# Patient Record
Sex: FEMALE | Race: OTHER | Employment: UNEMPLOYED | URBAN - METROPOLITAN AREA
[De-identification: names, ages, dates, MRNs, and addresses within clinical notes are randomized per-mention and may not be internally consistent; named-entity substitution may affect disease eponyms.]

---

## 2019-09-26 ENCOUNTER — HOSPITAL ENCOUNTER (OUTPATIENT)
Dept: GENERAL RADIOLOGY | Age: 59
Discharge: HOME | End: 2019-09-28
Payer: MEDICARE

## 2019-09-26 DIAGNOSIS — Z12.31 VISIT FOR SCREENING MAMMOGRAM: ICD-10-CM

## 2019-09-26 PROCEDURE — 77063 BREAST TOMOSYNTHESIS BI: CPT

## 2020-02-15 ENCOUNTER — APPOINTMENT (OUTPATIENT)
Dept: GENERAL RADIOLOGY | Age: 60
End: 2020-02-15
Payer: MEDICARE

## 2020-02-15 ENCOUNTER — HOSPITAL ENCOUNTER (EMERGENCY)
Age: 60
Discharge: HOME | End: 2020-02-15
Payer: MEDICARE

## 2020-02-15 VITALS
HEIGHT: 61 IN | BODY MASS INDEX: 33.99 KG/M2 | RESPIRATION RATE: 17 BRPM | HEART RATE: 82 BPM | WEIGHT: 180 LBS | OXYGEN SATURATION: 98 % | SYSTOLIC BLOOD PRESSURE: 136 MMHG | DIASTOLIC BLOOD PRESSURE: 83 MMHG | TEMPERATURE: 97.8 F

## 2020-02-15 LAB
INFLUENZA A BY PCR: DETECTED
INFLUENZA B BY PCR: NOT DETECTED

## 2020-02-15 PROCEDURE — 99283 EMERGENCY DEPT VISIT LOW MDM: CPT

## 2020-02-15 PROCEDURE — 71046 X-RAY EXAM CHEST 2 VIEWS: CPT

## 2020-02-15 PROCEDURE — 87502 INFLUENZA DNA AMP PROBE: CPT

## 2020-02-15 PROCEDURE — 6370000000 HC RX 637 (ALT 250 FOR IP): Performed by: NURSE PRACTITIONER

## 2020-02-15 RX ORDER — GUAIFENESIN AND CODEINE PHOSPHATE 100; 10 MG/5ML; MG/5ML
5 SOLUTION ORAL 3 TIMES DAILY PRN
Qty: 45 ML | Refills: 0 | Status: SHIPPED | OUTPATIENT
Start: 2020-02-15 | End: 2020-02-18

## 2020-02-15 RX ORDER — OSELTAMIVIR PHOSPHATE 75 MG/1
75 CAPSULE ORAL ONCE
Status: COMPLETED | OUTPATIENT
Start: 2020-02-15 | End: 2020-02-15

## 2020-02-15 RX ORDER — OSELTAMIVIR PHOSPHATE 75 MG/1
75 CAPSULE ORAL 2 TIMES DAILY
Qty: 10 CAPSULE | Refills: 0 | Status: SHIPPED | OUTPATIENT
Start: 2020-02-15 | End: 2020-02-20

## 2020-02-15 RX ADMIN — OSELTAMIVIR PHOSPHATE 75 MG: 75 CAPSULE ORAL at 02:29

## 2020-02-15 NOTE — ED PROVIDER NOTES
[facility]     Department of Emergency Medicine   ED  Provider Note  Admit Date/RoomTime: 2/15/2020 12:24 AM  ED Room: 36/36  Chief Complaint:   Cough (and running nose since yesterday and then began to bleed today. no bleeding in triage. pt states that she wants to gete something for her cold symptoms and also make she nose bleed is not from her abd. band surgery )    History of Present Illness   Source of history provided by:  patient. History/Exam Limitations: none. New Laura is a 61 y.o. old female with a past medical history of:   Past Medical History:   Diagnosis Date    Cir[facility]osis ([facility] [address]     Diabetes mellitus ([facility] [address]     presents to the emergency department by private vehicle, for rhinorrhea and cough, which began 1 day(s) prior to arrival.  Since onset the symptoms have been persistent and mild-moderate in severity. The symptoms are associated with none of significance. There has been NO abdominal pain, appetite decrease, chest pain, conjunctivitis, diarrhea, dizziness, dysuria, earache, headache, hoarseness, nausea, neck stiffness, rash, vomiting or wheezing. ROS   Pertinent positives and negatives are stated within HPI, all other systems reviewed and are negative. Past Surgical History:  has no past surgical history on file. Social History:  reports that she has never smoked. She has never used smokeless tobacco. She reports previous alcohol use. She reports previous drug use. Family History: family history is not on file. Allergies: Patient has no known allergies. Physical Exam           ED Triage Vitals   BP Temp Temp Source Pulse Resp SpO2 Height Weight   02/15/20 0020 02/15/20 0020 02/15/20 0020 02/14/20 2352 02/14/20 2352 02/14/20 2352 02/15/20 0020 02/15/20 0020   136/83 98.3 °F (36.8 °C) Oral 83 16 98 % 5' 1\" (1.549 m) 180 lb (81.6 kg)      Oxygen Saturation Interpretation: Normal.    · Constitutional:  Alert, development consistent with age.   · Ears:  External Ears: appropriate for outpatient management. Patient did test positive for influenza A. She will be discharged at this time was given a prescription for influenza as well as Robitussin cough syrup. She is to follow-up with her primary care provider. Plan of Care: Normal progression of disease discussed. All questions answered. Explained the rationale for symptomatic treatment rather than use of an antibiotic. Instruction provided in the use of fluids, vaporizer, acetaminophen, and other OTC medication for symptom control. Extra fluids  Analgesics as needed, dose reviewed. Follow up as needed should symptoms fail to improve. Counseling: The emergency provider has spoken with the patient and discussed todays results, in addition to providing specific details for the plan of care and counseling regarding the diagnosis and prognosis. Questions are answered at this time and they are agreeable with the plan to be discharged. Assessment     1. Influenza A      Plan   Discharge to home  Patient condition is good    New Medications     Discharge Medication List as of 2/15/2020  1:57 AM      START taking these medications    Details   oseltamivir (TAMIFLU) 75 MG capsule Take 1 capsule by mouth 2 times daily for 5 days, Disp-10 capsule, R-0Print      guaiFENesin-codeine (TUSSI-ORGANIDIN NR) 100-10 MG/5ML syrup Take 5 mLs by mouth 3 times daily as needed for Cough for up to 3 days. , Disp-45 mL, R-0Print           Electronically signed by OK Zamudio NP   DD: 2/15/20  **This report was transcribed using voice recognition software. Every effort was made to ensure accuracy; however, inadvertent computerized transcription errors may be present.   END OF ED PROVIDER NOTE        OK Silverman NP  02/15/20 [id number]

## 2021-01-20 LAB

## 2021-04-16 LAB
ALBUMIN SERPL-MCNC: NORMAL G/DL
ALP BLD-CCNC: NORMAL U/L
ALT SERPL-CCNC: NORMAL U/L
ANION GAP SERPL CALCULATED.3IONS-SCNC: NORMAL MMOL/L
AST SERPL-CCNC: NORMAL U/L
BILIRUB SERPL-MCNC: NORMAL MG/DL
BUN BLDV-MCNC: NORMAL MG/DL
CALCIUM SERPL-MCNC: NORMAL MG/DL
CHLORIDE BLD-SCNC: NORMAL MMOL/L
CO2: NORMAL
CREAT SERPL-MCNC: NORMAL MG/DL
GFR CALCULATED: NORMAL
GLUCOSE BLD-MCNC: NORMAL MG/DL
INR BLD: NORMAL
POTASSIUM SERPL-SCNC: NORMAL MMOL/L
PROTIME: NORMAL
SODIUM BLD-SCNC: NORMAL MMOL/L
TOTAL PROTEIN: NORMAL

## 2021-11-23 LAB
ALBUMIN SERPL-MCNC: NORMAL G/DL
ALP BLD-CCNC: NORMAL U/L
ALT SERPL-CCNC: NORMAL U/L
ANION GAP SERPL CALCULATED.3IONS-SCNC: NORMAL MMOL/L
AST SERPL-CCNC: NORMAL U/L
AVERAGE GLUCOSE: 180
BILIRUB SERPL-MCNC: NORMAL MG/DL
BUN BLDV-MCNC: NORMAL MG/DL
CALCIUM SERPL-MCNC: NORMAL MG/DL
CHLORIDE BLD-SCNC: NORMAL MMOL/L
CHOLESTEROL, TOTAL: NORMAL
CHOLESTEROL/HDL RATIO: NORMAL
CO2: NORMAL
CREAT SERPL-MCNC: NORMAL MG/DL
CREATININE, URINE: 109.6
GFR CALCULATED: NORMAL
GLUCOSE BLD-MCNC: NORMAL MG/DL
HBA1C MFR BLD: 7.9 %
HDLC SERPL-MCNC: NORMAL MG/DL
LDL CHOLESTEROL CALCULATED: NORMAL
MICROALBUMIN/CREAT 24H UR: 19 MG/G{CREAT}
MICROALBUMIN/CREAT UR-RTO: 17
NONHDLC SERPL-MCNC: NORMAL MG/DL
POTASSIUM SERPL-SCNC: NORMAL MMOL/L
SODIUM BLD-SCNC: NORMAL MMOL/L
TOTAL PROTEIN: NORMAL
TRIGL SERPL-MCNC: NORMAL MG/DL
VLDLC SERPL CALC-MCNC: NORMAL MG/DL

## 2022-03-21 ENCOUNTER — HOSPITAL ENCOUNTER (OUTPATIENT)
Dept: GENERAL RADIOLOGY | Age: 62
Discharge: HOME | End: 2022-03-23
Payer: MEDICARE

## 2022-03-21 DIAGNOSIS — R13.10 DYSPHAGIA, UNSPECIFIED TYPE: ICD-10-CM

## 2022-03-21 PROCEDURE — 74230 X-RAY XM SWLNG FUNCJ C+: CPT

## 2022-03-21 PROCEDURE — 92526 ORAL FUNCTION THERAPY: CPT

## 2022-03-21 PROCEDURE — 92611 MOTION FLUOROSCOPY/SWALLOW: CPT

## 2022-03-21 NOTE — PROGRESS NOTES
SPEECH/LANGUAGE PATHOLOGY  VIDEOFLUOROSCOPIC STUDY OF SWALLOWING (MBS)   and PLAN OF CARE    PATIENT NAME:  Francis Carroll  (female)     MRN:  [id number]    :  1960  (58 y.o.)  STATUS:  Outpatient    TODAY'S DATE:  3/21/2022  REFERRING PROVIDER:   Rosario Peres   SPECIFIC PROVIDER ORDER: FL modified barium swallow with video  Date of order:  3-10-22   REASON FOR REFERRAL: dysphagia   EVALUATING THERAPIST: JAVI Germain      RESULTS:      DYSPHAGIA DIAGNOSIS:  normal swallow function     DIET RECOMMENDATIONS:  Regular consistency solids (IDDSI level 7) with  thin liquids (IDDSI level 0)    FEEDING RECOMMENDATIONS:    Assistance level:  No assistance needed     Compensatory strategies recommended: No strategies are recommended at this time     Discussed recommendations with nursing and/or faxed report to referring provider: Yes    SPEECH THERAPY  PLAN OF CARE   The dysphagia POC is established based on physician order and dysphagia diagnosis    Dysphagia therapy is not recommended       Conditions Requiring Skilled Therapeutic Intervention for dysphagia:    not applicable    SPECIFIC DYSPHAGIA INTERVENTIONS TO INCLUDE:     Not applicable    Specific instructions for next treatment:  not applicable   Treatment Goals:    Short Term Goals:  Not applicable no therapy warranted     Long Term Goals:   Not applicable no therapy warranted      Patient/family Goal:    not applicable                    ADMITTING DIAGNOSIS: Dysphagia, unspecified type [R13.10]     VISIT DIAGNOSIS:   Visit Diagnoses       Codes    Dysphagia, unspecified type     R13.10              PATIENT REPORT/COMPLAINT: food sticking in the throat with emesis    PRIOR LEVEL OF SWALLOW FUNCTION:    Past History of Dysphagia?:  none reported    Home diet: Pureed consistency solids (IDDSI level 4) with  thin liquids (IDDSI level 0) due to fear    PROCEDURE:  Consistencies Administered During the Evaluation   Liquids: thin liquid and nectar thick liquid   Solids:  pureed foods and solid foods      Method of Intake:   cup, straw, spoon  Self fed, Fed by clinician      Position:   Standing, Lateral plane    INSTRUMENTAL ASSESSMENT:    ORAL PREPARATION PHASE:    Within functional limits    ORAL PHASE:   Within functional limits    PHARYNGEAL PHASE:     ONSET TIME       Onset time of the pharyngeal swallow was adequate       PHARYNGEAL RESIDUALS        Vallecula/Pharyngeal Wall           No significant residuals were noted in the vallecula      Pyriform Sinuses      No significant residuals were noted in the pyriform sinuses     LARYNGEAL PENETRATION   Laryngeal penetration was not present during this evaluation    ASPIRATION  Aspiration was not present during this evaluation    PENETRATION-ASPIRATION SCALE (PAS):  THIN 1 = Material does not enter the airway  MILDLY THICK 1 = Material does not enter the airway  MODERATELY THICK item not administered  PUREE 1 = Material does not enter the airway  HARD SOLID 1 = Material does not enter the airway       COMPENSATORY STRATEGIES    Compensatory strategies were not attempted      STRUCTURAL/FUNCTIONAL ANOMALIES   No structural/functional anomalies were noted    CERVICAL ESOPHAGEAL STAGE :     The cervical esophagus appeared adequate          ___________    Cognition:   Within functional limits for this exam    Oral Peripheral Examination   Adequate lingual/labial strength     Current Respiratory Status   room air     Parameters of Speech Production  Respiration:  Adequate for speech production  Quality:   Within functional limits  Intensity: Within functional limits    Pain: No pain reported. EDUCATION:   The Speech Language Pathologist (SLP) completed education regarding results of evaluation and that intervention is not warranted at this time.   Learner: Patient and Family  Education: Reviewed results and recommendations of this evaluation  Evaluation of Education:  Davin Stauffer understanding    This plan may be re-evaluated and revised as warranted. Evaluation Time includes thorough review of current medical information, gathering information on past medical history/social history and prior level of function, completion of standardized testing/informal observation of tasks, assessment of data and education on plan of care and goals. [x]The admitting diagnosis and active problem list, have been reviewed prior to initiation of this evaluation. CPT Code: 71219  dysphagia study    ACTIVE PROBLEM LIST: There is no problem list on file for this patient.

## 2022-07-06 LAB
AVERAGE GLUCOSE: NORMAL
HBA1C MFR BLD: 9 %

## 2022-09-28 RX ORDER — GABAPENTIN 600 MG/1
600 TABLET ORAL 3 TIMES DAILY
COMMUNITY

## 2022-09-28 RX ORDER — NADOLOL 20 MG/1
20 TABLET ORAL DAILY
COMMUNITY

## 2022-09-28 RX ORDER — LISINOPRIL 40 MG/1
40 TABLET ORAL DAILY
COMMUNITY

## 2022-09-28 RX ORDER — INSULIN LISPRO 100 [IU]/ML
45 INJECTION, SUSPENSION SUBCUTANEOUS 2 TIMES DAILY WITH MEALS
COMMUNITY

## 2022-10-17 DIAGNOSIS — E11.9 DIABETES MELLITUS WITHOUT COMPLICATION (HCC): ICD-10-CM

## 2022-10-17 DIAGNOSIS — E11.9 DIABETES MELLITUS WITHOUT COMPLICATION (HCC): Primary | ICD-10-CM

## 2022-10-17 LAB
ALBUMIN SERPL-MCNC: 3.5 G/DL (ref 3.5–5.2)
ALP BLD-CCNC: 127 U/L (ref 35–104)
ALT SERPL-CCNC: 19 U/L (ref 0–32)
ANION GAP SERPL CALCULATED.3IONS-SCNC: 10 MMOL/L (ref 7–16)
AST SERPL-CCNC: 29 U/L (ref 0–31)
BASOPHILS ABSOLUTE: 0 E9/L (ref 0–0.2)
BASOPHILS RELATIVE PERCENT: 0.5 % (ref 0–2)
BILIRUB SERPL-MCNC: 1.2 MG/DL (ref 0–1.2)
BUN BLDV-MCNC: 23 MG/DL (ref 6–23)
CALCIUM SERPL-MCNC: 8.9 MG/DL (ref 8.6–10.2)
CHLORIDE BLD-SCNC: 105 MMOL/L (ref 98–107)
CHOLESTEROL, TOTAL: 149 MG/DL (ref 0–199)
CO2: 25 MMOL/L (ref 22–29)
CREAT SERPL-MCNC: 1.1 MG/DL (ref 0.5–1)
EOSINOPHILS ABSOLUTE: 0.03 E9/L (ref 0.05–0.5)
EOSINOPHILS RELATIVE PERCENT: 1.8 % (ref 0–6)
GFR SERPL CREATININE-BSD FRML MDRD: 57 ML/MIN/1.73
GLUCOSE BLD-MCNC: 269 MG/DL (ref 74–99)
HBA1C MFR BLD: 9.7 % (ref 4–5.6)
HCT VFR BLD CALC: 33.9 % (ref 34–48)
HDLC SERPL-MCNC: 69 MG/DL
HEMOGLOBIN: 10.9 G/DL (ref 11.5–15.5)
LDL CHOLESTEROL CALCULATED: 57 MG/DL (ref 0–99)
LYMPHOCYTES ABSOLUTE: 0.46 E9/L (ref 1.5–4)
LYMPHOCYTES RELATIVE PERCENT: 24.1 % (ref 20–42)
MCH RBC QN AUTO: 29.4 PG (ref 26–35)
MCHC RBC AUTO-ENTMCNC: 32.2 % (ref 32–34.5)
MCV RBC AUTO: 91.4 FL (ref 80–99.9)
MONOCYTES ABSOLUTE: 0.15 E9/L (ref 0.1–0.95)
MONOCYTES RELATIVE PERCENT: 8 % (ref 2–12)
NEUTROPHILS ABSOLUTE: 1.25 E9/L (ref 1.8–7.3)
NEUTROPHILS RELATIVE PERCENT: 66.1 % (ref 43–80)
NUCLEATED RED BLOOD CELLS: 0.9 /100 WBC
OVALOCYTES: ABNORMAL
PDW BLD-RTO: 13.5 FL (ref 11.5–15)
PLATELET # BLD: 34 E9/L (ref 130–450)
PLATELET CONFIRMATION: NORMAL
PMV BLD AUTO: 12.6 FL (ref 7–12)
POIKILOCYTES: ABNORMAL
POTASSIUM SERPL-SCNC: 4.9 MMOL/L (ref 3.5–5)
RBC # BLD: 3.71 E12/L (ref 3.5–5.5)
SODIUM BLD-SCNC: 140 MMOL/L (ref 132–146)
TOTAL PROTEIN: 7.9 G/DL (ref 6.4–8.3)
TRIGL SERPL-MCNC: 113 MG/DL (ref 0–149)
VACUOLATED NEUTROPHILS: ABNORMAL
VLDLC SERPL CALC-MCNC: 23 MG/DL
WBC # BLD: 1.9 E9/L (ref 4.5–11.5)

## 2022-11-08 ENCOUNTER — OFFICE VISIT (OUTPATIENT)
Dept: PRIMARY CARE CLINIC | Age: 62
End: 2022-11-08
Payer: MEDICARE

## 2022-11-08 VITALS
HEIGHT: 63 IN | WEIGHT: 182 LBS | SYSTOLIC BLOOD PRESSURE: 122 MMHG | OXYGEN SATURATION: 99 % | BODY MASS INDEX: 32.25 KG/M2 | DIASTOLIC BLOOD PRESSURE: 62 MMHG | HEART RATE: 80 BPM | TEMPERATURE: 98 F

## 2022-11-08 DIAGNOSIS — Z79.4 TYPE 2 DIABETES MELLITUS WITHOUT COMPLICATION, WITH LONG-TERM CURRENT USE OF INSULIN (HCC): ICD-10-CM

## 2022-11-08 DIAGNOSIS — K74.69 OTHER CIRRHOSIS OF LIVER (HCC): ICD-10-CM

## 2022-11-08 DIAGNOSIS — E11.9 TYPE 2 DIABETES MELLITUS WITHOUT COMPLICATION, WITH LONG-TERM CURRENT USE OF INSULIN (HCC): ICD-10-CM

## 2022-11-08 DIAGNOSIS — I85.10 SECONDARY ESOPHAGEAL VARICES WITHOUT BLEEDING (HCC): ICD-10-CM

## 2022-11-08 DIAGNOSIS — K21.9 GERD WITHOUT ESOPHAGITIS: ICD-10-CM

## 2022-11-08 PROCEDURE — 99214 OFFICE O/P EST MOD 30 MIN: CPT | Performed by: INTERNAL MEDICINE

## 2022-11-08 PROCEDURE — 3046F HEMOGLOBIN A1C LEVEL >9.0%: CPT | Performed by: INTERNAL MEDICINE

## 2022-11-08 RX ORDER — OMEPRAZOLE 20 MG/1
20 CAPSULE, DELAYED RELEASE ORAL DAILY
Qty: 90 CAPSULE | Refills: 0 | Status: SHIPPED | OUTPATIENT
Start: 2022-11-08

## 2022-11-08 RX ORDER — INSULIN LISPRO 100 [IU]/ML
45 INJECTION, SUSPENSION SUBCUTANEOUS 2 TIMES DAILY WITH MEALS
Qty: 5 ADJUSTABLE DOSE PRE-FILLED PEN SYRINGE | Refills: 2 | Status: SHIPPED | OUTPATIENT
Start: 2022-11-08

## 2022-11-08 RX ORDER — NADOLOL 20 MG/1
20 TABLET ORAL DAILY
Qty: 90 TABLET | Refills: 0 | Status: SHIPPED | OUTPATIENT
Start: 2022-11-08

## 2022-11-08 RX ORDER — GABAPENTIN 600 MG/1
600 TABLET ORAL 3 TIMES DAILY
Qty: 90 TABLET | Refills: 2 | Status: SHIPPED | OUTPATIENT
Start: 2022-11-08 | End: 2023-02-06

## 2022-11-08 RX ORDER — LISINOPRIL 40 MG/1
40 TABLET ORAL DAILY
Qty: 90 TABLET | Refills: 0 | Status: SHIPPED | OUTPATIENT
Start: 2022-11-08

## 2022-11-08 SDOH — ECONOMIC STABILITY: FOOD INSECURITY: WITHIN THE PAST 12 MONTHS, THE FOOD YOU BOUGHT JUST DIDN'T LAST AND YOU DIDN'T HAVE MONEY TO GET MORE.: NEVER TRUE

## 2022-11-08 SDOH — ECONOMIC STABILITY: FOOD INSECURITY: WITHIN THE PAST 12 MONTHS, YOU WORRIED THAT YOUR FOOD WOULD RUN OUT BEFORE YOU GOT MONEY TO BUY MORE.: NEVER TRUE

## 2022-11-08 ASSESSMENT — PATIENT HEALTH QUESTIONNAIRE - PHQ9
SUM OF ALL RESPONSES TO PHQ QUESTIONS 1-9: 1
2. FEELING DOWN, DEPRESSED OR HOPELESS: 0
SUM OF ALL RESPONSES TO PHQ QUESTIONS 1-9: 1
1. LITTLE INTEREST OR PLEASURE IN DOING THINGS: 1
SUM OF ALL RESPONSES TO PHQ QUESTIONS 1-9: 1
SUM OF ALL RESPONSES TO PHQ QUESTIONS 1-9: 1
SUM OF ALL RESPONSES TO PHQ9 QUESTIONS 1 & 2: 1

## 2022-11-08 ASSESSMENT — ENCOUNTER SYMPTOMS
COLOR CHANGE: 0
ABDOMINAL PAIN: 0
SINUS PRESSURE: 0
SORE THROAT: 0
DIARRHEA: 0
NAUSEA: 0
RHINORRHEA: 0
TROUBLE SWALLOWING: 0
ABDOMINAL DISTENTION: 0
VOMITING: 0
BLOOD IN STOOL: 0
BACK PAIN: 0
ALLERGIC/IMMUNOLOGIC NEGATIVE: 1
CONSTIPATION: 0

## 2022-11-08 ASSESSMENT — SOCIAL DETERMINANTS OF HEALTH (SDOH): HOW HARD IS IT FOR YOU TO PAY FOR THE VERY BASICS LIKE FOOD, HOUSING, MEDICAL CARE, AND HEATING?: VERY HARD

## 2022-11-08 NOTE — PROGRESS NOTES
Lazaro Leader presents today for follow up of Diabetes, Cirhosis of the liver, Esophageal Varices, HTN and GERDLiver cirrhosis    Current Outpatient Medications   Medication Sig Dispense Refill    gabapentin (NEURONTIN) 600 MG tablet Take 1 tablet by mouth 3 times daily for 90 days. 90 tablet 2    insulin lispro protamine & lispro (HUMALOG MIX 75/25 KWIKPEN) (75-25) 100 UNIT per ML SUPN injection pen Inject 0.45 mLs into the skin 2 times daily (with meals) 5 Adjustable Dose Pre-filled Pen Syringe 2    lisinopril (PRINIVIL;ZESTRIL) 40 MG tablet Take 1 tablet by mouth daily 90 tablet 0    nadolol (CORGARD) 20 MG tablet Take 1 tablet by mouth daily 90 tablet 0    omeprazole (PRILOSEC) 20 MG delayed release capsule Take 1 capsule by mouth daily 90 capsule 0    insulin regular (HUMULIN R;NOVOLIN R) 100 UNIT/ML injection Inject 45 Units into the skin 2 times daily (before meals)       No current facility-administered medications for this visit. Past Medical History:   Diagnosis Date    Cirrhosis ([facility] [address]     Diabetes mellitus ([facility] [address]     Diabetic neuropathy ([facility] [address]     Esophageal varices without bleeding ([facility] [address]     GERD (gastroesophageal reflux disease)     Hypertension     Liver cirrhosis ([facility] [address]     with secondary esophageal varices, no signs/sx's of hepatic encephalopathy    Low vitamin D level     Thrombocytopenia (HCC)           Subjective:  Issues with Chronic Tendonitis rt upper chest and rt shoulder, has had it for years. At times has a hard time getting dressed. BS at times goes up to 200, depends on what she eats. No further choking episodes. Had Upper GI, says was fine, no abd pain, black stools or bloody stools. Review of Systems   Constitutional:  Negative for activity change, appetite change and chills. HENT:  Negative for congestion, ear pain, mouth sores, postnasal drip, rhinorrhea, sinus pressure, sneezing, sore throat and trouble swallowing. Eyes:  Negative for visual disturbance. Cardiovascular:  Negative for chest pain, palpitations and leg swelling. Gastrointestinal:  Negative for abdominal distention, abdominal pain, blood in stool, constipation, diarrhea, nausea and vomiting. Endocrine: Negative for cold intolerance, heat intolerance, polydipsia and polyuria. Genitourinary:  Negative for difficulty urinating, dysuria, flank pain, frequency and urgency. Musculoskeletal:  Negative for arthralgias, back pain, gait problem, neck pain and neck stiffness. Rt shoulder and rt upper chest pain   Skin: Negative. Negative for color change. Allergic/Immunologic: Negative. Neurological:  Negative for dizziness, tremors, speech difficulty, weakness, light-headedness and headaches. Hematological: Negative. Psychiatric/Behavioral: Negative. Objective:  /62 (Site: Left Upper Arm, Position: Sitting, Cuff Size: Medium Adult)   Pulse 80   Temp 98 °F (36.7 °C)   Ht 5' 3\" (1.6 m)   Wt 182 lb (82.6 kg)   SpO2 99%   BMI 32.24 kg/m²      Physical Exam  HENT:      Head: Normocephalic. Right Ear: Tympanic membrane and external ear normal. There is no impacted cerumen. Left Ear: Tympanic membrane and external ear normal. There is no impacted cerumen. Nose: Nose normal.      Mouth/Throat:      Pharynx: Oropharynx is clear. No oropharyngeal exudate. Eyes:      Extraocular Movements: Extraocular movements intact. Conjunctiva/sclera: Conjunctivae normal.      Pupils: Pupils are equal, round, and reactive to light. Cardiovascular:      Rate and Rhythm: Normal rate and regular rhythm. Heart sounds: No murmur heard. No friction rub. No gallop. Pulmonary:      Effort: Pulmonary effort is normal.      Breath sounds: Normal breath sounds. Abdominal:      General: Bowel sounds are normal. There is no distension. Palpations: Abdomen is soft. There is no mass. Tenderness: There is no abdominal tenderness.  There is no guarding or rebound. Musculoskeletal:         General: No swelling, tenderness or deformity. Normal range of motion. Cervical back: Normal range of motion and neck supple. No tenderness. Comments: Limited rom rt shoulder, tender rt upper chest, anteriorly   Lymphadenopathy:      Cervical: No cervical adenopathy. Skin:     General: Skin is warm. Coloration: Skin is not pale. Findings: No rash. Neurological:      General: No focal deficit present. Mental Status: She is alert and oriented to person, place, and time. Assessment:  Jessica Fields was seen today for diabetes. Diagnoses and all orders for this visit:    Other cirrhosis of liver ([facility] [address]  Comments:  latelets low 30's. Advised to watch for any signs of bleed, specially black and blue lesions on skin or bleeding from gums. Repeat in 3 months    Type 2 diabetes mellitus without complication, with long-term current use of insulin ([facility])  Comments:  Discussed dietary restrictions, monitor bs at home, repeat A1C next visit    Secondary esophageal varices without bleeding ([facility]  Comments:  Has repeat Gastroscopy in Feb 23    GERD without esophagitis  Comments:  Asymptomatic at this time    Other orders  -     gabapentin (NEURONTIN) 600 MG tablet; Take 1 tablet by mouth 3 times daily for 90 days. -     insulin lispro protamine & lispro (HUMALOG MIX 75/25 KWIKPEN) (75-25) 100 UNIT per ML SUPN injection pen; Inject 0.45 mLs into the skin 2 times daily (with meals)  -     lisinopril (PRINIVIL;ZESTRIL) 40 MG tablet; Take 1 tablet by mouth daily  -     nadolol (CORGARD) 20 MG tablet; Take 1 tablet by mouth daily  -     omeprazole (PRILOSEC) 20 MG delayed release capsule;  Take 1 capsule by mouth daily

## 2022-11-13 RX ORDER — GABAPENTIN 600 MG/1
600 TABLET ORAL 3 TIMES DAILY
Qty: 270 TABLET | Refills: 0 | OUTPATIENT
Start: 2022-11-13 | End: 2023-02-11

## 2023-03-29 ENCOUNTER — OFFICE VISIT (OUTPATIENT)
Dept: FAMILY MEDICINE CLINIC | Age: 63
End: 2023-03-29
Payer: MEDICARE

## 2023-03-29 VITALS
TEMPERATURE: 97.5 F | HEART RATE: 88 BPM | HEIGHT: 63 IN | DIASTOLIC BLOOD PRESSURE: 70 MMHG | OXYGEN SATURATION: 97 % | BODY MASS INDEX: 34.55 KG/M2 | SYSTOLIC BLOOD PRESSURE: 130 MMHG | WEIGHT: 195 LBS | RESPIRATION RATE: 16 BRPM

## 2023-03-29 DIAGNOSIS — E11.9 TYPE 2 DIABETES MELLITUS WITHOUT COMPLICATION, WITH LONG-TERM CURRENT USE OF INSULIN (HCC): ICD-10-CM

## 2023-03-29 DIAGNOSIS — K59.00 CONSTIPATION, UNSPECIFIED CONSTIPATION TYPE: ICD-10-CM

## 2023-03-29 DIAGNOSIS — Z12.11 COLON CANCER SCREENING: ICD-10-CM

## 2023-03-29 DIAGNOSIS — Z79.4 TYPE 2 DIABETES MELLITUS WITHOUT COMPLICATION, WITH LONG-TERM CURRENT USE OF INSULIN (HCC): ICD-10-CM

## 2023-03-29 DIAGNOSIS — Z28.21 VACCINATION NOT CARRIED OUT BECAUSE OF PATIENT REFUSAL: ICD-10-CM

## 2023-03-29 DIAGNOSIS — Z00.00 ENCOUNTER FOR MEDICAL EXAMINATION TO ESTABLISH CARE: Primary | ICD-10-CM

## 2023-03-29 DIAGNOSIS — I10 ESSENTIAL HYPERTENSION: ICD-10-CM

## 2023-03-29 DIAGNOSIS — Z12.31 BREAST CANCER SCREENING BY MAMMOGRAM: ICD-10-CM

## 2023-03-29 DIAGNOSIS — K74.69 OTHER CIRRHOSIS OF LIVER (HCC): ICD-10-CM

## 2023-03-29 LAB
ALBUMIN SERPL-MCNC: 3.1 G/DL (ref 3.5–5.2)
ALP SERPL-CCNC: 110 U/L (ref 35–104)
ALT SERPL-CCNC: 12 U/L (ref 0–32)
ANION GAP SERPL CALCULATED.3IONS-SCNC: 8 MMOL/L (ref 7–16)
AST SERPL-CCNC: 33 U/L (ref 0–31)
BILIRUB SERPL-MCNC: 1.7 MG/DL (ref 0–1.2)
BUN SERPL-MCNC: 21 MG/DL (ref 6–23)
CALCIUM SERPL-MCNC: 8.9 MG/DL (ref 8.6–10.2)
CHLORIDE SERPL-SCNC: 106 MMOL/L (ref 98–107)
CHOLESTEROL, FASTING: 132 MG/DL (ref 0–199)
CHP ED QC CHECK: ABNORMAL
CO2 SERPL-SCNC: 24 MMOL/L (ref 22–29)
CREAT SERPL-MCNC: 1 MG/DL (ref 0.5–1)
ERYTHROCYTE [DISTWIDTH] IN BLOOD BY AUTOMATED COUNT: 14.4 FL (ref 11.5–15)
GLUCOSE BLD-MCNC: 222 MG/DL
GLUCOSE FASTING: 227 MG/DL (ref 74–99)
HBA1C MFR BLD: 7.4 %
HCT VFR BLD AUTO: 30.3 % (ref 34–48)
HDLC SERPL-MCNC: 51 MG/DL
HGB BLD-MCNC: 9.7 G/DL (ref 11.5–15.5)
LDL CHOLESTEROL CALCULATED: 59 MG/DL (ref 0–99)
MCH RBC QN AUTO: 29.9 PG (ref 26–35)
MCHC RBC AUTO-ENTMCNC: 32 % (ref 32–34.5)
MCV RBC AUTO: 93.5 FL (ref 80–99.9)
PLATELET # BLD AUTO: 48 E9/L (ref 130–450)
PLATELET CONFIRMATION: NORMAL
PMV BLD AUTO: 11.7 FL (ref 7–12)
POTASSIUM SERPL-SCNC: 5 MMOL/L (ref 3.5–5)
PROT SERPL-MCNC: 8.3 G/DL (ref 6.4–8.3)
RBC # BLD AUTO: 3.24 E12/L (ref 3.5–5.5)
SODIUM SERPL-SCNC: 138 MMOL/L (ref 132–146)
TRIGLYCERIDE, FASTING: 111 MG/DL (ref 0–149)
TSH SERPL-MCNC: 3.74 UIU/ML (ref 0.27–4.2)
VLDLC SERPL CALC-MCNC: 22 MG/DL
WBC # BLD: 2.4 E9/L (ref 4.5–11.5)

## 2023-03-29 PROCEDURE — 99214 OFFICE O/P EST MOD 30 MIN: CPT | Performed by: STUDENT IN AN ORGANIZED HEALTH CARE EDUCATION/TRAINING PROGRAM

## 2023-03-29 PROCEDURE — 3075F SYST BP GE 130 - 139MM HG: CPT | Performed by: STUDENT IN AN ORGANIZED HEALTH CARE EDUCATION/TRAINING PROGRAM

## 2023-03-29 PROCEDURE — 3051F HG A1C>EQUAL 7.0%<8.0%: CPT | Performed by: STUDENT IN AN ORGANIZED HEALTH CARE EDUCATION/TRAINING PROGRAM

## 2023-03-29 PROCEDURE — 83036 HEMOGLOBIN GLYCOSYLATED A1C: CPT | Performed by: STUDENT IN AN ORGANIZED HEALTH CARE EDUCATION/TRAINING PROGRAM

## 2023-03-29 PROCEDURE — 82962 GLUCOSE BLOOD TEST: CPT | Performed by: STUDENT IN AN ORGANIZED HEALTH CARE EDUCATION/TRAINING PROGRAM

## 2023-03-29 PROCEDURE — 3078F DIAST BP <80 MM HG: CPT | Performed by: STUDENT IN AN ORGANIZED HEALTH CARE EDUCATION/TRAINING PROGRAM

## 2023-03-29 RX ORDER — POLYETHYLENE GLYCOL 3350 17 G/17G
17 POWDER, FOR SOLUTION ORAL DAILY
Qty: 1530 G | Refills: 1 | Status: SHIPPED | OUTPATIENT
Start: 2023-03-29 | End: 2023-04-28

## 2023-03-29 SDOH — ECONOMIC STABILITY: HOUSING INSECURITY
IN THE LAST 12 MONTHS, WAS THERE A TIME WHEN YOU DID NOT HAVE A STEADY PLACE TO SLEEP OR SLEPT IN A SHELTER (INCLUDING NOW)?: NO

## 2023-03-29 SDOH — ECONOMIC STABILITY: FOOD INSECURITY: WITHIN THE PAST 12 MONTHS, YOU WORRIED THAT YOUR FOOD WOULD RUN OUT BEFORE YOU GOT MONEY TO BUY MORE.: NEVER TRUE

## 2023-03-29 SDOH — ECONOMIC STABILITY: INCOME INSECURITY: HOW HARD IS IT FOR YOU TO PAY FOR THE VERY BASICS LIKE FOOD, HOUSING, MEDICAL CARE, AND HEATING?: NOT HARD AT ALL

## 2023-03-29 SDOH — ECONOMIC STABILITY: FOOD INSECURITY: WITHIN THE PAST 12 MONTHS, THE FOOD YOU BOUGHT JUST DIDN'T LAST AND YOU DIDN'T HAVE MONEY TO GET MORE.: NEVER TRUE

## 2023-03-29 ASSESSMENT — ENCOUNTER SYMPTOMS
SHORTNESS OF BREATH: 0
NAUSEA: 0
SORE THROAT: 0
CONSTIPATION: 1
DIARRHEA: 0
BACK PAIN: 0
RHINORRHEA: 0
SINUS PRESSURE: 0
ABDOMINAL PAIN: 0
VOMITING: 0
EYE PAIN: 0
COUGH: 0
ABDOMINAL DISTENTION: 1
SINUS PAIN: 0
EYE REDNESS: 0

## 2023-03-29 ASSESSMENT — PATIENT HEALTH QUESTIONNAIRE - PHQ9
SUM OF ALL RESPONSES TO PHQ9 QUESTIONS 1 & 2: 0
SUM OF ALL RESPONSES TO PHQ QUESTIONS 1-9: 0
1. LITTLE INTEREST OR PLEASURE IN DOING THINGS: 0
2. FEELING DOWN, DEPRESSED OR HOPELESS: 0
SUM OF ALL RESPONSES TO PHQ QUESTIONS 1-9: 0

## 2023-03-29 NOTE — PROGRESS NOTES
3/29/2023    HPI  Chief Complaint   Patient presents with    New Patient    Hypertension    Diabetes    Constipation     X 2 days     Bloated       Agn Welsh is a 61 y.o. female who  has a past medical history of Cirrhosis ([address] Diabetes mellitus ([address] Diabetic neuropathy ([address] Esophageal varices without bleeding ([address] GERD (gastroesophageal reflux disease), Hypertension, Liver cirrhosis ([address] Low vitamin D level, Thrombocytopenia ([address] and Type 2 diabetes mellitus without complication ([address] Patient is here today to establish care myself. Patient has no known allergies to medications but is allergic to fish. Surgical history includes appendectomy cholecystectomy  upper and GI endoscopy. DM2:   Patient is here to fu regarding DM2. Patient is not controlled. Taking all medications and tolerating well. Fasting sugars are running high. Patient is  taking ASA and Ace Inhibitor/ARB. Patient is not on appropriately-dosed statin. LDL is  at goal.  BP is  controlled. does not hypoglycemic episodes. Patient does not see Podiatry regularly. Patient is aware that it is necessary to see an Eye Dr yearly. Patient does not smoke. Most recent labs reviewed with patient. Patient does not have complaints or concerns today. Lab Results   Component Value Date    LABA1C 7.4 2023       Lab Results   Component Value Date    [address] 57 10/17/2022         Hypertension:  Patient is here for follow up chronic hypertension. This is  generally controlled on current medication regimen. Takes meds as directed and tolerates them well. Most recent labs reviewed with patient and are remarkable. No symptoms from htn standpoint per ROS. Patient is not compliant with lifestyle modifications. Patient does not smoke. Constipation  Patient complains of constipation. Onset was 2 days ago. Patient has been having rare formed stools per day. Defecation has been painful.  Symptoms have

## 2023-04-06 RX ORDER — OMEPRAZOLE 20 MG/1
20 CAPSULE, DELAYED RELEASE ORAL DAILY
Qty: 90 CAPSULE | Refills: 0 | Status: SHIPPED | OUTPATIENT
Start: 2023-04-06

## 2023-04-06 RX ORDER — INSULIN LISPRO 100 [IU]/ML
45 INJECTION, SUSPENSION SUBCUTANEOUS 2 TIMES DAILY WITH MEALS
Qty: 5 ADJUSTABLE DOSE PRE-FILLED PEN SYRINGE | Refills: 2 | Status: SHIPPED | OUTPATIENT
Start: 2023-04-06

## 2023-04-06 RX ORDER — GABAPENTIN 600 MG/1
600 TABLET ORAL 3 TIMES DAILY
Qty: 90 TABLET | Refills: 2 | Status: SHIPPED | OUTPATIENT
Start: 2023-04-06 | End: 2023-07-05

## 2023-04-06 RX ORDER — LISINOPRIL 40 MG/1
40 TABLET ORAL DAILY
Qty: 90 TABLET | Refills: 0 | Status: SHIPPED | OUTPATIENT
Start: 2023-04-06

## 2023-04-06 RX ORDER — NADOLOL 20 MG/1
20 TABLET ORAL DAILY
Qty: 90 TABLET | Refills: 0 | Status: SHIPPED | OUTPATIENT
Start: 2023-04-06

## 2023-04-28 DIAGNOSIS — R18.8 PANCREATIC ASCITES: ICD-10-CM

## 2023-04-28 DIAGNOSIS — K74.60 CIRRHOSIS OF LIVER WITHOUT ASCITES, UNSPECIFIED HEPATIC CIRRHOSIS TYPE (HCC): ICD-10-CM

## 2023-05-01 ENCOUNTER — APPOINTMENT (OUTPATIENT)
Dept: GENERAL RADIOLOGY | Age: 63
DRG: 086 | End: 2023-05-01
Payer: MEDICARE

## 2023-05-01 ENCOUNTER — HOSPITAL ENCOUNTER (INPATIENT)
Age: 63
LOS: 3 days | Discharge: NURSING, REHAB OR LONG-TERM CARE | DRG: 086 | End: 2023-05-04
Attending: EMERGENCY MEDICINE | Admitting: SURGERY
Payer: MEDICARE

## 2023-05-01 ENCOUNTER — APPOINTMENT (OUTPATIENT)
Dept: CT IMAGING | Age: 63
DRG: 086 | End: 2023-05-01
Payer: MEDICARE
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
## 2023-05-01 DIAGNOSIS — S06.5XAA SDH (SUBDURAL HEMATOMA) (HCC): ICD-10-CM

## 2023-05-01 DIAGNOSIS — D69.6 THROMBOCYTOPENIA (HCC): ICD-10-CM

## 2023-05-01 DIAGNOSIS — I62.9 INTRACRANIAL HEMORRHAGE (HCC): ICD-10-CM

## 2023-05-01 DIAGNOSIS — S09.90XA CLOSED HEAD INJURY, INITIAL ENCOUNTER: ICD-10-CM

## 2023-05-01 LAB
ABO + RH BLD: NORMAL
ABO + RH BLD: NORMAL
ALBUMIN SERPL-MCNC: 3 G/DL (ref 3.5–5.2)
ALP SERPL-CCNC: 104 U/L (ref 35–104)
ALT SERPL-CCNC: 11 U/L (ref 0–32)
ANGLE (CLOT STRENGTH): 59.2 DEGREE (ref 59–74)
ANION GAP SERPL CALCULATED.3IONS-SCNC: 7 MMOL/L (ref 7–16)
ANISOCYTOSIS: ABNORMAL
APTT BLD: 31.6 SEC (ref 24.5–35.1)
AST SERPL-CCNC: 28 U/L (ref 0–31)
BASOPHILS # BLD: 0 E9/L (ref 0–0.2)
BASOPHILS NFR BLD: 0.7 % (ref 0–2)
BILIRUB SERPL-MCNC: 2 MG/DL (ref 0–1.2)
BLD GP AB SCN SERPL QL: NORMAL
BLD GP AB SCN SERPL QL: NORMAL
BLOOD BANK DISPENSE STATUS: NORMAL
BLOOD BANK PRODUCT CODE: NORMAL
BPU ID: NORMAL
BUN SERPL-MCNC: 17 MG/DL (ref 6–23)
CALCIUM SERPL-MCNC: 8.9 MG/DL (ref 8.6–10.2)
CHLORIDE SERPL-SCNC: 106 MMOL/L (ref 98–107)
CO2 SERPL-SCNC: 24 MMOL/L (ref 22–29)
CREAT SERPL-MCNC: 0.8 MG/DL (ref 0.5–1)
DESCRIPTION BLOOD BANK: NORMAL
EOSINOPHIL # BLD: 0.03 E9/L (ref 0.05–0.5)
EOSINOPHIL NFR BLD: 1.8 % (ref 0–6)
EPL-TEG: 0.3 % (ref 0–15)
ERYTHROCYTE [DISTWIDTH] IN BLOOD BY AUTOMATED COUNT: 13.5 FL (ref 11.5–15)
ETHANOLAMINE SERPL-MCNC: <10 MG/DL (ref 0–0.08)
G-TEG: 5.2 K D/SC (ref 4.5–11)
GLUCOSE SERPL-MCNC: 220 MG/DL (ref 74–99)
HCT VFR BLD AUTO: 30.1 % (ref 34–48)
HGB BLD-MCNC: 9.7 G/DL (ref 11.5–15.5)
INR BLD: 1.6
K (CLOTTING TIME): 2.3 MIN (ref 1–3)
LACTATE BLDV-SCNC: 1.6 MMOL/L (ref 0.5–2.2)
LIPASE: 26 U/L (ref 13–60)
LY30 (FIBRINOLYSIS): 0.3 % (ref 0–8)
LYMPHOCYTES # BLD: 0.24 E9/L (ref 1.5–4)
LYMPHOCYTES NFR BLD: 15.9 % (ref 20–42)
MA (MAX AMPLITUDE): 51 MM (ref 50–70)
MCH RBC QN AUTO: 29.8 PG (ref 26–35)
MCHC RBC AUTO-ENTMCNC: 32.2 % (ref 32–34.5)
MCV RBC AUTO: 92.3 FL (ref 80–99.9)
METAMYELOCYTES NFR BLD MANUAL: 0.9 % (ref 0–1)
MONOCYTES # BLD: 0.06 E9/L (ref 0.1–0.95)
MONOCYTES NFR BLD: 3.5 % (ref 2–12)
MYELOCYTES NFR BLD MANUAL: 0.9 % (ref 0–0)
NEUTROPHILS # BLD: 1.19 E9/L (ref 1.8–7.3)
NEUTS SEG NFR BLD: 77 % (ref 43–80)
OVALOCYTES: ABNORMAL
PLATELET # BLD AUTO: 46 E9/L (ref 130–450)
PLATELET CONFIRMATION: NORMAL
PMV BLD AUTO: 11.5 FL (ref 7–12)
POIKILOCYTES: ABNORMAL
POTASSIUM SERPL-SCNC: 4.7 MMOL/L (ref 3.5–5)
PROT SERPL-MCNC: 8 G/DL (ref 6.4–8.3)
PROTHROMBIN TIME: 16.8 SEC (ref 9.3–12.4)
R (REACTION TIME): 5.9 MIN (ref 5–10)
RBC # BLD AUTO: 3.26 E12/L (ref 3.5–5.5)
SODIUM SERPL-SCNC: 137 MMOL/L (ref 132–146)
TEAR DROP CELLS: ABNORMAL
WBC # BLD: 1.5 E9/L (ref 4.5–11.5)

## 2023-05-01 PROCEDURE — 96375 TX/PRO/DX INJ NEW DRUG ADDON: CPT

## 2023-05-01 PROCEDURE — 2060000000 HC ICU INTERMEDIATE R&B

## 2023-05-01 PROCEDURE — 6360000004 HC RX CONTRAST MEDICATION: Performed by: RADIOLOGY

## 2023-05-01 PROCEDURE — 80053 COMPREHEN METABOLIC PANEL: CPT

## 2023-05-01 PROCEDURE — 96374 THER/PROPH/DIAG INJ IV PUSH: CPT

## 2023-05-01 PROCEDURE — P9035 PLATELET PHERES LEUKOREDUCED: HCPCS

## 2023-05-01 PROCEDURE — 85730 THROMBOPLASTIN TIME PARTIAL: CPT

## 2023-05-01 PROCEDURE — 72125 CT NECK SPINE W/O DYE: CPT

## 2023-05-01 PROCEDURE — 70486 CT MAXILLOFACIAL W/O DYE: CPT

## 2023-05-01 PROCEDURE — 85384 FIBRINOGEN ACTIVITY: CPT

## 2023-05-01 PROCEDURE — 6370000000 HC RX 637 (ALT 250 FOR IP)

## 2023-05-01 PROCEDURE — 85576 BLOOD PLATELET AGGREGATION: CPT

## 2023-05-01 PROCEDURE — 71045 X-RAY EXAM CHEST 1 VIEW: CPT

## 2023-05-01 PROCEDURE — 73080 X-RAY EXAM OF ELBOW: CPT

## 2023-05-01 PROCEDURE — 99285 EMERGENCY DEPT VISIT HI MDM: CPT

## 2023-05-01 PROCEDURE — 73030 X-RAY EXAM OF SHOULDER: CPT

## 2023-05-01 PROCEDURE — 2580000003 HC RX 258

## 2023-05-01 PROCEDURE — 85347 COAGULATION TIME ACTIVATED: CPT

## 2023-05-01 PROCEDURE — 6360000002 HC RX W HCPCS: Performed by: EMERGENCY MEDICINE

## 2023-05-01 PROCEDURE — 82077 ASSAY SPEC XCP UR&BREATH IA: CPT

## 2023-05-01 PROCEDURE — 85025 COMPLETE CBC W/AUTO DIFF WBC: CPT

## 2023-05-01 PROCEDURE — 70450 CT HEAD/BRAIN W/O DYE: CPT

## 2023-05-01 PROCEDURE — 83690 ASSAY OF LIPASE: CPT

## 2023-05-01 PROCEDURE — 86900 BLOOD TYPING SEROLOGIC ABO: CPT

## 2023-05-01 PROCEDURE — 86850 RBC ANTIBODY SCREEN: CPT

## 2023-05-01 PROCEDURE — 83605 ASSAY OF LACTIC ACID: CPT

## 2023-05-01 PROCEDURE — 85610 PROTHROMBIN TIME: CPT

## 2023-05-01 PROCEDURE — 36415 COLL VENOUS BLD VENIPUNCTURE: CPT

## 2023-05-01 PROCEDURE — 74177 CT ABD & PELVIS W/CONTRAST: CPT

## 2023-05-01 PROCEDURE — 86901 BLOOD TYPING SEROLOGIC RH(D): CPT

## 2023-05-01 RX ORDER — LEVETIRACETAM 500 MG/5ML
500 INJECTION, SOLUTION, CONCENTRATE INTRAVENOUS EVERY 12 HOURS
Status: DISCONTINUED | OUTPATIENT
Start: 2023-05-02 | End: 2023-05-04 | Stop reason: HOSPADM

## 2023-05-01 RX ORDER — ONDANSETRON 4 MG/1
4 TABLET, ORALLY DISINTEGRATING ORAL EVERY 8 HOURS PRN
Status: DISCONTINUED | OUTPATIENT
Start: 2023-05-01 | End: 2023-05-04 | Stop reason: HOSPADM

## 2023-05-01 RX ORDER — OXYCODONE HYDROCHLORIDE 5 MG/1
2.5 TABLET ORAL EVERY 4 HOURS PRN
Status: DISCONTINUED | OUTPATIENT
Start: 2023-05-01 | End: 2023-05-04 | Stop reason: HOSPADM

## 2023-05-01 RX ORDER — SODIUM CHLORIDE 0.9 % (FLUSH) 0.9 %
5-40 SYRINGE (ML) INJECTION EVERY 12 HOURS SCHEDULED
Status: DISCONTINUED | OUTPATIENT
Start: 2023-05-01 | End: 2023-05-04 | Stop reason: HOSPADM

## 2023-05-01 RX ORDER — SODIUM CHLORIDE 9 MG/ML
INJECTION, SOLUTION INTRAVENOUS CONTINUOUS
Status: DISCONTINUED | OUTPATIENT
Start: 2023-05-01 | End: 2023-05-03

## 2023-05-01 RX ORDER — OXYCODONE HYDROCHLORIDE 5 MG/1
5 TABLET ORAL EVERY 4 HOURS PRN
Status: DISCONTINUED | OUTPATIENT
Start: 2023-05-01 | End: 2023-05-04 | Stop reason: HOSPADM

## 2023-05-01 RX ORDER — HYDRALAZINE HYDROCHLORIDE 20 MG/ML
10 INJECTION INTRAMUSCULAR; INTRAVENOUS
Status: DISCONTINUED | OUTPATIENT
Start: 2023-05-01 | End: 2023-05-04 | Stop reason: HOSPADM

## 2023-05-01 RX ORDER — LABETALOL HYDROCHLORIDE 5 MG/ML
10 INJECTION, SOLUTION INTRAVENOUS EVERY 30 MIN PRN
Status: DISCONTINUED | OUTPATIENT
Start: 2023-05-01 | End: 2023-05-04 | Stop reason: HOSPADM

## 2023-05-01 RX ORDER — GABAPENTIN 600 MG/1
600 TABLET ORAL NIGHTLY
COMMUNITY

## 2023-05-01 RX ORDER — SENNA AND DOCUSATE SODIUM 50; 8.6 MG/1; MG/1
1 TABLET, FILM COATED ORAL 2 TIMES DAILY
Status: DISCONTINUED | OUTPATIENT
Start: 2023-05-01 | End: 2023-05-04 | Stop reason: HOSPADM

## 2023-05-01 RX ORDER — SODIUM CHLORIDE 9 MG/ML
INJECTION, SOLUTION INTRAVENOUS PRN
Status: DISCONTINUED | OUTPATIENT
Start: 2023-05-01 | End: 2023-05-04 | Stop reason: HOSPADM

## 2023-05-01 RX ORDER — BACITRACIN ZINC 500 [USP'U]/G
OINTMENT TOPICAL 3 TIMES DAILY
Status: DISCONTINUED | OUTPATIENT
Start: 2023-05-01 | End: 2023-05-04 | Stop reason: HOSPADM

## 2023-05-01 RX ORDER — CARVEDILOL 6.25 MG/1
6.25 TABLET ORAL DAILY
COMMUNITY

## 2023-05-01 RX ORDER — LEVETIRACETAM 500 MG/5ML
1000 INJECTION, SOLUTION, CONCENTRATE INTRAVENOUS ONCE
Status: COMPLETED | OUTPATIENT
Start: 2023-05-01 | End: 2023-05-01

## 2023-05-01 RX ORDER — ONDANSETRON 2 MG/ML
4 INJECTION INTRAMUSCULAR; INTRAVENOUS EVERY 6 HOURS PRN
Status: DISCONTINUED | OUTPATIENT
Start: 2023-05-01 | End: 2023-05-04 | Stop reason: HOSPADM

## 2023-05-01 RX ORDER — POLYETHYLENE GLYCOL 3350 17 G/17G
17 POWDER, FOR SOLUTION ORAL DAILY PRN
Status: DISCONTINUED | OUTPATIENT
Start: 2023-05-01 | End: 2023-05-04 | Stop reason: HOSPADM

## 2023-05-01 RX ORDER — SODIUM CHLORIDE 0.9 % (FLUSH) 0.9 %
5-40 SYRINGE (ML) INJECTION PRN
Status: DISCONTINUED | OUTPATIENT
Start: 2023-05-01 | End: 2023-05-04 | Stop reason: HOSPADM

## 2023-05-01 RX ORDER — HYDRALAZINE HYDROCHLORIDE 20 MG/ML
10 INJECTION INTRAMUSCULAR; INTRAVENOUS ONCE
Status: COMPLETED | OUTPATIENT
Start: 2023-05-01 | End: 2023-05-01

## 2023-05-01 RX ORDER — ACETAMINOPHEN 500 MG
1000 TABLET ORAL EVERY 8 HOURS PRN
Status: DISCONTINUED | OUTPATIENT
Start: 2023-05-01 | End: 2023-05-04 | Stop reason: HOSPADM

## 2023-05-01 RX ADMIN — HYDRALAZINE HYDROCHLORIDE 10 MG: 20 INJECTION INTRAMUSCULAR; INTRAVENOUS at 18:13

## 2023-05-01 RX ADMIN — SODIUM CHLORIDE: 9 INJECTION, SOLUTION INTRAVENOUS at 21:19

## 2023-05-01 RX ADMIN — SENNOSIDES AND DOCUSATE SODIUM 1 TABLET: 50; 8.6 TABLET ORAL at 21:12

## 2023-05-01 RX ADMIN — Medication 10 ML: at 23:04

## 2023-05-01 RX ADMIN — OXYCODONE 5 MG: 5 TABLET ORAL at 21:11

## 2023-05-01 RX ADMIN — IOPAMIDOL 75 ML: 755 INJECTION, SOLUTION INTRAVENOUS at 15:52

## 2023-05-01 RX ADMIN — LEVETIRACETAM 1000 MG: 100 INJECTION INTRAVENOUS at 17:29

## 2023-05-01 ASSESSMENT — PAIN - FUNCTIONAL ASSESSMENT: PAIN_FUNCTIONAL_ASSESSMENT: NONE - DENIES PAIN

## 2023-05-01 ASSESSMENT — PAIN SCALES - GENERAL: PAINLEVEL_OUTOF10: 6

## 2023-05-01 ASSESSMENT — PAIN DESCRIPTION - LOCATION: LOCATION: HEAD

## 2023-05-01 NOTE — H&P
TRAUMA HISTORY & PHYSICAL  Surgical Resident/Advance Practice Nurse  5/1/2023  6:24 PM    PRIMARY SURVEY    CHIEF COMPLAINT:  Trauma consult. Injury occurred at approximately 12 PM, patient was outside and had mechanical fall, she tripped onto concrete landing on her face. Patient has abrasion to her nose and ecchymosis on her nasal bridge. Patient is Ukrainian-speaking and  was used. Patient and daughter verify she has a history of Alejandro/cirrhosis. Patient's abdomen is distended with ascites. Her chief complaint is facial pain and headache. She has mild left-sided elbow pain and weakness which she attributes to tenosynovitis that has been chronic, her weakness in her left hand is not new. Patient is GCS 15 and focally intact. AIRWAY:   Airway Normal  EMS ETT Absent  Noisy respirations Absent  Retractions: Absent  Vomiting/bleeding: Absent    BREATHING:    Midaxillary breath sound left:  Normal  Midaxillary breath sound right:  Normal    Cough sound intensity:  good   FiO2: Room air  SMI 2000 mL. CIRCULATION:   Femerol pulse intensity: Strong  Palpebral conjunctiva: Pink     Vitals:    05/01/23 1813   BP: (!) 169/80   Pulse:    Resp:    Temp:    SpO2:           FAST EXAM:  Not performed    Central Nervous System    GCS Initial 15 minutes   Eye  Motor  Verbal 4 - Opens eyes on own  6 - Follows simple motor commands  5 - Alert and oriented 4 - Opens eyes on own  6 - Follows simple motor commands  5 - Alert and oriented     Neuromuscular blockade: No  Pupil size:  Left 4 mm    Right 4 mm  Pupil reaction: Yes    Wiggles fingers: Left Yes Right Yes  Wiggles toes: Left Yes   Right Yes    Hand grasp:   Left  Present      Right  Present  Plantar flexion: Left  Present      Right   Present    Loss of consciousness:  No  History Obtained From:  Patient   Private Medical Doctor:  Kianna Lee DO     Pre-exisiting Medical History:  yes  As below    Conditions:   Past Medical History:   Diagnosis Date

## 2023-05-01 NOTE — ED PROVIDER NOTES
Department of Emergency Medicine   ED  Provider Note  Admit Date/RoomTime: 2023  1:47 PM  ED Room: 10/10          History of Present Illness:  23, Time: 2:33 PM EDT  Chief Complaint   Patient presents with    Fall     Patient was walking out of GI doctors office and tripped and fell hitting nose denies LOC                 Kojo Frances is a 61 y.o. female presenting to the ED for fall. Patient was just at her GI doctors when she tripped over the curb and hit her head. No loss conscious, she is on no anticoagulation. She does have a history of Alejandro. She states he was initially at the appointment as she was very distended over the past several days. She had a scope on Friday, she is not sure the results of that. She has never had a paracentesis before. She has been distended for about a week and a half. She is on no anticoagulation. No neck pain or stiffness. No back pain, does have right-sided arm pain. No other symptoms or complaints. Review of Systems:   Pertinent positives and negatives are stated within HPI, all other systems reviewed and are negative.        --------------------------------------------- PAST HISTORY ---------------------------------------------  Past Medical History:  has a past medical history of Cirrhosis ([facility] [address] Diabetes mellitus ([facility] [address] Diabetic neuropathy ([facility] [address] Esophageal varices without bleeding ([facility] [address] GERD (gastroesophageal reflux disease), Hypertension, Liver cirrhosis ([facility] [address] Low vitamin D level, Thrombocytopenia ([facility] [address] and Type 2 diabetes mellitus without complication ([facility] [address] Past Surgical History:  has a past surgical history that includes Upper gastrointestinal endoscopy (2022); Upper gastrointestinal endoscopy (2021); Upper gastrointestinal endoscopy (2019); Upper gastrointestinal endoscopy (2021);  section; Cholecystectomy; and Appendectomy. Social History:  reports that she has never smoked.  She has never used

## 2023-05-01 NOTE — ED NOTES
Per EMS patient was at the dr and tripped and fell. - LOC -thinners.       Seema Cm RN  05/01/23 1401

## 2023-05-01 NOTE — ED NOTES
Nurse to nurse report given to [facility].  Will take pt up to room when available     Albert Solorzano RN  05/01/23 1952

## 2023-05-02 PROBLEM — K75.81 LIVER CIRRHOSIS SECONDARY TO NASH (HCC): Status: ACTIVE | Noted: 2023-05-02

## 2023-05-02 PROBLEM — S06.5XAA SDH (SUBDURAL HEMATOMA) (HCC): Status: ACTIVE | Noted: 2023-05-02

## 2023-05-02 PROBLEM — K74.60 LIVER CIRRHOSIS SECONDARY TO NASH (HCC): Status: ACTIVE | Noted: 2023-05-02

## 2023-05-02 LAB
ALBUMIN SERPL-MCNC: 2.9 G/DL (ref 3.5–5.2)
ALP SERPL-CCNC: 100 U/L (ref 35–104)
ALT SERPL-CCNC: 11 U/L (ref 0–32)
AMPHET UR QL SCN: NOT DETECTED
ANION GAP SERPL CALCULATED.3IONS-SCNC: 10 MMOL/L (ref 7–16)
AST SERPL-CCNC: 28 U/L (ref 0–31)
BARBITURATES UR QL SCN: NOT DETECTED
BASOPHILS # BLD: 0.02 E9/L (ref 0–0.2)
BASOPHILS NFR BLD: 0.9 % (ref 0–2)
BENZODIAZ UR QL SCN: NOT DETECTED
BILIRUB SERPL-MCNC: 2.1 MG/DL (ref 0–1.2)
BUN SERPL-MCNC: 16 MG/DL (ref 6–23)
CALCIUM SERPL-MCNC: 8.3 MG/DL (ref 8.6–10.2)
CANNABINOIDS UR QL SCN: NOT DETECTED
CHLORIDE SERPL-SCNC: 106 MMOL/L (ref 98–107)
CO2 SERPL-SCNC: 21 MMOL/L (ref 22–29)
COCAINE UR QL SCN: NOT DETECTED
CREAT SERPL-MCNC: 0.8 MG/DL (ref 0.5–1)
DRUG SCREEN COMMENT UR-IMP: ABNORMAL
EOSINOPHIL # BLD: 0.1 E9/L (ref 0.05–0.5)
EOSINOPHIL NFR BLD: 4.3 % (ref 0–6)
ERYTHROCYTE [DISTWIDTH] IN BLOOD BY AUTOMATED COUNT: 13.7 FL (ref 11.5–15)
FENTANYL SCREEN, URINE: NOT DETECTED
GLUCOSE SERPL-MCNC: 190 MG/DL (ref 74–99)
HBA1C MFR BLD: 7.4 % (ref 4–5.6)
HCT VFR BLD AUTO: 31.4 % (ref 34–48)
HGB BLD-MCNC: 10 G/DL (ref 11.5–15.5)
IMM GRANULOCYTES # BLD: 0.01 E9/L
IMM GRANULOCYTES NFR BLD: 0.4 % (ref 0–5)
LYMPHOCYTES # BLD: 0.57 E9/L (ref 1.5–4)
LYMPHOCYTES NFR BLD: 24.7 % (ref 20–42)
MCH RBC QN AUTO: 29.9 PG (ref 26–35)
MCHC RBC AUTO-ENTMCNC: 31.8 % (ref 32–34.5)
MCV RBC AUTO: 93.7 FL (ref 80–99.9)
METER GLUCOSE: 195 MG/DL (ref 74–99)
METER GLUCOSE: 223 MG/DL (ref 74–99)
METER GLUCOSE: 266 MG/DL (ref 74–99)
METER GLUCOSE: 355 MG/DL (ref 74–99)
METHADONE UR QL SCN: NOT DETECTED
MONOCYTES # BLD: 0.19 E9/L (ref 0.1–0.95)
MONOCYTES NFR BLD: 8.2 % (ref 2–12)
NEUTROPHILS # BLD: 1.42 E9/L (ref 1.8–7.3)
NEUTS SEG NFR BLD: 61.5 % (ref 43–80)
OPIATES UR QL SCN: NOT DETECTED
OVALOCYTES: ABNORMAL
OXYCODONE URINE: POSITIVE
PCP UR QL SCN: NOT DETECTED
PLATELET # BLD AUTO: 45 E9/L (ref 130–450)
PLATELET CONFIRMATION: NORMAL
PMV BLD AUTO: 12.1 FL (ref 7–12)
POIKILOCYTES: ABNORMAL
POTASSIUM SERPL-SCNC: 4.5 MMOL/L (ref 3.5–5)
PROT SERPL-MCNC: 7.6 G/DL (ref 6.4–8.3)
RBC # BLD AUTO: 3.35 E12/L (ref 3.5–5.5)
REASON FOR REJECTION: NORMAL
REJECTED TEST: NORMAL
SODIUM SERPL-SCNC: 137 MMOL/L (ref 132–146)
TEAR DROP CELLS: ABNORMAL
WBC # BLD: 2.3 E9/L (ref 4.5–11.5)

## 2023-05-02 PROCEDURE — 6370000000 HC RX 637 (ALT 250 FOR IP)

## 2023-05-02 PROCEDURE — 2060000000 HC ICU INTERMEDIATE R&B

## 2023-05-02 PROCEDURE — 80307 DRUG TEST PRSMV CHEM ANLYZR: CPT

## 2023-05-02 PROCEDURE — 97165 OT EVAL LOW COMPLEX 30 MIN: CPT

## 2023-05-02 PROCEDURE — 97530 THERAPEUTIC ACTIVITIES: CPT

## 2023-05-02 PROCEDURE — 83036 HEMOGLOBIN GLYCOSYLATED A1C: CPT

## 2023-05-02 PROCEDURE — 85025 COMPLETE CBC W/AUTO DIFF WBC: CPT

## 2023-05-02 PROCEDURE — 6370000000 HC RX 637 (ALT 250 FOR IP): Performed by: STUDENT IN AN ORGANIZED HEALTH CARE EDUCATION/TRAINING PROGRAM

## 2023-05-02 PROCEDURE — 2580000003 HC RX 258

## 2023-05-02 PROCEDURE — 82962 GLUCOSE BLOOD TEST: CPT

## 2023-05-02 PROCEDURE — 99223 1ST HOSP IP/OBS HIGH 75: CPT | Performed by: SURGERY

## 2023-05-02 PROCEDURE — 97161 PT EVAL LOW COMPLEX 20 MIN: CPT

## 2023-05-02 PROCEDURE — 6360000002 HC RX W HCPCS: Performed by: STUDENT IN AN ORGANIZED HEALTH CARE EDUCATION/TRAINING PROGRAM

## 2023-05-02 PROCEDURE — 36415 COLL VENOUS BLD VENIPUNCTURE: CPT

## 2023-05-02 PROCEDURE — 80053 COMPREHEN METABOLIC PANEL: CPT

## 2023-05-02 PROCEDURE — 2500000003 HC RX 250 WO HCPCS

## 2023-05-02 RX ORDER — INSULIN LISPRO 100 [IU]/ML
0-4 INJECTION, SOLUTION INTRAVENOUS; SUBCUTANEOUS NIGHTLY
Status: DISCONTINUED | OUTPATIENT
Start: 2023-05-02 | End: 2023-05-04

## 2023-05-02 RX ORDER — CARVEDILOL 6.25 MG/1
6.25 TABLET ORAL DAILY
Status: DISCONTINUED | OUTPATIENT
Start: 2023-05-02 | End: 2023-05-04 | Stop reason: HOSPADM

## 2023-05-02 RX ORDER — INSULIN LISPRO 100 [IU]/ML
0-4 INJECTION, SOLUTION INTRAVENOUS; SUBCUTANEOUS
Status: DISCONTINUED | OUTPATIENT
Start: 2023-05-02 | End: 2023-05-02

## 2023-05-02 RX ORDER — PANTOPRAZOLE SODIUM 40 MG/1
40 TABLET, DELAYED RELEASE ORAL
Status: DISCONTINUED | OUTPATIENT
Start: 2023-05-03 | End: 2023-05-04 | Stop reason: HOSPADM

## 2023-05-02 RX ORDER — GABAPENTIN 300 MG/1
600 CAPSULE ORAL NIGHTLY
Status: DISCONTINUED | OUTPATIENT
Start: 2023-05-02 | End: 2023-05-04 | Stop reason: HOSPADM

## 2023-05-02 RX ORDER — LISINOPRIL 20 MG/1
40 TABLET ORAL DAILY
Status: DISCONTINUED | OUTPATIENT
Start: 2023-05-02 | End: 2023-05-04 | Stop reason: HOSPADM

## 2023-05-02 RX ORDER — LACTULOSE 10 G/15ML
20 SOLUTION ORAL ONCE
Status: COMPLETED | OUTPATIENT
Start: 2023-05-02 | End: 2023-05-02

## 2023-05-02 RX ORDER — DEXTROSE MONOHYDRATE 100 MG/ML
INJECTION, SOLUTION INTRAVENOUS CONTINUOUS PRN
Status: DISCONTINUED | OUTPATIENT
Start: 2023-05-02 | End: 2023-05-02 | Stop reason: SDUPTHER

## 2023-05-02 RX ORDER — DEXTROSE MONOHYDRATE 100 MG/ML
INJECTION, SOLUTION INTRAVENOUS CONTINUOUS PRN
Status: DISCONTINUED | OUTPATIENT
Start: 2023-05-02 | End: 2023-05-04 | Stop reason: HOSPADM

## 2023-05-02 RX ORDER — INSULIN LISPRO 100 [IU]/ML
0-4 INJECTION, SOLUTION INTRAVENOUS; SUBCUTANEOUS NIGHTLY
Status: DISCONTINUED | OUTPATIENT
Start: 2023-05-02 | End: 2023-05-02

## 2023-05-02 RX ORDER — INSULIN LISPRO 100 [IU]/ML
0-8 INJECTION, SOLUTION INTRAVENOUS; SUBCUTANEOUS
Status: DISCONTINUED | OUTPATIENT
Start: 2023-05-02 | End: 2023-05-04

## 2023-05-02 RX ADMIN — LABETALOL HYDROCHLORIDE 10 MG: 5 INJECTION INTRAVENOUS at 12:31

## 2023-05-02 RX ADMIN — SODIUM CHLORIDE: 9 INJECTION, SOLUTION INTRAVENOUS at 18:32

## 2023-05-02 RX ADMIN — LACTULOSE 20 G: 20 SOLUTION ORAL at 20:30

## 2023-05-02 RX ADMIN — SENNOSIDES AND DOCUSATE SODIUM 1 TABLET: 50; 8.6 TABLET ORAL at 08:08

## 2023-05-02 RX ADMIN — LEVETIRACETAM 500 MG: 100 INJECTION INTRAVENOUS at 06:12

## 2023-05-02 RX ADMIN — SODIUM CHLORIDE: 9 INJECTION, SOLUTION INTRAVENOUS at 06:13

## 2023-05-02 RX ADMIN — Medication 10 ML: at 08:08

## 2023-05-02 RX ADMIN — INSULIN LISPRO 1 UNITS: 100 INJECTION, SOLUTION INTRAVENOUS; SUBCUTANEOUS at 11:25

## 2023-05-02 RX ADMIN — LABETALOL HYDROCHLORIDE 10 MG: 5 INJECTION INTRAVENOUS at 13:36

## 2023-05-02 RX ADMIN — GABAPENTIN 600 MG: 300 CAPSULE ORAL at 20:30

## 2023-05-02 RX ADMIN — LISINOPRIL 40 MG: 20 TABLET ORAL at 16:37

## 2023-05-02 RX ADMIN — SENNOSIDES AND DOCUSATE SODIUM 1 TABLET: 50; 8.6 TABLET ORAL at 20:30

## 2023-05-02 RX ADMIN — INSULIN LISPRO 8 UNITS: 100 INJECTION, SOLUTION INTRAVENOUS; SUBCUTANEOUS at 16:37

## 2023-05-02 RX ADMIN — CARVEDILOL 6.25 MG: 6.25 TABLET, FILM COATED ORAL at 16:49

## 2023-05-02 RX ADMIN — LEVETIRACETAM 500 MG: 100 INJECTION INTRAVENOUS at 18:29

## 2023-05-02 ASSESSMENT — PAIN SCALES - GENERAL: PAINLEVEL_OUTOF10: 0

## 2023-05-02 ASSESSMENT — ENCOUNTER SYMPTOMS
PHOTOPHOBIA: 0
ABDOMINAL PAIN: 1
SHORTNESS OF BREATH: 0
TROUBLE SWALLOWING: 0

## 2023-05-02 NOTE — PLAN OF CARE
Problem: Discharge Planning  Goal: Discharge to home or other facility with appropriate resources  Outcome: Progressing  Flowsheets (Taken 5/2/2023 0810)  Discharge to home or other facility with appropriate resources: Identify barriers to discharge with patient and caregiver     Problem: ABCDS Injury Assessment  Goal: Absence of physical injury  Outcome: Progressing  Flowsheets (Taken 5/2/2023 1005)  Absence of Physical Injury: Implement safety measures based on patient assessment     Problem: Safety - Adult  Goal: Free from fall injury  Outcome: Progressing  Flowsheets (Taken 5/2/2023 1005)  Free From Fall Injury: Instruct family/caregiver on patient safety     Problem: Pain  Goal: Verbalizes/displays adequate comfort level or baseline comfort level  Outcome: Progressing     Problem: Chronic Conditions and Co-morbidities  Goal: Patient's chronic conditions and co-morbidity symptoms are monitored and maintained or improved  Outcome: Progressing  Flowsheets (Taken 5/2/2023 0810)  Care Plan - Patient's Chronic Conditions and Co-Morbidity Symptoms are Monitored and Maintained or Improved: Monitor and assess patient's chronic conditions and comorbid symptoms for stability, deterioration, or improvement

## 2023-05-02 NOTE — CARE COORDINATION
5/2/23. Patien admitted 5/1/23 Dx Intracranial bleed Head CT showed SDH. Neuro consulted, plan for BP control, serial Ct 4 weeks, Keppra x 7 days. OT15/PT10. Pt lives home alone in apartment. Per pt and daughter she has been declining and having difficulty with ADLs. She does not have any DME. PCP Dr Alisa El, preferred pharmacy is [facility] on [address] and daughter requesting rehab prior to return to home. SNF list provided. Pt will review with family and provide choices. Víctor ESPINOZA RN-BC  [phone]    Case Management Assessment  Initial Evaluation    Date/Time of Evaluation: 5/2/2023 3:07 PM  Assessment Completed by: Víctor Enamorado RN    If patient is discharged prior to next notation, then this note serves as note for discharge by case management. Patient Name: Ángel Cobian                   YOB: 1960  Diagnosis: Intracranial hemorrhage ([facility] [I62.9]  Thrombocytopenia ([facility] [D69.6]  Intracranial bleed ([facility] [I62.9]  Closed head injury, initial encounter [C77.58QC]  Fall, initial encounter [W19. XXXA]                   Date / Time: 5/1/2023  1:47 PM    Patient Admission Status: Inpatient   Readmission Risk (Low < 19, Mod (19-27), High > 27): Readmission Risk Score: 10.4    Current PCP: Liliana Gutierrez, DO  PCP verified by ? Chart Reviewed: Yes      History Provided by: Patient, Medical Record, Child/Family  Patient Orientation: Alert and Oriented    Patient Cognition: Alert    Hospitalization in the last 30 days (Readmission):  No    If yes, Readmission Assessment in  Navigator will be completed.     Advance Directives:      Code Status: Full Code   Patient's Primary Decision Maker is:      Primary Decision MakerAroman Isadora - Child - [phone]    Secondary Decision Maker: Nathan Jumana - Child - [phone]    Discharge Planning:    Patient lives with: Spouse/Significant Other Type of Home: House  Primary Care Giver: Self  Patient Support Systems

## 2023-05-02 NOTE — H&P
TRAUMA SURGERY HISTORY & PHYSICAL  TRAUMA ATTENDING      Attending Physician Statement:  I have examined the patient in  12l , reviewed the record, and discussed the case with the resident/APN. I agree with the  assessment and plan with the following corrections/additions. CC: fall    HISTORY   The patient is a 62 yo  female who sustained a fall around noon. Patient tripped and hit her face on concrete. The patient reported  acute, constant  sharp  pain localized to the head  that started afterwards. The intensity of the pain is mild. Pain does not radiate. There are no alleviating or worsening factors regarding the pain. Patient is Uz[place] speaker and was using the [employer]  . Patient has a history of Alejandro cirrhosis. Currently patient        A trauma consult was requested to assist, guide,  and expedite further evaluation and treatment for the patient.            Past medical/surgical/social history:  as noted in resident/APN note    Family History:   no anesthetic complications      Review of Systems:  General ROS: Positive for fall  Psychological ROS: negative  ENT ROS: negative  Hematological and Lymphatic ROS: negative  Respiratory ROS: negative  Cardiovascular ROS: negative  Gastrointestinal ROS: Positive for liver cirrhosis  Genito-Urinary ROS: negative  Musculoskeletal ROS: negative  Neurological ROS: negative     PHYSICAL EXAM:  Vitals:    05/02/23 1441   BP: (!) 142/59   Pulse:    Resp:    Temp:    SpO2:        Neuro:   GCS 15     Moving all extremities    Reactive, equal pupils  Psychiatric:  Affect , Judgement  appropriate   Alert and oriented to self, place, time  Head/Face:   Facial ecchymosis   no bony deformities  Eyes:     Vision/EOM grossly intact  Ears:     no otorrhagia  Nose:      no epistaxis  Mouth:     no intraoral lacerations/ecchymoses  Neck:        no tracheal deviation    no soft tissue injury  Chest:     no deformities   non tender  Lungs:      equal and clear

## 2023-05-02 NOTE — CONSULTS
KWIKPEN) (75-25) 100 UNIT per ML SUPN injection pen Inject 0.45 mLs into the skin 2 times daily (with meals) 5 Adjustable Dose Pre-filled Pen Syringe 2        Allergies:    Fish allergy     /61   Pulse 84   Temp 97.7 °F (36.5 °C) (Temporal)   Resp 18   Ht 5' 3\" (1.6 m)   Wt 185 lb (83.9 kg)   SpO2 97%   BMI 32.77 kg/m²     Review of Systems   Constitutional:  Negative for chills and fever. HENT:  Negative for trouble swallowing. Eyes:  Negative for photophobia. Respiratory:  Negative for shortness of breath. Cardiovascular:  Negative for chest pain. Gastrointestinal:  Positive for abdominal pain. Endocrine: Negative for heat intolerance. Genitourinary:  Negative for flank pain. Musculoskeletal:  Negative for myalgias. Skin:  Negative for wound. Neurological:  Negative for weakness, numbness and headaches. Psychiatric/Behavioral:  Negative for confusion. Physical Exam  HENT:      Head: Normocephalic. Eyes:      Pupils: Pupils are equal, round, and reactive to light. Cardiovascular:      Rate and Rhythm: Normal rate. Pulmonary:      Effort: Pulmonary effort is normal.   Abdominal:      General: There is no distension. Musculoskeletal:         General: Normal range of motion. Cervical back: Normal range of motion. Skin:     General: Skin is warm and dry. Neurological:      Mental Status: She is alert. Comments: A&Ox3  CN3-12 intact  Motor Strength full   Sensation intact to light touch   (-)drift   Psychiatric:         Thought Content: Thought content normal.      Imagin2023 CT Head   IMPRESSION:  Layering in the right-side of the tentorium extending along the right  posterior and mid falx    Assessment:   -Krishna Morrison is a 62 y/o female who CT head shows SDH.      Plan:  -Serial Neuro Checks  -Blood pressure control; keep SBP <140  -No AP/AC or NSAIDS  -Follow up in 4 weeks with repeat Head CT  -Please call with any questions or concerns

## 2023-05-03 DIAGNOSIS — I62.9 INTRACRANIAL BLEED (HCC): Primary | ICD-10-CM

## 2023-05-03 DIAGNOSIS — S06.5XAA SDH (SUBDURAL HEMATOMA) (HCC): ICD-10-CM

## 2023-05-03 LAB
ALBUMIN SERPL-MCNC: 2.7 G/DL (ref 3.5–5.2)
ALP SERPL-CCNC: 95 U/L (ref 35–104)
ALT SERPL-CCNC: 10 U/L (ref 0–32)
ANION GAP SERPL CALCULATED.3IONS-SCNC: 9 MMOL/L (ref 7–16)
ANISOCYTOSIS: ABNORMAL
AST SERPL-CCNC: 22 U/L (ref 0–31)
BASOPHILS # BLD: 0.02 E9/L (ref 0–0.2)
BASOPHILS NFR BLD: 1 % (ref 0–2)
BILIRUB SERPL-MCNC: 1.8 MG/DL (ref 0–1.2)
BUN SERPL-MCNC: 20 MG/DL (ref 6–23)
CALCIUM SERPL-MCNC: 8.1 MG/DL (ref 8.6–10.2)
CHLORIDE SERPL-SCNC: 107 MMOL/L (ref 98–107)
CO2 SERPL-SCNC: 20 MMOL/L (ref 22–29)
CREAT SERPL-MCNC: 0.9 MG/DL (ref 0.5–1)
EOSINOPHIL # BLD: 0.06 E9/L (ref 0.05–0.5)
EOSINOPHIL NFR BLD: 2.9 % (ref 0–6)
ERYTHROCYTE [DISTWIDTH] IN BLOOD BY AUTOMATED COUNT: 13.5 FL (ref 11.5–15)
GLUCOSE SERPL-MCNC: 234 MG/DL (ref 74–99)
HCT VFR BLD AUTO: 25.5 % (ref 34–48)
HGB BLD-MCNC: 8.2 G/DL (ref 11.5–15.5)
IMM GRANULOCYTES # BLD: 0 E9/L
IMM GRANULOCYTES NFR BLD: 0 % (ref 0–5)
LYMPHOCYTES # BLD: 0.57 E9/L (ref 1.5–4)
LYMPHOCYTES NFR BLD: 27.5 % (ref 20–42)
MCH RBC QN AUTO: 29.9 PG (ref 26–35)
MCHC RBC AUTO-ENTMCNC: 32.2 % (ref 32–34.5)
MCV RBC AUTO: 93.1 FL (ref 80–99.9)
METER GLUCOSE: 255 MG/DL (ref 74–99)
METER GLUCOSE: 261 MG/DL (ref 74–99)
METER GLUCOSE: 272 MG/DL (ref 74–99)
METER GLUCOSE: 320 MG/DL (ref 74–99)
MONOCYTES # BLD: 0.22 E9/L (ref 0.1–0.95)
MONOCYTES NFR BLD: 10.6 % (ref 2–12)
NEUTROPHILS # BLD: 1.2 E9/L (ref 1.8–7.3)
NEUTS SEG NFR BLD: 58 % (ref 43–80)
OVALOCYTES: ABNORMAL
PLATELET # BLD AUTO: 52 E9/L (ref 130–450)
PLATELET CONFIRMATION: NORMAL
PMV BLD AUTO: 11.2 FL (ref 7–12)
POIKILOCYTES: ABNORMAL
POLYCHROMASIA: ABNORMAL
POTASSIUM SERPL-SCNC: 3.8 MMOL/L (ref 3.5–5)
PROT SERPL-MCNC: 7.1 G/DL (ref 6.4–8.3)
RBC # BLD AUTO: 2.74 E12/L (ref 3.5–5.5)
SODIUM SERPL-SCNC: 136 MMOL/L (ref 132–146)
WBC # BLD: 2.1 E9/L (ref 4.5–11.5)

## 2023-05-03 PROCEDURE — 6370000000 HC RX 637 (ALT 250 FOR IP)

## 2023-05-03 PROCEDURE — 6360000002 HC RX W HCPCS: Performed by: STUDENT IN AN ORGANIZED HEALTH CARE EDUCATION/TRAINING PROGRAM

## 2023-05-03 PROCEDURE — 80053 COMPREHEN METABOLIC PANEL: CPT

## 2023-05-03 PROCEDURE — 2060000000 HC ICU INTERMEDIATE R&B

## 2023-05-03 PROCEDURE — 82962 GLUCOSE BLOOD TEST: CPT

## 2023-05-03 PROCEDURE — 36415 COLL VENOUS BLD VENIPUNCTURE: CPT

## 2023-05-03 PROCEDURE — 99232 SBSQ HOSP IP/OBS MODERATE 35: CPT | Performed by: SURGERY

## 2023-05-03 PROCEDURE — 2580000003 HC RX 258

## 2023-05-03 PROCEDURE — 85025 COMPLETE CBC W/AUTO DIFF WBC: CPT

## 2023-05-03 PROCEDURE — 6370000000 HC RX 637 (ALT 250 FOR IP): Performed by: STUDENT IN AN ORGANIZED HEALTH CARE EDUCATION/TRAINING PROGRAM

## 2023-05-03 RX ADMIN — SENNOSIDES AND DOCUSATE SODIUM 1 TABLET: 50; 8.6 TABLET ORAL at 09:17

## 2023-05-03 RX ADMIN — GABAPENTIN 600 MG: 300 CAPSULE ORAL at 21:10

## 2023-05-03 RX ADMIN — Medication 10 ML: at 21:13

## 2023-05-03 RX ADMIN — LEVETIRACETAM 500 MG: 100 INJECTION INTRAVENOUS at 05:26

## 2023-05-03 RX ADMIN — INSULIN LISPRO 4 UNITS: 100 INJECTION, SOLUTION INTRAVENOUS; SUBCUTANEOUS at 12:05

## 2023-05-03 RX ADMIN — SENNOSIDES AND DOCUSATE SODIUM 1 TABLET: 50; 8.6 TABLET ORAL at 21:11

## 2023-05-03 RX ADMIN — INSULIN LISPRO 4 UNITS: 100 INJECTION, SOLUTION INTRAVENOUS; SUBCUTANEOUS at 06:32

## 2023-05-03 RX ADMIN — OXYCODONE 5 MG: 5 TABLET ORAL at 03:48

## 2023-05-03 RX ADMIN — CARVEDILOL 6.25 MG: 6.25 TABLET, FILM COATED ORAL at 09:17

## 2023-05-03 RX ADMIN — INSULIN LISPRO 4 UNITS: 100 INJECTION, SOLUTION INTRAVENOUS; SUBCUTANEOUS at 16:15

## 2023-05-03 RX ADMIN — Medication 10 ML: at 09:17

## 2023-05-03 RX ADMIN — INSULIN LISPRO 4 UNITS: 100 INJECTION, SOLUTION INTRAVENOUS; SUBCUTANEOUS at 21:11

## 2023-05-03 RX ADMIN — LISINOPRIL 40 MG: 20 TABLET ORAL at 09:17

## 2023-05-03 RX ADMIN — PANTOPRAZOLE SODIUM 40 MG: 40 TABLET, DELAYED RELEASE ORAL at 05:26

## 2023-05-03 RX ADMIN — LEVETIRACETAM 500 MG: 100 INJECTION INTRAVENOUS at 18:42

## 2023-05-03 NOTE — CARE COORDINATION
5/3/23 Update CM Note. Patient admitted 5/1/23 Dx Intracranial bleed Head CT showed SDH. Neuro consulted, plan for BP control, serial Ct 4 weeks, Keppra x 7 days. OT15/PT10. Reviewed choice list with pt and daughter Berny Denney. Referrals to [facility] and [address] also sent to [facility] for additional support once pt returns to home. Magali ESTRADAN RN-BC  [phone]     Addendum:Patient accepted at [facility]. Daughter and pt in agreement with DC plan.

## 2023-05-03 NOTE — PLAN OF CARE
Problem: Discharge Planning  Goal: Discharge to home or other facility with appropriate resources  5/3/2023 1034 by Saroj Irizarry RN  Outcome: Progressing  Flowsheets (Taken 5/3/2023 2572)  Discharge to home or other facility with appropriate resources: Identify barriers to discharge with patient and caregiver  5/2/2023 2142 by Michelet Roman RN  Outcome: Progressing     Problem: ABCDS Injury Assessment  Goal: Absence of physical injury  5/3/2023 1034 by Saroj Irizarry RN  Outcome: Progressing  Flowsheets (Taken 5/3/2023 1033)  Absence of Physical Injury: Implement safety measures based on patient assessment  5/2/2023 2142 by Michelet Roman RN  Outcome: Progressing     Problem: Safety - Adult  Goal: Free from fall injury  Outcome: Progressing  Flowsheets (Taken 5/3/2023 1033)  Free From Fall Injury: Instruct family/caregiver on patient safety     Problem: Pain  Goal: Verbalizes/displays adequate comfort level or baseline comfort level  Outcome: Progressing     Problem: Chronic Conditions and Co-morbidities  Goal: Patient's chronic conditions and co-morbidity symptoms are monitored and maintained or improved  Outcome: Progressing  Flowsheets (Taken 5/3/2023 0917)  Care Plan - Patient's Chronic Conditions and Co-Morbidity Symptoms are Monitored and Maintained or Improved: Monitor and assess patient's chronic conditions and comorbid symptoms for stability, deterioration, or improvement

## 2023-05-03 NOTE — PLAN OF CARE
Problem: Discharge Planning  Goal: Discharge to home or other facility with appropriate resources  5/2/2023 2142 by Jaison Zepeda RN  Outcome: Progressing  5/2/2023 1007 by Daija Pittman RN  Outcome: Progressing  Flowsheets (Taken 5/2/2023 2174)  Discharge to home or other facility with appropriate resources: Identify barriers to discharge with patient and caregiver     Problem: ABCDS Injury Assessment  Goal: Absence of physical injury  5/2/2023 2142 by Jaison Zepeda RN  Outcome: Progressing  5/2/2023 1007 by Daija Pittman RN  Outcome: Progressing  Flowsheets (Taken 5/2/2023 1005)  Absence of Physical Injury: Implement safety measures based on patient assessment     Problem: Safety - Adult  Goal: Free from fall injury  5/2/2023 1007 by Daija Pittman RN  Outcome: Progressing  Flowsheets (Taken 5/2/2023 1005)  Free From Fall Injury: Instruct family/caregiver on patient safety     Problem: Pain  Goal: Verbalizes/displays adequate comfort level or baseline comfort level  5/2/2023 1007 by Daija Pittman RN  Outcome: Progressing     Problem: Chronic Conditions and Co-morbidities  Goal: Patient's chronic conditions and co-morbidity symptoms are monitored and maintained or improved  5/2/2023 1007 by Daija Pittman RN  Outcome: Progressing  Flowsheets (Taken 5/2/2023 0810)  Care Plan - Patient's Chronic Conditions and Co-Morbidity Symptoms are Monitored and Maintained or Improved: Monitor and assess patient's chronic conditions and comorbid symptoms for stability, deterioration, or improvement

## 2023-05-04 ENCOUNTER — TELEPHONE (OUTPATIENT)
Dept: FAMILY MEDICINE CLINIC | Age: 63
End: 2023-05-04

## 2023-05-04 VITALS
WEIGHT: 185 LBS | HEART RATE: 84 BPM | TEMPERATURE: 97.7 F | HEIGHT: 63 IN | BODY MASS INDEX: 32.78 KG/M2 | OXYGEN SATURATION: 97 % | SYSTOLIC BLOOD PRESSURE: 118 MMHG | RESPIRATION RATE: 18 BRPM | DIASTOLIC BLOOD PRESSURE: 61 MMHG

## 2023-05-04 LAB
ALBUMIN SERPL-MCNC: 2.7 G/DL (ref 3.5–5.2)
ALP SERPL-CCNC: 91 U/L (ref 35–104)
ALT SERPL-CCNC: 9 U/L (ref 0–32)
ANION GAP SERPL CALCULATED.3IONS-SCNC: 9 MMOL/L (ref 7–16)
ANISOCYTOSIS: ABNORMAL
AST SERPL-CCNC: 20 U/L (ref 0–31)
BASOPHILS # BLD: 0.04 E9/L (ref 0–0.2)
BASOPHILS NFR BLD: 1.7 % (ref 0–2)
BILIRUB SERPL-MCNC: 1.3 MG/DL (ref 0–1.2)
BUN SERPL-MCNC: 21 MG/DL (ref 6–23)
CALCIUM SERPL-MCNC: 8 MG/DL (ref 8.6–10.2)
CHLORIDE SERPL-SCNC: 109 MMOL/L (ref 98–107)
CO2 SERPL-SCNC: 20 MMOL/L (ref 22–29)
CREAT SERPL-MCNC: 0.9 MG/DL (ref 0.5–1)
EOSINOPHIL # BLD: 0.05 E9/L (ref 0.05–0.5)
EOSINOPHIL NFR BLD: 2.6 % (ref 0–6)
ERYTHROCYTE [DISTWIDTH] IN BLOOD BY AUTOMATED COUNT: 13.5 FL (ref 11.5–15)
GLUCOSE SERPL-MCNC: 252 MG/DL (ref 74–99)
HCT VFR BLD AUTO: 28 % (ref 34–48)
HGB BLD-MCNC: 9 G/DL (ref 11.5–15.5)
LYMPHOCYTES # BLD: 0.25 E9/L (ref 1.5–4)
LYMPHOCYTES NFR BLD: 12.2 % (ref 20–42)
MCH RBC QN AUTO: 29.7 PG (ref 26–35)
MCHC RBC AUTO-ENTMCNC: 32.1 % (ref 32–34.5)
MCV RBC AUTO: 92.4 FL (ref 80–99.9)
METER GLUCOSE: 270 MG/DL (ref 74–99)
METER GLUCOSE: 305 MG/DL (ref 74–99)
MONOCYTES # BLD: 0.08 E9/L (ref 0.1–0.95)
MONOCYTES NFR BLD: 4.4 % (ref 2–12)
NEUTROPHILS # BLD: 1.66 E9/L (ref 1.8–7.3)
NEUTS SEG NFR BLD: 79.1 % (ref 43–80)
OVALOCYTES: ABNORMAL
PLATELET # BLD AUTO: 49 E9/L (ref 130–450)
PLATELET CONFIRMATION: NORMAL
PMV BLD AUTO: 11.1 FL (ref 7–12)
POIKILOCYTES: ABNORMAL
POTASSIUM SERPL-SCNC: 4.2 MMOL/L (ref 3.5–5)
PROT SERPL-MCNC: 7.2 G/DL (ref 6.4–8.3)
RBC # BLD AUTO: 3.03 E12/L (ref 3.5–5.5)
SARS-COV-2 RDRP RESP QL NAA+PROBE: NOT DETECTED
SMUDGE CELLS: ABNORMAL
SODIUM SERPL-SCNC: 138 MMOL/L (ref 132–146)
WBC # BLD: 2.1 E9/L (ref 4.5–11.5)

## 2023-05-04 PROCEDURE — 80053 COMPREHEN METABOLIC PANEL: CPT

## 2023-05-04 PROCEDURE — 85025 COMPLETE CBC W/AUTO DIFF WBC: CPT

## 2023-05-04 PROCEDURE — 2580000003 HC RX 258

## 2023-05-04 PROCEDURE — 97530 THERAPEUTIC ACTIVITIES: CPT

## 2023-05-04 PROCEDURE — 82962 GLUCOSE BLOOD TEST: CPT

## 2023-05-04 PROCEDURE — 36415 COLL VENOUS BLD VENIPUNCTURE: CPT

## 2023-05-04 PROCEDURE — 99238 HOSP IP/OBS DSCHRG MGMT 30/<: CPT | Performed by: SURGERY

## 2023-05-04 PROCEDURE — 6370000000 HC RX 637 (ALT 250 FOR IP)

## 2023-05-04 PROCEDURE — 6360000002 HC RX W HCPCS: Performed by: STUDENT IN AN ORGANIZED HEALTH CARE EDUCATION/TRAINING PROGRAM

## 2023-05-04 PROCEDURE — 6370000000 HC RX 637 (ALT 250 FOR IP): Performed by: STUDENT IN AN ORGANIZED HEALTH CARE EDUCATION/TRAINING PROGRAM

## 2023-05-04 PROCEDURE — 6370000000 HC RX 637 (ALT 250 FOR IP): Performed by: NURSE PRACTITIONER

## 2023-05-04 PROCEDURE — 87635 SARS-COV-2 COVID-19 AMP PRB: CPT

## 2023-05-04 RX ORDER — INSULIN LISPRO 100 [IU]/ML
0-16 INJECTION, SOLUTION INTRAVENOUS; SUBCUTANEOUS
Status: DISCONTINUED | OUTPATIENT
Start: 2023-05-04 | End: 2023-05-04 | Stop reason: HOSPADM

## 2023-05-04 RX ORDER — INSULIN LISPRO 100 [IU]/ML
0-4 INJECTION, SOLUTION INTRAVENOUS; SUBCUTANEOUS NIGHTLY
Status: DISCONTINUED | OUTPATIENT
Start: 2023-05-04 | End: 2023-05-04 | Stop reason: HOSPADM

## 2023-05-04 RX ORDER — INSULIN GLARGINE-YFGN 100 [IU]/ML
15 INJECTION, SOLUTION SUBCUTANEOUS NIGHTLY
Status: DISCONTINUED | OUTPATIENT
Start: 2023-05-04 | End: 2023-05-04 | Stop reason: HOSPADM

## 2023-05-04 RX ADMIN — BACITRACIN ZINC: 500 OINTMENT TOPICAL at 08:37

## 2023-05-04 RX ADMIN — LEVETIRACETAM 500 MG: 100 INJECTION INTRAVENOUS at 06:06

## 2023-05-04 RX ADMIN — PANTOPRAZOLE SODIUM 40 MG: 40 TABLET, DELAYED RELEASE ORAL at 06:05

## 2023-05-04 RX ADMIN — INSULIN LISPRO 4 UNITS: 100 INJECTION, SOLUTION INTRAVENOUS; SUBCUTANEOUS at 08:38

## 2023-05-04 RX ADMIN — INSULIN LISPRO 12 UNITS: 100 INJECTION, SOLUTION INTRAVENOUS; SUBCUTANEOUS at 11:23

## 2023-05-04 RX ADMIN — CARVEDILOL 6.25 MG: 6.25 TABLET, FILM COATED ORAL at 08:36

## 2023-05-04 RX ADMIN — Medication 10 ML: at 08:37

## 2023-05-04 RX ADMIN — SENNOSIDES AND DOCUSATE SODIUM 1 TABLET: 50; 8.6 TABLET ORAL at 08:36

## 2023-05-04 RX ADMIN — LISINOPRIL 40 MG: 20 TABLET ORAL at 08:36

## 2023-05-04 ASSESSMENT — PAIN SCALES - GENERAL: PAINLEVEL_OUTOF10: 0

## 2023-05-04 NOTE — CARE COORDINATION
3Precmichelle initiated this AM for [facility]. Spoke to [facility], they will accept pending precert if needed. Will need a COVID day of discharge. PASRR, ambulance form and envelope on soft chart. Per NS Keppra x 7days, BP control <140, f/u 4 weeks with head CT. Will ask Trauma if stable for discharge. CM will follow (TF)    Matthew Flores  Case Management  [phone] 1220--Discharge order noted. Transport arranged with [facility] @ 3:30pm. Daughter at bedside and in room updated.  Facility updated (TF)

## 2023-05-04 NOTE — DISCHARGE INSTRUCTIONS
Syndrome)    Notify physician if any of the following persists longer than 2-4 weeks. Difficulty with concentration or attention (easily distracted)  Frequent headaches  Memory problems   Sensitivity to light   Sleeping difficulties      There is a higher risk of having a more serious head injury if:  Previous history of head injury or concussion  Taking medicine that thins your blood, or have a bleeding disorder  Have other neurologic (brain) problems  Have difficulty walking or frequent falls  Active in high impact contact sports, like soccer or football. Activities  Stay away from activities that could cause another head injury (like sports), until the doctor says it's okay. A second blow to the head can cause more damage to the brain  Limit reading, television, video games, etc. the first 48 hours. Your brain needs to rest so that it can recover. You may find that it helps to take time off school or work. Limit exposure to bright lights, loud noises, and crowds for the first 48 hours, as these can make your symptoms worse  Limit use of screens, such as an IPad, computer, cellphone, TV, etc, as these can make symptoms, especially headaches, worse. Work/School  It is recommended that you wait to return to work/school until after your follow-up appointment with trauma or your family doctor. If you are having no symptoms, please call for an earlier appointment  Some people find it hard to concentrate well so return to your normal activities slowly. Go back to work or school for half days at first, and increase as tolerated. Trauma services can help you with a graduated schedule. If you feel comfortable doing so, tell work or school about your concussion. You may have to adjust your activities, depending on your job or school demands. Rest and Sleep  Rest for the first 24 hours; it's one of the best things to help your brain recover.   It's okay to sleep if you want  You don't have to be woken up

## 2023-05-04 NOTE — TELEPHONE ENCOUNTER
Pts daughter called to let you know that they will be sending pt to a rehab facility and when she gets home her daughter will take care of her. She needs a letter stating that she is taking care of her mother.  Thank you

## 2023-05-04 NOTE — ACP (ADVANCE CARE PLANNING)
Advance Care Planning   Healthcare Decision Maker:    Primary Decision Maker: Sandeep Hamm Child - [phone]    Secondary Decision Maker: Angel Pena - Child - [phone]    Click here to complete Healthcare Decision Makers including selection of the Healthcare Decision Maker Relationship (ie \"Primary\").

## 2023-05-04 NOTE — PROGRESS NOTES
[address]        Date:2023                                                  Patient Name: Pavan Zhu    MRN: [id number]    : 1960    Room: [address] [place] [address]          Evaluating OT: Santy Persaud OTR/L; [id number]       Referring Provider: Luciano Dose, DO    Specific Provider Orders/Date: OT Eval and Treat 23       Diagnosis: Intracranial bleed      Surgery: None this admission     Pertinent Medical History:  has a past medical history of Cirrhosis ([facility] [address] Diabetes mellitus ([facility] [address] Diabetic neuropathy ([facility] Esophageal varices without bleeding ([facility] GERD (gastroesophageal reflux disease), Hypertension, Liver cirrhosis ([facility] Low vitamin D level, Thrombocytopenia ([facility] and Type 2 diabetes mellitus without complication ([facility]      Recommended Adaptive Equipment: TBD     Precautions:  Fall Risk, South Korean-speaking, SBP <140     ID: [id number]     Assessment of current deficits    [x] Functional mobility  [x]ADLs  [x] Strength               []Cognition    [x] Functional transfers   [x] IADLs         [x] Safety Awareness   [x]Endurance    [x] Fine Coordination              [x] Balance      [] Vision/perception   []Sensation     []Gross Motor Coordination  [] ROM  [] Delirium                   [] Motor Control     OT PLAN OF CARE   OT POC based on physician orders, patient diagnosis and results of clinical assessment    Frequency/Duration 2-4 days/wk for 2 weeks PRN   Specific OT Treatment Interventions to include:   * Instruction/training on adapted ADL techniques and AE recommendations to increase functional independence within precautions       * Training on energy conservation strategies, correct breathing pattern and techniques to improve independence/tolerance for self-care routine  * Functional transfer/mobility training/DME recommendations for increased
Norma Salinas notified of patients blood sugar of 355 via perfect serve at this time.
Nurse to nurse given to David Dsouza.
[facility]  ATTENDING PHYSICIAN PROGRESS NOTE      I personally saw, examined and provided care for the patient. Radiographs, labs and medications were reviewed by me independently. The case was discussed in detail and plans for care were established. Review of Residents documentation was conducted and revisions were made as appropriate. I agree with the above documented exam, problem list and plan of care. The following summarizes my clinical findings and independent assessment. CC: Subdural hematoma    S. Patient is awaiting subacute rehab    O. 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 *       PLAN:  LABS:  -I have personally reviewed the patient's labs   Complete blood count--    Basic metabolic panel--   CBC:   Lab Results   Component Value Date/Time    WBC 2.1 05/04/2023 06:34 AM    RBC 3.03 05/04/2023 06:34 AM    HGB 9.0 05/04/2023 06:34 AM    HCT 28.0 05/04/2023 06:34 AM    MCV 92.4 05/04/2023 06:34 AM    MCH 29.7 05/04/2023 06:34 AM    MCHC 32.1 05/04/2023 06:34 AM    RDW 13.5 05/04/2023 06:34 AM    PLT 49 05/04/2023 06:34 AM    MPV 11.1 05/04/2023 06:34 AM     BMP:    Lab Results   Component Value Date/Time     05/04/2023 06:35 AM    K 4.2 05/04/2023 06:35 AM     05/04/2023 06:35 AM    CO2 20 05/04/2023 06:35 AM    BUN 21 05/04/2023 06:35 AM    LABALBU 2.7 05/04/2023 06:35 AM    CREATININE 0.9 05/04/2023 06:35 AM    CALCIUM 8.0 05/04/2023 06:35 AM    GFRAA 55 07/06/2022 10:18 AM    LABGLOM >60 05/04/2023 06:35 AM    GLUCOSE 252 05/04/2023 06:35 AM       -I have reviewed the progress note from case management 5/4/2023      -Acute subdural hematoma Patient's GCS remained 15.   Continue Keppra x7 days for early TBI
Physical Therapy  Physical Therapy Initial Assessment     Name: Cielo Wilkins  : 1960  MRN: [id number]      Date of Service: 2023    Evaluating PT:  Maged Carey PT, DPT, [id number]    Room #:  9228/7332-J  Diagnosis:  Intracranial hemorrhage ([address] [I62.9]  Thrombocytopenia ([address] [D69.6]  Intracranial bleed ([address] [I62.9]  Closed head injury, initial encounter [G08.33AJ]  Fall, initial encounter [W19. XXXA]  PMHx/PSHx:    Past Medical History:   Diagnosis Date    Cirrhosis ([address]     Diabetes mellitus ([address]     Diabetic neuropathy ([address]     Esophageal varices without bleeding ([address]     GERD (gastroesophageal reflux disease)     Hypertension     Liver cirrhosis ([address]     with secondary esophageal varices, no signs/sx's of hepatic encephalopathy    Low vitamin D level     Thrombocytopenia (HCC)     Type 2 diabetes mellitus without complication [facility])       Past Surgical History:   Procedure Laterality Date    APPENDECTOMY       SECTION      CHOLECYSTECTOMY      UPPER GASTROINTESTINAL ENDOSCOPY  2022    [facility]    UPPER GASTROINTESTINAL ENDOSCOPY  2021    [facility]    UPPER [facility]    UPPER GASTROINTESTINAL ENDOSCOPY  2021    [facility]      Procedure/Surgery:  none this admission  Precautions:  Fall Risk, Wol[place] Speaking, SBP <140  Equipment Needs:  TBD    SUBJECTIVE:    Pt lives alone in a 3rd floor apartment with elevator access. Bed is on 1 floor and bath is on 1 floor. Pt ambulated with cane PTA. OBJECTIVE:   Initial Evaluation  Date: 23 Treatment Short Term/ Long Term   Goals   AM-PAC 6 Clicks      Was pt agreeable to Eval/treatment? yes     Does pt have pain? No c/o pain     Bed Mobility  Rolling: NT  Supine to sit: SBA  Sit to supine: SBA  Scooting: SBA  Rolling: Independent   Supine to sit:  Independent   Sit to supine: Independent   Scooting: Independent    Transfers Sit to
Physical Therapy  Physical Therapy Treatment     Name: Orquidea Armstrong  : 1960  MRN: [id number]      Date of Service: 2023    Evaluating PT:  Munir Perdomo PT, DPT, [id number]    Room #:  9051/5910-T  Diagnosis:  Intracranial hemorrhage ([address] [I62.9]  Thrombocytopenia ([address] [D69.6]  Intracranial bleed ([address] [I62.9]  Closed head injury, initial encounter [S30.03VU]  Fall, initial encounter [W19. XXXA]  PMHx/PSHx:    Past Medical History:   Diagnosis Date    Cirrhosis ([facility]     Diabetes mellitus ([facility]     Diabetic neuropathy ([facility]     Esophageal varices without bleeding ([address]     GERD (gastroesophageal reflux disease)     Hypertension     Liver cirrhosis ([facility] [facility]     with secondary esophageal varices, no signs/sx's of hepatic encephalopathy    Low vitamin D level     Thrombocytopenia (HCC)     Type 2 diabetes mellitus without complication [facility])       Past Surgical History:   Procedure Laterality Date    APPENDECTOMY       SECTION      CHOLECYSTECTOMY      UPPER GASTROINTESTINAL ENDOSCOPY  2022    [facility]    UPPER GASTROINTESTINAL ENDOSCOPY  2021    [facility]    UPPER GASTROINTESTINAL [facility]COPY  [facility]    UPPER GASTROINTESTINAL ENDOSCOPY  2021    [facility]      Procedure/Surgery:  none this admission  Precautions:  Fall Risk, Kittitian Speaking, SBP <140  Equipment Needs:  TBD    SUBJECTIVE:    Pt lives alone in a 3rd floor apartment with elevator access. Bed is on 1 floor and bath is on 1 floor. Pt ambulated with cane PTA. OBJECTIVE:   Initial Evaluation  Date: 23 Treatment Date:   23 Short Term/ Long Term   Goals   AM-PAC 6 Clicks 78/76 75/92    Was pt agreeable to Eval/treatment? yes yes    Does pt have pain?  No c/o pain \"A little\" pain across her nose    Bed Mobility  Rolling: NT  Supine to sit: SBA  Sit to supine: SBA  Scooting: SBA Rolling: NT  Supine to sit: NT  Sit to supine: NT  Scooting: NT Rolling:
Trauma Tertiary Survey    Admit Date: 5/1/2023  Hospital day 1    CC: Intracranial hemorrhage  * via iPad video used*  Alcohol pre-screening:  Women: How many times in the past year have you had 4 or more drinks in a day? none  How much do you drink on a daily basis? 0    Drug Pre-screening:    How many times in the past year have you used a recreational drug or used a prescription medication for non medical reasons? No    Mood Prescreening:    During the past two weeks, have you been bothered by little interest or pleasure doing things? No  During the past two weeks, have you been bothered by feeling down, depressed or hopeless? No      Scheduled Meds:   insulin lispro  0-4 Units SubCUTAneous TID WC    insulin lispro  0-4 Units SubCUTAneous Nightly    levETIRAcetam  500 mg IntraVENous Q12H    sodium chloride flush  5-40 mL IntraVENous 2 times per day    bacitracin zinc   Topical TID    sennosides-docusate sodium  1 tablet Oral BID     Continuous Infusions:   dextrose      sodium chloride      sodium chloride      sodium chloride 100 mL/hr at 05/02/23 0613     PRN Meds:glucose, dextrose bolus **OR** dextrose bolus, glucagon (rDNA), dextrose, sodium chloride, sodium chloride flush, sodium chloride, acetaminophen, ondansetron **OR** ondansetron, polyethylene glycol, oxyCODONE **OR** oxyCODONE, hydrALAZINE, labetalol    Subjective:     Overall patient is feeling okay, no new complaints today except for constipation. Does not have any significant pain, no nausea or emesis. Patient tells me she has never had paracentesis in the past.  She follows with GI at [facility] for her cirrhosis. She does not take daily Lasix or spironolactone. Objective:   Patient Vitals for the past 8 hrs:   BP Temp Temp src Pulse Resp SpO2   05/02/23 0800 (!) 149/67 97.4 °F (36.3 °C) Temporal 90 17 99 %   05/02/23 0430 (!) 144/68 97 °F (36.1 °C) Oral 85 18 --       I/O last 3 completed shifts:   In: 1000 [P.O.:100;
personally reviewed the repeat head CT scan and shows stable head bleed. Patient's GCS remained 15. Continue Keppra x7 days for early TBI seizure prophylaxis  -ACOSTA cirrhosis-continue monitor  - Hypertension-continue Coreg lisinopril  PT OT-AM-PAC score 10  DVT Proph: ELA/              Yessi Barth MD, FACS  5/3/2023  1:09 PM    NOTE: This report was transcribed using voice recognition software. Every effort was made to ensure accuracy; however, inadvertent computerized transcription errors may be present.

## 2023-05-05 ENCOUNTER — TELEPHONE (OUTPATIENT)
Dept: FAMILY MEDICINE CLINIC | Age: 63
End: 2023-05-05
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
## 2023-05-10 ENCOUNTER — HOSPITAL ENCOUNTER (OUTPATIENT)
Dept: INTERVENTIONAL RADIOLOGY/VASCULAR | Age: 63
Discharge: HOME | End: 2023-05-12
Payer: MEDICARE

## 2023-05-10 VITALS
DIASTOLIC BLOOD PRESSURE: 55 MMHG | RESPIRATION RATE: 18 BRPM | OXYGEN SATURATION: 96 % | HEART RATE: 88 BPM | TEMPERATURE: 97.3 F | SYSTOLIC BLOOD PRESSURE: 163 MMHG

## 2023-05-10 DIAGNOSIS — R18.8 OTHER ASCITES: ICD-10-CM

## 2023-05-10 LAB
ALBUMIN FLD-MCNC: 1.1 G/DL
AMYLASE FLD-CCNC: 13 U/L
APPEARANCE FLUID: NORMAL
CELL COUNT FLUID TYPE: NORMAL
COLOR FLUID: YELLOW
FLUID TYPE: NORMAL
INR BLD: 1.4
MONOCYTE, FLUID: 91 %
NEUTROPHIL, FLUID: 9 %
NUCLEATED CELLS FLUID: 196 /UL
PROT FLD-MCNC: 2.9 G/DL
PROTHROMBIN TIME: 15.1 SEC (ref 9.3–12.4)
RBC FLUID: <2000 /UL

## 2023-05-10 PROCEDURE — 36415 COLL VENOUS BLD VENIPUNCTURE: CPT

## 2023-05-10 PROCEDURE — 87205 SMEAR GRAM STAIN: CPT

## 2023-05-10 PROCEDURE — 82150 ASSAY OF AMYLASE: CPT

## 2023-05-10 PROCEDURE — 85610 PROTHROMBIN TIME: CPT

## 2023-05-10 PROCEDURE — 49083 ABD PARACENTESIS W/IMAGING: CPT

## 2023-05-10 PROCEDURE — 87070 CULTURE OTHR SPECIMN AEROBIC: CPT

## 2023-05-10 PROCEDURE — C1729 CATH, DRAINAGE: HCPCS

## 2023-05-10 PROCEDURE — 88112 CYTOPATH CELL ENHANCE TECH: CPT

## 2023-05-10 PROCEDURE — 84157 ASSAY OF PROTEIN OTHER: CPT

## 2023-05-10 PROCEDURE — 82042 OTHER SOURCE ALBUMIN QUAN EA: CPT

## 2023-05-10 PROCEDURE — 89051 BODY FLUID CELL COUNT: CPT

## 2023-05-10 PROCEDURE — 88305 TISSUE EXAM BY PATHOLOGIST: CPT

## 2023-05-10 NOTE — INTERVAL H&P NOTE
IR H&P UPDATE NOTE  5/10/23    Molly #[id number] from [address] [place] [address] Video Remote Interpreting Service was utilized for patient: Stacey Le for purposes of obtaining reason for visit, History of Present Illness and  all relevant PMH as it relates to today's visit including pertinent physical exam requiring the aid of an  and tentative treatment plan pending any/all initial testing that is considered appropriate at this time. All initial questions and concerns were addressed and answered at this time. Patient's History and Physical Examination were reviewed from current hospital admission records. Stacey Le appears likely to able to tolerate the requested Paracentesis procedure by the consultant. Risk and benefits were discussed with patient including ultimate complications, possibly death and consent was obtained.     RANJITH Betancourt MD.

## 2023-05-10 NOTE — CONSULTS
Session ID: [id number]  Request JOHN:[id number]  Language:Tunisian  Status:Fulfilled  Agent ID:#[id number]  Agent Name:Molly

## 2023-05-10 NOTE — PROCEDURES
Patient arrived to radiology department for paracentesis. Allergies, home medications, H&P and fasting instructions reviewed with patient. Vital signs taken. IV placed, blood obtained, IV flushed and prn adapter attached. Blood sample sent to lab for ordered tests. Procedural instructions given, questions answered, understanding expressed and consent signed.  No questions at this time

## 2023-05-10 NOTE — BRIEF OP NOTE
Brief Postoperative Note  ______________________________________________________________      IR PARACENTESIS    Patient Name: Arjun Cosby   YOB: 1960  Medical Record Number: [id number]  Date of Procedure: 5/10/23  Room/Bed: Room/bed info not found    Pre-operative Diagnosis: Ascites    Post-operative Diagnosis: Ascites    Consent: INFORMED CONSENT WAS OBTAINED BY patient, RISK AND BENEFITS WERE DISCUSSED via video  Molly #[id number]. Procedure: image guided paracentesis. H and P reviewed prior to the procedure without change. Sonographic images were saved and stored in PACS. See radiology dictation in PACs for image review and additional procedural information. Performed by: RANJITH Li under on-site supervision by Gil York MD.    Procedure: Routine scanning of all four abdominal quadrants was performed using real-time ultrasound and revealed sufficient amount of ascites fluid present. Decision was made to proceed with procedure. After obtaining consent, the patient was placed in the supine position with the head of the bed slightly elevated and the appropriate landmarks were identified. The skin over the puncture site in the left lower quadrant region was prepped with betadine and draped in a sterile fashion. Local anesthesia was obtained by infiltration using 2% Lidocaine without epinephrine. A paracentesis catheter was then advanced into the abdominal cavity over a needle and the needle was withdrawn. Fluid return was clear yellow colored. A total volume of 5350 cc was withdrawn and was processed in accordance with the ordering provider(s) requests (see Epic Orders--> Labs for laboratory order details). The catheter was then withdrawn and a sterile dressing was placed over the site. The patient tolerated the procedure well. Complications: None. Estimated Blood Loss: < 10 cc. .        Electronically signed by RANJITH Li   DD: 5/10/23  11:48 AM

## 2023-05-11 RX ORDER — NADOLOL 20 MG/1
20 TABLET ORAL DAILY
Qty: 90 TABLET | Refills: 0 | OUTPATIENT
Start: 2023-05-11

## 2023-05-13 LAB
BACTERIA FLD AEROBE CULT: NORMAL
GRAM STN SPEC: NORMAL

## 2023-05-16 ENCOUNTER — TELEPHONE (OUTPATIENT)
Dept: FAMILY MEDICINE CLINIC | Age: 63
End: 2023-05-16

## 2023-05-16 DIAGNOSIS — I62.00 SUBDURAL HEMORRHAGE (HCC): Primary | ICD-10-CM

## 2023-05-16 RX ORDER — CALCIUM CARBONATE 160(400)MG
1 TABLET,CHEWABLE ORAL DAILY
Qty: 1 EACH | Refills: 0 | Status: SHIPPED | OUTPATIENT
Start: 2023-05-16

## 2023-05-16 NOTE — TELEPHONE ENCOUNTER
Junious Post from [facility] called. She is asking for orders for pt to get a rollator walker with seat. DX. Subdural hemorrhage with gait abnormalities. Please advise.  Can fax order  to [phone]  Last seen 3/29/2023  Next appt 5/24/2023

## 2023-05-24 ENCOUNTER — OFFICE VISIT (OUTPATIENT)
Dept: FAMILY MEDICINE CLINIC | Age: 63
End: 2023-05-24
Payer: MEDICARE

## 2023-05-24 VITALS
SYSTOLIC BLOOD PRESSURE: 98 MMHG | HEIGHT: 63 IN | OXYGEN SATURATION: 99 % | RESPIRATION RATE: 16 BRPM | DIASTOLIC BLOOD PRESSURE: 64 MMHG | WEIGHT: 180 LBS | HEART RATE: 75 BPM | BODY MASS INDEX: 31.89 KG/M2 | TEMPERATURE: 97.8 F

## 2023-05-24 DIAGNOSIS — R42 DIZZINESS: ICD-10-CM

## 2023-05-24 DIAGNOSIS — I10 ESSENTIAL HYPERTENSION: Primary | ICD-10-CM

## 2023-05-24 DIAGNOSIS — I62.9 INTRACRANIAL BLEED (HCC): ICD-10-CM

## 2023-05-24 DIAGNOSIS — S06.5XAA SDH (SUBDURAL HEMATOMA) (HCC): ICD-10-CM

## 2023-05-24 DIAGNOSIS — R05.1 ACUTE COUGH: ICD-10-CM

## 2023-05-24 PROCEDURE — 3078F DIAST BP <80 MM HG: CPT | Performed by: STUDENT IN AN ORGANIZED HEALTH CARE EDUCATION/TRAINING PROGRAM

## 2023-05-24 PROCEDURE — 3074F SYST BP LT 130 MM HG: CPT | Performed by: STUDENT IN AN ORGANIZED HEALTH CARE EDUCATION/TRAINING PROGRAM

## 2023-05-24 PROCEDURE — 99214 OFFICE O/P EST MOD 30 MIN: CPT | Performed by: STUDENT IN AN ORGANIZED HEALTH CARE EDUCATION/TRAINING PROGRAM

## 2023-05-24 RX ORDER — BLOOD-GLUCOSE METER
EACH MISCELLANEOUS
COMMUNITY
Start: 2023-04-11

## 2023-05-24 RX ORDER — CARVEDILOL 6.25 MG/1
6.25 TABLET ORAL DAILY
Qty: 90 TABLET | Refills: 1 | Status: SHIPPED | OUTPATIENT
Start: 2023-05-24

## 2023-05-24 RX ORDER — BROMPHENIRAMINE MALEATE, PSEUDOEPHEDRINE HYDROCHLORIDE, AND DEXTROMETHORPHAN HYDROBROMIDE 2; 30; 10 MG/5ML; MG/5ML; MG/5ML
SYRUP ORAL
Qty: 180 ML | Refills: 0 | Status: SHIPPED | OUTPATIENT
Start: 2023-05-24

## 2023-05-24 RX ORDER — BLOOD PRESSURE TEST KIT
1 KIT MISCELLANEOUS DAILY
Qty: 1 KIT | Refills: 0 | Status: SHIPPED | OUTPATIENT
Start: 2023-05-24

## 2023-05-24 RX ORDER — LANCETS
EACH MISCELLANEOUS
COMMUNITY
Start: 2023-04-11

## 2023-05-24 RX ORDER — BLOOD SUGAR DIAGNOSTIC
STRIP MISCELLANEOUS
COMMUNITY
Start: 2023-04-11

## 2023-05-24 ASSESSMENT — ENCOUNTER SYMPTOMS
ABDOMINAL PAIN: 0
SHORTNESS OF BREATH: 0
CONSTIPATION: 0
BACK PAIN: 0
DIARRHEA: 0
VOMITING: 0
NAUSEA: 0
SINUS PRESSURE: 0
EYE PAIN: 0
SORE THROAT: 0
EYE REDNESS: 0
SINUS PAIN: 0
COUGH: 1
RHINORRHEA: 0

## 2023-05-24 NOTE — PROGRESS NOTES
5/24/2023    [facility]  Chief Complaint   Patient presents with    Follow-up     Discharged from Nursing home 5/14/23, patient noted still feeling dizzy  Has [facility] coming to house w/ PT/OT       Concepcion Daneils is a 61 y.o. female who  has a past medical history of Cirrhosis ([address] Diabetes mellitus ([address] Diabetic neuropathy ([address] Esophageal varices without bleeding ([address] GERD (gastroesophageal reflux disease), Hypertension, Liver cirrhosis ([address] Low vitamin D level, Thrombocytopenia ([address] and Type 2 diabetes mellitus without complication ([facility] [address] Patient is here today for a hospital follow-up and nursing home follow-up. She was discharged to [address] [place] [address] and rehabilitation. She was discharged from the nursing home on May 14. Patient is currently still feeling dizzy. Blood pressure is on the lower end here today. We are going to stop her amlodipine and we may have to cut the lisinopril if we have to. Hospital Course/Procedures/Operation/treatments:   5/1: Trauma consult. Injury occurred at approximately 12 PM, patient was outside and had mechanical fall, she tripped onto concrete landing on her face. Patient has abrasion to her nose and ecchymosis on her nasal bridge. Patient is Belgian-speaking and  was used. Patient and daughter verify she has a history of Alejandro/cirrhosis. Patient's abdomen is distended with ascites. Her chief complaint is facial pain and headache. She has mild left-sided elbow pain and weakness which she attributes to tenosynovitis that has been chronic, her weakness in her left hand is not new. Patient is GCS 15 and focally intact. Imaging revealed SDH. Patient was admitted for further management. Started on [address]. 5/2: Repeat head CT stable. NSGY with no surgical intervention planned. Worked with PT/OT, 10/24. 5/3: Feels well, tolerating diet. Continue home medications. Holding DVT PPX given low platelet count.   5/4: Worked

## 2023-05-31 ENCOUNTER — HOSPITAL ENCOUNTER (OUTPATIENT)
Dept: MAMMOGRAPHY | Age: 63
Discharge: HOME | End: 2023-06-02

## 2023-05-31 DIAGNOSIS — Z12.31 BREAST CANCER SCREENING BY MAMMOGRAM: ICD-10-CM

## 2023-06-01 ENCOUNTER — OFFICE VISIT (OUTPATIENT)
Dept: NEUROSURGERY | Age: 63
End: 2023-06-01
Payer: MEDICARE

## 2023-06-01 ENCOUNTER — HOSPITAL ENCOUNTER (OUTPATIENT)
Dept: CT IMAGING | Age: 63
Discharge: HOME | End: 2023-06-03
Payer: MEDICARE

## 2023-06-01 VITALS
HEART RATE: 94 BPM | WEIGHT: 180 LBS | HEIGHT: 63 IN | BODY MASS INDEX: 31.89 KG/M2 | OXYGEN SATURATION: 99 % | DIASTOLIC BLOOD PRESSURE: 85 MMHG | SYSTOLIC BLOOD PRESSURE: 140 MMHG

## 2023-06-01 DIAGNOSIS — I62.9 INTRACRANIAL BLEED (HCC): ICD-10-CM

## 2023-06-01 DIAGNOSIS — S06.5XAA SDH (SUBDURAL HEMATOMA) (HCC): Primary | ICD-10-CM

## 2023-06-01 DIAGNOSIS — S06.5XAA SDH (SUBDURAL HEMATOMA) (HCC): ICD-10-CM

## 2023-06-01 PROCEDURE — 3077F SYST BP >= 140 MM HG: CPT | Performed by: STUDENT IN AN ORGANIZED HEALTH CARE EDUCATION/TRAINING PROGRAM

## 2023-06-01 PROCEDURE — 99212 OFFICE O/P EST SF 10 MIN: CPT

## 2023-06-01 PROCEDURE — 3079F DIAST BP 80-89 MM HG: CPT | Performed by: STUDENT IN AN ORGANIZED HEALTH CARE EDUCATION/TRAINING PROGRAM

## 2023-06-01 PROCEDURE — 99213 OFFICE O/P EST LOW 20 MIN: CPT | Performed by: STUDENT IN AN ORGANIZED HEALTH CARE EDUCATION/TRAINING PROGRAM

## 2023-06-01 PROCEDURE — 70450 CT HEAD/BRAIN W/O DYE: CPT

## 2023-06-01 NOTE — PROGRESS NOTES
Hospital Follow-up     This is a 61year old female who presents to the office for a 1 month follow-up s/p SDH     Subjective: Patient is Icelandic speaking, so IPAD interpretor was used. Patient states she is doing well. She denies any significant headache or dizziness. No numbness or weakness. Head CT reviewed with patient. Physical Exam:              WDWN, no apparent distress              Non-labored breathing               Vitals Stable              Alert and oriented x3              PERRL              EOMI              CALIXTO well              Motor strength symmetric              Sensation to LT intact bilaterally                Imagin2023 CT Head   1. Resolved previous midline falcine subdural hematoma which had extended  into the right-sided tentorium the cerebellum. 2.  No acute intracranial abnormality. Assessment: This is a 61 y.o.  female presenting for a 1 month follow-up s/p SDH. Plan:  -Pain control and expectations discussed  -No restrictions  -OARRS report reviewed   -Follow-up in neurosurgery clinic prn  -Call or return to neurosurgery office sooner if symptoms worsen or if new issues arise in the interim.     Electronically signed by Channing Monae PA-C on 2023 at 6:16 PM

## 2023-06-01 NOTE — CONSULTS
Session ID: [id number]  Request ID: [id number]  Language: Bulgarian  Status: Fulfilled   ID: #[id number]   Name: Verito Macdonald

## 2023-06-01 NOTE — CONSULTS
Session ID: [id number]  Request ID: [id number]  Language: Azeri  Status: Fulfilled   ID: #[id number]   Name: Melissa Siddiqi
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
## 2023-06-19 ENCOUNTER — TELEPHONE (OUTPATIENT)
Dept: FAMILY MEDICINE CLINIC | Age: 63
End: 2023-06-19

## 2023-06-19 DIAGNOSIS — E11.9 TYPE 2 DIABETES MELLITUS WITHOUT COMPLICATION, WITH LONG-TERM CURRENT USE OF INSULIN (HCC): Primary | ICD-10-CM

## 2023-06-19 DIAGNOSIS — Z79.4 TYPE 2 DIABETES MELLITUS WITHOUT COMPLICATION, WITH LONG-TERM CURRENT USE OF INSULIN (HCC): Primary | ICD-10-CM

## 2023-06-19 RX ORDER — INSULIN LISPRO 100 [IU]/ML
INJECTION, SUSPENSION SUBCUTANEOUS
Qty: 84 ML | Refills: 1 | Status: SHIPPED | OUTPATIENT
Start: 2023-06-19

## 2023-06-19 NOTE — TELEPHONE ENCOUNTER
Steve called and they need an updated script for the insulin the script is currently in mLs and they need it to be in units and please make the qty for 84 mL on new script for a 90 day supply.

## 2023-07-05 ENCOUNTER — OFFICE VISIT (OUTPATIENT)
Dept: FAMILY MEDICINE CLINIC | Age: 63
End: 2023-07-05
Payer: MEDICARE

## 2023-07-05 VITALS
HEART RATE: 86 BPM | WEIGHT: 180 LBS | SYSTOLIC BLOOD PRESSURE: 132 MMHG | HEIGHT: 63 IN | RESPIRATION RATE: 16 BRPM | OXYGEN SATURATION: 96 % | TEMPERATURE: 97 F | DIASTOLIC BLOOD PRESSURE: 70 MMHG | BODY MASS INDEX: 31.89 KG/M2

## 2023-07-05 DIAGNOSIS — R42 DIZZINESS: ICD-10-CM

## 2023-07-05 DIAGNOSIS — M25.511 CHRONIC PAIN OF BOTH SHOULDERS: ICD-10-CM

## 2023-07-05 DIAGNOSIS — E11.9 TYPE 2 DIABETES MELLITUS WITHOUT COMPLICATION, WITH LONG-TERM CURRENT USE OF INSULIN (HCC): ICD-10-CM

## 2023-07-05 DIAGNOSIS — Z79.4 TYPE 2 DIABETES MELLITUS WITHOUT COMPLICATION, WITH LONG-TERM CURRENT USE OF INSULIN (HCC): ICD-10-CM

## 2023-07-05 DIAGNOSIS — M25.512 CHRONIC PAIN OF BOTH SHOULDERS: ICD-10-CM

## 2023-07-05 DIAGNOSIS — Z00.00 INITIAL MEDICARE ANNUAL WELLNESS VISIT: Primary | ICD-10-CM

## 2023-07-05 DIAGNOSIS — S06.5XAA SDH (SUBDURAL HEMATOMA) (HCC): ICD-10-CM

## 2023-07-05 DIAGNOSIS — M79.89 SWELLING OF LOWER EXTREMITY: ICD-10-CM

## 2023-07-05 DIAGNOSIS — R29.6 FREQUENT FALLS: ICD-10-CM

## 2023-07-05 DIAGNOSIS — G89.29 CHRONIC PAIN OF BOTH SHOULDERS: ICD-10-CM

## 2023-07-05 LAB
CREATININE URINE POCT: NORMAL
MICROALBUMIN/CREAT 24H UR: NORMAL MG/G{CREAT}
MICROALBUMIN/CREAT UR-RTO: NORMAL

## 2023-07-05 PROCEDURE — 3051F HG A1C>EQUAL 7.0%<8.0%: CPT | Performed by: STUDENT IN AN ORGANIZED HEALTH CARE EDUCATION/TRAINING PROGRAM

## 2023-07-05 PROCEDURE — 3075F SYST BP GE 130 - 139MM HG: CPT | Performed by: STUDENT IN AN ORGANIZED HEALTH CARE EDUCATION/TRAINING PROGRAM

## 2023-07-05 PROCEDURE — G0438 PPPS, INITIAL VISIT: HCPCS | Performed by: STUDENT IN AN ORGANIZED HEALTH CARE EDUCATION/TRAINING PROGRAM

## 2023-07-05 PROCEDURE — 82044 UR ALBUMIN SEMIQUANTITATIVE: CPT | Performed by: STUDENT IN AN ORGANIZED HEALTH CARE EDUCATION/TRAINING PROGRAM

## 2023-07-05 PROCEDURE — 3078F DIAST BP <80 MM HG: CPT | Performed by: STUDENT IN AN ORGANIZED HEALTH CARE EDUCATION/TRAINING PROGRAM

## 2023-07-05 RX ORDER — IBUPROFEN 600 MG/1
600 TABLET ORAL 2 TIMES DAILY
Qty: 60 TABLET | Refills: 3 | Status: SHIPPED | OUTPATIENT
Start: 2023-07-05

## 2023-07-05 RX ORDER — MECLIZINE HCL 12.5 MG/1
12.5 TABLET ORAL 3 TIMES DAILY PRN
Qty: 30 TABLET | Refills: 1 | Status: SHIPPED | OUTPATIENT
Start: 2023-07-05 | End: 2023-07-25

## 2023-07-05 RX ORDER — FUROSEMIDE 20 MG/1
20 TABLET ORAL DAILY
Qty: 90 TABLET | Refills: 1 | Status: SHIPPED | OUTPATIENT
Start: 2023-07-05

## 2023-07-05 SDOH — HEALTH STABILITY: PHYSICAL HEALTH: ON AVERAGE, HOW MANY MINUTES DO YOU ENGAGE IN EXERCISE AT THIS LEVEL?: 20 MIN

## 2023-07-05 SDOH — HEALTH STABILITY: PHYSICAL HEALTH: ON AVERAGE, HOW MANY DAYS PER WEEK DO YOU ENGAGE IN MODERATE TO STRENUOUS EXERCISE (LIKE A BRISK WALK)?: 1 DAY

## 2023-07-05 ASSESSMENT — PATIENT HEALTH QUESTIONNAIRE - PHQ9
2. FEELING DOWN, DEPRESSED OR HOPELESS: 0
SUM OF ALL RESPONSES TO PHQ9 QUESTIONS 1 & 2: 0
SUM OF ALL RESPONSES TO PHQ QUESTIONS 1-9: 0
1. LITTLE INTEREST OR PLEASURE IN DOING THINGS: 0
SUM OF ALL RESPONSES TO PHQ QUESTIONS 1-9: 0

## 2023-07-05 ASSESSMENT — LIFESTYLE VARIABLES
HOW MANY STANDARD DRINKS CONTAINING ALCOHOL DO YOU HAVE ON A TYPICAL DAY: PATIENT DOES NOT DRINK
HOW OFTEN DO YOU HAVE A DRINK CONTAINING ALCOHOL: NEVER
HOW OFTEN DO YOU HAVE SIX OR MORE DRINKS ON ONE OCCASION: 1
HOW OFTEN DO YOU HAVE A DRINK CONTAINING ALCOHOL: 1
HOW MANY STANDARD DRINKS CONTAINING ALCOHOL DO YOU HAVE ON A TYPICAL DAY: 0

## 2023-07-17 ENCOUNTER — HOSPITAL ENCOUNTER (INPATIENT)
Age: 63
LOS: 1 days | Discharge: HOME | DRG: 433 | End: 2023-07-19
Attending: EMERGENCY MEDICINE | Admitting: FAMILY MEDICINE
Payer: MEDICARE

## 2023-07-17 ENCOUNTER — APPOINTMENT (OUTPATIENT)
Dept: CT IMAGING | Age: 63
DRG: 433 | End: 2023-07-17
Payer: MEDICARE

## 2023-07-17 ENCOUNTER — APPOINTMENT (OUTPATIENT)
Dept: GENERAL RADIOLOGY | Age: 63
DRG: 433 | End: 2023-07-17
Payer: MEDICARE

## 2023-07-17 DIAGNOSIS — M79.89 SWELLING OF LOWER EXTREMITY: ICD-10-CM

## 2023-07-17 DIAGNOSIS — R18.8 OTHER ASCITES: ICD-10-CM

## 2023-07-17 DIAGNOSIS — G93.40 ENCEPHALOPATHY: Primary | ICD-10-CM

## 2023-07-17 DIAGNOSIS — D72.819 LEUKOPENIA, UNSPECIFIED TYPE: ICD-10-CM

## 2023-07-17 DIAGNOSIS — N17.9 AKI (ACUTE KIDNEY INJURY) (HCC): ICD-10-CM

## 2023-07-17 LAB
ALBUMIN SERPL-MCNC: 3.6 G/DL (ref 3.5–5.2)
ALP SERPL-CCNC: 135 U/L (ref 35–104)
ALT SERPL-CCNC: 15 U/L (ref 0–32)
ANION GAP SERPL CALCULATED.3IONS-SCNC: 8 MMOL/L (ref 7–16)
AST SERPL-CCNC: 35 U/L (ref 0–31)
BILIRUB DIRECT SERPL-MCNC: 0.3 MG/DL (ref 0–0.3)
BILIRUB INDIRECT SERPL-MCNC: 1 MG/DL (ref 0–1)
BILIRUB SERPL-MCNC: 1.3 MG/DL (ref 0–1.2)
BNP SERPL-MCNC: 190 PG/ML (ref 0–125)
BUN SERPL-MCNC: 30 MG/DL (ref 6–23)
CALCIUM SERPL-MCNC: 9.1 MG/DL (ref 8.6–10.2)
CHLORIDE SERPL-SCNC: 104 MMOL/L (ref 98–107)
CO2 SERPL-SCNC: 23 MMOL/L (ref 22–29)
CREAT SERPL-MCNC: 1.5 MG/DL (ref 0.5–1)
ERYTHROCYTE [DISTWIDTH] IN BLOOD BY AUTOMATED COUNT: 13.7 % (ref 11.5–15)
GFR SERPL CREATININE-BSD FRML MDRD: 41 ML/MIN/1.73M2
GLUCOSE SERPL-MCNC: 321 MG/DL (ref 74–99)
HCT VFR BLD AUTO: 31.4 % (ref 34–48)
HGB BLD-MCNC: 10 G/DL (ref 11.5–15.5)
LACTATE BLDV-SCNC: 1.8 MMOL/L (ref 0.5–2.2)
LIPASE SERPL-CCNC: 40 U/L (ref 13–60)
MCH RBC QN AUTO: 29.2 PG (ref 26–35)
MCHC RBC AUTO-ENTMCNC: 31.8 G/DL (ref 32–34.5)
MCV RBC AUTO: 91.8 FL (ref 80–99.9)
METER GLUCOSE: 318 MG/DL (ref 74–99)
PLATELET # BLD AUTO: 59 K/UL (ref 130–450)
PLATELET CONFIRMATION: NORMAL
PMV BLD AUTO: 12.6 FL (ref 7–12)
POTASSIUM SERPL-SCNC: 6.3 MMOL/L (ref 3.5–5)
PROT SERPL-MCNC: 9.4 G/DL (ref 6.4–8.3)
RBC # BLD AUTO: 3.42 M/UL (ref 3.5–5.5)
SODIUM SERPL-SCNC: 135 MMOL/L (ref 132–146)
TROPONIN I SERPL HS-MCNC: NORMAL NG/L (ref 0–14)
TROPONIN INTERP: NORMAL
TROPONIN T SERPL-MCNC: NORMAL NG/ML
TSH SERPL DL<=0.05 MIU/L-ACNC: 3.08 UIU/ML (ref 0.27–4.2)
WBC OTHER # BLD: 2.2 K/UL (ref 4.5–11.5)

## 2023-07-17 PROCEDURE — 71045 X-RAY EXAM CHEST 1 VIEW: CPT

## 2023-07-17 PROCEDURE — 80048 BASIC METABOLIC PNL TOTAL CA: CPT

## 2023-07-17 PROCEDURE — 70450 CT HEAD/BRAIN W/O DYE: CPT

## 2023-07-17 PROCEDURE — 84484 ASSAY OF TROPONIN QUANT: CPT

## 2023-07-17 PROCEDURE — 6360000004 HC RX CONTRAST MEDICATION: Performed by: RADIOLOGY

## 2023-07-17 PROCEDURE — 93005 ELECTROCARDIOGRAM TRACING: CPT | Performed by: EMERGENCY MEDICINE

## 2023-07-17 PROCEDURE — 83605 ASSAY OF LACTIC ACID: CPT

## 2023-07-17 PROCEDURE — 82947 ASSAY GLUCOSE BLOOD QUANT: CPT

## 2023-07-17 PROCEDURE — 99285 EMERGENCY DEPT VISIT HI MDM: CPT

## 2023-07-17 PROCEDURE — 85027 COMPLETE CBC AUTOMATED: CPT

## 2023-07-17 PROCEDURE — 36415 COLL VENOUS BLD VENIPUNCTURE: CPT

## 2023-07-17 PROCEDURE — 83880 ASSAY OF NATRIURETIC PEPTIDE: CPT

## 2023-07-17 PROCEDURE — 80076 HEPATIC FUNCTION PANEL: CPT

## 2023-07-17 PROCEDURE — 84443 ASSAY THYROID STIM HORMONE: CPT

## 2023-07-17 PROCEDURE — 96375 TX/PRO/DX INJ NEW DRUG ADDON: CPT

## 2023-07-17 PROCEDURE — 2580000003 HC RX 258: Performed by: EMERGENCY MEDICINE

## 2023-07-17 PROCEDURE — 84132 ASSAY OF SERUM POTASSIUM: CPT

## 2023-07-17 PROCEDURE — 83690 ASSAY OF LIPASE: CPT

## 2023-07-17 PROCEDURE — 96374 THER/PROPH/DIAG INJ IV PUSH: CPT

## 2023-07-17 PROCEDURE — 74177 CT ABD & PELVIS W/CONTRAST: CPT

## 2023-07-17 RX ORDER — CALCIUM GLUCONATE 10 MG/ML
1000 INJECTION, SOLUTION INTRAVENOUS ONCE
Status: COMPLETED | OUTPATIENT
Start: 2023-07-18 | End: 2023-07-18

## 2023-07-17 RX ORDER — 0.9 % SODIUM CHLORIDE 0.9 %
1000 INTRAVENOUS SOLUTION INTRAVENOUS ONCE
Status: COMPLETED | OUTPATIENT
Start: 2023-07-17 | End: 2023-07-18

## 2023-07-17 RX ORDER — ONDANSETRON 2 MG/ML
4 INJECTION INTRAMUSCULAR; INTRAVENOUS ONCE
Status: COMPLETED | OUTPATIENT
Start: 2023-07-17 | End: 2023-07-18

## 2023-07-17 RX ADMIN — IOPAMIDOL 75 ML: 755 INJECTION, SOLUTION INTRAVENOUS at 22:48

## 2023-07-17 RX ADMIN — SODIUM CHLORIDE 1000 ML: 9 INJECTION, SOLUTION INTRAVENOUS at 20:34

## 2023-07-17 ASSESSMENT — PAIN - FUNCTIONAL ASSESSMENT: PAIN_FUNCTIONAL_ASSESSMENT: NONE - DENIES PAIN

## 2023-07-18 ENCOUNTER — APPOINTMENT (OUTPATIENT)
Dept: INTERVENTIONAL RADIOLOGY/VASCULAR | Age: 63
DRG: 433 | End: 2023-07-18
Payer: MEDICARE

## 2023-07-18 PROBLEM — E55.9 VITAMIN D DEFICIENCY: Status: ACTIVE | Noted: 2023-07-18

## 2023-07-18 PROBLEM — E87.5 HYPERKALEMIA: Status: ACTIVE | Noted: 2023-07-18

## 2023-07-18 PROBLEM — N17.9 AKI (ACUTE KIDNEY INJURY) (HCC): Status: ACTIVE | Noted: 2023-07-18

## 2023-07-18 PROBLEM — G93.41 METABOLIC ENCEPHALOPATHY: Status: ACTIVE | Noted: 2023-07-18

## 2023-07-18 PROBLEM — K76.82 ACUTE HEPATIC ENCEPHALOPATHY (HCC): Status: ACTIVE | Noted: 2023-07-18

## 2023-07-18 LAB
25(OH)D3 SERPL-MCNC: 15.1 NG/ML (ref 30–100)
ALBUMIN SERPL-MCNC: 4 G/DL (ref 3.5–5.2)
ALP SERPL-CCNC: 143 U/L (ref 35–104)
ALT SERPL-CCNC: 19 U/L (ref 0–32)
AMMONIA PLAS-SCNC: 52 UMOL/L (ref 11–51)
AMMONIA PLAS-SCNC: 78 UMOL/L (ref 11–51)
ANION GAP SERPL CALCULATED.3IONS-SCNC: 11 MMOL/L (ref 7–16)
APPEARANCE FLD: CLEAR
AST SERPL-CCNC: 46 U/L (ref 0–31)
BASOPHILS # BLD: 0.03 K/UL (ref 0–0.2)
BASOPHILS NFR BLD: 2 % (ref 0–2)
BILIRUB SERPL-MCNC: 2.3 MG/DL (ref 0–1.2)
BODY FLD TYPE: NORMAL
BUN SERPL-MCNC: 24 MG/DL (ref 6–23)
CALCIUM SERPL-MCNC: 9.7 MG/DL (ref 8.6–10.2)
CHLORIDE SERPL-SCNC: 102 MMOL/L (ref 98–107)
CHP ED QC CHECK: NORMAL
CLOT CHECK: NORMAL
CO2 SERPL-SCNC: 21 MMOL/L (ref 22–29)
COLOR FLD: YELLOW
CREAT SERPL-MCNC: 1.1 MG/DL (ref 0.5–1)
EKG ATRIAL RATE: 83 BPM
EKG P AXIS: 37 DEGREES
EKG P-R INTERVAL: 174 MS
EKG Q-T INTERVAL: 422 MS
EKG QRS DURATION: 132 MS
EKG QTC CALCULATION (BAZETT): 495 MS
EKG R AXIS: -44 DEGREES
EKG T AXIS: -3 DEGREES
EKG VENTRICULAR RATE: 83 BPM
EOSINOPHIL # BLD: 0.02 K/UL (ref 0.05–0.5)
EOSINOPHILS RELATIVE PERCENT: 1 % (ref 0–6)
ERYTHROCYTE [DISTWIDTH] IN BLOOD BY AUTOMATED COUNT: 13.8 % (ref 11.5–15)
FOLATE SERPL-MCNC: 15.5 NG/ML (ref 4.8–24.2)
FOLATE SERPL-MCNC: NORMAL NG/ML
GFR SERPL CREATININE-BSD FRML MDRD: 54 ML/MIN/1.73M2
GLUCOSE BLD-MCNC: 385 MG/DL
GLUCOSE SERPL-MCNC: 367 MG/DL (ref 74–99)
HBA1C MFR BLD: 8.5 % (ref 4–5.6)
HCT VFR BLD AUTO: 34.5 % (ref 34–48)
HGB BLD-MCNC: 10.9 G/DL (ref 11.5–15.5)
INR PPP: 1.6
IRON SATN MFR SERPL: 43 % (ref 15–50)
IRON SERPL-MCNC: 159 UG/DL (ref 37–145)
LACTATE BLDV-SCNC: 1.8 MMOL/L (ref 0.5–1.9)
LYMPHOCYTES # BLD: 24 % (ref 20–42)
LYMPHOCYTES NFR BLD: 0.43 K/UL (ref 1.5–4)
MCH RBC QN AUTO: 29.3 PG (ref 26–35)
MCHC RBC AUTO-ENTMCNC: 31.6 G/DL (ref 32–34.5)
MCV RBC AUTO: 92.7 FL (ref 80–99.9)
METER GLUCOSE: 385 MG/DL (ref 74–99)
METER GLUCOSE: 406 MG/DL (ref 74–99)
MONOCYTES NFR BLD: 0.06 K/UL (ref 0.1–0.95)
MONOCYTES NFR BLD: 4 % (ref 2–12)
MONOCYTES NFR FLD: 96 %
NEUTROPHILS NFR BLD: 70 % (ref 43–80)
NEUTROPHILS NFR FLD: 4 %
NEUTS SEG NFR BLD: 1.25 K/UL (ref 1.8–7.3)
PLATELET # BLD AUTO: 67 K/UL (ref 130–450)
PLATELET CONFIRMATION: NORMAL
PMV BLD AUTO: 12.3 FL (ref 7–12)
POTASSIUM SERPL-SCNC: 4 MMOL/L (ref 3.5–5)
POTASSIUM SERPL-SCNC: 5.1 MMOL/L (ref 3.5–5)
POTASSIUM SERPL-SCNC: 5.9 MMOL/L (ref 3.5–5)
POTASSIUM SERPL-SCNC: 6.2 MMOL/L (ref 3.5–5)
PROT SERPL-MCNC: 10.2 G/DL (ref 6.4–8.3)
PROTHROMBIN TIME: 17 SEC (ref 9.3–12.4)
RBC # BLD AUTO: 3.72 M/UL (ref 3.5–5.5)
RBC # BLD: NORMAL 10*6/UL
RBC # FLD: 3000 CELLS/UL
SODIUM SERPL-SCNC: 134 MMOL/L (ref 132–146)
TIBC SERPL-MCNC: 370 UG/DL (ref 250–450)
TROPONIN I SERPL HS-MCNC: 16 NG/L (ref 0–9)
TROPONIN I SERPL HS-MCNC: 16 NG/L (ref 0–9)
VIT B12 SERPL-MCNC: 601 PG/ML (ref 211–946)
VIT B12 SERPL-MCNC: NORMAL PG/ML (ref 232–1245)
WBC # FLD: 284 CELLS/UL
WBC OTHER # BLD: 1.8 K/UL (ref 4.5–11.5)

## 2023-07-18 PROCEDURE — 85027 COMPLETE CBC AUTOMATED: CPT

## 2023-07-18 PROCEDURE — 84484 ASSAY OF TROPONIN QUANT: CPT

## 2023-07-18 PROCEDURE — 6360000002 HC RX W HCPCS: Performed by: EMERGENCY MEDICINE

## 2023-07-18 PROCEDURE — 2580000003 HC RX 258: Performed by: FAMILY MEDICINE

## 2023-07-18 PROCEDURE — 82150 ASSAY OF AMYLASE: CPT

## 2023-07-18 PROCEDURE — 6370000000 HC RX 637 (ALT 250 FOR IP): Performed by: INTERNAL MEDICINE

## 2023-07-18 PROCEDURE — 87205 SMEAR GRAM STAIN: CPT

## 2023-07-18 PROCEDURE — 2060000000 HC ICU INTERMEDIATE R&B

## 2023-07-18 PROCEDURE — 82947 ASSAY GLUCOSE BLOOD QUANT: CPT

## 2023-07-18 PROCEDURE — 2500000003 HC RX 250 WO HCPCS: Performed by: PHYSICIAN ASSISTANT

## 2023-07-18 PROCEDURE — 83540 ASSAY OF IRON: CPT

## 2023-07-18 PROCEDURE — 82607 VITAMIN B-12: CPT

## 2023-07-18 PROCEDURE — 85610 PROTHROMBIN TIME: CPT

## 2023-07-18 PROCEDURE — 88305 TISSUE EXAM BY PATHOLOGIST: CPT

## 2023-07-18 PROCEDURE — 51798 US URINE CAPACITY MEASURE: CPT

## 2023-07-18 PROCEDURE — 80053 COMPREHEN METABOLIC PANEL: CPT

## 2023-07-18 PROCEDURE — 83550 IRON BINDING TEST: CPT

## 2023-07-18 PROCEDURE — 93010 ELECTROCARDIOGRAM REPORT: CPT | Performed by: INTERNAL MEDICINE

## 2023-07-18 PROCEDURE — C1729 CATH, DRAINAGE: HCPCS

## 2023-07-18 PROCEDURE — 0W9G3ZZ DRAINAGE OF PERITONEAL CAVITY, PERCUTANEOUS APPROACH: ICD-10-PCS | Performed by: INTERNAL MEDICINE

## 2023-07-18 PROCEDURE — 88112 CYTOPATH CELL ENHANCE TECH: CPT

## 2023-07-18 PROCEDURE — 49083 ABD PARACENTESIS W/IMAGING: CPT

## 2023-07-18 PROCEDURE — 82140 ASSAY OF AMMONIA: CPT

## 2023-07-18 PROCEDURE — 82306 VITAMIN D 25 HYDROXY: CPT

## 2023-07-18 PROCEDURE — 87070 CULTURE OTHR SPECIMN AEROBIC: CPT

## 2023-07-18 PROCEDURE — 6370000000 HC RX 637 (ALT 250 FOR IP): Performed by: FAMILY MEDICINE

## 2023-07-18 PROCEDURE — 2580000003 HC RX 258: Performed by: INTERNAL MEDICINE

## 2023-07-18 PROCEDURE — 82746 ASSAY OF FOLIC ACID SERUM: CPT

## 2023-07-18 PROCEDURE — 83036 HEMOGLOBIN GLYCOSYLATED A1C: CPT

## 2023-07-18 PROCEDURE — 6360000002 HC RX W HCPCS: Performed by: FAMILY MEDICINE

## 2023-07-18 PROCEDURE — 82042 OTHER SOURCE ALBUMIN QUAN EA: CPT

## 2023-07-18 PROCEDURE — 89051 BODY FLUID CELL COUNT: CPT

## 2023-07-18 RX ORDER — LIDOCAINE HYDROCHLORIDE 20 MG/ML
INJECTION, SOLUTION INFILTRATION; PERINEURAL PRN
Status: COMPLETED | OUTPATIENT
Start: 2023-07-18 | End: 2023-07-18

## 2023-07-18 RX ORDER — CHOLECALCIFEROL (VITAMIN D3) 50 MCG
2000 TABLET ORAL DAILY
Status: DISCONTINUED | OUTPATIENT
Start: 2023-07-18 | End: 2023-07-19 | Stop reason: HOSPADM

## 2023-07-18 RX ORDER — SODIUM CHLORIDE 9 MG/ML
INJECTION, SOLUTION INTRAVENOUS PRN
Status: DISCONTINUED | OUTPATIENT
Start: 2023-07-18 | End: 2023-07-19 | Stop reason: HOSPADM

## 2023-07-18 RX ORDER — AMLODIPINE BESYLATE 5 MG/1
5 TABLET ORAL DAILY
Status: DISCONTINUED | OUTPATIENT
Start: 2023-07-18 | End: 2023-07-19 | Stop reason: HOSPADM

## 2023-07-18 RX ORDER — LACTULOSE 10 G/15ML
20 SOLUTION ORAL 3 TIMES DAILY
Status: DISCONTINUED | OUTPATIENT
Start: 2023-07-18 | End: 2023-07-19 | Stop reason: HOSPADM

## 2023-07-18 RX ORDER — INSULIN LISPRO 100 [IU]/ML
0-8 INJECTION, SOLUTION INTRAVENOUS; SUBCUTANEOUS
Status: DISCONTINUED | OUTPATIENT
Start: 2023-07-18 | End: 2023-07-19 | Stop reason: HOSPADM

## 2023-07-18 RX ORDER — SODIUM CHLORIDE 0.9 % (FLUSH) 0.9 %
10 SYRINGE (ML) INJECTION PRN
Status: DISCONTINUED | OUTPATIENT
Start: 2023-07-18 | End: 2023-07-19 | Stop reason: HOSPADM

## 2023-07-18 RX ORDER — SODIUM CHLORIDE 0.9 % (FLUSH) 0.9 %
10 SYRINGE (ML) INJECTION EVERY 12 HOURS SCHEDULED
Status: DISCONTINUED | OUTPATIENT
Start: 2023-07-18 | End: 2023-07-19 | Stop reason: HOSPADM

## 2023-07-18 RX ORDER — ACETAMINOPHEN 650 MG/1
650 SUPPOSITORY RECTAL EVERY 6 HOURS PRN
Status: DISCONTINUED | OUTPATIENT
Start: 2023-07-18 | End: 2023-07-19 | Stop reason: HOSPADM

## 2023-07-18 RX ORDER — PANTOPRAZOLE SODIUM 40 MG/1
40 TABLET, DELAYED RELEASE ORAL
Status: DISCONTINUED | OUTPATIENT
Start: 2023-07-18 | End: 2023-07-19 | Stop reason: HOSPADM

## 2023-07-18 RX ORDER — SODIUM CHLORIDE, SODIUM LACTATE, POTASSIUM CHLORIDE, CALCIUM CHLORIDE 600; 310; 30; 20 MG/100ML; MG/100ML; MG/100ML; MG/100ML
INJECTION, SOLUTION INTRAVENOUS CONTINUOUS
Status: DISCONTINUED | OUTPATIENT
Start: 2023-07-18 | End: 2023-07-18

## 2023-07-18 RX ORDER — DEXTROSE MONOHYDRATE 100 MG/ML
INJECTION, SOLUTION INTRAVENOUS CONTINUOUS PRN
Status: DISCONTINUED | OUTPATIENT
Start: 2023-07-18 | End: 2023-07-19 | Stop reason: HOSPADM

## 2023-07-18 RX ORDER — INSULIN GLARGINE-YFGN 100 [IU]/ML
0.25 INJECTION, SOLUTION SUBCUTANEOUS DAILY
Status: DISCONTINUED | OUTPATIENT
Start: 2023-07-18 | End: 2023-07-19

## 2023-07-18 RX ORDER — POLYETHYLENE GLYCOL 3350 17 G/17G
17 POWDER, FOR SOLUTION ORAL DAILY PRN
Status: DISCONTINUED | OUTPATIENT
Start: 2023-07-18 | End: 2023-07-19 | Stop reason: HOSPADM

## 2023-07-18 RX ORDER — PROMETHAZINE HYDROCHLORIDE 12.5 MG/1
12.5 TABLET ORAL EVERY 6 HOURS PRN
Status: DISCONTINUED | OUTPATIENT
Start: 2023-07-18 | End: 2023-07-19 | Stop reason: HOSPADM

## 2023-07-18 RX ORDER — ACETAMINOPHEN 325 MG/1
650 TABLET ORAL EVERY 6 HOURS PRN
Status: DISCONTINUED | OUTPATIENT
Start: 2023-07-18 | End: 2023-07-19 | Stop reason: HOSPADM

## 2023-07-18 RX ORDER — OMEPRAZOLE 20 MG/1
20 CAPSULE, DELAYED RELEASE ORAL DAILY
Status: DISCONTINUED | OUTPATIENT
Start: 2023-07-18 | End: 2023-07-18 | Stop reason: CLARIF

## 2023-07-18 RX ORDER — GABAPENTIN 600 MG/1
600 TABLET ORAL NIGHTLY
Status: DISCONTINUED | OUTPATIENT
Start: 2023-07-18 | End: 2023-07-18

## 2023-07-18 RX ORDER — ENOXAPARIN SODIUM 100 MG/ML
40 INJECTION SUBCUTANEOUS DAILY
Status: DISCONTINUED | OUTPATIENT
Start: 2023-07-18 | End: 2023-07-19 | Stop reason: HOSPADM

## 2023-07-18 RX ORDER — INSULIN GLARGINE-YFGN 100 [IU]/ML
0.25 INJECTION, SOLUTION SUBCUTANEOUS NIGHTLY
Status: DISCONTINUED | OUTPATIENT
Start: 2023-07-18 | End: 2023-07-18

## 2023-07-18 RX ORDER — SODIUM CHLORIDE 9 MG/ML
INJECTION, SOLUTION INTRAVENOUS CONTINUOUS
Status: DISCONTINUED | OUTPATIENT
Start: 2023-07-18 | End: 2023-07-18

## 2023-07-18 RX ORDER — CLONIDINE HYDROCHLORIDE 0.1 MG/1
0.1 TABLET ORAL EVERY 4 HOURS PRN
Status: DISCONTINUED | OUTPATIENT
Start: 2023-07-18 | End: 2023-07-19 | Stop reason: HOSPADM

## 2023-07-18 RX ORDER — CARVEDILOL 6.25 MG/1
6.25 TABLET ORAL DAILY
Status: DISCONTINUED | OUTPATIENT
Start: 2023-07-18 | End: 2023-07-19 | Stop reason: HOSPADM

## 2023-07-18 RX ORDER — INSULIN LISPRO 100 [IU]/ML
0-4 INJECTION, SOLUTION INTRAVENOUS; SUBCUTANEOUS NIGHTLY
Status: DISCONTINUED | OUTPATIENT
Start: 2023-07-18 | End: 2023-07-19 | Stop reason: HOSPADM

## 2023-07-18 RX ORDER — ERGOCALCIFEROL 1.25 MG/1
50000 CAPSULE ORAL WEEKLY
Status: DISCONTINUED | OUTPATIENT
Start: 2023-07-19 | End: 2023-07-19 | Stop reason: HOSPADM

## 2023-07-18 RX ORDER — INSULIN LISPRO 100 [IU]/ML
8 INJECTION, SOLUTION INTRAVENOUS; SUBCUTANEOUS
Status: DISCONTINUED | OUTPATIENT
Start: 2023-07-18 | End: 2023-07-19 | Stop reason: HOSPADM

## 2023-07-18 RX ORDER — ONDANSETRON 2 MG/ML
4 INJECTION INTRAMUSCULAR; INTRAVENOUS EVERY 6 HOURS PRN
Status: DISCONTINUED | OUTPATIENT
Start: 2023-07-18 | End: 2023-07-19 | Stop reason: HOSPADM

## 2023-07-18 RX ADMIN — SODIUM CHLORIDE, PRESERVATIVE FREE 10 ML: 5 INJECTION INTRAVENOUS at 12:07

## 2023-07-18 RX ADMIN — AMLODIPINE BESYLATE 5 MG: 5 TABLET ORAL at 12:43

## 2023-07-18 RX ADMIN — LACTULOSE 20 G: 20 SOLUTION ORAL at 12:00

## 2023-07-18 RX ADMIN — LIDOCAINE HYDROCHLORIDE 7 ML: 20 INJECTION, SOLUTION INFILTRATION; PERINEURAL at 15:48

## 2023-07-18 RX ADMIN — SODIUM CHLORIDE, PRESERVATIVE FREE 10 ML: 5 INJECTION INTRAVENOUS at 21:18

## 2023-07-18 RX ADMIN — SODIUM CHLORIDE, POTASSIUM CHLORIDE, SODIUM LACTATE AND CALCIUM CHLORIDE: 600; 310; 30; 20 INJECTION, SOLUTION INTRAVENOUS at 11:54

## 2023-07-18 RX ADMIN — LACTULOSE 20 G: 20 SOLUTION ORAL at 14:13

## 2023-07-18 RX ADMIN — PANTOPRAZOLE SODIUM 40 MG: 40 TABLET, DELAYED RELEASE ORAL at 11:57

## 2023-07-18 RX ADMIN — INSULIN LISPRO 4 UNITS: 100 INJECTION, SOLUTION INTRAVENOUS; SUBCUTANEOUS at 21:17

## 2023-07-18 RX ADMIN — CALCIUM GLUCONATE 1000 MG: 10 INJECTION, SOLUTION INTRAVENOUS at 01:47

## 2023-07-18 RX ADMIN — ONDANSETRON 4 MG: 2 INJECTION INTRAMUSCULAR; INTRAVENOUS at 01:42

## 2023-07-18 RX ADMIN — CLONIDINE HYDROCHLORIDE 0.1 MG: 0.1 TABLET ORAL at 12:43

## 2023-07-18 RX ADMIN — INSULIN LISPRO 8 UNITS: 100 INJECTION, SOLUTION INTRAVENOUS; SUBCUTANEOUS at 12:46

## 2023-07-18 RX ADMIN — CARVEDILOL 6.25 MG: 6.25 TABLET, FILM COATED ORAL at 11:57

## 2023-07-18 RX ADMIN — LACTULOSE 20 G: 20 SOLUTION ORAL at 21:17

## 2023-07-18 NOTE — INTERVAL H&P NOTE
IR H&P UPDATE NOTE    Patient's History and Physical Examination were reviewed from current hospital admission records. Brigid Calderon appears likely to able to tolerate the requested Paracentesis procedure by the consultant. Risk and benefits were discussed with patient via [employer] language service  Mary Piper due to language barrier including ultimate complications, possibly death and consent was obtained.     RANJITH Wyatt MD.

## 2023-07-18 NOTE — PROGRESS NOTES
4 Eyes Skin Assessment     NAME:  Ubaldo Carranza  YOB: 1960  MEDICAL RECORD NUMBER:  [id number]    The patient is being assessed for  Admission    I agree that at least one RN has performed a thorough Head to Toe Skin Assessment on the patient. ALL assessment sites listed below have been assessed. Areas assessed by both nurses:    Head, Face, Ears, Shoulders, Back, Chest, Arms, Elbows, Hands, Sacrum. Buttock, Coccyx, Ischium, Legs. Feet and Heels, and Under Medical Devices         Does the Patient have a Wound? Yes wound(s) were present on assessment.  LDA wound assessment was Initiated and completed by RN       Kane Prevention initiated by RN: No  Wound Care Orders initiated by RN: No    Pressure Injury (Stage 3,4, Unstageable, DTI, NWPT, and Complex wounds) if present, place Wound referral order by RN under : No    New Ostomies, if present place, Ostomy referral order under : No     Nurse 1 eSignature: Electronically signed by Kya Montero RN on 7/18/23 at 6:57 PM EDT    **SHARE this note so that the co-signing nurse can place an eSignature**    Nurse 2 eSignature: {Esignature:[id number]}

## 2023-07-18 NOTE — H&P
Hospitalist History & Physical      PCP: Sarwat Dee DO    Date of Service: Pt seen/examined on 2023     Chief Complaint:  had concerns including Altered Mental Status (Per daughter has noticed increasing confusion today, BGL >400, dizziness. ). History Of Present Illness:    Ms. Vidal Broussard, a 61y.o. year old female  who  has a past medical history of Cirrhosis ([address]), Diabetes mellitus ([address]), Diabetic neuropathy ([address]), Esophageal varices without bleeding ([address]), GERD (gastroesophageal reflux disease), Hypertension, Liver cirrhosis ([address]), Low vitamin D level, Thrombocytopenia ([address]), and Type 2 diabetes mellitus without complication ([address]). Scented to the emergency department with altered mental status. Patient been demonstrating increasing confusion throughout the day. Her blood glucose has been running higher than normal.  She has been complaining about lightheadedness. Denies fever, chills, chest pain, shortness of breath. Vital signs within normal limits and stable. Patient is afebrile. Laboratory studies demonstrate potassium 6.3, BUN 30, creatinine 1.5, glucose 321, proBNP 190, troponin 16 with repeat of 16, alk phos 135, AST 35, bilirubin 1.3 and hemoglobin of 10.0. CT of the head was unremarkable. CT abdomen pelvis did not show any acute changes but did show some ascites. Patient has a history of hepatic cirrhosis. Ammonia levels pending.       Past Medical History:   Diagnosis Date    Cirrhosis ([address])     Diabetes mellitus ([address])     Diabetic neuropathy ([address])     Esophageal varices without bleeding ([address])     GERD (gastroesophageal reflux disease)     Hypertension     Liver cirrhosis ([address])     with secondary esophageal varices, no signs/sx's of hepatic encephalopathy    Low vitamin D level     Thrombocytopenia (HCC)     Type 2 diabetes mellitus without complication ([address])        Past Surgical History:   Procedure Laterality Date    APPENDECTOMY       SECTION

## 2023-07-18 NOTE — PROCEDURES
Patient arrived via cart with self to Radiology department for paracentesis. Allergies, home medications, H&P and fasting instructions reviewed with patient. Vital signs taken. 20 ga IV in prn adapter attached. Language services contacted Procedural instructions given, questions answered, understanding expressed and consent signed. Patient given fluoroscopy education, no questions at this time.    Language session [id number] interprter [id number] procedure ended 2050cc peace ascetic fluid removed, tolerated well dressing applied to LUQ sample walked to lab pt escorted to er 13

## 2023-07-18 NOTE — PROGRESS NOTES
Hospitalist Progress Note            Patient: Susannah Medley Age: 61 y.o.   DOA: 7/17/2023 Admit Dx / CC: Metabolic encephalopathy [Z50.98]   LOS:  LOS: 0 days      Assessment/ Plan:     Acute hepatic encephalopathy (resolved) in pt with liver cirrhosis with esophageal varices and thrombocytopenia 2/2 ACOSTA  Lactulose PO and goal 3-4 Bms /day as d/w pt and her daughter  Rpt ammonia level now  CT abd noted with some ascites  US paracentesis ordered  Follows with Hepatology as OP    Acute kidney injury with hyperkalemia  Hold lisinopril and lasix  D/cibuprofen  IVF  Rpt BMP now    Chronic anemia  Check iron level    Hypertension  Cont home meds    Diabetes mellitus with neuropathy  C/w lantus  Hold gabapentin for now  ISS    GERD  On PPI    Hx/o SDH, resolved  CTH on admission with no SDH    DVT ppx:  Lovenox  Code status: Full code    Plan discharge home tomorrow    Plan of care discussed with: patient, family, and bedside RN    Patient Active Problem List   Diagnosis    Other cirrhosis of liver ([address])    Type 2 diabetes mellitus without complication, with long-term current use of insulin ([address])    Secondary esophageal varices without bleeding (HCC)    GERD without esophagitis    Intracranial bleed ([address])    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 ([address])    SDH (subdural hematoma) ([address])    Essential hypertension    Metabolic encephalopathy    DONNA (acute kidney injury) ([address])    Hyperkalemia    Acute hepatic encephalopathy (HCC)        Medications:  Reviewed    Infusion Medications    sodium chloride      dextrose      lactated ringers IV soln       Scheduled Medications    carvedilol  6.25 mg Oral Daily    sodium chloride flush  10 mL IntraVENous 2 times per day    enoxaparin  40 mg SubCUTAneous Daily    insulin glargine  0.25 Units/kg SubCUTAneous Nightly    insulin lispro  0-8 Units SubCUTAneous TID WC    insulin lispro  0-4 Units SubCUTAneous Nightly    lactulose  20 g Oral TID    pantoprazole  40 mg Oral

## 2023-07-18 NOTE — BRIEF OP NOTE
Brief-Op Note  ______________________________________________________________      IR PARACENTESIS    Patient Name: Buddy Lan   YOB: 1960  Medical Record Number: [id number]  Date of Procedure: 7/18/23  Room/Bed: 13/13      Pre-operative Diagnosis: Ascites    Post-operative Diagnosis: Ascites    Consent: Informed consent was obtained from the patient prior to the procedure. The details of the procedure, as well is its risks, benefits, and alternatives, were explained. Anesthesia: Local anesthesia.     Performed by: RANJITH Pereyra under on-site supervision by Swetha Panchal MD.    Estimated blood loss: Minimal    Complications: None    Implants: None    (See radiology dictation in [facility] for image review and additional procedural information)    Electronically signed by RANJITH Pereyra   DD: 7/18/23  3:54 PM

## 2023-07-18 NOTE — ED PROVIDER NOTES
[address] ENCOUNTER        Pt Name: Kiel Gaspar  MRN: [id number]  [address] [facility] 1960  Date of evaluation: 2023  Provider: Symone Hurst DO  PCP: Santos Bishop DO  Note Started: 8:17 PM EDT 23    CHIEF COMPLAINT       Chief Complaint   Patient presents with    Altered Mental Status     Per daughter has noticed increasing confusion today, BGL >400, dizziness. HISTORY OF PRESENT ILLNESS: 1 or more Elements   History From: Patient and family    Limitations to history : None    Kiel Gaspar is a 61 y.o. female who presents with concern for worsening mental status. She has been demonstrating increasing confusion throughout the day today, her blood glucose has been running higher than normal and her daughter gave her more of her insulin. She is can been complaining about lightheadedness. She was admitted to the hospital last for intracranial bleeding. She is not any headache, has not fallen recently, no chest pain or difficulty breathing, she has been vomiting throughout the day. No fevers, no other associated complaints. Nursing Notes were all reviewed and agreed with or any disagreements were addressed in the HPI. REVIEW OF SYSTEMS :      Positives and Pertinent negatives as per HPI.      SURGICAL HISTORY     Past Surgical History:   Procedure Laterality Date    APPENDECTOMY       SECTION      CHOLECYSTECTOMY      UPPER GASTROINTESTINAL ENDOSCOPY  2022    [facility]    UPPER GASTROINTESTINAL ENDOSCOPY  2021    [facility]    UPPER GASTROINTESTINAL ENDOSCOPY  2019    [facility]    UPPER GASTROINTESTINAL ENDOSCOPY  2021    [facility]       CURRENTMEDICATIONS       Previous Medications    ACCU-CHEK GUIDE STRIP    TEST FOUR TIMES DAILY    ACCU-CHEK SOFTCLIX LANCETS MISC    TEST FOUR TIMES DAILY    BLOOD GLUCOSE MONITORING SUPPL

## 2023-07-19 VITALS
WEIGHT: 180 LBS | HEIGHT: 63 IN | DIASTOLIC BLOOD PRESSURE: 50 MMHG | HEART RATE: 79 BPM | SYSTOLIC BLOOD PRESSURE: 100 MMHG | TEMPERATURE: 97.2 F | BODY MASS INDEX: 31.89 KG/M2 | RESPIRATION RATE: 19 BRPM | OXYGEN SATURATION: 98 %

## 2023-07-19 LAB
ALBUMIN FLD-MCNC: 1.5 G/DL
ALBUMIN SERPL-MCNC: 2.8 G/DL (ref 3.5–5.2)
ALP SERPL-CCNC: 104 U/L (ref 35–104)
ALT SERPL-CCNC: 12 U/L (ref 0–32)
AMMONIA PLAS-SCNC: 53 UMOL/L (ref 11–51)
AMYLASE FLD-CCNC: 13 U/L
ANION GAP SERPL CALCULATED.3IONS-SCNC: 4 MMOL/L (ref 7–16)
AST SERPL-CCNC: 22 U/L (ref 0–31)
BASOPHILS # BLD: 0.02 K/UL (ref 0–0.2)
BASOPHILS NFR BLD: 1 % (ref 0–2)
BILIRUB SERPL-MCNC: 1.1 MG/DL (ref 0–1.2)
BUN SERPL-MCNC: 24 MG/DL (ref 6–23)
CALCIUM SERPL-MCNC: 8.5 MG/DL (ref 8.6–10.2)
CHLORIDE SERPL-SCNC: 108 MMOL/L (ref 98–107)
CO2 SERPL-SCNC: 23 MMOL/L (ref 22–29)
CREAT SERPL-MCNC: 1.3 MG/DL (ref 0.5–1)
EOSINOPHIL # BLD: 0.1 K/UL (ref 0.05–0.5)
EOSINOPHILS RELATIVE PERCENT: 4 % (ref 0–6)
ERYTHROCYTE [DISTWIDTH] IN BLOOD BY AUTOMATED COUNT: 13.6 % (ref 11.5–15)
GFR SERPL CREATININE-BSD FRML MDRD: 45 ML/MIN/1.73M2
GLUCOSE SERPL-MCNC: 373 MG/DL (ref 74–99)
HCT VFR BLD AUTO: 27.5 % (ref 34–48)
HGB BLD-MCNC: 8.8 G/DL (ref 11.5–15.5)
IMM GRANULOCYTES # BLD AUTO: <0.03 K/UL (ref 0–0.58)
IMM GRANULOCYTES NFR BLD: 0 % (ref 0–5)
LYMPHOCYTES NFR BLD: 0.61 K/UL (ref 1.5–4)
LYMPHOCYTES RELATIVE PERCENT: 26 % (ref 20–42)
MCH RBC QN AUTO: 29.1 PG (ref 26–35)
MCHC RBC AUTO-ENTMCNC: 32 G/DL (ref 32–34.5)
MCV RBC AUTO: 91.1 FL (ref 80–99.9)
METER GLUCOSE: 373 MG/DL (ref 74–99)
MONOCYTES NFR BLD: 0.22 K/UL (ref 0.1–0.95)
MONOCYTES NFR BLD: 9 % (ref 2–12)
NEUTROPHILS NFR BLD: 60 % (ref 43–80)
NEUTS SEG NFR BLD: 1.43 K/UL (ref 1.8–7.3)
PLATELET # BLD AUTO: 40 K/UL (ref 130–450)
PLATELET CONFIRMATION: NORMAL
PMV BLD AUTO: 12.2 FL (ref 7–12)
POTASSIUM SERPL-SCNC: 4.8 MMOL/L (ref 3.5–5)
PROT SERPL-MCNC: 7.1 G/DL (ref 6.4–8.3)
RBC # BLD AUTO: 3.02 M/UL (ref 3.5–5.5)
SODIUM SERPL-SCNC: 135 MMOL/L (ref 132–146)
SPECIMEN TYPE: NORMAL
SPECIMEN TYPE: NORMAL
WBC OTHER # BLD: 2.4 K/UL (ref 4.5–11.5)

## 2023-07-19 PROCEDURE — 36415 COLL VENOUS BLD VENIPUNCTURE: CPT

## 2023-07-19 PROCEDURE — 80053 COMPREHEN METABOLIC PANEL: CPT

## 2023-07-19 PROCEDURE — 6370000000 HC RX 637 (ALT 250 FOR IP): Performed by: INTERNAL MEDICINE

## 2023-07-19 PROCEDURE — 82140 ASSAY OF AMMONIA: CPT

## 2023-07-19 PROCEDURE — 51798 US URINE CAPACITY MEASURE: CPT

## 2023-07-19 PROCEDURE — S5553 INSULIN LONG ACTING 5 U: HCPCS | Performed by: INTERNAL MEDICINE

## 2023-07-19 PROCEDURE — 6370000000 HC RX 637 (ALT 250 FOR IP): Performed by: FAMILY MEDICINE

## 2023-07-19 PROCEDURE — 85027 COMPLETE CBC AUTOMATED: CPT

## 2023-07-19 PROCEDURE — 82947 ASSAY GLUCOSE BLOOD QUANT: CPT

## 2023-07-19 RX ORDER — AMLODIPINE BESYLATE 5 MG/1
5 TABLET ORAL DAILY
Qty: 30 TABLET | Refills: 0 | Status: SHIPPED | OUTPATIENT
Start: 2023-07-19

## 2023-07-19 RX ORDER — ERGOCALCIFEROL 1.25 MG/1
50000 CAPSULE ORAL WEEKLY
Qty: 12 CAPSULE | Refills: 0 | Status: SHIPPED | OUTPATIENT
Start: 2023-07-19

## 2023-07-19 RX ORDER — FUROSEMIDE 20 MG/1
20 TABLET ORAL DAILY
Qty: 90 TABLET | Refills: 1
Start: 2023-07-21

## 2023-07-19 RX ORDER — INSULIN GLARGINE-YFGN 100 [IU]/ML
30 INJECTION, SOLUTION SUBCUTANEOUS DAILY
Status: DISCONTINUED | OUTPATIENT
Start: 2023-07-19 | End: 2023-07-19 | Stop reason: HOSPADM

## 2023-07-19 RX ORDER — LACTULOSE 10 G/15ML
20 SOLUTION ORAL 3 TIMES DAILY
Qty: 1 EACH | Refills: 0 | Status: SHIPPED | OUTPATIENT
Start: 2023-07-19 | End: 2023-08-18

## 2023-07-19 RX ORDER — CHOLECALCIFEROL (VITAMIN D3) 50 MCG
2000 TABLET ORAL DAILY
Qty: 30 TABLET | Refills: 0 | Status: SHIPPED | OUTPATIENT
Start: 2023-07-19

## 2023-07-19 RX ADMIN — INSULIN LISPRO 8 UNITS: 100 INJECTION, SOLUTION INTRAVENOUS; SUBCUTANEOUS at 09:19

## 2023-07-19 RX ADMIN — CARVEDILOL 6.25 MG: 6.25 TABLET, FILM COATED ORAL at 09:15

## 2023-07-19 RX ADMIN — ERGOCALCIFEROL 50000 UNITS: 1.25 CAPSULE ORAL at 09:15

## 2023-07-19 RX ADMIN — AMLODIPINE BESYLATE 5 MG: 5 TABLET ORAL at 09:15

## 2023-07-19 RX ADMIN — INSULIN GLARGINE-YFGN 30 UNITS: 100 INJECTION, SOLUTION SUBCUTANEOUS at 09:19

## 2023-07-19 RX ADMIN — INSULIN LISPRO 8 UNITS: 100 INJECTION, SOLUTION INTRAVENOUS; SUBCUTANEOUS at 09:30

## 2023-07-19 RX ADMIN — Medication 2000 UNITS: at 09:15

## 2023-07-19 RX ADMIN — LACTULOSE 20 G: 20 SOLUTION ORAL at 09:15

## 2023-07-19 RX ADMIN — PANTOPRAZOLE SODIUM 40 MG: 40 TABLET, DELAYED RELEASE ORAL at 05:47

## 2023-07-19 NOTE — PROGRESS NOTES
was used to go over in detail patient discharge paperwork. All questions were answered. IV was pulled. Heart monitor was taken off patient and placed in appropriate location. Patient left with paperwork in folder, medications were sent into patients pharmacy and belongings in bag. Daughter ambulated with patient off the floor.

## 2023-07-19 NOTE — DISCHARGE SUMMARY
Patient: Jerry Gardner Sex: female DOA: 7/17/2023   YOB: 1960 Age: 61 y.o. LOS:  LOS: 1 day    Admit Date: 7/17/2023   Discharge Date: 07/19/23   Primary Care Physician: Artem Harper,    Discharge to: home with support  Readmission risk: Moderate Risk    Hospital admission:  Per HPI:\" Ms. Jerry Gardner, a 61y.o. year old female  who  has a past medical history of Cirrhosis ([address]), Diabetes mellitus ([address]), Diabetic neuropathy ([address]), Esophageal varices without bleeding ([address]), GERD (gastroesophageal reflux disease), Hypertension, Liver cirrhosis ([address]), Low vitamin D level, Thrombocytopenia ([address]), and Type 2 diabetes mellitus without complication ([address]). Scented to the emergency department with altered mental status. Patient been demonstrating increasing confusion throughout the day. Her blood glucose has been running higher than normal.  She has been complaining about lightheadedness. Denies fever, chills, chest pain, shortness of breath. Vital signs within normal limits and stable. Patient is afebrile. Laboratory studies demonstrate potassium 6.3, BUN 30, creatinine 1.5, glucose 321, proBNP 190, troponin 16 with repeat of 16, alk phos 135, AST 35, bilirubin 1.3 and hemoglobin of 10.0. CT of the head was unremarkable. CT abdomen pelvis did not show any acute changes but did show some ascites. Patient has a history of hepatic cirrhosis. Ammonia levels pending.  [facility] Course and discharge assessment with plan:     Acute hepatic encephalopathy (resolved) in pt with liver cirrhosis with esophageal varices and thrombocytopenia 2/2 ACOSTA  Lactulose PO and goal 3-4 Bms /day as d/w pt and her daughter  US paracentesis 7/18 and 2050cc peace ascetic fluid was removed  Follows with Hepatology as OP     Acute kidney injury with hyperkalemia, on CKD, improved  stopped lisinopril due to hyperK  resume lasix tomorrow  D/c ibuprofen     Chronic anemia     Hypertension  Cont home

## 2023-07-19 NOTE — PROGRESS NOTES
Pt requesting that prescriptions be sent to her pharmacy, [address]. Called outpatient pharm to relay the message. Pharm tech stated that they would send them over.

## 2023-07-20 LAB — NON-GYN CYTOLOGY REPORT: NORMAL

## 2023-07-22 LAB
MICROORGANISM SPEC CULT: NO GROWTH
MICROORGANISM/AGENT SPEC: NORMAL
SPECIMEN DESCRIPTION: NORMAL

## 2023-08-01 ENCOUNTER — APPOINTMENT (OUTPATIENT)
Dept: CT IMAGING | Age: 63
DRG: 441 | End: 2023-08-01
Attending: EMERGENCY MEDICINE
Payer: MEDICARE

## 2023-08-01 ENCOUNTER — APPOINTMENT (OUTPATIENT)
Dept: GENERAL RADIOLOGY | Age: 63
DRG: 441 | End: 2023-08-01
Payer: MEDICARE

## 2023-08-01 PROBLEM — K76.82 HEPATIC ENCEPHALOPATHY (HCC): Status: ACTIVE | Noted: 2023-08-01

## 2023-08-01 LAB
ALBUMIN SERPL-MCNC: 3.7 G/DL (ref 3.5–5.2)
ALP SERPL-CCNC: 156 U/L (ref 35–104)
ALT SERPL-CCNC: 21 U/L (ref 0–32)
AMMONIA PLAS-SCNC: 93 UMOL/L (ref 11–51)
ANION GAP SERPL CALCULATED.3IONS-SCNC: 10 MMOL/L (ref 7–16)
AST SERPL-CCNC: 31 U/L (ref 0–31)
BACTERIA URNS QL MICRO: ABNORMAL
BASOPHILS # BLD: 0.01 K/UL (ref 0–0.2)
BASOPHILS NFR BLD: 0 % (ref 0–2)
BILIRUB SERPL-MCNC: 1.5 MG/DL (ref 0–1.2)
BILIRUB UR QL STRIP: NEGATIVE
BUN SERPL-MCNC: 29 MG/DL (ref 6–23)
CALCIUM SERPL-MCNC: 9.7 MG/DL (ref 8.6–10.2)
CHLORIDE SERPL-SCNC: 100 MMOL/L (ref 98–107)
CLARITY UR: CLEAR
CO2 SERPL-SCNC: 22 MMOL/L (ref 22–29)
COLOR UR: YELLOW
CREAT SERPL-MCNC: 1.3 MG/DL (ref 0.5–1)
EOSINOPHIL # BLD: 0.08 K/UL (ref 0.05–0.5)
EOSINOPHILS RELATIVE PERCENT: 3 % (ref 0–6)
ERYTHROCYTE [DISTWIDTH] IN BLOOD BY AUTOMATED COUNT: 13.4 % (ref 11.5–15)
GFR SERPL CREATININE-BSD FRML MDRD: 48 ML/MIN/1.73M2
GLUCOSE SERPL-MCNC: 349 MG/DL (ref 74–99)
GLUCOSE UR STRIP-MCNC: >=1000 MG/DL
HCT VFR BLD AUTO: 33.3 % (ref 34–48)
HGB BLD-MCNC: 10.7 G/DL (ref 11.5–15.5)
HGB UR QL STRIP.AUTO: ABNORMAL
IMM GRANULOCYTES # BLD AUTO: <0.03 K/UL (ref 0–0.58)
IMM GRANULOCYTES NFR BLD: 0 % (ref 0–5)
INR PPP: 1.4
KETONES UR STRIP-MCNC: NEGATIVE MG/DL
LEUKOCYTE ESTERASE UR QL STRIP: NEGATIVE
LYMPHOCYTES NFR BLD: 0.7 K/UL (ref 1.5–4)
LYMPHOCYTES RELATIVE PERCENT: 23 % (ref 20–42)
MAGNESIUM SERPL-MCNC: 1.7 MG/DL (ref 1.6–2.6)
MCH RBC QN AUTO: 29.7 PG (ref 26–35)
MCHC RBC AUTO-ENTMCNC: 32.1 G/DL (ref 32–34.5)
MCV RBC AUTO: 92.5 FL (ref 80–99.9)
MONOCYTES NFR BLD: 0.23 K/UL (ref 0.1–0.95)
MONOCYTES NFR BLD: 7 % (ref 2–12)
NEUTROPHILS NFR BLD: 67 % (ref 43–80)
NEUTS SEG NFR BLD: 2.07 K/UL (ref 1.8–7.3)
NITRITE UR QL STRIP: NEGATIVE
PH UR STRIP: 5.5 [PH] (ref 5–9)
PLATELET # BLD AUTO: 37 K/UL (ref 130–450)
PMV BLD AUTO: 12.6 FL (ref 7–12)
POTASSIUM SERPL-SCNC: 5.5 MMOL/L (ref 3.5–5)
PROT SERPL-MCNC: 9.5 G/DL (ref 6.4–8.3)
PROT UR STRIP-MCNC: NEGATIVE MG/DL
PROTHROMBIN TIME: 15.2 SEC (ref 9.3–12.4)
RBC # BLD AUTO: 3.6 M/UL (ref 3.5–5.5)
RBC #/AREA URNS HPF: ABNORMAL /HPF
SODIUM SERPL-SCNC: 132 MMOL/L (ref 132–146)
SP GR UR STRIP: 1.02 (ref 1–1.03)
UROBILINOGEN UR STRIP-ACNC: 0.2 EU/DL (ref 0–1)
WBC #/AREA URNS HPF: ABNORMAL /HPF
WBC OTHER # BLD: 3.1 K/UL (ref 4.5–11.5)

## 2023-08-01 PROCEDURE — 81001 URINALYSIS AUTO W/SCOPE: CPT

## 2023-08-01 PROCEDURE — 82140 ASSAY OF AMMONIA: CPT

## 2023-08-01 PROCEDURE — 71045 X-RAY EXAM CHEST 1 VIEW: CPT

## 2023-08-01 PROCEDURE — 83735 ASSAY OF MAGNESIUM: CPT

## 2023-08-01 PROCEDURE — 93005 ELECTROCARDIOGRAM TRACING: CPT | Performed by: EMERGENCY MEDICINE

## 2023-08-01 PROCEDURE — 99285 EMERGENCY DEPT VISIT HI MDM: CPT

## 2023-08-01 PROCEDURE — 80053 COMPREHEN METABOLIC PANEL: CPT

## 2023-08-01 PROCEDURE — 36415 COLL VENOUS BLD VENIPUNCTURE: CPT

## 2023-08-01 PROCEDURE — 85025 COMPLETE CBC W/AUTO DIFF WBC: CPT

## 2023-08-01 PROCEDURE — 85610 PROTHROMBIN TIME: CPT

## 2023-08-01 PROCEDURE — 70450 CT HEAD/BRAIN W/O DYE: CPT

## 2023-08-01 RX ORDER — LACTULOSE 10 G/15ML
20 SOLUTION ORAL ONCE
Status: DISCONTINUED | OUTPATIENT
Start: 2023-08-01 | End: 2023-08-02

## 2023-08-02 ENCOUNTER — HOSPITAL ENCOUNTER (INPATIENT)
Age: 63
LOS: 1 days | Discharge: HOME | DRG: 441 | End: 2023-08-02
Attending: FAMILY MEDICINE | Admitting: FAMILY MEDICINE
Payer: MEDICARE

## 2023-08-02 VITALS
RESPIRATION RATE: 16 BRPM | SYSTOLIC BLOOD PRESSURE: 113 MMHG | HEART RATE: 74 BPM | OXYGEN SATURATION: 99 % | TEMPERATURE: 97.2 F | DIASTOLIC BLOOD PRESSURE: 54 MMHG

## 2023-08-02 DIAGNOSIS — K76.82 HEPATIC ENCEPHALOPATHY (HCC): Primary | ICD-10-CM

## 2023-08-02 LAB
ALBUMIN SERPL-MCNC: 3.5 G/DL (ref 3.5–5.2)
ALP SERPL-CCNC: 131 U/L (ref 35–104)
ALT SERPL-CCNC: 17 U/L (ref 0–32)
AMMONIA PLAS-SCNC: 39 UMOL/L (ref 11–51)
ANION GAP SERPL CALCULATED.3IONS-SCNC: 13 MMOL/L (ref 7–16)
AST SERPL-CCNC: 24 U/L (ref 0–31)
BASOPHILS # BLD: 0.02 K/UL (ref 0–0.2)
BASOPHILS NFR BLD: 1 % (ref 0–2)
BILIRUB SERPL-MCNC: 1.9 MG/DL (ref 0–1.2)
BNP SERPL-MCNC: 95 PG/ML (ref 0–125)
BUN SERPL-MCNC: 30 MG/DL (ref 6–23)
CALCIUM SERPL-MCNC: 9.2 MG/DL (ref 8.6–10.2)
CHLORIDE SERPL-SCNC: 108 MMOL/L (ref 98–107)
CO2 SERPL-SCNC: 19 MMOL/L (ref 22–29)
CREAT SERPL-MCNC: 1.3 MG/DL (ref 0.5–1)
EKG ATRIAL RATE: 87 BPM
EKG P AXIS: 54 DEGREES
EKG P-R INTERVAL: 162 MS
EKG Q-T INTERVAL: 412 MS
EKG QRS DURATION: 130 MS
EKG QTC CALCULATION (BAZETT): 495 MS
EKG R AXIS: -62 DEGREES
EKG T AXIS: 27 DEGREES
EKG VENTRICULAR RATE: 87 BPM
EOSINOPHIL # BLD: 0.08 K/UL (ref 0.05–0.5)
EOSINOPHILS RELATIVE PERCENT: 4 % (ref 0–6)
ERYTHROCYTE [DISTWIDTH] IN BLOOD BY AUTOMATED COUNT: 13.6 % (ref 11.5–15)
GFR SERPL CREATININE-BSD FRML MDRD: 47 ML/MIN/1.73M2
GLUCOSE SERPL-MCNC: 260 MG/DL (ref 74–99)
HCT VFR BLD AUTO: 32.1 % (ref 34–48)
HGB BLD-MCNC: 10.1 G/DL (ref 11.5–15.5)
IMM GRANULOCYTES # BLD AUTO: <0.03 K/UL (ref 0–0.58)
IMM GRANULOCYTES NFR BLD: 0 % (ref 0–5)
LYMPHOCYTES NFR BLD: 0.67 K/UL (ref 1.5–4)
LYMPHOCYTES RELATIVE PERCENT: 31 % (ref 20–42)
MCH RBC QN AUTO: 29.4 PG (ref 26–35)
MCHC RBC AUTO-ENTMCNC: 31.5 G/DL (ref 32–34.5)
MCV RBC AUTO: 93.3 FL (ref 80–99.9)
METER GLUCOSE: 229 MG/DL (ref 74–99)
METER GLUCOSE: 263 MG/DL (ref 74–99)
MONOCYTES NFR BLD: 0.17 K/UL (ref 0.1–0.95)
MONOCYTES NFR BLD: 8 % (ref 2–12)
NEUTROPHILS NFR BLD: 57 % (ref 43–80)
NEUTS SEG NFR BLD: 1.24 K/UL (ref 1.8–7.3)
PLATELET # BLD AUTO: 28 K/UL (ref 130–450)
PMV BLD AUTO: 11.5 FL (ref 7–12)
POTASSIUM SERPL-SCNC: 4.9 MMOL/L (ref 3.5–5)
PROT SERPL-MCNC: 8.6 G/DL (ref 6.4–8.3)
RBC # BLD AUTO: 3.44 M/UL (ref 3.5–5.5)
SODIUM SERPL-SCNC: 140 MMOL/L (ref 132–146)
WBC OTHER # BLD: 2.2 K/UL (ref 4.5–11.5)

## 2023-08-02 PROCEDURE — 85025 COMPLETE CBC W/AUTO DIFF WBC: CPT

## 2023-08-02 PROCEDURE — 2580000003 HC RX 258: Performed by: NURSE PRACTITIONER

## 2023-08-02 PROCEDURE — 93010 ELECTROCARDIOGRAM REPORT: CPT | Performed by: INTERNAL MEDICINE

## 2023-08-02 PROCEDURE — 2580000003 HC RX 258: Performed by: FAMILY MEDICINE

## 2023-08-02 PROCEDURE — 6370000000 HC RX 637 (ALT 250 FOR IP): Performed by: NURSE PRACTITIONER

## 2023-08-02 PROCEDURE — 80053 COMPREHEN METABOLIC PANEL: CPT

## 2023-08-02 PROCEDURE — 6370000000 HC RX 637 (ALT 250 FOR IP): Performed by: FAMILY MEDICINE

## 2023-08-02 PROCEDURE — S5553 INSULIN LONG ACTING 5 U: HCPCS | Performed by: FAMILY MEDICINE

## 2023-08-02 PROCEDURE — 82947 ASSAY GLUCOSE BLOOD QUANT: CPT

## 2023-08-02 PROCEDURE — 83880 ASSAY OF NATRIURETIC PEPTIDE: CPT

## 2023-08-02 PROCEDURE — 82140 ASSAY OF AMMONIA: CPT

## 2023-08-02 PROCEDURE — 1200000000 HC SEMI PRIVATE

## 2023-08-02 RX ORDER — INSULIN LISPRO 100 [IU]/ML
0-4 INJECTION, SOLUTION INTRAVENOUS; SUBCUTANEOUS NIGHTLY
Status: DISCONTINUED | OUTPATIENT
Start: 2023-08-02 | End: 2023-08-02 | Stop reason: HOSPADM

## 2023-08-02 RX ORDER — PROMETHAZINE HYDROCHLORIDE 12.5 MG/1
12.5 TABLET ORAL EVERY 6 HOURS PRN
Status: DISCONTINUED | OUTPATIENT
Start: 2023-08-02 | End: 2023-08-02 | Stop reason: HOSPADM

## 2023-08-02 RX ORDER — ACETAMINOPHEN 325 MG/1
650 TABLET ORAL EVERY 6 HOURS PRN
Status: DISCONTINUED | OUTPATIENT
Start: 2023-08-02 | End: 2023-08-02 | Stop reason: HOSPADM

## 2023-08-02 RX ORDER — CHOLECALCIFEROL (VITAMIN D3) 50 MCG
2000 TABLET ORAL DAILY
Status: DISCONTINUED | OUTPATIENT
Start: 2023-08-02 | End: 2023-08-02 | Stop reason: HOSPADM

## 2023-08-02 RX ORDER — INSULIN GLARGINE-YFGN 100 [IU]/ML
54 INJECTION, SOLUTION SUBCUTANEOUS EVERY MORNING
Status: DISCONTINUED | OUTPATIENT
Start: 2023-08-02 | End: 2023-08-02 | Stop reason: HOSPADM

## 2023-08-02 RX ORDER — BROMPHENIRAMINE MALEATE, PSEUDOEPHEDRINE HYDROCHLORIDE, AND DEXTROMETHORPHAN HYDROBROMIDE 2; 30; 10 MG/5ML; MG/5ML; MG/5ML
5 SYRUP ORAL EVERY 6 HOURS PRN
COMMUNITY

## 2023-08-02 RX ORDER — DEXTROSE MONOHYDRATE 100 MG/ML
INJECTION, SOLUTION INTRAVENOUS CONTINUOUS PRN
Status: DISCONTINUED | OUTPATIENT
Start: 2023-08-02 | End: 2023-08-02 | Stop reason: HOSPADM

## 2023-08-02 RX ORDER — ONDANSETRON 2 MG/ML
4 INJECTION INTRAMUSCULAR; INTRAVENOUS EVERY 6 HOURS PRN
Status: DISCONTINUED | OUTPATIENT
Start: 2023-08-02 | End: 2023-08-02 | Stop reason: HOSPADM

## 2023-08-02 RX ORDER — 0.9 % SODIUM CHLORIDE 0.9 %
500 INTRAVENOUS SOLUTION INTRAVENOUS ONCE
Status: COMPLETED | OUTPATIENT
Start: 2023-08-02 | End: 2023-08-02

## 2023-08-02 RX ORDER — IBUPROFEN 600 MG/1
600 TABLET ORAL 2 TIMES DAILY PRN
COMMUNITY

## 2023-08-02 RX ORDER — POLYETHYLENE GLYCOL 3350 17 G/17G
17 POWDER, FOR SOLUTION ORAL DAILY PRN
Status: DISCONTINUED | OUTPATIENT
Start: 2023-08-02 | End: 2023-08-02 | Stop reason: HOSPADM

## 2023-08-02 RX ORDER — FUROSEMIDE 20 MG/1
20 TABLET ORAL EVERY OTHER DAY
Qty: 60 TABLET | Refills: 0
Start: 2023-08-02

## 2023-08-02 RX ORDER — FUROSEMIDE 20 MG/1
20 TABLET ORAL DAILY
Status: DISCONTINUED | OUTPATIENT
Start: 2023-08-02 | End: 2023-08-02 | Stop reason: HOSPADM

## 2023-08-02 RX ORDER — INSULIN LISPRO 100 [IU]/ML
45 INJECTION, SUSPENSION SUBCUTANEOUS 2 TIMES DAILY WITH MEALS
COMMUNITY

## 2023-08-02 RX ORDER — ENOXAPARIN SODIUM 100 MG/ML
40 INJECTION SUBCUTANEOUS DAILY
Status: DISCONTINUED | OUTPATIENT
Start: 2023-08-02 | End: 2023-08-02

## 2023-08-02 RX ORDER — AMLODIPINE BESYLATE 5 MG/1
5 TABLET ORAL DAILY
Status: DISCONTINUED | OUTPATIENT
Start: 2023-08-02 | End: 2023-08-02 | Stop reason: HOSPADM

## 2023-08-02 RX ORDER — SODIUM CHLORIDE 0.9 % (FLUSH) 0.9 %
10 SYRINGE (ML) INJECTION EVERY 12 HOURS SCHEDULED
Status: DISCONTINUED | OUTPATIENT
Start: 2023-08-02 | End: 2023-08-02 | Stop reason: HOSPADM

## 2023-08-02 RX ORDER — SODIUM CHLORIDE 0.9 % (FLUSH) 0.9 %
10 SYRINGE (ML) INJECTION PRN
Status: DISCONTINUED | OUTPATIENT
Start: 2023-08-02 | End: 2023-08-02 | Stop reason: HOSPADM

## 2023-08-02 RX ORDER — CARVEDILOL 6.25 MG/1
6.25 TABLET ORAL DAILY
Status: DISCONTINUED | OUTPATIENT
Start: 2023-08-02 | End: 2023-08-02 | Stop reason: HOSPADM

## 2023-08-02 RX ORDER — INSULIN LISPRO 100 [IU]/ML
7 INJECTION, SOLUTION INTRAVENOUS; SUBCUTANEOUS
Status: DISCONTINUED | OUTPATIENT
Start: 2023-08-02 | End: 2023-08-02 | Stop reason: HOSPADM

## 2023-08-02 RX ORDER — PANTOPRAZOLE SODIUM 40 MG/1
40 TABLET, DELAYED RELEASE ORAL
Status: DISCONTINUED | OUTPATIENT
Start: 2023-08-02 | End: 2023-08-02 | Stop reason: HOSPADM

## 2023-08-02 RX ORDER — LACTULOSE 10 G/15ML
20 SOLUTION ORAL 3 TIMES DAILY
Status: DISCONTINUED | OUTPATIENT
Start: 2023-08-02 | End: 2023-08-02 | Stop reason: HOSPADM

## 2023-08-02 RX ORDER — INSULIN LISPRO 100 [IU]/ML
0-4 INJECTION, SOLUTION INTRAVENOUS; SUBCUTANEOUS
Status: DISCONTINUED | OUTPATIENT
Start: 2023-08-02 | End: 2023-08-02 | Stop reason: HOSPADM

## 2023-08-02 RX ORDER — FUROSEMIDE 20 MG/1
20 TABLET ORAL DAILY
COMMUNITY
End: 2023-08-02 | Stop reason: SDUPTHER

## 2023-08-02 RX ORDER — OMEPRAZOLE 20 MG/1
20 CAPSULE, DELAYED RELEASE ORAL DAILY
Status: DISCONTINUED | OUTPATIENT
Start: 2023-08-02 | End: 2023-08-02 | Stop reason: CLARIF

## 2023-08-02 RX ORDER — LACTULOSE 10 G/15ML
20 SOLUTION ORAL 3 TIMES DAILY
COMMUNITY

## 2023-08-02 RX ORDER — SODIUM CHLORIDE 9 MG/ML
INJECTION, SOLUTION INTRAVENOUS PRN
Status: DISCONTINUED | OUTPATIENT
Start: 2023-08-02 | End: 2023-08-02 | Stop reason: HOSPADM

## 2023-08-02 RX ORDER — LISINOPRIL 40 MG/1
40 TABLET ORAL DAILY
COMMUNITY
End: 2023-08-02 | Stop reason: HOSPADM

## 2023-08-02 RX ORDER — ACETAMINOPHEN 650 MG/1
650 SUPPOSITORY RECTAL EVERY 6 HOURS PRN
Status: DISCONTINUED | OUTPATIENT
Start: 2023-08-02 | End: 2023-08-02 | Stop reason: HOSPADM

## 2023-08-02 RX ADMIN — CARVEDILOL 6.25 MG: 6.25 TABLET, FILM COATED ORAL at 07:54

## 2023-08-02 RX ADMIN — INSULIN LISPRO 7 UNITS: 100 INJECTION, SOLUTION INTRAVENOUS; SUBCUTANEOUS at 12:04

## 2023-08-02 RX ADMIN — FUROSEMIDE 20 MG: 20 TABLET ORAL at 07:54

## 2023-08-02 RX ADMIN — Medication 2000 UNITS: at 07:54

## 2023-08-02 RX ADMIN — INSULIN LISPRO 7 UNITS: 100 INJECTION, SOLUTION INTRAVENOUS; SUBCUTANEOUS at 07:52

## 2023-08-02 RX ADMIN — AMLODIPINE BESYLATE 5 MG: 5 TABLET ORAL at 07:55

## 2023-08-02 RX ADMIN — INSULIN GLARGINE-YFGN 54 UNITS: 100 INJECTION, SOLUTION SUBCUTANEOUS at 07:52

## 2023-08-02 RX ADMIN — Medication 10 ML: at 07:55

## 2023-08-02 RX ADMIN — SODIUM CHLORIDE 500 ML: 9 INJECTION, SOLUTION INTRAVENOUS at 13:24

## 2023-08-02 RX ADMIN — LACTULOSE 20 G: 20 SOLUTION ORAL at 07:54

## 2023-08-02 RX ADMIN — INSULIN LISPRO 1 UNITS: 100 INJECTION, SOLUTION INTRAVENOUS; SUBCUTANEOUS at 12:03

## 2023-08-02 RX ADMIN — SODIUM CHLORIDE 500 ML: 9 INJECTION, SOLUTION INTRAVENOUS at 10:54

## 2023-08-02 RX ADMIN — PANTOPRAZOLE SODIUM 40 MG: 40 TABLET, DELAYED RELEASE ORAL at 07:55

## 2023-08-02 ASSESSMENT — PAIN - FUNCTIONAL ASSESSMENT: PAIN_FUNCTIONAL_ASSESSMENT: NONE - DENIES PAIN

## 2023-08-02 NOTE — DISCHARGE SUMMARY
Hospitalist Discharge Summary    Patient ID: Martell Rouse   Patient : 1960  Patient's PCP: Angel Rose DO    Admit Date: 2023   Admitting Physician: Alberto Boggs DO    Discharge Date:  2023   Discharge Physician: OK Hernandez NP   Discharge Condition: Stable  Discharge Disposition: [facility] course in brief:  (Please refer to daily progress notes for a comprehensive review of the hospitalization by requesting medical records)    Patient admitted for Altered mental status that began several days ago. Her daughter assists with HPI. Per notes the patient has had increased confusion, not speaking or walking. The patient does have a history of cirrhosis/hepatic encephalopathy. She admits to noncompliance with her lactulose at home. She follows with Dr Frank VÁZQUEZ outpatient and underwent an Ultrasound-guided paracentesis on   with a fluid removal of 2050 cc. In ED labs significant for hyperammonemia, hyperglycemia, anemia, and renal insufficiency. EKG significant for a right bundle branch. Chest x ray shows suboptimal inspiration that limits the study. CT of the head is unremarkable. Her mentation and ammonia improved with administration of home dose of lactulose. Her Bps were soft but improved with 1L IVF. She is eager for discharge. She lives home with her daughter. She is stable for discharge home with OP follow up with PCP and established GI. Consults:   IP CONSULT TO INTERNAL MEDICINE    Discharge Diagnoses:  Hepatic encephalopathy- cont Lactulose 20 grams oral TID. Follow up with Doctor Frank VÁZQUEZ outpatient. Hyperammonemia- resolved 39. Patient and daughter educated on the importance of taking Lactulose as ordered. Hyperkalemia-resolved      Normocytic anemia     Hyperglycemia-A1C 8.5. Resume home insulin dosing     Paroxysmal Hypotension w/ hx of HTN - improved with IVF. Stop lisinopril + amlodipine at discharge. Cont coreg.

## 2023-08-02 NOTE — H&P
Hospitalist History & Physical      PCP: Sylwia Rios DO    Date of Service: Pt seen/examined on 2023     Chief Complaint:  had concerns including Altered Mental Status (Hasnt been speaking, walking, confusion  x3 hours, hx of cirrhosis and elevated ammonia levels, c/o high glucose 325 at home, denies pain, SOB   ). History Of Present Illness:    Ms. Barbara Arriola, a 61y.o. year old female  who  has a past medical history of Cirrhosis ([address]), Diabetes mellitus ([address]), Diabetic neuropathy ([address]), Esophageal varices without bleeding ([address]), GERD (gastroesophageal reflux disease), Hypertension, Liver cirrhosis ([address]), Low vitamin D level, Thrombocytopenia ([address]), and Type 2 diabetes mellitus without complication ([address]). Patient presented to the emergency department with altered mental status. This began several days ago. Has a known history of hepatic encephalopathy/hepatic cirrhosis. Vital signs within normal limits and stable. The patient is afebrile. Laboratory studies demonstrate potassium 5.5, BUN 29, creatinine 1.3, glucose 349, ammonia 93, hemoglobin 10.7. CT head unremarkable. Was given a dose of lactulose in the emergency department and medicine was consulted for admission.       Past Medical History:   Diagnosis Date    Cirrhosis ([address])     Diabetes mellitus ([address])     Diabetic neuropathy ([address])     Esophageal varices without bleeding ([address])     GERD (gastroesophageal reflux disease)     Hypertension     Liver cirrhosis ([address])     with secondary esophageal varices, no signs/sx's of hepatic encephalopathy    Low vitamin D level     Thrombocytopenia (HCC)     Type 2 diabetes mellitus without complication [facility])        Past Surgical History:   Procedure Laterality Date    APPENDECTOMY       SECTION      CHOLECYSTECTOMY      UPPER GASTROINTESTINAL ENDOSCOPY  2022    [facility]    UPPER GASTROINTESTINAL ENDOSCOPY  2021    [facility]    UPPER GASTROINTESTINAL

## 2023-08-03 DIAGNOSIS — K74.69 OTHER CIRRHOSIS OF LIVER (HCC): Primary | ICD-10-CM

## 2023-08-03 LAB — PLATELET CONFIRMATION: NORMAL

## 2023-08-03 NOTE — TELEPHONE ENCOUNTER
----- Message from [facility] sent at 8/3/2023 12:11 PM EDT -----  Subject: Message to Provider    QUESTIONS  Information for Provider? Pt daughter needs a call back about her   medication to be filled for her Ammonia levels. Please call back to   schedule appt if needed asap. She is going into the hospital on Tuesday   and needs this medication asap.   ---------------------------------------------------------------------------  --------------  [address]  [phone]; OK to leave message on voicemail  ---------------------------------------------------------------------------  --------------  SCRIPT ANSWERS  Relationship to Patient? Covered Entity  Covered Entity Type? Other  Other Covered Entity Type? Daughter  Representative Name?  Rossi

## 2023-08-04 RX ORDER — LACTULOSE 10 G/15ML
SOLUTION ORAL
Qty: 1 EACH | Refills: 5 | Status: SHIPPED | OUTPATIENT
Start: 2023-08-04

## 2023-08-04 NOTE — TELEPHONE ENCOUNTER
Last Appointment:  7/5/2023  Future Appointments   Date Time Provider [address]   10/5/2023  1:15 PM DO Padmini Anderson [facility]

## 2023-08-15 LAB — PLATELET CONFIRMATION: NORMAL

## 2023-08-22 RX ORDER — BLOOD SUGAR DIAGNOSTIC
STRIP MISCELLANEOUS
Qty: 400 STRIP | OUTPATIENT
Start: 2023-08-22

## 2023-09-01 DIAGNOSIS — K74.69 OTHER CIRRHOSIS OF LIVER (HCC): ICD-10-CM

## 2023-09-01 RX ORDER — LACTULOSE 10 G/15ML
SOLUTION ORAL
Qty: 1 EACH | Refills: 5 | Status: SHIPPED | OUTPATIENT
Start: 2023-09-01

## 2023-09-11 RX ORDER — LISINOPRIL 40 MG/1
40 TABLET ORAL DAILY
Qty: 90 TABLET | Refills: 0 | OUTPATIENT
Start: 2023-09-11

## 2023-09-12 RX ORDER — OMEPRAZOLE 20 MG/1
20 CAPSULE, DELAYED RELEASE ORAL DAILY
Qty: 90 CAPSULE | Refills: 0 | Status: SHIPPED | OUTPATIENT
Start: 2023-09-12

## 2023-09-14 DIAGNOSIS — K74.69 OTHER CIRRHOSIS OF LIVER (HCC): ICD-10-CM

## 2023-09-14 RX ORDER — LACTULOSE 10 G/15ML
SOLUTION ORAL
Qty: 946 ML | Refills: 5 | Status: SHIPPED | OUTPATIENT
Start: 2023-09-14

## 2023-10-12 ENCOUNTER — HOSPITAL ENCOUNTER (OUTPATIENT)
Dept: INTERVENTIONAL RADIOLOGY/VASCULAR | Age: 63
Discharge: HOME | End: 2023-10-14
Payer: MEDICARE

## 2023-10-12 VITALS
SYSTOLIC BLOOD PRESSURE: 157 MMHG | TEMPERATURE: 97.8 F | DIASTOLIC BLOOD PRESSURE: 66 MMHG | RESPIRATION RATE: 18 BRPM | HEART RATE: 82 BPM | OXYGEN SATURATION: 100 %

## 2023-10-12 DIAGNOSIS — K70.31 ASCITES DUE TO ALCOHOLIC CIRRHOSIS (HCC): ICD-10-CM

## 2023-10-12 LAB
ALBUMIN FLD-MCNC: 1.1 G/DL
AMYLASE FLD-CCNC: 14 U/L
BASOPHILS # BLD: 0.01 K/UL (ref 0–0.2)
BASOPHILS NFR BLD: 1 % (ref 0–2)
EOSINOPHIL # BLD: 0.1 K/UL (ref 0.05–0.5)
EOSINOPHILS RELATIVE PERCENT: 6 % (ref 0–6)
ERYTHROCYTE [DISTWIDTH] IN BLOOD BY AUTOMATED COUNT: 13 % (ref 11.5–15)
HCT VFR BLD AUTO: 29.5 % (ref 34–48)
HGB BLD-MCNC: 9.5 G/DL (ref 11.5–15.5)
INR PPP: 1.5
LYMPHOCYTES NFR BLD: 0.24 K/UL (ref 1.5–4)
LYMPHOCYTES RELATIVE PERCENT: 15 % (ref 20–42)
MCH RBC QN AUTO: 29.3 PG (ref 26–35)
MCHC RBC AUTO-ENTMCNC: 32.2 G/DL (ref 32–34.5)
MCV RBC AUTO: 91 FL (ref 80–99.9)
MONOCYTES NFR BLD: 0.1 K/UL (ref 0.1–0.95)
MONOCYTES NFR BLD: 6 % (ref 2–12)
NEUTROPHILS NFR BLD: 72 % (ref 43–80)
NEUTS SEG NFR BLD: 1.15 K/UL (ref 1.8–7.3)
PLATELET # BLD AUTO: 44 K/UL (ref 130–450)
PLATELET CONFIRMATION: NORMAL
PMV BLD AUTO: 11.2 FL (ref 7–12)
PROT FLD-MCNC: 2.9 G/DL
PROTHROMBIN TIME: 16.1 SEC (ref 9.3–12.4)
RBC # BLD AUTO: 3.24 M/UL (ref 3.5–5.5)
RBC # BLD: ABNORMAL 10*6/UL
RBC # BLD: ABNORMAL 10*6/UL
SPECIMEN TYPE: NORMAL
WBC OTHER # BLD: 1.6 K/UL (ref 4.5–11.5)

## 2023-10-12 PROCEDURE — 87070 CULTURE OTHR SPECIMN AEROBIC: CPT

## 2023-10-12 PROCEDURE — 85610 PROTHROMBIN TIME: CPT

## 2023-10-12 PROCEDURE — C1729 CATH, DRAINAGE: HCPCS

## 2023-10-12 PROCEDURE — 82150 ASSAY OF AMYLASE: CPT

## 2023-10-12 PROCEDURE — 88112 CYTOPATH CELL ENHANCE TECH: CPT

## 2023-10-12 PROCEDURE — 87205 SMEAR GRAM STAIN: CPT

## 2023-10-12 PROCEDURE — 89051 BODY FLUID CELL COUNT: CPT

## 2023-10-12 PROCEDURE — 82042 OTHER SOURCE ALBUMIN QUAN EA: CPT

## 2023-10-12 PROCEDURE — 2500000003 HC RX 250 WO HCPCS: Performed by: PHYSICIAN ASSISTANT

## 2023-10-12 PROCEDURE — 84157 ASSAY OF PROTEIN OTHER: CPT

## 2023-10-12 PROCEDURE — 49083 ABD PARACENTESIS W/IMAGING: CPT

## 2023-10-12 PROCEDURE — 36415 COLL VENOUS BLD VENIPUNCTURE: CPT

## 2023-10-12 PROCEDURE — 88305 TISSUE EXAM BY PATHOLOGIST: CPT

## 2023-10-12 PROCEDURE — 85025 COMPLETE CBC W/AUTO DIFF WBC: CPT

## 2023-10-12 RX ORDER — LIDOCAINE HYDROCHLORIDE 20 MG/ML
INJECTION, SOLUTION INFILTRATION; PERINEURAL PRN
Status: COMPLETED | OUTPATIENT
Start: 2023-10-12 | End: 2023-10-12

## 2023-10-12 RX ADMIN — LIDOCAINE HYDROCHLORIDE 10 ML: 20 INJECTION, SOLUTION INFILTRATION; PERINEURAL at 10:09

## 2023-10-12 NOTE — PROGRESS NOTES
Patient arrived via ambulation with family member to Radiology department for paracentesis. Allergies, home medications, H&P and fasting instructions reviewed with patient. Vital signs taken. 22g IV placed, blood obtained, IV flushed and prn adapter attached. Blood sample sent to lab for ordered tests. Procedural instructions given, questions answered, understanding expressed and consent signed.

## 2023-10-12 NOTE — H&P
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)    SDH (subdural hematoma) ([address])    Essential hypertension    Metabolic encephalopathy    DONNA (acute kidney injury) ([address])    Hyperkalemia    Acute hepatic encephalopathy (HCC)    Vitamin D deficiency    Hepatic encephalopathy (HCC)       CHIEF COMPLAINT: Abdominal Ascites      Current Outpatient Medications:     lactulose (CHRONULAC) 10 GM/15ML solution, Take 30ml by mouth three times daily (titrate for every 3 to 4 bowel movements per day), Disp: 946 mL, Rfl: 5    omeprazole (PRILOSEC) 20 MG delayed release capsule, TAKE 1 CAPSULE BY MOUTH DAILY, Disp: 90 capsule, Rfl: 0    brompheniramine-pseudoephedrine-DM 2-30-10 MG/5ML syrup, Take 5 mLs by mouth every 6 hours as needed for Cough or Congestion, Disp: , Rfl:     insulin lispro protamine & lispro (HUMALOG MIX) (75-25) 100 UNIT per ML SUPN injection pen, Inject 0.45 mLs into the skin with breakfast and with evening meal, Disp: , Rfl:     lactulose (CHRONULAC) 10 GM/15ML solution, Take 30 mLs by mouth 3 times daily, Disp: , Rfl:     ibuprofen (ADVIL;MOTRIN) 600 MG tablet, Take 1 tablet by mouth 2 times daily as needed for Pain, Disp: , Rfl:     furosemide (LASIX) 20 MG tablet, Take 1 tablet by mouth every other day, Disp: 60 tablet, Rfl: 0    vitamin D (CHOLECALCIFEROL) 50 MCG (2000 UT) TABS tablet, Take 1 tablet by mouth daily, Disp: 30 tablet, Rfl: 0    Ergocalciferol (VITAMIN D) 96009 units CAPS, Take 50,000 Units by mouth once a week, Disp: 12 capsule, Rfl: 0    carvedilol (COREG) 6.25 MG tablet, Take 1 tablet by mouth daily, Disp: 90 tablet, Rfl: 1  Allergy:

## 2023-10-12 NOTE — BRIEF OP NOTE
Brief-Op Note  ______________________________________________________________      IR PARACENTESIS    Patient Name: Elder Men   YOB: 1960  Medical Record Number: [id number]  Date of Procedure: 10/12/23  Room/Bed: Room/bed info not found      Pre-operative Diagnosis: Ascites    Post-operative Diagnosis: Ascites    Consent: Informed consent was obtained from the patient daughter prior to the procedure. The details of the procedure, as well is its risks, benefits, and alternatives, were explained. Anesthesia: Local anesthesia.     Performed by: RANJITH Velasquez under on-site supervision by Mitch Zepeda III MD.    Estimated blood loss: Minimal    Complications: None    Implants: None    (See radiology dictation in [facility] for image review and additional procedural information)    Electronically signed by RANJITH Velasquez   DD: 10/12/23  1:21 PM

## 2023-10-12 NOTE — OP NOTE
Operative Note  ______________________________________________________________      IR PARACENTESIS    Patient Name: Corky Zamora   YOB: 1960  Medical Record Number: [id number]  Date of Procedure: 10/12/23  Room/Bed: Room/bed info not found    Pre-operative Diagnosis: Ascites    Post-operative Diagnosis: Ascites    Consent: Informed consent was obtained from the patient daughter via representative Jocelyn Mann #[id number] from [facility] due to language barrier. prior to the procedure. The details of the procedure, as well is its risks, benefits, and alternatives, were explained. Anesthesia: Local anesthesia    Performed by: RANJITH Harris under on-site supervision by Iris Quigley III MD.    Estimated blood loss: Minimal    Complications: None    Implants: None    Procedure: Routine scanning of all four abdominal quadrants was performed using real-time ultrasound and revealed sufficient amount of ascites fluid present. Decision was made to proceed with procedure. After obtaining consent, the patient was placed in the supine position with the head of the bed slightly elevated and the appropriate landmarks were identified. The skin over the puncture site in the left lower quadrant region was prepped with betadine and draped in a sterile fashion. Local anesthesia was obtained by infiltration using 2% Lidocaine without epinephrine. A 6 Divehi safe-t-centesis catheter was then advanced into the abdominal cavity. Fluid return was clear yellow colored. A total volume of  4400mL was withdrawn. The catheter was then withdrawn and a sterile dressing was placed over the site. The patient tolerated the procedure well. Complications: None. Estimated Blood Loss: < 10 cc. Albumin not given.        Electronically signed by RANJITH Harris   DD: 10/12/23  1:21 PM

## 2023-10-13 LAB
APPEARANCE FLD: NORMAL
BODY FLD TYPE: NORMAL
CLOT CHECK: NORMAL
COLOR FLD: YELLOW
MANUAL DIF COMMENT FLD-IMP: NORMAL
MONOCYTES NFR FLD: 93 %
NEUTROPHILS NFR FLD: 7 %
NON-GYN CYTOLOGY REPORT: NORMAL
RBC # FLD: 2000 CELLS/UL
WBC # FLD: 268 CELLS/UL

## 2023-10-14 LAB
MICROORGANISM SPEC CULT: NO GROWTH
MICROORGANISM/AGENT SPEC: NORMAL
SPECIMEN DESCRIPTION: NORMAL

## 2023-11-29 ENCOUNTER — OFFICE VISIT (OUTPATIENT)
Dept: FAMILY MEDICINE CLINIC | Age: 63
End: 2023-11-29
Payer: MEDICARE

## 2023-11-29 VITALS
TEMPERATURE: 97.6 F | SYSTOLIC BLOOD PRESSURE: 138 MMHG | OXYGEN SATURATION: 99 % | RESPIRATION RATE: 18 BRPM | DIASTOLIC BLOOD PRESSURE: 60 MMHG | HEART RATE: 80 BPM | WEIGHT: 163 LBS | BODY MASS INDEX: 28.87 KG/M2

## 2023-11-29 DIAGNOSIS — K74.69 OTHER CIRRHOSIS OF LIVER (HCC): ICD-10-CM

## 2023-11-29 DIAGNOSIS — G89.29 CHRONIC PAIN OF BOTH SHOULDERS: ICD-10-CM

## 2023-11-29 DIAGNOSIS — E55.9 VITAMIN D DEFICIENCY: ICD-10-CM

## 2023-11-29 DIAGNOSIS — K21.9 GERD WITHOUT ESOPHAGITIS: ICD-10-CM

## 2023-11-29 DIAGNOSIS — R11.0 NAUSEA: ICD-10-CM

## 2023-11-29 DIAGNOSIS — M25.511 CHRONIC PAIN OF BOTH SHOULDERS: ICD-10-CM

## 2023-11-29 DIAGNOSIS — Z79.4 TYPE 2 DIABETES MELLITUS WITHOUT COMPLICATION, WITH LONG-TERM CURRENT USE OF INSULIN (HCC): Primary | ICD-10-CM

## 2023-11-29 DIAGNOSIS — R42 DIZZINESS: ICD-10-CM

## 2023-11-29 DIAGNOSIS — E11.9 TYPE 2 DIABETES MELLITUS WITHOUT COMPLICATION, WITH LONG-TERM CURRENT USE OF INSULIN (HCC): Primary | ICD-10-CM

## 2023-11-29 DIAGNOSIS — M25.512 CHRONIC PAIN OF BOTH SHOULDERS: ICD-10-CM

## 2023-11-29 DIAGNOSIS — M79.89 SWELLING OF LOWER EXTREMITY: ICD-10-CM

## 2023-11-29 DIAGNOSIS — I10 ESSENTIAL HYPERTENSION: ICD-10-CM

## 2023-11-29 LAB
ABSOLUTE IMMATURE GRANULOCYTE: <0.03 K/UL (ref 0–0.58)
ALBUMIN SERPL-MCNC: 3.6 G/DL (ref 3.5–5.2)
ALP BLD-CCNC: 127 U/L (ref 35–104)
ALT SERPL-CCNC: 23 U/L (ref 0–32)
ANION GAP SERPL CALCULATED.3IONS-SCNC: 14 MMOL/L (ref 7–16)
AST SERPL-CCNC: 37 U/L (ref 0–31)
BASOPHILS ABSOLUTE: 0.01 K/UL (ref 0–0.2)
BASOPHILS RELATIVE PERCENT: 1 % (ref 0–2)
BILIRUB SERPL-MCNC: 1.4 MG/DL (ref 0–1.2)
BUN BLDV-MCNC: 33 MG/DL (ref 6–23)
CALCIUM SERPL-MCNC: 9.4 MG/DL (ref 8.6–10.2)
CHLORIDE BLD-SCNC: 103 MMOL/L (ref 98–107)
CHOLESTEROL: 139 MG/DL
CO2: 20 MMOL/L (ref 22–29)
CREAT SERPL-MCNC: 1.2 MG/DL (ref 0.5–1)
EOSINOPHILS ABSOLUTE: 0.04 K/UL (ref 0.05–0.5)
EOSINOPHILS RELATIVE PERCENT: 3 % (ref 0–6)
GFR SERPL CREATININE-BSD FRML MDRD: 52 ML/MIN/1.73M2
GLUCOSE BLD-MCNC: 245 MG/DL (ref 74–99)
HBA1C MFR BLD: 9.2 %
HCT VFR BLD CALC: 30.4 % (ref 34–48)
HDLC SERPL-MCNC: 67 MG/DL
HEMOGLOBIN: 9.6 G/DL (ref 11.5–15.5)
IMMATURE GRANULOCYTES: 0 % (ref 0–5)
LDL CHOLESTEROL: 54 MG/DL
LYMPHOCYTES ABSOLUTE: 0.35 K/UL (ref 1.5–4)
LYMPHOCYTES RELATIVE PERCENT: 24 % (ref 20–42)
MAGNESIUM: 1.8 MG/DL (ref 1.6–2.6)
MCH RBC QN AUTO: 28.6 PG (ref 26–35)
MCHC RBC AUTO-ENTMCNC: 31.6 G/DL (ref 32–34.5)
MCV RBC AUTO: 90.5 FL (ref 80–99.9)
MONOCYTES ABSOLUTE: 0.08 K/UL (ref 0.1–0.95)
MONOCYTES RELATIVE PERCENT: 5 % (ref 2–12)
NEUTROPHILS ABSOLUTE: 1 K/UL (ref 1.8–7.3)
NEUTROPHILS RELATIVE PERCENT: 68 % (ref 43–80)
PDW BLD-RTO: 14.4 % (ref 11.5–15)
PLATELET # BLD: 31 K/UL (ref 130–450)
PLATELET CONFIRMATION: NORMAL
PMV BLD AUTO: 11.3 FL (ref 7–12)
POTASSIUM SERPL-SCNC: 5 MMOL/L (ref 3.5–5)
RBC # BLD: 3.36 M/UL (ref 3.5–5.5)
RBC # BLD: ABNORMAL 10*6/UL
SODIUM BLD-SCNC: 137 MMOL/L (ref 132–146)
TOTAL PROTEIN: 8.8 G/DL (ref 6.4–8.3)
TRIGL SERPL-MCNC: 88 MG/DL
TSH SERPL DL<=0.05 MIU/L-ACNC: 5.77 UIU/ML (ref 0.27–4.2)
VITAMIN B-12: 731 PG/ML (ref 211–946)
VITAMIN D 25-HYDROXY: 39.1 NG/ML (ref 30–100)
VLDLC SERPL CALC-MCNC: 18 MG/DL
WBC # BLD: 1.5 K/UL (ref 4.5–11.5)

## 2023-11-29 PROCEDURE — 99214 OFFICE O/P EST MOD 30 MIN: CPT | Performed by: STUDENT IN AN ORGANIZED HEALTH CARE EDUCATION/TRAINING PROGRAM

## 2023-11-29 PROCEDURE — 3046F HEMOGLOBIN A1C LEVEL >9.0%: CPT | Performed by: STUDENT IN AN ORGANIZED HEALTH CARE EDUCATION/TRAINING PROGRAM

## 2023-11-29 PROCEDURE — 3075F SYST BP GE 130 - 139MM HG: CPT | Performed by: STUDENT IN AN ORGANIZED HEALTH CARE EDUCATION/TRAINING PROGRAM

## 2023-11-29 PROCEDURE — 83036 HEMOGLOBIN GLYCOSYLATED A1C: CPT | Performed by: STUDENT IN AN ORGANIZED HEALTH CARE EDUCATION/TRAINING PROGRAM

## 2023-11-29 PROCEDURE — 3078F DIAST BP <80 MM HG: CPT | Performed by: STUDENT IN AN ORGANIZED HEALTH CARE EDUCATION/TRAINING PROGRAM

## 2023-11-29 PROCEDURE — 93000 ELECTROCARDIOGRAM COMPLETE: CPT | Performed by: STUDENT IN AN ORGANIZED HEALTH CARE EDUCATION/TRAINING PROGRAM

## 2023-11-29 PROCEDURE — 36415 COLL VENOUS BLD VENIPUNCTURE: CPT | Performed by: STUDENT IN AN ORGANIZED HEALTH CARE EDUCATION/TRAINING PROGRAM

## 2023-11-29 RX ORDER — IBUPROFEN 600 MG/1
600 TABLET ORAL 2 TIMES DAILY PRN
Qty: 120 TABLET | Refills: 3 | Status: SHIPPED | OUTPATIENT
Start: 2023-11-29

## 2023-11-29 RX ORDER — ONDANSETRON 4 MG/1
4 TABLET, FILM COATED ORAL DAILY PRN
Qty: 30 TABLET | Refills: 3 | Status: SHIPPED | OUTPATIENT
Start: 2023-11-29

## 2023-11-29 RX ORDER — INSULIN LISPRO 100 [IU]/ML
50 INJECTION, SUSPENSION SUBCUTANEOUS 2 TIMES DAILY WITH MEALS
Qty: 5 ADJUSTABLE DOSE PRE-FILLED PEN SYRINGE | Refills: 3 | Status: SHIPPED | OUTPATIENT
Start: 2023-11-29

## 2023-11-29 RX ORDER — FUROSEMIDE 20 MG/1
20 TABLET ORAL EVERY OTHER DAY
Qty: 60 TABLET | Refills: 3 | Status: SHIPPED | OUTPATIENT
Start: 2023-11-29

## 2023-11-29 RX ORDER — CHOLECALCIFEROL (VITAMIN D3) 125 MCG
1 CAPSULE ORAL DAILY
Qty: 90 TABLET | OUTPATIENT
Start: 2023-11-29

## 2023-11-29 RX ORDER — INSULIN LISPRO 100 [IU]/ML
45 INJECTION, SUSPENSION SUBCUTANEOUS 2 TIMES DAILY WITH MEALS
Qty: 5 ADJUSTABLE DOSE PRE-FILLED PEN SYRINGE | Refills: 3 | Status: SHIPPED
Start: 2023-11-29 | End: 2023-11-29 | Stop reason: SDUPTHER

## 2023-11-29 RX ORDER — LACTULOSE 10 G/15ML
20 SOLUTION ORAL 3 TIMES DAILY
Status: CANCELLED | OUTPATIENT
Start: 2023-11-29

## 2023-11-29 RX ORDER — LACTULOSE 10 G/15ML
SOLUTION ORAL
Qty: 946 ML | Refills: 5 | Status: SHIPPED | OUTPATIENT
Start: 2023-11-29

## 2023-11-29 RX ORDER — MECLIZINE HCL 12.5 MG/1
12.5 TABLET ORAL 3 TIMES DAILY PRN
Qty: 30 TABLET | Refills: 1 | Status: SHIPPED | OUTPATIENT
Start: 2023-11-29 | End: 2023-12-19

## 2023-11-29 RX ORDER — OMEPRAZOLE 20 MG/1
20 CAPSULE, DELAYED RELEASE ORAL DAILY
Qty: 90 CAPSULE | Refills: 1 | Status: SHIPPED | OUTPATIENT
Start: 2023-11-29

## 2023-11-29 RX ORDER — CHOLECALCIFEROL (VITAMIN D3) 50 MCG
2000 TABLET ORAL DAILY
Qty: 30 TABLET | Refills: 3 | Status: SHIPPED | OUTPATIENT
Start: 2023-11-29

## 2023-11-29 RX ORDER — ERGOCALCIFEROL 1.25 MG/1
50000 CAPSULE ORAL WEEKLY
Qty: 12 CAPSULE | Refills: 0 | Status: SHIPPED | OUTPATIENT
Start: 2023-11-29

## 2023-11-29 RX ORDER — CARVEDILOL 6.25 MG/1
6.25 TABLET ORAL DAILY
Qty: 90 TABLET | Refills: 1 | Status: SHIPPED | OUTPATIENT
Start: 2023-11-29

## 2023-11-29 ASSESSMENT — ENCOUNTER SYMPTOMS
NAUSEA: 0
VOMITING: 0
COUGH: 0
SINUS PRESSURE: 0
DIARRHEA: 0
BACK PAIN: 0
SHORTNESS OF BREATH: 0
SINUS PAIN: 0
RHINORRHEA: 0
EYE PAIN: 0
EYE REDNESS: 0
CONSTIPATION: 0
ABDOMINAL PAIN: 0
SORE THROAT: 0

## 2023-11-29 NOTE — PROGRESS NOTES
General: Skin is warm and dry. Capillary Refill: Capillary refill takes less than 2 seconds. Neurological:      General: No focal deficit present. Mental Status: She is alert and oriented to person, place, and time. Psychiatric:         Mood and Affect: Mood normal.         Behavior: Behavior normal.         Thought Content: Thought content normal.         ASSESSMENT/PLAN:  Niecy Ledesma was seen today for diabetes. Diagnoses and all orders for this visit:    Type 2 diabetes mellitus without complication, with long-term current use of insulin ([facility])  -     Discontinue: insulin lispro protamine & lispro (HUMALOG MIX) (75-25) 100 UNIT per ML SUPN injection pen; Inject 0.45 mLs into the skin with breakfast and with evening meal  -     POCT glycosylated hemoglobin (Hb A1C)  -     insulin lispro protamine & lispro (HUMALOG MIX) (75-25) 100 UNIT per ML SUPN injection pen; Inject 0.5 mLs into the skin with breakfast and with evening meal  -     Lipid Panel  Not controlled  Will increase insulin to 50 units BID   The patient is asked to make an attempt to improve diet and exercise patterns to aid in medical management of this problem. Essential hypertension  -     carvedilol (COREG) 6.25 MG tablet; Take 1 tablet by mouth daily  -     furosemide (LASIX) 20 MG tablet; Take 1 tablet by mouth every other day  -     Comprehensive Metabolic Panel  -     CBC with Auto Differential  Blood pressure well controlled and at goal  All medications reviewed by myself personally, continue current meds  Diet and exercise were discussed and recommended to the patient. Labs ordered     Other cirrhosis of liver (HCC)  -     furosemide (LASIX) 20 MG tablet; Take 1 tablet by mouth every other day  -     lactulose (CHRONULAC) 10 GM/15ML solution; Take 30ml by mouth three times daily (titrate for every 3 to 4 bowel movements per day)    Vitamin D deficiency  -     Vitamin D, Ergocalciferol, 28712 units CAPS;  Take 50,000 Units by

## 2023-12-04 DIAGNOSIS — N18.31 STAGE 3A CHRONIC KIDNEY DISEASE (HCC): Primary | ICD-10-CM

## 2023-12-09 ENCOUNTER — APPOINTMENT (OUTPATIENT)
Dept: CT IMAGING | Age: 63
DRG: 441 | End: 2023-12-09
Payer: MEDICARE

## 2023-12-09 ENCOUNTER — APPOINTMENT (OUTPATIENT)
Dept: GENERAL RADIOLOGY | Age: 63
DRG: 441 | End: 2023-12-09
Payer: MEDICARE

## 2023-12-09 ENCOUNTER — HOSPITAL ENCOUNTER (INPATIENT)
Age: 63
LOS: 3 days | Discharge: HOME | DRG: 441 | End: 2023-12-12
Attending: EMERGENCY MEDICINE | Admitting: INTERNAL MEDICINE
Payer: MEDICARE

## 2023-12-09 DIAGNOSIS — K76.82 HEPATIC ENCEPHALOPATHY (HCC): ICD-10-CM

## 2023-12-09 DIAGNOSIS — E72.20 HYPERAMMONEMIA (HCC): ICD-10-CM

## 2023-12-09 DIAGNOSIS — U07.1 COVID-19 VIRUS INFECTION: ICD-10-CM

## 2023-12-09 DIAGNOSIS — R41.82 ALTERED MENTAL STATUS, UNSPECIFIED ALTERED MENTAL STATUS TYPE: Primary | ICD-10-CM

## 2023-12-09 LAB
ALBUMIN SERPL-MCNC: 3.6 G/DL (ref 3.5–5.2)
ALP SERPL-CCNC: 141 U/L (ref 35–104)
ALT SERPL-CCNC: 26 U/L (ref 0–32)
AMMONIA PLAS-SCNC: 85 UMOL/L (ref 11–51)
ANION GAP SERPL CALCULATED.3IONS-SCNC: 13 MMOL/L (ref 7–16)
AST SERPL-CCNC: 32 U/L (ref 0–31)
B PARAP IS1001 DNA NPH QL NAA+NON-PROBE: NOT DETECTED
B PERT DNA SPEC QL NAA+PROBE: NOT DETECTED
B-OH-BUTYR SERPL-MCNC: 0.31 MMOL/L (ref 0.02–0.27)
BACTERIA URNS QL MICRO: ABNORMAL
BASOPHILS # BLD: 0 K/UL (ref 0–0.2)
BASOPHILS NFR BLD: 0 % (ref 0–2)
BILIRUB SERPL-MCNC: 2 MG/DL (ref 0–1.2)
BILIRUB UR QL STRIP: NEGATIVE
BUN SERPL-MCNC: 26 MG/DL (ref 6–23)
C PNEUM DNA NPH QL NAA+NON-PROBE: NOT DETECTED
CALCIUM SERPL-MCNC: 8.9 MG/DL (ref 8.6–10.2)
CHLORIDE SERPL-SCNC: 102 MMOL/L (ref 98–107)
CLARITY UR: CLEAR
CO2 SERPL-SCNC: 20 MMOL/L (ref 22–29)
COLOR UR: YELLOW
CREAT SERPL-MCNC: 1.2 MG/DL (ref 0.5–1)
EOSINOPHIL # BLD: 0.05 K/UL (ref 0.05–0.5)
EOSINOPHILS RELATIVE PERCENT: 3 % (ref 0–6)
ERYTHROCYTE [DISTWIDTH] IN BLOOD BY AUTOMATED COUNT: 14.3 % (ref 11.5–15)
FLUAV RNA NPH QL NAA+NON-PROBE: NOT DETECTED
FLUBV RNA NPH QL NAA+NON-PROBE: NOT DETECTED
GFR SERPL CREATININE-BSD FRML MDRD: 53 ML/MIN/1.73M2
GLUCOSE BLD-MCNC: 249 MG/DL (ref 74–99)
GLUCOSE BLD-MCNC: 316 MG/DL (ref 74–99)
GLUCOSE BLD-MCNC: 342 MG/DL (ref 74–99)
GLUCOSE SERPL-MCNC: 257 MG/DL (ref 74–99)
GLUCOSE UR STRIP-MCNC: 250 MG/DL
HADV DNA NPH QL NAA+NON-PROBE: NOT DETECTED
HCOV 229E RNA NPH QL NAA+NON-PROBE: DETECTED
HCOV HKU1 RNA NPH QL NAA+NON-PROBE: NOT DETECTED
HCOV NL63 RNA NPH QL NAA+NON-PROBE: NOT DETECTED
HCOV OC43 RNA NPH QL NAA+NON-PROBE: NOT DETECTED
HCT VFR BLD AUTO: 31.3 % (ref 34–48)
HGB BLD-MCNC: 10.1 G/DL (ref 11.5–15.5)
HGB UR QL STRIP.AUTO: ABNORMAL
HMPV RNA NPH QL NAA+NON-PROBE: NOT DETECTED
HPIV1 RNA NPH QL NAA+NON-PROBE: NOT DETECTED
HPIV2 RNA NPH QL NAA+NON-PROBE: NOT DETECTED
HPIV3 RNA NPH QL NAA+NON-PROBE: NOT DETECTED
HPIV4 RNA NPH QL NAA+NON-PROBE: NOT DETECTED
KETONES UR STRIP-MCNC: NEGATIVE MG/DL
LACTATE BLDV-SCNC: 1.7 MMOL/L (ref 0.5–1.9)
LACTATE BLDV-SCNC: 1.9 MMOL/L (ref 0.5–2.2)
LEUKOCYTE ESTERASE UR QL STRIP: NEGATIVE
LIPASE SERPL-CCNC: 24 U/L (ref 13–60)
LYMPHOCYTES NFR BLD: 0.27 K/UL (ref 1.5–4)
LYMPHOCYTES RELATIVE PERCENT: 13 % (ref 20–42)
M PNEUMO DNA NPH QL NAA+NON-PROBE: NOT DETECTED
MCH RBC QN AUTO: 28.5 PG (ref 26–35)
MCHC RBC AUTO-ENTMCNC: 32.3 G/DL (ref 32–34.5)
MCV RBC AUTO: 88.2 FL (ref 80–99.9)
METAMYELOCYTES ABSOLUTE COUNT: 0.02 K/UL (ref 0–0.12)
METAMYELOCYTES: 1 % (ref 0–1)
MONOCYTES NFR BLD: 0.07 K/UL (ref 0.1–0.95)
MONOCYTES NFR BLD: 4 % (ref 2–12)
NEUTROPHILS NFR BLD: 80 % (ref 43–80)
NEUTS SEG NFR BLD: 1.68 K/UL (ref 1.8–7.3)
NITRITE UR QL STRIP: NEGATIVE
PH UR STRIP: 6.5 [PH] (ref 5–9)
PH VENOUS: 7.39 (ref 7.35–7.45)
PLATELET CONFIRMATION: NORMAL
PLATELET, FLUORESCENCE: 35 K/UL (ref 130–450)
PMV BLD AUTO: 12.3 FL (ref 7–12)
POTASSIUM SERPL-SCNC: 4.9 MMOL/L (ref 3.5–5)
PROT SERPL-MCNC: 8.6 G/DL (ref 6.4–8.3)
PROT UR STRIP-MCNC: NEGATIVE MG/DL
RBC # BLD AUTO: 3.55 M/UL (ref 3.5–5.5)
RBC # BLD: ABNORMAL 10*6/UL
RBC # BLD: ABNORMAL 10*6/UL
RBC #/AREA URNS HPF: ABNORMAL /HPF
RSV RNA NPH QL NAA+NON-PROBE: NOT DETECTED
RV+EV RNA NPH QL NAA+NON-PROBE: NOT DETECTED
SARS-COV-2 RNA NPH QL NAA+NON-PROBE: NOT DETECTED
SODIUM SERPL-SCNC: 135 MMOL/L (ref 132–146)
SP GR UR STRIP: 1.01 (ref 1–1.03)
SPECIMEN DESCRIPTION: ABNORMAL
T4 FREE SERPL-MCNC: 1.4 NG/DL (ref 0.9–1.7)
TROPONIN I SERPL HS-MCNC: 19 NG/L (ref 0–9)
TROPONIN I SERPL HS-MCNC: 20 NG/L (ref 0–9)
TSH SERPL DL<=0.05 MIU/L-ACNC: 4.67 UIU/ML (ref 0.27–4.2)
UROBILINOGEN UR STRIP-ACNC: 0.2 EU/DL (ref 0–1)
WBC #/AREA URNS HPF: ABNORMAL /HPF
WBC OTHER # BLD: 2.1 K/UL (ref 4.5–11.5)

## 2023-12-09 PROCEDURE — 99285 EMERGENCY DEPT VISIT HI MDM: CPT

## 2023-12-09 PROCEDURE — 84443 ASSAY THYROID STIM HORMONE: CPT

## 2023-12-09 PROCEDURE — 2580000003 HC RX 258: Performed by: NURSE PRACTITIONER

## 2023-12-09 PROCEDURE — A4216 STERILE WATER/SALINE, 10 ML: HCPCS | Performed by: NURSE PRACTITIONER

## 2023-12-09 PROCEDURE — 84439 ASSAY OF FREE THYROXINE: CPT

## 2023-12-09 PROCEDURE — 0202U NFCT DS 22 TRGT SARS-COV-2: CPT

## 2023-12-09 PROCEDURE — 83605 ASSAY OF LACTIC ACID: CPT

## 2023-12-09 PROCEDURE — 74176 CT ABD & PELVIS W/O CONTRAST: CPT

## 2023-12-09 PROCEDURE — 87040 BLOOD CULTURE FOR BACTERIA: CPT

## 2023-12-09 PROCEDURE — 82800 BLOOD PH: CPT

## 2023-12-09 PROCEDURE — C9113 INJ PANTOPRAZOLE SODIUM, VIA: HCPCS | Performed by: NURSE PRACTITIONER

## 2023-12-09 PROCEDURE — 6370000000 HC RX 637 (ALT 250 FOR IP)

## 2023-12-09 PROCEDURE — 93005 ELECTROCARDIOGRAM TRACING: CPT

## 2023-12-09 PROCEDURE — 2060000000 HC ICU INTERMEDIATE R&B

## 2023-12-09 PROCEDURE — 82962 GLUCOSE BLOOD TEST: CPT

## 2023-12-09 PROCEDURE — 82010 KETONE BODYS QUAN: CPT

## 2023-12-09 PROCEDURE — 82140 ASSAY OF AMMONIA: CPT

## 2023-12-09 PROCEDURE — 70450 CT HEAD/BRAIN W/O DYE: CPT

## 2023-12-09 PROCEDURE — 85025 COMPLETE CBC W/AUTO DIFF WBC: CPT

## 2023-12-09 PROCEDURE — 6370000000 HC RX 637 (ALT 250 FOR IP): Performed by: NURSE PRACTITIONER

## 2023-12-09 PROCEDURE — 84484 ASSAY OF TROPONIN QUANT: CPT

## 2023-12-09 PROCEDURE — 83690 ASSAY OF LIPASE: CPT

## 2023-12-09 PROCEDURE — 80053 COMPREHEN METABOLIC PANEL: CPT

## 2023-12-09 PROCEDURE — 2580000003 HC RX 258

## 2023-12-09 PROCEDURE — 81001 URINALYSIS AUTO W/SCOPE: CPT

## 2023-12-09 PROCEDURE — 71045 X-RAY EXAM CHEST 1 VIEW: CPT

## 2023-12-09 PROCEDURE — 6360000002 HC RX W HCPCS: Performed by: NURSE PRACTITIONER

## 2023-12-09 RX ORDER — LACTULOSE 10 G/15ML
30 SOLUTION ORAL 3 TIMES DAILY
Status: DISCONTINUED | OUTPATIENT
Start: 2023-12-09 | End: 2023-12-12 | Stop reason: HOSPADM

## 2023-12-09 RX ORDER — LACTULOSE 10 G/15ML
20 SOLUTION ORAL ONCE
Status: COMPLETED | OUTPATIENT
Start: 2023-12-09 | End: 2023-12-09

## 2023-12-09 RX ORDER — POTASSIUM CHLORIDE 7.45 MG/ML
10 INJECTION INTRAVENOUS PRN
Status: DISCONTINUED | OUTPATIENT
Start: 2023-12-09 | End: 2023-12-12 | Stop reason: HOSPADM

## 2023-12-09 RX ORDER — SODIUM CHLORIDE 0.9 % (FLUSH) 0.9 %
5-40 SYRINGE (ML) INJECTION PRN
Status: DISCONTINUED | OUTPATIENT
Start: 2023-12-09 | End: 2023-12-12 | Stop reason: HOSPADM

## 2023-12-09 RX ORDER — SODIUM CHLORIDE 0.9 % (FLUSH) 0.9 %
5-40 SYRINGE (ML) INJECTION EVERY 12 HOURS SCHEDULED
Status: DISCONTINUED | OUTPATIENT
Start: 2023-12-09 | End: 2023-12-12 | Stop reason: HOSPADM

## 2023-12-09 RX ORDER — ONDANSETRON 2 MG/ML
4 INJECTION INTRAMUSCULAR; INTRAVENOUS EVERY 6 HOURS PRN
Status: DISCONTINUED | OUTPATIENT
Start: 2023-12-09 | End: 2023-12-09 | Stop reason: SDUPTHER

## 2023-12-09 RX ORDER — PROCHLORPERAZINE MALEATE 10 MG
10 TABLET ORAL EVERY 6 HOURS PRN
Status: DISCONTINUED | OUTPATIENT
Start: 2023-12-09 | End: 2023-12-12 | Stop reason: HOSPADM

## 2023-12-09 RX ORDER — POLYETHYLENE GLYCOL 3350 17 G/17G
17 POWDER, FOR SOLUTION ORAL DAILY PRN
Status: DISCONTINUED | OUTPATIENT
Start: 2023-12-09 | End: 2023-12-12 | Stop reason: HOSPADM

## 2023-12-09 RX ORDER — SODIUM CHLORIDE 9 MG/ML
INJECTION, SOLUTION INTRAVENOUS CONTINUOUS
Status: DISCONTINUED | OUTPATIENT
Start: 2023-12-09 | End: 2023-12-10

## 2023-12-09 RX ORDER — INSULIN LISPRO 100 [IU]/ML
0-8 INJECTION, SOLUTION INTRAVENOUS; SUBCUTANEOUS
Status: DISCONTINUED | OUTPATIENT
Start: 2023-12-09 | End: 2023-12-11

## 2023-12-09 RX ORDER — PANTOPRAZOLE SODIUM 40 MG/1
40 TABLET, DELAYED RELEASE ORAL DAILY
Status: DISCONTINUED | OUTPATIENT
Start: 2023-12-09 | End: 2023-12-09

## 2023-12-09 RX ORDER — PROCHLORPERAZINE EDISYLATE 5 MG/ML
10 INJECTION INTRAMUSCULAR; INTRAVENOUS EVERY 6 HOURS PRN
Status: DISCONTINUED | OUTPATIENT
Start: 2023-12-09 | End: 2023-12-12 | Stop reason: HOSPADM

## 2023-12-09 RX ORDER — SODIUM CHLORIDE 9 MG/ML
INJECTION, SOLUTION INTRAVENOUS PRN
Status: DISCONTINUED | OUTPATIENT
Start: 2023-12-09 | End: 2023-12-12 | Stop reason: HOSPADM

## 2023-12-09 RX ORDER — ACETAMINOPHEN 325 MG/1
650 TABLET ORAL EVERY 6 HOURS PRN
Status: DISCONTINUED | OUTPATIENT
Start: 2023-12-09 | End: 2023-12-12 | Stop reason: HOSPADM

## 2023-12-09 RX ORDER — GABAPENTIN 600 MG/1
600 TABLET ORAL 3 TIMES DAILY
COMMUNITY

## 2023-12-09 RX ORDER — MAGNESIUM SULFATE IN WATER 40 MG/ML
2000 INJECTION, SOLUTION INTRAVENOUS PRN
Status: DISCONTINUED | OUTPATIENT
Start: 2023-12-09 | End: 2023-12-12 | Stop reason: HOSPADM

## 2023-12-09 RX ORDER — ONDANSETRON 4 MG/1
4 TABLET, ORALLY DISINTEGRATING ORAL EVERY 8 HOURS PRN
Status: DISCONTINUED | OUTPATIENT
Start: 2023-12-09 | End: 2023-12-09 | Stop reason: SDUPTHER

## 2023-12-09 RX ORDER — INSULIN LISPRO 100 [IU]/ML
0-4 INJECTION, SOLUTION INTRAVENOUS; SUBCUTANEOUS NIGHTLY
Status: DISCONTINUED | OUTPATIENT
Start: 2023-12-09 | End: 2023-12-11

## 2023-12-09 RX ORDER — LACTULOSE 10 G/15ML
20 SOLUTION ORAL 3 TIMES DAILY
COMMUNITY

## 2023-12-09 RX ORDER — CARVEDILOL 6.25 MG/1
6.25 TABLET ORAL DAILY
Status: DISCONTINUED | OUTPATIENT
Start: 2023-12-09 | End: 2023-12-12 | Stop reason: HOSPADM

## 2023-12-09 RX ORDER — 0.9 % SODIUM CHLORIDE 0.9 %
1000 INTRAVENOUS SOLUTION INTRAVENOUS ONCE
Status: COMPLETED | OUTPATIENT
Start: 2023-12-09 | End: 2023-12-09

## 2023-12-09 RX ORDER — ACETAMINOPHEN 650 MG/1
650 SUPPOSITORY RECTAL EVERY 6 HOURS PRN
Status: DISCONTINUED | OUTPATIENT
Start: 2023-12-09 | End: 2023-12-12 | Stop reason: HOSPADM

## 2023-12-09 RX ORDER — MECLIZINE HCL 12.5 MG/1
12.5 TABLET ORAL 3 TIMES DAILY PRN
Status: DISCONTINUED | OUTPATIENT
Start: 2023-12-09 | End: 2023-12-12 | Stop reason: HOSPADM

## 2023-12-09 RX ORDER — POTASSIUM CHLORIDE 20 MEQ/1
40 TABLET, EXTENDED RELEASE ORAL PRN
Status: DISCONTINUED | OUTPATIENT
Start: 2023-12-09 | End: 2023-12-12 | Stop reason: HOSPADM

## 2023-12-09 RX ADMIN — INSULIN LISPRO 6 UNITS: 100 INJECTION, SOLUTION INTRAVENOUS; SUBCUTANEOUS at 16:46

## 2023-12-09 RX ADMIN — INSULIN LISPRO 6 UNITS: 100 INJECTION, SOLUTION INTRAVENOUS; SUBCUTANEOUS at 19:04

## 2023-12-09 RX ADMIN — PANTOPRAZOLE SODIUM 40 MG: 40 INJECTION, POWDER, FOR SOLUTION INTRAVENOUS at 16:47

## 2023-12-09 RX ADMIN — LACTULOSE 20 G: 20 SOLUTION ORAL at 12:58

## 2023-12-09 RX ADMIN — SODIUM CHLORIDE 1000 ML: 9 INJECTION, SOLUTION INTRAVENOUS at 09:42

## 2023-12-09 RX ADMIN — CARVEDILOL 6.25 MG: 6.25 TABLET, FILM COATED ORAL at 16:46

## 2023-12-09 RX ADMIN — LACTULOSE 20 G: 20 SOLUTION ORAL at 16:45

## 2023-12-09 NOTE — H&P
31       Time spent reviewing chart, clinical exam, discussing case and answering questions with staff/consultants/patient/family = 45 minutes         Code Status: FULL  DVT prophylaxis: pcds      NOTE: This report was transcribed using voice recognition software. Every effort was made to ensure accuracy; however, inadvertent computerized transcription errors may be present.   Electronically signed by ANNA Ventura on 12/9/2023 at 1:02 PM

## 2023-12-09 NOTE — ED NOTES
Nay called and notified of bladder scan/ urine retention. Nay requested bladder scan in 4 hours and notify him if urine retention is still present. Nay also wants to encourage patient to void.       Shruthi Crocker RN  12/09/23 2071

## 2023-12-09 NOTE — ED NOTES
Called to [facility] made to update on bladder scan No answer and call back will be made in a few minutes.       Lee Ann Coats RN  12/09/23 8291

## 2023-12-09 NOTE — ED NOTES
Second call made to [facility] with no answer.      Ruperto Capps RN  12/09/23 0948

## 2023-12-09 NOTE — ED NOTES
Bladder scan revealed 450 ml @ 1756. Patient had one large urination on CT table and one large urination 1 hour after CT. Patient straight cath and had 650 mls of urine out. Patient placed on pure wick and currently has 600 ml of urine now.       Mervat Lua RN  12/09/23 7651       Mervat Lau RN  12/09/23 1412

## 2023-12-10 LAB
AMMONIA PLAS-SCNC: 66 UMOL/L (ref 11–51)
ANION GAP SERPL CALCULATED.3IONS-SCNC: 10 MMOL/L (ref 7–16)
BASOPHILS # BLD: 0.01 K/UL (ref 0–0.2)
BASOPHILS NFR BLD: 1 % (ref 0–2)
BUN SERPL-MCNC: 26 MG/DL (ref 6–23)
CALCIUM SERPL-MCNC: 8.5 MG/DL (ref 8.6–10.2)
CHLORIDE SERPL-SCNC: 105 MMOL/L (ref 98–107)
CO2 SERPL-SCNC: 20 MMOL/L (ref 22–29)
CREAT SERPL-MCNC: 1.1 MG/DL (ref 0.5–1)
EOSINOPHIL # BLD: 0.03 K/UL (ref 0.05–0.5)
EOSINOPHILS RELATIVE PERCENT: 2 % (ref 0–6)
ERYTHROCYTE [DISTWIDTH] IN BLOOD BY AUTOMATED COUNT: 14.2 % (ref 11.5–15)
GFR SERPL CREATININE-BSD FRML MDRD: 58 ML/MIN/1.73M2
GLUCOSE BLD-MCNC: 280 MG/DL (ref 74–99)
GLUCOSE BLD-MCNC: 357 MG/DL (ref 74–99)
GLUCOSE BLD-MCNC: 362 MG/DL (ref 74–99)
GLUCOSE BLD-MCNC: 462 MG/DL (ref 74–99)
GLUCOSE SERPL-MCNC: 252 MG/DL (ref 74–99)
HCT VFR BLD AUTO: 28.3 % (ref 34–48)
HGB BLD-MCNC: 9.3 G/DL (ref 11.5–15.5)
LYMPHOCYTES NFR BLD: 0.33 K/UL (ref 1.5–4)
LYMPHOCYTES RELATIVE PERCENT: 22 % (ref 20–42)
MCH RBC QN AUTO: 29.3 PG (ref 26–35)
MCHC RBC AUTO-ENTMCNC: 32.9 G/DL (ref 32–34.5)
MCV RBC AUTO: 89.3 FL (ref 80–99.9)
MONOCYTES NFR BLD: 0.1 K/UL (ref 0.1–0.95)
MONOCYTES NFR BLD: 7 % (ref 2–12)
NEUTROPHILS NFR BLD: 69 % (ref 43–80)
NEUTS SEG NFR BLD: 1.03 K/UL (ref 1.8–7.3)
PLATELET # BLD AUTO: 41 K/UL (ref 130–450)
PLATELET CONFIRMATION: NORMAL
PMV BLD AUTO: 12.5 FL (ref 7–12)
POTASSIUM SERPL-SCNC: 4.6 MMOL/L (ref 3.5–5)
RBC # BLD AUTO: 3.17 M/UL (ref 3.5–5.5)
RBC # BLD: NORMAL 10*6/UL
SODIUM SERPL-SCNC: 135 MMOL/L (ref 132–146)
WBC OTHER # BLD: 1.5 K/UL (ref 4.5–11.5)

## 2023-12-10 PROCEDURE — C9113 INJ PANTOPRAZOLE SODIUM, VIA: HCPCS | Performed by: NURSE PRACTITIONER

## 2023-12-10 PROCEDURE — 2580000003 HC RX 258: Performed by: NURSE PRACTITIONER

## 2023-12-10 PROCEDURE — A4216 STERILE WATER/SALINE, 10 ML: HCPCS | Performed by: NURSE PRACTITIONER

## 2023-12-10 PROCEDURE — 85025 COMPLETE CBC W/AUTO DIFF WBC: CPT

## 2023-12-10 PROCEDURE — 82140 ASSAY OF AMMONIA: CPT

## 2023-12-10 PROCEDURE — 99233 SBSQ HOSP IP/OBS HIGH 50: CPT | Performed by: INTERNAL MEDICINE

## 2023-12-10 PROCEDURE — 6370000000 HC RX 637 (ALT 250 FOR IP): Performed by: NURSE PRACTITIONER

## 2023-12-10 PROCEDURE — 80048 BASIC METABOLIC PNL TOTAL CA: CPT

## 2023-12-10 PROCEDURE — 2060000000 HC ICU INTERMEDIATE R&B

## 2023-12-10 PROCEDURE — 82962 GLUCOSE BLOOD TEST: CPT

## 2023-12-10 PROCEDURE — 6360000002 HC RX W HCPCS: Performed by: NURSE PRACTITIONER

## 2023-12-10 RX ORDER — DEXTROSE MONOHYDRATE 100 MG/ML
INJECTION, SOLUTION INTRAVENOUS CONTINUOUS PRN
Status: DISCONTINUED | OUTPATIENT
Start: 2023-12-10 | End: 2023-12-12 | Stop reason: HOSPADM

## 2023-12-10 RX ORDER — INSULIN GLARGINE 100 [IU]/ML
0.25 INJECTION, SOLUTION SUBCUTANEOUS NIGHTLY
Status: DISCONTINUED | OUTPATIENT
Start: 2023-12-10 | End: 2023-12-12 | Stop reason: HOSPADM

## 2023-12-10 RX ADMIN — INSULIN LISPRO 8 UNITS: 100 INJECTION, SOLUTION INTRAVENOUS; SUBCUTANEOUS at 18:58

## 2023-12-10 RX ADMIN — SODIUM CHLORIDE, PRESERVATIVE FREE 10 ML: 5 INJECTION INTRAVENOUS at 13:31

## 2023-12-10 RX ADMIN — INSULIN HUMAN 8 UNITS: 100 INJECTION, SOLUTION PARENTERAL at 22:13

## 2023-12-10 RX ADMIN — INSULIN LISPRO 4 UNITS: 100 INJECTION, SOLUTION INTRAVENOUS; SUBCUTANEOUS at 22:14

## 2023-12-10 RX ADMIN — INSULIN LISPRO 4 UNITS: 100 INJECTION, SOLUTION INTRAVENOUS; SUBCUTANEOUS at 09:48

## 2023-12-10 RX ADMIN — INSULIN LISPRO 8 UNITS: 100 INJECTION, SOLUTION INTRAVENOUS; SUBCUTANEOUS at 13:26

## 2023-12-10 RX ADMIN — LACTULOSE 20 G: 20 SOLUTION ORAL at 21:31

## 2023-12-10 RX ADMIN — CARVEDILOL 6.25 MG: 6.25 TABLET, FILM COATED ORAL at 09:48

## 2023-12-10 RX ADMIN — PANTOPRAZOLE SODIUM 40 MG: 40 INJECTION, POWDER, FOR SOLUTION INTRAVENOUS at 13:30

## 2023-12-10 RX ADMIN — SODIUM CHLORIDE, PRESERVATIVE FREE 10 ML: 5 INJECTION INTRAVENOUS at 21:31

## 2023-12-10 RX ADMIN — INSULIN GLARGINE 19 UNITS: 100 INJECTION, SOLUTION SUBCUTANEOUS at 22:14

## 2023-12-11 LAB
ALBUMIN SERPL-MCNC: 3.1 G/DL (ref 3.5–5.2)
ALP SERPL-CCNC: 117 U/L (ref 35–104)
ALT SERPL-CCNC: 19 U/L (ref 0–32)
AMMONIA PLAS-SCNC: 93 UMOL/L (ref 11–51)
ANION GAP SERPL CALCULATED.3IONS-SCNC: 13 MMOL/L (ref 7–16)
AST SERPL-CCNC: 27 U/L (ref 0–31)
BASOPHILS # BLD: 0.03 K/UL (ref 0–0.2)
BASOPHILS NFR BLD: 2 % (ref 0–2)
BILIRUB SERPL-MCNC: 1.1 MG/DL (ref 0–1.2)
BUN SERPL-MCNC: 26 MG/DL (ref 6–23)
CALCIUM SERPL-MCNC: 8.4 MG/DL (ref 8.6–10.2)
CHLORIDE SERPL-SCNC: 103 MMOL/L (ref 98–107)
CO2 SERPL-SCNC: 19 MMOL/L (ref 22–29)
CREAT SERPL-MCNC: 1 MG/DL (ref 0.5–1)
EKG ATRIAL RATE: 78 BPM
EKG P AXIS: 42 DEGREES
EKG P-R INTERVAL: 172 MS
EKG Q-T INTERVAL: 450 MS
EKG QRS DURATION: 140 MS
EKG QTC CALCULATION (BAZETT): 513 MS
EKG R AXIS: -39 DEGREES
EKG T AXIS: 8 DEGREES
EKG VENTRICULAR RATE: 78 BPM
EOSINOPHIL # BLD: 0.04 K/UL (ref 0.05–0.5)
EOSINOPHILS RELATIVE PERCENT: 3 % (ref 0–6)
ERYTHROCYTE [DISTWIDTH] IN BLOOD BY AUTOMATED COUNT: 14.1 % (ref 11.5–15)
GFR SERPL CREATININE-BSD FRML MDRD: >60 ML/MIN/1.73M2
GLUCOSE BLD-MCNC: 311 MG/DL (ref 74–99)
GLUCOSE BLD-MCNC: 342 MG/DL (ref 74–99)
GLUCOSE BLD-MCNC: 355 MG/DL (ref 74–99)
GLUCOSE BLD-MCNC: 357 MG/DL (ref 74–99)
GLUCOSE SERPL-MCNC: 300 MG/DL (ref 74–99)
HCT VFR BLD AUTO: 27.5 % (ref 34–48)
HGB BLD-MCNC: 8.9 G/DL (ref 11.5–15.5)
LYMPHOCYTES NFR BLD: 0.32 K/UL (ref 1.5–4)
LYMPHOCYTES RELATIVE PERCENT: 20 % (ref 20–42)
MCH RBC QN AUTO: 29 PG (ref 26–35)
MCHC RBC AUTO-ENTMCNC: 32.4 G/DL (ref 32–34.5)
MCV RBC AUTO: 89.6 FL (ref 80–99.9)
MONOCYTES NFR BLD: 0.18 K/UL (ref 0.1–0.95)
MONOCYTES NFR BLD: 11 % (ref 2–12)
NEUTROPHILS NFR BLD: 64 % (ref 43–80)
NEUTS SEG NFR BLD: 1.02 K/UL (ref 1.8–7.3)
PLATELET # BLD AUTO: 32 K/UL (ref 130–450)
PLATELET CONFIRMATION: NORMAL
PMV BLD AUTO: 11.9 FL (ref 7–12)
POTASSIUM SERPL-SCNC: 4.2 MMOL/L (ref 3.5–5)
PROT SERPL-MCNC: 7.5 G/DL (ref 6.4–8.3)
RBC # BLD AUTO: 3.07 M/UL (ref 3.5–5.5)
RBC # BLD: NORMAL 10*6/UL
SODIUM SERPL-SCNC: 135 MMOL/L (ref 132–146)
WBC OTHER # BLD: 1.6 K/UL (ref 4.5–11.5)

## 2023-12-11 PROCEDURE — 36415 COLL VENOUS BLD VENIPUNCTURE: CPT

## 2023-12-11 PROCEDURE — 93010 ELECTROCARDIOGRAM REPORT: CPT | Performed by: INTERNAL MEDICINE

## 2023-12-11 PROCEDURE — 99232 SBSQ HOSP IP/OBS MODERATE 35: CPT | Performed by: INTERNAL MEDICINE

## 2023-12-11 PROCEDURE — 2580000003 HC RX 258: Performed by: NURSE PRACTITIONER

## 2023-12-11 PROCEDURE — 6370000000 HC RX 637 (ALT 250 FOR IP): Performed by: NURSE PRACTITIONER

## 2023-12-11 PROCEDURE — 80053 COMPREHEN METABOLIC PANEL: CPT

## 2023-12-11 PROCEDURE — 82962 GLUCOSE BLOOD TEST: CPT

## 2023-12-11 PROCEDURE — 2060000000 HC ICU INTERMEDIATE R&B

## 2023-12-11 PROCEDURE — 6370000000 HC RX 637 (ALT 250 FOR IP): Performed by: INTERNAL MEDICINE

## 2023-12-11 PROCEDURE — 82140 ASSAY OF AMMONIA: CPT

## 2023-12-11 PROCEDURE — 85025 COMPLETE CBC W/AUTO DIFF WBC: CPT

## 2023-12-11 RX ORDER — INSULIN LISPRO 100 [IU]/ML
0-4 INJECTION, SOLUTION INTRAVENOUS; SUBCUTANEOUS NIGHTLY
Status: DISCONTINUED | OUTPATIENT
Start: 2023-12-11 | End: 2023-12-12 | Stop reason: HOSPADM

## 2023-12-11 RX ORDER — INSULIN LISPRO 100 [IU]/ML
0-16 INJECTION, SOLUTION INTRAVENOUS; SUBCUTANEOUS
Status: DISCONTINUED | OUTPATIENT
Start: 2023-12-11 | End: 2023-12-12 | Stop reason: HOSPADM

## 2023-12-11 RX ORDER — PANTOPRAZOLE SODIUM 40 MG/1
40 TABLET, DELAYED RELEASE ORAL
Status: DISCONTINUED | OUTPATIENT
Start: 2023-12-11 | End: 2023-12-12 | Stop reason: HOSPADM

## 2023-12-11 RX ADMIN — LACTULOSE 20 G: 20 SOLUTION ORAL at 15:44

## 2023-12-11 RX ADMIN — SODIUM CHLORIDE, PRESERVATIVE FREE 10 ML: 5 INJECTION INTRAVENOUS at 11:32

## 2023-12-11 RX ADMIN — INSULIN LISPRO 6 UNITS: 100 INJECTION, SOLUTION INTRAVENOUS; SUBCUTANEOUS at 12:35

## 2023-12-11 RX ADMIN — LACTULOSE 20 G: 20 SOLUTION ORAL at 20:48

## 2023-12-11 RX ADMIN — INSULIN LISPRO 16 UNITS: 100 INJECTION, SOLUTION INTRAVENOUS; SUBCUTANEOUS at 16:49

## 2023-12-11 RX ADMIN — CARVEDILOL 6.25 MG: 6.25 TABLET, FILM COATED ORAL at 09:52

## 2023-12-11 RX ADMIN — INSULIN LISPRO 4 UNITS: 100 INJECTION, SOLUTION INTRAVENOUS; SUBCUTANEOUS at 20:48

## 2023-12-11 RX ADMIN — INSULIN GLARGINE 19 UNITS: 100 INJECTION, SOLUTION SUBCUTANEOUS at 20:48

## 2023-12-11 RX ADMIN — INSULIN LISPRO 8 UNITS: 100 INJECTION, SOLUTION INTRAVENOUS; SUBCUTANEOUS at 09:52

## 2023-12-11 RX ADMIN — PANTOPRAZOLE SODIUM 40 MG: 40 TABLET, DELAYED RELEASE ORAL at 15:44

## 2023-12-11 RX ADMIN — INSULIN LISPRO 6 UNITS: 100 INJECTION, SOLUTION INTRAVENOUS; SUBCUTANEOUS at 11:38

## 2023-12-11 RX ADMIN — LACTULOSE 20 G: 20 SOLUTION ORAL at 09:52

## 2023-12-11 RX ADMIN — INSULIN HUMAN 8 UNITS: 100 INJECTION, SOLUTION PARENTERAL at 06:00

## 2023-12-11 RX ADMIN — SODIUM CHLORIDE, PRESERVATIVE FREE 10 ML: 5 INJECTION INTRAVENOUS at 20:49

## 2023-12-12 VITALS
RESPIRATION RATE: 16 BRPM | BODY MASS INDEX: 29.77 KG/M2 | WEIGHT: 168 LBS | TEMPERATURE: 98 F | DIASTOLIC BLOOD PRESSURE: 51 MMHG | HEIGHT: 63 IN | SYSTOLIC BLOOD PRESSURE: 105 MMHG | HEART RATE: 76 BPM | OXYGEN SATURATION: 100 %

## 2023-12-12 LAB
ALBUMIN SERPL-MCNC: 3.1 G/DL (ref 3.5–5.2)
ALP SERPL-CCNC: 112 U/L (ref 35–104)
ALT SERPL-CCNC: 18 U/L (ref 0–32)
AMMONIA PLAS-SCNC: 49 UMOL/L (ref 11–51)
ANION GAP SERPL CALCULATED.3IONS-SCNC: 12 MMOL/L (ref 7–16)
AST SERPL-CCNC: 26 U/L (ref 0–31)
BASOPHILS # BLD: 0.01 K/UL (ref 0–0.2)
BASOPHILS NFR BLD: 1 % (ref 0–2)
BILIRUB SERPL-MCNC: 1.3 MG/DL (ref 0–1.2)
BUN SERPL-MCNC: 25 MG/DL (ref 6–23)
CALCIUM SERPL-MCNC: 8.7 MG/DL (ref 8.6–10.2)
CHLORIDE SERPL-SCNC: 103 MMOL/L (ref 98–107)
CO2 SERPL-SCNC: 20 MMOL/L (ref 22–29)
CREAT SERPL-MCNC: 1.1 MG/DL (ref 0.5–1)
EOSINOPHIL # BLD: 0.06 K/UL (ref 0.05–0.5)
EOSINOPHILS RELATIVE PERCENT: 4 % (ref 0–6)
ERYTHROCYTE [DISTWIDTH] IN BLOOD BY AUTOMATED COUNT: 14 % (ref 11.5–15)
GFR SERPL CREATININE-BSD FRML MDRD: 59 ML/MIN/1.73M2
GLUCOSE BLD-MCNC: 246 MG/DL (ref 74–99)
GLUCOSE BLD-MCNC: 383 MG/DL (ref 74–99)
GLUCOSE SERPL-MCNC: 234 MG/DL (ref 74–99)
HCT VFR BLD AUTO: 27 % (ref 34–48)
HGB BLD-MCNC: 8.9 G/DL (ref 11.5–15.5)
IMM GRANULOCYTES # BLD AUTO: <0.03 K/UL (ref 0–0.58)
IMM GRANULOCYTES NFR BLD: 1 % (ref 0–5)
LYMPHOCYTES NFR BLD: 0.51 K/UL (ref 1.5–4)
LYMPHOCYTES RELATIVE PERCENT: 32 % (ref 20–42)
MCH RBC QN AUTO: 29.2 PG (ref 26–35)
MCHC RBC AUTO-ENTMCNC: 33 G/DL (ref 32–34.5)
MCV RBC AUTO: 88.5 FL (ref 80–99.9)
MONOCYTES NFR BLD: 0.16 K/UL (ref 0.1–0.95)
MONOCYTES NFR BLD: 10 % (ref 2–12)
NEUTROPHILS NFR BLD: 54 % (ref 43–80)
NEUTS SEG NFR BLD: 0.86 K/UL (ref 1.8–7.3)
PLATELET # BLD AUTO: 27 K/UL (ref 130–450)
PLATELET CONFIRMATION: NORMAL
PMV BLD AUTO: 11.6 FL (ref 7–12)
POTASSIUM SERPL-SCNC: 4.3 MMOL/L (ref 3.5–5)
PROT SERPL-MCNC: 7.4 G/DL (ref 6.4–8.3)
RBC # BLD AUTO: 3.05 M/UL (ref 3.5–5.5)
SODIUM SERPL-SCNC: 135 MMOL/L (ref 132–146)
WBC OTHER # BLD: 1.6 K/UL (ref 4.5–11.5)

## 2023-12-12 PROCEDURE — 99239 HOSP IP/OBS DSCHRG MGMT >30: CPT | Performed by: INTERNAL MEDICINE

## 2023-12-12 PROCEDURE — 82140 ASSAY OF AMMONIA: CPT

## 2023-12-12 PROCEDURE — 97530 THERAPEUTIC ACTIVITIES: CPT

## 2023-12-12 PROCEDURE — 82962 GLUCOSE BLOOD TEST: CPT

## 2023-12-12 PROCEDURE — 36415 COLL VENOUS BLD VENIPUNCTURE: CPT

## 2023-12-12 PROCEDURE — 6370000000 HC RX 637 (ALT 250 FOR IP): Performed by: INTERNAL MEDICINE

## 2023-12-12 PROCEDURE — 80053 COMPREHEN METABOLIC PANEL: CPT

## 2023-12-12 PROCEDURE — 6370000000 HC RX 637 (ALT 250 FOR IP): Performed by: NURSE PRACTITIONER

## 2023-12-12 PROCEDURE — 85025 COMPLETE CBC W/AUTO DIFF WBC: CPT

## 2023-12-12 PROCEDURE — 97535 SELF CARE MNGMENT TRAINING: CPT

## 2023-12-12 PROCEDURE — 97165 OT EVAL LOW COMPLEX 30 MIN: CPT

## 2023-12-12 PROCEDURE — 97161 PT EVAL LOW COMPLEX 20 MIN: CPT

## 2023-12-12 RX ADMIN — INSULIN LISPRO 4 UNITS: 100 INJECTION, SOLUTION INTRAVENOUS; SUBCUTANEOUS at 06:50

## 2023-12-12 RX ADMIN — PANTOPRAZOLE SODIUM 40 MG: 40 TABLET, DELAYED RELEASE ORAL at 05:58

## 2023-12-12 RX ADMIN — LACTULOSE 20 G: 20 SOLUTION ORAL at 14:50

## 2023-12-12 RX ADMIN — CARVEDILOL 6.25 MG: 6.25 TABLET, FILM COATED ORAL at 09:25

## 2023-12-12 RX ADMIN — LACTULOSE 20 G: 20 SOLUTION ORAL at 09:25

## 2023-12-12 RX ADMIN — INSULIN LISPRO 16 UNITS: 100 INJECTION, SOLUTION INTRAVENOUS; SUBCUTANEOUS at 11:45

## 2023-12-12 NOTE — DISCHARGE SUMMARY
Take 1 tablet by mouth daily as needed for Nausea or Vomiting  Qty: 30 tablet, Refills: 3    Associated Diagnoses: Nausea      insulin lispro protamine & lispro (HUMALOG MIX) (75-25) 100 UNIT per ML SUPN injection pen Inject 0.5 mLs into the skin with breakfast and with evening meal  Qty: 5 Adjustable Dose Pre-filled Pen Syringe, Refills: 3    Associated Diagnoses: Type 2 diabetes mellitus without complication, with long-term current use of insulin (HCC)      meclizine (ANTIVERT) 12.5 MG tablet Take 1 tablet by mouth 3 times daily as needed for Dizziness or Nausea  Qty: 30 tablet, Refills: 1    Associated Diagnoses: Dizziness             Time Spent on discharge is 45 minutes in the examination, evaluation, counseling and review of medications and discharge plan.       Signed:    Nikki Prince MD   12/12/2023

## 2023-12-12 NOTE — PROGRESS NOTES
[address]        EUMA:02/15/7710                                                  Patient Name: Leodan Eli    MRN: [id number]    : 1960    Room: [address] [place] [address]          Evaluating OT: Shani Yusuf OTR/L; [id number]       Referring Provider: ANNA Moran     Specific Provider Orders/Date: OT Eval and Treat 23       Diagnosis: Hepatic encephalopathy      Surgery: None this admission     Pertinent Medical History:  has a past medical history of Cirrhosis ([address]), Diabetes mellitus ([address]), Diabetic neuropathy ([address]), Esophageal varices without bleeding ([address]), GERD (gastroesophageal reflux disease), Hypertension, Liver cirrhosis ([address]), Low vitamin D level, Thrombocytopenia ([address]), and Type 2 diabetes mellitus without complication ([address]).      Recommended Adaptive Equipment: TBD     Precautions:  Fall Risk, Zimbabwean speaking    Session Code: 73370   ID: [id number]     Assessment of current deficits    [x] Functional mobility  [x]ADLs  [x] Strength               [x]Cognition    [x] Functional transfers   [x] IADLs         [x] Safety Awareness   [x]Endurance    [] Fine Coordination              [x] Balance      [] Vision/perception   []Sensation     []Gross Motor Coordination  [] ROM  [] Delirium                   [] Motor Control     OT PLAN OF CARE   OT POC based on physician orders, patient diagnosis and results of clinical assessment    Frequency/Duration 1-3 days/wk for 2 weeks PRN   Specific OT Treatment Interventions to include:   * Instruction/training on adapted ADL techniques and AE recommendations to increase functional independence within precautions       * Training on energy conservation strategies, correct breathing pattern and techniques to improve independence/tolerance for self-care routine  * Functional transfer/mobility training/DME

## 2023-12-12 NOTE — CARE COORDINATION
Transition of Care: Discharge order noted. Pt lives with her daughter in a 1 story home. She has a cane. Independent with ADL's. She ambulated with therapy 100 feet no assistive device SBA. Pt returning home with no anticipated needs.  Family to provide transport(TF)      Cassi Jeffers [facility]  Case Management  [phone]

## 2023-12-12 NOTE — PROGRESS NOTES
Physical Therapy    Initial Assessment     Name: Patricia Acosta  : 1960  MRN: [id number]      Date of Service: 2023    Evaluating PT: Linda De PT, LUC [id number]      Room #:  3679/7487-H  Diagnosis:  Hepatic encephalopathy ([address]) [F55.04]  Metabolic encephalopathy [V07.63]  Hyperammonemia ([address]) [E72.20]  Altered mental status, unspecified altered mental status type [R41.82]  COVID-19 virus infection [U07.1]  PMHx/PSHx:   has a past medical history of Cirrhosis ([address]), Diabetes mellitus ([address]), Diabetic neuropathy ([address]), Esophageal varices without bleeding ([address]), GERD (gastroesophageal reflux disease), Hypertension, Liver cirrhosis ([address]), Low vitamin D level, Thrombocytopenia ([address]), and Type 2 diabetes mellitus without complication ([address]). Precautions:  Fall risk, Malay speaking, iPad     SUBJECTIVE:    Pt lives with daughter in a single story house with 3 stair(s) and 1 rail(s) to enter. Pt ambulated with cane prior to admission. OBJECTIVE:   Initial Evaluation  Date: 23 Treatment Date: Short Term/ Long Term   Goals   AM-PAC 6 Clicks /     Was pt agreeable to Eval/treatment? Yes     Does pt have pain? No complaints of pain     Bed Mobility  Rolling: NT  Supine to sit: NT  Sit to supine: NT  Scooting: NT  Rolling: Independent   Supine to sit: Independent   Sit to supine: Independent   Scooting: Independent    Transfers Sit to stand: SBA  Stand to sit: SBA  Stand pivot: CGA progressing to SBA without AD  Sit to stand: Independent   Stand to sit:  Independent   Stand pivot: Independent    Ambulation   25 feet without AD with CGA  100 feet without AD with SBA  >400 feet with SPC Mod Independent    Stair negotiation: ascended and descended NT  3 step(s) with 1 rail(s) Mod Independent    ROM BUE: Refer to OT note  BLE: WFL     Strength BUE: Refer to OT note  BLE: WFL     Balance Sitting EOB: Independent   Dynamic Standing: SBA without AD  Dynamic Standing: Independent      Pt is

## 2023-12-12 NOTE — PLAN OF CARE
Problem: Discharge Planning  Goal: Discharge to home or other facility with appropriate resources  12/11/2023 2305 by Claudia Hernandez RN  Outcome: Progressing  12/11/2023 1926 by Brittney Martin RN  Outcome: Progressing     Problem: Safety - Adult  Goal: Free from fall injury  12/11/2023 2305 by Claudia Hernandez RN  Outcome: Progressing  12/11/2023 1926 by Brittney Martin RN  Outcome: Progressing     Problem: Chronic Conditions and Co-morbidities  Goal: Patient's chronic conditions and co-morbidity symptoms are monitored and maintained or improved  12/11/2023 2305 by Claudia Hernandez RN  Outcome: Progressing  12/11/2023 1926 by Brittney Martin RN  Outcome: Progressing     Problem: Neurosensory - Adult  Goal: Achieves stable or improved neurological status  Outcome: Progressing

## 2023-12-12 NOTE — CARE COORDINATION
Reviewed chart, attending waiting for GI, reviewing orders, GI consult cancelled 12/10. Messaged attending for new GI consult. Kala Pierce, MSW, LSW

## 2023-12-13 ENCOUNTER — CARE COORDINATION (OUTPATIENT)
Dept: CARE COORDINATION | Age: 63
End: 2023-12-13

## 2023-12-13 NOTE — CARE COORDINATION
Care Transitions Initial Follow Up Call    Call within 2 business days of discharge: Yes    Patient: Barak Marcus Patient : 1960   MRN: [id number]  Reason for Admission: AMS  Discharge Date: 23 RARS: Readmission Risk Score: 16.2      Last Discharge Facility       Date Complaint Diagnosis Description Type Department Provider    23 Altered Mental Status Altered mental status, unspecified altered mental status type . .. ED to Hosp-Admission (Discharged) (ADMITTED) [facility] 7WE Remi Corea MD; Maurisio-Spirto. .. First attempt to reach the patient for initial Care Transition call post hospital discharge. HIPAA compliant message left with CTN's contact information requesting return phone call.     Follow Up  Future Appointments   Date Time Provider [address]   2024  9:30 AM DO Padmini Sethi [facility]     Vishal Leal RN

## 2023-12-14 ENCOUNTER — CARE COORDINATION (OUTPATIENT)
Dept: CARE COORDINATION | Age: 63
End: 2023-12-14

## 2023-12-14 LAB
MICROORGANISM SPEC CULT: NORMAL
MICROORGANISM SPEC CULT: NORMAL
SERVICE CMNT-IMP: NORMAL
SERVICE CMNT-IMP: NORMAL
SPECIMEN DESCRIPTION: NORMAL
SPECIMEN DESCRIPTION: NORMAL

## 2023-12-14 NOTE — CARE COORDINATION
Care Transitions Initial Follow Up Call    Call within 2 business days of discharge: Yes    Patient: Mau Zavala Patient : 1960   MRN: [id number]  Reason for Admission: AMS  Discharge Date: 23 RARS: Readmission Risk Score: 16.2    Last Discharge Facility       Date Complaint Diagnosis Description Type Department Provider    23 Altered Mental Status Altered mental status, unspecified altered mental status type . .. ED to Hosp-Admission (Discharged) (ADMITTED) [facility] 7WE Jareth Calderon MD; Maurisio-Spirto. .. Spoke briefly with Trupti Vila, she stated she cannot speak English, CTN will call back with . [facility] used for reattempt: Care Transition/Care Management [phone]    Session ID: [id number]  Language: Yoruba   ID: [id number]   Name: Natali Meredith and final attempt to reach the patient for initial Care Transition call post hospital discharge. HIPAA compliant message left via  with CTN's contact information requesting return phone call. Will route a message to Dr. Flora Rodriguez office requesting that an attempt be made to contact the patient to schedule TCM visit within 7 days of discharge, discharge date 23.   If no return call by the end of today, CTN will sign off and resolve CT program.       Follow Up  Future Appointments   Date Time Provider [address]   2024  9:30 AM DO Padmini Smith [facility]   Lili Donnelly RN

## 2023-12-22 PROBLEM — N18.30 CHRONIC RENAL DISEASE, STAGE III (HCC): Status: ACTIVE | Noted: 2023-12-22

## 2023-12-26 ENCOUNTER — TELEPHONE (OUTPATIENT)
Dept: FAMILY MEDICINE CLINIC | Age: 63
End: 2023-12-26

## 2023-12-26 DIAGNOSIS — E11.9 TYPE 2 DIABETES MELLITUS WITHOUT COMPLICATION, WITH LONG-TERM CURRENT USE OF INSULIN (HCC): ICD-10-CM

## 2023-12-26 DIAGNOSIS — Z79.4 TYPE 2 DIABETES MELLITUS WITHOUT COMPLICATION, WITH LONG-TERM CURRENT USE OF INSULIN (HCC): ICD-10-CM

## 2023-12-26 RX ORDER — INSULIN LISPRO 100 [IU]/ML
INJECTION, SUSPENSION SUBCUTANEOUS
Qty: 5 ADJUSTABLE DOSE PRE-FILLED PEN SYRINGE | Refills: 3 | Status: SHIPPED | OUTPATIENT
Start: 2023-12-26

## 2023-12-26 NOTE — TELEPHONE ENCOUNTER
Walrafaelrussell needs clarification on pt insulin humalog with how many units she is supposed in inject. Directions stated inject 0.5 mLs with breakfast and evening meals. They need units.  Please advise

## 2023-12-27 ENCOUNTER — TELEPHONE (OUTPATIENT)
Dept: HEMATOLOGY | Age: 63
End: 2023-12-27

## 2023-12-27 NOTE — TELEPHONE ENCOUNTER
I called and spoke to patient and her daughter was on phone also. Appt made for 1/10/24 at [facility] clinic at [facility] at 8:30am.  I gave her directions to our office and she verbalized understanding and confirmed this appt. She will need ipad for  for this appt.     Electronically signed by Paolo Kong RN on 12/27/2023 at 11:28 AM

## 2024-01-09 ENCOUNTER — TELEPHONE (OUTPATIENT)
Dept: HEMATOLOGY | Age: 64
End: 2024-01-09

## 2024-01-09 ENCOUNTER — APPOINTMENT (OUTPATIENT)
Dept: GENERAL RADIOLOGY | Age: 64
DRG: 442 | End: 2024-01-09
Payer: MEDICARE

## 2024-01-09 ENCOUNTER — HOSPITAL ENCOUNTER (INPATIENT)
Age: 64
LOS: 5 days | Discharge: HOME | DRG: 442 | End: 2024-01-14
Attending: EMERGENCY MEDICINE | Admitting: INTERNAL MEDICINE
Payer: MEDICARE

## 2024-01-09 ENCOUNTER — APPOINTMENT (OUTPATIENT)
Dept: CT IMAGING | Age: 64
DRG: 442 | End: 2024-01-09
Attending: EMERGENCY MEDICINE
Payer: MEDICARE

## 2024-01-09 DIAGNOSIS — K74.69 OTHER CIRRHOSIS OF LIVER (HCC): ICD-10-CM

## 2024-01-09 DIAGNOSIS — I10 ESSENTIAL HYPERTENSION: ICD-10-CM

## 2024-01-09 DIAGNOSIS — K76.82 HEPATIC ENCEPHALOPATHY (HCC): ICD-10-CM

## 2024-01-09 DIAGNOSIS — D64.9 CHRONIC ANEMIA: ICD-10-CM

## 2024-01-09 DIAGNOSIS — N18.31 STAGE 3A CHRONIC KIDNEY DISEASE (HCC): Primary | ICD-10-CM

## 2024-01-09 DIAGNOSIS — D64.9 ANEMIA REQUIRING TRANSFUSIONS: ICD-10-CM

## 2024-01-09 DIAGNOSIS — M79.89 SWELLING OF LOWER EXTREMITY: ICD-10-CM

## 2024-01-09 LAB
ALBUMIN SERPL-MCNC: 3.3 G/DL (ref 3.5–5.2)
ALP SERPL-CCNC: 129 U/L (ref 35–104)
ALT SERPL-CCNC: 17 U/L (ref 0–32)
AMMONIA PLAS-SCNC: 58 UMOL/L (ref 11–51)
AMPHET UR QL SCN: NEGATIVE
ANION GAP SERPL CALCULATED.3IONS-SCNC: 7 MMOL/L (ref 7–16)
APAP SERPL-MCNC: <5 UG/ML (ref 10–30)
AST SERPL-CCNC: 26 U/L (ref 0–31)
B PARAP IS1001 DNA NPH QL NAA+NON-PROBE: NOT DETECTED
B PERT DNA SPEC QL NAA+PROBE: NOT DETECTED
BARBITURATES UR QL SCN: NEGATIVE
BASOPHILS # BLD: 0.02 K/UL (ref 0–0.2)
BASOPHILS NFR BLD: 1 % (ref 0–2)
BENZODIAZ UR QL: NEGATIVE
BILIRUB SERPL-MCNC: 1.3 MG/DL (ref 0–1.2)
BILIRUB UR QL STRIP: NEGATIVE
BUN SERPL-MCNC: 28 MG/DL (ref 6–23)
BUPRENORPHINE UR QL: NEGATIVE
C PNEUM DNA NPH QL NAA+NON-PROBE: NOT DETECTED
CALCIUM SERPL-MCNC: 8.9 MG/DL (ref 8.6–10.2)
CANNABINOIDS UR QL SCN: NEGATIVE
CHLORIDE SERPL-SCNC: 104 MMOL/L (ref 98–107)
CLARITY UR: CLEAR
CO2 SERPL-SCNC: 28 MMOL/L (ref 22–29)
COCAINE UR QL SCN: NEGATIVE
COLOR UR: YELLOW
COMMENT: ABNORMAL
CREAT SERPL-MCNC: 1.4 MG/DL (ref 0.5–1)
EKG ATRIAL RATE: 83 BPM
EKG P AXIS: 23 DEGREES
EKG P-R INTERVAL: 168 MS
EKG Q-T INTERVAL: 418 MS
EKG QRS DURATION: 136 MS
EKG QTC CALCULATION (BAZETT): 491 MS
EKG R AXIS: -28 DEGREES
EKG T AXIS: 11 DEGREES
EKG VENTRICULAR RATE: 83 BPM
EOSINOPHIL # BLD: 0.08 K/UL (ref 0.05–0.5)
EOSINOPHILS RELATIVE PERCENT: 4 % (ref 0–6)
ERYTHROCYTE [DISTWIDTH] IN BLOOD BY AUTOMATED COUNT: 14.2 % (ref 11.5–15)
ETHANOLAMINE SERPL-MCNC: <10 MG/DL
FENTANYL UR QL: NEGATIVE
FLUAV RNA NPH QL NAA+NON-PROBE: NOT DETECTED
FLUBV RNA NPH QL NAA+NON-PROBE: NOT DETECTED
GFR SERPL CREATININE-BSD FRML MDRD: 43 ML/MIN/1.73M2
GLUCOSE BLD-MCNC: 211 MG/DL (ref 74–99)
GLUCOSE SERPL-MCNC: 214 MG/DL (ref 74–99)
GLUCOSE UR STRIP-MCNC: >=1000 MG/DL
HADV DNA NPH QL NAA+NON-PROBE: NOT DETECTED
HCOV 229E RNA NPH QL NAA+NON-PROBE: NOT DETECTED
HCOV HKU1 RNA NPH QL NAA+NON-PROBE: NOT DETECTED
HCOV NL63 RNA NPH QL NAA+NON-PROBE: NOT DETECTED
HCOV OC43 RNA NPH QL NAA+NON-PROBE: NOT DETECTED
HCT VFR BLD AUTO: 26.1 % (ref 34–48)
HGB BLD-MCNC: 8.5 G/DL (ref 11.5–15.5)
HGB UR QL STRIP.AUTO: NEGATIVE
HMPV RNA NPH QL NAA+NON-PROBE: NOT DETECTED
HPIV1 RNA NPH QL NAA+NON-PROBE: NOT DETECTED
HPIV2 RNA NPH QL NAA+NON-PROBE: NOT DETECTED
HPIV3 RNA NPH QL NAA+NON-PROBE: NOT DETECTED
HPIV4 RNA NPH QL NAA+NON-PROBE: NOT DETECTED
IMM GRANULOCYTES # BLD AUTO: <0.03 K/UL (ref 0–0.58)
IMM GRANULOCYTES NFR BLD: 0 % (ref 0–5)
INR PPP: 1.4
KETONES UR STRIP-MCNC: NEGATIVE MG/DL
LACTATE BLDV-SCNC: 1.8 MMOL/L (ref 0.5–1.9)
LEUKOCYTE ESTERASE UR QL STRIP: NEGATIVE
LYMPHOCYTES NFR BLD: 0.47 K/UL (ref 1.5–4)
LYMPHOCYTES RELATIVE PERCENT: 21 % (ref 20–42)
M PNEUMO DNA NPH QL NAA+NON-PROBE: NOT DETECTED
MCH RBC QN AUTO: 29 PG (ref 26–35)
MCHC RBC AUTO-ENTMCNC: 32.6 G/DL (ref 32–34.5)
MCV RBC AUTO: 89.1 FL (ref 80–99.9)
METHADONE UR QL: NEGATIVE
MONOCYTES NFR BLD: 0.24 K/UL (ref 0.1–0.95)
MONOCYTES NFR BLD: 11 % (ref 2–12)
NEUTROPHILS NFR BLD: 64 % (ref 43–80)
NEUTS SEG NFR BLD: 1.41 K/UL (ref 1.8–7.3)
NITRITE UR QL STRIP: NEGATIVE
OPIATES UR QL SCN: NEGATIVE
OXYCODONE UR QL SCN: NEGATIVE
PCP UR QL SCN: NEGATIVE
PH UR STRIP: 6 [PH] (ref 5–9)
PLATELET # BLD AUTO: 37 K/UL (ref 130–450)
PLATELET CONFIRMATION: NORMAL
PMV BLD AUTO: 12.2 FL (ref 7–12)
POTASSIUM SERPL-SCNC: 4.3 MMOL/L (ref 3.5–5)
PROT SERPL-MCNC: 7.7 G/DL (ref 6.4–8.3)
PROT UR STRIP-MCNC: NEGATIVE MG/DL
PROTHROMBIN TIME: 15.5 SEC (ref 9.3–12.4)
RBC # BLD AUTO: 2.93 M/UL (ref 3.5–5.5)
RSV RNA NPH QL NAA+NON-PROBE: NOT DETECTED
RV+EV RNA NPH QL NAA+NON-PROBE: NOT DETECTED
SALICYLATES SERPL-MCNC: <0.3 MG/DL (ref 0–30)
SARS-COV-2 RNA NPH QL NAA+NON-PROBE: NOT DETECTED
SODIUM SERPL-SCNC: 139 MMOL/L (ref 132–146)
SP GR UR STRIP: 1.02 (ref 1–1.03)
SPECIMEN DESCRIPTION: NORMAL
TEST INFORMATION: NORMAL
TOXIC TRICYCLIC SC,BLOOD: NEGATIVE
TROPONIN I SERPL HS-MCNC: 22 NG/L (ref 0–9)
TSH SERPL DL<=0.05 MIU/L-ACNC: 6.32 UIU/ML (ref 0.27–4.2)
UROBILINOGEN UR STRIP-ACNC: 0.2 EU/DL (ref 0–1)
WBC OTHER # BLD: 2.2 K/UL (ref 4.5–11.5)

## 2024-01-09 PROCEDURE — 71045 X-RAY EXAM CHEST 1 VIEW: CPT

## 2024-01-09 PROCEDURE — 80179 DRUG ASSAY SALICYLATE: CPT

## 2024-01-09 PROCEDURE — 84443 ASSAY THYROID STIM HORMONE: CPT

## 2024-01-09 PROCEDURE — 2060000000 HC ICU INTERMEDIATE R&B

## 2024-01-09 PROCEDURE — 70450 CT HEAD/BRAIN W/O DYE: CPT

## 2024-01-09 PROCEDURE — 6370000000 HC RX 637 (ALT 250 FOR IP): Performed by: EMERGENCY MEDICINE

## 2024-01-09 PROCEDURE — 80307 DRUG TEST PRSMV CHEM ANLYZR: CPT

## 2024-01-09 PROCEDURE — 85025 COMPLETE CBC W/AUTO DIFF WBC: CPT

## 2024-01-09 PROCEDURE — 0202U NFCT DS 22 TRGT SARS-COV-2: CPT

## 2024-01-09 PROCEDURE — 93010 ELECTROCARDIOGRAM REPORT: CPT | Performed by: INTERNAL MEDICINE

## 2024-01-09 PROCEDURE — 84484 ASSAY OF TROPONIN QUANT: CPT

## 2024-01-09 PROCEDURE — 81003 URINALYSIS AUTO W/O SCOPE: CPT

## 2024-01-09 PROCEDURE — 80053 COMPREHEN METABOLIC PANEL: CPT

## 2024-01-09 PROCEDURE — 83605 ASSAY OF LACTIC ACID: CPT

## 2024-01-09 PROCEDURE — G0480 DRUG TEST DEF 1-7 CLASSES: HCPCS

## 2024-01-09 PROCEDURE — 99285 EMERGENCY DEPT VISIT HI MDM: CPT

## 2024-01-09 PROCEDURE — 82140 ASSAY OF AMMONIA: CPT

## 2024-01-09 PROCEDURE — 99223 1ST HOSP IP/OBS HIGH 75: CPT | Performed by: INTERNAL MEDICINE

## 2024-01-09 PROCEDURE — 82962 GLUCOSE BLOOD TEST: CPT

## 2024-01-09 PROCEDURE — 85610 PROTHROMBIN TIME: CPT

## 2024-01-09 PROCEDURE — 80143 DRUG ASSAY ACETAMINOPHEN: CPT

## 2024-01-09 PROCEDURE — 2580000003 HC RX 258: Performed by: NURSE PRACTITIONER

## 2024-01-09 PROCEDURE — 93005 ELECTROCARDIOGRAM TRACING: CPT | Performed by: EMERGENCY MEDICINE

## 2024-01-09 PROCEDURE — 6370000000 HC RX 637 (ALT 250 FOR IP): Performed by: NURSE PRACTITIONER

## 2024-01-09 RX ORDER — SODIUM CHLORIDE 9 MG/ML
INJECTION, SOLUTION INTRAVENOUS PRN
Status: DISCONTINUED | OUTPATIENT
Start: 2024-01-09 | End: 2024-01-14 | Stop reason: HOSPADM

## 2024-01-09 RX ORDER — INSULIN LISPRO 100 [IU]/ML
0-4 INJECTION, SOLUTION INTRAVENOUS; SUBCUTANEOUS NIGHTLY
Status: DISCONTINUED | OUTPATIENT
Start: 2024-01-09 | End: 2024-01-10

## 2024-01-09 RX ORDER — SODIUM CHLORIDE 0.9 % (FLUSH) 0.9 %
5-40 SYRINGE (ML) INJECTION PRN
Status: DISCONTINUED | OUTPATIENT
Start: 2024-01-09 | End: 2024-01-14 | Stop reason: HOSPADM

## 2024-01-09 RX ORDER — LACTULOSE 10 G/15ML
20 SOLUTION ORAL EVERY 6 HOURS SCHEDULED
Status: DISCONTINUED | OUTPATIENT
Start: 2024-01-09 | End: 2024-01-14 | Stop reason: HOSPADM

## 2024-01-09 RX ORDER — ACETAMINOPHEN 650 MG/1
650 SUPPOSITORY RECTAL EVERY 6 HOURS PRN
Status: DISCONTINUED | OUTPATIENT
Start: 2024-01-09 | End: 2024-01-14 | Stop reason: HOSPADM

## 2024-01-09 RX ORDER — POLYETHYLENE GLYCOL 3350 17 G/17G
17 POWDER, FOR SOLUTION ORAL DAILY PRN
Status: DISCONTINUED | OUTPATIENT
Start: 2024-01-09 | End: 2024-01-14 | Stop reason: HOSPADM

## 2024-01-09 RX ORDER — ONDANSETRON 2 MG/ML
4 INJECTION INTRAMUSCULAR; INTRAVENOUS EVERY 6 HOURS PRN
Status: DISCONTINUED | OUTPATIENT
Start: 2024-01-09 | End: 2024-01-14 | Stop reason: HOSPADM

## 2024-01-09 RX ORDER — PANTOPRAZOLE SODIUM 40 MG/1
40 TABLET, DELAYED RELEASE ORAL DAILY
Status: DISCONTINUED | OUTPATIENT
Start: 2024-01-09 | End: 2024-01-14 | Stop reason: HOSPADM

## 2024-01-09 RX ORDER — SODIUM CHLORIDE 0.9 % (FLUSH) 0.9 %
5-40 SYRINGE (ML) INJECTION EVERY 12 HOURS SCHEDULED
Status: DISCONTINUED | OUTPATIENT
Start: 2024-01-09 | End: 2024-01-14 | Stop reason: HOSPADM

## 2024-01-09 RX ORDER — DEXTROSE MONOHYDRATE 100 MG/ML
INJECTION, SOLUTION INTRAVENOUS CONTINUOUS PRN
Status: DISCONTINUED | OUTPATIENT
Start: 2024-01-09 | End: 2024-01-10 | Stop reason: SDUPTHER

## 2024-01-09 RX ORDER — CARVEDILOL 6.25 MG/1
6.25 TABLET ORAL DAILY
Status: DISCONTINUED | OUTPATIENT
Start: 2024-01-09 | End: 2024-01-14 | Stop reason: HOSPADM

## 2024-01-09 RX ORDER — ACETAMINOPHEN 325 MG/1
650 TABLET ORAL EVERY 6 HOURS PRN
Status: DISCONTINUED | OUTPATIENT
Start: 2024-01-09 | End: 2024-01-14 | Stop reason: HOSPADM

## 2024-01-09 RX ORDER — LACTULOSE 10 G/15ML
20 SOLUTION ORAL ONCE
Status: COMPLETED | OUTPATIENT
Start: 2024-01-09 | End: 2024-01-09

## 2024-01-09 RX ORDER — INSULIN LISPRO 100 [IU]/ML
0-4 INJECTION, SOLUTION INTRAVENOUS; SUBCUTANEOUS
Status: DISCONTINUED | OUTPATIENT
Start: 2024-01-09 | End: 2024-01-10

## 2024-01-09 RX ORDER — ONDANSETRON 4 MG/1
4 TABLET, ORALLY DISINTEGRATING ORAL EVERY 8 HOURS PRN
Status: DISCONTINUED | OUTPATIENT
Start: 2024-01-09 | End: 2024-01-14 | Stop reason: HOSPADM

## 2024-01-09 RX ORDER — SODIUM CHLORIDE 9 MG/ML
INJECTION, SOLUTION INTRAVENOUS CONTINUOUS
Status: DISCONTINUED | OUTPATIENT
Start: 2024-01-09 | End: 2024-01-10

## 2024-01-09 RX ADMIN — CARVEDILOL 6.25 MG: 6.25 TABLET, FILM COATED ORAL at 16:29

## 2024-01-09 RX ADMIN — INSULIN LISPRO 1 UNITS: 100 INJECTION, SOLUTION INTRAVENOUS; SUBCUTANEOUS at 16:25

## 2024-01-09 RX ADMIN — LACTULOSE 20 G: 20 SOLUTION ORAL at 14:01

## 2024-01-09 RX ADMIN — PANTOPRAZOLE SODIUM 40 MG: 40 TABLET, DELAYED RELEASE ORAL at 16:29

## 2024-01-09 RX ADMIN — LACTULOSE 20 G: 10 SOLUTION ORAL at 16:45

## 2024-01-09 RX ADMIN — SODIUM CHLORIDE: 9 INJECTION, SOLUTION INTRAVENOUS at 16:26

## 2024-01-09 ASSESSMENT — LIFESTYLE VARIABLES
HOW OFTEN DO YOU HAVE A DRINK CONTAINING ALCOHOL: PATIENT DECLINED
HOW MANY STANDARD DRINKS CONTAINING ALCOHOL DO YOU HAVE ON A TYPICAL DAY: PATIENT DECLINED

## 2024-01-09 NOTE — H&P
[facility] History and Physical      CHIEF COMPLAINT:  Decreased responsiveness     History of Present Illness:      Patient is a 62 yo female with a PMH of cirrhosis of liver, DM, DM neuropathy, h/o esophageal varices, GERD, HTN, thrombocytopenia.              Please note limited HPI as pt is somnolent- an no family at bedside. Also to note- pt is Malay speaking. Assistance from chart review/ ER attending. Pt sent to ER from home with decreased responsiveness. Unclear duration of symptoms.    Patient currently seen resting in ER in no acute distress. Somnolent. Unable to do ROS. Work up in ER revealed elevation of ammonia level.        Recent admission - 23- treated for hepatic encephalopathy, coronavirus variant. DC home       Informant(s) for H&P:pt/ chart review     REVIEW OF SYSTEMS:  A comprehensive review of systems was negative except for: what is in the HPI      PMH:  Past Medical History:   Diagnosis Date    Cirrhosis (HCC)     Diabetes mellitus (HCC)     Diabetic neuropathy (HCC)     Esophageal varices without bleeding (HCC)     GERD (gastroesophageal reflux disease)     Hypertension     Liver cirrhosis (HCC)     with secondary esophageal varices, no signs/sx's of hepatic encephalopathy    Low vitamin D level     Thrombocytopenia (HCC)     Type 2 diabetes mellitus without complication (HCC)        Surgical History:  Past Surgical History:   Procedure Laterality Date    APPENDECTOMY       SECTION      CHOLECYSTECTOMY      PARACENTESIS Left 10/12/2023    4400ml hazy yellow    UPPER GASTROINTESTINAL ENDOSCOPY  2022    [facility]    UPPER GASTROINTESTINAL ENDOSCOPY  2021    [facility]    UPPER GASTROINTESTINAL ENDOSCOPY  2019    [facility]    UPPER GASTROINTESTINAL ENDOSCOPY  2021    [facility]       Medications Prior to Admission:    Prior to Admission medications    Medication Sig Start Date End Date

## 2024-01-09 NOTE — TELEPHONE ENCOUNTER
Daughter called to cx appt with Dr. Sosa. Stated they had called the ambulance for her mother and she would be going to [facility]. Encouraged them to return call to clinic once ready to reschedule.

## 2024-01-09 NOTE — ED PROVIDER NOTES
HPI:  24,   Time: 12:10 PM CLIFFORD Hunt is a 63 y.o. female presenting to the ED for an altered mental status with decreased responsiveness, beginning days ago.  The complaint has been persistent, severe in severity, and worsened by nothing.  Patient is unable to give any history.  History is obtained from EMS and from the electronic medical record    ROS:   Pertinent positives and negatives are stated within HPI, all other systems reviewed and are negative.  --------------------------------------------- PAST HISTORY ---------------------------------------------  Past Medical History:  has a past medical history of Cirrhosis (HCC), Diabetes mellitus (HCC), Diabetic neuropathy (HCC), Esophageal varices without bleeding (HCC), GERD (gastroesophageal reflux disease), Hypertension, Liver cirrhosis (HCC), Low vitamin D level, Thrombocytopenia (HCC), and Type 2 diabetes mellitus without complication (HCC).    Past Surgical History:  has a past surgical history that includes Upper gastrointestinal endoscopy (2022); Upper gastrointestinal endoscopy (2021); Upper gastrointestinal endoscopy (2019); Upper gastrointestinal endoscopy (2021);  section; Cholecystectomy; Appendectomy; and Paracentesis (Left, 10/12/2023).    Social History:  reports that she has never smoked. She has never used smokeless tobacco. She reports that she does not currently use alcohol. She reports that she does not currently use drugs.    Family History: family history is not on file.     The patient’s home medications have been reviewed.    Allergies: Fish allergy    -------------------------------------------------- RESULTS -------------------------------------------------  All laboratory and radiology results have been personally reviewed by myself   LABS:  Results for orders placed or performed during the hospital encounter of 24   CBC with Auto Differential   Result Value Ref Range    WBC

## 2024-01-10 ENCOUNTER — APPOINTMENT (OUTPATIENT)
Dept: ULTRASOUND IMAGING | Age: 64
DRG: 442 | End: 2024-01-10
Payer: MEDICARE

## 2024-01-10 LAB
AMMONIA PLAS-SCNC: 59 UMOL/L (ref 11–51)
ANION GAP SERPL CALCULATED.3IONS-SCNC: 9 MMOL/L (ref 7–16)
BASOPHILS # BLD: 0.03 K/UL (ref 0–0.2)
BASOPHILS NFR BLD: 2 % (ref 0–2)
BUN SERPL-MCNC: 27 MG/DL (ref 6–23)
CALCIUM SERPL-MCNC: 8.5 MG/DL (ref 8.6–10.2)
CHLORIDE SERPL-SCNC: 104 MMOL/L (ref 98–107)
CO2 SERPL-SCNC: 24 MMOL/L (ref 22–29)
CREAT SERPL-MCNC: 1.2 MG/DL (ref 0.5–1)
EOSINOPHIL # BLD: 0.09 K/UL (ref 0.05–0.5)
EOSINOPHILS RELATIVE PERCENT: 5 % (ref 0–6)
ERYTHROCYTE [DISTWIDTH] IN BLOOD BY AUTOMATED COUNT: 14.2 % (ref 11.5–15)
GFR SERPL CREATININE-BSD FRML MDRD: 52 ML/MIN/1.73M2
GLUCOSE BLD-MCNC: 257 MG/DL (ref 74–99)
GLUCOSE BLD-MCNC: 370 MG/DL (ref 74–99)
GLUCOSE BLD-MCNC: 395 MG/DL (ref 74–99)
GLUCOSE SERPL-MCNC: 239 MG/DL (ref 74–99)
HCT VFR BLD AUTO: 25.9 % (ref 34–48)
HGB BLD-MCNC: 8.3 G/DL (ref 11.5–15.5)
LYMPHOCYTES NFR BLD: 0.19 K/UL (ref 1.5–4)
LYMPHOCYTES RELATIVE PERCENT: 11 % (ref 20–42)
MCH RBC QN AUTO: 28.9 PG (ref 26–35)
MCHC RBC AUTO-ENTMCNC: 32 G/DL (ref 32–34.5)
MCV RBC AUTO: 90.2 FL (ref 80–99.9)
MONOCYTES NFR BLD: 0.05 K/UL (ref 0.1–0.95)
MONOCYTES NFR BLD: 3 % (ref 2–12)
NEUTROPHILS NFR BLD: 80 % (ref 43–80)
NEUTS SEG NFR BLD: 1.44 K/UL (ref 1.8–7.3)
PLATELET # BLD AUTO: 32 K/UL (ref 130–450)
PLATELET CONFIRMATION: NORMAL
PMV BLD AUTO: 11.4 FL (ref 7–12)
POTASSIUM SERPL-SCNC: 4.2 MMOL/L (ref 3.5–5)
RBC # BLD AUTO: 2.87 M/UL (ref 3.5–5.5)
RBC # BLD: ABNORMAL 10*6/UL
RBC # BLD: ABNORMAL 10*6/UL
SODIUM SERPL-SCNC: 137 MMOL/L (ref 132–146)
WBC # BLD: ABNORMAL 10*3/UL
WBC OTHER # BLD: 1.8 K/UL (ref 4.5–11.5)

## 2024-01-10 PROCEDURE — 97535 SELF CARE MNGMENT TRAINING: CPT

## 2024-01-10 PROCEDURE — P9047 ALBUMIN (HUMAN), 25%, 50ML: HCPCS | Performed by: NURSE PRACTITIONER

## 2024-01-10 PROCEDURE — 6360000002 HC RX W HCPCS: Performed by: NURSE PRACTITIONER

## 2024-01-10 PROCEDURE — 36415 COLL VENOUS BLD VENIPUNCTURE: CPT

## 2024-01-10 PROCEDURE — 85025 COMPLETE CBC W/AUTO DIFF WBC: CPT

## 2024-01-10 PROCEDURE — 99222 1ST HOSP IP/OBS MODERATE 55: CPT | Performed by: NURSE PRACTITIONER

## 2024-01-10 PROCEDURE — 80048 BASIC METABOLIC PNL TOTAL CA: CPT

## 2024-01-10 PROCEDURE — 51798 US URINE CAPACITY MEASURE: CPT

## 2024-01-10 PROCEDURE — 97530 THERAPEUTIC ACTIVITIES: CPT

## 2024-01-10 PROCEDURE — 2580000003 HC RX 258: Performed by: NURSE PRACTITIONER

## 2024-01-10 PROCEDURE — 6370000000 HC RX 637 (ALT 250 FOR IP): Performed by: INTERNAL MEDICINE

## 2024-01-10 PROCEDURE — 76700 US EXAM ABDOM COMPLETE: CPT

## 2024-01-10 PROCEDURE — 82962 GLUCOSE BLOOD TEST: CPT

## 2024-01-10 PROCEDURE — 2060000000 HC ICU INTERMEDIATE R&B

## 2024-01-10 PROCEDURE — 97165 OT EVAL LOW COMPLEX 30 MIN: CPT

## 2024-01-10 PROCEDURE — 6370000000 HC RX 637 (ALT 250 FOR IP): Performed by: NURSE PRACTITIONER

## 2024-01-10 PROCEDURE — 82140 ASSAY OF AMMONIA: CPT

## 2024-01-10 PROCEDURE — 99233 SBSQ HOSP IP/OBS HIGH 50: CPT | Performed by: INTERNAL MEDICINE

## 2024-01-10 PROCEDURE — 51701 INSERT BLADDER CATHETER: CPT

## 2024-01-10 PROCEDURE — 97161 PT EVAL LOW COMPLEX 20 MIN: CPT

## 2024-01-10 RX ORDER — DEXTROSE MONOHYDRATE 100 MG/ML
INJECTION, SOLUTION INTRAVENOUS CONTINUOUS PRN
Status: DISCONTINUED | OUTPATIENT
Start: 2024-01-10 | End: 2024-01-11 | Stop reason: SDUPTHER

## 2024-01-10 RX ORDER — INSULIN LISPRO 100 [IU]/ML
0-8 INJECTION, SOLUTION INTRAVENOUS; SUBCUTANEOUS
Status: DISCONTINUED | OUTPATIENT
Start: 2024-01-10 | End: 2024-01-11

## 2024-01-10 RX ORDER — FUROSEMIDE 20 MG/1
20 TABLET ORAL DAILY
Status: DISCONTINUED | OUTPATIENT
Start: 2024-01-10 | End: 2024-01-14 | Stop reason: HOSPADM

## 2024-01-10 RX ORDER — ALBUMIN (HUMAN) 12.5 G/50ML
75 SOLUTION INTRAVENOUS ONCE
Status: COMPLETED | OUTPATIENT
Start: 2024-01-10 | End: 2024-01-10

## 2024-01-10 RX ORDER — SPIRONOLACTONE 25 MG/1
50 TABLET ORAL DAILY
Status: DISCONTINUED | OUTPATIENT
Start: 2024-01-10 | End: 2024-01-14 | Stop reason: HOSPADM

## 2024-01-10 RX ORDER — INSULIN LISPRO 100 [IU]/ML
0-4 INJECTION, SOLUTION INTRAVENOUS; SUBCUTANEOUS NIGHTLY
Status: DISCONTINUED | OUTPATIENT
Start: 2024-01-10 | End: 2024-01-11

## 2024-01-10 RX ADMIN — CARVEDILOL 6.25 MG: 6.25 TABLET, FILM COATED ORAL at 09:16

## 2024-01-10 RX ADMIN — RIFAXIMIN 400 MG: 200 TABLET ORAL at 09:16

## 2024-01-10 RX ADMIN — RIFAXIMIN 400 MG: 200 TABLET ORAL at 14:26

## 2024-01-10 RX ADMIN — INSULIN LISPRO 1 UNITS: 100 INJECTION, SOLUTION INTRAVENOUS; SUBCUTANEOUS at 09:16

## 2024-01-10 RX ADMIN — INSULIN LISPRO 4 UNITS: 100 INJECTION, SOLUTION INTRAVENOUS; SUBCUTANEOUS at 21:37

## 2024-01-10 RX ADMIN — SODIUM CHLORIDE, PRESERVATIVE FREE 10 ML: 5 INJECTION INTRAVENOUS at 09:20

## 2024-01-10 RX ADMIN — INSULIN LISPRO 8 UNITS: 100 INJECTION, SOLUTION INTRAVENOUS; SUBCUTANEOUS at 17:44

## 2024-01-10 RX ADMIN — RIFAXIMIN 400 MG: 200 TABLET ORAL at 21:37

## 2024-01-10 RX ADMIN — FUROSEMIDE 20 MG: 20 TABLET ORAL at 17:42

## 2024-01-10 RX ADMIN — SPIRONOLACTONE 50 MG: 25 TABLET ORAL at 17:42

## 2024-01-10 RX ADMIN — PANTOPRAZOLE SODIUM 40 MG: 40 TABLET, DELAYED RELEASE ORAL at 09:16

## 2024-01-10 RX ADMIN — LACTULOSE 20 G: 10 SOLUTION ORAL at 11:33

## 2024-01-10 RX ADMIN — LACTULOSE 20 G: 10 SOLUTION ORAL at 17:42

## 2024-01-10 RX ADMIN — LACTULOSE 20 G: 10 SOLUTION ORAL at 06:18

## 2024-01-10 RX ADMIN — ALBUMIN (HUMAN) 75 G: 0.25 INJECTION, SOLUTION INTRAVENOUS at 14:31

## 2024-01-10 NOTE — ED NOTES
Nurse to nurse report given to Natty LITTLEJOHN. All questions answered.   
Straight cath to get urine. Output 700ml   
caps/bridge/implants

## 2024-01-10 NOTE — ACP (ADVANCE CARE PLANNING)
Advance Care Planning   Healthcare Decision Maker:    Primary Decision Maker: yolette fitch - Child - [phone]    Secondary Decision Maker: Lisa Fitch - Child - [phone]    Click here to complete Healthcare Decision Makers including selection of the Healthcare Decision Maker Relationship (ie \"Primary\").

## 2024-01-10 NOTE — CARE COORDINATION
Transition of Care: Met with Patient at bedside. Patient AOX2. Called Clive (friend) and Kelsey (daughter) and explained case management role. Patient lives with Kelsey (daughter) in a Multi level with 2 steps to enter. Uses a front wheeled walker at home. Patient primary care physician is Tonie Hernandez DO. Patient uses [facility]. Primary decision maker is Kelsey (daughter). Discharge plan is Home with no home health care needs when medically stable. Hx of cirrhosis/hepatic encephalopathy/esoph varices with multiple admissions. follows with Dr Jovan Arteaga GI outpatient. Hx Paracentesis. Patient came to hospital with altered mental status with decreased responsiveness. Found to have Acute hepatic encephalopathy. Needs US abdomen to determine need for repeat paracentesis. lactulose q 6 hours for amonia level 58 on admit and now 59. CM/SW to follow. MT    Case Management Assessment  Initial Evaluation    Date/Time of Evaluation: 1/10/2024 1:12 PM  Assessment Completed by: Brent Yanez RN    If patient is discharged prior to next notation, then this note serves as note for discharge by case management.    Patient Name: Lisa Hunt                   YOB: 1960  Diagnosis: Hepatic encephalopathy (HCC) [K76.82]  Acute hepatic encephalopathy (HCC) [K76.82]                   Date / Time: 1/9/2024 12:02 PM    Patient Admission Status: Inpatient   Readmission Risk (Low < 19, Mod (19-27), High > 27): Readmission Risk Score: 22.8    Current PCP: Tonie Hernandez DO  PCP verified by CM?      Chart Reviewed: Yes      History Provided by:    Patient Orientation:      Patient Cognition:      Hospitalization in the last 30 days (Readmission):  Yes    If yes, Readmission Assessment in CM Navigator will be completed.    Advance Directives:      Code Status: Full Code   Patient's Primary Decision Maker is:      Primary Decision Maker: Lisa Fitch - Child - [phone]

## 2024-01-10 NOTE — PROGRESS NOTES
OCCUPATIONAL THERAPY TREATMENT SESSION  [facility]  [address] [place], [place]      Date:1/10/2024                                                  Patient Name: Lisa Hunt  MRN: [id number]  : 1960  Room: [address] [place] [address]    Evaluating OT: Davina Isbell, MOT, OTR/L  # [id number]    Referring Provider:  Tasha Smith APN    Specific Provider Orders:  \"OT Eval and Treat\"  24    Diagnosis: Hepatic encephalopathy (HCC) [K76.82]  Acute hepatic encephalopathy (HCC) [K76.82]    Pt was admitted w/ Altered mental status, decreased level of responsiveness    Pertinent Medical History:  Pt has a past medical history of Cirrhosis (HCC), Diabetes mellitus (HCC), Diabetic neuropathy (HCC), Esophageal varices without bleeding (HCC), GERD (gastroesophageal reflux disease), Hypertension, Liver cirrhosis (HCC), Low vitamin D level, Thrombocytopenia (HCC), and Type 2 diabetes mellitus without complication (HCC).,  has a past surgical history that includes Upper gastrointestinal endoscopy (2022); Upper gastrointestinal endoscopy (2021); Upper gastrointestinal endoscopy (2019); Upper gastrointestinal endoscopy (2021);  section; Cholecystectomy; Appendectomy; and Paracentesis (Left, 10/12/2023).    Surgeries this admission: None     Precautions:  Fall Risk  Cognition/Impulsive - Alarms  Surinamese Speaking -     Assessment of current deficits   [x] Functional mobility  [x]ADLs  [x] Strength               [x]Cognition   [x] Functional transfers   [x] IADLs         [x] Safety Awareness   [x]Endurance   [] Fine Coordination              [x] Balance     [] Vision/perception   []Sensation    []Gross Motor Coordination  [] ROM  [] Delirium                  [] Motor Control       OT PLAN OF CARE   OT POC based on physician orders, patient diagnosis and results of clinical assessment    Frequency/Duration 1-3 days/wk for 2  bathroom to move her bowels.    She was agreeable to participate in therapeutic ax w/ this OT.  No Family present during session.  Received permission from RN prior to engaging pt in OT services.  Educated pt on role of OT services.      At the end of the session, patient was properly positioned in Semi-Supine.  Call light and phone within reach, all lines and tubes intact.  Oriented pt to call bell.  Made all appropriate Environmental Modifications to facilitate pt's level of IND and safety.  All needs met.  Bed Alarm activated.  Nrsg Staff at b/s.  Notified RN of pt's position and performance and of her attempts to transfer OOB.          Overall patient demonstrated decreased independence and safety during completion of ADL/functional transfer/mobility tasks.  Pt would benefit from continued skilled OT to increase safety and independence with completion of ADL/IADL tasks for functional independence and quality of life.    Treatment: OT treatment provided this date includes:   Instruction/training on safety and adapted techniques for completion of ADLs, use of DME/AD/Adaptive equip;  within precautions   Instruction/training on safe functional mobility/transfer techniques, use of DME/AD;  within precautions      Instruction/training on energy conservation techs (EC)/Work Simplification/PLB for completion of ADLs:     Neuromuscular Reeducation to facilitate balance/righting reactions for increased function with ADLs:    Skilled positioning/alignment for Pain Mgmt, Skin Integrity, Edema Control, to maximize Pt's safety and ability to safely and INDly interact w/ his/her environment, maximize respiratory status  Activity tolerance - Sitting/Standing to improve endurance w/ functional ax   Cognitive retraining -  Oriented pt to current Date, Place and Situation; Cues for safety/safety awareness, sequencing, problem solving    Skilled monitoring of pt's response to tx ax      Pt/family ed re: benefits of participate in

## 2024-01-10 NOTE — CONSULTS
1/10/2024 5:15 PM  Lisa Hunt  [id number]     Chief Complaint:    Urinary retention      History of Present Illness:      The patient is a 63 y.o. female patient who has a history of cirrhosis and presented to the hospital with complaints of altered mental status. She was admitted for hepatic encephalopathy. She is Nepali speaking. Urology is asked to  evaluate for urinary retention. She was bladder scanned today for 483ml. She was then straight cathed for 250ml. PVR was still reading in the 400's, likely due to her ascites. A lane was placed.       Past Medical History:   Diagnosis Date    Cirrhosis (HCC)     Diabetes mellitus (HCC)     Diabetic neuropathy (HCC)     Esophageal varices without bleeding (HCC)     GERD (gastroesophageal reflux disease)     Hypertension     Liver cirrhosis (HCC)     with secondary esophageal varices, no signs/sx's of hepatic encephalopathy    Low vitamin D level     Thrombocytopenia (HCC)     Type 2 diabetes mellitus without complication (HCC)          Past Surgical History:   Procedure Laterality Date    APPENDECTOMY       SECTION      CHOLECYSTECTOMY      PARACENTESIS Left 10/12/2023    4400ml hazy yellow    UPPER GASTROINTESTINAL ENDOSCOPY  2022    [facility]    UPPER GASTROINTESTINAL ENDOSCOPY  2021    [facility]    UPPER [facility]TEST[facility]AL [facility]    UPPER GASTROINTESTINAL ENDOSCOPY  2021    [facility]       Medications Prior to Admission:    Medications Prior to Admission: insulin lispro protamine & lispro (HUMALOG MIX) (75-25) 100 UNIT per ML SUPN injection pen, Inject 50 Units into the skin 2 times daily (with meals  carvedilol (COREG) 6.25 MG tablet, Take 1 tablet by mouth daily  furosemide (LASIX) 20 MG tablet, Take 1 tablet by mouth every other day  ondansetron (ZOFRAN) 4 MG tablet, Take 1 tablet by mouth daily as needed for Nausea or Vomiting  vitamin D (CHOLECALCIFEROL) 50  32.6 32.0   RDW 14.2 14.2   PLT 37* 32*   MPV 12.2* 11.4         Recent Labs     01/09/24  1218 01/10/24  0536   CREATININE 1.4* 1.2*       No results found for: \"PSA\"    Imaging:   EXAMINATION:  Ultrasound abdomen complete.     COMPARISON:  None.     HISTORY:  ORDERING SYSTEM PROVIDED HISTORY: Ascites  TECHNOLOGIST PROVIDED HISTORY:     Reason for exam:->Ascites  What reading provider will be dictating this exam?->CRC     FINDINGS:  Moderate volume 4 quadrant ascites.     IMPRESSION:  Moderate volume 4 quadrant ascites.    Assessment/plan:  Urinary retention     UA unremarkable   Continue to watch the creatinine   Lane was placed for about 250ml and bladder scans were still reading high in the 400's likely due to her ascites. Bladder scans will be inaccurate in this patient   Her lane can be removed at any time as she was not retaining (only 250ml)  We will be unable to rely on bladder scans. As long as she is voiding comfortably the lane will not need replaced.           Electronically signed by OK Cedeño CNP on 1/10/2024 at 5:15 PM  [facility]   Agree with above assessment and plan

## 2024-01-10 NOTE — PROGRESS NOTES
OCCUPATIONAL THERAPY INITIAL EVALUATION    [facility]  [address] [place], [place]      Date:1/10/2024                                                  Patient Name: Lisa Hunt  MRN: [id number]  : 1960  Room: [address] [place] [address]    Evaluating OT: Davina Isbell, MOT, OTR/L  # [id number]    Referring Provider:  Tasha Smith APN    Specific Provider Orders:  \"OT Eval and Treat\"  24    Diagnosis: Hepatic encephalopathy (HCC) [K76.82]  Acute hepatic encephalopathy (HCC) [K76.82]    Pt was admitted w/ Altered mental status, decreased level of responsiveness    Pertinent Medical History:  Pt has a past medical history of Cirrhosis (HCC), Diabetes mellitus (HCC), Diabetic neuropathy (HCC), Esophageal varices without bleeding (HCC), GERD (gastroesophageal reflux disease), Hypertension, Liver cirrhosis (HCC), Low vitamin D level, Thrombocytopenia (HCC), and Type 2 diabetes mellitus without complication (HCC).,  has a past surgical history that includes Upper gastrointestinal endoscopy (2022); Upper gastrointestinal endoscopy (2021); Upper gastrointestinal endoscopy (2019); Upper gastrointestinal endoscopy (2021);  section; Cholecystectomy; Appendectomy; and Paracentesis (Left, 10/12/2023).    Surgeries this admission: None     Precautions:  Fall Risk  Cognition/Impulsive - Alarms  American Speaking -     Assessment of current deficits   [x] Functional mobility  [x]ADLs  [x] Strength               [x]Cognition   [x] Functional transfers   [x] IADLs         [x] Safety Awareness   [x]Endurance   [] Fine Coordination              [x] Balance     [] Vision/perception   []Sensation    []Gross Motor Coordination  [] ROM  [] Delirium                  [] Motor Control       OT PLAN OF CARE   OT POC based on physician orders, patient diagnosis and results of clinical assessment    Frequency/Duration 1-3 days/wk for

## 2024-01-10 NOTE — PROGRESS NOTES
Physical Therapy    Initial Assessment     Name: Lisa Hunt  : 1960  MRN: [id number]      Date of Service: 1/10/2024    Evaluating PT: Jacky Brody PT, DPT [id number]      Room #:  4503/4503-B  Diagnosis:  Hepatic encephalopathy (HCC) [K76.82]  Acute hepatic encephalopathy (HCC) [K76.82]  PMHx/PSHx:   has a past medical history of Cirrhosis (HCC), Diabetes mellitus (HCC), Diabetic neuropathy (HCC), Esophageal varices without bleeding (HCC), GERD (gastroesophageal reflux disease), Hypertension, Liver cirrhosis (HCC), Low vitamin D level, Thrombocytopenia (HCC), and Type 2 diabetes mellitus without complication (HCC).  Precautions:  Fall risk, Jordanian speaking, iPad     SUBJECTIVE:    Pt lives with daughter in a single story house with 1 stair(s) and 2 rail(s) to enter. Pt ambulated with rollator prior to admission.    OBJECTIVE:   Initial Evaluation  Date: 1/10/24 Treatment Date: Short Term/ Long Term   Goals   AM-PAC 6 Clicks      Was pt agreeable to Eval/treatment? Yes     Does pt have pain? No complaints of pain     Bed Mobility  Rolling: NT  Supine to sit: SBA  Sit to supine: SBA  Scooting: SBA to EOB  Rolling: Independent   Supine to sit: Independent   Sit to supine: Independent   Scooting: Independent    Transfers Sit to stand: Livier  Stand to sit: Livier  Stand pivot: Livier with WW  Sit to stand: Independent   Stand to sit: Independent   Stand pivot: Independent    Ambulation   100, 10, 10 feet with WW with Livier  >400 feet with WW Mod Independent    Stair negotiation: ascended and descended NT  >4 step(s) with 1 rail(s) Mod Independent    ROM BUE: Refer to OT note  BLE: WFL     Strength BUE: Refer to OT note  BLE: WFL     Balance Sitting EOB: SBA  Dynamic Standing: Livier with WW  Sitting EOB: Independent   Dynamic Standing: Supervision with WW     Pt is A & O x: 2 grossly to person and place.  Sensation: Pt denies numbness and tingling of extremities.   Edema: Unremarkable    Patient

## 2024-01-10 NOTE — PROGRESS NOTES
[facility] Hospitalist Progress Note     Admitting Date and Time: 1/9/2024 12:02 PM  had concerns including Altered Mental Status (Patient not answering any questions.  Will open eyes on occasion.  Syriac Speaking).  Admit Dx: Hepatic encephalopathy (HCC) [K76.82]  Acute hepatic encephalopathy (HCC) [K76.82]      Subjective:  Patient is being followed for Hepatic encephalopathy (HCC) [K76.82]  Acute hepatic encephalopathy (HCC) [K76.82]     Patient was seen and examined this morning at bedside  Tele  was used during the encounter  Family was also present on FaceTime  She is alert and oriented x 3  In no acute distress  Distended abdomen    ROS: denies fever, chills, cp, sob, n/v, HA unless stated above.      albumin human 25%  75 g IntraVENous Once    sodium chloride flush  5-40 mL IntraVENous 2 times per day    pantoprazole  40 mg Oral Daily    carvedilol  6.25 mg Oral Daily    lactulose  20 g Oral 4 times per day    insulin lispro  0-4 Units SubCUTAneous TID WC    insulin lispro  0-4 Units SubCUTAneous Nightly    rifAXIMin  400 mg Oral TID     sodium chloride flush, 5-40 mL, PRN  sodium chloride, , PRN  ondansetron, 4 mg, Q8H PRN   Or  ondansetron, 4 mg, Q6H PRN  polyethylene glycol, 17 g, Daily PRN  acetaminophen, 650 mg, Q6H PRN   Or  acetaminophen, 650 mg, Q6H PRN  glucose, 4 tablet, PRN  dextrose bolus, 125 mL, PRN   Or  dextrose bolus, 250 mL, PRN  glucagon (rDNA), 1 mg, PRN  dextrose, , Continuous PRN         Objective:    BP (!) 112/50   Pulse 80   Temp 97.5 °F (36.4 °C) (Infrared)   Resp 18   Ht 1.575 m (5' 2\")   Wt 77.1 kg (170 lb)   SpO2 100%   BMI 31.09 kg/m²     General Appearance: alert and oriented to person, place and time and in no acute distress  Skin: warm and dry  Head: normocephalic and atraumatic  Eyes: pupils equal, round, and reactive to light,

## 2024-01-10 NOTE — PROGRESS NOTES
Patient has not voided since admission 2200. Bladder scanned patient 483 mL. Patient straight cath got 250 mL out. PVR  still reading 412 mL, with complaints of pain on right side during bladder scan. NP notified. Advised to placed lane and if PVR is still high urology needs consulted.     Lane placed.  mL. Plan to consult urology.

## 2024-01-10 NOTE — CARE COORDINATION
Transition of Care: Met with Patient at bedside. Patient AOX2. Called Clive (friend) and Yolette (daughter) and explained case management role. Patient lives with Yolette (daughter) in a Multi level with 2 steps to enter. Uses a front wheeled walker at home. Patient primary care physician is Tonie Hernandez DO. Patient uses [facility]. Primary decision maker is Yolette (daughter). Discharge plan is Home with no home health care needs when medically stable. Hx of cirrhosis/hepatic encephalopathy/esoph varices with multiple admissions. follows with Dr Jovan Arteaga GI outpatient. Hx Paracentesis. Patient came to hospital with altered mental status with decreased responsiveness. Found to have Acute hepatic encephalopathy. Needs US abdomen to determine need for repeat paracentesis. lactulose q 6 hours for amonia level 58 on admit and now 59. CM/SW to follow. MT    Case Management Assessment  Initial Evaluation    Date/Time of Evaluation: 1/10/2024 2:59 PM  Assessment Completed by: Brent Yanez RN    If patient is discharged prior to next notation, then this note serves as note for discharge by case management.    Patient Name: Lisa Hunt                   YOB: 1960  Diagnosis: Hepatic encephalopathy (HCC) [K76.82]  Acute hepatic encephalopathy (HCC) [K76.82]                   Date / Time: 1/9/2024 12:02 PM    Patient Admission Status: Inpatient   Readmission Risk (Low < 19, Mod (19-27), High > 27): Readmission Risk Score: 22.8    Current PCP: Tonie Hernandez DO  PCP verified by CM?      Chart Reviewed: Yes      History Provided by:    Patient Orientation:      Patient Cognition:      Hospitalization in the last 30 days (Readmission):  Yes    If yes, Readmission Assessment in CM Navigator will be completed.    Advance Directives:      Code Status: Full Code   Patient's Primary Decision Maker is:      Primary Decision Maker: liatasayolette - Child - [phone]    Secondary

## 2024-01-11 PROBLEM — D64.9 ANEMIA REQUIRING TRANSFUSIONS: Status: ACTIVE | Noted: 2024-01-09

## 2024-01-11 LAB
ALBUMIN SERPL-MCNC: 3.5 G/DL (ref 3.5–5.2)
ALP SERPL-CCNC: 106 U/L (ref 35–104)
ALT SERPL-CCNC: 13 U/L (ref 0–32)
ANION GAP SERPL CALCULATED.3IONS-SCNC: 9 MMOL/L (ref 7–16)
AST SERPL-CCNC: 23 U/L (ref 0–31)
BASOPHILS # BLD: 0.03 K/UL (ref 0–0.2)
BASOPHILS NFR BLD: 3 % (ref 0–2)
BILIRUB SERPL-MCNC: 1.3 MG/DL (ref 0–1.2)
BUN SERPL-MCNC: 26 MG/DL (ref 6–23)
CALCIUM SERPL-MCNC: 8.5 MG/DL (ref 8.6–10.2)
CHLORIDE SERPL-SCNC: 104 MMOL/L (ref 98–107)
CO2 SERPL-SCNC: 24 MMOL/L (ref 22–29)
CREAT SERPL-MCNC: 1.3 MG/DL (ref 0.5–1)
EOSINOPHIL # BLD: 0.06 K/UL (ref 0.05–0.5)
EOSINOPHILS RELATIVE PERCENT: 4 % (ref 0–6)
ERYTHROCYTE [DISTWIDTH] IN BLOOD BY AUTOMATED COUNT: 13.9 % (ref 11.5–15)
GFR SERPL CREATININE-BSD FRML MDRD: 46 ML/MIN/1.73M2
GLUCOSE BLD-MCNC: 304 MG/DL (ref 74–99)
GLUCOSE BLD-MCNC: 358 MG/DL (ref 74–99)
GLUCOSE BLD-MCNC: 395 MG/DL (ref 74–99)
GLUCOSE BLD-MCNC: 415 MG/DL (ref 74–99)
GLUCOSE BLD-MCNC: 424 MG/DL (ref 74–99)
GLUCOSE SERPL-MCNC: 335 MG/DL (ref 74–99)
HCT VFR BLD AUTO: 20.9 % (ref 34–48)
HCT VFR BLD AUTO: 20.9 % (ref 34–48)
HCT VFR BLD AUTO: 21.9 % (ref 34–48)
HGB BLD-MCNC: 6.6 G/DL (ref 11.5–15.5)
HGB BLD-MCNC: 6.7 G/DL (ref 11.5–15.5)
HGB BLD-MCNC: 7.1 G/DL (ref 11.5–15.5)
LYMPHOCYTES NFR BLD: 0.34 K/UL (ref 1.5–4)
LYMPHOCYTES RELATIVE PERCENT: 26 % (ref 20–42)
MCH RBC QN AUTO: 28.9 PG (ref 26–35)
MCHC RBC AUTO-ENTMCNC: 32.1 G/DL (ref 32–34.5)
MCV RBC AUTO: 90.1 FL (ref 80–99.9)
MONOCYTES NFR BLD: 0.08 K/UL (ref 0.1–0.95)
MONOCYTES NFR BLD: 6 % (ref 2–12)
NEUTROPHILS NFR BLD: 61 % (ref 43–80)
NEUTS SEG NFR BLD: 0.79 K/UL (ref 1.8–7.3)
PLATELET # BLD AUTO: 32 K/UL (ref 130–450)
PLATELET CONFIRMATION: NORMAL
PMV BLD AUTO: 12.2 FL (ref 7–12)
POTASSIUM SERPL-SCNC: 4.5 MMOL/L (ref 3.5–5)
PROT SERPL-MCNC: 6.9 G/DL (ref 6.4–8.3)
RBC # BLD AUTO: 2.32 M/UL (ref 3.5–5.5)
RBC # BLD: ABNORMAL 10*6/UL
SODIUM SERPL-SCNC: 137 MMOL/L (ref 132–146)
T4 FREE SERPL-MCNC: 1.1 NG/DL (ref 0.9–1.7)
WBC OTHER # BLD: 1.3 K/UL (ref 4.5–11.5)

## 2024-01-11 PROCEDURE — P9047 ALBUMIN (HUMAN), 25%, 50ML: HCPCS | Performed by: NURSE PRACTITIONER

## 2024-01-11 PROCEDURE — 99233 SBSQ HOSP IP/OBS HIGH 50: CPT | Performed by: INTERNAL MEDICINE

## 2024-01-11 PROCEDURE — 80053 COMPREHEN METABOLIC PANEL: CPT

## 2024-01-11 PROCEDURE — 2580000003 HC RX 258: Performed by: NURSE PRACTITIONER

## 2024-01-11 PROCEDURE — 6360000002 HC RX W HCPCS: Performed by: NURSE PRACTITIONER

## 2024-01-11 PROCEDURE — 84439 ASSAY OF FREE THYROXINE: CPT

## 2024-01-11 PROCEDURE — 85018 HEMOGLOBIN: CPT

## 2024-01-11 PROCEDURE — 6370000000 HC RX 637 (ALT 250 FOR IP): Performed by: INTERNAL MEDICINE

## 2024-01-11 PROCEDURE — 2060000000 HC ICU INTERMEDIATE R&B

## 2024-01-11 PROCEDURE — 6370000000 HC RX 637 (ALT 250 FOR IP): Performed by: NURSE PRACTITIONER

## 2024-01-11 PROCEDURE — 85014 HEMATOCRIT: CPT

## 2024-01-11 PROCEDURE — 85025 COMPLETE CBC W/AUTO DIFF WBC: CPT

## 2024-01-11 PROCEDURE — 82962 GLUCOSE BLOOD TEST: CPT

## 2024-01-11 PROCEDURE — 36415 COLL VENOUS BLD VENIPUNCTURE: CPT

## 2024-01-11 RX ORDER — ALBUMIN (HUMAN) 12.5 G/50ML
25 SOLUTION INTRAVENOUS
Status: COMPLETED | OUTPATIENT
Start: 2024-01-11 | End: 2024-01-11

## 2024-01-11 RX ORDER — INSULIN LISPRO 100 [IU]/ML
0-8 INJECTION, SOLUTION INTRAVENOUS; SUBCUTANEOUS EVERY 4 HOURS
Status: DISCONTINUED | OUTPATIENT
Start: 2024-01-11 | End: 2024-01-13

## 2024-01-11 RX ORDER — INSULIN LISPRO 100 [IU]/ML
0-4 INJECTION, SOLUTION INTRAVENOUS; SUBCUTANEOUS NIGHTLY
Status: DISCONTINUED | OUTPATIENT
Start: 2024-01-11 | End: 2024-01-13

## 2024-01-11 RX ORDER — INSULIN GLARGINE 100 [IU]/ML
20 INJECTION, SOLUTION SUBCUTANEOUS NIGHTLY
Status: DISCONTINUED | OUTPATIENT
Start: 2024-01-11 | End: 2024-01-13

## 2024-01-11 RX ORDER — GABAPENTIN 100 MG/1
100 CAPSULE ORAL 3 TIMES DAILY
Status: DISCONTINUED | OUTPATIENT
Start: 2024-01-11 | End: 2024-01-14 | Stop reason: HOSPADM

## 2024-01-11 RX ORDER — SODIUM CHLORIDE 9 MG/ML
INJECTION, SOLUTION INTRAVENOUS PRN
Status: DISCONTINUED | OUTPATIENT
Start: 2024-01-11 | End: 2024-01-14 | Stop reason: HOSPADM

## 2024-01-11 RX ORDER — DEXTROSE MONOHYDRATE 100 MG/ML
INJECTION, SOLUTION INTRAVENOUS CONTINUOUS PRN
Status: DISCONTINUED | OUTPATIENT
Start: 2024-01-11 | End: 2024-01-14 | Stop reason: HOSPADM

## 2024-01-11 RX ORDER — INSULIN LISPRO 100 [IU]/ML
7 INJECTION, SOLUTION INTRAVENOUS; SUBCUTANEOUS
Status: DISCONTINUED | OUTPATIENT
Start: 2024-01-11 | End: 2024-01-13

## 2024-01-11 RX ADMIN — ALBUMIN (HUMAN) 25 G: 0.25 INJECTION, SOLUTION INTRAVENOUS at 13:09

## 2024-01-11 RX ADMIN — RIFAXIMIN 400 MG: 200 TABLET ORAL at 14:09

## 2024-01-11 RX ADMIN — LACTULOSE 20 G: 10 SOLUTION ORAL at 00:00

## 2024-01-11 RX ADMIN — GABAPENTIN 100 MG: 100 CAPSULE ORAL at 12:56

## 2024-01-11 RX ADMIN — INSULIN LISPRO 6 UNITS: 100 INJECTION, SOLUTION INTRAVENOUS; SUBCUTANEOUS at 21:02

## 2024-01-11 RX ADMIN — LACTULOSE 20 G: 10 SOLUTION ORAL at 17:22

## 2024-01-11 RX ADMIN — ACETAMINOPHEN 650 MG: 325 TABLET ORAL at 17:25

## 2024-01-11 RX ADMIN — INSULIN GLARGINE 20 UNITS: 100 INJECTION, SOLUTION SUBCUTANEOUS at 21:03

## 2024-01-11 RX ADMIN — WATER 1000 MG: 1 INJECTION INTRAMUSCULAR; INTRAVENOUS; SUBCUTANEOUS at 12:56

## 2024-01-11 RX ADMIN — GABAPENTIN 100 MG: 100 CAPSULE ORAL at 21:02

## 2024-01-11 RX ADMIN — INSULIN LISPRO 8 UNITS: 100 INJECTION, SOLUTION INTRAVENOUS; SUBCUTANEOUS at 12:57

## 2024-01-11 RX ADMIN — ALBUMIN (HUMAN) 25 G: 0.25 INJECTION, SOLUTION INTRAVENOUS at 14:02

## 2024-01-11 RX ADMIN — INSULIN LISPRO 8 UNITS: 100 INJECTION, SOLUTION INTRAVENOUS; SUBCUTANEOUS at 17:22

## 2024-01-11 RX ADMIN — RIFAXIMIN 400 MG: 200 TABLET ORAL at 09:36

## 2024-01-11 RX ADMIN — LACTULOSE 20 G: 10 SOLUTION ORAL at 12:56

## 2024-01-11 RX ADMIN — CARVEDILOL 6.25 MG: 6.25 TABLET, FILM COATED ORAL at 09:36

## 2024-01-11 RX ADMIN — SODIUM CHLORIDE, PRESERVATIVE FREE 10 ML: 5 INJECTION INTRAVENOUS at 09:36

## 2024-01-11 RX ADMIN — LACTULOSE 20 G: 10 SOLUTION ORAL at 06:09

## 2024-01-11 RX ADMIN — LACTULOSE 20 G: 10 SOLUTION ORAL at 23:54

## 2024-01-11 RX ADMIN — ALBUMIN (HUMAN) 25 G: 0.25 INJECTION, SOLUTION INTRAVENOUS at 15:12

## 2024-01-11 RX ADMIN — INSULIN LISPRO 7 UNITS: 100 INJECTION, SOLUTION INTRAVENOUS; SUBCUTANEOUS at 14:06

## 2024-01-11 RX ADMIN — RIFAXIMIN 400 MG: 200 TABLET ORAL at 21:02

## 2024-01-11 RX ADMIN — INSULIN LISPRO 7 UNITS: 100 INJECTION, SOLUTION INTRAVENOUS; SUBCUTANEOUS at 17:22

## 2024-01-11 RX ADMIN — SPIRONOLACTONE 50 MG: 25 TABLET ORAL at 09:36

## 2024-01-11 RX ADMIN — INSULIN LISPRO 4 UNITS: 100 INJECTION, SOLUTION INTRAVENOUS; SUBCUTANEOUS at 22:08

## 2024-01-11 RX ADMIN — SODIUM CHLORIDE, PRESERVATIVE FREE 10 ML: 5 INJECTION INTRAVENOUS at 21:03

## 2024-01-11 RX ADMIN — ACETAMINOPHEN 650 MG: 325 TABLET ORAL at 09:56

## 2024-01-11 RX ADMIN — FUROSEMIDE 20 MG: 20 TABLET ORAL at 09:36

## 2024-01-11 RX ADMIN — INSULIN LISPRO 8 UNITS: 100 INJECTION, SOLUTION INTRAVENOUS; SUBCUTANEOUS at 09:53

## 2024-01-11 ASSESSMENT — PAIN SCALES - GENERAL
PAINLEVEL_OUTOF10: 6
PAINLEVEL_OUTOF10: 7
PAINLEVEL_OUTOF10: 3

## 2024-01-11 ASSESSMENT — PAIN DESCRIPTION - LOCATION: LOCATION: ARM

## 2024-01-11 ASSESSMENT — PAIN DESCRIPTION - ORIENTATION: ORIENTATION: LEFT;UPPER

## 2024-01-11 NOTE — PROGRESS NOTES
Spoke with RN regarding patient's ordered paracentesis. Please hold all thinners for procedure. Pt ate lunch. Patient is Yi speaking but competent to sign consent with translation services.

## 2024-01-11 NOTE — PLAN OF CARE
Problem: Discharge Planning  Goal: Discharge to home or other facility with appropriate resources  1/11/2024 0517 by Amy Tovar RN  Outcome: Progressing  1/11/2024 0517 by Amy Tovar RN  Outcome: Progressing     Problem: Safety - Adult  Goal: Free from fall injury  1/11/2024 0517 by Amy Tovar RN  Outcome: Progressing  1/11/2024 0517 by Amy Tovar RN  Outcome: Progressing     Problem: Cardiovascular - Adult  Goal: Maintains optimal cardiac output and hemodynamic stability  Outcome: Progressing     Problem: Genitourinary - Adult  Goal: Absence of urinary retention  Outcome: Progressing

## 2024-01-11 NOTE — PROGRESS NOTES
[facility] Hospitalist Progress Note     Admitting Date and Time: 1/9/2024 12:02 PM  had concerns including Altered Mental Status (Patient not answering any questions.  Will open eyes on occasion.  Arabic Speaking).  Admit Dx: Hepatic encephalopathy (HCC) [K76.82]  Acute hepatic encephalopathy (HCC) [K76.82]      Subjective:  Patient is being followed for Hepatic encephalopathy (HCC) [K76.82]  Acute hepatic encephalopathy (HCC) [K76.82]     Patient was seen and examined this morning at bedside  Tele  was used during the encounter  She is alert and oriented x 3  In no acute distress  Distended abdomen  Discussed with GI    ROS: denies fever, chills, cp, sob, n/v, HA unless stated above.      insulin lispro  0-8 Units SubCUTAneous Q4H    insulin lispro  0-4 Units SubCUTAneous Nightly    cefTRIAXone (ROCEPHIN) IV  1,000 mg IntraVENous Q24H    albumin human 25%  25 g IntraVENous Q1H    gabapentin  100 mg Oral TID    furosemide  20 mg Oral Daily    spironolactone  50 mg Oral Daily    sodium chloride flush  5-40 mL IntraVENous 2 times per day    pantoprazole  40 mg Oral Daily    carvedilol  6.25 mg Oral Daily    lactulose  20 g Oral 4 times per day    rifAXIMin  400 mg Oral TID     glucose, 4 tablet, PRN  dextrose bolus, 125 mL, PRN   Or  dextrose bolus, 250 mL, PRN  glucagon (rDNA), 1 mg, PRN  dextrose, , Continuous PRN  sodium chloride flush, 5-40 mL, PRN  sodium chloride, , PRN  ondansetron, 4 mg, Q8H PRN   Or  ondansetron, 4 mg, Q6H PRN  polyethylene glycol, 17 g, Daily PRN  acetaminophen, 650 mg, Q6H PRN   Or  acetaminophen, 650 mg, Q6H PRN         Objective:    BP (!) 114/54   Pulse 76   Temp 97.9 °F (36.6 °C) (Oral)   Resp 16   Ht 1.575 m (5' 2\")   Wt 77.1 kg (170 lb)   SpO2 97%   BMI 31.09 kg/m²     General Appearance: alert and oriented to person, place and time and in

## 2024-01-11 NOTE — CARE COORDINATION
CM Update: Plan at discharge is Home. Unsure if hhc is needed. Pt known from previous admissions. She was independent, living with her daughter. She walked 120 feet with PT. GI consulted, Paracentisis ordered. IV Rocephin started. Ammonia 59, lactulose continues. Hgb 6.7, redraw 7.1. CM will continue to follow for discharge needs(TF)      Marek West, NATALIEN,RN  Case Management  [phone]

## 2024-01-12 ENCOUNTER — APPOINTMENT (OUTPATIENT)
Dept: INTERVENTIONAL RADIOLOGY/VASCULAR | Age: 64
DRG: 442 | End: 2024-01-12
Payer: MEDICARE

## 2024-01-12 ENCOUNTER — ANESTHESIA EVENT (OUTPATIENT)
Dept: ENDOSCOPY | Age: 64
End: 2024-01-12
Payer: MEDICARE

## 2024-01-12 ENCOUNTER — ANESTHESIA (OUTPATIENT)
Dept: ENDOSCOPY | Age: 64
End: 2024-01-12
Payer: MEDICARE

## 2024-01-12 LAB
ALBUMIN FLD-MCNC: 2 G/DL
ALBUMIN SERPL-MCNC: 4.3 G/DL (ref 3.5–5.2)
ALP SERPL-CCNC: 108 U/L (ref 35–104)
ALT SERPL-CCNC: 16 U/L (ref 0–32)
AMYLASE FLD-CCNC: 12 U/L
ANION GAP SERPL CALCULATED.3IONS-SCNC: 15 MMOL/L (ref 7–16)
APPEARANCE FLD: CLEAR
AST SERPL-CCNC: 34 U/L (ref 0–31)
BASOPHILS # BLD: 0.04 K/UL (ref 0–0.2)
BASOPHILS NFR BLD: 3 % (ref 0–2)
BILIRUB SERPL-MCNC: 3.2 MG/DL (ref 0–1.2)
BODY FLD TYPE: NORMAL
BUN SERPL-MCNC: 28 MG/DL (ref 6–23)
CALCIUM SERPL-MCNC: 8.9 MG/DL (ref 8.6–10.2)
CHLORIDE SERPL-SCNC: 106 MMOL/L (ref 98–107)
CLOT CHECK: NORMAL
CO2 SERPL-SCNC: 23 MMOL/L (ref 22–29)
COLOR FLD: YELLOW
CREAT SERPL-MCNC: 1.4 MG/DL (ref 0.5–1)
EOSINOPHIL # BLD: 0.05 K/UL (ref 0.05–0.5)
EOSINOPHILS RELATIVE PERCENT: 4 % (ref 0–6)
ERYTHROCYTE [DISTWIDTH] IN BLOOD BY AUTOMATED COUNT: 14 % (ref 11.5–15)
GFR SERPL CREATININE-BSD FRML MDRD: 43 ML/MIN/1.73M2
GLUCOSE BLD-MCNC: 200 MG/DL (ref 74–99)
GLUCOSE BLD-MCNC: 222 MG/DL (ref 74–99)
GLUCOSE BLD-MCNC: 261 MG/DL (ref 74–99)
GLUCOSE SERPL-MCNC: 221 MG/DL (ref 74–99)
HCT VFR BLD AUTO: 25.6 % (ref 34–48)
HCT VFR BLD AUTO: 29.5 % (ref 34–48)
HGB BLD-MCNC: 8.5 G/DL (ref 11.5–15.5)
HGB BLD-MCNC: 9.4 G/DL (ref 11.5–15.5)
LYMPHOCYTES NFR BLD: 0.27 K/UL (ref 1.5–4)
LYMPHOCYTES RELATIVE PERCENT: 19 % (ref 20–42)
MCH RBC QN AUTO: 30 PG (ref 26–35)
MCHC RBC AUTO-ENTMCNC: 33.2 G/DL (ref 32–34.5)
MCV RBC AUTO: 90.5 FL (ref 80–99.9)
MONOCYTES NFR BLD: 0.05 K/UL (ref 0.1–0.95)
MONOCYTES NFR BLD: 4 % (ref 2–12)
MONOCYTES NFR FLD: 94 %
NEUTROPHILS NFR BLD: 71 % (ref 43–80)
NEUTROPHILS NFR FLD: 6 %
NEUTS SEG NFR BLD: 0.99 K/UL (ref 1.8–7.3)
PLATELET # BLD AUTO: 34 K/UL (ref 130–450)
PLATELET CONFIRMATION: NORMAL
PMV BLD AUTO: 11.9 FL (ref 7–12)
POTASSIUM SERPL-SCNC: 4.2 MMOL/L (ref 3.5–5)
PROT FLD-MCNC: 3.4 G/DL
PROT SERPL-MCNC: 7.7 G/DL (ref 6.4–8.3)
RBC # BLD AUTO: 2.83 M/UL (ref 3.5–5.5)
RBC # BLD: ABNORMAL 10*6/UL
RBC # FLD: 2000 CELLS/UL
SODIUM SERPL-SCNC: 144 MMOL/L (ref 132–146)
SPECIMEN TYPE: NORMAL
WBC # FLD: 331 CELLS/UL
WBC OTHER # BLD: 1.4 K/UL (ref 4.5–11.5)

## 2024-01-12 PROCEDURE — 85018 HEMOGLOBIN: CPT

## 2024-01-12 PROCEDURE — 3700000001 HC ADD 15 MINUTES (ANESTHESIA): Performed by: STUDENT IN AN ORGANIZED HEALTH CARE EDUCATION/TRAINING PROGRAM

## 2024-01-12 PROCEDURE — 43244 EGD VARICES LIGATION: CPT | Performed by: STUDENT IN AN ORGANIZED HEALTH CARE EDUCATION/TRAINING PROGRAM

## 2024-01-12 PROCEDURE — 86900 BLOOD TYPING SEROLOGIC ABO: CPT

## 2024-01-12 PROCEDURE — 36430 TRANSFUSION BLD/BLD COMPNT: CPT

## 2024-01-12 PROCEDURE — 86923 COMPATIBILITY TEST ELECTRIC: CPT

## 2024-01-12 PROCEDURE — 86850 RBC ANTIBODY SCREEN: CPT

## 2024-01-12 PROCEDURE — 36415 COLL VENOUS BLD VENIPUNCTURE: CPT

## 2024-01-12 PROCEDURE — P9016 RBC LEUKOCYTES REDUCED: HCPCS

## 2024-01-12 PROCEDURE — 0W9G3ZZ DRAINAGE OF PERITONEAL CAVITY, PERCUTANEOUS APPROACH: ICD-10-PCS | Performed by: RADIOLOGY

## 2024-01-12 PROCEDURE — 89051 BODY FLUID CELL COUNT: CPT

## 2024-01-12 PROCEDURE — 2580000003 HC RX 258: Performed by: NURSE ANESTHETIST, CERTIFIED REGISTERED

## 2024-01-12 PROCEDURE — 2580000003 HC RX 258: Performed by: NURSE PRACTITIONER

## 2024-01-12 PROCEDURE — 30233N1 TRANSFUSION OF NONAUTOLOGOUS RED BLOOD CELLS INTO PERIPHERAL VEIN, PERCUTANEOUS APPROACH: ICD-10-PCS | Performed by: INTERNAL MEDICINE

## 2024-01-12 PROCEDURE — 43239 EGD BIOPSY SINGLE/MULTIPLE: CPT | Performed by: STUDENT IN AN ORGANIZED HEALTH CARE EDUCATION/TRAINING PROGRAM

## 2024-01-12 PROCEDURE — 3609012400 HC EGD TRANSORAL BIOPSY SINGLE/MULTIPLE: Performed by: STUDENT IN AN ORGANIZED HEALTH CARE EDUCATION/TRAINING PROGRAM

## 2024-01-12 PROCEDURE — 6370000000 HC RX 637 (ALT 250 FOR IP): Performed by: INTERNAL MEDICINE

## 2024-01-12 PROCEDURE — P9047 ALBUMIN (HUMAN), 25%, 50ML: HCPCS | Performed by: RADIOLOGY

## 2024-01-12 PROCEDURE — C1729 CATH, DRAINAGE: HCPCS

## 2024-01-12 PROCEDURE — 6370000000 HC RX 637 (ALT 250 FOR IP): Performed by: NURSE PRACTITIONER

## 2024-01-12 PROCEDURE — 86901 BLOOD TYPING SEROLOGIC RH(D): CPT

## 2024-01-12 PROCEDURE — 6360000002 HC RX W HCPCS: Performed by: NURSE ANESTHETIST, CERTIFIED REGISTERED

## 2024-01-12 PROCEDURE — 3609012300 HC EGD BAND LIGATION ESOPHGEAL/GASTRIC VARICES: Performed by: STUDENT IN AN ORGANIZED HEALTH CARE EDUCATION/TRAINING PROGRAM

## 2024-01-12 PROCEDURE — 49083 ABD PARACENTESIS W/IMAGING: CPT

## 2024-01-12 PROCEDURE — 2500000003 HC RX 250 WO HCPCS: Performed by: NURSE ANESTHETIST, CERTIFIED REGISTERED

## 2024-01-12 PROCEDURE — 88342 IMHCHEM/IMCYTCHM 1ST ANTB: CPT

## 2024-01-12 PROCEDURE — 2709999900 HC NON-CHARGEABLE SUPPLY: Performed by: STUDENT IN AN ORGANIZED HEALTH CARE EDUCATION/TRAINING PROGRAM

## 2024-01-12 PROCEDURE — 85014 HEMATOCRIT: CPT

## 2024-01-12 PROCEDURE — 0DB98ZX EXCISION OF DUODENUM, VIA NATURAL OR ARTIFICIAL OPENING ENDOSCOPIC, DIAGNOSTIC: ICD-10-PCS | Performed by: STUDENT IN AN ORGANIZED HEALTH CARE EDUCATION/TRAINING PROGRAM

## 2024-01-12 PROCEDURE — 7100000000 HC PACU RECOVERY - FIRST 15 MIN: Performed by: STUDENT IN AN ORGANIZED HEALTH CARE EDUCATION/TRAINING PROGRAM

## 2024-01-12 PROCEDURE — 3700000000 HC ANESTHESIA ATTENDED CARE: Performed by: STUDENT IN AN ORGANIZED HEALTH CARE EDUCATION/TRAINING PROGRAM

## 2024-01-12 PROCEDURE — 80053 COMPREHEN METABOLIC PANEL: CPT

## 2024-01-12 PROCEDURE — 82042 OTHER SOURCE ALBUMIN QUAN EA: CPT

## 2024-01-12 PROCEDURE — 88305 TISSUE EXAM BY PATHOLOGIST: CPT

## 2024-01-12 PROCEDURE — 2720000010 HC SURG SUPPLY STERILE: Performed by: STUDENT IN AN ORGANIZED HEALTH CARE EDUCATION/TRAINING PROGRAM

## 2024-01-12 PROCEDURE — 2060000000 HC ICU INTERMEDIATE R&B

## 2024-01-12 PROCEDURE — 84157 ASSAY OF PROTEIN OTHER: CPT

## 2024-01-12 PROCEDURE — 82962 GLUCOSE BLOOD TEST: CPT

## 2024-01-12 PROCEDURE — 7100000001 HC PACU RECOVERY - ADDTL 15 MIN: Performed by: STUDENT IN AN ORGANIZED HEALTH CARE EDUCATION/TRAINING PROGRAM

## 2024-01-12 PROCEDURE — 85025 COMPLETE CBC W/AUTO DIFF WBC: CPT

## 2024-01-12 PROCEDURE — 99233 SBSQ HOSP IP/OBS HIGH 50: CPT | Performed by: INTERNAL MEDICINE

## 2024-01-12 PROCEDURE — 82150 ASSAY OF AMYLASE: CPT

## 2024-01-12 PROCEDURE — 6360000002 HC RX W HCPCS: Performed by: RADIOLOGY

## 2024-01-12 PROCEDURE — 06L38CZ OCCLUSION OF ESOPHAGEAL VEIN WITH EXTRALUMINAL DEVICE, VIA NATURAL OR ARTIFICIAL OPENING ENDOSCOPIC: ICD-10-PCS | Performed by: STUDENT IN AN ORGANIZED HEALTH CARE EDUCATION/TRAINING PROGRAM

## 2024-01-12 PROCEDURE — 2500000003 HC RX 250 WO HCPCS: Performed by: RADIOLOGY

## 2024-01-12 RX ORDER — SODIUM CHLORIDE 9 MG/ML
INJECTION, SOLUTION INTRAVENOUS CONTINUOUS PRN
Status: DISCONTINUED | OUTPATIENT
Start: 2024-01-12 | End: 2024-01-12 | Stop reason: SDUPTHER

## 2024-01-12 RX ORDER — PROPOFOL 10 MG/ML
INJECTION, EMULSION INTRAVENOUS PRN
Status: DISCONTINUED | OUTPATIENT
Start: 2024-01-12 | End: 2024-01-12 | Stop reason: SDUPTHER

## 2024-01-12 RX ORDER — LIDOCAINE HYDROCHLORIDE AND EPINEPHRINE BITARTRATE 20; .01 MG/ML; MG/ML
INJECTION, SOLUTION SUBCUTANEOUS PRN
Status: COMPLETED | OUTPATIENT
Start: 2024-01-12 | End: 2024-01-12

## 2024-01-12 RX ORDER — HYDRALAZINE HYDROCHLORIDE 20 MG/ML
10 INJECTION INTRAMUSCULAR; INTRAVENOUS EVERY 6 HOURS PRN
Status: DISCONTINUED | OUTPATIENT
Start: 2024-01-12 | End: 2024-01-14 | Stop reason: HOSPADM

## 2024-01-12 RX ORDER — LIDOCAINE HYDROCHLORIDE 20 MG/ML
INJECTION, SOLUTION INFILTRATION; PERINEURAL PRN
Status: COMPLETED | OUTPATIENT
Start: 2024-01-12 | End: 2024-01-12

## 2024-01-12 RX ORDER — DEXMEDETOMIDINE HYDROCHLORIDE 100 UG/ML
INJECTION, SOLUTION INTRAVENOUS PRN
Status: DISCONTINUED | OUTPATIENT
Start: 2024-01-12 | End: 2024-01-12 | Stop reason: SDUPTHER

## 2024-01-12 RX ORDER — ALBUMIN (HUMAN) 12.5 G/50ML
SOLUTION INTRAVENOUS CONTINUOUS PRN
Status: COMPLETED | OUTPATIENT
Start: 2024-01-12 | End: 2024-01-12

## 2024-01-12 RX ADMIN — LACTULOSE 20 G: 10 SOLUTION ORAL at 05:18

## 2024-01-12 RX ADMIN — DEXMEDETOMIDINE HCL 20 MCG: 100 INJECTION INTRAVENOUS at 10:09

## 2024-01-12 RX ADMIN — INSULIN GLARGINE 20 UNITS: 100 INJECTION, SOLUTION SUBCUTANEOUS at 21:14

## 2024-01-12 RX ADMIN — INSULIN LISPRO 4 UNITS: 100 INJECTION, SOLUTION INTRAVENOUS; SUBCUTANEOUS at 21:14

## 2024-01-12 RX ADMIN — LIDOCAINE HYDROCHLORIDE 10 ML: 20 INJECTION, SOLUTION INFILTRATION; PERINEURAL at 16:37

## 2024-01-12 RX ADMIN — RIFAXIMIN 400 MG: 200 TABLET ORAL at 21:14

## 2024-01-12 RX ADMIN — LIDOCAINE HYDROCHLORIDE,EPINEPHRINE BITARTRATE 10 ML: 20; .01 INJECTION, SOLUTION INFILTRATION; PERINEURAL at 16:37

## 2024-01-12 RX ADMIN — PROPOFOL 125 MCG/KG/MIN: 10 INJECTION, EMULSION INTRAVENOUS at 10:13

## 2024-01-12 RX ADMIN — ALBUMIN (HUMAN) 25 G: 25 SOLUTION INTRAVENOUS at 16:58

## 2024-01-12 RX ADMIN — PROPOFOL 30 MG: 10 INJECTION, EMULSION INTRAVENOUS at 10:14

## 2024-01-12 RX ADMIN — SODIUM CHLORIDE: 9 INJECTION, SOLUTION INTRAVENOUS at 10:06

## 2024-01-12 RX ADMIN — INSULIN LISPRO 2 UNITS: 100 INJECTION, SOLUTION INTRAVENOUS; SUBCUTANEOUS at 02:00

## 2024-01-12 RX ADMIN — GABAPENTIN 100 MG: 100 CAPSULE ORAL at 21:14

## 2024-01-12 RX ADMIN — SODIUM CHLORIDE, PRESERVATIVE FREE 10 ML: 5 INJECTION INTRAVENOUS at 21:15

## 2024-01-12 RX ADMIN — PROPOFOL 50 MG: 10 INJECTION, EMULSION INTRAVENOUS at 10:12

## 2024-01-12 RX ADMIN — INSULIN LISPRO 2 UNITS: 100 INJECTION, SOLUTION INTRAVENOUS; SUBCUTANEOUS at 05:17

## 2024-01-12 ASSESSMENT — PAIN SCALES - GENERAL
PAINLEVEL_OUTOF10: 0

## 2024-01-12 NOTE — ANESTHESIA PRE PROCEDURE
1/10/2024  EXAMINATION: Ultrasound abdomen complete. COMPARISON: None. HISTORY: ORDERING SYSTEM PROVIDED HISTORY: Ascites TECHNOLOGIST PROVIDED HISTORY: Reason for exam:->Ascites What reading provider will be dictating this exam?->CRC FINDINGS: Moderate volume 4 quadrant ascites.     Moderate volume 4 quadrant ascites.     XR CHEST PORTABLE    Result Date: 1/9/2024  EXAMINATION: ONE XRAY VIEW OF THE CHEST 1/9/2024 2:26 pm COMPARISON: 12/09/2023 HISTORY: ORDERING SYSTEM PROVIDED HISTORY: altered mental status TECHNOLOGIST PROVIDED HISTORY: Reason for exam:->altered mental status What reading provider will be dictating this exam?->CRC FINDINGS: See impression.     1. Low lung volumes with central and bibasilar subsegmental atelectasis. 2.  Cardiac silhouette borderline enlarged, accentuated by portable projection.     CT HEAD WO CONTRAST    Result Date: 1/9/2024  EXAMINATION: CT OF THE HEAD WITHOUT CONTRAST  1/9/2024 1:15 pm TECHNIQUE: CT of the head was performed without the administration of intravenous contrast. Automated exposure control, iterative reconstruction, and/or weight based adjustment of the mA/kV was utilized to reduce the radiation dose to as low as reasonably achievable. COMPARISON: None. HISTORY: ORDERING SYSTEM PROVIDED HISTORY: ams TECHNOLOGIST PROVIDED HISTORY: Has a \"code stroke\" or \"stroke alert\" been called?->No Reason for exam:->ams Decision Support Exception - unselect if not a suspected or confirmed emergency medical condition->Emergency Medical Condition (MA) What reading provider will be dictating this exam?->CRC FINDINGS: BRAIN/VENTRICLES: There is no acute intracranial hemorrhage, mass effect or midline shift.  No abnormal extra-axial fluid collection.  The gray-white differentiation is maintained without evidence of an acute infarct.  There is no evidence of hydrocephalus. The ventricles, cisterns and sulci are prominent consistent with atrophy.  There is decreased attenuation within

## 2024-01-12 NOTE — PROGRESS NOTES
Floor Called, nurse to nurse given. Spoke with Ruby LITTLEJOHN. Patients test results review, VS reported to receiving nurse. Any and all important information regarding patient disclosed. To 4502A on telemetry monitor in stable condition with ancillary staff.

## 2024-01-12 NOTE — PLAN OF CARE
Problem: Discharge Planning  Goal: Discharge to home or other facility with appropriate resources  Outcome: Progressing     Problem: Safety - Adult  Goal: Free from fall injury  Outcome: Progressing     Problem: Cardiovascular - Adult  Goal: Maintains optimal cardiac output and hemodynamic stability  Outcome: Progressing  Flowsheets (Taken 1/11/2024 2017)  Maintains optimal cardiac output and hemodynamic stability: Monitor urine output and notify Licensed Independent Practitioner for values outside of normal range     Problem: Genitourinary - Adult  Goal: Absence of urinary retention  Outcome: Progressing     Problem: Pain  Goal: Verbalizes/displays adequate comfort level or baseline comfort level  Outcome: Progressing     Problem: Chronic Conditions and Co-morbidities  Goal: Patient's chronic conditions and co-morbidity symptoms are monitored and maintained or improved  Outcome: Progressing

## 2024-01-12 NOTE — PROGRESS NOTES
Spoke with ANNETTA Piña regarding patient's ordered paracentesis. Keep patient NPO and hold all thinners for procedure. Patient is able to sign consent.

## 2024-01-12 NOTE — PROCEDURES
Patient arrived to Radiology department for paracentesis. Allergies, home medications, H&P and fasting instructions reviewed with patient. Vital signs taken. Procedural instructions given, questions answered, understanding expressed and consent signed. Patient given fluoroscopy education, no questions at this time.

## 2024-01-12 NOTE — BRIEF OP NOTE
Brief Postoperative Note      Patient: Lisa Hunt  YOB: 1960  MRN: [id number]    Date of Procedure: 1/12/2024    Pre-Op Diagnosis Codes:     * Anemia requiring transfusions [D64.9]    Post-Op Diagnosis: Esophageal varices, portal hypertensive gastropathy       Procedure(s):  EGD BIOPSY  EGD BAND LIGATION    Surgeon(s):  Anil Conrad MD    Assistant:  * No surgical staff found *    Anesthesia: Monitor Anesthesia Care    Estimated Blood Loss (mL): less than 50     Complications: None    Specimens:   ID Type Source Tests Collected by Time Destination   A : duodenum bx r/o Celiac Dz Gastric Gastric SURGICAL PATHOLOGY Anil Conrad MD 1/12/2024 1016    B : Antrum biopsy r/o H. Pylori Gastric Gastric SURGICAL PATHOLOGY Anil Conrad MD 1/12/2024 1020        Implants:  * No implants in log *      Drains:   [REMOVED] Urinary Catheter 01/10/24 Stauffer (Removed)   Catheter Indications Urinary retention (acute or chronic), continuous bladder irrigation or bladder outlet obstruction 01/11/24 0800   Site Assessment No urethral drainage 01/11/24 0800   Urine Color Yellow 01/11/24 0800   Urine Appearance Cloudy 01/11/24 0800   Collection Container Standard 01/11/24 0800   Securement Method Leg strap 01/11/24 0800   Catheter Care  Perineal wipes 01/10/24 0800   Catheter Best Practices  Drainage tube clipped to bed;Catheter secured to thigh;Tamper seal intact;Bag below bladder;Bag not on floor;Lack of dependent loop in tubing;Drainage bag less than half full 01/11/24 0800   Status Draining 01/11/24 0800   Output (mL) 450 mL 01/11/24 0400       Findings:     Grade I/II varices with mucosal scarring from likely previous banding. One column with focal red spot. Banding x 1 performed.   Moderate portal hypertensive gastropathy.  Mild antral gastritis. Biopsied.   Overall normal duodenum with possible scalloping. Biopsied.     PLAN:  - Clear liquid diet.  - Paracentesis today.  - Continue trending Hgb daily.  Billing Type: Third-Party Bill Expected Date Of Service: 01/11/2022 Bill For Surgical Tray: no Clinical Notes (To The Lab): Standing order for six months Performing Laboratory: 0

## 2024-01-12 NOTE — PROGRESS NOTES
Patient admitted to PACU and placed on appropriate monitors. Patient on 40% face mask. Airway patent at this time. Report obtained from CRNA. Warm blankets applied.

## 2024-01-12 NOTE — ANESTHESIA POSTPROCEDURE EVALUATION
Department of Anesthesiology  Postprocedure Note    Patient: Lisa Hunt  MRN: [id number]  YOB: 1960  Date of evaluation: 1/12/2024    Procedure Summary     Date: 01/12/24 Room / Location: [place] [address] / [facility]    Anesthesia Start: 1006 Anesthesia Stop: 1039    Procedures:       EGD BIOPSY      EGD BAND LIGATION Diagnosis:       Anemia requiring transfusions      (Anemia requiring transfusions [D64.9])    Surgeons: Anil Conrad MD Responsible Provider: Nain Kaufman MD    Anesthesia Type: MAC ASA Status: 3          Anesthesia Type: MAC    Suzette Phase I: Suzette Score: 9    Suzette Phase II:      Anesthesia Post Evaluation    Patient location during evaluation: PACU  Patient participation: complete - patient participated  Level of consciousness: awake and alert  Airway patency: patent  Nausea & Vomiting: no nausea and no vomiting  Cardiovascular status: hemodynamically stable  Respiratory status: acceptable  Hydration status: euvolemic  There was medical reason for not using a multimodal analgesia pain management approach.Pain management: adequate        No notable events documented.

## 2024-01-12 NOTE — PROGRESS NOTES
[facility] Hospitalist Progress Note     Admitting Date and Time: 1/9/2024 12:02 PM  had concerns including Altered Mental Status (Patient not answering any questions.  Will open eyes on occasion.  Georgian Speaking).  Admit Dx: Hepatic encephalopathy (HCC) [K76.82]  Acute hepatic encephalopathy (HCC) [K76.82]      Subjective:  Patient is being followed for Hepatic encephalopathy (HCC) [K76.82]  Acute hepatic encephalopathy (HCC) [K76.82]     Patient was seen and examined this morning at bedside  Tele  was used during the encounter   For EGD and paracentesis today   In no acute distress  Distended abdomen  Discussed with GI    ROS: denies fever, chills, cp, sob, n/v, HA unless stated above.      insulin lispro  0-8 Units SubCUTAneous Q4H    insulin lispro  0-4 Units SubCUTAneous Nightly    cefTRIAXone (ROCEPHIN) IV  1,000 mg IntraVENous Q24H    gabapentin  100 mg Oral TID    insulin glargine  20 Units SubCUTAneous Nightly    insulin lispro  7 Units SubCUTAneous TID WC    furosemide  20 mg Oral Daily    spironolactone  50 mg Oral Daily    sodium chloride flush  5-40 mL IntraVENous 2 times per day    pantoprazole  40 mg Oral Daily    carvedilol  6.25 mg Oral Daily    lactulose  20 g Oral 4 times per day    rifAXIMin  400 mg Oral TID     glucose, 4 tablet, PRN  dextrose bolus, 125 mL, PRN   Or  dextrose bolus, 250 mL, PRN  glucagon (rDNA), 1 mg, PRN  dextrose, , Continuous PRN  sodium chloride, , PRN  sodium chloride flush, 5-40 mL, PRN  sodium chloride, , PRN  ondansetron, 4 mg, Q8H PRN   Or  ondansetron, 4 mg, Q6H PRN  polyethylene glycol, 17 g, Daily PRN  acetaminophen, 650 mg, Q6H PRN   Or  acetaminophen, 650 mg, Q6H PRN         Objective:    BP (!) 134/58   Pulse 77   Temp 98.1 °F (36.7 °C)   Resp 23   Ht 1.575 m (5' 2\")   Wt 77.1 kg (170 lb)   SpO2 96%   BMI 31.09 kg/m²

## 2024-01-12 NOTE — CARE COORDINATION
CM Update: Plan at discharge is Home with her daughter, no needs. Scheduled for IR paracentisis and EGD today. IV Rocephin continues. Repeat hgb 8.5 after 6.6 and 1 unit blood yesterday. CM to follow (TF)      NATALIE PowersN,RN  Case Management  [phone]

## 2024-01-12 NOTE — PROGRESS NOTES
Procedural signed by pt and placed in soft chart. All questions answered at this time using .    Electronically signed by Lena Jacob RN on 1/12/2024 at 9:19 AM

## 2024-01-13 LAB
ABO/RH: NORMAL
ALBUMIN SERPL-MCNC: 4 G/DL (ref 3.5–5.2)
ALP SERPL-CCNC: 115 U/L (ref 35–104)
ALT SERPL-CCNC: 16 U/L (ref 0–32)
ANION GAP SERPL CALCULATED.3IONS-SCNC: 11 MMOL/L (ref 7–16)
ANTIBODY SCREEN: NEGATIVE
ARM BAND NUMBER: NORMAL
AST SERPL-CCNC: 31 U/L (ref 0–31)
BASOPHILS # BLD: 0.01 K/UL (ref 0–0.2)
BASOPHILS NFR BLD: 1 % (ref 0–2)
BILIRUB SERPL-MCNC: 2.2 MG/DL (ref 0–1.2)
BLOOD BANK BLOOD PRODUCT EXPIRATION DATE: NORMAL
BLOOD BANK DISPENSE STATUS: NORMAL
BLOOD BANK ISBT PRODUCT BLOOD TYPE: 600
BLOOD BANK PRODUCT CODE: NORMAL
BLOOD BANK SAMPLE EXPIRATION: NORMAL
BLOOD BANK UNIT TYPE AND RH: NORMAL
BPU ID: NORMAL
BUN SERPL-MCNC: 23 MG/DL (ref 6–23)
CALCIUM SERPL-MCNC: 8.9 MG/DL (ref 8.6–10.2)
CHLORIDE SERPL-SCNC: 104 MMOL/L (ref 98–107)
CO2 SERPL-SCNC: 22 MMOL/L (ref 22–29)
COMPONENT: NORMAL
CREAT SERPL-MCNC: 1 MG/DL (ref 0.5–1)
CROSSMATCH RESULT: NORMAL
EOSINOPHIL # BLD: 0.01 K/UL (ref 0.05–0.5)
EOSINOPHILS RELATIVE PERCENT: 1 % (ref 0–6)
ERYTHROCYTE [DISTWIDTH] IN BLOOD BY AUTOMATED COUNT: 14 % (ref 11.5–15)
GFR SERPL CREATININE-BSD FRML MDRD: >60 ML/MIN/1.73M2
GLUCOSE BLD-MCNC: 285 MG/DL (ref 74–99)
GLUCOSE BLD-MCNC: 370 MG/DL (ref 74–99)
GLUCOSE BLD-MCNC: 425 MG/DL (ref 74–99)
GLUCOSE BLD-MCNC: 473 MG/DL (ref 74–99)
GLUCOSE SERPL-MCNC: 366 MG/DL (ref 74–99)
HCT VFR BLD AUTO: 27.3 % (ref 34–48)
HGB BLD-MCNC: 9.1 G/DL (ref 11.5–15.5)
LYMPHOCYTES NFR BLD: 0.38 K/UL (ref 1.5–4)
LYMPHOCYTES RELATIVE PERCENT: 23 % (ref 20–42)
MCH RBC QN AUTO: 29.8 PG (ref 26–35)
MCHC RBC AUTO-ENTMCNC: 33.3 G/DL (ref 32–34.5)
MCV RBC AUTO: 89.5 FL (ref 80–99.9)
MONOCYTES NFR BLD: 0.09 K/UL (ref 0.1–0.95)
MONOCYTES NFR BLD: 5 % (ref 2–12)
NEUTROPHILS NFR BLD: 70 % (ref 43–80)
NEUTS SEG NFR BLD: 1.2 K/UL (ref 1.8–7.3)
PLATELET # BLD AUTO: 38 K/UL (ref 130–450)
PLATELET CONFIRMATION: NORMAL
PMV BLD AUTO: 11.7 FL (ref 7–12)
POTASSIUM SERPL-SCNC: 4.6 MMOL/L (ref 3.5–5)
PROT SERPL-MCNC: 7.3 G/DL (ref 6.4–8.3)
RBC # BLD AUTO: 3.05 M/UL (ref 3.5–5.5)
RBC # BLD: ABNORMAL 10*6/UL
SODIUM SERPL-SCNC: 137 MMOL/L (ref 132–146)
TRANSFUSION STATUS: NORMAL
UNIT DIVISION: 0
UNIT ISSUE DATE/TIME: NORMAL
WBC OTHER # BLD: 1.7 K/UL (ref 4.5–11.5)

## 2024-01-13 PROCEDURE — 99232 SBSQ HOSP IP/OBS MODERATE 35: CPT | Performed by: INTERNAL MEDICINE

## 2024-01-13 PROCEDURE — 82962 GLUCOSE BLOOD TEST: CPT

## 2024-01-13 PROCEDURE — 36415 COLL VENOUS BLD VENIPUNCTURE: CPT

## 2024-01-13 PROCEDURE — 80053 COMPREHEN METABOLIC PANEL: CPT

## 2024-01-13 PROCEDURE — 2060000000 HC ICU INTERMEDIATE R&B

## 2024-01-13 PROCEDURE — 2580000003 HC RX 258: Performed by: NURSE PRACTITIONER

## 2024-01-13 PROCEDURE — 6370000000 HC RX 637 (ALT 250 FOR IP): Performed by: INTERNAL MEDICINE

## 2024-01-13 PROCEDURE — 6360000002 HC RX W HCPCS: Performed by: NURSE PRACTITIONER

## 2024-01-13 PROCEDURE — 85025 COMPLETE CBC W/AUTO DIFF WBC: CPT

## 2024-01-13 PROCEDURE — 6370000000 HC RX 637 (ALT 250 FOR IP): Performed by: NURSE PRACTITIONER

## 2024-01-13 RX ORDER — INSULIN GLARGINE 100 [IU]/ML
25 INJECTION, SOLUTION SUBCUTANEOUS NIGHTLY
Status: DISCONTINUED | OUTPATIENT
Start: 2024-01-13 | End: 2024-01-14 | Stop reason: HOSPADM

## 2024-01-13 RX ORDER — INSULIN LISPRO 100 [IU]/ML
0-16 INJECTION, SOLUTION INTRAVENOUS; SUBCUTANEOUS EVERY 4 HOURS
Status: DISCONTINUED | OUTPATIENT
Start: 2024-01-13 | End: 2024-01-14 | Stop reason: HOSPADM

## 2024-01-13 RX ORDER — INSULIN LISPRO 100 [IU]/ML
8 INJECTION, SOLUTION INTRAVENOUS; SUBCUTANEOUS
Status: DISCONTINUED | OUTPATIENT
Start: 2024-01-13 | End: 2024-01-13

## 2024-01-13 RX ORDER — INSULIN LISPRO 100 [IU]/ML
0-4 INJECTION, SOLUTION INTRAVENOUS; SUBCUTANEOUS NIGHTLY
Status: DISCONTINUED | OUTPATIENT
Start: 2024-01-13 | End: 2024-01-14 | Stop reason: HOSPADM

## 2024-01-13 RX ORDER — INSULIN LISPRO 100 [IU]/ML
8 INJECTION, SOLUTION INTRAVENOUS; SUBCUTANEOUS
Status: DISCONTINUED | OUTPATIENT
Start: 2024-01-14 | End: 2024-01-14 | Stop reason: HOSPADM

## 2024-01-13 RX ADMIN — LACTULOSE 20 G: 10 SOLUTION ORAL at 17:27

## 2024-01-13 RX ADMIN — WATER 1000 MG: 1 INJECTION INTRAMUSCULAR; INTRAVENOUS; SUBCUTANEOUS at 13:40

## 2024-01-13 RX ADMIN — RIFAXIMIN 400 MG: 200 TABLET ORAL at 21:19

## 2024-01-13 RX ADMIN — CARVEDILOL 6.25 MG: 6.25 TABLET, FILM COATED ORAL at 09:20

## 2024-01-13 RX ADMIN — PANTOPRAZOLE SODIUM 40 MG: 40 TABLET, DELAYED RELEASE ORAL at 09:20

## 2024-01-13 RX ADMIN — LACTULOSE 20 G: 10 SOLUTION ORAL at 00:54

## 2024-01-13 RX ADMIN — INSULIN LISPRO 16 UNITS: 100 INJECTION, SOLUTION INTRAVENOUS; SUBCUTANEOUS at 17:27

## 2024-01-13 RX ADMIN — INSULIN LISPRO 16 UNITS: 100 INJECTION, SOLUTION INTRAVENOUS; SUBCUTANEOUS at 21:10

## 2024-01-13 RX ADMIN — INSULIN LISPRO 7 UNITS: 100 INJECTION, SOLUTION INTRAVENOUS; SUBCUTANEOUS at 09:20

## 2024-01-13 RX ADMIN — INSULIN HUMAN 8 UNITS: 100 INJECTION, SOLUTION PARENTERAL at 17:26

## 2024-01-13 RX ADMIN — INSULIN GLARGINE 25 UNITS: 100 INJECTION, SOLUTION SUBCUTANEOUS at 21:10

## 2024-01-13 RX ADMIN — INSULIN LISPRO 8 UNITS: 100 INJECTION, SOLUTION INTRAVENOUS; SUBCUTANEOUS at 13:41

## 2024-01-13 RX ADMIN — SODIUM CHLORIDE, PRESERVATIVE FREE 10 ML: 5 INJECTION INTRAVENOUS at 21:19

## 2024-01-13 RX ADMIN — GABAPENTIN 100 MG: 100 CAPSULE ORAL at 13:40

## 2024-01-13 RX ADMIN — RIFAXIMIN 400 MG: 200 TABLET ORAL at 13:40

## 2024-01-13 RX ADMIN — RIFAXIMIN 400 MG: 200 TABLET ORAL at 09:20

## 2024-01-13 RX ADMIN — INSULIN LISPRO 8 UNITS: 100 INJECTION, SOLUTION INTRAVENOUS; SUBCUTANEOUS at 06:10

## 2024-01-13 RX ADMIN — SODIUM CHLORIDE, PRESERVATIVE FREE 10 ML: 5 INJECTION INTRAVENOUS at 09:21

## 2024-01-13 RX ADMIN — GABAPENTIN 100 MG: 100 CAPSULE ORAL at 21:10

## 2024-01-13 RX ADMIN — LACTULOSE 20 G: 10 SOLUTION ORAL at 13:40

## 2024-01-13 RX ADMIN — GABAPENTIN 100 MG: 100 CAPSULE ORAL at 09:20

## 2024-01-13 RX ADMIN — INSULIN LISPRO 4 UNITS: 100 INJECTION, SOLUTION INTRAVENOUS; SUBCUTANEOUS at 21:10

## 2024-01-13 ASSESSMENT — PAIN SCALES - GENERAL
PAINLEVEL_OUTOF10: 0

## 2024-01-13 NOTE — PLAN OF CARE
Problem: Discharge Planning  Goal: Discharge to home or other facility with appropriate resources  Outcome: Progressing     Problem: Safety - Adult  Goal: Free from fall injury  Outcome: Progressing     Problem: Cardiovascular - Adult  Goal: Maintains optimal cardiac output and hemodynamic stability  Outcome: Progressing     Problem: Genitourinary - Adult  Goal: Absence of urinary retention  Outcome: Progressing     Problem: Pain  Goal: Verbalizes/displays adequate comfort level or baseline comfort level  Outcome: Progressing     Problem: Chronic Conditions and Co-morbidities  Goal: Patient's chronic conditions and co-morbidity symptoms are monitored and maintained or improved  Outcome: Progressing

## 2024-01-13 NOTE — PROGRESS NOTES
Patient blood sugar 425 and /45 with temp of 99.6 Dr. Major notified via [employer].  Electronically signed by Kostas Adair RN on 1/13/2024 at 5:01 PM

## 2024-01-13 NOTE — PROGRESS NOTES
Patient /50 perfectserve sent to Dr March regarding BP med administration. Ordered to give coreg but hold Lasix and Aldactone.  Electronically signed by Kostas Adair RN on 1/13/2024 at 8:02 AM

## 2024-01-13 NOTE — PROGRESS NOTES
[facility] Hospitalist Progress Note     Admitting Date and Time: 1/9/2024 12:02 PM  had concerns including Altered Mental Status (Patient not answering any questions.  Will open eyes on occasion.  Portuguese Speaking).  Admit Dx: Hepatic encephalopathy (HCC) [K76.82]  Acute hepatic encephalopathy (HCC) [K76.82]      Subjective:  Patient is being followed for Hepatic encephalopathy (HCC) [K76.82]  Acute hepatic encephalopathy (HCC) [K76.82]     Patient was seen and examined this morning at bedside  Tele  was used during the encounter   S/p EGD and paracentesis  In no acute distress  Distended abdomen  Discussed with family at bedside     ROS: denies fever, chills, cp, sob, n/v, HA unless stated above.      insulin glargine  25 Units SubCUTAneous Nightly    insulin lispro  8 Units SubCUTAneous TID WC    insulin lispro  0-8 Units SubCUTAneous Q4H    insulin lispro  0-4 Units SubCUTAneous Nightly    cefTRIAXone (ROCEPHIN) IV  1,000 mg IntraVENous Q24H    gabapentin  100 mg Oral TID    furosemide  20 mg Oral Daily    spironolactone  50 mg Oral Daily    sodium chloride flush  5-40 mL IntraVENous 2 times per day    pantoprazole  40 mg Oral Daily    carvedilol  6.25 mg Oral Daily    lactulose  20 g Oral 4 times per day    rifAXIMin  400 mg Oral TID     hydrALAZINE, 10 mg, Q6H PRN  glucose, 4 tablet, PRN  dextrose bolus, 125 mL, PRN   Or  dextrose bolus, 250 mL, PRN  glucagon (rDNA), 1 mg, PRN  dextrose, , Continuous PRN  sodium chloride, , PRN  sodium chloride flush, 5-40 mL, PRN  sodium chloride, , PRN  ondansetron, 4 mg, Q8H PRN   Or  ondansetron, 4 mg, Q6H PRN  polyethylene glycol, 17 g, Daily PRN  acetaminophen, 650 mg, Q6H PRN   Or  acetaminophen, 650 mg, Q6H PRN         Objective:    BP (!) 110/50   Pulse 70   Temp 98.1 °F (36.7 °C) (Oral)   Resp 13   Ht 1.575 m (5' 2\")   Wt  decompensated liver cirrhosis  Known history of liver cirrhosis  S/p EGD and banding 01/12/23  Follow biopsy   Continue Coreg, lactulose, rifaximin  Discussed with GI team and bedside nurse   SBP prophylaxis with ceftriaxone  S/p  IR paracentesis 2L  Patient has outpatient follow-up with Dr. Arteaga  lasix 20 and aldactone 50, titrate   Monitor kidney function     Chronic anemia and thrombocytopenia  From liver cirrhosis  S/p banding 01/12  Monitor H and H  Transfuse if hemoglobin is less than 7  Repeat CBC    Uncontrolled diabetes mellitus  Start 25 units of Lantus nightly, 8 Premeal with medium dose sliding scale insulin  Hypoglycemia protocol  adjusted insulin based on the 24-hour requirement  Carb controlled diet  Resume home gabapentin    Subclinical hypothyroidism   Repeat tsh on 4 to 6 weeks     DONNA on CKD  Improving   Repeat BMP, hold diuretics if worsening cret   Encourage oral intake    CODE STATUS:   full      DVT prophylaxis: pcd  GI prophylaxis: protonix   Disposition: home, once tolerate diet     NOTE: This report was transcribed using voice recognition software. Every effort was made to ensure accuracy; however, inadvertent computerized transcription errors may be present.    Electronically signed by Casper March MD on 1/13/2024 at 1:59 PM

## 2024-01-14 VITALS
TEMPERATURE: 98.4 F | HEIGHT: 62 IN | OXYGEN SATURATION: 96 % | RESPIRATION RATE: 16 BRPM | WEIGHT: 170 LBS | HEART RATE: 82 BPM | SYSTOLIC BLOOD PRESSURE: 118 MMHG | BODY MASS INDEX: 31.28 KG/M2 | DIASTOLIC BLOOD PRESSURE: 48 MMHG

## 2024-01-14 LAB
ALBUMIN SERPL-MCNC: 3.8 G/DL (ref 3.5–5.2)
ALP SERPL-CCNC: 115 U/L (ref 35–104)
ALT SERPL-CCNC: 16 U/L (ref 0–32)
ANION GAP SERPL CALCULATED.3IONS-SCNC: 12 MMOL/L (ref 7–16)
AST SERPL-CCNC: 32 U/L (ref 0–31)
BASOPHILS # BLD: 0.01 K/UL (ref 0–0.2)
BASOPHILS NFR BLD: 0 % (ref 0–2)
BILIRUB SERPL-MCNC: 2.7 MG/DL (ref 0–1.2)
BUN SERPL-MCNC: 25 MG/DL (ref 6–23)
CALCIUM SERPL-MCNC: 8.5 MG/DL (ref 8.6–10.2)
CHLORIDE SERPL-SCNC: 102 MMOL/L (ref 98–107)
CO2 SERPL-SCNC: 23 MMOL/L (ref 22–29)
CREAT SERPL-MCNC: 1.3 MG/DL (ref 0.5–1)
EOSINOPHIL # BLD: 0.09 K/UL (ref 0.05–0.5)
EOSINOPHILS RELATIVE PERCENT: 4 % (ref 0–6)
ERYTHROCYTE [DISTWIDTH] IN BLOOD BY AUTOMATED COUNT: 14.1 % (ref 11.5–15)
GFR SERPL CREATININE-BSD FRML MDRD: 47 ML/MIN/1.73M2
GLUCOSE BLD-MCNC: 133 MG/DL (ref 74–99)
GLUCOSE BLD-MCNC: 177 MG/DL (ref 74–99)
GLUCOSE BLD-MCNC: 321 MG/DL (ref 74–99)
GLUCOSE SERPL-MCNC: 146 MG/DL (ref 74–99)
HCT VFR BLD AUTO: 28.8 % (ref 34–48)
HGB BLD-MCNC: 9.1 G/DL (ref 11.5–15.5)
IMM GRANULOCYTES # BLD AUTO: <0.03 K/UL (ref 0–0.58)
IMM GRANULOCYTES NFR BLD: 0 % (ref 0–5)
LYMPHOCYTES NFR BLD: 0.48 K/UL (ref 1.5–4)
LYMPHOCYTES RELATIVE PERCENT: 20 % (ref 20–42)
MCH RBC QN AUTO: 29 PG (ref 26–35)
MCHC RBC AUTO-ENTMCNC: 31.6 G/DL (ref 32–34.5)
MCV RBC AUTO: 91.7 FL (ref 80–99.9)
MONOCYTES NFR BLD: 0.21 K/UL (ref 0.1–0.95)
MONOCYTES NFR BLD: 9 % (ref 2–12)
NEUTROPHILS NFR BLD: 67 % (ref 43–80)
NEUTS SEG NFR BLD: 1.6 K/UL (ref 1.8–7.3)
PLATELET CONFIRMATION: NORMAL
PLATELET, FLUORESCENCE: 37 K/UL (ref 130–450)
PMV BLD AUTO: 12 FL (ref 7–12)
POTASSIUM SERPL-SCNC: 4 MMOL/L (ref 3.5–5)
PROT SERPL-MCNC: 7 G/DL (ref 6.4–8.3)
RBC # BLD AUTO: 3.14 M/UL (ref 3.5–5.5)
RBC # BLD: ABNORMAL 10*6/UL
SODIUM SERPL-SCNC: 137 MMOL/L (ref 132–146)
WBC OTHER # BLD: 2.4 K/UL (ref 4.5–11.5)

## 2024-01-14 PROCEDURE — 36415 COLL VENOUS BLD VENIPUNCTURE: CPT

## 2024-01-14 PROCEDURE — 82962 GLUCOSE BLOOD TEST: CPT

## 2024-01-14 PROCEDURE — 6360000002 HC RX W HCPCS: Performed by: NURSE PRACTITIONER

## 2024-01-14 PROCEDURE — 2580000003 HC RX 258: Performed by: NURSE PRACTITIONER

## 2024-01-14 PROCEDURE — 99239 HOSP IP/OBS DSCHRG MGMT >30: CPT | Performed by: INTERNAL MEDICINE

## 2024-01-14 PROCEDURE — 80053 COMPREHEN METABOLIC PANEL: CPT

## 2024-01-14 PROCEDURE — 6370000000 HC RX 637 (ALT 250 FOR IP): Performed by: NURSE PRACTITIONER

## 2024-01-14 PROCEDURE — 85025 COMPLETE CBC W/AUTO DIFF WBC: CPT

## 2024-01-14 PROCEDURE — 6370000000 HC RX 637 (ALT 250 FOR IP): Performed by: INTERNAL MEDICINE

## 2024-01-14 RX ORDER — SPIRONOLACTONE 50 MG/1
50 TABLET, FILM COATED ORAL DAILY
Qty: 30 TABLET | Refills: 2 | Status: SHIPPED | OUTPATIENT
Start: 2024-01-14 | End: 2024-04-13

## 2024-01-14 RX ORDER — SPIRONOLACTONE 50 MG/1
50 TABLET, FILM COATED ORAL DAILY
Qty: 30 TABLET | Refills: 3 | Status: SHIPPED | OUTPATIENT
Start: 2024-01-15 | End: 2024-01-14 | Stop reason: SDUPTHER

## 2024-01-14 RX ORDER — FUROSEMIDE 20 MG/1
20 TABLET ORAL DAILY
Qty: 60 TABLET | Refills: 3 | Status: SHIPPED | OUTPATIENT
Start: 2024-01-14

## 2024-01-14 RX ADMIN — INSULIN LISPRO 8 UNITS: 100 INJECTION, SOLUTION INTRAVENOUS; SUBCUTANEOUS at 12:46

## 2024-01-14 RX ADMIN — SPIRONOLACTONE 50 MG: 25 TABLET ORAL at 08:35

## 2024-01-14 RX ADMIN — SODIUM CHLORIDE, PRESERVATIVE FREE 10 ML: 5 INJECTION INTRAVENOUS at 08:35

## 2024-01-14 RX ADMIN — PANTOPRAZOLE SODIUM 40 MG: 40 TABLET, DELAYED RELEASE ORAL at 08:34

## 2024-01-14 RX ADMIN — WATER 1000 MG: 1 INJECTION INTRAMUSCULAR; INTRAVENOUS; SUBCUTANEOUS at 12:45

## 2024-01-14 RX ADMIN — FUROSEMIDE 20 MG: 20 TABLET ORAL at 08:35

## 2024-01-14 RX ADMIN — RIFAXIMIN 400 MG: 200 TABLET ORAL at 08:33

## 2024-01-14 RX ADMIN — GABAPENTIN 100 MG: 100 CAPSULE ORAL at 08:33

## 2024-01-14 RX ADMIN — GABAPENTIN 100 MG: 100 CAPSULE ORAL at 14:16

## 2024-01-14 RX ADMIN — INSULIN LISPRO 8 UNITS: 100 INJECTION, SOLUTION INTRAVENOUS; SUBCUTANEOUS at 08:33

## 2024-01-14 RX ADMIN — CARVEDILOL 6.25 MG: 6.25 TABLET, FILM COATED ORAL at 08:34

## 2024-01-14 RX ADMIN — RIFAXIMIN 400 MG: 200 TABLET ORAL at 14:16

## 2024-01-14 RX ADMIN — INSULIN LISPRO 12 UNITS: 100 INJECTION, SOLUTION INTRAVENOUS; SUBCUTANEOUS at 12:45

## 2024-01-14 NOTE — PLAN OF CARE
Problem: Discharge Planning  Goal: Discharge to home or other facility with appropriate resources  Outcome: Progressing  Flowsheets (Taken 1/14/2024 1194)  Discharge to home or other facility with appropriate resources: Arrange for needed discharge resources and transportation as appropriate     Problem: Safety - Adult  Goal: Free from fall injury  Outcome: Progressing

## 2024-01-14 NOTE — DISCHARGE INSTRUCTIONS
Activity as tolerated    Be compliant with your medications and take them as prescribed.    Diet: soft low carb diet     Special Instructions:     Hospital Follow up: With Dr @PCP@ in 7 days    Other Follow-Ups:    Other than any new prescriptions given to you today, the list of home going meds on this After Visit Summary are based on information provided to us from you. This information, including the list, dose, and frequency of medications is only as accurate as the information you provided. If you have any questions or concerns about your home  medications, please contact your Primary Care Physician for further clarification.    Information obtained by:   By signing below, I understand that if any problems occur once I leave the hospital I am to contact [email or web address]. I understand and acknowledge receipt of the instruction indicated above.

## 2024-01-14 NOTE — DISCHARGE SUMMARY
[facility] Hospitalist Physician Discharge Summary       Tonie Hernandez DO  [address] [address]  [phone]    Follow up      Tonie Hernandez DO  [address] [address]  [phone]          Chandler Jovan, DO  [address] [facility] [address]  [phone]    Schedule an appointment as soon as possible for a visit in 1 week(s)  Hospital followup      Activity level: As tolerated     Dispo: Home      Condition on discharge: Stable     Patient ID:  Lisa Hunt  [id number]  63 y.o.  1960    Admit date: 1/9/2024    Discharge date and time:  1/14/2024  12:42 PM    Admission Diagnoses: Principal Problem:    Acute hepatic encephalopathy (HCC)  Active Problems:    Type 2 diabetes mellitus without complication, without long-term current use of insulin (HCC)    Chronic anemia    Anemia requiring transfusions  Resolved Problems:    * No resolved hospital problems. *      Discharge Diagnoses: Principal Problem:    Acute hepatic encephalopathy (HCC)  Active Problems:    Type 2 diabetes mellitus without complication, without long-term current use of insulin (HCC)    Chronic anemia    Anemia requiring transfusions  Resolved Problems:    * No resolved hospital problems. *      Consults:  IP CONSULT TO INTERNAL MEDICINE  IP CONSULT TO GI  IP CONSULT TO UROLOGY    Hospital Course:   Patient Lisa Hunt is a 63 y.o. presented with Hepatic encephalopathy (HCC) [K76.82]  Acute hepatic encephalopathy (HCC) [K76.82]    Patient is a 64 yo female with a PMH of cirrhosis of liver, DM, DM neuropathy, h/o esophageal varices, GERD, HTN, thrombocytopenia. Hb dropped she she underwent EGD and has esophogeal banding              Acute hepatic encephalopathy  Resolved   Decompensated liver cirrhosis  Elevated ammonia 58 on presentation  Titrate lactulose to 3 bowel movement per day     Acute decompensated liver cirrhosis  Known history of liver cirrhosis  S/p EGD and banding

## 2024-01-14 NOTE — CARE COORDINATION
Discharge order noted. Plan is home with her daughter. No needs at this time. Daughter will provide transport(TF)      NATALIE PowersN,RN  Case Management  [phone]

## 2024-01-15 RX ORDER — BLOOD SUGAR DIAGNOSTIC
STRIP MISCELLANEOUS
Qty: 400 STRIP | Refills: 1 | Status: SHIPPED | OUTPATIENT
Start: 2024-01-15

## 2024-01-15 RX ORDER — LANCETS
EACH MISCELLANEOUS
Qty: 400 EACH | Refills: 1 | Status: SHIPPED | OUTPATIENT
Start: 2024-01-15

## 2024-01-16 ENCOUNTER — TELEPHONE (OUTPATIENT)
Dept: FAMILY MEDICINE CLINIC | Age: 64
End: 2024-01-16

## 2024-01-16 ENCOUNTER — CARE COORDINATION (OUTPATIENT)
Dept: CARE COORDINATION | Age: 64
End: 2024-01-16

## 2024-01-16 NOTE — TELEPHONE ENCOUNTER
Care Transitions Initial Follow Up Call    Outreach made within 2 business days of discharge: Yes    Patient: Lisa Hunt Patient : 1960   MRN: [id number]  Reason for Admission: There are no discharge diagnoses documented for the most recent discharge.  Discharge Date: 24       Spoke with: Daughter    Discharge department/facility: [facility] Interactive Patient Contact:  Was patient able to fill all prescriptions: Yes  Was patient instructed to bring all medications to the follow-up visit: Yes  Is patient taking all medications as directed in the discharge summary? Yes  Does patient understand their discharge instructions: Yes  Does patient have questions or concerns that need addressed prior to 7-14 day follow up office visit: no    Scheduled appointment with PCP within 7-14 days    Follow Up  Future Appointments   Date Time Provider Department Center   2024  9:45 AM Tonie Hernandez DO [facility]   2024  9:30 AM Tonie Hernandez DO [facility]       Daniela Mcneil MA

## 2024-01-16 NOTE — CARE COORDINATION
Care Transitions Initial Follow Up Call    Call within 2 business days of discharge: Yes    Care Transition Nurse attempted initial CT outreach using [employer] P: [phone]  #[phone] to leave [employer] VM, purpose of call, my contact, and instruction to schedule appts.       Patient: Lisa Hunt Patient : 1960   MRN: <[id number]>  Reason for Admission: 2024 - 2024 [facility] [place] Obs. Acute hepatic encephalopathy, s/p EGD w/ banding & Paracentesis.  Discharge Date: 24 RARS: Readmission Risk Score: 23.2  NR  CT    [facility]/  staff routed to schedule 7 day hosp fu PCP.  Schedule an appointment with Jovan Arteaga DO (Gastroenterology) in 1 week     START taking:  rifAXIMin (XIFAXAN)  spironolactone (ALDACTONE)  Start taking on: January 15, 2024  CHANGE how you take:  furosemide (LASIX)    PDM: Kelsey Fitch/Dtr [phone]   PMH: Cirrhosis/ACOSTA, Ascites, DM, Esophageal varices, SDH, HTN.    Last Discharge Facility       Date Complaint Diagnosis Description Type Department Provider    24 Altered Mental Status Stage 3a chronic kidney disease (HCC) ... ED to Hosp-Admission (Discharged) (ADMITTED) [facility] Casper March MD; Tristian Campbell RN

## 2024-01-17 ENCOUNTER — CARE COORDINATION (OUTPATIENT)
Dept: CARE COORDINATION | Age: 64
End: 2024-01-17

## 2024-01-17 ENCOUNTER — TELEPHONE (OUTPATIENT)
Dept: HEMATOLOGY | Age: 64
End: 2024-01-17

## 2024-01-17 ENCOUNTER — OFFICE VISIT (OUTPATIENT)
Dept: FAMILY MEDICINE CLINIC | Age: 64
End: 2024-01-17

## 2024-01-17 VITALS
WEIGHT: 174.6 LBS | HEART RATE: 96 BPM | BODY MASS INDEX: 31.93 KG/M2 | DIASTOLIC BLOOD PRESSURE: 58 MMHG | SYSTOLIC BLOOD PRESSURE: 136 MMHG | OXYGEN SATURATION: 99 % | RESPIRATION RATE: 17 BRPM | TEMPERATURE: 97.6 F

## 2024-01-17 DIAGNOSIS — Z79.4 TYPE 2 DIABETES MELLITUS WITHOUT COMPLICATION, WITH LONG-TERM CURRENT USE OF INSULIN (HCC): ICD-10-CM

## 2024-01-17 DIAGNOSIS — E11.22 TYPE 2 DIABETES MELLITUS WITH STAGE 3B CHRONIC KIDNEY DISEASE, WITH LONG-TERM CURRENT USE OF INSULIN (HCC): ICD-10-CM

## 2024-01-17 DIAGNOSIS — K76.82 ACUTE HEPATIC ENCEPHALOPATHY (HCC): ICD-10-CM

## 2024-01-17 DIAGNOSIS — Z79.4 TYPE 2 DIABETES MELLITUS WITH HYPERGLYCEMIA, WITH LONG-TERM CURRENT USE OF INSULIN (HCC): ICD-10-CM

## 2024-01-17 DIAGNOSIS — E11.65 TYPE 2 DIABETES MELLITUS WITH HYPERGLYCEMIA, WITH LONG-TERM CURRENT USE OF INSULIN (HCC): ICD-10-CM

## 2024-01-17 DIAGNOSIS — K74.60 LIVER CIRRHOSIS SECONDARY TO NASH (HCC): ICD-10-CM

## 2024-01-17 DIAGNOSIS — N18.31 STAGE 3A CHRONIC KIDNEY DISEASE (HCC): ICD-10-CM

## 2024-01-17 DIAGNOSIS — K74.69 OTHER CIRRHOSIS OF LIVER (HCC): ICD-10-CM

## 2024-01-17 DIAGNOSIS — K76.82 HEPATIC ENCEPHALOPATHY (HCC): ICD-10-CM

## 2024-01-17 DIAGNOSIS — Z09 HOSPITAL DISCHARGE FOLLOW-UP: Primary | ICD-10-CM

## 2024-01-17 DIAGNOSIS — E11.9 TYPE 2 DIABETES MELLITUS WITHOUT COMPLICATION, WITHOUT LONG-TERM CURRENT USE OF INSULIN (HCC): ICD-10-CM

## 2024-01-17 DIAGNOSIS — N18.32 TYPE 2 DIABETES MELLITUS WITH STAGE 3B CHRONIC KIDNEY DISEASE, WITH LONG-TERM CURRENT USE OF INSULIN (HCC): ICD-10-CM

## 2024-01-17 DIAGNOSIS — I85.10 SECONDARY ESOPHAGEAL VARICES WITHOUT BLEEDING (HCC): ICD-10-CM

## 2024-01-17 DIAGNOSIS — K75.81 LIVER CIRRHOSIS SECONDARY TO NASH (HCC): ICD-10-CM

## 2024-01-17 DIAGNOSIS — E72.20 HYPERAMMONEMIA (HCC): ICD-10-CM

## 2024-01-17 DIAGNOSIS — E11.9 TYPE 2 DIABETES MELLITUS WITHOUT COMPLICATION, WITH LONG-TERM CURRENT USE OF INSULIN (HCC): ICD-10-CM

## 2024-01-17 DIAGNOSIS — Z79.4 TYPE 2 DIABETES MELLITUS WITH STAGE 3B CHRONIC KIDNEY DISEASE, WITH LONG-TERM CURRENT USE OF INSULIN (HCC): ICD-10-CM

## 2024-01-17 DIAGNOSIS — D69.6 THROMBOCYTOPENIA (HCC): ICD-10-CM

## 2024-01-17 LAB — SURGICAL PATHOLOGY REPORT: NORMAL

## 2024-01-17 RX ORDER — LACTULOSE 10 G/15ML
20 SOLUTION ORAL 3 TIMES DAILY
Qty: 946 ML | Refills: 5 | Status: SHIPPED | OUTPATIENT
Start: 2024-01-17

## 2024-01-17 RX ORDER — INSULIN LISPRO 100 [IU]/ML
INJECTION, SUSPENSION SUBCUTANEOUS
Qty: 5 ADJUSTABLE DOSE PRE-FILLED PEN SYRINGE | Refills: 3 | Status: SHIPPED | OUTPATIENT
Start: 2024-01-17

## 2024-01-17 ASSESSMENT — PATIENT HEALTH QUESTIONNAIRE - PHQ9
SUM OF ALL RESPONSES TO PHQ9 QUESTIONS 1 & 2: 0
SUM OF ALL RESPONSES TO PHQ QUESTIONS 1-9: 0
1. LITTLE INTEREST OR PLEASURE IN DOING THINGS: 0
SUM OF ALL RESPONSES TO PHQ QUESTIONS 1-9: 0
2. FEELING DOWN, DEPRESSED OR HOPELESS: 0

## 2024-01-17 NOTE — PROGRESS NOTES
tablet  Commonly known as: XIFAXAN  Take 2 tablets by mouth 3 times daily for 10 days     spironolactone 50 MG tablet  Commonly known as: ALDACTONE  TAKE 1 TABLET BY MOUTH DAILY     Vitamin D (Ergocalciferol) 25894 units Caps  Take 50,000 Units by mouth once a week     vitamin D 50 MCG (2000 UT) Tabs tablet  Commonly known as: CHOLECALCIFEROL  Take 1 tablet by mouth daily               Where to Get Your Medications        These medications were sent to [id number] - [place], [place] - [address] - P [phone] - F [phone] [address], [facility] [address]      Phone: [phone]   insulin lispro protamine & lispro (75-25) 100 UNIT per ML Supn injection pen  lactulose 10 GM/15ML solution           Medications marked \"taking\" at this time  Outpatient Medications Marked as Taking for the 1/17/24 encounter (Office Visit) with Tonie Hernandez DO   Medication Sig Dispense Refill    lactulose (CHRONULAC) 10 GM/15ML solution Take 30 mLs by mouth 3 times daily 946 mL 5    insulin lispro protamine & lispro (HUMALOG MIX) (75-25) 100 UNIT per ML SUPN injection pen Inject 50 Units into the skin 2 times daily (with meals 5 Adjustable Dose Pre-filled Pen Syringe 3    blood glucose test strips (ACCU-CHEK GUIDE) strip TEST FOUR TIMES DAILY 400 strip 1    Accu-Chek Softclix Lancets MISC TEST FOUR TIMES DAILY 400 each 1    furosemide (LASIX) 20 MG tablet Take 1 tablet by mouth daily 60 tablet 3    rifAXIMin (XIFAXAN) 200 MG tablet Take 2 tablets by mouth 3 times daily for 10 days 60 tablet 0    spironolactone (ALDACTONE) 50 MG tablet TAKE 1 TABLET BY MOUTH DAILY 30 tablet 2    carvedilol (COREG) 6.25 MG tablet Take 1 tablet by mouth daily 90 tablet 1    ondansetron (ZOFRAN) 4 MG tablet Take 1 tablet by mouth daily as needed for Nausea or Vomiting 30 tablet 3    vitamin D (CHOLECALCIFEROL) 50 MCG (2000 UT) TABS tablet Take 1 tablet by mouth daily 30 tablet 3    Vitamin D, Ergocalciferol, 28030

## 2024-01-17 NOTE — TELEPHONE ENCOUNTER
The patients daughter Kelsey called and stated that the patient seen Dr Hernandez this morning and was told to call and make a appt asap. I scheduled the patient for [facility] on 01/24/2024 at 10:00 am. Directions to the office were given at the time of our call. The patients daughter confirmed all of the above and all questions were answered at this time  Electronically signed by Julissa Castanon MA on 1/17/2024 at 10:08 AM

## 2024-01-17 NOTE — CARE COORDINATION
Care Transitions Initial Follow Up Call    Call within 2 business days of discharge: Yes    Care Transition Nurse attempted second initial CT outreach using [employer] #[phone], leaving [employer] VM, purpose of call, and my contact. CTN s/o.     Patient: Lisa Hunt Patient : 1960   MRN: [id number]  Reason for Admission: 2024 - 2024 [facility] Obs. Acute hepatic encephalopathy, s/p EGD w/ banding & Paracentesis.  Discharge Date: 24 RARS: Readmission Risk Score: 23.2  NR  CT     PCP 24 9:45 (Office /58, HR 96, Sat 99%) Attended.  Surg/Ian  10:00     START taking:  rifAXIMin (XIFAXAN)  spironolactone (ALDACTONE)  Start taking on: January 15, 2024  CHANGE how you take:  furosemide (LASIX)     PDM: Kelsey Fitch/Dtr [phone]   PMH: Cirrhosis/ACOSTA, Ascites, DM, Esophageal varices, SDH, HTN.    Last Discharge Facility       Date Complaint Diagnosis Description Type Department Provider    24 Altered Mental Status Stage 3a chronic kidney disease (HCC) ... ED to Hosp-Admission (Discharged) (ADMITTED) [facility] Casper March MD; Tristian Campbell.          Ammy Murdock RN

## 2024-01-18 PROBLEM — S06.5XAA SDH (SUBDURAL HEMATOMA) (HCC): Status: RESOLVED | Noted: 2023-05-02 | Resolved: 2024-01-18

## 2024-01-18 PROBLEM — I62.9 INTRACRANIAL BLEED (HCC): Status: RESOLVED | Noted: 2023-05-01 | Resolved: 2024-01-18

## 2024-01-18 PROBLEM — N17.9 AKI (ACUTE KIDNEY INJURY) (HCC): Status: RESOLVED | Noted: 2023-07-18 | Resolved: 2024-01-18

## 2024-01-24 ENCOUNTER — TELEPHONE (OUTPATIENT)
Dept: HEMATOLOGY | Age: 64
End: 2024-01-24

## 2024-01-24 ENCOUNTER — OFFICE VISIT (OUTPATIENT)
Dept: HEMATOLOGY | Age: 64
End: 2024-01-24
Payer: MEDICARE

## 2024-01-24 VITALS
HEIGHT: 62 IN | BODY MASS INDEX: 32.2 KG/M2 | SYSTOLIC BLOOD PRESSURE: 124 MMHG | TEMPERATURE: 98.3 F | HEART RATE: 76 BPM | WEIGHT: 175 LBS | DIASTOLIC BLOOD PRESSURE: 58 MMHG | OXYGEN SATURATION: 100 %

## 2024-01-24 DIAGNOSIS — E11.69 TYPE 2 DIABETES MELLITUS WITH OTHER SPECIFIED COMPLICATION, WITHOUT LONG-TERM CURRENT USE OF INSULIN (HCC): ICD-10-CM

## 2024-01-24 DIAGNOSIS — I85.10 SECONDARY ESOPHAGEAL VARICES WITHOUT BLEEDING (HCC): ICD-10-CM

## 2024-01-24 DIAGNOSIS — K75.81 NASH (NONALCOHOLIC STEATOHEPATITIS): ICD-10-CM

## 2024-01-24 DIAGNOSIS — K74.60 CIRRHOSIS OF LIVER WITH ASCITES, UNSPECIFIED HEPATIC CIRRHOSIS TYPE (HCC): Primary | ICD-10-CM

## 2024-01-24 DIAGNOSIS — R18.8 CIRRHOSIS OF LIVER WITH ASCITES, UNSPECIFIED HEPATIC CIRRHOSIS TYPE (HCC): Primary | ICD-10-CM

## 2024-01-24 DIAGNOSIS — K76.6 PORTAL HYPERTENSION (HCC): ICD-10-CM

## 2024-01-24 DIAGNOSIS — E72.20 HYPERAMMONEMIA (HCC): ICD-10-CM

## 2024-01-24 LAB
ALBUMIN SERPL-MCNC: 3.4 G/DL (ref 3.5–5.2)
ALP BLD-CCNC: 153 U/L (ref 35–104)
ALT SERPL-CCNC: 17 U/L (ref 0–32)
ANION GAP SERPL CALCULATED.3IONS-SCNC: 12 MMOL/L (ref 7–16)
AST SERPL-CCNC: 36 U/L (ref 0–31)
BASOPHILS ABSOLUTE: 0 K/UL (ref 0–0.2)
BASOPHILS RELATIVE PERCENT: 0 % (ref 0–2)
BILIRUB SERPL-MCNC: 0.9 MG/DL (ref 0–1.2)
BILIRUBIN DIRECT: 0.3 MG/DL (ref 0–0.3)
BILIRUBIN, INDIRECT: 0.6 MG/DL (ref 0–1)
BUN BLDV-MCNC: 27 MG/DL (ref 6–23)
CALCIUM SERPL-MCNC: 7.6 MG/DL (ref 8.6–10.2)
CHLORIDE BLD-SCNC: 106 MMOL/L (ref 98–107)
CO2: 21 MMOL/L (ref 22–29)
CREAT SERPL-MCNC: 1.2 MG/DL (ref 0.5–1)
EOSINOPHILS ABSOLUTE: 0.06 K/UL (ref 0.05–0.5)
EOSINOPHILS RELATIVE PERCENT: 4 % (ref 0–6)
GFR SERPL CREATININE-BSD FRML MDRD: 52 ML/MIN/1.73M2
GLUCOSE BLD-MCNC: 179 MG/DL (ref 74–99)
HCT VFR BLD CALC: 25.4 % (ref 34–48)
HEMOGLOBIN: 8 G/DL (ref 11.5–15.5)
INR BLD: 1.4
LYMPHOCYTES ABSOLUTE: 0.31 K/UL (ref 1.5–4)
LYMPHOCYTES RELATIVE PERCENT: 19 % (ref 20–42)
MCH RBC QN AUTO: 29.5 PG (ref 26–35)
MCHC RBC AUTO-ENTMCNC: 31.5 G/DL (ref 32–34.5)
MCV RBC AUTO: 93.7 FL (ref 80–99.9)
MONOCYTES ABSOLUTE: 0.06 K/UL (ref 0.1–0.95)
MONOCYTES RELATIVE PERCENT: 4 % (ref 2–12)
NEUTROPHILS ABSOLUTE: 1.18 K/UL (ref 1.8–7.3)
NEUTROPHILS RELATIVE PERCENT: 74 % (ref 43–80)
PDW BLD-RTO: 14.6 % (ref 11.5–15)
PLATELET # BLD: 37 K/UL (ref 130–450)
PLATELET CONFIRMATION: NORMAL
PMV BLD AUTO: 12.4 FL (ref 7–12)
POTASSIUM SERPL-SCNC: 5.3 MMOL/L (ref 3.5–5)
PROTHROMBIN TIME: 15.5 SEC (ref 9.3–12.4)
RBC # BLD: 2.71 M/UL (ref 3.5–5.5)
RBC # BLD: NORMAL 10*6/UL
SODIUM BLD-SCNC: 139 MMOL/L (ref 132–146)
TOTAL PROTEIN: 6.6 G/DL (ref 6.4–8.3)
WBC # BLD: 1.6 K/UL (ref 4.5–11.5)

## 2024-01-24 PROCEDURE — 99205 OFFICE O/P NEW HI 60 MIN: CPT | Performed by: STUDENT IN AN ORGANIZED HEALTH CARE EDUCATION/TRAINING PROGRAM

## 2024-01-24 PROCEDURE — 36415 COLL VENOUS BLD VENIPUNCTURE: CPT | Performed by: STUDENT IN AN ORGANIZED HEALTH CARE EDUCATION/TRAINING PROGRAM

## 2024-01-24 PROCEDURE — 3074F SYST BP LT 130 MM HG: CPT | Performed by: STUDENT IN AN ORGANIZED HEALTH CARE EDUCATION/TRAINING PROGRAM

## 2024-01-24 PROCEDURE — 3078F DIAST BP <80 MM HG: CPT | Performed by: STUDENT IN AN ORGANIZED HEALTH CARE EDUCATION/TRAINING PROGRAM

## 2024-01-24 RX ORDER — IBUPROFEN 600 MG/1
600 TABLET ORAL EVERY 6 HOURS PRN
COMMUNITY

## 2024-01-24 RX ORDER — MECLIZINE HYDROCHLORIDE 25 MG/1
12.5 TABLET ORAL 3 TIMES DAILY PRN
COMMUNITY

## 2024-01-24 ASSESSMENT — ENCOUNTER SYMPTOMS
EYES NEGATIVE: 1
ABDOMINAL DISTENTION: 1
ALLERGIC/IMMUNOLOGIC NEGATIVE: 1
DIARRHEA: 1
COLOR CHANGE: 0
RESPIRATORY NEGATIVE: 1

## 2024-01-24 NOTE — TELEPHONE ENCOUNTER
The patients prior auth was done online, approved and scanned into the patients chart. The patient is scheduled for her MRI on 02/08/2024 [facility]  Arrive 8:00 am  Scan 8:30 am  NPO starting at midnight    While the patient and her daughter were at their appt this morning the above information was shared to them via [employer], id# [id number]. The patient was also informed via [employer]  to not wear any medal, jewelry, buttons or zippers to this appt. I also gave them directions to radiology registration. While in the office a follow up appt was given to the patient for [facility] 02/14/2024 at 10:00 am. All the above information was given via , the patient and her daughter had the opportunity to ask questions and all questions were answered at this time. The patient and her daughter confirmed all of the above via [employer]io the  with [employer]  Electronically signed by Julissa Castanon MA on 1/24/2024 at 11:13 AM

## 2024-01-24 NOTE — PROGRESS NOTES
Hepatobiliary and Pancreatic Surgery Attending History and Physical    Patient's Name/Date of Birth: Lisa Hunt /1960 (63 y.o.)    Date: 2024     CC:Cirrhosis    HPI:  Ms. Hunt is a 64 yo Italian speaking F with long hx of ACOSTA cirrhosis for the last 12 yrs, DONNA, DM, GERD, HTN, who presents as new patient referral. She has seen GI in the past and had prior endoscopies with Dr. Arteaga. She recently was hospitalized early January due to decompensation requiring EGD wit Dr. Conrad and banding of esophageal varices and IR paracentesis. She has had a total of 4 paracenteses and three were within the last year. She denies hx of bloody stools or dark tarry stools. She does take rifaximin and lactulose and notices she gets confused when she does not have a bowel movement for a while. Her ammonia levels have also been persistently elevated. She has been told by someone in the past that she should be put on a transplant list. She denies any alcohol use or smoking. Her liver disease is from ACOSTA. She takes her spironolactone and lasix as prescribed. She has not had a heart cath. Last EKG showed RBB which is new.     Past Medical History:   Diagnosis Date    DONNA (acute kidney injury) (HCC) 2023    Cirrhosis (HCC)     Diabetes mellitus (HCC)     Diabetic neuropathy (HCC)     Esophageal varices without bleeding (HCC)     GERD (gastroesophageal reflux disease)     Hypertension     Intracranial bleed (HCC) 2023    Liver cirrhosis (HCC)     with secondary esophageal varices, no signs/sx's of hepatic encephalopathy    Low vitamin D level     SDH (subdural hematoma) (HCC) 2023    Thrombocytopenia (HCC)     Type 2 diabetes mellitus without complication (HCC)        Past Surgical History:   Procedure Laterality Date    APPENDECTOMY       SECTION      CHOLECYSTECTOMY      PARACENTESIS Left 10/12/2023    4400ml hazy yellow    PARACENTESIS Left 2024    2440cc yellow clear fluid

## 2024-01-26 LAB — AFP: <1.8 UG/L

## 2024-02-08 ENCOUNTER — HOSPITAL ENCOUNTER (OUTPATIENT)
Dept: MRI IMAGING | Age: 64
Discharge: HOME | End: 2024-02-10
Attending: STUDENT IN AN ORGANIZED HEALTH CARE EDUCATION/TRAINING PROGRAM
Payer: MEDICARE

## 2024-02-08 DIAGNOSIS — R18.8 CIRRHOSIS OF LIVER WITH ASCITES, UNSPECIFIED HEPATIC CIRRHOSIS TYPE (HCC): ICD-10-CM

## 2024-02-08 DIAGNOSIS — K74.60 CIRRHOSIS OF LIVER WITH ASCITES, UNSPECIFIED HEPATIC CIRRHOSIS TYPE (HCC): ICD-10-CM

## 2024-02-08 PROCEDURE — 74183 MRI ABD W/O CNTR FLWD CNTR: CPT

## 2024-02-08 PROCEDURE — A9581 GADOXETATE DISODIUM INJ: HCPCS | Performed by: RADIOLOGY

## 2024-02-08 PROCEDURE — 6360000004 HC RX CONTRAST MEDICATION: Performed by: RADIOLOGY

## 2024-02-08 RX ADMIN — GADOXETATE DISODIUM 9 ML: 181.43 INJECTION, SOLUTION INTRAVENOUS at 10:28

## 2024-02-21 ENCOUNTER — OFFICE VISIT (OUTPATIENT)
Dept: FAMILY MEDICINE CLINIC | Age: 64
End: 2024-02-21
Payer: MEDICARE

## 2024-02-21 VITALS
HEART RATE: 88 BPM | SYSTOLIC BLOOD PRESSURE: 110 MMHG | OXYGEN SATURATION: 99 % | RESPIRATION RATE: 17 BRPM | HEIGHT: 62 IN | WEIGHT: 174.8 LBS | DIASTOLIC BLOOD PRESSURE: 60 MMHG | BODY MASS INDEX: 32.17 KG/M2 | TEMPERATURE: 96.9 F

## 2024-02-21 DIAGNOSIS — Z12.11 COLON CANCER SCREENING: ICD-10-CM

## 2024-02-21 DIAGNOSIS — Z79.4 TYPE 2 DIABETES MELLITUS WITHOUT COMPLICATION, WITH LONG-TERM CURRENT USE OF INSULIN (HCC): ICD-10-CM

## 2024-02-21 DIAGNOSIS — E72.20 HYPERAMMONEMIA (HCC): ICD-10-CM

## 2024-02-21 DIAGNOSIS — Z12.31 BREAST CANCER SCREENING BY MAMMOGRAM: ICD-10-CM

## 2024-02-21 DIAGNOSIS — E11.9 TYPE 2 DIABETES MELLITUS WITHOUT COMPLICATION, WITH LONG-TERM CURRENT USE OF INSULIN (HCC): ICD-10-CM

## 2024-02-21 DIAGNOSIS — Z00.00 MEDICARE ANNUAL WELLNESS VISIT, SUBSEQUENT: Primary | ICD-10-CM

## 2024-02-21 DIAGNOSIS — I10 ESSENTIAL HYPERTENSION: ICD-10-CM

## 2024-02-21 DIAGNOSIS — R11.0 NAUSEA: ICD-10-CM

## 2024-02-21 DIAGNOSIS — K21.9 GERD WITHOUT ESOPHAGITIS: ICD-10-CM

## 2024-02-21 DIAGNOSIS — E55.9 VITAMIN D DEFICIENCY: ICD-10-CM

## 2024-02-21 PROCEDURE — G0439 PPPS, SUBSEQ VISIT: HCPCS | Performed by: STUDENT IN AN ORGANIZED HEALTH CARE EDUCATION/TRAINING PROGRAM

## 2024-02-21 PROCEDURE — 3074F SYST BP LT 130 MM HG: CPT | Performed by: STUDENT IN AN ORGANIZED HEALTH CARE EDUCATION/TRAINING PROGRAM

## 2024-02-21 PROCEDURE — 3078F DIAST BP <80 MM HG: CPT | Performed by: STUDENT IN AN ORGANIZED HEALTH CARE EDUCATION/TRAINING PROGRAM

## 2024-02-21 RX ORDER — INSULIN LISPRO 100 [IU]/ML
INJECTION, SUSPENSION SUBCUTANEOUS
Qty: 5 ADJUSTABLE DOSE PRE-FILLED PEN SYRINGE | Refills: 3 | Status: SHIPPED | OUTPATIENT
Start: 2024-02-21

## 2024-02-21 RX ORDER — OMEPRAZOLE 20 MG/1
20 CAPSULE, DELAYED RELEASE ORAL DAILY
Qty: 90 CAPSULE | Refills: 1 | Status: SHIPPED | OUTPATIENT
Start: 2024-02-21

## 2024-02-21 RX ORDER — LACTULOSE 10 G/15ML
20 SOLUTION ORAL 3 TIMES DAILY
Qty: 946 ML | Refills: 5 | Status: SHIPPED | OUTPATIENT
Start: 2024-02-21

## 2024-02-21 RX ORDER — GABAPENTIN 600 MG/1
600 TABLET ORAL 3 TIMES DAILY
Qty: 90 TABLET | Refills: 2 | Status: SHIPPED | OUTPATIENT
Start: 2024-02-21 | End: 2024-05-21

## 2024-02-21 RX ORDER — CHOLECALCIFEROL (VITAMIN D3) 50 MCG
2000 TABLET ORAL DAILY
Qty: 90 TABLET | Refills: 1 | Status: SHIPPED | OUTPATIENT
Start: 2024-02-21

## 2024-02-21 RX ORDER — ERGOCALCIFEROL 1.25 MG/1
50000 CAPSULE ORAL WEEKLY
Qty: 12 CAPSULE | Refills: 1 | Status: SHIPPED | OUTPATIENT
Start: 2024-02-21

## 2024-02-21 RX ORDER — ONDANSETRON 4 MG/1
4 TABLET, FILM COATED ORAL DAILY PRN
Qty: 90 TABLET | Refills: 1 | Status: SHIPPED | OUTPATIENT
Start: 2024-02-21

## 2024-02-21 RX ORDER — CARVEDILOL 6.25 MG/1
6.25 TABLET ORAL DAILY
Qty: 90 TABLET | Refills: 1 | Status: SHIPPED | OUTPATIENT
Start: 2024-02-21

## 2024-02-21 ASSESSMENT — PATIENT HEALTH QUESTIONNAIRE - PHQ9
2. FEELING DOWN, DEPRESSED OR HOPELESS: 0
SUM OF ALL RESPONSES TO PHQ9 QUESTIONS 1 & 2: 0
1. LITTLE INTEREST OR PLEASURE IN DOING THINGS: 0
SUM OF ALL RESPONSES TO PHQ QUESTIONS 1-9: 0

## 2024-02-21 ASSESSMENT — LIFESTYLE VARIABLES
HOW OFTEN DO YOU HAVE A DRINK CONTAINING ALCOHOL: NEVER
HOW MANY STANDARD DRINKS CONTAINING ALCOHOL DO YOU HAVE ON A TYPICAL DAY: PATIENT DOES NOT DRINK

## 2024-02-21 NOTE — PROGRESS NOTES
daily Yes Tonie Hernandez DO   gabapentin (NEURONTIN) 600 MG tablet Take 1 tablet by mouth 3 times daily for 90 days. Yes Tonie Hernandez DO   insulin lispro protamine & lispro (HUMALOG MIX) (75-25) 100 UNIT per ML SUPN injection pen Inject 50 Units into the skin 2 times daily (with meals Yes Tonie Hernandez DO   lactulose (CHRONULAC) 10 GM/15ML solution Take 30 mLs by mouth 3 times daily Yes Tonie Hernandez DO   omeprazole (PRILOSEC) 20 MG delayed release capsule Take 1 capsule by mouth daily Yes Tonie Hernandez DO   ondansetron (ZOFRAN) 4 MG tablet Take 1 tablet by mouth daily as needed for Nausea or Vomiting Yes Tonie Hernandez DO   vitamin D (CHOLECALCIFEROL) 50 MCG (2000 UT) TABS tablet Take 1 tablet by mouth daily Yes Tonie Hernandez DO   Vitamin D, Ergocalciferol, 17396 units CAPS Take 50,000 Units by mouth once a week Yes Tonie Hernandez DO   meclizine (ANTIVERT) 25 MG tablet Take 0.5 tablets by mouth 3 times daily as needed Yes ProviderJamie MD   ibuprofen (ADVIL;MOTRIN) 600 MG tablet Take 1 tablet by mouth every 6 hours as needed Yes ProviderJamie MD   blood glucose test strips (ACCU-CHEK GUIDE) strip TEST FOUR TIMES DAILY Yes Tonie Hernandez DO   Accu-Chek Softclix Lancets MISC TEST FOUR TIMES DAILY Yes Tonie Hernandez DO   furosemide (LASIX) 20 MG tablet Take 1 tablet by mouth daily Yes Casper March MD   spironolactone (ALDACTONE) 50 MG tablet TAKE 1 TABLET BY MOUTH DAILY Yes Casper March MD       CareTe (Including outside providers/suppliers regularly involved in providing care):   Patient Care Team:  Tonie Hernandez DO as PCP - General (Family Medicine)  Tonie Hernandez DO as PCP - Empaneled Provider     Reviewed and updated this visit:  Tobacco  Allergies  Meds  Problems  Med Hx  Surg Hx  Soc Hx  Fam Hx

## 2024-03-08 ENCOUNTER — TELEPHONE (OUTPATIENT)
Dept: FAMILY MEDICINE CLINIC | Age: 64
End: 2024-03-08

## 2024-03-08 NOTE — TELEPHONE ENCOUNTER
Pt daughter called in stating pt is having a lot of fluid build up in her stomach causing her to be bloated and she would like the fluid drained. Pt had fluid drained in hospital on 01/09/2024 and the family is wondering if there is a specialist that she needs referred to or what pt needs to do in order to have fluid removed. Please advise with what you would like to do.

## 2024-03-13 ENCOUNTER — HOSPITAL ENCOUNTER (INPATIENT)
Age: 64
LOS: 4 days | Discharge: HOME | DRG: 432 | End: 2024-03-17
Attending: STUDENT IN AN ORGANIZED HEALTH CARE EDUCATION/TRAINING PROGRAM | Admitting: INTERNAL MEDICINE
Payer: MEDICARE

## 2024-03-13 DIAGNOSIS — D64.9 ANEMIA, UNSPECIFIED TYPE: ICD-10-CM

## 2024-03-13 DIAGNOSIS — D64.9 ANEMIA REQUIRING TRANSFUSIONS: ICD-10-CM

## 2024-03-13 DIAGNOSIS — K74.60 DECOMPENSATED HEPATIC CIRRHOSIS (HCC): ICD-10-CM

## 2024-03-13 DIAGNOSIS — R18.8 OTHER ASCITES: Primary | ICD-10-CM

## 2024-03-13 DIAGNOSIS — K72.90 DECOMPENSATED HEPATIC CIRRHOSIS (HCC): ICD-10-CM

## 2024-03-13 PROBLEM — K72.10 CHRONIC LIVER FAILURE (HCC): Status: ACTIVE | Noted: 2024-03-13

## 2024-03-13 PROBLEM — E11.65 DM HYPEROSMOLARITY TYPE II, UNCONTROLLED (HCC): Status: ACTIVE | Noted: 2024-01-17

## 2024-03-13 PROBLEM — K92.2 GI BLEED: Status: ACTIVE | Noted: 2024-03-13

## 2024-03-13 PROBLEM — E11.00 DM HYPEROSMOLARITY TYPE II, UNCONTROLLED (HCC): Status: ACTIVE | Noted: 2024-01-17

## 2024-03-13 LAB
ALBUMIN SERPL-MCNC: 3 G/DL (ref 3.5–5.2)
ALP SERPL-CCNC: 124 U/L (ref 35–104)
ALT SERPL-CCNC: 11 U/L (ref 0–32)
AMMONIA PLAS-SCNC: 37 UMOL/L (ref 11–51)
ANION GAP SERPL CALCULATED.3IONS-SCNC: 12 MMOL/L (ref 7–16)
AST SERPL-CCNC: 25 U/L (ref 0–31)
BASOPHILS # BLD: 0.01 K/UL (ref 0–0.2)
BASOPHILS NFR BLD: 1 % (ref 0–2)
BILIRUB DIRECT SERPL-MCNC: 0.3 MG/DL (ref 0–0.3)
BILIRUB INDIRECT SERPL-MCNC: 0.6 MG/DL (ref 0–1)
BILIRUB SERPL-MCNC: 0.9 MG/DL (ref 0–1.2)
BUN SERPL-MCNC: 26 MG/DL (ref 6–23)
CALCIUM SERPL-MCNC: 8.9 MG/DL (ref 8.6–10.2)
CHLORIDE SERPL-SCNC: 99 MMOL/L (ref 98–107)
CO2 SERPL-SCNC: 22 MMOL/L (ref 22–29)
CREAT SERPL-MCNC: 1.3 MG/DL (ref 0.5–1)
EOSINOPHIL # BLD: 0.05 K/UL (ref 0.05–0.5)
EOSINOPHILS RELATIVE PERCENT: 3 % (ref 0–6)
ERYTHROCYTE [DISTWIDTH] IN BLOOD BY AUTOMATED COUNT: 14.4 % (ref 11.5–15)
GFR SERPL CREATININE-BSD FRML MDRD: 46 ML/MIN/1.73M2
GLUCOSE BLD-MCNC: 365 MG/DL (ref 74–99)
GLUCOSE BLD-MCNC: 412 MG/DL (ref 74–99)
GLUCOSE BLD-MCNC: 418 MG/DL (ref 74–99)
GLUCOSE BLD-MCNC: >500 MG/DL (ref 74–99)
GLUCOSE BLD-MCNC: >500 MG/DL (ref 74–99)
GLUCOSE SERPL-MCNC: 562 MG/DL (ref 74–99)
HCT VFR BLD AUTO: 16.1 % (ref 34–48)
HCT VFR BLD AUTO: 20.9 % (ref 34–48)
HCT VFR BLD AUTO: 21.4 % (ref 34–48)
HGB BLD-MCNC: 4.8 G/DL (ref 11.5–15.5)
HGB BLD-MCNC: 6.3 G/DL (ref 11.5–15.5)
HGB BLD-MCNC: 6.6 G/DL (ref 11.5–15.5)
IMM GRANULOCYTES # BLD AUTO: <0.03 K/UL (ref 0–0.58)
IMM GRANULOCYTES NFR BLD: 0 % (ref 0–5)
INR PPP: 1.5
LACTATE BLDV-SCNC: 2.3 MMOL/L (ref 0.5–2.2)
LIPASE SERPL-CCNC: 35 U/L (ref 13–60)
LYMPHOCYTES NFR BLD: 0.4 K/UL (ref 1.5–4)
LYMPHOCYTES RELATIVE PERCENT: 23 % (ref 20–42)
MCH RBC QN AUTO: 25.3 PG (ref 26–35)
MCHC RBC AUTO-ENTMCNC: 29.8 G/DL (ref 32–34.5)
MCV RBC AUTO: 84.7 FL (ref 80–99.9)
MONOCYTES NFR BLD: 0.14 K/UL (ref 0.1–0.95)
MONOCYTES NFR BLD: 8 % (ref 2–12)
NEUTROPHILS NFR BLD: 66 % (ref 43–80)
NEUTS SEG NFR BLD: 1.18 K/UL (ref 1.8–7.3)
PARTIAL THROMBOPLASTIN TIME: 23.2 SEC (ref 24.5–35.1)
PLATELET CONFIRMATION: NORMAL
PLATELET, FLUORESCENCE: 34 K/UL (ref 130–450)
PMV BLD AUTO: 12.5 FL (ref 7–12)
POTASSIUM SERPL-SCNC: 5.5 MMOL/L (ref 3.5–5)
PROT SERPL-MCNC: 7.5 G/DL (ref 6.4–8.3)
PROT SERPL-MCNC: 8.2 G/DL (ref 6.4–8.3)
PROTHROMBIN TIME: 16.5 SEC (ref 9.3–12.4)
RBC # BLD AUTO: 1.9 M/UL (ref 3.5–5.5)
SODIUM SERPL-SCNC: 133 MMOL/L (ref 132–146)
WBC OTHER # BLD: 1.8 K/UL (ref 4.5–11.5)

## 2024-03-13 PROCEDURE — 99285 EMERGENCY DEPT VISIT HI MDM: CPT

## 2024-03-13 PROCEDURE — 80053 COMPREHEN METABOLIC PANEL: CPT

## 2024-03-13 PROCEDURE — 84155 ASSAY OF PROTEIN SERUM: CPT

## 2024-03-13 PROCEDURE — 89051 BODY FLUID CELL COUNT: CPT

## 2024-03-13 PROCEDURE — C9113 INJ PANTOPRAZOLE SODIUM, VIA: HCPCS | Performed by: NURSE PRACTITIONER

## 2024-03-13 PROCEDURE — 2580000003 HC RX 258: Performed by: STUDENT IN AN ORGANIZED HEALTH CARE EDUCATION/TRAINING PROGRAM

## 2024-03-13 PROCEDURE — 86850 RBC ANTIBODY SCREEN: CPT

## 2024-03-13 PROCEDURE — 83690 ASSAY OF LIPASE: CPT

## 2024-03-13 PROCEDURE — 82042 OTHER SOURCE ALBUMIN QUAN EA: CPT

## 2024-03-13 PROCEDURE — 06L38CZ OCCLUSION OF ESOPHAGEAL VEIN WITH EXTRALUMINAL DEVICE, VIA NATURAL OR ARTIFICIAL OPENING ENDOSCOPIC: ICD-10-PCS | Performed by: STUDENT IN AN ORGANIZED HEALTH CARE EDUCATION/TRAINING PROGRAM

## 2024-03-13 PROCEDURE — 82140 ASSAY OF AMMONIA: CPT

## 2024-03-13 PROCEDURE — 6370000000 HC RX 637 (ALT 250 FOR IP): Performed by: NURSE PRACTITIONER

## 2024-03-13 PROCEDURE — 6360000002 HC RX W HCPCS: Performed by: NURSE PRACTITIONER

## 2024-03-13 PROCEDURE — 36430 TRANSFUSION BLD/BLD COMPNT: CPT

## 2024-03-13 PROCEDURE — 99223 1ST HOSP IP/OBS HIGH 75: CPT | Performed by: INTERNAL MEDICINE

## 2024-03-13 PROCEDURE — 86901 BLOOD TYPING SEROLOGIC RH(D): CPT

## 2024-03-13 PROCEDURE — 36415 COLL VENOUS BLD VENIPUNCTURE: CPT

## 2024-03-13 PROCEDURE — 83605 ASSAY OF LACTIC ACID: CPT

## 2024-03-13 PROCEDURE — 99222 1ST HOSP IP/OBS MODERATE 55: CPT | Performed by: STUDENT IN AN ORGANIZED HEALTH CARE EDUCATION/TRAINING PROGRAM

## 2024-03-13 PROCEDURE — 30233N1 TRANSFUSION OF NONAUTOLOGOUS RED BLOOD CELLS INTO PERIPHERAL VEIN, PERCUTANEOUS APPROACH: ICD-10-PCS | Performed by: INTERNAL MEDICINE

## 2024-03-13 PROCEDURE — 6370000000 HC RX 637 (ALT 250 FOR IP): Performed by: STUDENT IN AN ORGANIZED HEALTH CARE EDUCATION/TRAINING PROGRAM

## 2024-03-13 PROCEDURE — 86923 COMPATIBILITY TEST ELECTRIC: CPT

## 2024-03-13 PROCEDURE — 96375 TX/PRO/DX INJ NEW DRUG ADDON: CPT

## 2024-03-13 PROCEDURE — 2580000003 HC RX 258: Performed by: NURSE PRACTITIONER

## 2024-03-13 PROCEDURE — 86900 BLOOD TYPING SEROLOGIC ABO: CPT

## 2024-03-13 PROCEDURE — 82248 BILIRUBIN DIRECT: CPT

## 2024-03-13 PROCEDURE — 85014 HEMATOCRIT: CPT

## 2024-03-13 PROCEDURE — 96374 THER/PROPH/DIAG INJ IV PUSH: CPT

## 2024-03-13 PROCEDURE — P9016 RBC LEUKOCYTES REDUCED: HCPCS

## 2024-03-13 PROCEDURE — 82150 ASSAY OF AMYLASE: CPT

## 2024-03-13 PROCEDURE — 82962 GLUCOSE BLOOD TEST: CPT

## 2024-03-13 PROCEDURE — 85018 HEMOGLOBIN: CPT

## 2024-03-13 PROCEDURE — 0DB68ZX EXCISION OF STOMACH, VIA NATURAL OR ARTIFICIAL OPENING ENDOSCOPIC, DIAGNOSTIC: ICD-10-PCS | Performed by: STUDENT IN AN ORGANIZED HEALTH CARE EDUCATION/TRAINING PROGRAM

## 2024-03-13 PROCEDURE — 85730 THROMBOPLASTIN TIME PARTIAL: CPT

## 2024-03-13 PROCEDURE — 87070 CULTURE OTHR SPECIMN AEROBIC: CPT

## 2024-03-13 PROCEDURE — 99222 1ST HOSP IP/OBS MODERATE 55: CPT | Performed by: NURSE PRACTITIONER

## 2024-03-13 PROCEDURE — 0DB98ZX EXCISION OF DUODENUM, VIA NATURAL OR ARTIFICIAL OPENING ENDOSCOPIC, DIAGNOSTIC: ICD-10-PCS | Performed by: STUDENT IN AN ORGANIZED HEALTH CARE EDUCATION/TRAINING PROGRAM

## 2024-03-13 PROCEDURE — 30233N1 TRANSFUSION OF NONAUTOLOGOUS RED BLOOD CELLS INTO PERIPHERAL VEIN, PERCUTANEOUS APPROACH: ICD-10-PCS

## 2024-03-13 PROCEDURE — 85025 COMPLETE CBC W/AUTO DIFF WBC: CPT

## 2024-03-13 PROCEDURE — 87205 SMEAR GRAM STAIN: CPT

## 2024-03-13 PROCEDURE — 85610 PROTHROMBIN TIME: CPT

## 2024-03-13 PROCEDURE — 2060000000 HC ICU INTERMEDIATE R&B

## 2024-03-13 PROCEDURE — A4216 STERILE WATER/SALINE, 10 ML: HCPCS | Performed by: NURSE PRACTITIONER

## 2024-03-13 PROCEDURE — 6360000002 HC RX W HCPCS: Performed by: STUDENT IN AN ORGANIZED HEALTH CARE EDUCATION/TRAINING PROGRAM

## 2024-03-13 RX ORDER — ACETAMINOPHEN 325 MG/1
650 TABLET ORAL EVERY 6 HOURS PRN
Status: DISCONTINUED | OUTPATIENT
Start: 2024-03-13 | End: 2024-03-17 | Stop reason: HOSPADM

## 2024-03-13 RX ORDER — DEXTROSE MONOHYDRATE 100 MG/ML
INJECTION, SOLUTION INTRAVENOUS CONTINUOUS PRN
Status: DISCONTINUED | OUTPATIENT
Start: 2024-03-13 | End: 2024-03-17 | Stop reason: HOSPADM

## 2024-03-13 RX ORDER — INSULIN LISPRO 100 [IU]/ML
0-16 INJECTION, SOLUTION INTRAVENOUS; SUBCUTANEOUS
Status: DISCONTINUED | OUTPATIENT
Start: 2024-03-13 | End: 2024-03-16

## 2024-03-13 RX ORDER — INSULIN LISPRO 100 [IU]/ML
0-4 INJECTION, SOLUTION INTRAVENOUS; SUBCUTANEOUS NIGHTLY
Status: DISCONTINUED | OUTPATIENT
Start: 2024-03-13 | End: 2024-03-16

## 2024-03-13 RX ORDER — OCTREOTIDE ACETATE 50 UG/ML
50 INJECTION, SOLUTION INTRAVENOUS; SUBCUTANEOUS ONCE
Status: COMPLETED | OUTPATIENT
Start: 2024-03-13 | End: 2024-03-13

## 2024-03-13 RX ORDER — SODIUM CHLORIDE 9 MG/ML
INJECTION, SOLUTION INTRAVENOUS PRN
Status: DISCONTINUED | OUTPATIENT
Start: 2024-03-13 | End: 2024-03-17 | Stop reason: HOSPADM

## 2024-03-13 RX ORDER — SPIRONOLACTONE 25 MG/1
50 TABLET ORAL DAILY
Status: DISCONTINUED | OUTPATIENT
Start: 2024-03-13 | End: 2024-03-17 | Stop reason: HOSPADM

## 2024-03-13 RX ORDER — FUROSEMIDE 10 MG/ML
20 INJECTION INTRAMUSCULAR; INTRAVENOUS ONCE
Status: COMPLETED | OUTPATIENT
Start: 2024-03-13 | End: 2024-03-13

## 2024-03-13 RX ORDER — SODIUM CHLORIDE 0.9 % (FLUSH) 0.9 %
5-40 SYRINGE (ML) INJECTION PRN
Status: DISCONTINUED | OUTPATIENT
Start: 2024-03-13 | End: 2024-03-17 | Stop reason: HOSPADM

## 2024-03-13 RX ORDER — CARVEDILOL 6.25 MG/1
6.25 TABLET ORAL DAILY
Status: DISCONTINUED | OUTPATIENT
Start: 2024-03-13 | End: 2024-03-17 | Stop reason: HOSPADM

## 2024-03-13 RX ORDER — ONDANSETRON 4 MG/1
4 TABLET, ORALLY DISINTEGRATING ORAL EVERY 8 HOURS PRN
Status: DISCONTINUED | OUTPATIENT
Start: 2024-03-13 | End: 2024-03-17 | Stop reason: HOSPADM

## 2024-03-13 RX ORDER — CHOLECALCIFEROL (VITAMIN D3) 50 MCG
2000 TABLET ORAL DAILY
Status: DISCONTINUED | OUTPATIENT
Start: 2024-03-13 | End: 2024-03-17 | Stop reason: HOSPADM

## 2024-03-13 RX ORDER — LACTULOSE 10 G/15ML
20 SOLUTION ORAL 3 TIMES DAILY
Status: DISCONTINUED | OUTPATIENT
Start: 2024-03-13 | End: 2024-03-13 | Stop reason: SDUPTHER

## 2024-03-13 RX ORDER — ERGOCALCIFEROL 1.25 MG/1
50000 CAPSULE ORAL WEEKLY
COMMUNITY

## 2024-03-13 RX ORDER — GABAPENTIN 300 MG/1
600 CAPSULE ORAL 3 TIMES DAILY
Status: DISCONTINUED | OUTPATIENT
Start: 2024-03-13 | End: 2024-03-17 | Stop reason: HOSPADM

## 2024-03-13 RX ORDER — ACETAMINOPHEN 650 MG/1
650 SUPPOSITORY RECTAL EVERY 6 HOURS PRN
Status: DISCONTINUED | OUTPATIENT
Start: 2024-03-13 | End: 2024-03-17 | Stop reason: HOSPADM

## 2024-03-13 RX ORDER — LACTULOSE 10 G/15ML
20 SOLUTION ORAL 3 TIMES DAILY
Status: DISCONTINUED | OUTPATIENT
Start: 2024-03-13 | End: 2024-03-17 | Stop reason: HOSPADM

## 2024-03-13 RX ORDER — FUROSEMIDE 40 MG/1
20 TABLET ORAL DAILY
Status: DISCONTINUED | OUTPATIENT
Start: 2024-03-14 | End: 2024-03-17 | Stop reason: HOSPADM

## 2024-03-13 RX ORDER — OCTREOTIDE ACETATE 50 UG/ML
50 INJECTION, SOLUTION INTRAVENOUS; SUBCUTANEOUS ONCE
Status: DISCONTINUED | OUTPATIENT
Start: 2024-03-13 | End: 2024-03-13

## 2024-03-13 RX ORDER — INSULIN LISPRO 100 [IU]/ML
50 INJECTION, SUSPENSION SUBCUTANEOUS 2 TIMES DAILY WITH MEALS
COMMUNITY
End: 2024-03-29 | Stop reason: SDUPTHER

## 2024-03-13 RX ORDER — ONDANSETRON 2 MG/ML
4 INJECTION INTRAMUSCULAR; INTRAVENOUS EVERY 6 HOURS PRN
Status: DISCONTINUED | OUTPATIENT
Start: 2024-03-13 | End: 2024-03-17 | Stop reason: HOSPADM

## 2024-03-13 RX ORDER — POLYETHYLENE GLYCOL 3350 17 G/17G
17 POWDER, FOR SOLUTION ORAL DAILY PRN
Status: DISCONTINUED | OUTPATIENT
Start: 2024-03-13 | End: 2024-03-17 | Stop reason: HOSPADM

## 2024-03-13 RX ORDER — GLUCAGON 1 MG/ML
1 KIT INJECTION PRN
Status: DISCONTINUED | OUTPATIENT
Start: 2024-03-13 | End: 2024-03-17 | Stop reason: HOSPADM

## 2024-03-13 RX ORDER — SODIUM CHLORIDE 0.9 % (FLUSH) 0.9 %
5-40 SYRINGE (ML) INJECTION EVERY 12 HOURS SCHEDULED
Status: DISCONTINUED | OUTPATIENT
Start: 2024-03-13 | End: 2024-03-17 | Stop reason: HOSPADM

## 2024-03-13 RX ADMIN — INSULIN LISPRO 16 UNITS: 100 INJECTION, SOLUTION INTRAVENOUS; SUBCUTANEOUS at 18:17

## 2024-03-13 RX ADMIN — FUROSEMIDE 20 MG: 10 INJECTION, SOLUTION INTRAMUSCULAR; INTRAVENOUS at 12:09

## 2024-03-13 RX ADMIN — INSULIN LISPRO 4 UNITS: 100 INJECTION, SOLUTION INTRAVENOUS; SUBCUTANEOUS at 20:11

## 2024-03-13 RX ADMIN — INSULIN HUMAN 10 UNITS: 100 INJECTION, SOLUTION PARENTERAL at 14:03

## 2024-03-13 RX ADMIN — OCTREOTIDE ACETATE 25 MCG/HR: 500 INJECTION, SOLUTION INTRAVENOUS; SUBCUTANEOUS at 11:29

## 2024-03-13 RX ADMIN — INSULIN HUMAN 10 UNITS: 100 INJECTION, SOLUTION PARENTERAL at 11:36

## 2024-03-13 RX ADMIN — SODIUM CHLORIDE, PRESERVATIVE FREE 10 ML: 5 INJECTION INTRAVENOUS at 20:12

## 2024-03-13 RX ADMIN — GABAPENTIN 600 MG: 300 CAPSULE ORAL at 20:11

## 2024-03-13 RX ADMIN — CEFTRIAXONE SODIUM 1000 MG: 1 INJECTION, POWDER, FOR SOLUTION INTRAMUSCULAR; INTRAVENOUS at 23:10

## 2024-03-13 RX ADMIN — CEFTRIAXONE SODIUM 2000 MG: 2 INJECTION, POWDER, FOR SOLUTION INTRAMUSCULAR; INTRAVENOUS at 11:39

## 2024-03-13 RX ADMIN — LACTULOSE 20 G: 20 SOLUTION ORAL at 20:11

## 2024-03-13 RX ADMIN — PANTOPRAZOLE SODIUM 40 MG: 40 INJECTION, POWDER, FOR SOLUTION INTRAVENOUS at 20:12

## 2024-03-13 RX ADMIN — OCTREOTIDE ACETATE 50 MCG: 50 INJECTION, SOLUTION INTRAVENOUS; SUBCUTANEOUS at 11:25

## 2024-03-13 NOTE — PROGRESS NOTES
Sent perfect serve making Dr Martinez making aware pt current Blood glucose is 412 awaiting response/orders

## 2024-03-13 NOTE — PROCEDURES
Spoke with bedside RN, per our MD hemoglobin needs corrected prior to paracentesis. The procedure is planned for 3-. Please keep patient NPO, hold all thinners and obtain STAT PT/INR in AM.

## 2024-03-13 NOTE — PROGRESS NOTES
4 Eyes Skin Assessment     NAME:  Lisa Hunt  YOB: 1960  MEDICAL RECORD NUMBER:  [id number]    The patient is being assessed for  Admission    I agree that at least one RN has performed a thorough Head to Toe Skin Assessment on the patient. ALL assessment sites listed below have been assessed.      Areas assessed by both nurses:    Head, Face, Ears, Shoulders, Back, Chest, Arms, Elbows, Hands, Sacrum. Buttock, Coccyx, Ischium, and Legs. Feet and Heels        Does the Patient have a Wound? No noted wound(s)       Kane Prevention initiated by RN: Yes  Wound Care Orders initiated by RN: No    Pressure Injury (Stage 3,4, Unstageable, DTI, NWPT, and Complex wounds) if present, place Wound referral order by RN under : No    New Ostomies, if present place, Ostomy referral order under : No     Nurse 1 eSignature: Electronically signed by Lelo Rosado RN on 3/13/24 at 5:55 PM EDT    **SHARE this note so that the co-signing nurse can place an eSignature**    Nurse 2 eSignature: {Esignature:[id number]}

## 2024-03-13 NOTE — PLAN OF CARE
Problem: Chronic Conditions and Co-morbidities  Goal: Patient's chronic conditions and co-morbidity symptoms are monitored and maintained or improved  Outcome: Progressing  Flowsheets  Taken 3/13/2024 1852  Care Plan - Patient's Chronic Conditions and Co-Morbidity Symptoms are Monitored and Maintained or Improved: Monitor and assess patient's chronic conditions and comorbid symptoms for stability, deterioration, or improvement  Taken 3/13/2024 1700  Care Plan - Patient's Chronic Conditions and Co-Morbidity Symptoms are Monitored and Maintained or Improved: Monitor and assess patient's chronic conditions and comorbid symptoms for stability, deterioration, or improvement     Problem: Discharge Planning  Goal: Discharge to home or other facility with appropriate resources  Outcome: Progressing  Flowsheets (Taken 3/13/2024 1700)  Discharge to home or other facility with appropriate resources: Identify barriers to discharge with patient and caregiver     Problem: Safety - Adult  Goal: Free from fall injury  Outcome: Progressing   Pt arrived and oriented to unit  used @ bedside as pt is Japanese speaking blood transfusion started pt tolerating well plan for paracentesis 03/14 care plan ongoing

## 2024-03-13 NOTE — CONSENT
Informed Consent for Blood Component Transfusion Note    I have discussed with the patient the rationale for blood component transfusion; its benefits in treating or preventing fatigue, organ damage, or death; and its risk which includes mild transfusion reactions, rare risk of blood borne infection, or more serious but rare reactions. I have discussed the alternatives to transfusion, including the risk and consequences of not receiving transfusion. The patient had an opportunity to ask questions and had agreed to proceed with transfusion of blood components.    Electronically signed by Alessio Leach DO on 3/13/24 at 10:43 AM EDT

## 2024-03-13 NOTE — CONSULTS
[facility]   Gastroenterology, Hepatology, &  Advanced Endoscopy    Consult     Reason for consult: Decompensated Cirrhosis, GI bleed concern for Esophageal Varices  ASSESSMENT AND PLAN:  Kyrgyz speaking patient with use of video interpretor used during my consultation.  . She has had a total of 4 paracenteses and three were within the last year.     MELD 3.0  17    Cirrhosis of the liver, ACOSTA, esophageal varices, HE  - EGD Friday will continue to monitor Hgb or gross bleed  - continue Xifaxan 400 mg TID  - Protonix 40 mg qd  - lactulose q 6 hours  - coreg 6.25 hx of varices  -Hgb 4.8 on presentation in the ED.  -INR 1.5  -Rifaximin ordered  -Lactulose ordered  - US abdomen  - For paracentesis today  - Fluids for analysis  -Monitor Hgb transfuse to keep >7  - Transplant consideration referral to  as discussed during office visit with Dr. Sosa 1/24/24    EGD Findings: 1/12/24   Grade I/II varices with mucosal scarring from likely previous banding. One column with focal red spot. Banding x 1 performed.   Moderate portal hypertensive gastropathy.  Mild antral gastritis. Biopsied.   Overall normal duodenum with possible scalloping. Biopsied.     Pt has had multiple admissions  7/17/23, 8/2/23, 12/9/23 for AMS with hx of  cirrhosis/hepatic encephalopathy/esoph varices.  She admits to noncompliance with her lactulose at home in the past. She follows with Dr Jovan VÁZQUEZ as a outpatient and underwent an Ultrasound-guided paracentesis on 7/19/23 with a fluid removal of 2050 cc and 10/12/23 Paracentesis Fluid return was clear yellow colored.  A total volume of  4400mL was withdrawn.     HISTORY OF PRESENT ILLNESS:      This is a 64-year-old female with a past medical history of CKD,ACOSTA, cirrhosis, type II DM, diabetic neuropathy, esophageal varices s/p banding, GERD, HTN, SDH, thrombocytopenia, anemia, and type II DM who presents to the ED today with complaints of increasing abdominal girth, bilateral lower  CREATININE 1.3 (H) 03/13/2024    BUN 26 (H) 03/13/2024    CO2 22 03/13/2024    FOLATE 15.5 07/18/2023    [id number] 731 11/29/2023    AMMONIA 37 03/13/2024    GLUCOSE 562 (HH) 03/13/2024    INR 1.5 03/13/2024    APTT 23.2 (L) 03/13/2024    TSH 6.32 (H) 01/09/2024    LABA1C 9.2 11/29/2023         PHYSICAL EXAM:  /66   Pulse 76   Temp 97.5 °F (36.4 °C) (Temporal)   Resp 28   Wt 78.9 kg (174 lb)   SpO2 100%   BMI 31.83 kg/m²   Physical Exam:  General: Overall well-appearing, NAD  HEENT: PERRLA, EOMI, Anicteric sclera, MMM, no rhinorrhea  Cards: RRR, no LE edema  Resp: Breathing comfortably on room air, good air movement, no use of accessory muscles, no audible wheezing  Abdomen: Appreciable distention and ascites   Extremities: Moves all extremities, no effusions or bruising.  Skin: No rashes or jaundice  Neuro: A&O x 3, CN grossly intact, non-focal exam

## 2024-03-14 ENCOUNTER — ANESTHESIA EVENT (OUTPATIENT)
Dept: ENDOSCOPY | Age: 64
End: 2024-03-14
Payer: MEDICARE

## 2024-03-14 ENCOUNTER — APPOINTMENT (OUTPATIENT)
Dept: INTERVENTIONAL RADIOLOGY/VASCULAR | Age: 64
DRG: 432 | End: 2024-03-14
Payer: MEDICARE

## 2024-03-14 LAB
ALBUMIN FLD-MCNC: 1 G/DL
ALBUMIN SERPL-MCNC: 3 G/DL (ref 3.5–5.2)
ALP SERPL-CCNC: 119 U/L (ref 35–104)
ALT SERPL-CCNC: 12 U/L (ref 0–32)
AMYLASE FLD-CCNC: 10 U/L
ANION GAP SERPL CALCULATED.3IONS-SCNC: 10 MMOL/L (ref 7–16)
ANION GAP SERPL CALCULATED.3IONS-SCNC: 9 MMOL/L (ref 7–16)
APPEARANCE FLD: NORMAL
AST SERPL-CCNC: 28 U/L (ref 0–31)
BASOPHILS # BLD: 0.01 K/UL (ref 0–0.2)
BASOPHILS NFR BLD: 1 % (ref 0–2)
BILIRUB SERPL-MCNC: 2.5 MG/DL (ref 0–1.2)
BODY FLD TYPE: NORMAL
BUN SERPL-MCNC: 27 MG/DL (ref 6–23)
BUN SERPL-MCNC: 27 MG/DL (ref 6–23)
CALCIUM SERPL-MCNC: 8.5 MG/DL (ref 8.6–10.2)
CALCIUM SERPL-MCNC: 8.7 MG/DL (ref 8.6–10.2)
CHLORIDE SERPL-SCNC: 100 MMOL/L (ref 98–107)
CHLORIDE SERPL-SCNC: 100 MMOL/L (ref 98–107)
CLOT CHECK: NORMAL
CO2 SERPL-SCNC: 22 MMOL/L (ref 22–29)
CO2 SERPL-SCNC: 24 MMOL/L (ref 22–29)
COLOR FLD: YELLOW
CREAT SERPL-MCNC: 1.3 MG/DL (ref 0.5–1)
CREAT SERPL-MCNC: 1.3 MG/DL (ref 0.5–1)
EOSINOPHIL # BLD: 0.09 K/UL (ref 0.05–0.5)
EOSINOPHILS RELATIVE PERCENT: 4 % (ref 0–6)
ERYTHROCYTE [DISTWIDTH] IN BLOOD BY AUTOMATED COUNT: 14.7 % (ref 11.5–15)
GFR SERPL CREATININE-BSD FRML MDRD: 45 ML/MIN/1.73M2
GFR SERPL CREATININE-BSD FRML MDRD: 46 ML/MIN/1.73M2
GLUCOSE BLD-MCNC: 168 MG/DL (ref 74–99)
GLUCOSE BLD-MCNC: 221 MG/DL (ref 74–99)
GLUCOSE BLD-MCNC: 276 MG/DL (ref 74–99)
GLUCOSE BLD-MCNC: 323 MG/DL (ref 74–99)
GLUCOSE SERPL-MCNC: 150 MG/DL (ref 74–99)
GLUCOSE SERPL-MCNC: 326 MG/DL (ref 74–99)
HCT VFR BLD AUTO: 26.1 % (ref 34–48)
HCT VFR BLD AUTO: 26.3 % (ref 34–48)
HCT VFR BLD AUTO: 27.4 % (ref 34–48)
HCT VFR BLD AUTO: 27.7 % (ref 34–48)
HGB BLD-MCNC: 8.2 G/DL (ref 11.5–15.5)
HGB BLD-MCNC: 8.3 G/DL (ref 11.5–15.5)
HGB BLD-MCNC: 8.3 G/DL (ref 11.5–15.5)
HGB BLD-MCNC: 8.5 G/DL (ref 11.5–15.5)
IMM GRANULOCYTES # BLD AUTO: <0.03 K/UL (ref 0–0.58)
IMM GRANULOCYTES NFR BLD: 0 % (ref 0–5)
LYMPHOCYTES NFR BLD: 0.61 K/UL (ref 1.5–4)
LYMPHOCYTES RELATIVE PERCENT: 30 % (ref 20–42)
MCH RBC QN AUTO: 27.7 PG (ref 26–35)
MCHC RBC AUTO-ENTMCNC: 31.6 G/DL (ref 32–34.5)
MCV RBC AUTO: 87.7 FL (ref 80–99.9)
MONOCYTES NFR BLD: 0.16 K/UL (ref 0.1–0.95)
MONOCYTES NFR BLD: 8 % (ref 2–12)
MONOCYTES NFR FLD: 95 %
NEUTROPHILS NFR BLD: 58 % (ref 43–80)
NEUTROPHILS NFR FLD: 5 %
NEUTS SEG NFR BLD: 1.19 K/UL (ref 1.8–7.3)
PLATELET # BLD AUTO: 29 K/UL (ref 130–450)
PLATELET CONFIRMATION: NORMAL
PMV BLD AUTO: 12 FL (ref 7–12)
POTASSIUM SERPL-SCNC: 4.9 MMOL/L (ref 3.5–5)
POTASSIUM SERPL-SCNC: 5.2 MMOL/L (ref 3.5–5)
PROT SERPL-MCNC: 7.5 G/DL (ref 6.4–8.3)
RBC # BLD AUTO: 3 M/UL (ref 3.5–5.5)
RBC # FLD: <2000 CELLS/UL
SODIUM SERPL-SCNC: 131 MMOL/L (ref 132–146)
SODIUM SERPL-SCNC: 134 MMOL/L (ref 132–146)
SPECIMEN TYPE: NORMAL
SPECIMEN TYPE: NORMAL
WBC # FLD: 114 CELLS/UL
WBC OTHER # BLD: 2.1 K/UL (ref 4.5–11.5)

## 2024-03-14 PROCEDURE — C9113 INJ PANTOPRAZOLE SODIUM, VIA: HCPCS | Performed by: NURSE PRACTITIONER

## 2024-03-14 PROCEDURE — 6360000002 HC RX W HCPCS: Performed by: INTERNAL MEDICINE

## 2024-03-14 PROCEDURE — 0W9G3ZZ DRAINAGE OF PERITONEAL CAVITY, PERCUTANEOUS APPROACH: ICD-10-PCS

## 2024-03-14 PROCEDURE — 6360000002 HC RX W HCPCS: Performed by: NURSE PRACTITIONER

## 2024-03-14 PROCEDURE — 2580000003 HC RX 258: Performed by: NURSE PRACTITIONER

## 2024-03-14 PROCEDURE — C1729 CATH, DRAINAGE: HCPCS

## 2024-03-14 PROCEDURE — 49083 ABD PARACENTESIS W/IMAGING: CPT

## 2024-03-14 PROCEDURE — 82962 GLUCOSE BLOOD TEST: CPT

## 2024-03-14 PROCEDURE — A4216 STERILE WATER/SALINE, 10 ML: HCPCS | Performed by: NURSE PRACTITIONER

## 2024-03-14 PROCEDURE — 85014 HEMATOCRIT: CPT

## 2024-03-14 PROCEDURE — 2500000003 HC RX 250 WO HCPCS: Performed by: PHYSICIAN ASSISTANT

## 2024-03-14 PROCEDURE — P9047 ALBUMIN (HUMAN), 25%, 50ML: HCPCS | Performed by: INTERNAL MEDICINE

## 2024-03-14 PROCEDURE — 80053 COMPREHEN METABOLIC PANEL: CPT

## 2024-03-14 PROCEDURE — 0W9G3ZZ DRAINAGE OF PERITONEAL CAVITY, PERCUTANEOUS APPROACH: ICD-10-PCS | Performed by: RADIOLOGY

## 2024-03-14 PROCEDURE — 99232 SBSQ HOSP IP/OBS MODERATE 35: CPT | Performed by: NURSE PRACTITIONER

## 2024-03-14 PROCEDURE — 99233 SBSQ HOSP IP/OBS HIGH 50: CPT | Performed by: INTERNAL MEDICINE

## 2024-03-14 PROCEDURE — 80048 BASIC METABOLIC PNL TOTAL CA: CPT

## 2024-03-14 PROCEDURE — P9016 RBC LEUKOCYTES REDUCED: HCPCS

## 2024-03-14 PROCEDURE — 2060000000 HC ICU INTERMEDIATE R&B

## 2024-03-14 PROCEDURE — 6370000000 HC RX 637 (ALT 250 FOR IP): Performed by: NURSE PRACTITIONER

## 2024-03-14 PROCEDURE — 88112 CYTOPATH CELL ENHANCE TECH: CPT

## 2024-03-14 PROCEDURE — 85018 HEMOGLOBIN: CPT

## 2024-03-14 PROCEDURE — 36430 TRANSFUSION BLD/BLD COMPNT: CPT

## 2024-03-14 PROCEDURE — 85025 COMPLETE CBC W/AUTO DIFF WBC: CPT

## 2024-03-14 PROCEDURE — 88305 TISSUE EXAM BY PATHOLOGIST: CPT

## 2024-03-14 PROCEDURE — 36415 COLL VENOUS BLD VENIPUNCTURE: CPT

## 2024-03-14 PROCEDURE — 6360000002 HC RX W HCPCS: Performed by: STUDENT IN AN ORGANIZED HEALTH CARE EDUCATION/TRAINING PROGRAM

## 2024-03-14 PROCEDURE — 2580000003 HC RX 258: Performed by: STUDENT IN AN ORGANIZED HEALTH CARE EDUCATION/TRAINING PROGRAM

## 2024-03-14 RX ORDER — LIDOCAINE HYDROCHLORIDE 20 MG/ML
INJECTION, SOLUTION INFILTRATION; PERINEURAL PRN
Status: COMPLETED | OUTPATIENT
Start: 2024-03-14 | End: 2024-03-14

## 2024-03-14 RX ORDER — ALBUMIN (HUMAN) 12.5 G/50ML
25 SOLUTION INTRAVENOUS EVERY 8 HOURS
Status: COMPLETED | OUTPATIENT
Start: 2024-03-14 | End: 2024-03-15

## 2024-03-14 RX ORDER — CALCIUM GLUCONATE 10 MG/ML
1000 INJECTION, SOLUTION INTRAVENOUS ONCE
Status: COMPLETED | OUTPATIENT
Start: 2024-03-14 | End: 2024-03-14

## 2024-03-14 RX ADMIN — Medication 2000 UNITS: at 10:13

## 2024-03-14 RX ADMIN — SODIUM CHLORIDE, PRESERVATIVE FREE 10 ML: 5 INJECTION INTRAVENOUS at 09:48

## 2024-03-14 RX ADMIN — GABAPENTIN 600 MG: 300 CAPSULE ORAL at 10:12

## 2024-03-14 RX ADMIN — RIFAXIMIN 400 MG: 200 TABLET ORAL at 20:57

## 2024-03-14 RX ADMIN — CARVEDILOL 6.25 MG: 6.25 TABLET, FILM COATED ORAL at 10:12

## 2024-03-14 RX ADMIN — LIDOCAINE HYDROCHLORIDE 10 ML: 20 INJECTION, SOLUTION INFILTRATION; PERINEURAL at 12:25

## 2024-03-14 RX ADMIN — RIFAXIMIN 400 MG: 200 TABLET ORAL at 10:13

## 2024-03-14 RX ADMIN — LACTULOSE 20 G: 20 SOLUTION ORAL at 20:42

## 2024-03-14 RX ADMIN — INSULIN LISPRO 12 UNITS: 100 INJECTION, SOLUTION INTRAVENOUS; SUBCUTANEOUS at 16:40

## 2024-03-14 RX ADMIN — LACTULOSE 20 G: 20 SOLUTION ORAL at 13:56

## 2024-03-14 RX ADMIN — FUROSEMIDE 20 MG: 40 TABLET ORAL at 10:12

## 2024-03-14 RX ADMIN — GABAPENTIN 600 MG: 300 CAPSULE ORAL at 20:42

## 2024-03-14 RX ADMIN — ALBUMIN (HUMAN) 25 G: 0.25 INJECTION, SOLUTION INTRAVENOUS at 10:04

## 2024-03-14 RX ADMIN — LACTULOSE 20 G: 20 SOLUTION ORAL at 10:12

## 2024-03-14 RX ADMIN — INSULIN LISPRO 4 UNITS: 100 INJECTION, SOLUTION INTRAVENOUS; SUBCUTANEOUS at 13:56

## 2024-03-14 RX ADMIN — OCTREOTIDE ACETATE 25 MCG/HR: 500 INJECTION, SOLUTION INTRAVENOUS; SUBCUTANEOUS at 05:33

## 2024-03-14 RX ADMIN — SPIRONOLACTONE 50 MG: 25 TABLET ORAL at 10:12

## 2024-03-14 RX ADMIN — RIFAXIMIN 400 MG: 200 TABLET ORAL at 13:56

## 2024-03-14 RX ADMIN — GABAPENTIN 600 MG: 300 CAPSULE ORAL at 13:56

## 2024-03-14 RX ADMIN — PANTOPRAZOLE SODIUM 40 MG: 40 INJECTION, POWDER, FOR SOLUTION INTRAVENOUS at 20:42

## 2024-03-14 RX ADMIN — PANTOPRAZOLE SODIUM 40 MG: 40 INJECTION, POWDER, FOR SOLUTION INTRAVENOUS at 09:55

## 2024-03-14 RX ADMIN — ALBUMIN (HUMAN) 25 G: 0.25 INJECTION, SOLUTION INTRAVENOUS at 17:45

## 2024-03-14 RX ADMIN — CALCIUM GLUCONATE 1000 MG: 10 INJECTION, SOLUTION INTRAVENOUS at 09:55

## 2024-03-14 ASSESSMENT — PAIN - FUNCTIONAL ASSESSMENT: PAIN_FUNCTIONAL_ASSESSMENT: ACTIVITIES ARE NOT PREVENTED

## 2024-03-14 ASSESSMENT — PAIN SCALES - GENERAL
PAINLEVEL_OUTOF10: 3
PAINLEVEL_OUTOF10: 1

## 2024-03-14 ASSESSMENT — PAIN DESCRIPTION - DESCRIPTORS: DESCRIPTORS: HEAVINESS

## 2024-03-14 ASSESSMENT — PAIN DESCRIPTION - PAIN TYPE: TYPE: ACUTE PAIN

## 2024-03-14 ASSESSMENT — PAIN DESCRIPTION - ORIENTATION: ORIENTATION: LOWER;UPPER

## 2024-03-14 ASSESSMENT — PAIN DESCRIPTION - LOCATION: LOCATION: ABDOMEN

## 2024-03-14 NOTE — PROGRESS NOTES
Blood glucose 365. Yulisa Balderas NP notified. States that she will forward to attending. No new orders at this time.

## 2024-03-14 NOTE — PROGRESS NOTES
Comprehensive Nutrition Assessment    Type and Reason for Visit:  Initial, Positive Nutrition Screen    Nutrition Recommendations/Plan:   Continue NPO ; will provide nutrition recs as appropriate with diet advancement  Will monitor     Malnutrition Assessment:  Malnutrition Status:  At risk for malnutrition (Comment) (03/14/24 1105)    Context:  Chronic Illness     Findings of the 6 clinical characteristics of malnutrition:  Energy Intake:  75% or less estimated energy requirements for 1 month or longer  Weight Loss:  Unable to assess (d/t lack of wt hx per EMR)     Body Fat Loss:  Unable to assess     Muscle Mass Loss:  Unable to assess    Fluid Accumulation:  No significant fluid accumulation     Strength:  Not Performed    Nutrition Assessment:    pt adm d/t abd pain/ascites w/ dark stools/GIB; pt currently NPO anticipating EGD/thoracentesis today 3/14; PMhx of liver cirrhosis 2/2 ACOSTA w/ esophageal varices s/p banding; PMhx of SDH,GERD; will provide nutrition recs as appropriate with diet advancement; will monitor.    Nutrition Related Findings:    Kazakh speaking ; +I/O; alert; active BS; trace edema; hyponatremia; hyperkalemia; elevated bili Wound Type: None       Current Nutrition Intake & Therapies:    Average Meal Intake: NPO  Average Supplements Intake: NPO  Diet NPO    Anthropometric Measures:  Height: 160 cm (5' 2.99\")  Ideal Body Weight (IBW): 115 lbs (52 kg)    Admission Body Weight: 74.8 kg (164 lb 14.5 oz) (3/13-BS)  Current Body Weight: 74.8 kg (164 lb 14.5 oz) (3/13-BS), 143.4 % IBW. Weight Source: Bed Scale  Current BMI (kg/m2): 29.2  Usual Body Weight:  (UTO d/t lack of wt hx per EMR)     Weight Adjustment For: No Adjustment                 BMI Categories: Overweight (BMI 25.0-29.9)    Estimated Daily Nutrient Needs:  Energy Requirements Based On: Formula  Weight Used for Energy Requirements: Current  Energy (kcal/day): 4276-3137  Weight Used for Protein Requirements: Ideal  Protein (g/day):

## 2024-03-14 NOTE — PROGRESS NOTES
[facility]   Gastroenterology, Hepatology, &  Advanced Endoscopy      Reason for consult: Decompensated Cirrhosis, GI bleed concern for Esophageal Varices  ASSESSMENT AND PLAN:  Georgian speaking patient with use of video interpretor used during my consultation.  . She has had a total of 4 paracenteses and three were within the last year.     MELD 3.0  17    Cirrhosis of the liver, ACOSTA, esophageal varices, HE  - EGD Friday will continue to monitor Hgb or gross bleed  - continue Xifaxan 400 mg TID  - Protonix 40 mg qd  - lactulose q 6 hours  - coreg 6.25 hx of varices  -Hgb 4.8 on presentation in the ED.  -INR 1.5  -Rifaximin ordered  -Lactulose ordered  - US abdomen  - For paracentesis today, not done yesterday 2/2 low hgb 6.6 , 8.3 currently  - Fluids for analysis  -Monitor Hgb transfuse to keep >7  repeat transfusion this am  - Transplant consideration referral to  as discussed during office visit with Dr. Sosa 1/24/24, pt has a appointment setup at  but not seen yet.    EGD Findings: 1/12/24   Grade I/II varices with mucosal scarring from likely previous banding. One column with focal red spot. Banding x 1 performed.   Moderate portal hypertensive gastropathy.  Mild antral gastritis. Biopsied.   Overall normal duodenum with possible scalloping. Biopsied.     Pt has had multiple admissions  7/17/23, 8/2/23, 12/9/23 for AMS with hx of  cirrhosis/hepatic encephalopathy/esoph varices.  She admits to noncompliance with her lactulose at home in the past. She follows with Dr Jovan VÁZQUEZ as a outpatient and underwent an Ultrasound-guided paracentesis on 7/19/23 with a fluid removal of 2050 cc and 10/12/23 Paracentesis Fluid return was clear yellow colored.  A total volume of  4400mL was withdrawn.     HISTORY OF PRESENT ILLNESS:      This is a 64-year-old female with a past medical history of CKD,ACOSTA, cirrhosis, type II DM, diabetic neuropathy, esophageal varices s/p banding, GERD, HTN, SDH,  IntraVENous, Continuous PRN, Lisa Campa APRN - CNP    insulin lispro (HUMALOG) injection vial 0-16 Units, 0-16 Units, SubCUTAneous, TID WC, Carrier, MONROE GonzalezN - CNP, 16 Units at 03/13/24 1817    insulin lispro (HUMALOG) injection vial 0-4 Units, 0-4 Units, SubCUTAneous, Nightly, Katheryn, [place] Gonzalez - CNP, 4 Units at 03/13/24 2011    cefTRIAXone (ROCEPHIN) 1,000 mg in sterile water 10 mL IV syringe, 1,000 mg, IntraVENous, Q24H, Katheryn, [place] Gonzalez - CNP, 1,000 mg at 03/13/24 2310    carvedilol (COREG) tablet 6.25 mg, 6.25 mg, Oral, Daily, Lisa Campa APRN - CNP    gabapentin (NEURONTIN) capsule 600 mg, 600 mg, Oral, TID, Lisa Campa APRN - CNP, 600 mg at 03/13/24 2011    lactulose (CHRONULAC) 10 GM/15ML solution 20 g, 20 g, Oral, TID, Lisa Campa APRN - CNP, 20 g at 03/13/24 2011    spironolactone (ALDACTONE) tablet 50 mg, 50 mg, Oral, Daily, Lisa Campa APRN - CNP    vitamin D (CHOLECALCIFEROL) tablet 2,000 Units, 2,000 Units, Oral, Daily, Lisa Campa APRN - CNP    furosemide (LASIX) tablet 20 mg, 20 mg, Oral, Daily, Lisa Campa APRN - CNP    rifAXIMin (XIFAXAN) tablet 400 mg, 400 mg, Oral, TID, Igor Hussein APRN - CNP    0.9 % sodium chloride infusion, , IntraVENous, PRN, Yulisa Balderas APRN - NP     Allergies:  Fish allergy    ROS:  Aside from what was mentioned in the past medical history and history of present illness, essentially unremarkable, all others are negative.      Recent Labs     03/13/24  0943 03/14/24  0634   INR 1.5  --    ALT 11 12   AST 25 28   ALKPHOS 124* 119*   BILITOT 0.9 2.5*   LABALBU 3.0* 3.0*   BILIDIR 0.3  --        Lab Results   Component Value Date    WBC 2.1 (L) 03/14/2024    HGB 8.3 (L) 03/14/2024    HCT 27.4 (L) 03/14/2024    PLT 29 (L) 03/14/2024     (L) 03/14/2024    K 5.2 (H) 03/14/2024     03/14/2024    CREATININE 1.3 (H) 03/14/2024    BUN 27 (H) 03/14/2024    CO2 22 03/14/2024

## 2024-03-14 NOTE — OP NOTE
Operative Note  ______________________________________________________________      IR U/S GUIDED PARACENTESIS  [facility] 8SE MED SURG/PEDS    Patient Name: Lisa Hunt   YOB: 1960  Medical Record Number: [id number]  Date of Procedure: 3/14/24  Room/Bed: [facility]8508-    Pre-operative Diagnosis: Ascites    Post-operative Diagnosis: Ascites    Consent: Informed consent was obtained from the patient via representative from [facility] Video Remote Interpreting Service due to language barrier. prior to the procedure. The details of the procedure, as well is its risks, benefits, and alternatives, were explained.      Anesthesia: Local anesthesia    Performed by: RANJITH Alvarez under on-site supervision by Tian Gonzalez MD.    Estimated blood loss: Minimal    Complications: None    Specimens Obtained: Ascites Fluid    Procedure: Routine scanning of all four abdominal quadrants was performed using real-time ultrasound and revealed sufficient amount of ascites fluid present.  Decision was made to proceed with procedure.  After obtaining consent, a \"Time-Out\" was called to verify the correct patient, procedure/location, allergies, relevant medications held for procedure and that all equipment is functioning and available. The patient was then placed in the supine position with the head of the bed slightly elevated and the appropriate landmarks were identified. The skin over the puncture site in the left lower quadrant region was prepped with betadine and draped in a sterile fashion. Local anesthesia was obtained by infiltration using 2% Lidocaine without epinephrine. A 5 Mongolian needle sheath catheter was then advanced into the abdominal cavity. Fluid return was clear straw colored.      A total volume of  4250mL was withdrawn. The catheter was then withdrawn and a sterile dressing was placed over the site. The patient tolerated the procedure well.     Complications: None.     Estimated Blood Loss: <

## 2024-03-14 NOTE — ACP (ADVANCE CARE PLANNING)
Advance Care Planning   Healthcare Decision Maker:    Primary Decision Maker: yolette fitch - Child - [phone]    Secondary Decision Maker: Lisa Fitch - Child - [phone]    Click here to complete Healthcare Decision Makers including selection of the Healthcare Decision Maker Relationship (ie \"Primary\").

## 2024-03-14 NOTE — PLAN OF CARE
Problem: Chronic Conditions and Co-morbidities  Goal: Patient's chronic conditions and co-morbidity symptoms are monitored and maintained or improved  3/13/2024 2115 by Renea Ladd RN  Outcome: Progressing  3/13/2024 1852 by Lelo Rosado RN  Outcome: Progressing  Flowsheets  Taken 3/13/2024 1852  Care Plan - Patient's Chronic Conditions and Co-Morbidity Symptoms are Monitored and Maintained or Improved: Monitor and assess patient's chronic conditions and comorbid symptoms for stability, deterioration, or improvement  Taken 3/13/2024 1700  Care Plan - Patient's Chronic Conditions and Co-Morbidity Symptoms are Monitored and Maintained or Improved: Monitor and assess patient's chronic conditions and comorbid symptoms for stability, deterioration, or improvement     Problem: Discharge Planning  Goal: Discharge to home or other facility with appropriate resources  3/13/2024 2115 by Renea Ladd RN  Outcome: Progressing  3/13/2024 1852 by Lelo Rosado RN  Outcome: Progressing  Flowsheets (Taken 3/13/2024 1700)  Discharge to home or other facility with appropriate resources: Identify barriers to discharge with patient and caregiver     Problem: Safety - Adult  Goal: Free from fall injury  3/13/2024 2115 by Renea Ladd RN  Outcome: Progressing  3/13/2024 1852 by Lelo Rosado RN  Outcome: Progressing

## 2024-03-14 NOTE — ED PROVIDER NOTES
[facility] 8[facility] MED SURG/PEDS  EMERGENCY DEPARTMENT ENCOUNTER      Pt Name: Lisa Hunt  MRN: [id number]  Birthdate 1960  Date of evaluation: 3/13/2024  Provider: Alessio Leach DO  PCP: Tonie Hernandez DO      CHIEF COMPLAINT       Chief Complaint   Patient presents with    Abdominal Pain     Started a couple of days ago. Pt feels bloated and feels like she needs to be drained. Hx of cirrhosis.        HISTORY OF PRESENT ILLNESS: 1 or more Elements   History From: Patient  Limitations to history : Language  used    Lisa Hunt is a 64 y.o. female past medical history of type 2 diabetes, hypertension, CKD as well as cirrhosis secondary to ACOSTA with known esophageal varices.  Patient presents with chief complaint of worsening abdominal distention as well as pain.  Patient states that for the last few days she has had gradual worsening abdominal distention and pain.  She states that symptoms have been moderate in severity constant onset.  She denies any exacerbating leaving factors.  Patient notes that she has had multiple similar episodes in the past and has required paracentesis in the past.  Most recently was last month.  Patient denies any outward signs of bleeding.  Patient denies any fevers, chills, chest pain, cough, headache, lightheadedness, numbness tingling or weakness  Nursing Notes were all reviewed and agreed with or any disagreements were addressed in the HPI.    REVIEW OF SYSTEMS :    Positives and Pertinent negatives as per HPI.     PAST MEDICAL HISTORY/Chronic Conditions Affecting Care    has a past medical history of DONNA (acute kidney injury) (HCC) (07/18/2023), Cirrhosis (HCC), Diabetes mellitus (HCC), Diabetic neuropathy (HCC), Esophageal varices without bleeding (HCC), GERD (gastroesophageal reflux disease), Hypertension, Intracranial bleed (HCC) (05/01/2023), Liver cirrhosis (HCC), Low vitamin D level, SDH (subdural hematoma) (HCC) (05/02/2023), Thrombocytopenia

## 2024-03-14 NOTE — PROGRESS NOTES
VSS. bBood transfusion initiated after explaining procedure to patient and verifying blood transfusion consent

## 2024-03-14 NOTE — CARE COORDINATION
Here for GI bleed - hgb 4.8  Has had 3 units prbcs. liver cirrhosis She developed huge ascites causing shortness of breath, need paracentesis.:Rocephin IV  and sandostatin  Met with patient to discuss role/transition of care via Swedish interpretors session 74626. She lives with  2 story home  with her daughter  Her PCP is Dr Hernandez  and uses [facility] pharmacy on [address].  She owns a walker. No HHC or reanna history.aWAIT pt/ot. Cm/sw to follow.Electronically signed by Amy Young RN on 3/14/2024 at 11:12 AM    Case Management Assessment  Initial Evaluation    Date/Time of Evaluation: 3/14/2024 11:14 AM  Assessment Completed by: Amy Young RN    If patient is discharged prior to next notation, then this note serves as note for discharge by case management.    Patient Name: Lisa Hunt                   YOB: 1960  Diagnosis: GI bleed [K92.2]  Other ascites [R18.8]                   Date / Time: 3/13/2024 11:08 AM    Patient Admission Status: Inpatient   Readmission Risk (Low < 19, Mod (19-27), High > 27): Readmission Risk Score: 24.9    Current PCP: Tonie Hernandez, DO  PCP verified by CM? Yes    Chart Reviewed: Yes      History Provided by: Patient  Patient Orientation: Alert and Oriented    Patient Cognition: Alert    Hospitalization in the last 30 days (Readmission):  No    If yes, Readmission Assessment in  Navigator will be completed.    Advance Directives:      Code Status: Full Code   Patient's Primary Decision Maker is: Legal Next of Kin    Primary Decision Maker: yolette armstrong - Child - [phone]    Secondary Decision Maker: CamilleLisa bray - Child - [phone]    Discharge Planning:    Patient lives with: Children Type of Home: House  Primary Care Giver: Family  Patient Support Systems include: Children   Current Financial resources:    Current community resources:    Current services prior to admission: None            Current DME:              Type of Home Care

## 2024-03-14 NOTE — PLAN OF CARE
Problem: Chronic Conditions and Co-morbidities  Goal: Patient's chronic conditions and co-morbidity symptoms are monitored and maintained or improved  Outcome: Progressing  Flowsheets (Taken 3/14/2024 0815)  Care Plan - Patient's Chronic Conditions and Co-Morbidity Symptoms are Monitored and Maintained or Improved: Monitor and assess patient's chronic conditions and comorbid symptoms for stability, deterioration, or improvement     Problem: Discharge Planning  Goal: Discharge to home or other facility with appropriate resources  Outcome: Progressing  Flowsheets (Taken 3/14/2024 0815)  Discharge to home or other facility with appropriate resources: Identify barriers to discharge with patient and caregiver     Problem: Safety - Adult  Goal: Free from fall injury  Outcome: Progressing  Flowsheets (Taken 3/14/2024 0815)  Free From Fall Injury: Instruct family/caregiver on patient safety     Problem: Pain  Goal: Verbalizes/displays adequate comfort level or baseline comfort level  Outcome: Progressing    Pt remained HDS throughout shift pt went ti IR for paracentesis 4250 ml removed pt had c/o mld abd discomfort pre procedure but discomfort relieved post procedure plan is possible endoscopy tomorrow care plan ongoing

## 2024-03-14 NOTE — PROGRESS NOTES
Patient arrival to procedure area via stretcher with hospital transportation staff for an anticipated and scheduled paracentesis.    A thorough review of the patient's allergies, current home medications, and history & physical was conducted to ensure accurate medical history. Vital signs were assessed with results indicating a stable condition. See flowsheets for detailed documentation of vital signs.    Detailed procedural instructions were provided to the patient, ensuring they understood each step of the upcoming procedure. Ample opportunity to ask questions, to which satisfactory answers were provided. The patient demonstrated a clear understanding of the information.    Throughout the interaction, the patient appeared calm and cooperative. The patient was reassured and made comfortable in preparation for the procedure. The nursing team remains ready to provide further assistance and answer any additional questions that may arise.

## 2024-03-14 NOTE — PROGRESS NOTES
[facility] Hospitalist Progress Note    Admitting Date and Time: 3/13/2024 11:08 AM  Admit Dx: GI bleed [K92.2]  Other ascites [R18.8]  Patient is a 64 year old female with PMH CKD, ACOSTA, cirrhosis, DM, esophageal varies s/p banding, GERD, HTN, thrombocytopenia, anemia who presented with increased abdominal girth, BL lower extremity swelling and dark stools. She follows with GI outpatient, last paracentesis 2 months ago, severe anemia on admission requiring transfusion. Plan for paracentesis. GI consulted.     Subjective:  Patient is being followed for GI bleed [K92.2]  Other ascites [R18.8]   Patient seen and examined.    used: #[id number]  Patient states she is doing well, abdominal distension but denies abdominal pain, denies signs of bleeding or SOB.     ROS: denies fever, chills, cp, n/v, HA unless stated above.      albumin human 25%  25 g IntraVENous Q8H    sodium chloride flush  5-40 mL IntraVENous 2 times per day    pantoprazole (PROTONIX) 40 mg in sodium chloride (PF) 0.9 % 10 mL injection  40 mg IntraVENous Q12H    insulin lispro  0-16 Units SubCUTAneous TID WC    insulin lispro  0-4 Units SubCUTAneous Nightly    cefTRIAXone (ROCEPHIN) IV  1,000 mg IntraVENous Q24H    carvedilol  6.25 mg Oral Daily    gabapentin  600 mg Oral TID    lactulose  20 g Oral TID    spironolactone  50 mg Oral Daily    vitamin D  2,000 Units Oral Daily    furosemide  20 mg Oral Daily    rifAXIMin  400 mg Oral TID     sodium chloride, , PRN  sodium chloride flush, 5-40 mL, PRN  sodium chloride, , PRN  ondansetron, 4 mg, Q8H PRN   Or  ondansetron, 4 mg, Q6H PRN  polyethylene glycol, 17 g, Daily PRN  acetaminophen, 650 mg, Q6H PRN   Or  acetaminophen, 650 mg, Q6H PRN  glucose, 4 tablet, PRN  dextrose bolus, 125 mL, PRN   Or  dextrose bolus, 250 mL, PRN  glucagon (rDNA), 1 mg, PRN  dextrose, , Continuous PRN  sodium chloride, , PRN         Objective:    BP (!) 162/76   Pulse 88   Temp 97.9 °F (36.6 °C)  (Temporal)   Resp 14   Ht 1.6 m (5' 2.99\")   Wt 74.8 kg (164 lb 14.5 oz)   SpO2 98%   BMI 29.22 kg/m²     General Appearance: alert and oriented to person, place and time and in no acute distress  Skin: warm and dry  Head: normocephalic and atraumatic  Eyes: pupils equal, round, and reactive to light, extraocular eye movements intact, conjunctivae normal  Neck: neck supple and non tender without mass   Pulmonary/Chest: clear to auscultation bilaterally- no wheezes, rales or rhonchi, normal air movement, no respiratory distress  Cardiovascular: normal rate, normal S1 and S2 and no carotid bruits  Abdomen: soft, non-tender, distended  Extremities: no cyanosis, no clubbing and no edema  Neurologic: no cranial nerve deficit and speech normal        Recent Labs     03/13/24  0943 03/14/24  0634    131*   K 5.5* 5.2*   CL 99 100   CO2 22 22   BUN 26* 27*   CREATININE 1.3* 1.3*   GLUCOSE 562* 150*   CALCIUM 8.9 8.5*       Recent Labs     03/13/24  0943 03/13/24  1721 03/14/24  0634 03/14/24  0635 03/14/24  1121   WBC 1.8*  --  2.1*  --   --    RBC 1.90*  --  3.00*  --   --    HGB 4.8*   < > 8.3* 8.3* 8.2*   HCT 16.1*   < > 26.3* 27.4* 26.1*   MCV 84.7  --  87.7  --   --    MCH 25.3*  --  27.7  --   --    MCHC 29.8*  --  31.6*  --   --    RDW 14.4  --  14.7  --   --    PLT  --   --  29*  --   --    MPV 12.5*  --  12.0  --   --     < > = values in this interval not displayed.       Labs and imaging reviewed.     Assessment and plan:    Acute decompensated liver cirrhosis with ascites, varices, thrombocytopenia, encephalopathy, follows with GI outpatient. GI consulted. Patient has appt set up with  for transplant consideration. Continue Rifaximin, lactulose, lasix, aldactone. On octreotide gtt.   Esophageal varices s/p banding 1/2023, EGD in Jan 2024 with Grade I/II varices with mucosal scariring from likely previous banding, one column with focal red spot, moderate portal hypertensive gastropathy, mild antral  gastritis s/p biopsy. continue coreg.   Ascites s/p IR paracentesis last admission. Plan for paracentesis today s/p 4250 cc removed clear yellow fluid, follow fluid analysis, give albumin, albumin 3.0 on labs. Continue Rocephin for SBP ppx.   Hyperkalemia s/p lasix. K 5.2 this morning, giving Ca, recheck BMP this afternoon. On aldactone.   Volume overload   Acute on chronic anemia, thrombocytopenia. Concern for GIB. Hg 4.8 on admission. Hg 6.6 this AM, Repeat transfusion today. Monitor Hg and transfuse if less than 7. Repeat Hg 8.2 after transfusion. S/p total of 3 units transfused.   Type II DM, uncontrolled, A1c 9.2% 11/2023, BS stable, continue to monitor, ISS, hypoglycemia protocol. Obtain repeat A1c.  CKD stage III, stable at baseline   GERD, stable, continue PPI  Vitamin D def, stable, continue supplementation.   HTN, continue coreg.   Noncompliance   Nutrition, patient was NPO for concern of GIB      Discharge planning, PT/OT, discussed with CM.      Total time 50 minutes spent reviewing chart notes, reviewing treatment plans and prognosis with the patient/caregiver, and documenting in the chart.    NOTE: This report was transcribed using voice recognition software. Every effort was made to ensure accuracy; however, inadvertent computerized transcription errors may be present.  Electronically signed by Peg Salcedo MD on 3/14/2024 at 12:02 PM

## 2024-03-14 NOTE — BRIEF OP NOTE
Brief-Op Note  ______________________________________________________________      IR U/S GUIDED PARACENTESIS  [facility] 8SE MED SURG/PEDS    Patient Name: Lisa Hunt   YOB: 1960  Medical Record Number: [id number]  Date of Procedure: 3/14/24  Room/Bed: [address] [place] [address]      Pre-operative Diagnosis: Ascites    Post-operative Diagnosis: Ascites    Consent: Informed consent was obtained from the via representative from [employer] due to language barrier. prior to the procedure. The details of the procedure, as well is its risks, benefits, and alternatives, were explained.      Anesthesia: Local anesthesia.    Performed by: RANJITH Alvarez under on-site supervision by Tian Gonzalez MD.    Estimated blood loss: Minimal    Complications: None    Specimen Obtained: 4250mL of clear straw colored ascites fluid was withdrawn.    (See radiology dictation in PACs for image review and additional procedural information)    Electronically signed by RANJITH Alvarez   DD: 3/14/24  2:32 PM

## 2024-03-14 NOTE — INTERVAL H&P NOTE
IR H&P UPDATE NOTE  [facility] 8SE MED SURG/PEDS    Patient's History and Physical Examination were reviewed from current hospital admission records.    Lisa Hunt appears likely to able to tolerate the requested Paracentesis procedure by the consultant.    Due to language barrier,  Name: Blane, ID #: [id number] from [employer] [facility] was utilized for Lisa Hunt for purposes of obtaining history of present illness and  all relevant PMH as it relates to today's procedure.  Risk and benefits were discussed with patient including ultimate complications, possibly death and consent was obtained.  The purpose of the procedure as well as procedural details was discussed in detail including post procedural instructions.    All questions and concerns were addressed and answered at this time and consent was obtained.   Electronically signed by RANJITH Alvarez   DD: 3/14/24  2:31 PM

## 2024-03-15 ENCOUNTER — ANESTHESIA (OUTPATIENT)
Dept: ENDOSCOPY | Age: 64
End: 2024-03-15
Payer: MEDICARE

## 2024-03-15 LAB
ABO/RH: NORMAL
ALBUMIN SERPL-MCNC: 4 G/DL (ref 3.5–5.2)
ALP SERPL-CCNC: 102 U/L (ref 35–104)
ALT SERPL-CCNC: 10 U/L (ref 0–32)
ANION GAP SERPL CALCULATED.3IONS-SCNC: 13 MMOL/L (ref 7–16)
ANTIBODY SCREEN: NEGATIVE
ARM BAND NUMBER: NORMAL
AST SERPL-CCNC: 27 U/L (ref 0–31)
BASOPHILS # BLD: 0.03 K/UL (ref 0–0.2)
BASOPHILS NFR BLD: 2 % (ref 0–2)
BILIRUB SERPL-MCNC: 3 MG/DL (ref 0–1.2)
BLOOD BANK BLOOD PRODUCT EXPIRATION DATE: NORMAL
BLOOD BANK DISPENSE STATUS: NORMAL
BLOOD BANK ISBT PRODUCT BLOOD TYPE: 6200
BLOOD BANK PRODUCT CODE: NORMAL
BLOOD BANK SAMPLE EXPIRATION: NORMAL
BLOOD BANK UNIT TYPE AND RH: NORMAL
BPU ID: NORMAL
BUN SERPL-MCNC: 26 MG/DL (ref 6–23)
CALCIUM SERPL-MCNC: 8.7 MG/DL (ref 8.6–10.2)
CHLORIDE SERPL-SCNC: 98 MMOL/L (ref 98–107)
CO2 SERPL-SCNC: 22 MMOL/L (ref 22–29)
COMPONENT: NORMAL
CREAT SERPL-MCNC: 1.2 MG/DL (ref 0.5–1)
CROSSMATCH RESULT: NORMAL
EOSINOPHIL # BLD: 0.04 K/UL (ref 0.05–0.5)
EOSINOPHILS RELATIVE PERCENT: 3 % (ref 0–6)
ERYTHROCYTE [DISTWIDTH] IN BLOOD BY AUTOMATED COUNT: 14.7 % (ref 11.5–15)
GFR SERPL CREATININE-BSD FRML MDRD: 51 ML/MIN/1.73M2
GLUCOSE BLD-MCNC: 282 MG/DL (ref 74–99)
GLUCOSE BLD-MCNC: 306 MG/DL (ref 74–99)
GLUCOSE BLD-MCNC: 317 MG/DL (ref 74–99)
GLUCOSE BLD-MCNC: 345 MG/DL (ref 74–99)
GLUCOSE SERPL-MCNC: 291 MG/DL (ref 74–99)
HBA1C MFR BLD: 7.9 % (ref 4–5.6)
HCT VFR BLD AUTO: 22.7 % (ref 34–48)
HCT VFR BLD AUTO: 25.1 % (ref 34–48)
HCT VFR BLD AUTO: 26.2 % (ref 34–48)
HCT VFR BLD AUTO: 26.2 % (ref 34–48)
HCT VFR BLD AUTO: NORMAL % (ref 36.3–47.1)
HGB BLD-MCNC: 7.1 G/DL (ref 11.5–15.5)
HGB BLD-MCNC: 7.9 G/DL (ref 11.5–15.5)
HGB BLD-MCNC: 8.1 G/DL (ref 11.5–15.5)
HGB BLD-MCNC: 8.3 G/DL (ref 11.5–15.5)
HGB BLD-MCNC: NORMAL G/DL (ref 11.9–15.1)
LYMPHOCYTES NFR BLD: 0.17 K/UL (ref 1.5–4)
LYMPHOCYTES RELATIVE PERCENT: 10 % (ref 20–42)
MCH RBC QN AUTO: 27.1 PG (ref 26–35)
MCHC RBC AUTO-ENTMCNC: 30.9 G/DL (ref 32–34.5)
MCV RBC AUTO: 87.6 FL (ref 80–99.9)
MICROORGANISM SPEC CULT: NO GROWTH
MICROORGANISM/AGENT SPEC: NORMAL
MONOCYTES NFR BLD: 0.06 K/UL (ref 0.1–0.95)
MONOCYTES NFR BLD: 4 % (ref 2–12)
NEUTROPHILS NFR BLD: 82 % (ref 43–80)
NEUTS SEG NFR BLD: 1.31 K/UL (ref 1.8–7.3)
PLATELET # BLD AUTO: 31 K/UL (ref 130–450)
PLATELET CONFIRMATION: NORMAL
PMV BLD AUTO: 12.3 FL (ref 7–12)
POTASSIUM SERPL-SCNC: 5 MMOL/L (ref 3.5–5)
PROT SERPL-MCNC: 7.9 G/DL (ref 6.4–8.3)
RBC # BLD AUTO: 2.99 M/UL (ref 3.5–5.5)
RBC # BLD: ABNORMAL 10*6/UL
RBC # BLD: ABNORMAL 10*6/UL
SODIUM SERPL-SCNC: 133 MMOL/L (ref 132–146)
SPECIMEN DESCRIPTION: NORMAL
TRANSFUSION STATUS: NORMAL
UNIT DIVISION: 0
UNIT ISSUE DATE/TIME: NORMAL
WBC OTHER # BLD: 1.6 K/UL (ref 4.5–11.5)

## 2024-03-15 PROCEDURE — 6360000002 HC RX W HCPCS: Performed by: INTERNAL MEDICINE

## 2024-03-15 PROCEDURE — 83036 HEMOGLOBIN GLYCOSYLATED A1C: CPT

## 2024-03-15 PROCEDURE — 97166 OT EVAL MOD COMPLEX 45 MIN: CPT

## 2024-03-15 PROCEDURE — 43255 EGD CONTROL BLEEDING ANY: CPT | Performed by: STUDENT IN AN ORGANIZED HEALTH CARE EDUCATION/TRAINING PROGRAM

## 2024-03-15 PROCEDURE — 6360000002 HC RX W HCPCS: Performed by: NURSE ANESTHETIST, CERTIFIED REGISTERED

## 2024-03-15 PROCEDURE — 0W3P8ZZ CONTROL BLEEDING IN GASTROINTESTINAL TRACT, VIA NATURAL OR ARTIFICIAL OPENING ENDOSCOPIC: ICD-10-PCS | Performed by: STUDENT IN AN ORGANIZED HEALTH CARE EDUCATION/TRAINING PROGRAM

## 2024-03-15 PROCEDURE — 2709999900 HC NON-CHARGEABLE SUPPLY: Performed by: STUDENT IN AN ORGANIZED HEALTH CARE EDUCATION/TRAINING PROGRAM

## 2024-03-15 PROCEDURE — 97530 THERAPEUTIC ACTIVITIES: CPT

## 2024-03-15 PROCEDURE — 3700000001 HC ADD 15 MINUTES (ANESTHESIA): Performed by: STUDENT IN AN ORGANIZED HEALTH CARE EDUCATION/TRAINING PROGRAM

## 2024-03-15 PROCEDURE — 2580000003 HC RX 258: Performed by: NURSE PRACTITIONER

## 2024-03-15 PROCEDURE — 2580000003 HC RX 258: Performed by: STUDENT IN AN ORGANIZED HEALTH CARE EDUCATION/TRAINING PROGRAM

## 2024-03-15 PROCEDURE — 80053 COMPREHEN METABOLIC PANEL: CPT

## 2024-03-15 PROCEDURE — P9047 ALBUMIN (HUMAN), 25%, 50ML: HCPCS | Performed by: INTERNAL MEDICINE

## 2024-03-15 PROCEDURE — 6360000002 HC RX W HCPCS: Performed by: STUDENT IN AN ORGANIZED HEALTH CARE EDUCATION/TRAINING PROGRAM

## 2024-03-15 PROCEDURE — 6370000000 HC RX 637 (ALT 250 FOR IP): Performed by: INTERNAL MEDICINE

## 2024-03-15 PROCEDURE — 7100000000 HC PACU RECOVERY - FIRST 15 MIN: Performed by: STUDENT IN AN ORGANIZED HEALTH CARE EDUCATION/TRAINING PROGRAM

## 2024-03-15 PROCEDURE — 85018 HEMOGLOBIN: CPT

## 2024-03-15 PROCEDURE — 99233 SBSQ HOSP IP/OBS HIGH 50: CPT | Performed by: INTERNAL MEDICINE

## 2024-03-15 PROCEDURE — 7100000001 HC PACU RECOVERY - ADDTL 15 MIN: Performed by: STUDENT IN AN ORGANIZED HEALTH CARE EDUCATION/TRAINING PROGRAM

## 2024-03-15 PROCEDURE — A4216 STERILE WATER/SALINE, 10 ML: HCPCS | Performed by: NURSE PRACTITIONER

## 2024-03-15 PROCEDURE — 6370000000 HC RX 637 (ALT 250 FOR IP): Performed by: NURSE PRACTITIONER

## 2024-03-15 PROCEDURE — 6360000002 HC RX W HCPCS: Performed by: NURSE PRACTITIONER

## 2024-03-15 PROCEDURE — 36415 COLL VENOUS BLD VENIPUNCTURE: CPT

## 2024-03-15 PROCEDURE — 3609012300 HC EGD BAND LIGATION ESOPHGEAL/GASTRIC VARICES: Performed by: STUDENT IN AN ORGANIZED HEALTH CARE EDUCATION/TRAINING PROGRAM

## 2024-03-15 PROCEDURE — 85014 HEMATOCRIT: CPT

## 2024-03-15 PROCEDURE — C9113 INJ PANTOPRAZOLE SODIUM, VIA: HCPCS | Performed by: NURSE PRACTITIONER

## 2024-03-15 PROCEDURE — 2580000003 HC RX 258: Performed by: NURSE ANESTHETIST, CERTIFIED REGISTERED

## 2024-03-15 PROCEDURE — 85025 COMPLETE CBC W/AUTO DIFF WBC: CPT

## 2024-03-15 PROCEDURE — 2060000000 HC ICU INTERMEDIATE R&B

## 2024-03-15 PROCEDURE — 82962 GLUCOSE BLOOD TEST: CPT

## 2024-03-15 PROCEDURE — 3700000000 HC ANESTHESIA ATTENDED CARE: Performed by: STUDENT IN AN ORGANIZED HEALTH CARE EDUCATION/TRAINING PROGRAM

## 2024-03-15 PROCEDURE — 97161 PT EVAL LOW COMPLEX 20 MIN: CPT

## 2024-03-15 RX ORDER — SODIUM CHLORIDE 0.9 % (FLUSH) 0.9 %
5-40 SYRINGE (ML) INJECTION PRN
Status: CANCELLED | OUTPATIENT
Start: 2024-03-15

## 2024-03-15 RX ORDER — INSULIN GLARGINE 100 [IU]/ML
25 INJECTION, SOLUTION SUBCUTANEOUS NIGHTLY
Status: DISCONTINUED | OUTPATIENT
Start: 2024-03-15 | End: 2024-03-15

## 2024-03-15 RX ORDER — SODIUM CHLORIDE 9 MG/ML
INJECTION, SOLUTION INTRAVENOUS CONTINUOUS PRN
Status: DISCONTINUED | OUTPATIENT
Start: 2024-03-15 | End: 2024-03-15 | Stop reason: SDUPTHER

## 2024-03-15 RX ORDER — SODIUM CHLORIDE 0.9 % (FLUSH) 0.9 %
5-40 SYRINGE (ML) INJECTION EVERY 12 HOURS SCHEDULED
Status: CANCELLED | OUTPATIENT
Start: 2024-03-15

## 2024-03-15 RX ORDER — INSULIN GLARGINE 100 [IU]/ML
20 INJECTION, SOLUTION SUBCUTANEOUS NIGHTLY
Status: DISCONTINUED | OUTPATIENT
Start: 2024-03-15 | End: 2024-03-16

## 2024-03-15 RX ORDER — INSULIN LISPRO 100 [IU]/ML
8 INJECTION, SOLUTION INTRAVENOUS; SUBCUTANEOUS
Status: DISCONTINUED | OUTPATIENT
Start: 2024-03-15 | End: 2024-03-17 | Stop reason: HOSPADM

## 2024-03-15 RX ORDER — INSULIN GLARGINE 100 [IU]/ML
20 INJECTION, SOLUTION SUBCUTANEOUS NIGHTLY
Status: DISCONTINUED | OUTPATIENT
Start: 2024-03-15 | End: 2024-03-15

## 2024-03-15 RX ORDER — PROPOFOL 10 MG/ML
INJECTION, EMULSION INTRAVENOUS PRN
Status: DISCONTINUED | OUTPATIENT
Start: 2024-03-15 | End: 2024-03-15 | Stop reason: SDUPTHER

## 2024-03-15 RX ORDER — SODIUM CHLORIDE 9 MG/ML
25 INJECTION, SOLUTION INTRAVENOUS PRN
Status: CANCELLED | OUTPATIENT
Start: 2024-03-15

## 2024-03-15 RX ADMIN — SODIUM CHLORIDE, PRESERVATIVE FREE 10 ML: 5 INJECTION INTRAVENOUS at 09:28

## 2024-03-15 RX ADMIN — RIFAXIMIN 400 MG: 200 TABLET ORAL at 09:27

## 2024-03-15 RX ADMIN — RIFAXIMIN 400 MG: 200 TABLET ORAL at 21:06

## 2024-03-15 RX ADMIN — GABAPENTIN 600 MG: 300 CAPSULE ORAL at 09:24

## 2024-03-15 RX ADMIN — CEFTRIAXONE SODIUM 1000 MG: 1 INJECTION, POWDER, FOR SOLUTION INTRAMUSCULAR; INTRAVENOUS at 00:22

## 2024-03-15 RX ADMIN — SODIUM CHLORIDE: 9 INJECTION, SOLUTION INTRAVENOUS at 18:39

## 2024-03-15 RX ADMIN — FUROSEMIDE 20 MG: 40 TABLET ORAL at 09:25

## 2024-03-15 RX ADMIN — RIFAXIMIN 400 MG: 200 TABLET ORAL at 13:55

## 2024-03-15 RX ADMIN — ALBUMIN (HUMAN) 25 G: 0.25 INJECTION, SOLUTION INTRAVENOUS at 00:33

## 2024-03-15 RX ADMIN — PANTOPRAZOLE SODIUM 40 MG: 40 INJECTION, POWDER, FOR SOLUTION INTRAVENOUS at 20:52

## 2024-03-15 RX ADMIN — SPIRONOLACTONE 50 MG: 25 TABLET ORAL at 10:17

## 2024-03-15 RX ADMIN — GABAPENTIN 600 MG: 300 CAPSULE ORAL at 13:55

## 2024-03-15 RX ADMIN — INSULIN GLARGINE 20 UNITS: 100 INJECTION, SOLUTION SUBCUTANEOUS at 20:53

## 2024-03-15 RX ADMIN — PROPOFOL 300 MG: 10 INJECTION, EMULSION INTRAVENOUS at 18:47

## 2024-03-15 RX ADMIN — INSULIN LISPRO 4 UNITS: 100 INJECTION, SOLUTION INTRAVENOUS; SUBCUTANEOUS at 20:52

## 2024-03-15 RX ADMIN — Medication 2000 UNITS: at 09:26

## 2024-03-15 RX ADMIN — LACTULOSE 20 G: 20 SOLUTION ORAL at 20:52

## 2024-03-15 RX ADMIN — ALBUMIN (HUMAN) 25 G: 0.25 INJECTION, SOLUTION INTRAVENOUS at 09:24

## 2024-03-15 RX ADMIN — GABAPENTIN 600 MG: 300 CAPSULE ORAL at 20:52

## 2024-03-15 RX ADMIN — CARVEDILOL 6.25 MG: 6.25 TABLET, FILM COATED ORAL at 09:24

## 2024-03-15 RX ADMIN — SODIUM CHLORIDE, PRESERVATIVE FREE 10 ML: 5 INJECTION INTRAVENOUS at 20:54

## 2024-03-15 RX ADMIN — OCTREOTIDE ACETATE 25 MCG/HR: 500 INJECTION, SOLUTION INTRAVENOUS; SUBCUTANEOUS at 06:12

## 2024-03-15 RX ADMIN — PANTOPRAZOLE SODIUM 40 MG: 40 INJECTION, POWDER, FOR SOLUTION INTRAVENOUS at 09:24

## 2024-03-15 ASSESSMENT — PAIN SCALES - GENERAL
PAINLEVEL_OUTOF10: 5
PAINLEVEL_OUTOF10: 0

## 2024-03-15 ASSESSMENT — PAIN - FUNCTIONAL ASSESSMENT: PAIN_FUNCTIONAL_ASSESSMENT: ADULT NONVERBAL PAIN SCALE (NPVS)

## 2024-03-15 NOTE — BRIEF OP NOTE
Brief Postoperative Note      Patient: Lisa Hunt  YOB: 1960  MRN: [id number]    Date of Procedure: 3/15/2024    Pre-Op Diagnosis Codes:     * Anemia, unspecified type [D64.9]     * Bleeding esophageal varices, unspecified esophageal varices type (HCC) [I85.01]    Post-Op Diagnosis: GAVE, portal hypertensive gastropathy        Procedure(s):  ESOPHAGOGASTRODUODENOSCOPY POSSIBLE BAND LIGATION    Surgeon(s):  Anil Conrad MD    Assistant:  * No surgical staff found *    Anesthesia: Monitor Anesthesia Care    Estimated Blood Loss (mL): less than 50     Complications: None    Specimens:   * No specimens in log *    Implants:  * No implants in log *      Drains: * No LDAs found *    Findings:     Esophagus with scarring from previous banding. Grade I/II varices with no high risk features.  Moderate portal hypertensive gastropathy.  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  Normal duodenum.       Electronically signed by Anil Conrad MD on 3/15/2024 at 7:05 PM

## 2024-03-15 NOTE — ANESTHESIA PRE PROCEDURE
Vascular: negative vascular ROS.         Other Findings:             Anesthesia Plan      MAC     ASA 3       Induction: intravenous.      Anesthetic plan and risks discussed with patient.    Use of blood products discussed with patient whom consented to blood products.    Plan discussed with CRNA.                    Luigi Olson RN   3/14/2024

## 2024-03-15 NOTE — PROGRESS NOTES
Physician Progress Note      PATIENT:               CLINTON ADAMS  CSN #:                  [id number]  :                       1960  ADMIT DATE:       3/13/2024 11:08 AM  DISCH DATE:  RESPONDING  PROVIDER #:        Peg Salcedo MD          QUERY TEXT:    Patient admitted with  acute decompensated liver cirrhosis. Noted   documentation of encephalopathy in Dr. Salcedo progress note on  24.    Also noted patient \"Neuro: A&O x 3, CN grossly intact, non-focal exam\".  In   order to support the diagnosis of encephalopathy, please include additional   clinical indicators in your documentation.  Or please document if the   diagnosis of encephalopathy has been ruled out after further study.    The medical record reflects the following:  Risk Factors: 64 yof, Cirrhosis of the liver, ACOSTA, esophageal varices, HE,   GIB, hx of hepatic encephalopathy  Clinical Indicators: ED GCS 15, no focal deficits, symmetric strength 5/5 in   the upper and lower extremities bilaterally A&O x 3,  CN grossly intact, non-focal exam.  Treatment:  consult GI, Hepatology, paracentesis, planned EGD, Lactulose,   Rifaximin, labs    Thank you  Cristian Schwartz BSN RN CDS   M-F 6am-2pm  Options provided:  -- encephalopathy present as evidenced by, Please document evidence and type.  -- encephalopathy was ruled out  -- Other - I will add my own diagnosis  -- Disagree - Not applicable / Not valid  -- Disagree - Clinically unable to determine / Unknown  -- Refer to Clinical Documentation Reviewer    PROVIDER RESPONSE TEXT:    Provider disagreed with this query.  hx of hepatic encephalopathy    Query created by: Cristian Schwartz on 3/15/2024 11:07 AM      Electronically signed by:  Peg Salcedo MD 3/15/2024 11:10 AM

## 2024-03-15 NOTE — CARE COORDINATION
Here for GI bleed - hgb 4.8  Has had 3 units prbcs. liver cirrhosis. Had paracentesis yesterday 4250 ml removed. HGB yesterday 7.9 HGB today 7.1 IV albumin, rocephin and sandostatin For EGD with possible banding or control of hemorrhage today. She owns a walker. No HHC or reanna history.Await PT/OT Cm/sw to follow. Electronically signed by Amy Young RN on 3/15/2024 at 10:36 AM

## 2024-03-15 NOTE — PROGRESS NOTES
Physical Therapy  Physical Therapy Initial Assessment     Name: Lisa Hunt  : 1960  MRN: [id number]      Date of Service: 3/15/2024    Evaluating PT:  Isabel Lopez PT, DPT [id number]    Room #:  8508/8508-B  Diagnosis:  GI bleed [K92.2]  Other ascites [R18.8]  PMHx/PSHx:    Past Medical History:   Diagnosis Date    DONNA (acute kidney injury) (HCC) 2023    Cirrhosis (HCC)     Diabetes mellitus (HCC)     Diabetic neuropathy (HCC)     Esophageal varices without bleeding (HCC)     GERD (gastroesophageal reflux disease)     Hypertension     Intracranial bleed (HCC) 2023    Liver cirrhosis (HCC)     with secondary esophageal varices, no signs/sx's of hepatic encephalopathy    Low vitamin D level     SDH (subdural hematoma) (HCC) 2023    Thrombocytopenia (HCC)     Type 2 diabetes mellitus without complication (HCC)       Procedure/Surgery:  paracentesis 3/14  Precautions:  Falls, Armenian speaking  Equipment Needs:  none, pt has rollator    SUBJECTIVE:    Pt lives with daughter in a 2 story home with 1 stairs to enter and 1 rail.  Bed is on 1st floor and bath is on 1st floor.  Pt ambulated with rollator as needed PTA.    OBJECTIVE:   Initial Evaluation  Date: 3/15/24 Treatment Short Term/ Long Term   Goals   AM-PAC 6 Clicks      Was pt agreeable to Eval/treatment? yes     Does pt have pain? No c/o pain     Bed Mobility  Rolling: SBA  Supine to sit: SBA  Sit to supine: SBA  Scooting: SBA  Rolling: Independent   Supine to sit: Independent   Sit to supine: Independent   Scooting: Independent    Transfers Sit to stand: SBA  Stand to sit: SBA  Stand pivot: SBA with WW  Sit to stand: Independent   Stand to sit: Independent   Stand pivot: Mod I with AAD   Ambulation    150 feet with WW SBA  10 feet x2 with no AD SBA  >300 feet with AAD Mod I    Stair negotiation: ascended and descended  NT  1 steps with 1 rail Mod I    ROM BUE:  Defer to OT note  BLE:  WFL     Strength BUE:  Defer to OT  - minutes  [] Manual therapy 42078 - minutes  [x] Therapeutic activities 97886 15 minutes  [] Therapeutic exercises 21336 - minutes  [] Neuromuscular reeducation 82359 - minutes     Isabel Lopez PT, DPT  [id number]

## 2024-03-15 NOTE — PROGRESS NOTES
[facility] Hospitalist Progress Note    Admitting Date and Time: 3/13/2024 11:08 AM  Admit Dx: GI bleed [K92.2]  Other ascites [R18.8]  Patient is a 64 year old female with PMH CKD, ACOSTA, cirrhosis, DM, esophageal varies s/p banding, GERD, HTN, thrombocytopenia, anemia who presented with increased abdominal girth, BL lower extremity swelling and dark stools. She follows with GI outpatient, last paracentesis 2 months ago, severe anemia on admission requiring transfusion. Plan for paracentesis. GI consulted.     Subjective:  Patient is being followed for GI bleed [K92.2]  Other ascites [R18.8]   Patient seen and examined.    used: #[id number]  States she is doing better, abdomen feels better after paracentesis yesterday, denies fever, chills, abdominal pain, bleeding, nausea, vomiting.   NPO for EGD today.     ROS: denies fever, chills, cp, n/v, HA unless stated above.      sodium chloride flush  5-40 mL IntraVENous 2 times per day    pantoprazole (PROTONIX) 40 mg in sodium chloride (PF) 0.9 % 10 mL injection  40 mg IntraVENous Q12H    insulin lispro  0-16 Units SubCUTAneous TID WC    insulin lispro  0-4 Units SubCUTAneous Nightly    cefTRIAXone (ROCEPHIN) IV  1,000 mg IntraVENous Q24H    carvedilol  6.25 mg Oral Daily    gabapentin  600 mg Oral TID    lactulose  20 g Oral TID    spironolactone  50 mg Oral Daily    vitamin D  2,000 Units Oral Daily    furosemide  20 mg Oral Daily    rifAXIMin  400 mg Oral TID     sodium chloride, , PRN  sodium chloride flush, 5-40 mL, PRN  sodium chloride, , PRN  ondansetron, 4 mg, Q8H PRN   Or  ondansetron, 4 mg, Q6H PRN  polyethylene glycol, 17 g, Daily PRN  acetaminophen, 650 mg, Q6H PRN   Or  acetaminophen, 650 mg, Q6H PRN  glucose, 4 tablet, PRN  dextrose bolus, 125 mL, PRN   Or  dextrose bolus, 250 mL, PRN  glucagon (rDNA), 1 mg, PRN  dextrose, , Continuous PRN  sodium chloride, , PRN         Objective:    BP (!) 133/55   Pulse 75   Temp 98 °F (36.7 °C)  mucosal scariring from likely previous banding, one column with focal red spot, moderate portal hypertensive gastropathy, mild antral gastritis s/p biopsy. continue coreg.   Ascites s/p IR paracentesis last admission. Plan for paracentesis today s/p 4250 cc removed clear yellow fluid, follow fluid analysis, giving albumin, Continue Rocephin for SBP ppx.   Hyperkalemia s/p lasix. K 5.2 > 4.9. monitor.   Acute on chronic anemia, thrombocytopenia. Concern for GIB. Hg 4.8 on admission. Monitor Hg and transfuse if less than 7. S/p total of 3 units transfused. Trending down, down to 7.1, follow repeat CBC  Type II DM, uncontrolled, A1c 7.9% 11/2023, BS stable, continue to monitor, ISS, hypoglycemia protocol. Add lantus 20 units nightly. Continue HDSS. NPO, can add premeals when eating. Can resume diet after EGD.  CKD stage III, stable at baseline, continue to monitor.   GERD, stable, continue PPI  Vitamin D def, stable, continue supplementation.   HTN, continue coreg.   Noncompliance       Discharge planning, PT/OT, discussed with CM. Plan for EGD today with GI.      Total time 50 minutes spent reviewing chart notes, reviewing treatment plans and prognosis with the patient/caregiver, and documenting in the chart.    NOTE: This report was transcribed using voice recognition software. Every effort was made to ensure accuracy; however, inadvertent computerized transcription errors may be present.  Electronically signed by Peg Salcedo MD on 3/15/2024 at 11:11 AM

## 2024-03-15 NOTE — PROGRESS NOTES
OCCUPATIONAL THERAPY INITIAL EVALUATION    [facility] [address] [place], [place]       Date:3/15/2024                                                  Patient Name: Lisa Hunt  MRN: [id number]  : 1960  Room: 850850[facility]    Evaluating OT: Miguelangel Ohara OTR/L [id number]    Referring Provider: Peg Salcedo MD      Specific Provider Orders/Date: OT evaluation and treatment 3/14/24 1115    Diagnosis:  GI bleed [K92.2]  Other ascites [R18.8]      Pertinent Medical History:  has a past medical history of DONNA (acute kidney injury) (HCC), Cirrhosis (HCC), Diabetes mellitus (HCC), Diabetic neuropathy (HCC), Esophageal varices without bleeding (HCC), GERD (gastroesophageal reflux disease), Hypertension, Intracranial bleed (HCC), Liver cirrhosis (HCC), Low vitamin D level, SDH (subdural hematoma) (HCC), Thrombocytopenia (HCC), and Type 2 diabetes mellitus without complication (HCC).   Past Surgical History:   Procedure Laterality Date    APPENDECTOMY       SECTION      CHOLECYSTECTOMY      PARACENTESIS Left 10/12/2023    4400ml hazy yellow    PARACENTESIS Left 2024    2440cc yellow clear fluid removed.    PARACENTESIS Left 2024    4250cc clear yellow fluid removed.    UPPER GASTROINTESTINAL ENDOSCOPY  2022    [facility]    UPPER GASTROINTESTINAL ENDOSCOPY  2021    [facility]    UPPER GASTROINTESTINAL ENDOSCOPY  2019    [facility]    UPPER GASTROINTESTINAL ENDOSCOPY  2021    [facility]    UPPER GASTROINTESTINAL ENDOSCOPY N/A 2024    EGD [place] [facility]    UPPER GASTROINTESTINAL ENDOSCOPY N/A 2024    EGD BAND LIGATION [place]li [place] [facility]       Pt admitted to the hospital 3/13 with abdominal pain   Pt underwent paracentesis on 3/14   EGD scheduled on 3/15    Orders received, chart  session, patient long sitting in bed ; with call light and phone within reach; all lines and tubes intact.   Patient presents with decreased safety awareness, activity tolerance , balance , and strength . Pt demonstrated decreased independence during ADLs, bed mobility , functional transfers, and functional mobility. Pt would benefit from continued skilled OT to increase safety and independence with completion of ADL tasks and functional mobility for improved quality of life and return to PLOF.       Treatment: OT treatment provided this date includes:  OT edu pt/family on role of OT in the acute care setting. Pt verbalized understanding   Therapist facilitated and instructed pt on adapted techniques & compensatory strategies to improve safety and independence with ADLs, bed mobility , functional transfers, and functional mobility as noted above to allow pt to achieve highest level of independence and safely. Pt provided verbal instructions, cuing, extended time, and encouragement in order to promote maximum level of independence.   Pt edu on use of call light and waiting until staff present to attempt any functional mobility. Pt verbalized understanding and demonstrated ability to locate and press call button.     Rehab Potential: Good for established goals     Patient / Family Goal: to go home      Patient and/or family were instructed on functional diagnosis, prognosis/goals and OT plan of care. Pt/family demonstrated understanding.       Eval Complexity:      Description  Performance deficits  Clinical decision making  Co-morbidities affecting occupational performance  Modification or assistance to complete eval    Low Complexity   1 to 3 []  Low []  None []  None []   Moderate Complexity   3 to 5 [x]  Mod [x]  Maybe []  Min to Mod [x]   High Complexity   5 or more []  High []  Yes [x]  Max []     The above evaluation is classified as moderate complexity based off the noted performance deficits, personal

## 2024-03-16 LAB
ALBUMIN SERPL-MCNC: 3.2 G/DL (ref 3.5–5.2)
ALP SERPL-CCNC: 92 U/L (ref 35–104)
ALT SERPL-CCNC: 8 U/L (ref 0–32)
ANION GAP SERPL CALCULATED.3IONS-SCNC: 12 MMOL/L (ref 7–16)
AST SERPL-CCNC: 22 U/L (ref 0–31)
BASOPHILS # BLD: 0.01 K/UL (ref 0–0.2)
BASOPHILS NFR BLD: 1 % (ref 0–2)
BILIRUB SERPL-MCNC: 2.2 MG/DL (ref 0–1.2)
BUN SERPL-MCNC: 27 MG/DL (ref 6–23)
CALCIUM SERPL-MCNC: 8.3 MG/DL (ref 8.6–10.2)
CHLORIDE SERPL-SCNC: 99 MMOL/L (ref 98–107)
CO2 SERPL-SCNC: 22 MMOL/L (ref 22–29)
CREAT SERPL-MCNC: 1.2 MG/DL (ref 0.5–1)
EOSINOPHIL # BLD: 0.06 K/UL (ref 0.05–0.5)
EOSINOPHILS RELATIVE PERCENT: 3 % (ref 0–6)
ERYTHROCYTE [DISTWIDTH] IN BLOOD BY AUTOMATED COUNT: 14.7 % (ref 11.5–15)
GFR SERPL CREATININE-BSD FRML MDRD: 49 ML/MIN/1.73M2
GLUCOSE BLD-MCNC: 166 MG/DL (ref 74–99)
GLUCOSE BLD-MCNC: 261 MG/DL (ref 74–99)
GLUCOSE BLD-MCNC: 273 MG/DL (ref 74–99)
GLUCOSE BLD-MCNC: 323 MG/DL (ref 74–99)
GLUCOSE SERPL-MCNC: 262 MG/DL (ref 74–99)
HCT VFR BLD AUTO: 22.9 % (ref 34–48)
HCT VFR BLD AUTO: 23.4 % (ref 34–48)
HCT VFR BLD AUTO: 24.3 % (ref 34–48)
HCT VFR BLD AUTO: 25 % (ref 34–48)
HGB BLD-MCNC: 7.2 G/DL (ref 11.5–15.5)
HGB BLD-MCNC: 7.3 G/DL (ref 11.5–15.5)
HGB BLD-MCNC: 7.6 G/DL (ref 11.5–15.5)
HGB BLD-MCNC: 7.8 G/DL (ref 11.5–15.5)
IMM GRANULOCYTES # BLD AUTO: <0.03 K/UL (ref 0–0.58)
IMM GRANULOCYTES NFR BLD: 0 % (ref 0–5)
LYMPHOCYTES NFR BLD: 0.46 K/UL (ref 1.5–4)
LYMPHOCYTES RELATIVE PERCENT: 26 % (ref 20–42)
MCH RBC QN AUTO: 27.1 PG (ref 26–35)
MCHC RBC AUTO-ENTMCNC: 31.3 G/DL (ref 32–34.5)
MCV RBC AUTO: 86.8 FL (ref 80–99.9)
MONOCYTES NFR BLD: 0.14 K/UL (ref 0.1–0.95)
MONOCYTES NFR BLD: 8 % (ref 2–12)
NEUTROPHILS NFR BLD: 63 % (ref 43–80)
NEUTS SEG NFR BLD: 1.13 K/UL (ref 1.8–7.3)
PLATELET # BLD AUTO: 38 K/UL (ref 130–450)
PLATELET CONFIRMATION: NORMAL
PMV BLD AUTO: 13 FL (ref 7–12)
POTASSIUM SERPL-SCNC: 4.5 MMOL/L (ref 3.5–5)
PROT SERPL-MCNC: 6.8 G/DL (ref 6.4–8.3)
RBC # BLD AUTO: 2.8 M/UL (ref 3.5–5.5)
RBC # BLD: ABNORMAL 10*6/UL
SODIUM SERPL-SCNC: 133 MMOL/L (ref 132–146)
WBC OTHER # BLD: 1.8 K/UL (ref 4.5–11.5)

## 2024-03-16 PROCEDURE — 85018 HEMOGLOBIN: CPT

## 2024-03-16 PROCEDURE — 80053 COMPREHEN METABOLIC PANEL: CPT

## 2024-03-16 PROCEDURE — 99233 SBSQ HOSP IP/OBS HIGH 50: CPT | Performed by: INTERNAL MEDICINE

## 2024-03-16 PROCEDURE — 6360000002 HC RX W HCPCS: Performed by: NURSE PRACTITIONER

## 2024-03-16 PROCEDURE — A4216 STERILE WATER/SALINE, 10 ML: HCPCS | Performed by: NURSE PRACTITIONER

## 2024-03-16 PROCEDURE — 85014 HEMATOCRIT: CPT

## 2024-03-16 PROCEDURE — 6360000002 HC RX W HCPCS: Performed by: INTERNAL MEDICINE

## 2024-03-16 PROCEDURE — 85025 COMPLETE CBC W/AUTO DIFF WBC: CPT

## 2024-03-16 PROCEDURE — C9113 INJ PANTOPRAZOLE SODIUM, VIA: HCPCS | Performed by: NURSE PRACTITIONER

## 2024-03-16 PROCEDURE — 6370000000 HC RX 637 (ALT 250 FOR IP): Performed by: NURSE PRACTITIONER

## 2024-03-16 PROCEDURE — 2580000003 HC RX 258: Performed by: NURSE PRACTITIONER

## 2024-03-16 PROCEDURE — 6370000000 HC RX 637 (ALT 250 FOR IP): Performed by: INTERNAL MEDICINE

## 2024-03-16 PROCEDURE — P9047 ALBUMIN (HUMAN), 25%, 50ML: HCPCS | Performed by: INTERNAL MEDICINE

## 2024-03-16 PROCEDURE — 82962 GLUCOSE BLOOD TEST: CPT

## 2024-03-16 PROCEDURE — 36415 COLL VENOUS BLD VENIPUNCTURE: CPT

## 2024-03-16 PROCEDURE — 2060000000 HC ICU INTERMEDIATE R&B

## 2024-03-16 RX ORDER — ALBUMIN (HUMAN) 12.5 G/50ML
25 SOLUTION INTRAVENOUS EVERY 8 HOURS
Status: DISCONTINUED | OUTPATIENT
Start: 2024-03-16 | End: 2024-03-17 | Stop reason: HOSPADM

## 2024-03-16 RX ORDER — INSULIN LISPRO 100 [IU]/ML
0-4 INJECTION, SOLUTION INTRAVENOUS; SUBCUTANEOUS NIGHTLY
Status: DISCONTINUED | OUTPATIENT
Start: 2024-03-16 | End: 2024-03-17 | Stop reason: HOSPADM

## 2024-03-16 RX ORDER — INSULIN GLARGINE 100 [IU]/ML
25 INJECTION, SOLUTION SUBCUTANEOUS NIGHTLY
Status: DISCONTINUED | OUTPATIENT
Start: 2024-03-16 | End: 2024-03-17 | Stop reason: HOSPADM

## 2024-03-16 RX ORDER — INSULIN LISPRO 100 [IU]/ML
0-4 INJECTION, SOLUTION INTRAVENOUS; SUBCUTANEOUS
Status: DISCONTINUED | OUTPATIENT
Start: 2024-03-16 | End: 2024-03-17 | Stop reason: HOSPADM

## 2024-03-16 RX ADMIN — PANTOPRAZOLE SODIUM 40 MG: 40 INJECTION, POWDER, FOR SOLUTION INTRAVENOUS at 21:13

## 2024-03-16 RX ADMIN — SODIUM CHLORIDE, PRESERVATIVE FREE 10 ML: 5 INJECTION INTRAVENOUS at 21:14

## 2024-03-16 RX ADMIN — INSULIN LISPRO 2 UNITS: 100 INJECTION, SOLUTION INTRAVENOUS; SUBCUTANEOUS at 17:37

## 2024-03-16 RX ADMIN — GABAPENTIN 600 MG: 300 CAPSULE ORAL at 13:27

## 2024-03-16 RX ADMIN — ALBUMIN (HUMAN) 25 G: 0.25 INJECTION, SOLUTION INTRAVENOUS at 10:27

## 2024-03-16 RX ADMIN — INSULIN LISPRO 4 UNITS: 100 INJECTION, SOLUTION INTRAVENOUS; SUBCUTANEOUS at 21:14

## 2024-03-16 RX ADMIN — GABAPENTIN 600 MG: 300 CAPSULE ORAL at 08:26

## 2024-03-16 RX ADMIN — CEFTRIAXONE SODIUM 1000 MG: 1 INJECTION, POWDER, FOR SOLUTION INTRAMUSCULAR; INTRAVENOUS at 00:00

## 2024-03-16 RX ADMIN — INSULIN GLARGINE 25 UNITS: 100 INJECTION, SOLUTION SUBCUTANEOUS at 21:14

## 2024-03-16 RX ADMIN — RIFAXIMIN 400 MG: 200 TABLET ORAL at 08:26

## 2024-03-16 RX ADMIN — ALBUMIN (HUMAN) 25 G: 0.25 INJECTION, SOLUTION INTRAVENOUS at 17:37

## 2024-03-16 RX ADMIN — SPIRONOLACTONE 50 MG: 25 TABLET ORAL at 08:26

## 2024-03-16 RX ADMIN — LACTULOSE 20 G: 20 SOLUTION ORAL at 08:25

## 2024-03-16 RX ADMIN — CARVEDILOL 6.25 MG: 6.25 TABLET, FILM COATED ORAL at 08:26

## 2024-03-16 RX ADMIN — INSULIN LISPRO 8 UNITS: 100 INJECTION, SOLUTION INTRAVENOUS; SUBCUTANEOUS at 08:26

## 2024-03-16 RX ADMIN — RIFAXIMIN 400 MG: 200 TABLET ORAL at 21:13

## 2024-03-16 RX ADMIN — FUROSEMIDE 20 MG: 40 TABLET ORAL at 08:26

## 2024-03-16 RX ADMIN — ACETAMINOPHEN 650 MG: 325 TABLET ORAL at 08:34

## 2024-03-16 RX ADMIN — GABAPENTIN 600 MG: 300 CAPSULE ORAL at 21:13

## 2024-03-16 RX ADMIN — SODIUM CHLORIDE, PRESERVATIVE FREE 10 ML: 5 INJECTION INTRAVENOUS at 08:27

## 2024-03-16 RX ADMIN — RIFAXIMIN 400 MG: 200 TABLET ORAL at 13:27

## 2024-03-16 RX ADMIN — LACTULOSE 20 G: 20 SOLUTION ORAL at 21:14

## 2024-03-16 RX ADMIN — Medication 2000 UNITS: at 08:26

## 2024-03-16 RX ADMIN — PANTOPRAZOLE SODIUM 40 MG: 40 INJECTION, POWDER, FOR SOLUTION INTRAVENOUS at 08:26

## 2024-03-16 ASSESSMENT — PAIN SCALES - GENERAL
PAINLEVEL_OUTOF10: 0

## 2024-03-16 NOTE — ANESTHESIA POSTPROCEDURE EVALUATION
Department of Anesthesiology  Postprocedure Note    Patient: Lisa Hunt  MRN: [id number]  YOB: 1960  Date of evaluation: 3/15/2024    Procedure Summary       Date: 03/15/24 Room / Location: [place] [address] / [facility]    Anesthesia Start: 1843 Anesthesia Stop: 1905    Procedure: ESOPHAGOGASTRODUODENOSCOPY POSSIBLE BAND LIGATION Diagnosis:       Anemia, unspecified type      Bleeding esophageal varices, unspecified esophageal varices type (HCC)      (Anemia, unspecified type [D64.9])      (Bleeding esophageal varices, unspecified esophageal varices type (HCC) [I85.01])    Surgeons: Anil Conrad MD Responsible Provider: Azael Dodd DO    Anesthesia Type: MAC ASA Status: 3            Anesthesia Type: No value filed.    Suzette Phase I: Suzette Score: 9    Suzette Phase II:      Anesthesia Post Evaluation    Patient location during evaluation: PACU  Patient participation: complete - patient participated  Level of consciousness: awake  Airway patency: patent  Nausea & Vomiting: no nausea and no vomiting  Cardiovascular status: hemodynamically stable  Respiratory status: acceptable  Hydration status: stable  Pain management: adequate    No notable events documented.

## 2024-03-16 NOTE — PROGRESS NOTES
[facility] Hospitalist Progress Note    Admitting Date and Time: 3/13/2024 11:08 AM  Admit Dx: GI bleed [K92.2]  Other ascites [R18.8]  Patient is a 64 year old female with PMH CKD, ACOSTA, cirrhosis, DM, esophageal varies s/p banding, GERD, HTN, thrombocytopenia, anemia who presented with increased abdominal girth, BL lower extremity swelling and dark stools. She follows with GI outpatient, last paracentesis 2 months ago, severe anemia on admission requiring transfusion. S/p paracentesis and EGD. GI consulted.     Subjective:  Patient is being followed for GI bleed [K92.2]  Other ascites [R18.8]   Patient seen and examined.    used: #[id number]  Denies fever, chills, abdominal pain, bleeding, nausea, vomiting. Tolerating CLD.   Hg down to 7.2    ROS: denies fever, chills, cp, n/v, HA unless stated above.      insulin glargine  25 Units SubCUTAneous Nightly    albumin human 25%  25 g IntraVENous Q8H    insulin lispro  0-4 Units SubCUTAneous TID WC    insulin lispro  0-4 Units SubCUTAneous Nightly    [Held by provider] insulin lispro  8 Units SubCUTAneous TID WC    sodium chloride flush  5-40 mL IntraVENous 2 times per day    pantoprazole (PROTONIX) 40 mg in sodium chloride (PF) 0.9 % 10 mL injection  40 mg IntraVENous Q12H    cefTRIAXone (ROCEPHIN) IV  1,000 mg IntraVENous Q24H    carvedilol  6.25 mg Oral Daily    gabapentin  600 mg Oral TID    lactulose  20 g Oral TID    spironolactone  50 mg Oral Daily    vitamin D  2,000 Units Oral Daily    furosemide  20 mg Oral Daily    rifAXIMin  400 mg Oral TID     sodium chloride, , PRN  sodium chloride flush, 5-40 mL, PRN  sodium chloride, , PRN  ondansetron, 4 mg, Q8H PRN   Or  ondansetron, 4 mg, Q6H PRN  polyethylene glycol, 17 g, Daily PRN  acetaminophen, 650 mg, Q6H PRN   Or  acetaminophen, 650 mg, Q6H PRN  glucose, 4 tablet, PRN  dextrose bolus, 125 mL, PRN   Or  dextrose bolus, 250 mL, PRN  glucagon (rDNA), 1 mg, PRN  dextrose, , Continuous  PRN  sodium chloride, , PRN         Objective:    BP (!) 98/43   Pulse 65   Temp 98.3 °F (36.8 °C) (Temporal)   Resp 18   Ht 1.6 m (5' 2.99\")   Wt 74.8 kg (164 lb 14.5 oz)   SpO2 93%   BMI 29.22 kg/m²     General Appearance: alert and oriented to person, place and time and in no acute distress  Skin: warm and dry  Head: normocephalic and atraumatic  Eyes: pupils equal, round, and reactive to light, extraocular eye movements intact, conjunctivae normal  Neck: neck supple and non tender without mass   Pulmonary/Chest: clear to auscultation bilaterally- no wheezes, rales or rhonchi, normal air movement, no respiratory distress  Cardiovascular: normal rate, normal S1 and S2 and no carotid bruits  Abdomen: soft, non-tender, distended  Extremities: no cyanosis, no clubbing and no edema  Neurologic: no cranial nerve deficit and speech normal        Recent Labs     03/14/24  1634 03/15/24  1157 03/16/24  0546    133 133   K 4.9 5.0 4.5    98 99   CO2 24 22 22   BUN 27* 26* 27*   CREATININE 1.3* 1.2* 1.2*   GLUCOSE 326* 291* 262*   CALCIUM 8.7 8.7 8.3*         Recent Labs     03/14/24  0634 03/14/24  0635 03/15/24  1157 03/15/24  1158 03/16/24  0007 03/16/24  0546 03/16/24  1132   WBC 2.1*  --  1.6*  --   --  1.8*  --    RBC 3.00*  --  2.99*  --   --  2.80*  --    HGB 8.3*   < > 8.1*   < > 7.8* 7.6* 7.2*   HCT 26.3*   < > 26.2*   < > 25.0* 24.3* 22.9*   MCV 87.7  --  87.6  --   --  86.8  --    MCH 27.7  --  27.1  --   --  27.1  --    MCHC 31.6*  --  30.9*  --   --  31.3*  --    RDW 14.7  --  14.7  --   --  14.7  --    PLT 29*  --  31*  --   --  38*  --    MPV 12.0  --  12.3*  --   --  13.0*  --     < > = values in this interval not displayed.         Labs and imaging reviewed.     Assessment and plan:    Acute decompensated liver cirrhosis, follows with GI outpatient. GI consulted. Patient has appt set up with  for transplant consideration. Continue Rifaximin, lactulose, lasix, aldactone. On octreotide

## 2024-03-17 VITALS
SYSTOLIC BLOOD PRESSURE: 139 MMHG | RESPIRATION RATE: 18 BRPM | BODY MASS INDEX: 29.22 KG/M2 | WEIGHT: 164.9 LBS | OXYGEN SATURATION: 96 % | TEMPERATURE: 98.6 F | HEIGHT: 63 IN | HEART RATE: 100 BPM | DIASTOLIC BLOOD PRESSURE: 50 MMHG

## 2024-03-17 LAB
ALBUMIN SERPL-MCNC: 3.3 G/DL (ref 3.5–5.2)
ALP SERPL-CCNC: 97 U/L (ref 35–104)
ALT SERPL-CCNC: 8 U/L (ref 0–32)
ANION GAP SERPL CALCULATED.3IONS-SCNC: 12 MMOL/L (ref 7–16)
AST SERPL-CCNC: 23 U/L (ref 0–31)
BASOPHILS # BLD: 0.01 K/UL (ref 0–0.2)
BASOPHILS NFR BLD: 0 % (ref 0–2)
BILIRUB SERPL-MCNC: 1.4 MG/DL (ref 0–1.2)
BUN SERPL-MCNC: 27 MG/DL (ref 6–23)
CALCIUM SERPL-MCNC: 8 MG/DL (ref 8.6–10.2)
CHLORIDE SERPL-SCNC: 101 MMOL/L (ref 98–107)
CO2 SERPL-SCNC: 22 MMOL/L (ref 22–29)
CREAT SERPL-MCNC: 1.3 MG/DL (ref 0.5–1)
EOSINOPHIL # BLD: 0.07 K/UL (ref 0.05–0.5)
EOSINOPHILS RELATIVE PERCENT: 3 % (ref 0–6)
ERYTHROCYTE [DISTWIDTH] IN BLOOD BY AUTOMATED COUNT: 15.2 % (ref 11.5–15)
GFR SERPL CREATININE-BSD FRML MDRD: 45 ML/MIN/1.73M2
GLUCOSE BLD-MCNC: 241 MG/DL (ref 74–99)
GLUCOSE BLD-MCNC: 261 MG/DL (ref 74–99)
GLUCOSE SERPL-MCNC: 271 MG/DL (ref 74–99)
HCT VFR BLD AUTO: 24.1 % (ref 34–48)
HGB BLD-MCNC: 7.6 G/DL (ref 11.5–15.5)
IMM GRANULOCYTES # BLD AUTO: <0.03 K/UL (ref 0–0.58)
IMM GRANULOCYTES NFR BLD: 1 % (ref 0–5)
LYMPHOCYTES NFR BLD: 0.41 K/UL (ref 1.5–4)
LYMPHOCYTES RELATIVE PERCENT: 18 % (ref 20–42)
MCH RBC QN AUTO: 27.4 PG (ref 26–35)
MCHC RBC AUTO-ENTMCNC: 31.5 G/DL (ref 32–34.5)
MCV RBC AUTO: 87 FL (ref 80–99.9)
MONOCYTES NFR BLD: 0.2 K/UL (ref 0.1–0.95)
MONOCYTES NFR BLD: 9 % (ref 2–12)
NEUTROPHILS NFR BLD: 69 % (ref 43–80)
NEUTS SEG NFR BLD: 1.6 K/UL (ref 1.8–7.3)
PLATELET # BLD AUTO: 32 K/UL (ref 130–450)
PLATELET CONFIRMATION: NORMAL
PMV BLD AUTO: 12.3 FL (ref 7–12)
POTASSIUM SERPL-SCNC: 4.2 MMOL/L (ref 3.5–5)
PROT SERPL-MCNC: 6.9 G/DL (ref 6.4–8.3)
RBC # BLD AUTO: 2.77 M/UL (ref 3.5–5.5)
RBC # BLD: ABNORMAL 10*6/UL
SODIUM SERPL-SCNC: 135 MMOL/L (ref 132–146)
WBC OTHER # BLD: 2.3 K/UL (ref 4.5–11.5)

## 2024-03-17 PROCEDURE — 6370000000 HC RX 637 (ALT 250 FOR IP): Performed by: INTERNAL MEDICINE

## 2024-03-17 PROCEDURE — 85025 COMPLETE CBC W/AUTO DIFF WBC: CPT

## 2024-03-17 PROCEDURE — P9047 ALBUMIN (HUMAN), 25%, 50ML: HCPCS | Performed by: INTERNAL MEDICINE

## 2024-03-17 PROCEDURE — A4216 STERILE WATER/SALINE, 10 ML: HCPCS | Performed by: NURSE PRACTITIONER

## 2024-03-17 PROCEDURE — 80053 COMPREHEN METABOLIC PANEL: CPT

## 2024-03-17 PROCEDURE — 99239 HOSP IP/OBS DSCHRG MGMT >30: CPT | Performed by: INTERNAL MEDICINE

## 2024-03-17 PROCEDURE — 2580000003 HC RX 258: Performed by: NURSE PRACTITIONER

## 2024-03-17 PROCEDURE — 6370000000 HC RX 637 (ALT 250 FOR IP): Performed by: NURSE PRACTITIONER

## 2024-03-17 PROCEDURE — 6360000002 HC RX W HCPCS: Performed by: NURSE PRACTITIONER

## 2024-03-17 PROCEDURE — 82962 GLUCOSE BLOOD TEST: CPT

## 2024-03-17 PROCEDURE — 6360000002 HC RX W HCPCS: Performed by: INTERNAL MEDICINE

## 2024-03-17 PROCEDURE — C9113 INJ PANTOPRAZOLE SODIUM, VIA: HCPCS | Performed by: NURSE PRACTITIONER

## 2024-03-17 PROCEDURE — 36415 COLL VENOUS BLD VENIPUNCTURE: CPT

## 2024-03-17 RX ORDER — BLOOD SUGAR DIAGNOSTIC
STRIP MISCELLANEOUS
Qty: 400 STRIP | Refills: 3 | Status: SHIPPED | OUTPATIENT
Start: 2024-03-17

## 2024-03-17 RX ADMIN — CEFTRIAXONE SODIUM 1000 MG: 1 INJECTION, POWDER, FOR SOLUTION INTRAMUSCULAR; INTRAVENOUS at 02:41

## 2024-03-17 RX ADMIN — PANTOPRAZOLE SODIUM 40 MG: 40 INJECTION, POWDER, FOR SOLUTION INTRAVENOUS at 09:23

## 2024-03-17 RX ADMIN — SODIUM CHLORIDE, PRESERVATIVE FREE 10 ML: 5 INJECTION INTRAVENOUS at 09:27

## 2024-03-17 RX ADMIN — ALBUMIN (HUMAN) 25 G: 0.25 INJECTION, SOLUTION INTRAVENOUS at 02:45

## 2024-03-17 RX ADMIN — ALBUMIN (HUMAN) 25 G: 0.25 INJECTION, SOLUTION INTRAVENOUS at 09:57

## 2024-03-17 RX ADMIN — SPIRONOLACTONE 50 MG: 25 TABLET ORAL at 09:21

## 2024-03-17 RX ADMIN — RIFAXIMIN 400 MG: 200 TABLET ORAL at 09:20

## 2024-03-17 RX ADMIN — FUROSEMIDE 20 MG: 40 TABLET ORAL at 09:21

## 2024-03-17 RX ADMIN — CARVEDILOL 6.25 MG: 6.25 TABLET, FILM COATED ORAL at 09:21

## 2024-03-17 RX ADMIN — GABAPENTIN 600 MG: 300 CAPSULE ORAL at 09:21

## 2024-03-17 RX ADMIN — INSULIN LISPRO 2 UNITS: 100 INJECTION, SOLUTION INTRAVENOUS; SUBCUTANEOUS at 09:24

## 2024-03-17 RX ADMIN — LACTULOSE 20 G: 20 SOLUTION ORAL at 09:23

## 2024-03-17 RX ADMIN — Medication 2000 UNITS: at 09:21

## 2024-03-17 ASSESSMENT — PAIN SCALES - GENERAL: PAINLEVEL_OUTOF10: 0

## 2024-03-17 NOTE — PLAN OF CARE
Problem: Chronic Conditions and Co-morbidities  Goal: Patient's chronic conditions and co-morbidity symptoms are monitored and maintained or improved  Outcome: Adequate for Discharge     Problem: Discharge Planning  Goal: Discharge to home or other facility with appropriate resources  Outcome: Adequate for Discharge     Problem: Safety - Adult  Goal: Free from fall injury  3/17/2024 1202 by Anderson Stahl RN  Outcome: Adequate for Discharge     Problem: Pain  Goal: Verbalizes/displays adequate comfort level or baseline comfort level  Outcome: Adequate for Discharge     Problem: Nutrition Deficit:  Goal: Optimize nutritional status  Outcome: Adequate for Discharge

## 2024-03-17 NOTE — DISCHARGE SUMMARY
[facility] Hospitalist Physician Discharge Summary       Anil Conrad MD  [address] [facility]  [place] [address]  [phone]    Follow up in 1 month(s)  schedule appointment for 1 month    Tonie Hernandez DO  [address] [place] [address] [id number] [phone]    Follow up in 1 week(s)        Activity level: As tolerated     Dispo: Home      Condition on discharge: Stable     Patient ID:  Lisa Hunt  [id number]  64 y.o.  1960    Admit date: 3/13/2024    Discharge date and time:  3/17/2024  11:13 AM    Admission Diagnoses: Principal Problem:    GI bleed  Active Problems:    Other cirrhosis of liver (HCC)    Type 2 diabetes mellitus without complication, with long-term current use of insulin (HCC)    GERD without esophagitis    Essential hypertension    Chronic renal disease, stage III (HCC) [530985]    Thrombocytopenia (HCC)    Chronic liver failure (HCC)    Other ascites  Resolved Problems:    * No resolved hospital problems. *      Discharge Diagnoses: Principal Problem:    GI bleed  Active Problems:    Other cirrhosis of liver (HCC)    Type 2 diabetes mellitus without complication, with long-term current use of insulin (HCC)    GERD without esophagitis    Essential hypertension    Chronic renal disease, stage III (HCC) [426179]    Thrombocytopenia (HCC)    Chronic liver failure (HCC)    Other ascites  Resolved Problems:    * No resolved hospital problems. *      Consults:  IP CONSULT TO GI  IP CONSULT TO INTERNAL MEDICINE    Hospital Course:   Patient Lisa Hunt is a 64 y.o. presented with GI bleed [K92.2]  Other ascites [R18.8]    Patient is a 64 year old female with PMH CKD, ACOSTA, cirrhosis, DM, esophageal varies s/p banding, GERD, HTN, thrombocytopenia, anemia who presented with increased abdominal girth, BL lower extremity swelling and dark stools. She follows with GI outpatient, last paracentesis 2 months ago, severe anemia on admission requiring transfusion. S/p paracentesis  NEURONTIN  Take 1 tablet by mouth 3 times daily for 90 days.     HumaLOG Mix 75/25 KwikPen (75-25) 100 UNIT per ML Supn injection pen  Generic drug: insulin lispro protamine & lispro     ibuprofen 600 MG tablet  Commonly known as: ADVIL;MOTRIN     lactulose 10 GM/15ML solution  Commonly known as: CHRONULAC  Take 30 mLs by mouth 3 times daily     meclizine 25 MG tablet  Commonly known as: ANTIVERT     omeprazole 20 MG delayed release capsule  Commonly known as: PRILOSEC  Take 1 capsule by mouth daily     ondansetron 4 MG tablet  Commonly known as: ZOFRAN  Take 1 tablet by mouth daily as needed for Nausea or Vomiting     spironolactone 50 MG tablet  Commonly known as: ALDACTONE  TAKE 1 TABLET BY MOUTH DAILY     vitamin D 1.25 MG (02476 UT) Caps capsule  Commonly known as: ERGOCALCIFEROL     vitamin D 50 MCG (2000 UT) Tabs tablet  Commonly known as: CHOLECALCIFEROL  Take 1 tablet by mouth daily               Where to Get Your Medications        These medications were sent to [id number] - [place], [place] - [address] - P [phone] - F [phone] [address] [facility] [address]      Phone: [phone]   Accu-Chek Guide strip  rifAXIMin 200 MG tablet           35 minutes was spent in preparing discharge papers, discussing discharge with patient, medication review, etc.    Signed:  Electronically signed by Peg Salcedo MD on 3/17/2024 at 11:13 AM

## 2024-03-18 LAB — NON-GYN CYTOLOGY REPORT: NORMAL

## 2024-03-19 ENCOUNTER — TELEPHONE (OUTPATIENT)
Dept: FAMILY MEDICINE CLINIC | Age: 64
End: 2024-03-19

## 2024-03-19 ENCOUNTER — CARE COORDINATION (OUTPATIENT)
Dept: CARE COORDINATION | Age: 64
End: 2024-03-19

## 2024-03-19 NOTE — CARE COORDINATION
Care Transitions Outreach Attempt    Call within 2 business days of discharge: Yes     Using [employer] [place]ese interpretation services, CTN attempted to reach the patient for initial Care Transition call post hospital discharge. Unable to leave a VM d/t VM box is full.    Will attempt outreach again.    CTN will route a high priority message to Padmini DICK and request office staff outreach to the pt and offer a 7d HFU appointment.     [employer] language services   ID# [id number]  : Con  Session code: [id number]       Patient: Lisa Hunt Patient : 1960 MRN: [id number]    Last Discharge Facility       Date Complaint Diagnosis Description Type Department Provider    3/13/24 Abdominal Pain Other ascites ... ED to Hosp-Admission (Discharged) (ADMITTED) [facility] Peg Salcedo MD; MARTA Leach..            Was this an external facility discharge? No     Noted following upcoming appointments from discharge chart review:     Future Appointments   Date Time Provider Department Center   3/21/2024 10:00 AM [facility] GANT RM 1 [facility] Rad[facility]   3/29/2024 10:00 AM Tonie Hernandez DO [facility]   2025 11:00 AM Tonie Hernandez DO [facility]

## 2024-03-20 ENCOUNTER — CARE COORDINATION (OUTPATIENT)
Dept: CARE COORDINATION | Age: 64
End: 2024-03-20

## 2024-03-20 NOTE — CARE COORDINATION
Care Transitions Outreach Attempt    Call within 2 business days of discharge: Yes     Using [employer]  services 2nd attempt made to reach the patient for initial Care Transition call post hospital discharge. No VM left d/t VM box is full.     [employer]  service  [phone]  Session code: [id number]   ID# [id number]  : Brent     No further outreaches will be attempted.    If no return call by the end of today, CTN will  sign off and resolve  CT program.    Pt will be excluded from Care Transition Calls until 24 due to being unable to reach after two consecutive admissions.     CTN notes pt has a f/u scheduled with her pcp on 3/27/24. CTN will route message to office requesting they change OV type for appt to a HFU OV type instead of an ED visit type.    Per chart review the patient is not  active on [employer], unable to reach letter pended and CTN will route to Lorena Leonard, , to mail to the patients listed address.      Patient: Lisa Hunt Patient : 1960 MRN: [id number]    Last Discharge Facility       Date Complaint Diagnosis Description Type Department Provider    3/13/24 Abdominal Pain Other ascites ... ED to Hosp-Admission (Discharged) (ADMITTED) [facility] Peg Salcedo MD; NILS Leach.          Was this an external facility discharge? No     Noted following upcoming appointments from discharge chart review:     Future Appointments   Date Time Provider Department Center   3/21/2024 10:00 AM DAVID DUNCAN STALIN RM 1 SEBA[facility]ARI [facility]UNC[facility] Rad/Car   3/27/2024  3:00 PM Tonie Hernandez DO [facility]   3/29/2024 10:00 AM Tonie Hernandez DO [facility]   2025 11:00 AM [facility]

## 2024-03-21 ENCOUNTER — HOSPITAL ENCOUNTER (OUTPATIENT)
Dept: GENERAL RADIOLOGY | Age: 64
Discharge: HOME | End: 2024-03-23
Payer: MEDICARE

## 2024-03-21 DIAGNOSIS — Z12.31 BREAST CANCER SCREENING BY MAMMOGRAM: ICD-10-CM

## 2024-03-21 PROCEDURE — 77063 BREAST TOMOSYNTHESIS BI: CPT

## 2024-03-29 ENCOUNTER — OFFICE VISIT (OUTPATIENT)
Dept: FAMILY MEDICINE CLINIC | Age: 64
End: 2024-03-29

## 2024-03-29 ENCOUNTER — TELEPHONE (OUTPATIENT)
Dept: FAMILY MEDICINE CLINIC | Age: 64
End: 2024-03-29

## 2024-03-29 VITALS
HEIGHT: 62 IN | BODY MASS INDEX: 32.02 KG/M2 | DIASTOLIC BLOOD PRESSURE: 62 MMHG | OXYGEN SATURATION: 96 % | HEART RATE: 63 BPM | WEIGHT: 174 LBS | TEMPERATURE: 97.4 F | SYSTOLIC BLOOD PRESSURE: 134 MMHG | RESPIRATION RATE: 18 BRPM

## 2024-03-29 DIAGNOSIS — M79.89 SWELLING OF LOWER EXTREMITY: ICD-10-CM

## 2024-03-29 DIAGNOSIS — I10 ESSENTIAL HYPERTENSION: ICD-10-CM

## 2024-03-29 DIAGNOSIS — K74.69 OTHER CIRRHOSIS OF LIVER (HCC): ICD-10-CM

## 2024-03-29 DIAGNOSIS — E11.9 TYPE 2 DIABETES MELLITUS WITHOUT COMPLICATION, WITH LONG-TERM CURRENT USE OF INSULIN (HCC): ICD-10-CM

## 2024-03-29 DIAGNOSIS — Z79.4 TYPE 2 DIABETES MELLITUS WITHOUT COMPLICATION, WITH LONG-TERM CURRENT USE OF INSULIN (HCC): ICD-10-CM

## 2024-03-29 DIAGNOSIS — Z09 HOSPITAL DISCHARGE FOLLOW-UP: Primary | ICD-10-CM

## 2024-03-29 LAB
ABSOLUTE IMMATURE GRANULOCYTE: <0.03 K/UL (ref 0–0.58)
BASOPHILS ABSOLUTE: 0.01 K/UL (ref 0–0.2)
BASOPHILS RELATIVE PERCENT: 1 % (ref 0–2)
CHP ED QC CHECK: ABNORMAL
EOSINOPHILS ABSOLUTE: 0.08 K/UL (ref 0.05–0.5)
EOSINOPHILS RELATIVE PERCENT: 5 % (ref 0–6)
GLUCOSE BLD-MCNC: 377 MG/DL
HCT VFR BLD CALC: 26 % (ref 34–48)
HEMOGLOBIN: 7.9 G/DL (ref 11.5–15.5)
IMMATURE GRANULOCYTES: 0 % (ref 0–5)
LYMPHOCYTES ABSOLUTE: 0.33 K/UL (ref 1.5–4)
LYMPHOCYTES RELATIVE PERCENT: 21 % (ref 20–42)
MCH RBC QN AUTO: 27.6 PG (ref 26–35)
MCHC RBC AUTO-ENTMCNC: 30.4 G/DL (ref 32–34.5)
MCV RBC AUTO: 90.9 FL (ref 80–99.9)
MONOCYTES ABSOLUTE: 0.1 K/UL (ref 0.1–0.95)
MONOCYTES RELATIVE PERCENT: 6 % (ref 2–12)
NEUTROPHILS ABSOLUTE: 1.09 K/UL (ref 1.8–7.3)
NEUTROPHILS RELATIVE PERCENT: 68 % (ref 43–80)
PDW BLD-RTO: 18.7 % (ref 11.5–15)
PLATELET CONFIRMATION: NORMAL
PLATELET, FLUORESCENCE: 41 K/UL (ref 130–450)
PMV BLD AUTO: ABNORMAL FL (ref 7–12)
RBC # BLD: 2.86 M/UL (ref 3.5–5.5)
RBC # BLD: ABNORMAL 10*6/UL
WBC # BLD: 1.6 K/UL (ref 4.5–11.5)

## 2024-03-29 RX ORDER — INSULIN LISPRO 100 [IU]/ML
50 INJECTION, SUSPENSION SUBCUTANEOUS 2 TIMES DAILY WITH MEALS
Qty: 5 ADJUSTABLE DOSE PRE-FILLED PEN SYRINGE | Refills: 3 | Status: SHIPPED
Start: 2024-03-29 | End: 2024-03-29 | Stop reason: SDUPTHER

## 2024-03-29 RX ORDER — FUROSEMIDE 20 MG/1
20 TABLET ORAL DAILY
Qty: 60 TABLET | Refills: 3 | Status: SHIPPED | OUTPATIENT
Start: 2024-03-29

## 2024-03-29 RX ORDER — INSULIN LISPRO 100 [IU]/ML
INJECTION, SUSPENSION SUBCUTANEOUS
Qty: 5 ADJUSTABLE DOSE PRE-FILLED PEN SYRINGE | Refills: 3 | Status: SHIPPED | OUTPATIENT
Start: 2024-03-29

## 2024-03-29 SDOH — ECONOMIC STABILITY: FOOD INSECURITY: WITHIN THE PAST 12 MONTHS, THE FOOD YOU BOUGHT JUST DIDN'T LAST AND YOU DIDN'T HAVE MONEY TO GET MORE.: NEVER TRUE

## 2024-03-29 SDOH — ECONOMIC STABILITY: FOOD INSECURITY: WITHIN THE PAST 12 MONTHS, YOU WORRIED THAT YOUR FOOD WOULD RUN OUT BEFORE YOU GOT MONEY TO BUY MORE.: NEVER TRUE

## 2024-03-29 SDOH — ECONOMIC STABILITY: INCOME INSECURITY: HOW HARD IS IT FOR YOU TO PAY FOR THE VERY BASICS LIKE FOOD, HOUSING, MEDICAL CARE, AND HEATING?: NOT HARD AT ALL

## 2024-03-29 ASSESSMENT — PATIENT HEALTH QUESTIONNAIRE - PHQ9
SUM OF ALL RESPONSES TO PHQ QUESTIONS 1-9: 0
SUM OF ALL RESPONSES TO PHQ QUESTIONS 1-9: 0
1. LITTLE INTEREST OR PLEASURE IN DOING THINGS: NOT AT ALL
2. FEELING DOWN, DEPRESSED OR HOPELESS: NOT AT ALL
SUM OF ALL RESPONSES TO PHQ QUESTIONS 1-9: 0
SUM OF ALL RESPONSES TO PHQ9 QUESTIONS 1 & 2: 0
SUM OF ALL RESPONSES TO PHQ QUESTIONS 1-9: 0

## 2024-03-29 NOTE — TELEPHONE ENCOUNTER
Steve needs clarification on humalog medication. Directions state 0.5 mLs and the need to know units. Please advise

## 2024-03-29 NOTE — PROGRESS NOTES
of systems was negative except for what was noted in the HPI.    Objective:    /62   Pulse 63   Temp 97.4 °F (36.3 °C) (Temporal)   Resp 18   Ht 1.575 m (5' 2\")   Wt 78.9 kg (174 lb)   SpO2 96%   BMI 31.83 kg/m²     Physical Exam  Vitals and nursing note reviewed.   Constitutional:       General: She is not in acute distress.     Appearance: Normal appearance. She is not ill-appearing.   HENT:      Head: Normocephalic and atraumatic.      Right Ear: External ear normal.      Left Ear: External ear normal.   Eyes:      Extraocular Movements: Extraocular movements intact.      Conjunctiva/sclera: Conjunctivae normal.      Pupils: Pupils are equal, round, and reactive to light.   Cardiovascular:      Rate and Rhythm: Normal rate and regular rhythm.      Pulses: Normal pulses.      Heart sounds: Normal heart sounds.   Pulmonary:      Effort: Pulmonary effort is normal.      Breath sounds: Normal breath sounds.   Abdominal:      General: Bowel sounds are normal.      Palpations: Abdomen is soft.   Musculoskeletal:         General: Normal range of motion.      Cervical back: Normal range of motion and neck supple.   Skin:     General: Skin is warm and dry.      Capillary Refill: Capillary refill takes less than 2 seconds.   Neurological:      General: No focal deficit present.      Mental Status: She is alert and oriented to person, place, and time.   Psychiatric:         Mood and Affect: Mood normal.         Behavior: Behavior normal.         Thought Content: Thought content normal.            An electronic signature was used to authenticate this note.  --Tonie Hernandez DO

## 2024-04-07 PROBLEM — I85.00 ESOPHAGEAL VARICES DETERMINED BY ENDOSCOPY (HCC): Status: ACTIVE | Noted: 2022-11-08

## 2024-05-01 DIAGNOSIS — E55.9 VITAMIN D DEFICIENCY: ICD-10-CM

## 2024-05-01 DIAGNOSIS — E72.20 HYPERAMMONEMIA (HCC): ICD-10-CM

## 2024-05-01 DIAGNOSIS — E11.9 TYPE 2 DIABETES MELLITUS WITHOUT COMPLICATION, WITH LONG-TERM CURRENT USE OF INSULIN (HCC): ICD-10-CM

## 2024-05-01 DIAGNOSIS — K74.69 OTHER CIRRHOSIS OF LIVER (HCC): ICD-10-CM

## 2024-05-01 DIAGNOSIS — K21.9 GERD WITHOUT ESOPHAGITIS: ICD-10-CM

## 2024-05-01 DIAGNOSIS — R11.0 NAUSEA: ICD-10-CM

## 2024-05-01 DIAGNOSIS — M79.89 SWELLING OF LOWER EXTREMITY: ICD-10-CM

## 2024-05-01 DIAGNOSIS — I10 ESSENTIAL HYPERTENSION: ICD-10-CM

## 2024-05-01 DIAGNOSIS — Z79.4 TYPE 2 DIABETES MELLITUS WITHOUT COMPLICATION, WITH LONG-TERM CURRENT USE OF INSULIN (HCC): ICD-10-CM

## 2024-05-01 RX ORDER — CARVEDILOL 6.25 MG/1
6.25 TABLET ORAL DAILY
Qty: 90 TABLET | Refills: 1 | Status: ON HOLD | OUTPATIENT
Start: 2024-05-01

## 2024-05-01 RX ORDER — GABAPENTIN 600 MG/1
600 TABLET ORAL 3 TIMES DAILY
Qty: 90 TABLET | Refills: 2 | Status: ON HOLD | OUTPATIENT
Start: 2024-05-01 | End: 2024-07-30

## 2024-05-01 RX ORDER — SPIRONOLACTONE 50 MG/1
50 TABLET, FILM COATED ORAL DAILY
Qty: 30 TABLET | Refills: 2 | Status: ON HOLD | OUTPATIENT
Start: 2024-05-01 | End: 2024-07-30

## 2024-05-01 RX ORDER — ONDANSETRON 4 MG/1
4 TABLET, FILM COATED ORAL DAILY PRN
Qty: 90 TABLET | Refills: 1 | Status: ON HOLD | OUTPATIENT
Start: 2024-05-01

## 2024-05-01 RX ORDER — CHOLECALCIFEROL (VITAMIN D3) 50 MCG
2000 TABLET ORAL DAILY
Qty: 90 TABLET | Refills: 1 | Status: ON HOLD | OUTPATIENT
Start: 2024-05-01

## 2024-05-01 RX ORDER — LACTULOSE 10 G/15ML
20 SOLUTION ORAL 3 TIMES DAILY
Qty: 946 ML | Refills: 5 | Status: ON HOLD | OUTPATIENT
Start: 2024-05-01

## 2024-05-01 RX ORDER — INSULIN LISPRO 100 [IU]/ML
INJECTION, SUSPENSION SUBCUTANEOUS
Qty: 5 ADJUSTABLE DOSE PRE-FILLED PEN SYRINGE | Refills: 3 | Status: ON HOLD | OUTPATIENT
Start: 2024-05-01

## 2024-05-01 RX ORDER — FUROSEMIDE 20 MG/1
20 TABLET ORAL DAILY
Qty: 60 TABLET | Refills: 3 | Status: ON HOLD | OUTPATIENT
Start: 2024-05-01

## 2024-05-01 RX ORDER — OMEPRAZOLE 20 MG/1
20 CAPSULE, DELAYED RELEASE ORAL DAILY
Qty: 90 CAPSULE | Refills: 1 | Status: ON HOLD | OUTPATIENT
Start: 2024-05-01

## 2024-05-04 ENCOUNTER — APPOINTMENT (OUTPATIENT)
Dept: CT IMAGING | Age: 64
DRG: 441 | End: 2024-05-04
Payer: MEDICARE

## 2024-05-04 ENCOUNTER — HOSPITAL ENCOUNTER (INPATIENT)
Age: 64
LOS: 3 days | Discharge: HOME | DRG: 441 | End: 2024-05-07
Attending: EMERGENCY MEDICINE | Admitting: STUDENT IN AN ORGANIZED HEALTH CARE EDUCATION/TRAINING PROGRAM
Payer: MEDICARE

## 2024-05-04 ENCOUNTER — APPOINTMENT (OUTPATIENT)
Dept: GENERAL RADIOLOGY | Age: 64
DRG: 441 | End: 2024-05-04
Payer: MEDICARE

## 2024-05-04 DIAGNOSIS — R41.82 ALTERED MENTAL STATUS, UNSPECIFIED ALTERED MENTAL STATUS TYPE: Primary | ICD-10-CM

## 2024-05-04 DIAGNOSIS — E72.20 HYPERAMMONEMIA (HCC): ICD-10-CM

## 2024-05-04 PROBLEM — G93.41 ACUTE METABOLIC ENCEPHALOPATHY: Status: ACTIVE | Noted: 2024-05-04

## 2024-05-04 LAB
ALBUMIN SERPL-MCNC: 3.4 G/DL (ref 3.5–5.2)
ALP SERPL-CCNC: 167 U/L (ref 35–104)
ALT SERPL-CCNC: 31 U/L (ref 0–32)
AMMONIA PLAS-SCNC: 55 UMOL/L (ref 11–51)
ANION GAP SERPL CALCULATED.3IONS-SCNC: 10 MMOL/L (ref 7–16)
AST SERPL-CCNC: 47 U/L (ref 0–31)
B-OH-BUTYR SERPL-MCNC: 0.09 MMOL/L (ref 0.02–0.27)
BACTERIA URNS QL MICRO: ABNORMAL
BASOPHILS # BLD: 0.04 K/UL (ref 0–0.2)
BASOPHILS NFR BLD: 2 % (ref 0–2)
BILIRUB DIRECT SERPL-MCNC: 0.6 MG/DL (ref 0–0.3)
BILIRUB INDIRECT SERPL-MCNC: 1.4 MG/DL (ref 0–1)
BILIRUB SERPL-MCNC: 2 MG/DL (ref 0–1.2)
BILIRUB UR QL STRIP: NEGATIVE
BUN SERPL-MCNC: 26 MG/DL (ref 6–23)
CALCIUM SERPL-MCNC: 9.1 MG/DL (ref 8.6–10.2)
CHLORIDE SERPL-SCNC: 100 MMOL/L (ref 98–107)
CLARITY UR: CLEAR
CO2 SERPL-SCNC: 21 MMOL/L (ref 22–29)
COLOR UR: YELLOW
CREAT SERPL-MCNC: 1.1 MG/DL (ref 0.5–1)
EOSINOPHIL # BLD: 0.04 K/UL (ref 0.05–0.5)
EOSINOPHILS RELATIVE PERCENT: 2 % (ref 0–6)
ERYTHROCYTE [DISTWIDTH] IN BLOOD BY AUTOMATED COUNT: 17.2 % (ref 11.5–15)
GFR, ESTIMATED: 58 ML/MIN/1.73M2
GLUCOSE BLD-MCNC: 370 MG/DL (ref 74–99)
GLUCOSE SERPL-MCNC: 390 MG/DL (ref 74–99)
GLUCOSE UR STRIP-MCNC: >=1000 MG/DL
HCT VFR BLD AUTO: 29.4 % (ref 34–48)
HGB BLD-MCNC: 9.5 G/DL (ref 11.5–15.5)
HGB UR QL STRIP.AUTO: ABNORMAL
INR PPP: 1.4
KETONES UR STRIP-MCNC: NEGATIVE MG/DL
LACTATE BLDV-SCNC: 2.4 MMOL/L (ref 0.5–2.2)
LEUKOCYTE ESTERASE UR QL STRIP: NEGATIVE
LIPASE SERPL-CCNC: 40 U/L (ref 13–60)
LYMPHOCYTES NFR BLD: 0.34 K/UL (ref 1.5–4)
LYMPHOCYTES RELATIVE PERCENT: 15 % (ref 20–42)
MCH RBC QN AUTO: 27.9 PG (ref 26–35)
MCHC RBC AUTO-ENTMCNC: 32.3 G/DL (ref 32–34.5)
MCV RBC AUTO: 86.2 FL (ref 80–99.9)
MONOCYTES NFR BLD: 0.06 K/UL (ref 0.1–0.95)
MONOCYTES NFR BLD: 3 % (ref 2–12)
NEUTROPHILS NFR BLD: 79 % (ref 43–80)
NEUTS SEG NFR BLD: 1.82 K/UL (ref 1.8–7.3)
NITRITE UR QL STRIP: NEGATIVE
PARTIAL THROMBOPLASTIN TIME: 28.4 SEC (ref 24.5–35.1)
PH UR STRIP: 6 [PH] (ref 5–9)
PLATELET CONFIRMATION: NORMAL
PLATELET, FLUORESCENCE: 35 K/UL (ref 130–450)
PMV BLD AUTO: ABNORMAL FL (ref 7–12)
POTASSIUM SERPL-SCNC: 5.1 MMOL/L (ref 3.5–5)
PROT SERPL-MCNC: 8.5 G/DL (ref 6.4–8.3)
PROT UR STRIP-MCNC: NEGATIVE MG/DL
PROTHROMBIN TIME: 15.7 SEC (ref 9.3–12.4)
RBC # BLD AUTO: 3.41 M/UL (ref 3.5–5.5)
RBC # BLD: ABNORMAL 10*6/UL
RBC #/AREA URNS HPF: ABNORMAL /HPF
SODIUM SERPL-SCNC: 131 MMOL/L (ref 132–146)
SP GR UR STRIP: 1.01 (ref 1–1.03)
TROPONIN I SERPL HS-MCNC: 19 NG/L (ref 0–9)
UROBILINOGEN UR STRIP-ACNC: 0.2 EU/DL (ref 0–1)
WBC #/AREA URNS HPF: ABNORMAL /HPF
WBC OTHER # BLD: 2.3 K/UL (ref 4.5–11.5)

## 2024-05-04 PROCEDURE — 74177 CT ABD & PELVIS W/CONTRAST: CPT

## 2024-05-04 PROCEDURE — 85730 THROMBOPLASTIN TIME PARTIAL: CPT

## 2024-05-04 PROCEDURE — 82010 KETONE BODYS QUAN: CPT

## 2024-05-04 PROCEDURE — 81001 URINALYSIS AUTO W/SCOPE: CPT

## 2024-05-04 PROCEDURE — 82962 GLUCOSE BLOOD TEST: CPT

## 2024-05-04 PROCEDURE — 83690 ASSAY OF LIPASE: CPT

## 2024-05-04 PROCEDURE — 1200000000 HC SEMI PRIVATE

## 2024-05-04 PROCEDURE — 84484 ASSAY OF TROPONIN QUANT: CPT

## 2024-05-04 PROCEDURE — 70450 CT HEAD/BRAIN W/O DYE: CPT

## 2024-05-04 PROCEDURE — 99222 1ST HOSP IP/OBS MODERATE 55: CPT | Performed by: STUDENT IN AN ORGANIZED HEALTH CARE EDUCATION/TRAINING PROGRAM

## 2024-05-04 PROCEDURE — 71045 X-RAY EXAM CHEST 1 VIEW: CPT

## 2024-05-04 PROCEDURE — 82248 BILIRUBIN DIRECT: CPT

## 2024-05-04 PROCEDURE — 85610 PROTHROMBIN TIME: CPT

## 2024-05-04 PROCEDURE — 80053 COMPREHEN METABOLIC PANEL: CPT

## 2024-05-04 PROCEDURE — 85025 COMPLETE CBC W/AUTO DIFF WBC: CPT

## 2024-05-04 PROCEDURE — 99285 EMERGENCY DEPT VISIT HI MDM: CPT

## 2024-05-04 PROCEDURE — 6370000000 HC RX 637 (ALT 250 FOR IP): Performed by: STUDENT IN AN ORGANIZED HEALTH CARE EDUCATION/TRAINING PROGRAM

## 2024-05-04 PROCEDURE — 2580000003 HC RX 258: Performed by: STUDENT IN AN ORGANIZED HEALTH CARE EDUCATION/TRAINING PROGRAM

## 2024-05-04 PROCEDURE — 83605 ASSAY OF LACTIC ACID: CPT

## 2024-05-04 PROCEDURE — 93005 ELECTROCARDIOGRAM TRACING: CPT | Performed by: EMERGENCY MEDICINE

## 2024-05-04 PROCEDURE — 6360000004 HC RX CONTRAST MEDICATION: Performed by: RADIOLOGY

## 2024-05-04 PROCEDURE — 87086 URINE CULTURE/COLONY COUNT: CPT

## 2024-05-04 PROCEDURE — 82140 ASSAY OF AMMONIA: CPT

## 2024-05-04 RX ORDER — SODIUM CHLORIDE 0.9 % (FLUSH) 0.9 %
10 SYRINGE (ML) INJECTION
Status: ACTIVE | OUTPATIENT
Start: 2024-05-04 | End: 2024-05-05

## 2024-05-04 RX ORDER — SODIUM CHLORIDE 9 MG/ML
INJECTION, SOLUTION INTRAVENOUS PRN
Status: DISCONTINUED | OUTPATIENT
Start: 2024-05-04 | End: 2024-05-07 | Stop reason: HOSPADM

## 2024-05-04 RX ORDER — ONDANSETRON 2 MG/ML
4 INJECTION INTRAMUSCULAR; INTRAVENOUS EVERY 6 HOURS PRN
Status: DISCONTINUED | OUTPATIENT
Start: 2024-05-04 | End: 2024-05-07 | Stop reason: HOSPADM

## 2024-05-04 RX ORDER — FUROSEMIDE 20 MG/1
20 TABLET ORAL DAILY
Status: DISCONTINUED | OUTPATIENT
Start: 2024-05-04 | End: 2024-05-07 | Stop reason: HOSPADM

## 2024-05-04 RX ORDER — CHOLECALCIFEROL (VITAMIN D3) 50 MCG
2000 TABLET ORAL DAILY
Status: DISCONTINUED | OUTPATIENT
Start: 2024-05-04 | End: 2024-05-07 | Stop reason: HOSPADM

## 2024-05-04 RX ORDER — SODIUM CHLORIDE 0.9 % (FLUSH) 0.9 %
5-40 SYRINGE (ML) INJECTION EVERY 12 HOURS SCHEDULED
Status: DISCONTINUED | OUTPATIENT
Start: 2024-05-04 | End: 2024-05-07 | Stop reason: HOSPADM

## 2024-05-04 RX ORDER — GABAPENTIN 600 MG/1
600 TABLET ORAL 3 TIMES DAILY
Status: DISCONTINUED | OUTPATIENT
Start: 2024-05-04 | End: 2024-05-07 | Stop reason: HOSPADM

## 2024-05-04 RX ORDER — LACTULOSE 10 G/15ML
20 SOLUTION ORAL 3 TIMES DAILY
Status: DISCONTINUED | OUTPATIENT
Start: 2024-05-04 | End: 2024-05-07 | Stop reason: HOSPADM

## 2024-05-04 RX ORDER — ONDANSETRON 4 MG/1
4 TABLET, ORALLY DISINTEGRATING ORAL EVERY 8 HOURS PRN
Status: DISCONTINUED | OUTPATIENT
Start: 2024-05-04 | End: 2024-05-07 | Stop reason: HOSPADM

## 2024-05-04 RX ORDER — SODIUM CHLORIDE 0.9 % (FLUSH) 0.9 %
5-40 SYRINGE (ML) INJECTION PRN
Status: DISCONTINUED | OUTPATIENT
Start: 2024-05-04 | End: 2024-05-07 | Stop reason: HOSPADM

## 2024-05-04 RX ORDER — POLYETHYLENE GLYCOL 3350 17 G/17G
17 POWDER, FOR SOLUTION ORAL DAILY PRN
Status: DISCONTINUED | OUTPATIENT
Start: 2024-05-04 | End: 2024-05-07 | Stop reason: HOSPADM

## 2024-05-04 RX ORDER — SPIRONOLACTONE 25 MG/1
50 TABLET ORAL DAILY
Status: DISCONTINUED | OUTPATIENT
Start: 2024-05-04 | End: 2024-05-07 | Stop reason: HOSPADM

## 2024-05-04 RX ORDER — CARVEDILOL 6.25 MG/1
6.25 TABLET ORAL DAILY
Status: DISCONTINUED | OUTPATIENT
Start: 2024-05-04 | End: 2024-05-07 | Stop reason: HOSPADM

## 2024-05-04 RX ADMIN — CARVEDILOL 6.25 MG: 6.25 TABLET, FILM COATED ORAL at 18:31

## 2024-05-04 RX ADMIN — LACTULOSE 20 G: 20 SOLUTION ORAL at 21:21

## 2024-05-04 RX ADMIN — Medication 2000 UNITS: at 18:31

## 2024-05-04 RX ADMIN — LACTULOSE 20 G: 20 SOLUTION ORAL at 14:49

## 2024-05-04 RX ADMIN — IOPAMIDOL 75 ML: 755 INJECTION, SOLUTION INTRAVENOUS at 12:30

## 2024-05-04 RX ADMIN — GABAPENTIN 600 MG: 600 TABLET, FILM COATED ORAL at 21:21

## 2024-05-04 RX ADMIN — FUROSEMIDE 20 MG: 20 TABLET ORAL at 18:31

## 2024-05-04 RX ADMIN — SODIUM CHLORIDE, PRESERVATIVE FREE 10 ML: 5 INJECTION INTRAVENOUS at 21:21

## 2024-05-04 NOTE — H&P
Results   Component Value Date/Time    NITRU NEGATIVE 01/09/2024 12:18 PM    WBCUA 0 TO 5 12/09/2023 11:57 AM    BACTERIA TRACE 12/09/2023 11:57 AM    RBCUA 0 TO 2 12/09/2023 11:57 AM    GLUCOSEU >=1000 01/09/2024 12:18 PM       Imaging:  CT ABDOMEN PELVIS W IV CONTRAST Additional Contrast? None    Result Date: 5/4/2024  EXAMINATION: CT OF THE ABDOMEN AND PELVIS WITH CONTRAST 5/4/2024 12:30 pm TECHNIQUE: CT of the abdomen and pelvis was performed with the administration of intravenous contrast. Multiplanar reformatted images are provided for review. Automated exposure control, iterative reconstruction, and/or weight based adjustment of the mA/kV was utilized to reduce the radiation dose to as low as reasonably achievable. COMPARISON: 12/09/2023 HISTORY: ORDERING SYSTEM PROVIDED HISTORY: ab pain, hx of cirrhosis TECHNOLOGIST PROVIDED HISTORY: Reason for exam:->ab pain, hx of cirrhosis Additional Contrast?->None Decision Support Exception - unselect if not a suspected or confirmed emergency medical condition->Emergency Medical Condition (MA) What reading provider will be dictating this exam?->CRC FINDINGS: Mild body wall edema.  Coronary artery calcifications.  Mild cardiomegaly. Small hiatal hernia and mild distal esophageal wall thickening.  Wall thickening and fluid in the distal esophagus suggestive of reflux or esophagitis.  Lung bases negative.  No pneumoperitoneum.  Cirrhotic liver with fatty infiltration.  Cholecystectomy.  Massive splenomegaly up to approximately 19.6 cm craniocaudal.  Superior splenic infarct measuring 3.3 x 2.3 cm.  Axial image 15.  Abdominal venous collaterals consistent with portal hypertension.  Pancreatic atrophy.  Unremarkable adrenal glands.  No renal mass or hydronephrosis.  Mild ascites.  Nonobstructive bowel gas pattern. Proximal colitis.  Surgical changes right lower quadrant.  The probable small right uterine fibroid.  Axial image 140.  Pelvic structures otherwise unremarkable.

## 2024-05-04 NOTE — ED PROVIDER NOTES
Department of Emergency Medicine   ED  Provider Note  Admit Date/RoomTime: 2024  9:39 AM  ED Room:           History of Present Illness:  24, Time: 12:29 PM EDT  Chief Complaint   Patient presents with    Fatigue     Per the family the patient has been more weak than normal. Family thinks it may be her ammonia level.                 Lisa Hunt is a 64 y.o. female presenting to the ED for altered mental status.  Patient presents from home.  Family feels that she is more confused than normal.  He also feels she is not able to walk straight.  This began several days ago.  She does have a history of cirrhosis, there is concern that her ammonia level is up.  No fall, head injury, no nausea or vomiting, no fevers or chills.  No change in bowel or bladder, no paresthesias, no other symptoms or complaints.    Review of Systems:   Pertinent positives and negatives are stated within HPI, all other systems reviewed and are negative.        --------------------------------------------- PAST HISTORY ---------------------------------------------  Past Medical History:  has a past medical history of DONNA (acute kidney injury) (HCC), Cirrhosis (HCC), Diabetes mellitus (HCC), Diabetic neuropathy (HCC), Esophageal varices without bleeding (HCC), GERD (gastroesophageal reflux disease), Hypertension, Intracranial bleed (HCC), Liver cirrhosis (HCC), Low vitamin D level, SDH (subdural hematoma) (HCC), Thrombocytopenia (HCC), and Type 2 diabetes mellitus without complication (HCC).    Past Surgical History:  has a past surgical history that includes Upper gastrointestinal endoscopy (2022); Upper gastrointestinal endoscopy (2021); Upper gastrointestinal endoscopy (2019); Upper gastrointestinal endoscopy (2021);  section; Cholecystectomy; Appendectomy; Paracentesis (Left, 10/12/2023); Paracentesis (Left, 2024); Upper gastrointestinal endoscopy (N/A, 2024); Upper

## 2024-05-05 LAB
ALBUMIN SERPL-MCNC: 2.9 G/DL (ref 3.5–5.2)
ALP SERPL-CCNC: 137 U/L (ref 35–104)
ALT SERPL-CCNC: 26 U/L (ref 0–32)
AMMONIA PLAS-SCNC: 65 UMOL/L (ref 11–51)
ANION GAP SERPL CALCULATED.3IONS-SCNC: 9 MMOL/L (ref 7–16)
AST SERPL-CCNC: 41 U/L (ref 0–31)
BASOPHILS # BLD: 0 K/UL (ref 0–0.2)
BASOPHILS NFR BLD: 0 % (ref 0–2)
BILIRUB DIRECT SERPL-MCNC: 0.5 MG/DL (ref 0–0.3)
BILIRUB INDIRECT SERPL-MCNC: 1 MG/DL (ref 0–1)
BILIRUB SERPL-MCNC: 1.5 MG/DL (ref 0–1.2)
BUN SERPL-MCNC: 27 MG/DL (ref 6–23)
CALCIUM SERPL-MCNC: 8.4 MG/DL (ref 8.6–10.2)
CHLORIDE SERPL-SCNC: 105 MMOL/L (ref 98–107)
CO2 SERPL-SCNC: 19 MMOL/L (ref 22–29)
CREAT SERPL-MCNC: 1.1 MG/DL (ref 0.5–1)
EOSINOPHIL # BLD: 0.06 K/UL (ref 0.05–0.5)
EOSINOPHILS RELATIVE PERCENT: 4 % (ref 0–6)
ERYTHROCYTE [DISTWIDTH] IN BLOOD BY AUTOMATED COUNT: 17.5 % (ref 11.5–15)
GFR, ESTIMATED: 54 ML/MIN/1.73M2
GLUCOSE BLD-MCNC: 198 MG/DL (ref 74–99)
GLUCOSE BLD-MCNC: 304 MG/DL (ref 74–99)
GLUCOSE BLD-MCNC: 308 MG/DL (ref 74–99)
GLUCOSE BLD-MCNC: 423 MG/DL (ref 74–99)
GLUCOSE BLD-MCNC: 467 MG/DL (ref 74–99)
GLUCOSE SERPL-MCNC: 375 MG/DL (ref 74–99)
HCT VFR BLD AUTO: 23.6 % (ref 34–48)
HGB BLD-MCNC: 7.6 G/DL (ref 11.5–15.5)
LYMPHOCYTES NFR BLD: 0.36 K/UL (ref 1.5–4)
LYMPHOCYTES RELATIVE PERCENT: 21 % (ref 20–42)
MCH RBC QN AUTO: 27.7 PG (ref 26–35)
MCHC RBC AUTO-ENTMCNC: 32.2 G/DL (ref 32–34.5)
MCV RBC AUTO: 86.1 FL (ref 80–99.9)
MONOCYTES NFR BLD: 0.1 K/UL (ref 0.1–0.95)
MONOCYTES NFR BLD: 6 % (ref 2–12)
NEUTROPHILS NFR BLD: 69 % (ref 43–80)
NEUTS SEG NFR BLD: 1.18 K/UL (ref 1.8–7.3)
PLATELET CONFIRMATION: NORMAL
PLATELET, FLUORESCENCE: 30 K/UL (ref 130–450)
PMV BLD AUTO: ABNORMAL FL (ref 7–12)
POTASSIUM SERPL-SCNC: 5 MMOL/L (ref 3.5–5)
PROT SERPL-MCNC: 7 G/DL (ref 6.4–8.3)
RBC # BLD AUTO: 2.74 M/UL (ref 3.5–5.5)
RBC # BLD: ABNORMAL 10*6/UL
SODIUM SERPL-SCNC: 133 MMOL/L (ref 132–146)
WBC OTHER # BLD: 1.7 K/UL (ref 4.5–11.5)

## 2024-05-05 PROCEDURE — 36415 COLL VENOUS BLD VENIPUNCTURE: CPT

## 2024-05-05 PROCEDURE — 80053 COMPREHEN METABOLIC PANEL: CPT

## 2024-05-05 PROCEDURE — 99232 SBSQ HOSP IP/OBS MODERATE 35: CPT | Performed by: INTERNAL MEDICINE

## 2024-05-05 PROCEDURE — 6370000000 HC RX 637 (ALT 250 FOR IP): Performed by: INTERNAL MEDICINE

## 2024-05-05 PROCEDURE — 6370000000 HC RX 637 (ALT 250 FOR IP): Performed by: STUDENT IN AN ORGANIZED HEALTH CARE EDUCATION/TRAINING PROGRAM

## 2024-05-05 PROCEDURE — 1200000000 HC SEMI PRIVATE

## 2024-05-05 PROCEDURE — 82962 GLUCOSE BLOOD TEST: CPT

## 2024-05-05 PROCEDURE — 82248 BILIRUBIN DIRECT: CPT

## 2024-05-05 PROCEDURE — 82140 ASSAY OF AMMONIA: CPT

## 2024-05-05 PROCEDURE — 2580000003 HC RX 258: Performed by: STUDENT IN AN ORGANIZED HEALTH CARE EDUCATION/TRAINING PROGRAM

## 2024-05-05 PROCEDURE — 85025 COMPLETE CBC W/AUTO DIFF WBC: CPT

## 2024-05-05 RX ORDER — INSULIN LISPRO 100 [IU]/ML
0-4 INJECTION, SOLUTION INTRAVENOUS; SUBCUTANEOUS NIGHTLY
Status: DISCONTINUED | OUTPATIENT
Start: 2024-05-05 | End: 2024-05-07 | Stop reason: HOSPADM

## 2024-05-05 RX ORDER — INSULIN GLARGINE 100 [IU]/ML
30 INJECTION, SOLUTION SUBCUTANEOUS 2 TIMES DAILY
Status: DISCONTINUED | OUTPATIENT
Start: 2024-05-05 | End: 2024-05-05

## 2024-05-05 RX ORDER — INSULIN LISPRO 100 [IU]/ML
10 INJECTION, SOLUTION INTRAVENOUS; SUBCUTANEOUS
Status: DISCONTINUED | OUTPATIENT
Start: 2024-05-05 | End: 2024-05-05

## 2024-05-05 RX ORDER — DEXTROSE MONOHYDRATE 100 MG/ML
INJECTION, SOLUTION INTRAVENOUS CONTINUOUS PRN
Status: DISCONTINUED | OUTPATIENT
Start: 2024-05-05 | End: 2024-05-07 | Stop reason: HOSPADM

## 2024-05-05 RX ORDER — INSULIN GLARGINE 100 [IU]/ML
30 INJECTION, SOLUTION SUBCUTANEOUS ONCE
Status: COMPLETED | OUTPATIENT
Start: 2024-05-05 | End: 2024-05-05

## 2024-05-05 RX ORDER — INSULIN GLARGINE 100 [IU]/ML
45 INJECTION, SOLUTION SUBCUTANEOUS 2 TIMES DAILY
Status: DISCONTINUED | OUTPATIENT
Start: 2024-05-05 | End: 2024-05-06

## 2024-05-05 RX ORDER — INSULIN GLARGINE 100 [IU]/ML
15 INJECTION, SOLUTION SUBCUTANEOUS NIGHTLY
Status: DISCONTINUED | OUTPATIENT
Start: 2024-05-05 | End: 2024-05-05

## 2024-05-05 RX ORDER — INSULIN LISPRO 100 [IU]/ML
5 INJECTION, SOLUTION INTRAVENOUS; SUBCUTANEOUS
Status: DISCONTINUED | OUTPATIENT
Start: 2024-05-05 | End: 2024-05-05

## 2024-05-05 RX ORDER — INSULIN GLARGINE 100 [IU]/ML
30 INJECTION, SOLUTION SUBCUTANEOUS DAILY
Status: DISCONTINUED | OUTPATIENT
Start: 2024-05-05 | End: 2024-05-05

## 2024-05-05 RX ORDER — INSULIN LISPRO 100 [IU]/ML
15 INJECTION, SOLUTION INTRAVENOUS; SUBCUTANEOUS
Status: DISCONTINUED | OUTPATIENT
Start: 2024-05-06 | End: 2024-05-06

## 2024-05-05 RX ORDER — INSULIN LISPRO 100 [IU]/ML
0-16 INJECTION, SOLUTION INTRAVENOUS; SUBCUTANEOUS
Status: DISCONTINUED | OUTPATIENT
Start: 2024-05-05 | End: 2024-05-06

## 2024-05-05 RX ORDER — GLUCAGON 1 MG/ML
1 KIT INJECTION PRN
Status: DISCONTINUED | OUTPATIENT
Start: 2024-05-05 | End: 2024-05-07 | Stop reason: HOSPADM

## 2024-05-05 RX ADMIN — GABAPENTIN 600 MG: 600 TABLET, FILM COATED ORAL at 22:23

## 2024-05-05 RX ADMIN — GABAPENTIN 600 MG: 600 TABLET, FILM COATED ORAL at 14:50

## 2024-05-05 RX ADMIN — GABAPENTIN 600 MG: 600 TABLET, FILM COATED ORAL at 09:08

## 2024-05-05 RX ADMIN — RIFAXIMIN 600 MG: 200 TABLET ORAL at 14:50

## 2024-05-05 RX ADMIN — Medication 2000 UNITS: at 09:08

## 2024-05-05 RX ADMIN — CARVEDILOL 6.25 MG: 6.25 TABLET, FILM COATED ORAL at 09:08

## 2024-05-05 RX ADMIN — INSULIN LISPRO 16 UNITS: 100 INJECTION, SOLUTION INTRAVENOUS; SUBCUTANEOUS at 09:08

## 2024-05-05 RX ADMIN — FUROSEMIDE 20 MG: 20 TABLET ORAL at 09:08

## 2024-05-05 RX ADMIN — LACTULOSE 20 G: 20 SOLUTION ORAL at 22:23

## 2024-05-05 RX ADMIN — INSULIN LISPRO 10 UNITS: 100 INJECTION, SOLUTION INTRAVENOUS; SUBCUTANEOUS at 12:17

## 2024-05-05 RX ADMIN — SODIUM CHLORIDE, PRESERVATIVE FREE 10 ML: 5 INJECTION INTRAVENOUS at 09:09

## 2024-05-05 RX ADMIN — SODIUM CHLORIDE, PRESERVATIVE FREE 10 ML: 5 INJECTION INTRAVENOUS at 22:23

## 2024-05-05 RX ADMIN — LACTULOSE 20 G: 20 SOLUTION ORAL at 09:08

## 2024-05-05 RX ADMIN — LACTULOSE 20 G: 20 SOLUTION ORAL at 14:50

## 2024-05-05 RX ADMIN — RIFAXIMIN 600 MG: 200 TABLET ORAL at 09:12

## 2024-05-05 RX ADMIN — INSULIN GLARGINE 30 UNITS: 100 INJECTION, SOLUTION SUBCUTANEOUS at 23:09

## 2024-05-05 RX ADMIN — RIFAXIMIN 600 MG: 200 TABLET ORAL at 23:09

## 2024-05-05 RX ADMIN — SPIRONOLACTONE 50 MG: 25 TABLET ORAL at 09:08

## 2024-05-05 RX ADMIN — INSULIN LISPRO 12 UNITS: 100 INJECTION, SOLUTION INTRAVENOUS; SUBCUTANEOUS at 17:05

## 2024-05-05 RX ADMIN — INSULIN GLARGINE 30 UNITS: 100 INJECTION, SOLUTION SUBCUTANEOUS at 09:08

## 2024-05-05 RX ADMIN — INSULIN LISPRO 16 UNITS: 100 INJECTION, SOLUTION INTRAVENOUS; SUBCUTANEOUS at 12:17

## 2024-05-05 RX ADMIN — INSULIN LISPRO 10 UNITS: 100 INJECTION, SOLUTION INTRAVENOUS; SUBCUTANEOUS at 17:04

## 2024-05-05 NOTE — PROGRESS NOTES
[facility] Hospitalist Progress Note    Admitting Date and Time: 5/4/2024  9:39 AM  Admit Dx: Altered mental status, unspecified altered mental status type [R41.82]  Acute metabolic encephalopathy [G93.41]    Synopsis:    64-year-old female patient with ACOSTA cirrhosis with ascites, type 2 diabetes mellitus with long-term use of insulin, esophageal varies s/p banding, hypovitaminosis D, thrombocytopenia who presented to the ER as family was concerned given her increased fatigue, impaired walking and confusion.  Patient is oriented to person and the fact that she is in the hospital but believes she is currently in [place], not oriented to time either.  Patient admits to chills, palpitations, nausea and increased urinary frequency.  Per daughter at bedside states that she was pain around the house as she seems lost and could not find the bathroom.  Daughter also endorses high blood sugars most of the time between 300s and 500s.  Denied fevers, headache, chest pain, abdominal pain or distention, cough, shortness of breath, vomiting, diarrhea or constipation, melena. She has had paracentesis about 5 times, last one around last month. Daughter denies she has has peritonitis in the past. Her daughter administered medications in the morning before she leaves for work including lactulose.  Per report patient is having about just 1 BM daily.  There are no sick contacts.  Patient follows-up with GI, hepatobiliary as outpatient and her PCP  On ER evaluation patient is afebrile temp 96.7 F /74 HR 94 RR 16 SpO2 99% on room air  Labs significant for pancytopenia, WBC 2.3, hemoglobin 9.5, platelets 35, ammonia 55, lactic acid 2.4, INR 1.4, normal lipase, sodium 131, potassium 5.1, BUN 26, creatinine 1.1, glucose 390, troponin 19  CT head with no acute intracranial abnormality.  CTAP with IV contrast with findings described below in radiology section of this note  EKG normal sinus rhythm, HR 91 QTc

## 2024-05-05 NOTE — PLAN OF CARE
Problem: Safety - Adult  Goal: Free from fall injury  5/5/2024 0355 by Page Castañeda RN  Outcome: Progressing  5/4/2024 1855 by Ary Peres RN  Outcome: Progressing     Problem: ABCDS Injury Assessment  Goal: Absence of physical injury  5/5/2024 0355 by Page Castañeda RN  Outcome: Progressing  5/4/2024 1855 by Ary Peres RN  Outcome: Progressing

## 2024-05-05 NOTE — PLAN OF CARE
Problem: Safety - Adult  Goal: Free from fall injury  5/5/2024 1457 by Ary Peres RN  Outcome: Progressing  5/5/2024 0355 by Page Castañeda RN  Outcome: Progressing     Problem: ABCDS Injury Assessment  Goal: Absence of physical injury  5/5/2024 1457 by Ary Peres, RN  Outcome: Progressing  5/5/2024 0355 by Page Castañeda RN  Outcome: Progressing

## 2024-05-06 PROBLEM — Z91.119 DIETARY NONCOMPLIANCE: Status: ACTIVE | Noted: 2024-05-06

## 2024-05-06 LAB
ALBUMIN SERPL-MCNC: 2.9 G/DL (ref 3.5–5.2)
ALP SERPL-CCNC: 140 U/L (ref 35–104)
ALT SERPL-CCNC: 28 U/L (ref 0–32)
ANION GAP SERPL CALCULATED.3IONS-SCNC: 10 MMOL/L (ref 7–16)
AST SERPL-CCNC: 53 U/L (ref 0–31)
BASOPHILS # BLD: 0.03 K/UL (ref 0–0.2)
BASOPHILS NFR BLD: 2 % (ref 0–2)
BILIRUB SERPL-MCNC: 0.9 MG/DL (ref 0–1.2)
BUN SERPL-MCNC: 36 MG/DL (ref 6–23)
CALCIUM SERPL-MCNC: 8.5 MG/DL (ref 8.6–10.2)
CHLORIDE SERPL-SCNC: 102 MMOL/L (ref 98–107)
CO2 SERPL-SCNC: 21 MMOL/L (ref 22–29)
CREAT SERPL-MCNC: 1.4 MG/DL (ref 0.5–1)
EKG ATRIAL RATE: 91 BPM
EKG P AXIS: 63 DEGREES
EKG P-R INTERVAL: 178 MS
EKG Q-T INTERVAL: 402 MS
EKG QRS DURATION: 134 MS
EKG QTC CALCULATION (BAZETT): 494 MS
EKG R AXIS: -58 DEGREES
EKG T AXIS: 22 DEGREES
EKG VENTRICULAR RATE: 91 BPM
EOSINOPHIL # BLD: 0.09 K/UL (ref 0.05–0.5)
EOSINOPHILS RELATIVE PERCENT: 5 % (ref 0–6)
ERYTHROCYTE [DISTWIDTH] IN BLOOD BY AUTOMATED COUNT: 17.2 % (ref 11.5–15)
GFR, ESTIMATED: 42 ML/MIN/1.73M2
GLUCOSE BLD-MCNC: 183 MG/DL (ref 74–99)
GLUCOSE BLD-MCNC: 233 MG/DL (ref 74–99)
GLUCOSE BLD-MCNC: 258 MG/DL (ref 74–99)
GLUCOSE BLD-MCNC: 299 MG/DL (ref 74–99)
GLUCOSE SERPL-MCNC: 229 MG/DL (ref 74–99)
HCT VFR BLD AUTO: 23.2 % (ref 34–48)
HGB BLD-MCNC: 7.5 G/DL (ref 11.5–15.5)
LYMPHOCYTES NFR BLD: 0.56 K/UL (ref 1.5–4)
LYMPHOCYTES RELATIVE PERCENT: 31 % (ref 20–42)
MCH RBC QN AUTO: 28.6 PG (ref 26–35)
MCHC RBC AUTO-ENTMCNC: 32.3 G/DL (ref 32–34.5)
MCV RBC AUTO: 88.5 FL (ref 80–99.9)
MICROORGANISM SPEC CULT: ABNORMAL
MICROORGANISM SPEC CULT: ABNORMAL
MONOCYTES NFR BLD: 0.14 K/UL (ref 0.1–0.95)
MONOCYTES NFR BLD: 8 % (ref 2–12)
NEUTROPHILS NFR BLD: 54 % (ref 43–80)
NEUTS SEG NFR BLD: 0.97 K/UL (ref 1.8–7.3)
PLATELET CONFIRMATION: NORMAL
PLATELET, FLUORESCENCE: 31 K/UL (ref 130–450)
PMV BLD AUTO: ABNORMAL FL (ref 7–12)
POTASSIUM SERPL-SCNC: 5.3 MMOL/L (ref 3.5–5)
PROT SERPL-MCNC: 7 G/DL (ref 6.4–8.3)
RBC # BLD AUTO: 2.62 M/UL (ref 3.5–5.5)
RBC # BLD: ABNORMAL 10*6/UL
SODIUM SERPL-SCNC: 133 MMOL/L (ref 132–146)
SPECIMEN DESCRIPTION: ABNORMAL
WBC OTHER # BLD: 1.8 K/UL (ref 4.5–11.5)

## 2024-05-06 PROCEDURE — 99222 1ST HOSP IP/OBS MODERATE 55: CPT | Performed by: INTERNAL MEDICINE

## 2024-05-06 PROCEDURE — 82962 GLUCOSE BLOOD TEST: CPT

## 2024-05-06 PROCEDURE — 99222 1ST HOSP IP/OBS MODERATE 55: CPT | Performed by: NURSE PRACTITIONER

## 2024-05-06 PROCEDURE — 93010 ELECTROCARDIOGRAM REPORT: CPT | Performed by: INTERNAL MEDICINE

## 2024-05-06 PROCEDURE — 6370000000 HC RX 637 (ALT 250 FOR IP): Performed by: INTERNAL MEDICINE

## 2024-05-06 PROCEDURE — 2580000003 HC RX 258: Performed by: STUDENT IN AN ORGANIZED HEALTH CARE EDUCATION/TRAINING PROGRAM

## 2024-05-06 PROCEDURE — 36415 COLL VENOUS BLD VENIPUNCTURE: CPT

## 2024-05-06 PROCEDURE — 1200000000 HC SEMI PRIVATE

## 2024-05-06 PROCEDURE — 80053 COMPREHEN METABOLIC PANEL: CPT

## 2024-05-06 PROCEDURE — 6370000000 HC RX 637 (ALT 250 FOR IP): Performed by: STUDENT IN AN ORGANIZED HEALTH CARE EDUCATION/TRAINING PROGRAM

## 2024-05-06 PROCEDURE — 99232 SBSQ HOSP IP/OBS MODERATE 35: CPT | Performed by: INTERNAL MEDICINE

## 2024-05-06 PROCEDURE — 85025 COMPLETE CBC W/AUTO DIFF WBC: CPT

## 2024-05-06 RX ORDER — INSULIN GLARGINE 100 [IU]/ML
48 INJECTION, SOLUTION SUBCUTANEOUS EVERY MORNING
Status: DISCONTINUED | OUTPATIENT
Start: 2024-05-07 | End: 2024-05-07 | Stop reason: HOSPADM

## 2024-05-06 RX ORDER — INSULIN LISPRO 100 [IU]/ML
0-18 INJECTION, SOLUTION INTRAVENOUS; SUBCUTANEOUS
Status: DISCONTINUED | OUTPATIENT
Start: 2024-05-06 | End: 2024-05-07 | Stop reason: HOSPADM

## 2024-05-06 RX ORDER — INSULIN LISPRO 100 [IU]/ML
18 INJECTION, SOLUTION INTRAVENOUS; SUBCUTANEOUS
Status: DISCONTINUED | OUTPATIENT
Start: 2024-05-06 | End: 2024-05-07 | Stop reason: HOSPADM

## 2024-05-06 RX ORDER — INSULIN GLARGINE 100 [IU]/ML
60 INJECTION, SOLUTION SUBCUTANEOUS DAILY
Status: DISCONTINUED | OUTPATIENT
Start: 2024-05-07 | End: 2024-05-06

## 2024-05-06 RX ADMIN — LACTULOSE 20 G: 20 SOLUTION ORAL at 10:12

## 2024-05-06 RX ADMIN — LACTULOSE 20 G: 20 SOLUTION ORAL at 15:30

## 2024-05-06 RX ADMIN — FUROSEMIDE 20 MG: 20 TABLET ORAL at 10:12

## 2024-05-06 RX ADMIN — GABAPENTIN 600 MG: 600 TABLET, FILM COATED ORAL at 15:30

## 2024-05-06 RX ADMIN — INSULIN LISPRO 8 UNITS: 100 INJECTION, SOLUTION INTRAVENOUS; SUBCUTANEOUS at 12:50

## 2024-05-06 RX ADMIN — INSULIN GLARGINE 45 UNITS: 100 INJECTION, SOLUTION SUBCUTANEOUS at 10:11

## 2024-05-06 RX ADMIN — LACTULOSE 20 G: 20 SOLUTION ORAL at 20:51

## 2024-05-06 RX ADMIN — SODIUM CHLORIDE, PRESERVATIVE FREE 10 ML: 5 INJECTION INTRAVENOUS at 10:13

## 2024-05-06 RX ADMIN — INSULIN LISPRO 15 UNITS: 100 INJECTION, SOLUTION INTRAVENOUS; SUBCUTANEOUS at 10:11

## 2024-05-06 RX ADMIN — INSULIN LISPRO 15 UNITS: 100 INJECTION, SOLUTION INTRAVENOUS; SUBCUTANEOUS at 12:49

## 2024-05-06 RX ADMIN — GABAPENTIN 600 MG: 600 TABLET, FILM COATED ORAL at 20:51

## 2024-05-06 RX ADMIN — INSULIN LISPRO 18 UNITS: 100 INJECTION, SOLUTION INTRAVENOUS; SUBCUTANEOUS at 18:50

## 2024-05-06 RX ADMIN — SODIUM CHLORIDE, PRESERVATIVE FREE 10 ML: 5 INJECTION INTRAVENOUS at 20:51

## 2024-05-06 RX ADMIN — INSULIN LISPRO 3 UNITS: 100 INJECTION, SOLUTION INTRAVENOUS; SUBCUTANEOUS at 18:49

## 2024-05-06 RX ADMIN — Medication 2000 UNITS: at 10:12

## 2024-05-06 RX ADMIN — RIFAXIMIN 600 MG: 200 TABLET ORAL at 10:14

## 2024-05-06 RX ADMIN — CARVEDILOL 6.25 MG: 6.25 TABLET, FILM COATED ORAL at 10:12

## 2024-05-06 RX ADMIN — RIFAXIMIN 600 MG: 200 TABLET ORAL at 20:51

## 2024-05-06 RX ADMIN — GABAPENTIN 600 MG: 600 TABLET, FILM COATED ORAL at 10:12

## 2024-05-06 RX ADMIN — SPIRONOLACTONE 50 MG: 25 TABLET ORAL at 10:12

## 2024-05-06 RX ADMIN — INSULIN LISPRO 4 UNITS: 100 INJECTION, SOLUTION INTRAVENOUS; SUBCUTANEOUS at 10:13

## 2024-05-06 RX ADMIN — RIFAXIMIN 600 MG: 200 TABLET ORAL at 15:35

## 2024-05-06 NOTE — PROGRESS NOTES
[facility] Hospitalist Progress Note    Admitting Date and Time: 5/4/2024  9:39 AM  Admit Dx: Altered mental status, unspecified altered mental status type [R41.82]  Acute metabolic encephalopathy [G93.41]    Synopsis:    64-year-old female patient with ACOSTA cirrhosis with ascites, type 2 diabetes mellitus with long-term use of insulin, esophageal varies s/p banding, hypovitaminosis D, thrombocytopenia who presented to the ER as family was concerned given her increased fatigue, impaired walking and confusion.  Patient is oriented to person and the fact that she is in the hospital but believes she is currently in [place], not oriented to time either.  Patient admits to chills, palpitations, nausea and increased urinary frequency.  Per daughter at bedside states that she was pain around the house as she seems lost and could not find the bathroom.  Daughter also endorses high blood sugars most of the time between 300s and 500s.  Denied fevers, headache, chest pain, abdominal pain or distention, cough, shortness of breath, vomiting, diarrhea or constipation, melena. She has had paracentesis about 5 times, last one around last month. Daughter denies she has has peritonitis in the past. Her daughter administered medications in the morning before she leaves for work including lactulose.  Per report patient is having about just 1 BM daily.  There are no sick contacts.  Patient follows-up with GI, hepatobiliary as outpatient and her PCP  On ER evaluation patient is afebrile temp 96.7 F /74 HR 94 RR 16 SpO2 99% on room air  Labs significant for pancytopenia, WBC 2.3, hemoglobin 9.5, platelets 35, ammonia 55, lactic acid 2.4, INR 1.4, normal lipase, sodium 131, potassium 5.1, BUN 26, creatinine 1.1, glucose 390, troponin 19  CT head with no acute intracranial abnormality.  CTAP with IV contrast with findings described below in radiology section of this note  EKG normal sinus rhythm, HR 91 QTc

## 2024-05-06 NOTE — ACP (ADVANCE CARE PLANNING)
Advance Care Planning   The patient has the following advanced directives on file:  Advance Directives       Power of  Living Will ACP-Advance Directive ACP-Power of     Not on File Not on File Not on File Not on File            The patient has appointed the following active healthcare agents:  Primary:  Kelsey Fitch - daughter  [phone]      JOSY Ochoa  5/6/2024

## 2024-05-06 NOTE — CONSULTS
[facility]   Gastroenterology, Hepatology, &  Advanced Endoscopy    Consult     Reason for consult: liver cirrhosis  ASSESSMENT AND PLAN:  Family in room. Much time spent with patient and family regarding previous work up and current plan.  Stressed the importance of being adherent with OP lactulose dosing. Once again, states she doesn't take lactulose as directed at home.   No further work up planned during this admission.  Continue with lactulose as pt has made improvement's in mental status since taking lactulose and having BM's in house.  Goal is 3-4 BMS qd     Pt well known to our practice.  Last seen 3/14/24 in house.  -History of ACOSTA, cirrhosis  - Pt has had multiple admissions  7/17/23, 8/2/23, 12/9/23, 1/09/24,3/14/24 for AMS with hx of  cirrhosis/hepatic encephalopathy/esoph varices.    -She admits to noncompliance with her lactulose at home in the past.   -She follows with Dr Jovan Arteaga GI as a outpatient    - Transplant consideration referral to  as discussed during office visit with Dr. Sosa 1/24/24, pt has a appointment setup at  but not seen yet.   -Paracentesis 3/14/24  A total volume of  4250mL was withdrawn.      - Agree with diet choice low carb, low sodium  - continue Xifaxan 600 mg TID  - Protonix 40 mg qd  - lactulose  20 g TID  - coreg 6.25 hx of varices  - Aldactone 50 mg   - Lasix 20 mg qd  -Hgb transfuse to keep >7   - Portal HTN  -PLT 32  -Hgb 7.5  - NH 3 65  -INR 1.4    3/15/24 EGD  Esophagus with scarring from previous banding.   -Grade I/II varices with no high risk features.  -Moderate portal hypertensive gastropathy.  -GAVE (Gastric Antral Vascular Ectasia) in antrum treated with APC.  -Normal duodenum.     EGD Findings: 1/12/24   Grade I/II varices with mucosal scarring from likely previous banding. One column with focal red spot. Banding x 1 performed.   Moderate portal hypertensive gastropathy.  Mild antral gastritis. Biopsied.   Overall normal duodenum with possible

## 2024-05-06 NOTE — CARE COORDINATION
Social Work/Case Management Transition of Care Planning (Tahmina FERRIS [phone]):  Patient presented to the hospital due to concerns of AMS and increased weakness.  She has a history of ACOSTA cirrhosis of the liver.  She does not always take her lactulose as prescribed.  GI has been consulted.  PT/OT to mary.  Met with patient and her daughter, Kelsey, at bedside.   was utilized.  Patient does speak a little English.  Patient resides in a 1 story home with 2 NICK.  She lives with her daughter, Kelsey.  Patient reports she is independent with personal care but relies on Kelsey for all other assistance.  She does have a FWW.  No oxygen needs in the home.  Patient is diabetic and stated she does have all needed testing supplies.  PCP is Dr. Hernandez.  Pharmacy is [facility] in [place].  Patient has used [facility] in the past but is unsure of the name of the agency.  She has a JOSAFAT history at [place].  Discharge plan is to home with no needs.  Family will transport.  CM/SW will follow.  JOSY Ochoa  5/6/2024      Case Management Assessment  Initial Evaluation    Date/Time of Evaluation: 5/6/2024 12:11 PM  Assessment Completed by: JOSY Ochoa    If patient is discharged prior to next notation, then this note serves as note for discharge by case management.    Patient Name: Lisa Hunt                   YOB: 1960  Diagnosis: Altered mental status, unspecified altered mental status type [R41.82]  Acute metabolic encephalopathy [G93.41]                   Date / Time: 5/4/2024  9:39 AM    Patient Admission Status: Inpatient   Readmission Risk (Low < 19, Mod (19-27), High > 27): Readmission Risk Score: 25.6    Current PCP: Tonie Hernandez, DO  PCP verified by CM? Yes    Chart Reviewed: Yes      History Provided by: Patient  Patient Orientation: Alert and Oriented, Person, Place, Situation, Self    Patient Cognition: Alert    Hospitalization in the last 30 days (Readmission):  No    If yes,

## 2024-05-06 NOTE — CONSULTS
ENDOCRINOLOGY INITIAL CONSULTATION NOTE      Date of admission: 5/4/2024  Date of service: 5/6/2024  Admitting physician: No admitting provider for patient encounter.   Primary Care Physician: Tonie Hernandez DO  Consultant physician: Tae Gaines MD     Reason for the consultation:  Uncontrolled DM    History of Present Illness:  The history is provided by the patient. Accuracy of the patient data is excellent    Lisa Hunt is a very pleasant 64 y.o. old female with PMH of ACOSTA, Cirrhosis, esophageal varices s/p banding, type 2DM, thrombocytopenia  and other listed below admitted to [facility] on 5/4/2024 because of altered mental status. Was found to have abnormal labs in ED including hyperammoniemia, pancytopenia and hyperglycemia. AMS was found to be likely 2/2 hepatic encephalopathy. GI consulted for further management and currently on lactulose.  Endocrine service was consulted for diabetes management. Saw and examined the patient at bedside. Spoke with family. Family and patient report she has been having elevated blood sugars at home despite adhering to her home regimen. No current acute complaints.     Prior to admission  The patient was diagnosed with type 2 DM at the age 20. Prior to admission patient was on Humalog mix 75/25 50U BID . Patient has had no hypoglycemic episodes. Patient has not been eating consistent carbohydrate meals, self-blood glucose monitoring has been above goal prior to admission. In addition, patient denied macrovascular or microvascular complications. The patient is not up to date with yearly diabetic eye exam.     Lab Results   Component Value Date/Time    LABA1C 7.9 03/15/2024 05:59 AM       Inpatient diet:   Carb Restricted diet     Point of care glucose monitoring   (Independently reviewed)   Recent Labs     05/04/24  1104 05/05/24  0859 05/05/24  1039 05/05/24  1443 05/05/24  1630 05/05/24  2219 05/06/24  0550   POCGLU 370* 423* 467* 304* 308* 198* 233*       Past medical

## 2024-05-06 NOTE — PLAN OF CARE
Problem: Safety - Adult  Goal: Free from fall injury  5/6/2024 0234 by Page Castañeda, RN  Outcome: Progressing  5/5/2024 1457 by Ary Peres, RN  Outcome: Progressing

## 2024-05-07 VITALS
SYSTOLIC BLOOD PRESSURE: 128 MMHG | RESPIRATION RATE: 18 BRPM | TEMPERATURE: 98.7 F | DIASTOLIC BLOOD PRESSURE: 51 MMHG | OXYGEN SATURATION: 98 % | HEART RATE: 77 BPM

## 2024-05-07 LAB
ALBUMIN SERPL-MCNC: 2.9 G/DL (ref 3.5–5.2)
ALP SERPL-CCNC: 149 U/L (ref 35–104)
ALT SERPL-CCNC: 30 U/L (ref 0–32)
ANION GAP SERPL CALCULATED.3IONS-SCNC: 12 MMOL/L (ref 7–16)
AST SERPL-CCNC: 54 U/L (ref 0–31)
BASOPHILS # BLD: 0 K/UL (ref 0–0.2)
BASOPHILS NFR BLD: 0 % (ref 0–2)
BILIRUB SERPL-MCNC: 0.7 MG/DL (ref 0–1.2)
BUN SERPL-MCNC: 38 MG/DL (ref 6–23)
CALCIUM SERPL-MCNC: 8.4 MG/DL (ref 8.6–10.2)
CHLORIDE SERPL-SCNC: 105 MMOL/L (ref 98–107)
CO2 SERPL-SCNC: 19 MMOL/L (ref 22–29)
CREAT SERPL-MCNC: 1.3 MG/DL (ref 0.5–1)
EOSINOPHIL # BLD: 0.07 K/UL (ref 0.05–0.5)
EOSINOPHILS RELATIVE PERCENT: 4 % (ref 0–6)
ERYTHROCYTE [DISTWIDTH] IN BLOOD BY AUTOMATED COUNT: 17.3 % (ref 11.5–15)
GFR, ESTIMATED: 46 ML/MIN/1.73M2
GLUCOSE BLD-MCNC: 264 MG/DL (ref 74–99)
GLUCOSE BLD-MCNC: 392 MG/DL (ref 74–99)
GLUCOSE SERPL-MCNC: 269 MG/DL (ref 74–99)
HCT VFR BLD AUTO: 24.6 % (ref 34–48)
HGB BLD-MCNC: 7.9 G/DL (ref 11.5–15.5)
IMM GRANULOCYTES # BLD AUTO: <0.03 K/UL (ref 0–0.58)
IMM GRANULOCYTES NFR BLD: 1 % (ref 0–5)
LYMPHOCYTES NFR BLD: 0.58 K/UL (ref 1.5–4)
LYMPHOCYTES RELATIVE PERCENT: 32 % (ref 20–42)
MCH RBC QN AUTO: 28.2 PG (ref 26–35)
MCHC RBC AUTO-ENTMCNC: 32.1 G/DL (ref 32–34.5)
MCV RBC AUTO: 87.9 FL (ref 80–99.9)
MONOCYTES NFR BLD: 0.18 K/UL (ref 0.1–0.95)
MONOCYTES NFR BLD: 10 % (ref 2–12)
NEUTROPHILS NFR BLD: 54 % (ref 43–80)
NEUTS SEG NFR BLD: 0.98 K/UL (ref 1.8–7.3)
PLATELET # BLD AUTO: 30 K/UL (ref 130–450)
PLATELET CONFIRMATION: NORMAL
PMV BLD AUTO: 10.7 FL (ref 7–12)
POTASSIUM SERPL-SCNC: 4.9 MMOL/L (ref 3.5–5)
PROT SERPL-MCNC: 7.1 G/DL (ref 6.4–8.3)
RBC # BLD AUTO: 2.8 M/UL (ref 3.5–5.5)
RBC # BLD: ABNORMAL 10*6/UL
SODIUM SERPL-SCNC: 136 MMOL/L (ref 132–146)
WBC OTHER # BLD: 1.8 K/UL (ref 4.5–11.5)

## 2024-05-07 PROCEDURE — 80053 COMPREHEN METABOLIC PANEL: CPT

## 2024-05-07 PROCEDURE — 97161 PT EVAL LOW COMPLEX 20 MIN: CPT

## 2024-05-07 PROCEDURE — 6370000000 HC RX 637 (ALT 250 FOR IP): Performed by: INTERNAL MEDICINE

## 2024-05-07 PROCEDURE — 85025 COMPLETE CBC W/AUTO DIFF WBC: CPT

## 2024-05-07 PROCEDURE — 82962 GLUCOSE BLOOD TEST: CPT

## 2024-05-07 PROCEDURE — 99239 HOSP IP/OBS DSCHRG MGMT >30: CPT | Performed by: STUDENT IN AN ORGANIZED HEALTH CARE EDUCATION/TRAINING PROGRAM

## 2024-05-07 PROCEDURE — 2580000003 HC RX 258: Performed by: STUDENT IN AN ORGANIZED HEALTH CARE EDUCATION/TRAINING PROGRAM

## 2024-05-07 PROCEDURE — 6370000000 HC RX 637 (ALT 250 FOR IP): Performed by: STUDENT IN AN ORGANIZED HEALTH CARE EDUCATION/TRAINING PROGRAM

## 2024-05-07 PROCEDURE — 99232 SBSQ HOSP IP/OBS MODERATE 35: CPT | Performed by: NURSE PRACTITIONER

## 2024-05-07 PROCEDURE — 36415 COLL VENOUS BLD VENIPUNCTURE: CPT

## 2024-05-07 RX ORDER — LACTULOSE 10 G/15ML
20 SOLUTION ORAL EVERY 4 HOURS
Qty: 946 ML | Refills: 5 | Status: SHIPPED | OUTPATIENT
Start: 2024-05-07

## 2024-05-07 RX ORDER — MIDODRINE HYDROCHLORIDE 5 MG/1
2.5 TABLET ORAL
Status: DISCONTINUED | OUTPATIENT
Start: 2024-05-07 | End: 2024-05-07 | Stop reason: HOSPADM

## 2024-05-07 RX ORDER — MIDODRINE HYDROCHLORIDE 2.5 MG/1
2.5 TABLET ORAL
Qty: 90 TABLET | Refills: 3 | Status: SHIPPED | OUTPATIENT
Start: 2024-05-07

## 2024-05-07 RX ADMIN — SPIRONOLACTONE 50 MG: 25 TABLET ORAL at 12:57

## 2024-05-07 RX ADMIN — RIFAXIMIN 600 MG: 200 TABLET ORAL at 09:46

## 2024-05-07 RX ADMIN — FUROSEMIDE 20 MG: 20 TABLET ORAL at 09:45

## 2024-05-07 RX ADMIN — CARVEDILOL 6.25 MG: 6.25 TABLET, FILM COATED ORAL at 09:45

## 2024-05-07 RX ADMIN — INSULIN LISPRO 18 UNITS: 100 INJECTION, SOLUTION INTRAVENOUS; SUBCUTANEOUS at 09:44

## 2024-05-07 RX ADMIN — MIDODRINE HYDROCHLORIDE 2.5 MG: 5 TABLET ORAL at 12:56

## 2024-05-07 RX ADMIN — INSULIN LISPRO 18 UNITS: 100 INJECTION, SOLUTION INTRAVENOUS; SUBCUTANEOUS at 12:57

## 2024-05-07 RX ADMIN — INSULIN LISPRO 15 UNITS: 100 INJECTION, SOLUTION INTRAVENOUS; SUBCUTANEOUS at 12:56

## 2024-05-07 RX ADMIN — GABAPENTIN 600 MG: 600 TABLET, FILM COATED ORAL at 09:45

## 2024-05-07 RX ADMIN — LACTULOSE 20 G: 20 SOLUTION ORAL at 09:44

## 2024-05-07 RX ADMIN — Medication 2000 UNITS: at 09:45

## 2024-05-07 RX ADMIN — INSULIN LISPRO 9 UNITS: 100 INJECTION, SOLUTION INTRAVENOUS; SUBCUTANEOUS at 09:43

## 2024-05-07 RX ADMIN — SODIUM CHLORIDE, PRESERVATIVE FREE 10 ML: 5 INJECTION INTRAVENOUS at 09:43

## 2024-05-07 RX ADMIN — INSULIN GLARGINE 48 UNITS: 100 INJECTION, SOLUTION SUBCUTANEOUS at 09:43

## 2024-05-07 NOTE — PROGRESS NOTES
Physical Therapy  Physical Therapy Initial Assessment     Name: Lisa Hunt  : 1960  MRN: [id number]      Date of Service: 2024    Evaluating PT:  Jm Smith PT, DPT    Room #:  8421/8421-A  Diagnosis:  Altered mental status, unspecified altered mental status type [R41.82]  Acute metabolic encephalopathy [G93.41]  PMHx/PSHx:  ACOSTA, cirrhosis, DM II  Procedure/Surgery:  N/A  Precautions:  fall risk, Vietnamese speaking (understands some english)  Equipment Owned: ww  Equipment Needs:  none anticipated    SUBJECTIVE:    Pt lives with dtr in a 1 story home with 2 steps to enter and no handrail.  Bed/bath is on 1st floor.  Pt ambulated with no AD PTA.    OBJECTIVE:   Initial Evaluation  Date: 24 Treatment Short Term/ Long Term   Goals   AM-PAC 6 Clicks      Was pt agreeable to Eval/treatment? yes     Does pt have pain? Unrated L shoulder     Bed Mobility  Rolling: SBA  Supine to sit: SBA  Sit to supine: SBA  Scooting: SBA  Rolling: IND  Supine to sit: IND  Sit to supine: IND  Scooting: IND   Transfers Sit to stand: SBA  Stand to sit: SBA  Stand pivot: CGA with no AD  Sit to stand: IND  Stand to sit: IND  Stand pivot: mod I with AAD   Ambulation    100'x2 with no AD CGA  300'+ with AAD mod I   Stair negotiation: ascended and descended  NT  2 steps with AAD and no handrail mod I     Strength/ROM:   BLE grossly 4/5  BLE AROM WFL    Balance:   Static Sitting: SBA  Dynamic Sitting: SBA  Static Standing: SBA with no AD  Dynamic Standing: CGA with no AD    Pt is A & O not formally assessed this date  Sensation:  Pt denies numbness and tingling to extremities  Edema:  unremarkable    Vitals:  SpO2 and HR were stable during session    Therapeutic Exercises:    Bed mobility: supine<>sit, cued for EOB positioning  Transfers: STS x1, cued for hand placement and postural correction  Ambulation: 100'x2  BLE AROM    Patient education  Pt educated on role of PT, importance of functional mobility during hospital

## 2024-05-07 NOTE — PROGRESS NOTES
.CLINICAL PHARMACY NOTE: MEDS TO BEDS    Total # of Prescriptions Filled: 2   The following medications were delivered to the patient:  Midodrine 2.5 mg  Enulose 100mg/15ml    Additional Documentation:     Delivered meds to patient at bedside

## 2024-05-07 NOTE — PROGRESS NOTES
Gastroenterology, Hepatology, &  Advanced Endoscopy    Reason for consult: liver cirrhosis  ASSESSMENT AND PLAN:  OK for discharge today.  Family stated understanding to give 30cc of lactulose q 2 hours until diarrhea if noticed mental status changes at home.  Family in room. Much time spent with patient and family regarding previous work up and current plan.  Stressed the importance of being adherent with OP lactulose dosing. Once again, states she doesn't take lactulose as directed at home.   No further work up planned during this admission.  Continue with lactulose as pt has made improvement's in mental status since taking lactulose and having BM's in house.  Goal is 3-4 BMS qd     Pt well known to our practice.  Last seen 3/14/24 in house.  -History of ACOSTA, cirrhosis  - Pt has had multiple admissions  7/17/23, 8/2/23, 12/9/23, 1/09/24,3/14/24 for AMS with hx of  cirrhosis/hepatic encephalopathy/esoph varices.    -She admits to noncompliance with her lactulose at home in the past.   -She follows with Dr Jovan Arteaga GI as a outpatient    - Transplant consideration referral to  as discussed during office visit with Dr. Sosa 1/24/24, pt has a appointment setup at  but not seen yet.   -Paracentesis 3/14/24  A total volume of  4250mL was withdrawn.      - Agree with diet choice low carb, low sodium  - continue Xifaxan 600 mg TID  - Protonix 40 mg qd  - lactulose  20 g TID  - coreg 6.25 hx of varices  - Aldactone 50 mg   - Lasix 20 mg qd  -Hgb transfuse to keep >7   - Portal HTN  -PLT 32  -Hgb 7.5  - NH 3 65  -INR 1.4    3/15/24 EGD  Esophagus with scarring from previous banding.   -Grade I/II varices with no high risk features.  -Moderate portal hypertensive gastropathy.  -GAVE (Gastric Antral Vascular Ectasia) in antrum treated with APC.  -Normal duodenum.     EGD Findings: 1/12/24   Grade I/II varices with mucosal scarring from likely previous banding. One column with focal red spot.

## 2024-05-07 NOTE — DISCHARGE SUMMARY
Hospitalist Discharge Summary    Patient ID: Lisa Hunt   Patient : 1960  Patient's PCP: Tonie Hernandez DO    Admit Date: 2024   Admitting Physician: No admitting provider for patient encounter.    Discharge Date:  2024   Discharge Physician: Shelia Gar MD   Discharge Condition: Stable  Discharge Disposition: Home      Hospital course in brief:  (Please refer to daily progress notes for a comprehensive review of the hospitalization by requesting medical records)    64-year-old female patient with ACOSTA cirrhosis with ascites, type 2 diabetes mellitus with long-term use of insulin, esophageal varies s/p banding, hypovitaminosis D, thrombocytopenia who presented to the ER as family was concerned given her increased fatigue, impaired walking and confusion.  Patient is oriented to person and the fact that she is in the hospital but believes she is currently in [place], not oriented to time either.  Patient admits to chills, palpitations, nausea and increased urinary frequency.  Per daughter at bedside states that she was pain around the house as she seems lost and could not find the bathroom.  Daughter also endorses high blood sugars most of the time between 300s and 500s.  Denied fevers, headache, chest pain, abdominal pain or distention, cough, shortness of breath, vomiting, diarrhea or constipation, melena. She has had paracentesis about 5 times, last one around last month. Daughter denies she has has peritonitis in the past. Her daughter administered medications in the morning before she leaves for work including lactulose.  Per report patient is having about just 1 BM daily.  There are no sick contacts.  Patient follows-up with GI, hepatobiliary as outpatient and her PCP  On ER evaluation patient is afebrile temp 96.7 F /74 HR 94 RR 16 SpO2 99% on room air  Labs significant for pancytopenia, WBC 2.3, hemoglobin 9.5, platelets 35, ammonia 55, lactic acid 2.4, INR 1.4,

## 2024-05-07 NOTE — CARE COORDINATION
Social Work/Case Management Transition of Care Planning (Tahmina Can -[phone]):  Per report and chart review, GI was consulted for liver cirrhosis.  Patient is noncompliant with lactulose at home.  No further workup by GI is planned.  Mentation has improved with taking lactulose while in the hospital.  Endocrinology was consulted for management of uncontrolled diabetes.  Med recommendations were made and patient is to follow up as an outpatient. Discharge order noted.  Met with patient at bedside.  She does speak and understand some English.  Discharge plan is to home with no needs. Family will transport. Tahmina Can, JOSY  5/7/2024

## 2024-05-08 ENCOUNTER — CARE COORDINATION (OUTPATIENT)
Dept: CARE COORDINATION | Age: 64
End: 2024-05-08

## 2024-05-08 NOTE — CARE COORDINATION
Care Transitions Initial Follow Up Call    Call within 2 business days of discharge: Yes    -First attempt to reach the patient via use of  for initial Care Transition call post hospital discharge. HIPAA compliant message left with CTN's contact information requesting return phone call.   -CTN will route to PCP front office pool under high priority need for TCM/hospital follow up appointment.        Patient: Lisa Hunt Patient : 1960   MRN: [id number]  Reason for Admission: AMS  Discharge Date: 24 RARS: Readmission Risk Score: 25.4      Last Discharge Facility       Date Complaint Diagnosis Description Type Department Provider    24 Fatigue Altered mental status, unspecified altered mental status type ... ED to Hosp-Admission (Discharged) (ADMITTED) Y[facility] 8WE Shelia Gar MD; Zulema Mendez...                Follow Up  Future Appointments   Date Time Provider Department Center   2024 10:00 AM Tonie Hernandez DO [facility]   2024 10:00 AM Carolyne Roman APRN - CNP [facility]   2024  2:30 PM Sidra Olvera APRN - CNP [facility] END[facility]   2025 11:00 AM Tonie Hernandez DO [facility]         Monse Garrido RN

## 2024-05-08 NOTE — TELEPHONE ENCOUNTER
Patient discharged from [facility] on 5/7/24 and would need a TCM/hospital follow up appointment.    There is no availability until later this month, please advise.

## 2024-05-09 ENCOUNTER — CARE COORDINATION (OUTPATIENT)
Dept: CARE COORDINATION | Age: 64
End: 2024-05-09

## 2024-05-09 NOTE — CARE COORDINATION
Care Transitions Initial Follow Up Call    Call within 2 business days of discharge: Yes      -Second attempt to reach the patient via use of  for initial Care Transition call post hospital discharge. HIPAA compliant message left with CTN's contact information requesting return phone call.   -CTN  routed yesterday to PCP front office pool under high priority need for TCM/hospital follow up appointment.  -Per chart review the patient is not active on [employer], CTN will route unable to reach letter to  to mail to the patient's  listed address.            Patient: Lisa Hunt Patient : 1960   MRN: [id number]  Reason for Admission: AMS  Discharge Date: 24 RARS: Readmission Risk Score: 25.4      Last Discharge Facility       Date Complaint Diagnosis Description Type Department Provider    24 Fatigue Altered mental status, unspecified altered mental status type ... ED to Hosp-Admission (Discharged) (ADMITTED) SEY[facility] 8WE Shelia Gar MD; Zulema Mendez...              Follow Up  Future Appointments   Date Time Provider Department Center   2024 10:00 AM Tonie Hernandez DO [facility]   2024 10:00 AM Carolyne Roman APRN - CNP [facility]   2024  2:30 PM Sidra Olvera APRN - CNP [facility] END[facility]   2025 11:00 AM Tonie Hernandez DO [facility]         Monse Garrido RN

## 2024-05-13 ENCOUNTER — TELEPHONE (OUTPATIENT)
Dept: FAMILY MEDICINE CLINIC | Age: 64
End: 2024-05-13

## 2024-05-13 NOTE — TELEPHONE ENCOUNTER
Left vm for gisselle lopez her boyfriend that we need an appt in order to fill out this fmla paperwork for fausto; told him to call back to schedule

## 2024-05-26 ENCOUNTER — APPOINTMENT (OUTPATIENT)
Dept: GENERAL RADIOLOGY | Age: 64
End: 2024-05-26
Payer: MEDICARE

## 2024-05-26 ENCOUNTER — HOSPITAL ENCOUNTER (INPATIENT)
Age: 64
LOS: 5 days | Discharge: HOME | End: 2024-05-31
Attending: EMERGENCY MEDICINE
Payer: MEDICARE

## 2024-05-26 ENCOUNTER — APPOINTMENT (OUTPATIENT)
Dept: CT IMAGING | Age: 64
End: 2024-05-26
Payer: MEDICARE

## 2024-05-26 DIAGNOSIS — I10 ESSENTIAL HYPERTENSION: ICD-10-CM

## 2024-05-26 DIAGNOSIS — D61.818 PANCYTOPENIA (HCC): ICD-10-CM

## 2024-05-26 DIAGNOSIS — G92.8 HYPERAMMONEMIC ENCEPHALOPATHY: Primary | ICD-10-CM

## 2024-05-26 DIAGNOSIS — E72.20 HYPERAMMONEMIA (HCC): ICD-10-CM

## 2024-05-26 DIAGNOSIS — T59.891A HYPERAMMONEMIC ENCEPHALOPATHY: Primary | ICD-10-CM

## 2024-05-26 DIAGNOSIS — E87.5 HYPERKALEMIA: ICD-10-CM

## 2024-05-26 PROBLEM — R41.82 AMS (ALTERED MENTAL STATUS): Status: ACTIVE | Noted: 2024-05-26

## 2024-05-26 LAB
ALBUMIN SERPL-MCNC: 3.4 G/DL (ref 3.5–5.2)
ALP SERPL-CCNC: 173 U/L (ref 35–104)
ALT SERPL-CCNC: 28 U/L (ref 0–32)
AMMONIA PLAS-SCNC: 65 UMOL/L (ref 11–51)
ANION GAP SERPL CALCULATED.3IONS-SCNC: 10 MMOL/L (ref 7–16)
ANION GAP SERPL CALCULATED.3IONS-SCNC: 9 MMOL/L (ref 7–16)
AST SERPL-CCNC: 37 U/L (ref 0–31)
B-OH-BUTYR SERPL-MCNC: 0.11 MMOL/L (ref 0.02–0.27)
BASOPHILS # BLD: 0 K/UL (ref 0–0.2)
BASOPHILS NFR BLD: 0 % (ref 0–2)
BILIRUB SERPL-MCNC: 1.4 MG/DL (ref 0–1.2)
BILIRUB UR QL STRIP: NEGATIVE
BUN SERPL-MCNC: 30 MG/DL (ref 6–23)
BUN SERPL-MCNC: 32 MG/DL (ref 6–23)
CALCIUM SERPL-MCNC: 8.5 MG/DL (ref 8.6–10.2)
CALCIUM SERPL-MCNC: 9.1 MG/DL (ref 8.6–10.2)
CHLORIDE SERPL-SCNC: 101 MMOL/L (ref 98–107)
CHLORIDE SERPL-SCNC: 98 MMOL/L (ref 98–107)
CLARITY UR: CLEAR
CO2 SERPL-SCNC: 23 MMOL/L (ref 22–29)
CO2 SERPL-SCNC: 23 MMOL/L (ref 22–29)
COLOR UR: YELLOW
CREAT SERPL-MCNC: 1.5 MG/DL (ref 0.5–1)
CREAT SERPL-MCNC: 1.5 MG/DL (ref 0.5–1)
EOSINOPHIL # BLD: 0.06 K/UL (ref 0.05–0.5)
EOSINOPHILS RELATIVE PERCENT: 3 % (ref 0–6)
ERYTHROCYTE [DISTWIDTH] IN BLOOD BY AUTOMATED COUNT: 15.9 % (ref 11.5–15)
GFR, ESTIMATED: 37 ML/MIN/1.73M2
GFR, ESTIMATED: 40 ML/MIN/1.73M2
GLUCOSE BLD-MCNC: 266 MG/DL (ref 74–99)
GLUCOSE BLD-MCNC: 315 MG/DL (ref 74–99)
GLUCOSE BLD-MCNC: 356 MG/DL (ref 74–99)
GLUCOSE SERPL-MCNC: 270 MG/DL (ref 74–99)
GLUCOSE SERPL-MCNC: 352 MG/DL (ref 74–99)
GLUCOSE UR STRIP-MCNC: 500 MG/DL
HCT VFR BLD AUTO: 25.1 % (ref 34–48)
HGB BLD-MCNC: 8.3 G/DL (ref 11.5–15.5)
HGB UR QL STRIP.AUTO: NEGATIVE
INR PPP: 1.4
KETONES UR STRIP-MCNC: NEGATIVE MG/DL
LACTATE BLDV-SCNC: 2.6 MMOL/L (ref 0.5–2.2)
LEUKOCYTE ESTERASE UR QL STRIP: NEGATIVE
LIPASE SERPL-CCNC: 41 U/L (ref 13–60)
LYMPHOCYTES NFR BLD: 0.62 K/UL (ref 1.5–4)
LYMPHOCYTES RELATIVE PERCENT: 27 % (ref 20–42)
MCH RBC QN AUTO: 29.2 PG (ref 26–35)
MCHC RBC AUTO-ENTMCNC: 33.1 G/DL (ref 32–34.5)
MCV RBC AUTO: 88.4 FL (ref 80–99.9)
MONOCYTES NFR BLD: 0.04 K/UL (ref 0.1–0.95)
MONOCYTES NFR BLD: 2 % (ref 2–12)
NEUTROPHILS NFR BLD: 69 % (ref 43–80)
NEUTS SEG NFR BLD: 1.58 K/UL (ref 1.8–7.3)
NITRITE UR QL STRIP: NEGATIVE
PH UR STRIP: 6 [PH] (ref 5–9)
PLATELET CONFIRMATION: NORMAL
PLATELET, FLUORESCENCE: 37 K/UL (ref 130–450)
PMV BLD AUTO: ABNORMAL FL (ref 7–12)
POTASSIUM SERPL-SCNC: 5.3 MMOL/L (ref 3.5–5)
POTASSIUM SERPL-SCNC: 5.9 MMOL/L (ref 3.5–5)
PROT SERPL-MCNC: 8.9 G/DL (ref 6.4–8.3)
PROT UR STRIP-MCNC: NEGATIVE MG/DL
PROTHROMBIN TIME: 15.5 SEC (ref 9.3–12.4)
RBC # BLD AUTO: 2.84 M/UL (ref 3.5–5.5)
RBC # BLD: ABNORMAL 10*6/UL
RBC #/AREA URNS HPF: ABNORMAL /HPF
SODIUM SERPL-SCNC: 131 MMOL/L (ref 132–146)
SODIUM SERPL-SCNC: 133 MMOL/L (ref 132–146)
SP GR UR STRIP: 1.02 (ref 1–1.03)
TROPONIN I SERPL HS-MCNC: 26 NG/L (ref 0–9)
UROBILINOGEN UR STRIP-ACNC: 0.2 EU/DL (ref 0–1)
WBC #/AREA URNS HPF: ABNORMAL /HPF
WBC OTHER # BLD: 2.3 K/UL (ref 4.5–11.5)

## 2024-05-26 PROCEDURE — 82140 ASSAY OF AMMONIA: CPT

## 2024-05-26 PROCEDURE — 71045 X-RAY EXAM CHEST 1 VIEW: CPT

## 2024-05-26 PROCEDURE — 83690 ASSAY OF LIPASE: CPT

## 2024-05-26 PROCEDURE — 6360000004 HC RX CONTRAST MEDICATION: Performed by: RADIOLOGY

## 2024-05-26 PROCEDURE — 82010 KETONE BODYS QUAN: CPT

## 2024-05-26 PROCEDURE — 85610 PROTHROMBIN TIME: CPT

## 2024-05-26 PROCEDURE — 99221 1ST HOSP IP/OBS SF/LOW 40: CPT | Performed by: STUDENT IN AN ORGANIZED HEALTH CARE EDUCATION/TRAINING PROGRAM

## 2024-05-26 PROCEDURE — 85025 COMPLETE CBC W/AUTO DIFF WBC: CPT

## 2024-05-26 PROCEDURE — 2580000003 HC RX 258

## 2024-05-26 PROCEDURE — 84484 ASSAY OF TROPONIN QUANT: CPT

## 2024-05-26 PROCEDURE — 80053 COMPREHEN METABOLIC PANEL: CPT

## 2024-05-26 PROCEDURE — 1200000000 HC SEMI PRIVATE

## 2024-05-26 PROCEDURE — 74177 CT ABD & PELVIS W/CONTRAST: CPT

## 2024-05-26 PROCEDURE — 93005 ELECTROCARDIOGRAM TRACING: CPT

## 2024-05-26 PROCEDURE — 6370000000 HC RX 637 (ALT 250 FOR IP): Performed by: STUDENT IN AN ORGANIZED HEALTH CARE EDUCATION/TRAINING PROGRAM

## 2024-05-26 PROCEDURE — 82962 GLUCOSE BLOOD TEST: CPT

## 2024-05-26 PROCEDURE — 99285 EMERGENCY DEPT VISIT HI MDM: CPT

## 2024-05-26 PROCEDURE — 83605 ASSAY OF LACTIC ACID: CPT

## 2024-05-26 PROCEDURE — 81001 URINALYSIS AUTO W/SCOPE: CPT

## 2024-05-26 PROCEDURE — 70450 CT HEAD/BRAIN W/O DYE: CPT

## 2024-05-26 PROCEDURE — 80048 BASIC METABOLIC PNL TOTAL CA: CPT

## 2024-05-26 PROCEDURE — 2580000003 HC RX 258: Performed by: STUDENT IN AN ORGANIZED HEALTH CARE EDUCATION/TRAINING PROGRAM

## 2024-05-26 RX ORDER — ENOXAPARIN SODIUM 100 MG/ML
40 INJECTION SUBCUTANEOUS DAILY
Status: DISCONTINUED | OUTPATIENT
Start: 2024-05-26 | End: 2024-05-26

## 2024-05-26 RX ORDER — SODIUM CHLORIDE 0.9 % (FLUSH) 0.9 %
5-40 SYRINGE (ML) INJECTION EVERY 12 HOURS SCHEDULED
Status: DISCONTINUED | OUTPATIENT
Start: 2024-05-26 | End: 2024-05-31 | Stop reason: HOSPADM

## 2024-05-26 RX ORDER — DEXTROSE MONOHYDRATE 100 MG/ML
INJECTION, SOLUTION INTRAVENOUS CONTINUOUS PRN
Status: DISCONTINUED | OUTPATIENT
Start: 2024-05-26 | End: 2024-05-31 | Stop reason: HOSPADM

## 2024-05-26 RX ORDER — 0.9 % SODIUM CHLORIDE 0.9 %
1000 INTRAVENOUS SOLUTION INTRAVENOUS ONCE
Status: COMPLETED | OUTPATIENT
Start: 2024-05-26 | End: 2024-05-26

## 2024-05-26 RX ORDER — POLYETHYLENE GLYCOL 3350 17 G/17G
17 POWDER, FOR SOLUTION ORAL DAILY PRN
Status: DISCONTINUED | OUTPATIENT
Start: 2024-05-26 | End: 2024-05-31 | Stop reason: HOSPADM

## 2024-05-26 RX ORDER — GLUCAGON 1 MG/ML
1 KIT INJECTION PRN
Status: DISCONTINUED | OUTPATIENT
Start: 2024-05-26 | End: 2024-05-31 | Stop reason: HOSPADM

## 2024-05-26 RX ORDER — PANTOPRAZOLE SODIUM 40 MG/1
40 TABLET, DELAYED RELEASE ORAL
Status: DISCONTINUED | OUTPATIENT
Start: 2024-05-27 | End: 2024-05-31 | Stop reason: HOSPADM

## 2024-05-26 RX ORDER — CHOLECALCIFEROL (VITAMIN D3) 50 MCG
2000 TABLET ORAL DAILY
Status: DISCONTINUED | OUTPATIENT
Start: 2024-05-26 | End: 2024-05-31 | Stop reason: HOSPADM

## 2024-05-26 RX ORDER — LACTULOSE 10 G/15ML
20 SOLUTION ORAL EVERY 4 HOURS
Status: DISCONTINUED | OUTPATIENT
Start: 2024-05-26 | End: 2024-05-27

## 2024-05-26 RX ORDER — MIDODRINE HYDROCHLORIDE 2.5 MG/1
2.5 TABLET ORAL
Status: DISCONTINUED | OUTPATIENT
Start: 2024-05-26 | End: 2024-05-27

## 2024-05-26 RX ORDER — SODIUM CHLORIDE 9 MG/ML
INJECTION, SOLUTION INTRAVENOUS CONTINUOUS
Status: ACTIVE | OUTPATIENT
Start: 2024-05-26 | End: 2024-05-27

## 2024-05-26 RX ORDER — SODIUM CHLORIDE 0.9 % (FLUSH) 0.9 %
5-40 SYRINGE (ML) INJECTION PRN
Status: DISCONTINUED | OUTPATIENT
Start: 2024-05-26 | End: 2024-05-31 | Stop reason: HOSPADM

## 2024-05-26 RX ORDER — INSULIN LISPRO 100 [IU]/ML
0-4 INJECTION, SOLUTION INTRAVENOUS; SUBCUTANEOUS NIGHTLY
Status: DISCONTINUED | OUTPATIENT
Start: 2024-05-26 | End: 2024-05-31 | Stop reason: HOSPADM

## 2024-05-26 RX ORDER — ONDANSETRON 2 MG/ML
4 INJECTION INTRAMUSCULAR; INTRAVENOUS EVERY 6 HOURS PRN
Status: DISCONTINUED | OUTPATIENT
Start: 2024-05-26 | End: 2024-05-31 | Stop reason: HOSPADM

## 2024-05-26 RX ORDER — ACETAMINOPHEN 650 MG/1
650 SUPPOSITORY RECTAL EVERY 6 HOURS PRN
Status: DISCONTINUED | OUTPATIENT
Start: 2024-05-26 | End: 2024-05-31 | Stop reason: HOSPADM

## 2024-05-26 RX ORDER — SODIUM CHLORIDE 9 MG/ML
INJECTION, SOLUTION INTRAVENOUS PRN
Status: DISCONTINUED | OUTPATIENT
Start: 2024-05-26 | End: 2024-05-31 | Stop reason: HOSPADM

## 2024-05-26 RX ORDER — INSULIN LISPRO 100 [IU]/ML
0-8 INJECTION, SOLUTION INTRAVENOUS; SUBCUTANEOUS
Status: DISCONTINUED | OUTPATIENT
Start: 2024-05-26 | End: 2024-05-31 | Stop reason: HOSPADM

## 2024-05-26 RX ORDER — ACETAMINOPHEN 325 MG/1
650 TABLET ORAL EVERY 6 HOURS PRN
Status: DISCONTINUED | OUTPATIENT
Start: 2024-05-26 | End: 2024-05-31 | Stop reason: HOSPADM

## 2024-05-26 RX ORDER — LANOLIN ALCOHOL/MO/W.PET/CERES
3 CREAM (GRAM) TOPICAL NIGHTLY
Status: DISCONTINUED | OUTPATIENT
Start: 2024-05-26 | End: 2024-05-31 | Stop reason: HOSPADM

## 2024-05-26 RX ORDER — ONDANSETRON 4 MG/1
4 TABLET, ORALLY DISINTEGRATING ORAL EVERY 8 HOURS PRN
Status: DISCONTINUED | OUTPATIENT
Start: 2024-05-26 | End: 2024-05-31 | Stop reason: HOSPADM

## 2024-05-26 RX ORDER — GABAPENTIN 300 MG/1
300 CAPSULE ORAL 3 TIMES DAILY
Status: DISCONTINUED | OUTPATIENT
Start: 2024-05-26 | End: 2024-05-31 | Stop reason: HOSPADM

## 2024-05-26 RX ORDER — INSULIN GLARGINE 100 [IU]/ML
15 INJECTION, SOLUTION SUBCUTANEOUS NIGHTLY
Status: DISCONTINUED | OUTPATIENT
Start: 2024-05-26 | End: 2024-05-27

## 2024-05-26 RX ORDER — CARVEDILOL 6.25 MG/1
6.25 TABLET ORAL DAILY
Status: DISCONTINUED | OUTPATIENT
Start: 2024-05-26 | End: 2024-05-31 | Stop reason: HOSPADM

## 2024-05-26 RX ADMIN — SODIUM ZIRCONIUM CYCLOSILICATE 10 G: 10 POWDER, FOR SUSPENSION ORAL at 22:35

## 2024-05-26 RX ADMIN — SODIUM CHLORIDE, PRESERVATIVE FREE 10 ML: 5 INJECTION INTRAVENOUS at 21:48

## 2024-05-26 RX ADMIN — GABAPENTIN 300 MG: 300 CAPSULE ORAL at 21:48

## 2024-05-26 RX ADMIN — SODIUM CHLORIDE: 9 INJECTION, SOLUTION INTRAVENOUS at 21:02

## 2024-05-26 RX ADMIN — IOPAMIDOL 75 ML: 755 INJECTION, SOLUTION INTRAVENOUS at 17:10

## 2024-05-26 RX ADMIN — MAGNESIUM HYDROXIDE 30 ML: 400 SUSPENSION ORAL at 19:09

## 2024-05-26 RX ADMIN — CARVEDILOL 6.25 MG: 6.25 TABLET, FILM COATED ORAL at 19:08

## 2024-05-26 RX ADMIN — Medication 3 MG: at 21:48

## 2024-05-26 RX ADMIN — Medication 2000 UNITS: at 19:08

## 2024-05-26 RX ADMIN — LACTULOSE 20 G: 10 SOLUTION ORAL at 21:48

## 2024-05-26 RX ADMIN — INSULIN GLARGINE 15 UNITS: 100 INJECTION, SOLUTION SUBCUTANEOUS at 21:49

## 2024-05-26 RX ADMIN — SODIUM CHLORIDE 1000 ML: 9 INJECTION, SOLUTION INTRAVENOUS at 18:01

## 2024-05-26 ASSESSMENT — PAIN SCALES - GENERAL: PAINLEVEL_OUTOF10: 0

## 2024-05-26 NOTE — ED PROVIDER NOTES
[facility] EMERGENCY DEPARTMENT  EMERGENCY DEPARTMENT ENCOUNTER        Pt Name: Lisa Hunt  MRN: [id number]  Birthdate 1960  Date of evaluation: 5/26/2024  Provider: Tano Powers II, DO  PCP: Tonie Hernandez DO  Note Started: 1:10 PM EDT 5/26/24    CHIEF COMPLAINT       Chief Complaint   Patient presents with    Altered Mental Status     Brought in by daughter vomited blood this morning when taking lactulose + liver disease + ams,  BGL>500 at home        HISTORY OF PRESENT ILLNESS: 1 or more Elements        Limitations to history : Altered Mental Status    Lisa Hunt is a 64 y.o. female history of Alejandro cirrhosis with ascites, DM, varices s/p banding,  who presents for complaint of altered mental status.  Per daughter at bedside, patient is chronically taking lactulose at home, was found this morning to be vomiting as well as having diarrhea and urinating in the kitchen at home.  Per daughter, patient does have some confusion at baseline, but confusion is in worse than usual starting today.  She does have difficulty ambulating at baseline as well.  Patient daughter was also concerned as today she was vomiting and noticed a small speck of blood in the vomit.  No recent fevers or chills, no recent cough or congestion, patient not actively complaining of chest pain or shortness of breath.     Nursing Notes were all reviewed and agreed with or any disagreements were addressed in the HPI.      REVIEW OF EXTERNAL NOTE :       Records reviewed for medical hx, surgical, hx, and medication lists      Chart Review/External Note Review    Last Echo reviewed by Me:  No results found for: \"LVEF\", \"LVEFMODE\"          Controlled Substance Monitoring:    Acute and Chronic Pain Monitoring:        No data to display                    REVIEW OF SYSTEMS :      Positives and Pertinent negatives as per HPI.     SURGICAL HISTORY     Past Surgical History:   Procedure  on file       DISCONTINUED MEDICATIONS:  Discontinued Medications    No medications on file              (Please note that portions of this note were completed with a voice recognition program.  Efforts were made to edit the dictations but occasionally words are mis-transcribed.)    Tano Powers II,  (electronically signed)

## 2024-05-26 NOTE — ED NOTES
Name: Lisa Hunt  : 1960  MRN: [id number]    Date:  24    Time: 3:49 PM EDT    Benefits of immediately proceeding with Radiology exam(s) without pre-testing outweigh the risks or are not indicated as specified below and therefore the following is/are being waived:    [] Pregnancy test   [] Patients LMP on-time and regular.   [] Patient had Tubal Ligation or has other Contraception Device.   [] Patient  is Menopausal or Premenarcheal.    [] Patient had Full or Partial Hysterectomy.    [] Protocol for Iodine allergy    [] MRI Questionnaire     [x] BUN/Creatinine   [] Patient age w/no hx of renal dysfunction.   [] Patient on Dialysis.   [] Recent Normal Labs.  Electronically signed by Mirian Morrissey DO on 24 at 3:49 PM EDT               Mirian Morrissey DO  24 [id number]

## 2024-05-26 NOTE — H&P
Hb A1C 7.9 on 2021 !  Diabetic peripheral neuropathy   GERD   Essential hypertension  Chronic renal disease, stage III (HCC) [647959]  Thrombocytopenia   Other ascites  Portal hypertension  Splenomegaly     PLAN:     Admit to medicine.    Lactulose 20 g PO TID with goal of 2-3 loose BM daily.   Resume rifaximin, coreg, PPI  PT/OT  Monitor  renal function.  Hold diuretics today. Hydrate  Blood glucose control.  Check beta hydroxybutyric.  Lantus 20 units nightly.  Humalog corrective scale  Aggressive bowel regime        Diet: Diet NPO  Code Status: Full Code  Surrogate decision maker confirmed with patient:   Extended Emergency Contact Information  Primary Emergency Contact: yolette fithc  Home Phone: [phone]  Mobile Phone: [phone]  Relation: Child  Secondary Emergency Contact: Lisa Fitch  Home Phone: [phone]  Mobile Phone: [phone]  Relation: Child  Preferred language: English    DVT Prophylaxis: []Lovenox []Heparin [x]PCD [] Warfarin/NOAC []Encouraged ambulation  Disposition: [x]Med/Surg [] Intermediate [] ICU/CCU  Admit status: [] Observation [x] Inpatient     +++++++++++++++++++++++++++++++++++++++++++++++++  Maria Marino Milanes, MD  [facility] Generalist  [place], [place]  +++++++++++++++++++++++++++++++++++++++++++++++++  NOTE: This report was transcribed using voice recognition software. Every effort was made to ensure accuracy; however, inadvertent computerized transcription errors may be present.

## 2024-05-27 LAB
AMMONIA PLAS-SCNC: 108 UMOL/L (ref 11–51)
ANION GAP SERPL CALCULATED.3IONS-SCNC: 18 MMOL/L (ref 7–16)
BASOPHILS # BLD: 0.01 K/UL (ref 0–0.2)
BASOPHILS NFR BLD: 1 % (ref 0–2)
BUN SERPL-MCNC: 33 MG/DL (ref 6–23)
CALCIUM SERPL-MCNC: 8.3 MG/DL (ref 8.6–10.2)
CHLORIDE SERPL-SCNC: 100 MMOL/L (ref 98–107)
CHLORIDE UR-SCNC: 32 MMOL/L
CO2 SERPL-SCNC: 16 MMOL/L (ref 22–29)
CREAT SERPL-MCNC: 1.7 MG/DL (ref 0.5–1)
CREAT UR-MCNC: 90 MG/DL (ref 29–226)
EOSINOPHIL # BLD: 0.06 K/UL (ref 0.05–0.5)
EOSINOPHILS RELATIVE PERCENT: 3 % (ref 0–6)
ERYTHROCYTE [DISTWIDTH] IN BLOOD BY AUTOMATED COUNT: 15.7 % (ref 11.5–15)
GFR, ESTIMATED: 34 ML/MIN/1.73M2
GLUCOSE BLD-MCNC: 395 MG/DL (ref 74–99)
GLUCOSE BLD-MCNC: 460 MG/DL (ref 74–99)
GLUCOSE BLD-MCNC: 472 MG/DL (ref 74–99)
GLUCOSE BLD-MCNC: 479 MG/DL (ref 74–99)
GLUCOSE SERPL-MCNC: 470 MG/DL (ref 74–99)
HCT VFR BLD AUTO: 24.2 % (ref 34–48)
HGB BLD-MCNC: 7.9 G/DL (ref 11.5–15.5)
IMM GRANULOCYTES # BLD AUTO: <0.03 K/UL (ref 0–0.58)
IMM GRANULOCYTES NFR BLD: 1 % (ref 0–5)
LYMPHOCYTES NFR BLD: 0.66 K/UL (ref 1.5–4)
LYMPHOCYTES RELATIVE PERCENT: 31 % (ref 20–42)
MCH RBC QN AUTO: 29.7 PG (ref 26–35)
MCHC RBC AUTO-ENTMCNC: 32.6 G/DL (ref 32–34.5)
MCV RBC AUTO: 91 FL (ref 80–99.9)
MONOCYTES NFR BLD: 0.16 K/UL (ref 0.1–0.95)
MONOCYTES NFR BLD: 7 % (ref 2–12)
NEUTROPHILS NFR BLD: 58 % (ref 43–80)
NEUTS SEG NFR BLD: 1.25 K/UL (ref 1.8–7.3)
PLATELET # BLD AUTO: 32 K/UL (ref 130–450)
PLATELET CONFIRMATION: NORMAL
PMV BLD AUTO: 12.9 FL (ref 7–12)
POTASSIUM SERPL-SCNC: 5.1 MMOL/L (ref 3.5–5)
RBC # BLD AUTO: 2.66 M/UL (ref 3.5–5.5)
RBC # BLD: ABNORMAL 10*6/UL
SODIUM SERPL-SCNC: 134 MMOL/L (ref 132–146)
SODIUM UR-SCNC: 46 MMOL/L
TOTAL PROTEIN, URINE: 10 MG/DL (ref 0–12)
URINE TOTAL PROTEIN CREATININE RATIO: 0.12 (ref 0–0.2)
WBC OTHER # BLD: 2.2 K/UL (ref 4.5–11.5)

## 2024-05-27 PROCEDURE — 80048 BASIC METABOLIC PNL TOTAL CA: CPT

## 2024-05-27 PROCEDURE — 82140 ASSAY OF AMMONIA: CPT

## 2024-05-27 PROCEDURE — 99233 SBSQ HOSP IP/OBS HIGH 50: CPT | Performed by: STUDENT IN AN ORGANIZED HEALTH CARE EDUCATION/TRAINING PROGRAM

## 2024-05-27 PROCEDURE — 84156 ASSAY OF PROTEIN URINE: CPT

## 2024-05-27 PROCEDURE — 6370000000 HC RX 637 (ALT 250 FOR IP): Performed by: STUDENT IN AN ORGANIZED HEALTH CARE EDUCATION/TRAINING PROGRAM

## 2024-05-27 PROCEDURE — 36415 COLL VENOUS BLD VENIPUNCTURE: CPT

## 2024-05-27 PROCEDURE — 84300 ASSAY OF URINE SODIUM: CPT

## 2024-05-27 PROCEDURE — 2580000003 HC RX 258: Performed by: STUDENT IN AN ORGANIZED HEALTH CARE EDUCATION/TRAINING PROGRAM

## 2024-05-27 PROCEDURE — 82436 ASSAY OF URINE CHLORIDE: CPT

## 2024-05-27 PROCEDURE — 85025 COMPLETE CBC W/AUTO DIFF WBC: CPT

## 2024-05-27 PROCEDURE — 83935 ASSAY OF URINE OSMOLALITY: CPT

## 2024-05-27 PROCEDURE — 82962 GLUCOSE BLOOD TEST: CPT

## 2024-05-27 PROCEDURE — 1200000000 HC SEMI PRIVATE

## 2024-05-27 PROCEDURE — 82570 ASSAY OF URINE CREATININE: CPT

## 2024-05-27 RX ORDER — LACTULOSE 10 G/15ML
30 SOLUTION ORAL
Status: DISCONTINUED | OUTPATIENT
Start: 2024-05-27 | End: 2024-05-28

## 2024-05-27 RX ORDER — MIDODRINE HYDROCHLORIDE 10 MG/1
5 TABLET ORAL
Status: DISCONTINUED | OUTPATIENT
Start: 2024-05-27 | End: 2024-05-27

## 2024-05-27 RX ORDER — SODIUM CHLORIDE 9 MG/ML
INJECTION, SOLUTION INTRAVENOUS CONTINUOUS
Status: DISCONTINUED | OUTPATIENT
Start: 2024-05-27 | End: 2024-05-30

## 2024-05-27 RX ORDER — INSULIN LISPRO 100 [IU]/ML
7 INJECTION, SOLUTION INTRAVENOUS; SUBCUTANEOUS
Status: DISCONTINUED | OUTPATIENT
Start: 2024-05-27 | End: 2024-05-28

## 2024-05-27 RX ORDER — MIDODRINE HYDROCHLORIDE 10 MG/1
5 TABLET ORAL
Status: DISCONTINUED | OUTPATIENT
Start: 2024-05-27 | End: 2024-05-31 | Stop reason: HOSPADM

## 2024-05-27 RX ORDER — LACTULOSE 10 G/15ML
30 SOLUTION ORAL EVERY 4 HOURS
Status: DISCONTINUED | OUTPATIENT
Start: 2024-05-27 | End: 2024-05-27

## 2024-05-27 RX ORDER — INSULIN LISPRO 100 [IU]/ML
5 INJECTION, SOLUTION INTRAVENOUS; SUBCUTANEOUS
Status: DISCONTINUED | OUTPATIENT
Start: 2024-05-27 | End: 2024-05-27

## 2024-05-27 RX ORDER — INSULIN GLARGINE 100 [IU]/ML
21 INJECTION, SOLUTION SUBCUTANEOUS NIGHTLY
Status: DISCONTINUED | OUTPATIENT
Start: 2024-05-27 | End: 2024-05-28

## 2024-05-27 RX ORDER — SODIUM CHLORIDE 9 MG/ML
INJECTION, SOLUTION INTRAVENOUS CONTINUOUS
Status: DISCONTINUED | OUTPATIENT
Start: 2024-05-27 | End: 2024-05-29

## 2024-05-27 RX ADMIN — LACTULOSE 30 G: 20 SOLUTION ORAL at 22:15

## 2024-05-27 RX ADMIN — INSULIN LISPRO 8 UNITS: 100 INJECTION, SOLUTION INTRAVENOUS; SUBCUTANEOUS at 06:00

## 2024-05-27 RX ADMIN — GABAPENTIN 300 MG: 300 CAPSULE ORAL at 08:59

## 2024-05-27 RX ADMIN — LACTULOSE 30 G: 20 SOLUTION ORAL at 16:18

## 2024-05-27 RX ADMIN — LACTULOSE 20 G: 10 SOLUTION ORAL at 03:16

## 2024-05-27 RX ADMIN — INSULIN LISPRO 4 UNITS: 100 INJECTION, SOLUTION INTRAVENOUS; SUBCUTANEOUS at 21:13

## 2024-05-27 RX ADMIN — PANTOPRAZOLE SODIUM 40 MG: 40 TABLET, DELAYED RELEASE ORAL at 05:51

## 2024-05-27 RX ADMIN — INSULIN LISPRO 8 UNITS: 100 INJECTION, SOLUTION INTRAVENOUS; SUBCUTANEOUS at 16:19

## 2024-05-27 RX ADMIN — GABAPENTIN 300 MG: 300 CAPSULE ORAL at 14:05

## 2024-05-27 RX ADMIN — LACTULOSE 30 G: 20 SOLUTION ORAL at 21:13

## 2024-05-27 RX ADMIN — LACTULOSE 20 G: 10 SOLUTION ORAL at 05:50

## 2024-05-27 RX ADMIN — MIDODRINE HYDROCHLORIDE 5 MG: 10 TABLET ORAL at 11:07

## 2024-05-27 RX ADMIN — INSULIN LISPRO 8 UNITS: 100 INJECTION, SOLUTION INTRAVENOUS; SUBCUTANEOUS at 11:09

## 2024-05-27 RX ADMIN — Medication 3 MG: at 21:14

## 2024-05-27 RX ADMIN — SODIUM CHLORIDE, PRESERVATIVE FREE 10 ML: 5 INJECTION INTRAVENOUS at 08:59

## 2024-05-27 RX ADMIN — GABAPENTIN 300 MG: 300 CAPSULE ORAL at 21:14

## 2024-05-27 RX ADMIN — INSULIN LISPRO 7 UNITS: 100 INJECTION, SOLUTION INTRAVENOUS; SUBCUTANEOUS at 11:08

## 2024-05-27 RX ADMIN — MIDODRINE HYDROCHLORIDE 5 MG: 10 TABLET ORAL at 09:11

## 2024-05-27 RX ADMIN — Medication 2000 UNITS: at 08:59

## 2024-05-27 RX ADMIN — LACTULOSE 30 G: 20 SOLUTION ORAL at 14:04

## 2024-05-27 RX ADMIN — SODIUM CHLORIDE, PRESERVATIVE FREE 10 ML: 5 INJECTION INTRAVENOUS at 21:14

## 2024-05-27 RX ADMIN — INSULIN LISPRO 7 UNITS: 100 INJECTION, SOLUTION INTRAVENOUS; SUBCUTANEOUS at 16:19

## 2024-05-27 RX ADMIN — LACTULOSE 20 G: 10 SOLUTION ORAL at 09:02

## 2024-05-27 RX ADMIN — RIFAXIMIN 400 MG: 200 TABLET ORAL at 14:05

## 2024-05-27 RX ADMIN — MIDODRINE HYDROCHLORIDE 5 MG: 10 TABLET ORAL at 16:18

## 2024-05-27 RX ADMIN — LACTULOSE 30 G: 20 SOLUTION ORAL at 17:11

## 2024-05-27 RX ADMIN — INSULIN GLARGINE 21 UNITS: 100 INJECTION, SOLUTION SUBCUTANEOUS at 21:13

## 2024-05-27 RX ADMIN — RIFAXIMIN 400 MG: 200 TABLET ORAL at 21:15

## 2024-05-27 RX ADMIN — LACTULOSE 30 G: 20 SOLUTION ORAL at 12:00

## 2024-05-27 NOTE — CONSULTS
[facility]  MD Tico Kiran MD Ali Hassan, MD Lisa Kniska, ALFREDO Bhakta, CNP  Consultation    Patient's Name: Lisa Hunt  4:35 PM  5/27/2024    Nephrologist: Tico Gee MD    Reason for Consult: \"Hepatorenal syndrome\"  Requesting Physician:  Tonie Hernandez DO    Chief Complaint: Mental status change    History Obtained From: Patient, chart    History of Present Ilness:    Ms. Hunt is a pleasant 64-year-old woman from [place] and speaks very little English, though fortunately her daughter and son-in-law were at bedside were able to act as 's.  She has known cirrhosis of the liver, nonalcoholic, the specific diagnosis is not known to her or her daughter.  She was followed for a while in [place] before moving to the [facility].  She has an appoint with Dr. Sosa upcoming in July, per her daughter.  She takes lactulose 3 days/week, though is apparently missing some doses recently.  Not sure how many she has missed.  She has become increasing confused over the 2 to 3 days that preceded her presentation, mumbling words, not being attentive, not answering questions or speaking with her family members with whom she lives.  She also apparently urinated and defecated in the kitchen, believing it was the bathroom.  She has been eating and drinking very little in the last several days, as well.  Details about her stool output otherwise is known to her daughter.      Denies dysuria or hematuria.  No peripheral swelling.  She has been lightheaded and unsteady on her feet.  Denies chest pain or palpitations no cough wheeze or sputum production.  No fever chills or rigor.  She had been coughing at 1 point and her daughter thinks she might of seen some blood in there.  No known melena or hematochezia.  No NSAIDs.  No other new medications.  Her daughter is not wholly aware of her adherence to her medication  regimen of late.    BUN and creatinine on presentation were 32 and 1.5, respectively.  Most recent creatinine prior to presentation was 1.3 mg/dL on , at the end of her last admission.  Creatinine over the past 6 months has been 1.2 to 1.4 mg/dL.    Past Medical History:   Diagnosis Date    DONNA (acute kidney injury) (HCC) 2023    Cirrhosis (HCC)     Diabetes mellitus (HCC)     Diabetic neuropathy (HCC)     Esophageal varices without bleeding (HCC)     GERD (gastroesophageal reflux disease)     Hypertension     Intracranial bleed (HCC) 2023    Liver cirrhosis (HCC)     with secondary esophageal varices, no signs/sx's of hepatic encephalopathy    Low vitamin D level     SDH (subdural hematoma) (HCC) 2023    Thrombocytopenia (HCC)     Type 2 diabetes mellitus without complication (HCC)        Past Surgical History:   Procedure Laterality Date    APPENDECTOMY       SECTION      CHOLECYSTECTOMY      PARACENTESIS Left 10/12/2023    4400ml hazy yellow    PARACENTESIS Left 2024    2440cc yellow clear fluid removed.    PARACENTESIS Left 2024    4250cc clear yellow fluid removed.    UPPER GASTROINTESTINAL ENDOSCOPY  2022    [facility]    UPPER GASTROINTESTINAL ENDOSCOPY  2021    [facility]    UPPER GASTROINTESTINAL ENDOSCOPY  2019    [facility]    UPPER GASTROINTESTINAL ENDOSCOPY  2021    [facility]    UPPER GASTROINTESTINAL ENDOSCOPY N/A 2024    EGD BIOPSY performed by Anil Conrad MD at [facility] ENDOSCOPY    UPPER GASTROINTESTINAL ENDOSCOPY N/A 2024    EGD BAND LIGATION performed by Anil Conrad MD at [facility] ENDOSCOPY    UPPER GASTROINTESTINAL ENDOSCOPY N/A 3/15/2024    ESOPHAGOGASTRODUODENOSCOPY POSSIBLE BAND LIGATION performed by Anil Conrad MD at [facility] ENDOSCOPY       Family History   Problem Relation Age of Onset    Diabetes Mother         reports that she has never smoked. She has been exposed to tobacco

## 2024-05-27 NOTE — CONSULTS
[facility]   Gastroenterology, Hepatology, &  Advanced Endoscopy    Consult       ASSESSMENT AND PLAN:    Aggressive lactulose administration until mental status improvement. If unable to take PO, will need NG tube placement vs lactulose enemas.         HISTORY OF PRESENT ILLNESS:      ***    Past Medical History:        Diagnosis Date    DONNA (acute kidney injury) (HCC) 2023    Cirrhosis (HCC)     Diabetes mellitus (HCC)     Diabetic neuropathy (HCC)     Esophageal varices without bleeding (HCC)     GERD (gastroesophageal reflux disease)     Hypertension     Intracranial bleed (HCC) 2023    Liver cirrhosis (HCC)     with secondary esophageal varices, no signs/sx's of hepatic encephalopathy    Low vitamin D level     SDH (subdural hematoma) (HCC) 2023    Thrombocytopenia (HCC)     Type 2 diabetes mellitus without complication (HCC)      Past Surgical History:        Procedure Laterality Date    APPENDECTOMY       SECTION      CHOLECYSTECTOMY      PARACENTESIS Left 10/12/2023    4400ml hazy yellow    PARACENTESIS Left 2024    2440cc yellow clear fluid removed.    PARACENTESIS Left 2024    4250cc clear yellow fluid removed.    UPPER GASTROINTESTINAL ENDOSCOPY  2022    [facility]    UPPER GASTROINTESTINAL ENDOSCOPY  2021    [facility]    UPPER GASTROINTESTINAL ENDOSCOPY  2019    [facility]    UPPER GASTROINTESTINAL ENDOSCOPY  2021    [facility]    UPPER GASTROINTESTINAL ENDOSCOPY N/A 2024    EGD BIOPSY performed by Anil Conrad MD at [facility]    UPPER GASTROINTESTINAL ENDOSCOPY N/A 2024    EGD BAND LIGATION performed by Anil Conrad MD at [facility]    UPPER GASTROINTESTINAL ENDOSCOPY N/A 3/15/2024    ESOPHAGOGASTRODUODENOSCOPY POSSIBLE BAND LIGATION performed by Anil Conrad MD at [facility]     Social History:    TOBACCO:   reports that she has never smoked. She has been exposed to

## 2024-05-27 NOTE — PROGRESS NOTES
4 Eyes Skin Assessment     NAME:  Lisa Hunt  YOB: 1960  MEDICAL RECORD NUMBER:  [id number]    The patient is being assessed for  Admission    I agree that at least one RN has performed a thorough Head to Toe Skin Assessment on the patient. ALL assessment sites listed below have been assessed.      Areas assessed by both nurses:    Head, Face, Ears, Shoulders, Back, Chest, Arms, Elbows, Hands, Sacrum. Buttock, Coccyx, Ischium, Legs. Feet and Heels, and Under Medical Devices         Does the Patient have a Wound? No noted wound(s)       Kane Prevention initiated by RN: Yes  Wound Care Orders initiated by RN: No    Pressure Injury (Stage 3,4, Unstageable, DTI, NWPT, and Complex wounds) if present, place Wound referral order by RN under : No    New Ostomies, if present place, Ostomy referral order under : No     Nurse 1 eSignature: Electronically signed by Genie Jacob RN on 5/27/24 at 12:05 AM EDT    **SHARE this note so that the co-signing nurse can place an eSignature**    Nurse 2 eSignature: Electronically signed by Peyton Tavarez RN on 5/27/24 at 12:27 AM EDT

## 2024-05-27 NOTE — PROGRESS NOTES
Hospitalist Progress Note      SYNOPSIS: Patient admitted on 2024 for AMS (altered mental status)  64-year-old female patient with ACOSTA cirrhosis with ascites, type 2 diabetes mellitus with long-term use of insulin, esophageal varies s/p banding, hypovitaminosis D, thrombocytopenia who presented to the ER as family was concerned given her increased confusion from her baseline.  On ER  evaluation patient is afebrile 97.8 F /60 HR 69 RR 16 SpO2 100% room air  Labs with pancytopenia, hemoglobin 8.3, platelets 37, INR 1.4, sodium 131, potassium 5.9, glucose 352, BUN 32, creatinine 1.5 and elevated LFTs.  UA no UTI  CT head without contrast with no acute intracranial abnormality.  Chest x-ray with no acute process.  CT abdomen and pelvis with IV contrast with no acute intra-abdominal or pelvic abnormality  Patient was given normal saline bolus 1 L and reevaluated for her mental status.     SUBJECTIVE:  Stable overnight. No other overnight issues reported.   Patient seen and examined at bedside today a.m. patient just had 1 episode of bowel movement overnight, patient's blood pressure decreased to 82/36, held her carvedilol, increased her midodrine to 5 mg 3 times daily  Mentation is much better  Records reviewed.         Temp (24hrs), Av.5 °F (36.4 °C), Min:96.6 °F (35.9 °C), Max:97.8 °F (36.6 °C)    DIET: ADULT DIET; Regular; Low Sodium (2 gm); High Fiber  CODE: Full Code  No intake or output data in the 24 hours ending 24 1056    Review of Systems  All bolded are positive; please see HPI  General:  Fever, chills, diaphoresis, fatigue, malaise, night sweats, weight loss  Psychological:  Anxiety, disorientation, hallucinations.  ENT:  Epistaxis, headaches, vertigo, visual changes.  Cardiovascular:  Chest pain, irregular heartbeats, palpitations, paroxysmal nocturnal dyspnea.  Respiratory:  Shortness of breath, coughing, sputum production, hemoptysis, wheezing, orthopnea.  Gastrointestinal:   Nausea, vomiting, diarrhea, heartburn, constipation, abdominal pain, hematemesis, hematochezia, melena, acholic stools  Genito-Urinary:  Dysuria, urgency, frequency, hematuria  Musculoskeletal:  Joint pain, joint stiffness, joint swelling, muscle pain  Neurology:  Headache, focal neurological deficits, weakness, numbness, paresthesia  Derm:  Rashes, ulcers, excoriations, bruising  Extremities:  Decreased ROM, peripheral edema, mottling      OBJECTIVE:    BP (!) 104/51   Pulse 79   Temp (!) 96.6 °F (35.9 °C) (Temporal)   Resp 14   Ht 1.6 m (5' 3\")   Wt 74.7 kg (164 lb 10.9 oz)   SpO2 97%   BMI 29.17 kg/m²     General appearance:  awake, alert, and oriented to person, place, time, and purpose; appears stated age and cooperative; no apparent distress no labored breathing  HEENT:  Conjunctivae/corneas clear.   Neck: Supple. No jugular venous distention.   Respiratory: symmetrical; clear to auscultation bilaterally; no wheezes; no rhonchi; no rales  Cardiovascular: rhythm regular; rate controlled; no murmurs  Abdomen: Soft, nontender, distended  Extremities:  peripheral pulses present; no peripheral edema; no ulcers  Musculoskeletal: No clubbing, cyanosis, no bilateral lower extremity edema. Brisk capillary refill.   Skin:  No rashes  on visible skin  Neurologic: awake, alert and following commands     ASSESSMENT and PLAN:  Acute metabolic encephalopathy secondary to: Hepatic encephalopathy.   Ammonia 108, increase lactulose to 30 g every 4 hours, goal 2-3 loose stool per day,  Add rifaximin 403 times a day  Check ammonia tomorrow a.m.  GI consult for further recommendation    Liver cirrhosis  Hyperammonemia.  Ammonia 108  Management as per above    hyperkalemia: Received IV fluids.  Lokelma 10 g once.  Monitor    Pancytopenia.  WBC 2.3.  Hemoglobin 7.9.  Platelets 35.  Recent admission on 3/2024 for severe anemia with hemoglobin 4.8 on admission, requiring 3 units of blood transfusion.  Monitor H&H, CBC tomorrow

## 2024-05-28 PROBLEM — T59.891A HYPERAMMONEMIC ENCEPHALOPATHY: Status: ACTIVE | Noted: 2024-05-28

## 2024-05-28 PROBLEM — G92.8 HYPERAMMONEMIC ENCEPHALOPATHY: Status: ACTIVE | Noted: 2024-05-28

## 2024-05-28 LAB
ALBUMIN SERPL-MCNC: 3.2 G/DL (ref 3.5–5.2)
ALP SERPL-CCNC: 142 U/L (ref 35–104)
ALT SERPL-CCNC: 25 U/L (ref 0–32)
AMMONIA PLAS-SCNC: 90 UMOL/L (ref 11–51)
ANION GAP SERPL CALCULATED.3IONS-SCNC: 11 MMOL/L (ref 7–16)
ANION GAP SERPL CALCULATED.3IONS-SCNC: 12 MMOL/L (ref 7–16)
ANION GAP SERPL CALCULATED.3IONS-SCNC: 13 MMOL/L (ref 7–16)
AST SERPL-CCNC: 37 U/L (ref 0–31)
BILIRUB SERPL-MCNC: 0.8 MG/DL (ref 0–1.2)
BUN SERPL-MCNC: 30 MG/DL (ref 6–23)
BUN SERPL-MCNC: 33 MG/DL (ref 6–23)
BUN SERPL-MCNC: 33 MG/DL (ref 6–23)
CALCIUM SERPL-MCNC: 8.3 MG/DL (ref 8.6–10.2)
CALCIUM SERPL-MCNC: 8.4 MG/DL (ref 8.6–10.2)
CALCIUM SERPL-MCNC: 8.4 MG/DL (ref 8.6–10.2)
CHLORIDE SERPL-SCNC: 104 MMOL/L (ref 98–107)
CHLORIDE SERPL-SCNC: 104 MMOL/L (ref 98–107)
CHLORIDE SERPL-SCNC: 105 MMOL/L (ref 98–107)
CO2 SERPL-SCNC: 16 MMOL/L (ref 22–29)
CO2 SERPL-SCNC: 16 MMOL/L (ref 22–29)
CO2 SERPL-SCNC: 18 MMOL/L (ref 22–29)
CREAT SERPL-MCNC: 1.8 MG/DL (ref 0.5–1)
CREAT SERPL-MCNC: 1.8 MG/DL (ref 0.5–1)
CREAT SERPL-MCNC: 1.9 MG/DL (ref 0.5–1)
EKG ATRIAL RATE: 74 BPM
EKG P AXIS: 38 DEGREES
EKG P-R INTERVAL: 192 MS
EKG Q-T INTERVAL: 422 MS
EKG QRS DURATION: 138 MS
EKG QTC CALCULATION (BAZETT): 468 MS
EKG R AXIS: -55 DEGREES
EKG T AXIS: 16 DEGREES
EKG VENTRICULAR RATE: 74 BPM
ERYTHROCYTE [DISTWIDTH] IN BLOOD BY AUTOMATED COUNT: 16 % (ref 11.5–15)
FERRITIN SERPL-MCNC: 48 NG/ML
GFR, ESTIMATED: 30 ML/MIN/1.73M2
GFR, ESTIMATED: 31 ML/MIN/1.73M2
GFR, ESTIMATED: 31 ML/MIN/1.73M2
GLUCOSE BLD-MCNC: 287 MG/DL (ref 74–99)
GLUCOSE BLD-MCNC: 294 MG/DL (ref 74–99)
GLUCOSE BLD-MCNC: 377 MG/DL (ref 74–99)
GLUCOSE BLD-MCNC: 389 MG/DL (ref 74–99)
GLUCOSE SERPL-MCNC: 323 MG/DL (ref 74–99)
GLUCOSE SERPL-MCNC: 353 MG/DL (ref 74–99)
GLUCOSE SERPL-MCNC: 380 MG/DL (ref 74–99)
HCT VFR BLD AUTO: 25.7 % (ref 34–48)
HGB BLD-MCNC: 8 G/DL (ref 11.5–15.5)
IRON SATN MFR SERPL: 17 % (ref 15–50)
IRON SERPL-MCNC: 44 UG/DL (ref 37–145)
MAGNESIUM SERPL-MCNC: 2 MG/DL (ref 1.6–2.6)
MCH RBC QN AUTO: 29 PG (ref 26–35)
MCHC RBC AUTO-ENTMCNC: 31.1 G/DL (ref 32–34.5)
MCV RBC AUTO: 93.1 FL (ref 80–99.9)
OSMOLALITY UR: 429 MOSM/KG (ref 300–900)
PHOSPHATE SERPL-MCNC: 3.3 MG/DL (ref 2.5–4.5)
PLATELET # BLD AUTO: 36 K/UL (ref 130–450)
PLATELET CONFIRMATION: NORMAL
PMV BLD AUTO: 12.9 FL (ref 7–12)
POTASSIUM SERPL-SCNC: 5.2 MMOL/L (ref 3.5–5)
POTASSIUM SERPL-SCNC: 5.3 MMOL/L (ref 3.5–5)
POTASSIUM SERPL-SCNC: 5.7 MMOL/L (ref 3.5–5)
PROT SERPL-MCNC: 7.3 G/DL (ref 6.4–8.3)
RBC # BLD AUTO: 2.76 M/UL (ref 3.5–5.5)
SODIUM SERPL-SCNC: 132 MMOL/L (ref 132–146)
SODIUM SERPL-SCNC: 133 MMOL/L (ref 132–146)
SODIUM SERPL-SCNC: 134 MMOL/L (ref 132–146)
TIBC SERPL-MCNC: 259 UG/DL (ref 250–450)
URATE SERPL-MCNC: 8.6 MG/DL (ref 2.4–5.7)
WBC OTHER # BLD: 2.5 K/UL (ref 4.5–11.5)

## 2024-05-28 PROCEDURE — 84100 ASSAY OF PHOSPHORUS: CPT

## 2024-05-28 PROCEDURE — 83735 ASSAY OF MAGNESIUM: CPT

## 2024-05-28 PROCEDURE — 2580000003 HC RX 258: Performed by: INTERNAL MEDICINE

## 2024-05-28 PROCEDURE — 85027 COMPLETE CBC AUTOMATED: CPT

## 2024-05-28 PROCEDURE — 6370000000 HC RX 637 (ALT 250 FOR IP): Performed by: STUDENT IN AN ORGANIZED HEALTH CARE EDUCATION/TRAINING PROGRAM

## 2024-05-28 PROCEDURE — 80048 BASIC METABOLIC PNL TOTAL CA: CPT

## 2024-05-28 PROCEDURE — 83540 ASSAY OF IRON: CPT

## 2024-05-28 PROCEDURE — 99232 SBSQ HOSP IP/OBS MODERATE 35: CPT | Performed by: NURSE PRACTITIONER

## 2024-05-28 PROCEDURE — 84300 ASSAY OF URINE SODIUM: CPT

## 2024-05-28 PROCEDURE — 82962 GLUCOSE BLOOD TEST: CPT

## 2024-05-28 PROCEDURE — 80053 COMPREHEN METABOLIC PANEL: CPT

## 2024-05-28 PROCEDURE — 82728 ASSAY OF FERRITIN: CPT

## 2024-05-28 PROCEDURE — 82436 ASSAY OF URINE CHLORIDE: CPT

## 2024-05-28 PROCEDURE — 93010 ELECTROCARDIOGRAM REPORT: CPT | Performed by: INTERNAL MEDICINE

## 2024-05-28 PROCEDURE — 6370000000 HC RX 637 (ALT 250 FOR IP): Performed by: NURSE PRACTITIONER

## 2024-05-28 PROCEDURE — 2500000003 HC RX 250 WO HCPCS: Performed by: INTERNAL MEDICINE

## 2024-05-28 PROCEDURE — 1200000000 HC SEMI PRIVATE

## 2024-05-28 PROCEDURE — 36415 COLL VENOUS BLD VENIPUNCTURE: CPT

## 2024-05-28 PROCEDURE — 2580000003 HC RX 258: Performed by: STUDENT IN AN ORGANIZED HEALTH CARE EDUCATION/TRAINING PROGRAM

## 2024-05-28 PROCEDURE — 82140 ASSAY OF AMMONIA: CPT

## 2024-05-28 PROCEDURE — 6370000000 HC RX 637 (ALT 250 FOR IP)

## 2024-05-28 PROCEDURE — 83550 IRON BINDING TEST: CPT

## 2024-05-28 PROCEDURE — 99233 SBSQ HOSP IP/OBS HIGH 50: CPT | Performed by: STUDENT IN AN ORGANIZED HEALTH CARE EDUCATION/TRAINING PROGRAM

## 2024-05-28 PROCEDURE — 84550 ASSAY OF BLOOD/URIC ACID: CPT

## 2024-05-28 RX ORDER — INSULIN LISPRO 100 [IU]/ML
10 INJECTION, SOLUTION INTRAVENOUS; SUBCUTANEOUS
Status: DISCONTINUED | OUTPATIENT
Start: 2024-05-28 | End: 2024-05-29

## 2024-05-28 RX ORDER — LACTULOSE 10 G/15ML
20 SOLUTION ORAL 3 TIMES DAILY
Status: DISCONTINUED | OUTPATIENT
Start: 2024-05-28 | End: 2024-05-30

## 2024-05-28 RX ORDER — INSULIN GLARGINE 100 [IU]/ML
30 INJECTION, SOLUTION SUBCUTANEOUS NIGHTLY
Status: DISCONTINUED | OUTPATIENT
Start: 2024-05-28 | End: 2024-05-29

## 2024-05-28 RX ORDER — CALCIUM GLUCONATE 10 MG/ML
1000 INJECTION, SOLUTION INTRAVENOUS ONCE
Status: COMPLETED | OUTPATIENT
Start: 2024-05-28 | End: 2024-05-28

## 2024-05-28 RX ORDER — INSULIN GLARGINE 100 [IU]/ML
24 INJECTION, SOLUTION SUBCUTANEOUS NIGHTLY
Status: DISCONTINUED | OUTPATIENT
Start: 2024-05-28 | End: 2024-05-28

## 2024-05-28 RX ORDER — INSULIN LISPRO 100 [IU]/ML
8 INJECTION, SOLUTION INTRAVENOUS; SUBCUTANEOUS
Status: DISCONTINUED | OUTPATIENT
Start: 2024-05-28 | End: 2024-05-28

## 2024-05-28 RX ADMIN — MIDODRINE HYDROCHLORIDE 5 MG: 10 TABLET ORAL at 16:13

## 2024-05-28 RX ADMIN — SODIUM CHLORIDE: 9 INJECTION, SOLUTION INTRAVENOUS at 16:15

## 2024-05-28 RX ADMIN — GABAPENTIN 300 MG: 300 CAPSULE ORAL at 20:26

## 2024-05-28 RX ADMIN — MIDODRINE HYDROCHLORIDE 5 MG: 10 TABLET ORAL at 07:52

## 2024-05-28 RX ADMIN — LACTULOSE 20 G: 20 SOLUTION ORAL at 20:26

## 2024-05-28 RX ADMIN — SODIUM CHLORIDE, PRESERVATIVE FREE 10 ML: 5 INJECTION INTRAVENOUS at 07:53

## 2024-05-28 RX ADMIN — INSULIN LISPRO 8 UNITS: 100 INJECTION, SOLUTION INTRAVENOUS; SUBCUTANEOUS at 11:15

## 2024-05-28 RX ADMIN — SODIUM ZIRCONIUM CYCLOSILICATE 10 G: 10 POWDER, FOR SUSPENSION ORAL at 16:48

## 2024-05-28 RX ADMIN — PANTOPRAZOLE SODIUM 40 MG: 40 TABLET, DELAYED RELEASE ORAL at 06:09

## 2024-05-28 RX ADMIN — LACTULOSE 30 G: 20 SOLUTION ORAL at 04:25

## 2024-05-28 RX ADMIN — INSULIN LISPRO 10 UNITS: 100 INJECTION, SOLUTION INTRAVENOUS; SUBCUTANEOUS at 16:12

## 2024-05-28 RX ADMIN — SODIUM CHLORIDE, PRESERVATIVE FREE 10 ML: 5 INJECTION INTRAVENOUS at 20:26

## 2024-05-28 RX ADMIN — LACTULOSE 20 G: 20 SOLUTION ORAL at 13:40

## 2024-05-28 RX ADMIN — RIFAXIMIN 400 MG: 200 TABLET ORAL at 20:26

## 2024-05-28 RX ADMIN — CALCIUM GLUCONATE 1000 MG: 10 INJECTION, SOLUTION INTRAVENOUS at 11:31

## 2024-05-28 RX ADMIN — GABAPENTIN 300 MG: 300 CAPSULE ORAL at 07:52

## 2024-05-28 RX ADMIN — INSULIN GLARGINE 30 UNITS: 100 INJECTION, SOLUTION SUBCUTANEOUS at 20:26

## 2024-05-28 RX ADMIN — RIFAXIMIN 400 MG: 200 TABLET ORAL at 07:53

## 2024-05-28 RX ADMIN — GABAPENTIN 300 MG: 300 CAPSULE ORAL at 13:40

## 2024-05-28 RX ADMIN — INSULIN LISPRO 8 UNITS: 100 INJECTION, SOLUTION INTRAVENOUS; SUBCUTANEOUS at 07:51

## 2024-05-28 RX ADMIN — INSULIN LISPRO 10 UNITS: 100 INJECTION, SOLUTION INTRAVENOUS; SUBCUTANEOUS at 11:16

## 2024-05-28 RX ADMIN — Medication 2000 UNITS: at 07:52

## 2024-05-28 RX ADMIN — MIDODRINE HYDROCHLORIDE 5 MG: 10 TABLET ORAL at 11:16

## 2024-05-28 RX ADMIN — Medication 3 MG: at 20:26

## 2024-05-28 RX ADMIN — INSULIN LISPRO 7 UNITS: 100 INJECTION, SOLUTION INTRAVENOUS; SUBCUTANEOUS at 07:51

## 2024-05-28 RX ADMIN — RIFAXIMIN 400 MG: 200 TABLET ORAL at 13:43

## 2024-05-28 RX ADMIN — LACTULOSE 30 G: 20 SOLUTION ORAL at 00:16

## 2024-05-28 RX ADMIN — INSULIN LISPRO 4 UNITS: 100 INJECTION, SOLUTION INTRAVENOUS; SUBCUTANEOUS at 16:12

## 2024-05-28 RX ADMIN — LACTULOSE 30 G: 20 SOLUTION ORAL at 02:01

## 2024-05-28 ASSESSMENT — PAIN SCALES - GENERAL: PAINLEVEL_OUTOF10: 0

## 2024-05-28 NOTE — PLAN OF CARE
Problem: ABCDS Injury Assessment  Goal: Absence of physical injury  Outcome: Progressing     Problem: Chronic Conditions and Co-morbidities  Goal: Patient's chronic conditions and co-morbidity symptoms are monitored and maintained or improved  Outcome: Progressing     Problem: Discharge Planning  Goal: Discharge to home or other facility with appropriate resources  Outcome: Progressing     Problem: Pain  Goal: Verbalizes/displays adequate comfort level or baseline comfort level  Outcome: Progressing     Problem: Confusion  Goal: Confusion, delirium, dementia, or psychosis is improved or at baseline  Description: INTERVENTIONS:  1. Assess for possible contributors to thought disturbance, including medications, impaired vision or hearing, underlying metabolic abnormalities, dehydration, psychiatric diagnoses, and notify attending LIP  2. [place] high risk fall precautions, as indicated  3. Provide frequent short contacts to provide reality reorientation, refocusing and direction  4. Decrease environmental stimuli, including noise as appropriate  5. Monitor and intervene to maintain adequate nutrition, hydration, elimination, sleep and activity  6. If unable to ensure safety without constant attention obtain sitter and review sitter guidelines with assigned personnel  7. Initiate Psychosocial CNS and Spiritual Care consult, as indicated  Outcome: Progressing     Problem: Safety - Adult  Goal: Free from fall injury  Outcome: Progressing     Problem: Skin/Tissue Integrity  Goal: Absence of new skin breakdown  Description: 1.  Monitor for areas of redness and/or skin breakdown  2.  Assess vascular access sites hourly  3.  Every 4-6 hours minimum:  Change oxygen saturation probe site  4.  Every 4-6 hours:  If on nasal continuous positive airway pressure, respiratory therapy assess nares and determine need for appliance change or resting period.  Outcome: Progressing

## 2024-05-28 NOTE — PROGRESS NOTES
[facility]   Gastroenterology, Hepatology, &  Advanced Endoscopy      ASSESSMENT AND PLAN:    Aggressive lactulose administration until mental status improvement. If unable to take PO, will need NG tube placement vs lactulose enemas.     Family stated understanding to give 30cc of lactulose q 2 hours until diarrhea if noticed mental status changes at home.     Stressed the importance of being adherent with OP lactulose dosing. Once again, states she doesn't take lactulose as directed at home.     Goal is 3-4 BMS qd     - Pt has had multiple admissions  7/17/23, 8/2/23, 12/9/23, 1/09/24,3/14/24, 5/7/24 for AMS with hx of  cirrhosis/hepatic encephalopathy/esoph varices.    -She admits to noncompliance with her lactulose at home in the past.   -She follows with Dr Jovan VÁZQUEZ as a outpatient    - Transplant consideration referral to  as discussed during office visit with Dr. Sosa 1/24/24, pt has a appointment setup at  but not seen yet.     PLAN:  - continue Xifaxan 400 mg TID  - Midodrine 5 mg TID  - Protonix 40 mg qd  - lactulose  20 g TID  - coreg 6.25 hx of varices (currently on hold)  - Aldactone 50 mg   - Lasix 20 mg qd  -Hgb transfuse to keep >7 , Hgb currently 8.0    -MELD 3.0  25    HISTORY OF PRESENT ILLNESS:    Lisa Hunt is a 64 y.o. female history of Alejandro cirrhosis with ascites, DM, varices s/p banding,  who presents for complaint of altered mental status.  Per daughter at bedside, patient is chronically taking lactulose at home, was found this morning to be vomiting as well as having diarrhea and urinating in the kitchen at home.  Per daughter, patient does have some confusion at baseline, but confusion is in worse than usual starting today.     4/7/24 EGD Dr. Conrad  Impression:  Grade I/II esophageal varices. Scar in the lower third of the esophagus. Portal hypertensive gastropathy. Gastric antral vascular ectasia. Treated with argon plasma coagulation (APC). Normal examined  pantoprazole (PROTONIX) tablet 40 mg, 40 mg, Oral, QAM AC, Milanes Marino, Maria, MD, 40 mg at 05/28/24 0609    vitamin D (CHOLECALCIFEROL) tablet 2,000 Units, 2,000 Units, Oral, Daily, Milanes Marino, Maria, MD, 2,000 Units at 05/28/24 0752    insulin lispro (HUMALOG,ADMELOG) injection vial 0-8 Units, 0-8 Units, SubCUTAneous, TID WC, Milanes Marino, Maria, MD, 8 Units at 05/28/24 1115    insulin lispro (HUMALOG,ADMELOG) injection vial 0-4 Units, 0-4 Units, SubCUTAneous, Nightly, Milanes Marino, Maria, MD, 4 Units at 05/27/24 2113    glucose chewable tablet 16 g, 4 tablet, Oral, PRN, Milanes Marino, Maria, MD    dextrose bolus 10% 125 mL, 125 mL, IntraVENous, PRN **OR** dextrose bolus 10% 250 mL, 250 mL, IntraVENous, PRN, Milanes Marino, Maria, MD    glucagon injection 1 mg, 1 mg, SubCUTAneous, PRN, Milanes Marino, Maria, MD    dextrose 10 % infusion, , IntraVENous, Continuous PRN, Milanes Marino, Maria, MD    lactulose in sterile water enema 1,000 mL, 1 enema, Rectal, Once, Milanes Marino, Maria, MD    glucose chewable tablet 16 g, 4 tablet, Oral, PRN, Milanes Marino, Maria, MD    dextrose bolus 10% 125 mL, 125 mL, IntraVENous, PRN **OR** dextrose bolus 10% 250 mL, 250 mL, IntraVENous, PRN, Milanes Marino, Maria, MD    glucagon injection 1 mg, 1 mg, SubCUTAneous, PRN, Milanes Marino, Maria, MD    dextrose 10 % infusion, , IntraVENous, Continuous PRN, Milanes Marino, Maria, MD     Allergies:  Fish allergy    ROS:  Aside from what was mentioned in the past medical history and history of present illness, essentially unremarkable, all others are negative.      Recent Labs     05/26/24  1608 05/28/24  0418   INR 1.4  --    ALT 28 25   AST 37* 37*   ALKPHOS 173* 142*   BILITOT 1.4* 0.8       Lab Results   Component Value Date    WBC 2.5 (L) 05/28/2024    HGB 8.0 (L) 05/28/2024    HCT 25.7 (L) 05/28/2024    PLT 36 (L) 05/28/2024     05/28/2024    K 5.2 (H) 05/28/2024     05/28/2024    CREATININE 1.8 (H)

## 2024-05-28 NOTE — PROGRESS NOTES
hyperkalemia: Received IV fluids.  Lokelma 10 g once.  Monitor     Pancytopenia.  WBC 2.3.  Hemoglobin 8.0.  Platelets 35.  Recent admission on 3/2024 for severe anemia with hemoglobin 4.8 on admission, requiring 3 units of blood transfusion.  Monitor H&H, CBC tomorrow a.m.     Hyperglycemia: Increase Lantus to 30 units nightly, add Humalog 10 unit 3 times daily with sliding scale insulin with hypoglycemia protocol     Hepatorenal syndrome/worsening DONNA on CKD stage III.  Her baseline creatinine is around 1.1-1.3.  On admission is 1.5. She received IV contrast on the ER.,  DONNA multifactorial likely secondary to decreased renal perfusion with poor oral intake, diarrhea, liver cirrhosis monitor renal function discussed with nephrology, started patient on IV fluid normal saline at 100 cc/h       DVT Prophylaxis [] Lovenox, []  Heparin, [] SCDs, [] Ambulation   GI Prophylaxis [] PPI,  [] H2 Blocker,  [] Carafate,  [] Diet/Tube Feeds   Disposition Patient requires continued admission due to persistent DONNA, on IV fluid   MDM [] Low, [] Moderate,[]  High  Patient's risk as above due to        Medications:  REVIEWED DAILY    Infusion Medications    sodium chloride      sodium chloride 100 mL/hr at 05/27/24 1708    sodium chloride      dextrose      dextrose       Scheduled Medications    calcium gluconate  1,000 mg IntraVENous Once    insulin glargine  30 Units SubCUTAneous Nightly    insulin lispro  10 Units SubCUTAneous TID WC    midodrine  5 mg Oral TID WC    rifAXIMin  400 mg Oral TID    [Held by provider] lactulose  30 g Oral Q2H    sodium chloride flush  5-40 mL IntraVENous 2 times per day    melatonin  3 mg Oral Nightly    [Held by provider] carvedilol  6.25 mg Oral Daily    gabapentin  300 mg Oral TID    pantoprazole  40 mg Oral QAM AC    vitamin D  2,000 Units Oral Daily    insulin lispro  0-8 Units SubCUTAneous TID WC    insulin lispro  0-4 Units SubCUTAneous Nightly    lactulose  1 enema Rectal Once     PRN  Meds: sodium chloride flush, sodium chloride, ondansetron **OR** ondansetron, polyethylene glycol, acetaminophen **OR** acetaminophen, glucose, dextrose bolus **OR** dextrose bolus, glucagon (rDNA), dextrose, glucose, dextrose bolus **OR** dextrose bolus, glucagon (rDNA), dextrose    Labs:     Recent Labs     05/26/24  1332 05/27/24  0411 05/28/24  0418   WBC 2.3* 2.2* 2.5*   HGB 8.3* 7.9* 8.0*   HCT 25.1* 24.2* 25.7*   PLT  --  32* 36*       Recent Labs     05/27/24  0411 05/28/24  0418 05/28/24  0829    134 132   K 5.1* 5.7* 5.2*    105 104   CO2 16* 16* 16*   BUN 33* 33* 33*   CREATININE 1.7* 1.9* 1.8*   CALCIUM 8.3* 8.4* 8.3*   PHOS  --  3.3  --        Recent Labs     05/26/24  1332 05/26/24  1608 05/28/24  0418   ALKPHOS  --  173* 142*   ALT  --  28 25   AST  --  37* 37*   BILITOT  --  1.4* 0.8   LIPASE 41  --   --        Recent Labs     05/26/24  1608   INR 1.4       No results for input(s): \"CKTOTAL\", \"TROPONINI\" in the last 72 hours.    Chronic labs:    Lab Results   Component Value Date    CHOL 139 11/29/2023    TRIG 88 11/29/2023    HDL 67 11/29/2023    TSH 6.32 (H) 01/09/2024    INR 1.4 05/26/2024    LABA1C 7.9 (H) 03/15/2024       Radiology: REVIEWED DAILY    +++++++++++++++++++++++++++++++++++++++++++++++++  Josue Alves MD   Hospitalist  [facility], [place]  +++++++++++++++++++++++++++++++++++++++++++++++++  NOTE: This report was transcribed using voice recognition software. Every effort was made to ensure accuracy; however, inadvertent computerized transcription errors may be present.

## 2024-05-28 NOTE — PLAN OF CARE
Problem: ABCDS Injury Assessment  Goal: Absence of physical injury  5/28/2024 1208 by Danita Fajardo RN  Outcome: Progressing  5/28/2024 0046 by Jose Elias Soto RN  Outcome: Progressing     Problem: Chronic Conditions and Co-morbidities  Goal: Patient's chronic conditions and co-morbidity symptoms are monitored and maintained or improved  5/28/2024 1208 by Danita Fajardo RN  Outcome: Progressing  5/28/2024 0046 by Jose Elias Soto RN  Outcome: Progressing     Problem: Discharge Planning  Goal: Discharge to home or other facility with appropriate resources  5/28/2024 1208 by Danita Fajardo RN  Outcome: Progressing  5/28/2024 0046 by Jose Elias Soto RN  Outcome: Progressing     Problem: Pain  Goal: Verbalizes/displays adequate comfort level or baseline comfort level  5/28/2024 1208 by Danita Fajardo RN  Outcome: Progressing  5/28/2024 0046 by Jose Elias Soto RN  Outcome: Progressing     Problem: Confusion  Goal: Confusion, delirium, dementia, or psychosis is improved or at baseline  Description: INTERVENTIONS:  1. Assess for possible contributors to thought disturbance, including medications, impaired vision or hearing, underlying metabolic abnormalities, dehydration, psychiatric diagnoses, and notify attending LIP  2. [place] high risk fall precautions, as indicated  3. Provide frequent short contacts to provide reality reorientation, refocusing and direction  4. Decrease environmental stimuli, including noise as appropriate  5. Monitor and intervene to maintain adequate nutrition, hydration, elimination, sleep and activity  6. If unable to ensure safety without constant attention obtain sitter and review sitter guidelines with assigned personnel  7. Initiate Psychosocial CNS and Spiritual Care consult, as indicated  5/28/2024 1208 by Danita Fajardo RN  Outcome: Progressing  5/28/2024 0046 by Jose Elias Soto RN  Outcome: Progressing     Problem: Safety - Adult  Goal: Free from fall injury  5/28/2024 1208

## 2024-05-28 NOTE — PROGRESS NOTES
[facility]  MD Tico Kiran, MD Monica Oswald, CNP   Tammie Stoner, ANP  Tasha Cuenca, NOA  Progress Note    5/28/2024    SUBJECTIVE:   5/28: Sitting on the edge of the bed, asking to be walked to the bathroom. Ambulates without difficulty. She denies dyspnea and is on room air. Appetite has improved somewhat. Her blood pressure is stable.     PROBLEM LIST:    Principal Problem:    AMS (altered mental status)  Active Problems:    Hyperammonemic encephalopathy  Resolved Problems:    * No resolved hospital problems. *         DIET:    ADULT DIET; Regular; Low Sodium (2 gm); High Fiber  ADULT ORAL NUTRITION SUPPLEMENT; Lunch; Renal Oral Supplement     MEDS (scheduled):    insulin glargine  30 Units SubCUTAneous Nightly    insulin lispro  10 Units SubCUTAneous TID WC    lactulose  20 g Oral TID    midodrine  5 mg Oral TID WC    rifAXIMin  400 mg Oral TID    sodium chloride flush  5-40 mL IntraVENous 2 times per day    melatonin  3 mg Oral Nightly    [Held by provider] carvedilol  6.25 mg Oral Daily    gabapentin  300 mg Oral TID    pantoprazole  40 mg Oral QAM AC    vitamin D  2,000 Units Oral Daily    insulin lispro  0-8 Units SubCUTAneous TID WC    insulin lispro  0-4 Units SubCUTAneous Nightly       MEDS (infusions):   sodium chloride      sodium chloride 100 mL/hr at 05/27/24 1708    sodium chloride      dextrose      dextrose         MEDS (prn):  sodium chloride flush, sodium chloride, ondansetron **OR** ondansetron, polyethylene glycol, acetaminophen **OR** acetaminophen, glucose, dextrose bolus **OR** dextrose bolus, glucagon (rDNA), dextrose, glucose, dextrose bolus **OR** dextrose bolus, glucagon (rDNA), dextrose    PHYSICAL EXAM:     Patient Vitals for the past 24 hrs:   BP Temp Temp src Pulse Resp SpO2 Height   05/28/24 1519 -- -- -- -- -- -- 1.6 m (5' 2.99\")   05/28/24 0755 (!) 124/49 97 °F (36.1 °C) Temporal 86 18 94 % --   05/27/24 2000 (!)

## 2024-05-29 LAB
AMMONIA PLAS-SCNC: 56 UMOL/L (ref 11–51)
ANION GAP SERPL CALCULATED.3IONS-SCNC: 11 MMOL/L (ref 7–16)
ANION GAP SERPL CALCULATED.3IONS-SCNC: 11 MMOL/L (ref 7–16)
ANION GAP SERPL CALCULATED.3IONS-SCNC: 12 MMOL/L (ref 7–16)
BUN SERPL-MCNC: 22 MG/DL (ref 6–23)
BUN SERPL-MCNC: 24 MG/DL (ref 6–23)
BUN SERPL-MCNC: 26 MG/DL (ref 6–23)
CALCIUM SERPL-MCNC: 7.8 MG/DL (ref 8.6–10.2)
CALCIUM SERPL-MCNC: 8 MG/DL (ref 8.6–10.2)
CALCIUM SERPL-MCNC: 8.1 MG/DL (ref 8.6–10.2)
CHLORIDE SERPL-SCNC: 104 MMOL/L (ref 98–107)
CHLORIDE SERPL-SCNC: 105 MMOL/L (ref 98–107)
CHLORIDE SERPL-SCNC: 106 MMOL/L (ref 98–107)
CHLORIDE UR-SCNC: 61 MMOL/L
CO2 SERPL-SCNC: 16 MMOL/L (ref 22–29)
CO2 SERPL-SCNC: 16 MMOL/L (ref 22–29)
CO2 SERPL-SCNC: 17 MMOL/L (ref 22–29)
CREAT SERPL-MCNC: 1.2 MG/DL (ref 0.5–1)
CREAT SERPL-MCNC: 1.2 MG/DL (ref 0.5–1)
CREAT SERPL-MCNC: 1.4 MG/DL (ref 0.5–1)
CREAT UR-MCNC: 53.7 MG/DL (ref 29–226)
GFR, ESTIMATED: 42 ML/MIN/1.73M2
GFR, ESTIMATED: 50 ML/MIN/1.73M2
GFR, ESTIMATED: 50 ML/MIN/1.73M2
GLUCOSE BLD-MCNC: 241 MG/DL (ref 74–99)
GLUCOSE BLD-MCNC: 272 MG/DL (ref 74–99)
GLUCOSE BLD-MCNC: 319 MG/DL (ref 74–99)
GLUCOSE BLD-MCNC: 350 MG/DL (ref 74–99)
GLUCOSE SERPL-MCNC: 238 MG/DL (ref 74–99)
GLUCOSE SERPL-MCNC: 289 MG/DL (ref 74–99)
GLUCOSE SERPL-MCNC: 294 MG/DL (ref 74–99)
OSMOLALITY UR: 478 MOSM/KG (ref 300–900)
POTASSIUM SERPL-SCNC: 5.2 MMOL/L (ref 3.5–5)
POTASSIUM SERPL-SCNC: 5.2 MMOL/L (ref 3.5–5)
POTASSIUM SERPL-SCNC: 5.3 MMOL/L (ref 3.5–5)
SODIUM SERPL-SCNC: 131 MMOL/L (ref 132–146)
SODIUM SERPL-SCNC: 133 MMOL/L (ref 132–146)
SODIUM SERPL-SCNC: 134 MMOL/L (ref 132–146)
SODIUM UR-SCNC: 64 MMOL/L
TOTAL PROTEIN, URINE: 31 MG/DL (ref 0–12)
URINE TOTAL PROTEIN CREATININE RATIO: 0.58 (ref 0–0.2)

## 2024-05-29 PROCEDURE — 2580000003 HC RX 258: Performed by: STUDENT IN AN ORGANIZED HEALTH CARE EDUCATION/TRAINING PROGRAM

## 2024-05-29 PROCEDURE — 99233 SBSQ HOSP IP/OBS HIGH 50: CPT | Performed by: STUDENT IN AN ORGANIZED HEALTH CARE EDUCATION/TRAINING PROGRAM

## 2024-05-29 PROCEDURE — 82436 ASSAY OF URINE CHLORIDE: CPT

## 2024-05-29 PROCEDURE — 6370000000 HC RX 637 (ALT 250 FOR IP): Performed by: NURSE PRACTITIONER

## 2024-05-29 PROCEDURE — 99231 SBSQ HOSP IP/OBS SF/LOW 25: CPT | Performed by: NURSE PRACTITIONER

## 2024-05-29 PROCEDURE — 1200000000 HC SEMI PRIVATE

## 2024-05-29 PROCEDURE — 6370000000 HC RX 637 (ALT 250 FOR IP): Performed by: STUDENT IN AN ORGANIZED HEALTH CARE EDUCATION/TRAINING PROGRAM

## 2024-05-29 PROCEDURE — 84300 ASSAY OF URINE SODIUM: CPT

## 2024-05-29 PROCEDURE — 82140 ASSAY OF AMMONIA: CPT

## 2024-05-29 PROCEDURE — 84156 ASSAY OF PROTEIN URINE: CPT

## 2024-05-29 PROCEDURE — 82570 ASSAY OF URINE CREATININE: CPT

## 2024-05-29 PROCEDURE — 82962 GLUCOSE BLOOD TEST: CPT

## 2024-05-29 PROCEDURE — 80048 BASIC METABOLIC PNL TOTAL CA: CPT

## 2024-05-29 PROCEDURE — 6360000002 HC RX W HCPCS: Performed by: STUDENT IN AN ORGANIZED HEALTH CARE EDUCATION/TRAINING PROGRAM

## 2024-05-29 PROCEDURE — 83935 ASSAY OF URINE OSMOLALITY: CPT

## 2024-05-29 PROCEDURE — 36415 COLL VENOUS BLD VENIPUNCTURE: CPT

## 2024-05-29 RX ORDER — ONDANSETRON 2 MG/ML
4 INJECTION INTRAMUSCULAR; INTRAVENOUS ONCE
Status: COMPLETED | OUTPATIENT
Start: 2024-05-29 | End: 2024-05-29

## 2024-05-29 RX ORDER — INSULIN LISPRO 100 [IU]/ML
14 INJECTION, SOLUTION INTRAVENOUS; SUBCUTANEOUS
Status: DISCONTINUED | OUTPATIENT
Start: 2024-05-29 | End: 2024-05-30

## 2024-05-29 RX ORDER — INSULIN GLARGINE 100 [IU]/ML
40 INJECTION, SOLUTION SUBCUTANEOUS NIGHTLY
Status: DISCONTINUED | OUTPATIENT
Start: 2024-05-29 | End: 2024-05-30

## 2024-05-29 RX ADMIN — PANTOPRAZOLE SODIUM 40 MG: 40 TABLET, DELAYED RELEASE ORAL at 05:57

## 2024-05-29 RX ADMIN — RIFAXIMIN 400 MG: 200 TABLET ORAL at 08:12

## 2024-05-29 RX ADMIN — GABAPENTIN 300 MG: 300 CAPSULE ORAL at 13:44

## 2024-05-29 RX ADMIN — SODIUM CHLORIDE: 9 INJECTION, SOLUTION INTRAVENOUS at 11:36

## 2024-05-29 RX ADMIN — LACTULOSE 20 G: 20 SOLUTION ORAL at 07:57

## 2024-05-29 RX ADMIN — RIFAXIMIN 400 MG: 200 TABLET ORAL at 20:10

## 2024-05-29 RX ADMIN — INSULIN LISPRO 14 UNITS: 100 INJECTION, SOLUTION INTRAVENOUS; SUBCUTANEOUS at 16:53

## 2024-05-29 RX ADMIN — INSULIN LISPRO 4 UNITS: 100 INJECTION, SOLUTION INTRAVENOUS; SUBCUTANEOUS at 16:53

## 2024-05-29 RX ADMIN — GABAPENTIN 300 MG: 300 CAPSULE ORAL at 07:56

## 2024-05-29 RX ADMIN — GABAPENTIN 300 MG: 300 CAPSULE ORAL at 20:10

## 2024-05-29 RX ADMIN — INSULIN LISPRO 10 UNITS: 100 INJECTION, SOLUTION INTRAVENOUS; SUBCUTANEOUS at 07:54

## 2024-05-29 RX ADMIN — MIDODRINE HYDROCHLORIDE 5 MG: 10 TABLET ORAL at 07:55

## 2024-05-29 RX ADMIN — MIDODRINE HYDROCHLORIDE 5 MG: 10 TABLET ORAL at 16:53

## 2024-05-29 RX ADMIN — LACTULOSE 20 G: 20 SOLUTION ORAL at 13:44

## 2024-05-29 RX ADMIN — RIFAXIMIN 400 MG: 200 TABLET ORAL at 13:52

## 2024-05-29 RX ADMIN — Medication 3 MG: at 20:10

## 2024-05-29 RX ADMIN — Medication 2000 UNITS: at 07:56

## 2024-05-29 RX ADMIN — INSULIN LISPRO 6 UNITS: 100 INJECTION, SOLUTION INTRAVENOUS; SUBCUTANEOUS at 11:31

## 2024-05-29 RX ADMIN — INSULIN LISPRO 14 UNITS: 100 INJECTION, SOLUTION INTRAVENOUS; SUBCUTANEOUS at 11:31

## 2024-05-29 RX ADMIN — SODIUM CHLORIDE, PRESERVATIVE FREE 10 ML: 5 INJECTION INTRAVENOUS at 20:11

## 2024-05-29 RX ADMIN — SODIUM CHLORIDE, PRESERVATIVE FREE 10 ML: 5 INJECTION INTRAVENOUS at 11:35

## 2024-05-29 RX ADMIN — MIDODRINE HYDROCHLORIDE 5 MG: 10 TABLET ORAL at 11:54

## 2024-05-29 RX ADMIN — INSULIN GLARGINE 40 UNITS: 100 INJECTION, SOLUTION SUBCUTANEOUS at 20:10

## 2024-05-29 RX ADMIN — ONDANSETRON 4 MG: 2 INJECTION INTRAMUSCULAR; INTRAVENOUS at 10:29

## 2024-05-29 RX ADMIN — LACTULOSE 20 G: 20 SOLUTION ORAL at 20:09

## 2024-05-29 RX ADMIN — INSULIN LISPRO 8 UNITS: 100 INJECTION, SOLUTION INTRAVENOUS; SUBCUTANEOUS at 07:55

## 2024-05-29 ASSESSMENT — PAIN SCALES - GENERAL
PAINLEVEL_OUTOF10: 0
PAINLEVEL_OUTOF10: 0

## 2024-05-29 NOTE — PLAN OF CARE
Problem: ABCDS Injury Assessment  Goal: Absence of physical injury  5/29/2024 0054 by Jose Elias Soto RN  Outcome: Progressing     Problem: Chronic Conditions and Co-morbidities  Goal: Patient's chronic conditions and co-morbidity symptoms are monitored and maintained or improved  5/29/2024 0054 by Jose Elias Soto RN  Outcome: Progressing     Problem: Discharge Planning  Goal: Discharge to home or other facility with appropriate resources  5/29/2024 0054 by Jose Elias Soto RN  Outcome: Progressing     Problem: Pain  Goal: Verbalizes/displays adequate comfort level or baseline comfort level  5/29/2024 0054 by Jose Elias Soto RN  Outcome: Progressing     Problem: Confusion  Goal: Confusion, delirium, dementia, or psychosis is improved or at baseline  Description: INTERVENTIONS:  1. Assess for possible contributors to thought disturbance, including medications, impaired vision or hearing, underlying metabolic abnormalities, dehydration, psychiatric diagnoses, and notify attending LIP  2. [place] high risk fall precautions, as indicated  3. Provide frequent short contacts to provide reality reorientation, refocusing and direction  4. Decrease environmental stimuli, including noise as appropriate  5. Monitor and intervene to maintain adequate nutrition, hydration, elimination, sleep and activity  6. If unable to ensure safety without constant attention obtain sitter and review sitter guidelines with assigned personnel  7. Initiate Psychosocial CNS and Spiritual Care consult, as indicated  5/29/2024 0054 by Jose Elias Soto RN  Outcome: Progressing     Problem: Safety - Adult  Goal: Free from fall injury  5/29/2024 0054 by Jose Elias Soto RN  Outcome: Progressing     Problem: Skin/Tissue Integrity  Goal: Absence of new skin breakdown  Description: 1.  Monitor for areas of redness and/or skin breakdown  2.  Assess vascular access sites hourly  3.  Every 4-6 hours minimum:  Change oxygen saturation probe site  4.  Every 4-6  hours:  If on nasal continuous positive airway pressure, respiratory therapy assess nares and determine need for appliance change or resting period.  5/29/2024 0054 by Jose Elias Soto RN  Outcome: Progressing     Problem: Nutrition Deficit:  Goal: Optimize nutritional status  Outcome: Progressing

## 2024-05-29 NOTE — PROGRESS NOTES
[facility]  MD Tico Kiran, MD Monica Oswald, CNP   Tammie Stoner, ANP  Tasha Cuenca, NOA  Progress Note    5/29/2024    SUBJECTIVE:   5/28: Sitting on the edge of the bed, asking to be walked to the bathroom. Ambulates without difficulty. She denies dyspnea and is on room air. Appetite has improved somewhat. Her blood pressure is stable.     5/29: Asleep, somnolent, but arousable.  BP stable, normal.  No peripheral swelling.  No dyspnea.  No pain.    PROBLEM LIST:    Principal Problem:    AMS (altered mental status)  Active Problems:    Hyperammonemic encephalopathy  Resolved Problems:    * No resolved hospital problems. *         DIET:    ADULT DIET; Regular; Low Sodium (2 gm); High Fiber  ADULT ORAL NUTRITION SUPPLEMENT; Lunch; Renal Oral Supplement     MEDS (scheduled):    insulin glargine  40 Units SubCUTAneous Nightly    insulin lispro  14 Units SubCUTAneous TID WC    lactulose  20 g Oral TID    midodrine  5 mg Oral TID WC    rifAXIMin  400 mg Oral TID    sodium chloride flush  5-40 mL IntraVENous 2 times per day    melatonin  3 mg Oral Nightly    [Held by provider] carvedilol  6.25 mg Oral Daily    gabapentin  300 mg Oral TID    pantoprazole  40 mg Oral QAM AC    vitamin D  2,000 Units Oral Daily    insulin lispro  0-8 Units SubCUTAneous TID WC    insulin lispro  0-4 Units SubCUTAneous Nightly       MEDS (infusions):   sodium chloride 75 mL/hr at 05/29/24 1136    sodium chloride      dextrose      dextrose         MEDS (prn):  sodium chloride flush, sodium chloride, ondansetron **OR** ondansetron, polyethylene glycol, acetaminophen **OR** acetaminophen, glucose, dextrose bolus **OR** dextrose bolus, glucagon (rDNA), dextrose, glucose, dextrose bolus **OR** dextrose bolus, glucagon (rDNA), dextrose    PHYSICAL EXAM:     Patient Vitals for the past 24 hrs:   BP Temp Temp src Pulse Resp SpO2   05/29/24 1930 (!) 127/42 98.5 °F (36.9 °C) Temporal 86

## 2024-05-29 NOTE — PROGRESS NOTES
[facility]   Gastroenterology, Hepatology, &  Advanced Endoscopy      ASSESSMENT AND PLAN:  Pt eating on the side of the bed, awake and alert today.  IV fluids discontinued.  Doing well today, but still wants to decline lactulose as ordered.  No episodes of emesis.    Family stated understanding to give 30cc of lactulose q 2 hours until diarrhea if noticed mental status changes at home.     Stressed the importance of being adherent with OP lactulose dosing. Once again, states she doesn't take lactulose as directed at home.     Goal is 3-4 BMS qd     - Pt has had multiple admissions  7/17/23, 8/2/23, 12/9/23, 1/09/24,3/14/24, 5/7/24 for AMS with hx of  cirrhosis/hepatic encephalopathy/esoph varices.    -She admits to noncompliance with her lactulose at home in the past.   -She follows with Dr Jovan VÁZQUEZ as a outpatient    - Transplant consideration referral to  as discussed during office visit with Dr. Sosa 1/24/24, pt has a appointment setup at  but not seen yet.     Ammonia 56<- 90 <-108 <- 65    Hepatic encepholopathy  PLAN:  - Monitor for UGI bleed in the setting of varices. vascular ectasia.  - continue Xifaxan 400 mg TID  - Midodrine 5 mg TID  - Protonix 40 mg qd  - lactulose  20 g TID  - coreg 6.25 hx of varices (currently on hold)  - Aldactone 50 mg   - Lasix 20 mg qd  - IV hydration  -Hgb transfuse to keep >7 , Hgb currently 8.0    -MELD 3.0  25    HISTORY OF PRESENT ILLNESS:    Lisa Hunt is a 64 y.o. female history of Alejandro cirrhosis with ascites, DM, varices s/p banding,  who presents for complaint of altered mental status.  Per daughter at bedside, patient is chronically taking lactulose at home, was found this morning to be vomiting as well as having diarrhea and urinating in the kitchen at home.  Per daughter, patient does have some confusion at baseline, but confusion is in worse than usual starting today.     4/7/24 EGD Dr. Conrad  Impression:  Grade I/II esophageal varices.  Scar in the lower third of the esophagus. Portal hypertensive gastropathy. Gastric antral vascular ectasia. Treated with argon plasma coagulation (APC). Normal examined duodenum. No specimens collected.    Past Medical History:        Diagnosis Date    DONNA (acute kidney injury) (HCC) 2023    Cirrhosis (HCC)     Diabetes mellitus (HCC)     Diabetic neuropathy (HCC)     Esophageal varices without bleeding (HCC)     GERD (gastroesophageal reflux disease)     Hypertension     Intracranial bleed (HCC) 2023    Liver cirrhosis (HCC)     with secondary esophageal varices, no signs/sx's of hepatic encephalopathy    Low vitamin D level     SDH (subdural hematoma) (HCC) 2023    Thrombocytopenia (HCC)     Type 2 diabetes mellitus without complication (HCC)      Past Surgical History:        Procedure Laterality Date    APPENDECTOMY       SECTION      CHOLECYSTECTOMY      PARACENTESIS Left 10/12/2023    4400ml hazy yellow    PARACENTESIS Left 2024    2440cc yellow clear fluid removed.    PARACENTESIS Left 2024    4250cc clear yellow fluid removed.    UPPER GASTROINTESTINAL ENDOSCOPY  2022    [facility]    UPPER GASTROINTESTINAL ENDOSCOPY  2021    [facility]    UPPER GASTROINTESTINAL ENDOSCOPY  2019    [facility]    UPPER GASTROINTESTINAL ENDOSCOPY  2021    [facility]    UPPER GASTROINTESTINAL ENDOSCOPY N/A 2024    EGD BIOPSY performed by Anil Conrad MD at [facility]    UPPER GASTROINTESTINAL ENDOSCOPY N/A 2024    EGD BAND LIGATION performed by Anil Conrad MD at [facility]    UPPER GASTROINTESTINAL ENDOSCOPY N/A 3/15/2024    ESOPHAGOGASTRODUODENOSCOPY POSSIBLE BAND LIGATION performed by Anil Conrad MD at [facility]     Social History:    TOBACCO:   reports that she has never smoked. She has been exposed to tobacco smoke. She has never used smokeless tobacco.  ETOH:   reports that she does not

## 2024-05-29 NOTE — PROGRESS NOTES
Hospitalist Progress Note      SYNOPSIS: Patient admitted on 2024 for AMS (altered mental status)  64-year-old female patient with ACOSTA cirrhosis with ascites, type 2 diabetes mellitus with long-term use of insulin, esophageal varies s/p banding, hypovitaminosis D, thrombocytopenia who presented to the ER as family was concerned given her increased confusion from her baseline.  On ER  evaluation patient is afebrile 97.8 F /60 HR 69 RR 16 SpO2 100% room air  Labs with pancytopenia, hemoglobin 8.3, platelets 37, INR 1.4, sodium 131, potassium 5.9, glucose 352, BUN 32, creatinine 1.5 and elevated LFTs.  UA no UTI  CT head without contrast with no acute intracranial abnormality.  Chest x-ray with no acute process.  CT abdomen and pelvis with IV contrast with no acute intra-abdominal or pelvic abnormality  Patient was given normal saline bolus 1 L and reevaluated for her mental status.    alert oriented x 3, mentation is improved    SUBJECTIVE:  Stable overnight. No other overnight issues reported.   Patient seen and examined at bedside today a.m.,  used to communicate with the patient  Jamilah # [id number]  Patient alert oriented x 3, states her loose stool is improved to 3/day, she does feel somewhat nauseous  Denies any abdominal pain  Records reviewed.         Temp (24hrs), Av °F (36.7 °C), Min:96.7 °F (35.9 °C), Max:99.3 °F (37.4 °C)    DIET: ADULT DIET; Regular; Low Sodium (2 gm); High Fiber  ADULT ORAL NUTRITION SUPPLEMENT; Lunch; Renal Oral Supplement  CODE: Full Code    Intake/Output Summary (Last 24 hours) at 2024 1110  Last data filed at 2024 2127  Gross per 24 hour   Intake 220 ml   Output --   Net 220 ml       Review of Systems  All bolded are positive; please see HPI  General:  Fever, chills, diaphoresis, fatigue, malaise, night sweats, weight loss  Psychological:  Anxiety, disorientation, hallucinations.  ENT:  Epistaxis, headaches, vertigo, visual  changes.  Cardiovascular:  Chest pain, irregular heartbeats, palpitations, paroxysmal nocturnal dyspnea.  Respiratory:  Shortness of breath, coughing, sputum production, hemoptysis, wheezing, orthopnea.  Gastrointestinal:  Nausea, vomiting, diarrhea, heartburn, constipation, abdominal pain, hematemesis, hematochezia, melena, acholic stools  Genito-Urinary:  Dysuria, urgency, frequency, hematuria  Musculoskeletal:  Joint pain, joint stiffness, joint swelling, muscle pain  Neurology:  Headache, focal neurological deficits, weakness, numbness, paresthesia  Derm:  Rashes, ulcers, excoriations, bruising  Extremities:  Decreased ROM, peripheral edema, mottling      OBJECTIVE:    BP (!) 118/34   Pulse 92   Temp 99.3 °F (37.4 °C) (Temporal)   Resp 18   Ht 1.6 m (5' 2.99\")   Wt 74.7 kg (164 lb 10.9 oz)   SpO2 100%   BMI 29.18 kg/m²     General appearance:  awake, alert, and oriented to person, place, time, and purpose; appears stated age and cooperative; no apparent distress no labored breathing  HEENT:  Conjunctivae/corneas clear.   Neck: Supple. No jugular venous distention.   Respiratory: symmetrical; clear to auscultation bilaterally; no wheezes; no rhonchi; no rales  Cardiovascular: rhythm regular; rate controlled; no murmurs  Abdomen: Soft, nontender, nondistended  Extremities:  peripheral pulses present; no peripheral edema; no ulcers  Musculoskeletal: No clubbing, cyanosis, no bilateral lower extremity edema. Brisk capillary refill.   Skin:  No rashes  on visible skin  Neurologic: awake, alert and following commands     ASSESSMENT and PLAN:  Acute metabolic encephalopathy secondary to: Hepatic encephalopathy.   Ammonia 108 down to 56, patient having 3 episodes of loose stool, lactulose frequency decreased to every 8 hours 30 g   Continue rifaximin 400 3 times a day  Discussed with GI     Liver cirrhosis  Hyperammonemia.  Ammonia 108 down to 56  Symptomatically patient is better  Management as per above

## 2024-05-30 LAB
AMMONIA PLAS-SCNC: 111 UMOL/L (ref 11–51)
AMMONIA PLAS-SCNC: 55 UMOL/L (ref 11–51)
ANION GAP SERPL CALCULATED.3IONS-SCNC: 11 MMOL/L (ref 7–16)
ANION GAP SERPL CALCULATED.3IONS-SCNC: 12 MMOL/L (ref 7–16)
BUN SERPL-MCNC: 19 MG/DL (ref 6–23)
BUN SERPL-MCNC: 20 MG/DL (ref 6–23)
CALCIUM SERPL-MCNC: 7.5 MG/DL (ref 8.6–10.2)
CALCIUM SERPL-MCNC: 7.6 MG/DL (ref 8.6–10.2)
CHLORIDE SERPL-SCNC: 105 MMOL/L (ref 98–107)
CHLORIDE SERPL-SCNC: 107 MMOL/L (ref 98–107)
CHLORIDE UR-SCNC: 73 MMOL/L
CO2 SERPL-SCNC: 15 MMOL/L (ref 22–29)
CO2 SERPL-SCNC: 16 MMOL/L (ref 22–29)
CREAT SERPL-MCNC: 1.1 MG/DL (ref 0.5–1)
CREAT SERPL-MCNC: 1.1 MG/DL (ref 0.5–1)
ERYTHROCYTE [DISTWIDTH] IN BLOOD BY AUTOMATED COUNT: 15.8 % (ref 11.5–15)
GFR, ESTIMATED: 57 ML/MIN/1.73M2
GFR, ESTIMATED: 59 ML/MIN/1.73M2
GLUCOSE BLD-MCNC: 289 MG/DL (ref 74–99)
GLUCOSE BLD-MCNC: 297 MG/DL (ref 74–99)
GLUCOSE BLD-MCNC: 350 MG/DL (ref 74–99)
GLUCOSE BLD-MCNC: 357 MG/DL (ref 74–99)
GLUCOSE BLD-MCNC: 359 MG/DL (ref 74–99)
GLUCOSE SERPL-MCNC: 222 MG/DL (ref 74–99)
GLUCOSE SERPL-MCNC: 318 MG/DL (ref 74–99)
HCT VFR BLD AUTO: 26 % (ref 34–48)
HGB BLD-MCNC: 7.6 G/DL (ref 11.5–15.5)
MAGNESIUM SERPL-MCNC: 1.6 MG/DL (ref 1.6–2.6)
MCH RBC QN AUTO: 28.8 PG (ref 26–35)
MCHC RBC AUTO-ENTMCNC: 29.2 G/DL (ref 32–34.5)
MCV RBC AUTO: 98.5 FL (ref 80–99.9)
PHOSPHATE SERPL-MCNC: 1.9 MG/DL (ref 2.5–4.5)
PLATELET CONFIRMATION: NORMAL
PLATELET, FLUORESCENCE: 36 K/UL (ref 130–450)
PMV BLD AUTO: ABNORMAL FL (ref 7–12)
POTASSIUM SERPL-SCNC: 5.1 MMOL/L (ref 3.5–5)
POTASSIUM SERPL-SCNC: 5.3 MMOL/L (ref 3.5–5)
RBC # BLD AUTO: 2.64 M/UL (ref 3.5–5.5)
SODIUM SERPL-SCNC: 132 MMOL/L (ref 132–146)
SODIUM SERPL-SCNC: 134 MMOL/L (ref 132–146)
SODIUM UR-SCNC: 84 MMOL/L
WBC OTHER # BLD: 2 K/UL (ref 4.5–11.5)

## 2024-05-30 PROCEDURE — 1200000000 HC SEMI PRIVATE

## 2024-05-30 PROCEDURE — 2580000003 HC RX 258

## 2024-05-30 PROCEDURE — 82962 GLUCOSE BLOOD TEST: CPT

## 2024-05-30 PROCEDURE — 6370000000 HC RX 637 (ALT 250 FOR IP): Performed by: STUDENT IN AN ORGANIZED HEALTH CARE EDUCATION/TRAINING PROGRAM

## 2024-05-30 PROCEDURE — 99232 SBSQ HOSP IP/OBS MODERATE 35: CPT | Performed by: NURSE PRACTITIONER

## 2024-05-30 PROCEDURE — 84100 ASSAY OF PHOSPHORUS: CPT

## 2024-05-30 PROCEDURE — 83735 ASSAY OF MAGNESIUM: CPT

## 2024-05-30 PROCEDURE — 6370000000 HC RX 637 (ALT 250 FOR IP): Performed by: NURSE PRACTITIONER

## 2024-05-30 PROCEDURE — 99232 SBSQ HOSP IP/OBS MODERATE 35: CPT | Performed by: STUDENT IN AN ORGANIZED HEALTH CARE EDUCATION/TRAINING PROGRAM

## 2024-05-30 PROCEDURE — 85027 COMPLETE CBC AUTOMATED: CPT

## 2024-05-30 PROCEDURE — 80048 BASIC METABOLIC PNL TOTAL CA: CPT

## 2024-05-30 PROCEDURE — 2500000003 HC RX 250 WO HCPCS

## 2024-05-30 PROCEDURE — 36415 COLL VENOUS BLD VENIPUNCTURE: CPT

## 2024-05-30 PROCEDURE — 6370000000 HC RX 637 (ALT 250 FOR IP)

## 2024-05-30 PROCEDURE — 2580000003 HC RX 258: Performed by: STUDENT IN AN ORGANIZED HEALTH CARE EDUCATION/TRAINING PROGRAM

## 2024-05-30 PROCEDURE — 82140 ASSAY OF AMMONIA: CPT

## 2024-05-30 RX ORDER — INSULIN GLARGINE 100 [IU]/ML
50 INJECTION, SOLUTION SUBCUTANEOUS NIGHTLY
Status: DISCONTINUED | OUTPATIENT
Start: 2024-05-30 | End: 2024-05-31 | Stop reason: HOSPADM

## 2024-05-30 RX ORDER — LACTULOSE 10 G/15ML
20 SOLUTION ORAL
Status: DISCONTINUED | OUTPATIENT
Start: 2024-05-30 | End: 2024-05-31 | Stop reason: HOSPADM

## 2024-05-30 RX ORDER — SODIUM BICARBONATE 650 MG/1
650 TABLET ORAL 4 TIMES DAILY
Qty: 20 TABLET | Refills: 0 | Status: SHIPPED | OUTPATIENT
Start: 2024-05-30 | End: 2024-06-04

## 2024-05-30 RX ORDER — INSULIN LISPRO 100 [IU]/ML
0-4 INJECTION, SOLUTION INTRAVENOUS; SUBCUTANEOUS NIGHTLY
Qty: 1 EACH | Refills: 3 | Status: SHIPPED | OUTPATIENT
Start: 2024-05-30

## 2024-05-30 RX ORDER — INSULIN GLARGINE 100 [IU]/ML
45 INJECTION, SOLUTION SUBCUTANEOUS NIGHTLY
Status: DISCONTINUED | OUTPATIENT
Start: 2024-05-30 | End: 2024-05-30

## 2024-05-30 RX ORDER — INSULIN LISPRO 100 [IU]/ML
15 INJECTION, SOLUTION INTRAVENOUS; SUBCUTANEOUS
Status: DISCONTINUED | OUTPATIENT
Start: 2024-05-30 | End: 2024-05-31 | Stop reason: HOSPADM

## 2024-05-30 RX ORDER — INSULIN LISPRO 100 [IU]/ML
18 INJECTION, SOLUTION INTRAVENOUS; SUBCUTANEOUS
Qty: 1 EACH | Refills: 3 | Status: SHIPPED | OUTPATIENT
Start: 2024-05-30

## 2024-05-30 RX ORDER — INSULIN GLARGINE 100 [IU]/ML
45 INJECTION, SOLUTION SUBCUTANEOUS NIGHTLY
Qty: 10 ML | Refills: 3 | Status: SHIPPED | OUTPATIENT
Start: 2024-05-30 | End: 2024-05-30

## 2024-05-30 RX ORDER — INSULIN GLARGINE 100 [IU]/ML
50 INJECTION, SOLUTION SUBCUTANEOUS NIGHTLY
Qty: 10 ML | Refills: 3 | Status: SHIPPED | OUTPATIENT
Start: 2024-05-30

## 2024-05-30 RX ORDER — INSULIN LISPRO 100 [IU]/ML
15 INJECTION, SOLUTION INTRAVENOUS; SUBCUTANEOUS
Qty: 1 EACH | Refills: 3 | Status: SHIPPED | OUTPATIENT
Start: 2024-05-30 | End: 2024-05-30

## 2024-05-30 RX ORDER — LACTULOSE 10 G/15ML
20 SOLUTION ORAL EVERY 8 HOURS
Qty: 946 ML | Refills: 5 | Status: SHIPPED | OUTPATIENT
Start: 2024-05-30

## 2024-05-30 RX ORDER — SODIUM BICARBONATE 650 MG/1
650 TABLET ORAL 4 TIMES DAILY
Status: DISCONTINUED | OUTPATIENT
Start: 2024-05-30 | End: 2024-05-31 | Stop reason: HOSPADM

## 2024-05-30 RX ORDER — CARVEDILOL 6.25 MG/1
3.12 TABLET ORAL DAILY
Qty: 90 TABLET | Refills: 1 | Status: SHIPPED | OUTPATIENT
Start: 2024-05-30

## 2024-05-30 RX ORDER — MIDODRINE HYDROCHLORIDE 2.5 MG/1
5 TABLET ORAL
Qty: 90 TABLET | Refills: 3 | Status: SHIPPED | OUTPATIENT
Start: 2024-05-30

## 2024-05-30 RX ORDER — INSULIN LISPRO 100 [IU]/ML
0-8 INJECTION, SOLUTION INTRAVENOUS; SUBCUTANEOUS
Qty: 1 EACH | Refills: 3 | Status: SHIPPED | OUTPATIENT
Start: 2024-05-30

## 2024-05-30 RX ADMIN — INSULIN LISPRO 8 UNITS: 100 INJECTION, SOLUTION INTRAVENOUS; SUBCUTANEOUS at 16:16

## 2024-05-30 RX ADMIN — LACTULOSE 20 G: 20 SOLUTION ORAL at 20:26

## 2024-05-30 RX ADMIN — GABAPENTIN 300 MG: 300 CAPSULE ORAL at 13:52

## 2024-05-30 RX ADMIN — RIFAXIMIN 400 MG: 200 TABLET ORAL at 20:36

## 2024-05-30 RX ADMIN — INSULIN GLARGINE 50 UNITS: 100 INJECTION, SOLUTION SUBCUTANEOUS at 20:26

## 2024-05-30 RX ADMIN — MIDODRINE HYDROCHLORIDE 5 MG: 10 TABLET ORAL at 07:57

## 2024-05-30 RX ADMIN — Medication 2000 UNITS: at 07:57

## 2024-05-30 RX ADMIN — GABAPENTIN 300 MG: 300 CAPSULE ORAL at 20:26

## 2024-05-30 RX ADMIN — Medication 3 MG: at 20:26

## 2024-05-30 RX ADMIN — INSULIN LISPRO 14 UNITS: 100 INJECTION, SOLUTION INTRAVENOUS; SUBCUTANEOUS at 07:56

## 2024-05-30 RX ADMIN — SODIUM CHLORIDE, PRESERVATIVE FREE 10 ML: 5 INJECTION INTRAVENOUS at 20:29

## 2024-05-30 RX ADMIN — RIFAXIMIN 400 MG: 200 TABLET ORAL at 07:58

## 2024-05-30 RX ADMIN — GABAPENTIN 300 MG: 300 CAPSULE ORAL at 07:56

## 2024-05-30 RX ADMIN — INSULIN LISPRO 15 UNITS: 100 INJECTION, SOLUTION INTRAVENOUS; SUBCUTANEOUS at 16:17

## 2024-05-30 RX ADMIN — SODIUM BICARBONATE 650 MG: 650 TABLET ORAL at 20:26

## 2024-05-30 RX ADMIN — SODIUM BICARBONATE 650 MG: 650 TABLET ORAL at 16:41

## 2024-05-30 RX ADMIN — SODIUM ZIRCONIUM CYCLOSILICATE 10 G: 10 POWDER, FOR SUSPENSION ORAL at 13:59

## 2024-05-30 RX ADMIN — LACTULOSE 20 G: 20 SOLUTION ORAL at 16:17

## 2024-05-30 RX ADMIN — INSULIN LISPRO 4 UNITS: 100 INJECTION, SOLUTION INTRAVENOUS; SUBCUTANEOUS at 11:30

## 2024-05-30 RX ADMIN — PANTOPRAZOLE SODIUM 40 MG: 40 TABLET, DELAYED RELEASE ORAL at 07:57

## 2024-05-30 RX ADMIN — LACTULOSE 20 G: 20 SOLUTION ORAL at 07:57

## 2024-05-30 RX ADMIN — SODIUM PHOSPHATE, MONOBASIC, MONOHYDRATE 20 MMOL: 276; 142 INJECTION, SOLUTION INTRAVENOUS at 10:30

## 2024-05-30 RX ADMIN — RIFAXIMIN 400 MG: 200 TABLET ORAL at 13:58

## 2024-05-30 RX ADMIN — INSULIN LISPRO 2 UNITS: 100 INJECTION, SOLUTION INTRAVENOUS; SUBCUTANEOUS at 07:59

## 2024-05-30 RX ADMIN — LACTULOSE 20 G: 20 SOLUTION ORAL at 11:28

## 2024-05-30 RX ADMIN — SODIUM BICARBONATE 650 MG: 650 TABLET ORAL at 11:28

## 2024-05-30 RX ADMIN — MIDODRINE HYDROCHLORIDE 5 MG: 10 TABLET ORAL at 11:30

## 2024-05-30 RX ADMIN — SODIUM BICARBONATE 650 MG: 650 TABLET ORAL at 10:13

## 2024-05-30 RX ADMIN — MIDODRINE HYDROCHLORIDE 5 MG: 10 TABLET ORAL at 16:41

## 2024-05-30 RX ADMIN — INSULIN LISPRO 15 UNITS: 100 INJECTION, SOLUTION INTRAVENOUS; SUBCUTANEOUS at 11:29

## 2024-05-30 RX ADMIN — SODIUM CHLORIDE, PRESERVATIVE FREE 10 ML: 5 INJECTION INTRAVENOUS at 07:58

## 2024-05-30 ASSESSMENT — PAIN SCALES - GENERAL
PAINLEVEL_OUTOF10: 0
PAINLEVEL_OUTOF10: 0

## 2024-05-30 NOTE — PROGRESS NOTES
[facility]  MD Tico Kiran MD Ali Hassan, MD Lisa Kniska, CNP   Tammie Stoner, ANP  Tasha Cuenca, NOA  Progress Note    5/30/2024    SUBJECTIVE:   5/28: Sitting on the edge of the bed, asking to be walked to the bathroom. Ambulates without difficulty. She denies dyspnea and is on room air. Appetite has improved somewhat. Her blood pressure is stable.     5/29: Asleep, somnolent, but arousable.  BP stable, normal.  No peripheral swelling.  No dyspnea.  No pain.    5/30: Sitting up in bed. No acute distress. Appetite is good. Did have a banana for breakfast. Denies any dyspnea. Asking if she can go home.     PROBLEM LIST:    Principal Problem:    AMS (altered mental status)  Active Problems:    Hyperammonemic encephalopathy  Resolved Problems:    * No resolved hospital problems. *         DIET:    ADULT ORAL NUTRITION SUPPLEMENT; Lunch; Renal Oral Supplement  ADULT DIET; Regular; Low Sodium (2 gm); Low Potassium (Less than 3000 mg/day); High Fiber     MEDS (scheduled):    insulin lispro  15 Units SubCUTAneous TID WC    sodium bicarbonate  650 mg Oral 4x Daily    sodium phosphate IVPB (PERIPHERAL line)  20 mmol IntraVENous Once    lactulose  20 g Oral 6 times per day    insulin glargine  50 Units SubCUTAneous Nightly    sodium zirconium cyclosilicate  10 g Oral Once    midodrine  5 mg Oral TID WC    rifAXIMin  400 mg Oral TID    sodium chloride flush  5-40 mL IntraVENous 2 times per day    melatonin  3 mg Oral Nightly    [Held by provider] carvedilol  6.25 mg Oral Daily    gabapentin  300 mg Oral TID    pantoprazole  40 mg Oral QAM AC    vitamin D  2,000 Units Oral Daily    insulin lispro  0-8 Units SubCUTAneous TID WC    insulin lispro  0-4 Units SubCUTAneous Nightly       MEDS (infusions):   sodium chloride      dextrose      dextrose         MEDS (prn):  sodium chloride flush, sodium chloride, ondansetron **OR** ondansetron, polyethylene glycol, acetaminophen

## 2024-05-30 NOTE — PROGRESS NOTES
Hospitalist Progress Note      SYNOPSIS: Patient admitted on 2024 for AMS (altered mental status)  64-year-old female patient with ACOSTA cirrhosis with ascites, type 2 diabetes mellitus with long-term use of insulin, esophageal varies s/p banding, hypovitaminosis D, thrombocytopenia who presented to the ER as family was concerned given her increased confusion from her baseline.  On ER  evaluation patient is afebrile 97.8 F /60 HR 69 RR 16 SpO2 100% room air  Labs with pancytopenia, hemoglobin 8.3, platelets 37, INR 1.4, sodium 131, potassium 5.9, glucose 352, BUN 32, creatinine 1.5 and elevated LFTs.  UA no UTI  CT head without contrast with no acute intracranial abnormality.  Chest x-ray with no acute process.  CT abdomen and pelvis with IV contrast with no acute intra-abdominal or pelvic abnormality  Patient was given normal saline bolus 1 L and reevaluated for her mental status.    alert oriented x 3, mentation is improved   ammonia worsened 111,     SUBJECTIVE:  Stable overnight. No other overnight issues reported.   Patient seen and examined at bedside today a.m. states 3 loose stools per day  Records reviewed.         Temp (24hrs), Av.7 °F (36.5 °C), Min:97 °F (36.1 °C), Max:98.5 °F (36.9 °C)    DIET: ADULT ORAL NUTRITION SUPPLEMENT; Lunch; Renal Oral Supplement  ADULT DIET; Regular; Low Sodium (2 gm); Low Potassium (Less than 3000 mg/day); High Fiber  CODE: Full Code    Intake/Output Summary (Last 24 hours) at 2024 1127  Last data filed at 2024 2108  Gross per 24 hour   Intake 1143.6 ml   Output --   Net 1143.6 ml       Review of Systems  All bolded are positive; please see HPI  General:  Fever, chills, diaphoresis, fatigue, malaise, night sweats, weight loss  Psychological:  Anxiety, disorientation, hallucinations.  ENT:  Epistaxis, headaches, vertigo, visual changes.  Cardiovascular:  Chest pain, irregular heartbeats, palpitations, paroxysmal nocturnal    Pancytopenia.  WBC 2.3.  Hemoglobin 7.6.  Platelets 35.  Recent admission on 3/2024 for severe anemia with hemoglobin 4.8 on admission, requiring 3 units of blood transfusion.       Uncontrolled type 2 diabetes mellitus: Increase Lantus to 50 units nightly, increase Humalog 17 unit 3 times daily with sliding scale insulin with hypoglycemia protocol     Hepatorenal syndrome/worsening DONNA on CKD stage III.  Her baseline creatinine is around 1.1-1.3.  On admission is 1.5. She received IV contrast on the ER.,  DONNA multifactorial likely secondary to decreased renal perfusion with poor oral intake, diarrhea, liver cirrhosis monitor renal function discussed with nephrology, increase patient  IV fluid normal saline at 125 cc/h.  Creatinine at her baseline    Normal anion gap metabolic acidosis  Likely secondary to diarrhea with bicarbonate 15  Add sodium bicarbonate 654 times a day    Hypophosphatemia  Started on sodium phosphate as per nephrology        DVT Prophylaxis [] Lovenox, []  Heparin, [] SCDs, [] Ambulation   GI Prophylaxis [] PPI,  [] H2 Blocker,  [] Carafate,  [] Diet/Tube Feeds   Disposition Patient requires continued admission due to worsening ammonia compared to yesterday will check BMP and ammonia at 2 PM and assess for her discharge   MDM [] Low, [] Moderate,[]  High  Patient's risk as above due to        Medications:  REVIEWED DAILY    Infusion Medications    sodium chloride 125 mL/hr at 05/29/24 1946    sodium chloride      dextrose      dextrose       Scheduled Medications    insulin glargine  45 Units SubCUTAneous Nightly    insulin lispro  15 Units SubCUTAneous TID     sodium bicarbonate  650 mg Oral 4x Daily    sodium phosphate IVPB (PERIPHERAL line)  20 mmol IntraVENous Once    lactulose  20 g Oral 6 times per day    midodrine  5 mg Oral TID WC    rifAXIMin  400 mg Oral TID    sodium chloride flush  5-40 mL IntraVENous 2 times per day    melatonin  3 mg Oral Nightly    [Held by provider]

## 2024-05-30 NOTE — PLAN OF CARE
Problem: ABCDS Injury Assessment  Goal: Absence of physical injury  Outcome: Progressing     Problem: Chronic Conditions and Co-morbidities  Goal: Patient's chronic conditions and co-morbidity symptoms are monitored and maintained or improved  Outcome: Progressing     Problem: Discharge Planning  Goal: Discharge to home or other facility with appropriate resources  Outcome: Progressing     Problem: Pain  Goal: Verbalizes/displays adequate comfort level or baseline comfort level  Outcome: Adequate for Discharge     Problem: Confusion  Goal: Confusion, delirium, dementia, or psychosis is improved or at baseline  Description: INTERVENTIONS:  1. Assess for possible contributors to thought disturbance, including medications, impaired vision or hearing, underlying metabolic abnormalities, dehydration, psychiatric diagnoses, and notify attending LIP  2. [place] high risk fall precautions, as indicated  3. Provide frequent short contacts to provide reality reorientation, refocusing and direction  4. Decrease environmental stimuli, including noise as appropriate  5. Monitor and intervene to maintain adequate nutrition, hydration, elimination, sleep and activity  6. If unable to ensure safety without constant attention obtain sitter and review sitter guidelines with assigned personnel  7. Initiate Psychosocial CNS and Spiritual Care consult, as indicated  Outcome: Progressing     Problem: Safety - Adult  Goal: Free from fall injury  Outcome: Progressing     Problem: Skin/Tissue Integrity  Goal: Absence of new skin breakdown  Description: 1.  Monitor for areas of redness and/or skin breakdown  2.  Assess vascular access sites hourly  3.  Every 4-6 hours minimum:  Change oxygen saturation probe site  4.  Every 4-6 hours:  If on nasal continuous positive airway pressure, respiratory therapy assess nares and determine need for appliance change or resting period.  Outcome: Progressing     Problem: Nutrition Deficit:  Goal:

## 2024-05-30 NOTE — PLAN OF CARE
Problem: ABCDS Injury Assessment  Goal: Absence of physical injury  5/30/2024 1119 by Annamarie Adhikari, RN  Outcome: Progressing  5/30/2024 0056 by Alber Fong RN  Outcome: Progressing     Problem: Metabolic/Fluid and Electrolytes - Adult  Goal: Electrolytes maintained within normal limits  5/30/2024 1119 by Annamarie Adhikari, RN  Outcome: Progressing  5/30/2024 0056 by Alber Fong RN  Outcome: Not Progressing

## 2024-05-30 NOTE — DISCHARGE SUMMARY
Hospital Medicine Discharge Summary    Patient ID: Lisa Hunt      Patient's PCP: Tonie Hernandez DO    Admit Date: 5/26/2024     Discharge Date:   05/30/2024    Admitting Physician: No admitting provider for patient encounter.     Discharge Physician: Josue Alves MD     Discharge Diagnoses:  Acute metabolic encephalopathy secondary to: Hepatic encephalopathy.   Liver cirrhosis  Hyperammonemia.     Active Hospital Problems    Diagnosis Date Noted    Hyperammonemic encephalopathy [T59.891A, G92.8] 05/28/2024    AMS (altered mental status) [R41.82] 05/26/2024       The patient was seen and examined on day of discharge and this discharge summary is in conjunction with any daily progress note from day of discharge.    Hospital Course:   64-year-old female patient with ACOSTA cirrhosis with ascites, type 2 diabetes mellitus with long-term use of insulin, esophageal varies s/p banding, hypovitaminosis D, thrombocytopenia who presented to the ER as family was concerned given her increased confusion from her baseline.    On ER  evaluation patient was afebrile 97.8 F /60 HR 69 RR 16 SpO2 100% room air  Labs with pancytopenia, hemoglobin 8.3, platelets 37, INR 1.4, sodium 131, potassium 5.9, glucose 352, BUN 32, creatinine 1.5 and elevated LFTs.  UA no UTI    CT head without contrast with no acute intracranial abnormality.  Chest x-ray with no acute process.  CT abdomen and pelvis with IV contrast with no acute intra-abdominal or pelvic abnormality    Patient was given normal saline bolus 1 L Ammonia was sent which was 113.     05/28 alert oriented x 3, mentation improved ipatient was started on Lactulose q4h with aim of 2-3 loose stool/day,    GI and nephrology was consulted, cr worsened to 1.5, suspected hepatorenal syndrome.    Patients mentation and ammonia improved with lactulose and rifaximin.  As per GI  \"Pt has had multiple admissions  7/17/23, 8/2/23, 12/9/23, 1/09/24,3/14/24, 5/7/24 for AMS  injection vial Inject 18 Units into the skin 3 times daily (with meals)  Qty: 1 each, Refills: 3       !! - Potential duplicate medications found. Please discuss with provider.             Details   carvedilol (COREG) 6.25 MG tablet Take 0.5 tablets by mouth daily  Qty: 90 tablet, Refills: 1    Associated Diagnoses: Essential hypertension      lactulose (CHRONULAC) 10 GM/15ML solution Take 30 mLs by mouth every 8 (eight) hours Titrate to goal of 2-3 bowel movement per day  Qty: 946 mL, Refills: 5    Associated Diagnoses: Hyperammonemia (HCC)      midodrine (PROAMATINE) 2.5 MG tablet Take 2 tablets by mouth 3 times daily (with meals)  Qty: 90 tablet, Refills: 3                Details   vitamin D (CHOLECALCIFEROL) 50 MCG (2000 UT) TABS tablet Take 1 tablet by mouth daily  Qty: 90 tablet, Refills: 1    Associated Diagnoses: Vitamin D deficiency      furosemide (LASIX) 20 MG tablet Take 1 tablet by mouth daily  Qty: 60 tablet, Refills: 3    Associated Diagnoses: Essential hypertension; Other cirrhosis of liver (HCC); Swelling of lower extremity      omeprazole (PRILOSEC) 20 MG delayed release capsule Take 1 capsule by mouth daily  Qty: 90 capsule, Refills: 1    Associated Diagnoses: GERD without esophagitis      ondansetron (ZOFRAN) 4 MG tablet Take 1 tablet by mouth daily as needed for Nausea or Vomiting  Qty: 90 tablet, Refills: 1    Associated Diagnoses: Nausea      gabapentin (NEURONTIN) 600 MG tablet Take 1 tablet by mouth 3 times daily for 90 days.  Qty: 90 tablet, Refills: 2    Associated Diagnoses: Type 2 diabetes mellitus without complication, with long-term current use of insulin (HCC)             Time Spent on discharge is 35mins in the examination, evaluation, counseling and review of medications and discharge plan.      Signed:    Josue Alves MD   5/30/2024

## 2024-05-30 NOTE — PROGRESS NOTES
, IntraVENous, Continuous PRN, Milanes Marino, Maria, MD     Allergies:  Fish allergy    ROS:  Aside from what was mentioned in the past medical history and history of present illness, essentially unremarkable, all others are negative.      Recent Labs     05/28/24  0418   ALT 25   AST 37*   ALKPHOS 142*   BILITOT 0.8       Lab Results   Component Value Date    WBC 2.0 (L) 05/30/2024    HGB 7.6 (L) 05/30/2024    HCT 26.0 (L) 05/30/2024    PLT 36 (L) 05/28/2024     05/30/2024    K 5.3 (H) 05/30/2024     05/30/2024    CREATININE 1.1 (H) 05/30/2024    BUN 20 05/30/2024    CO2 15 (L) 05/30/2024    FOLATE 15.5 07/18/2023    [id number] 731 11/29/2023    AMMONIA 111 (H) 05/30/2024    GLUCOSE 222 (H) 05/30/2024    INR 1.4 05/26/2024    APTT 28.4 05/04/2024    TSH 6.32 (H) 01/09/2024    LABA1C 7.9 (H) 03/15/2024         PHYSICAL EXAM:  /62   Pulse 84   Temp 97.6 °F (36.4 °C) (Temporal)   Resp 18   Ht 1.6 m (5' 2.99\")   Wt 74.7 kg (164 lb 10.9 oz)   SpO2 100%   BMI 29.18 kg/m²   Physical Exam:  General: Overall well-appearing, NAD  HEENT: PERRLA, EOMI, Anicteric sclera, MMM, no rhinorrhea  Cards: RRR, no LE edema  Resp: Breathing comfortably on room air, good air movement, no use of accessory muscles, no audible wheezing  Abdomen: soft, mild distention  Extremities: Moves all extremities, no effusions or bruising.  Skin: No rashes or jaundice  Neuro: A&O x 3, CN grossly intact, non-focal exam     Greater than or equal to 35 minutes was spent providing face-to-face patient care, including:  and coordinating care, reviewing the chart, labs, and diagnostics, as well as medical decision making. Greater than 50% of this time was spent instructing and counseling the patient face to face regarding findings and recommendations.    Thank you for including us in the care of this patient. Please do not hesitate to contact us with any additional questions or concerns.      Discussed with   Anamaria.  Electronically signed by OK Pat CNP on 5/30/2024 at 1:48 PM

## 2024-05-30 NOTE — PROGRESS NOTES
Lisa will discharge tomorrow per Dr Alves. d/t [facility] and Lisa unable to get her 7 discharging meds.   CHEST PAIN

## 2024-05-30 NOTE — PROGRESS NOTES
Hold Discharge for now d/t high ammonia level. BMP and Ammonia level will be checked again at 1400 Discharge will be decided after labs result.

## 2024-05-31 VITALS
WEIGHT: 164.68 LBS | RESPIRATION RATE: 18 BRPM | TEMPERATURE: 98.3 F | BODY MASS INDEX: 29.18 KG/M2 | HEART RATE: 98 BPM | SYSTOLIC BLOOD PRESSURE: 110 MMHG | DIASTOLIC BLOOD PRESSURE: 54 MMHG | HEIGHT: 63 IN | OXYGEN SATURATION: 100 %

## 2024-05-31 LAB
ANION GAP SERPL CALCULATED.3IONS-SCNC: 11 MMOL/L (ref 7–16)
BUN SERPL-MCNC: 18 MG/DL (ref 6–23)
CALCIUM SERPL-MCNC: 8 MG/DL (ref 8.6–10.2)
CHLORIDE SERPL-SCNC: 107 MMOL/L (ref 98–107)
CO2 SERPL-SCNC: 17 MMOL/L (ref 22–29)
CREAT SERPL-MCNC: 1.1 MG/DL (ref 0.5–1)
GFR, ESTIMATED: 57 ML/MIN/1.73M2
GLUCOSE BLD-MCNC: 225 MG/DL (ref 74–99)
GLUCOSE BLD-MCNC: 315 MG/DL (ref 74–99)
GLUCOSE SERPL-MCNC: 234 MG/DL (ref 74–99)
POTASSIUM SERPL-SCNC: 4.8 MMOL/L (ref 3.5–5)
SODIUM SERPL-SCNC: 135 MMOL/L (ref 132–146)

## 2024-05-31 PROCEDURE — 99231 SBSQ HOSP IP/OBS SF/LOW 25: CPT | Performed by: NURSE PRACTITIONER

## 2024-05-31 PROCEDURE — 6370000000 HC RX 637 (ALT 250 FOR IP)

## 2024-05-31 PROCEDURE — 2580000003 HC RX 258: Performed by: STUDENT IN AN ORGANIZED HEALTH CARE EDUCATION/TRAINING PROGRAM

## 2024-05-31 PROCEDURE — 82962 GLUCOSE BLOOD TEST: CPT

## 2024-05-31 PROCEDURE — 6370000000 HC RX 637 (ALT 250 FOR IP): Performed by: STUDENT IN AN ORGANIZED HEALTH CARE EDUCATION/TRAINING PROGRAM

## 2024-05-31 PROCEDURE — 80048 BASIC METABOLIC PNL TOTAL CA: CPT

## 2024-05-31 PROCEDURE — 36415 COLL VENOUS BLD VENIPUNCTURE: CPT

## 2024-05-31 PROCEDURE — 99239 HOSP IP/OBS DSCHRG MGMT >30: CPT | Performed by: STUDENT IN AN ORGANIZED HEALTH CARE EDUCATION/TRAINING PROGRAM

## 2024-05-31 RX ORDER — FERROUS SULFATE 325(65) MG
325 TABLET ORAL
Qty: 180 TABLET | Refills: 1 | Status: SHIPPED | OUTPATIENT
Start: 2024-05-31

## 2024-05-31 RX ORDER — FERROUS SULFATE 325(65) MG
325 TABLET ORAL
Status: DISCONTINUED | OUTPATIENT
Start: 2024-05-31 | End: 2024-05-31 | Stop reason: HOSPADM

## 2024-05-31 RX ORDER — FERROUS SULFATE 325(65) MG
325 TABLET ORAL 2 TIMES DAILY
Qty: 180 TABLET | Refills: 1 | Status: SHIPPED | OUTPATIENT
Start: 2024-05-31 | End: 2024-05-31

## 2024-05-31 RX ORDER — FERROUS SULFATE 325(65) MG
325 TABLET ORAL 2 TIMES DAILY WITH MEALS
Status: DISCONTINUED | OUTPATIENT
Start: 2024-05-31 | End: 2024-05-31

## 2024-05-31 RX ADMIN — LACTULOSE 20 G: 20 SOLUTION ORAL at 04:55

## 2024-05-31 RX ADMIN — SODIUM CHLORIDE, PRESERVATIVE FREE 10 ML: 5 INJECTION INTRAVENOUS at 07:50

## 2024-05-31 RX ADMIN — MIDODRINE HYDROCHLORIDE 5 MG: 10 TABLET ORAL at 09:35

## 2024-05-31 RX ADMIN — LACTULOSE 20 G: 20 SOLUTION ORAL at 15:41

## 2024-05-31 RX ADMIN — Medication 2000 UNITS: at 07:49

## 2024-05-31 RX ADMIN — SODIUM BICARBONATE 650 MG: 650 TABLET ORAL at 07:49

## 2024-05-31 RX ADMIN — GABAPENTIN 300 MG: 300 CAPSULE ORAL at 15:41

## 2024-05-31 RX ADMIN — INSULIN LISPRO 15 UNITS: 100 INJECTION, SOLUTION INTRAVENOUS; SUBCUTANEOUS at 11:06

## 2024-05-31 RX ADMIN — GABAPENTIN 300 MG: 300 CAPSULE ORAL at 07:49

## 2024-05-31 RX ADMIN — INSULIN LISPRO 8 UNITS: 100 INJECTION, SOLUTION INTRAVENOUS; SUBCUTANEOUS at 11:06

## 2024-05-31 RX ADMIN — LACTULOSE 20 G: 20 SOLUTION ORAL at 07:54

## 2024-05-31 RX ADMIN — INSULIN LISPRO 15 UNITS: 100 INJECTION, SOLUTION INTRAVENOUS; SUBCUTANEOUS at 07:55

## 2024-05-31 RX ADMIN — MIDODRINE HYDROCHLORIDE 5 MG: 10 TABLET ORAL at 11:07

## 2024-05-31 RX ADMIN — RIFAXIMIN 400 MG: 200 TABLET ORAL at 07:51

## 2024-05-31 RX ADMIN — PANTOPRAZOLE SODIUM 40 MG: 40 TABLET, DELAYED RELEASE ORAL at 06:44

## 2024-05-31 RX ADMIN — LACTULOSE 20 G: 20 SOLUTION ORAL at 07:49

## 2024-05-31 RX ADMIN — RIFAXIMIN 400 MG: 200 TABLET ORAL at 15:41

## 2024-05-31 RX ADMIN — INSULIN LISPRO 2 UNITS: 100 INJECTION, SOLUTION INTRAVENOUS; SUBCUTANEOUS at 07:55

## 2024-05-31 RX ADMIN — FERROUS SULFATE TAB 325 MG (65 MG ELEMENTAL FE) 325 MG: 325 (65 FE) TAB at 15:41

## 2024-05-31 RX ADMIN — LACTULOSE 20 G: 20 SOLUTION ORAL at 00:12

## 2024-05-31 RX ADMIN — LACTULOSE 20 G: 20 SOLUTION ORAL at 11:07

## 2024-05-31 RX ADMIN — SODIUM BICARBONATE 650 MG: 650 TABLET ORAL at 11:07

## 2024-05-31 NOTE — DISCHARGE SUMMARY
Hospital Medicine Discharge Summary    Patient ID: Lisa Hunt      Patient's PCP: Tonie Hernandez DO    Admit Date: 5/26/2024     Discharge Date:   05/31/2024    Admitting Physician: No admitting provider for patient encounter.     Discharge Physician: Josue Alves MD     Discharge Diagnoses:  Acute metabolic encephalopathy secondary to: Hepatic encephalopathy.   Liver cirrhosis  Hyperammonemia.     Active Hospital Problems    Diagnosis Date Noted    Hyperammonemic encephalopathy [T59.891A, G92.8] 05/28/2024    AMS (altered mental status) [R41.82] 05/26/2024       The patient was seen and examined on day of discharge and this discharge summary is in conjunction with any daily progress note from day of discharge.    Hospital Course:   64-year-old female patient with ACOSTA cirrhosis with ascites, type 2 diabetes mellitus with long-term use of insulin, esophageal varies s/p banding, hypovitaminosis D, thrombocytopenia who presented to the ER as family was concerned given her increased confusion from her baseline.     On ER  evaluation patient was afebrile 97.8 F /60 HR 69 RR 16 SpO2 100% room air  Labs with pancytopenia, hemoglobin 8.3, platelets 37, INR 1.4, sodium 131, potassium 5.9, glucose 352, BUN 32, creatinine 1.5 and elevated LFTs.  UA no UTI     CT head without contrast with no acute intracranial abnormality.  Chest x-ray with no acute process.  CT abdomen and pelvis with IV contrast with no acute intra-abdominal or pelvic abnormality     Patient was given normal saline bolus 1 L Ammonia was sent which was 113.     05/28 alert oriented x 3, mentation improved ipatient was started on Lactulose q4h with aim of 2-3 loose stool/day,     GI and nephrology was consulted, cr worsened to 1.5, suspected hepatorenal syndrome.     Patients mentation and ammonia improved with lactulose and rifaximin.  As per GI  \"Pt has had multiple admissions  7/17/23, 8/2/23, 12/9/23, 1/09/24,3/14/24, 5/7/24 for  glargine (LANTUS) 100 UNIT/ML injection vial Inject 50 Units into the skin nightly  Qty: 10 mL, Refills: 3      !! insulin lispro (HUMALOG,ADMELOG) 100 UNIT/ML SOLN injection vial Inject 18 Units into the skin 3 times daily (with meals)  Qty: 1 each, Refills: 3       !! - Potential duplicate medications found. Please discuss with provider.             Details   carvedilol (COREG) 6.25 MG tablet Take 0.5 tablets by mouth daily  Qty: 90 tablet, Refills: 1    Associated Diagnoses: Essential hypertension      lactulose (CHRONULAC) 10 GM/15ML solution Take 30 mLs by mouth every 8 (eight) hours Titrate to goal of 2-3 bowel movement per day  Qty: 946 mL, Refills: 5    Associated Diagnoses: Hyperammonemia (HCC)      midodrine (PROAMATINE) 2.5 MG tablet Take 2 tablets by mouth 3 times daily (with meals)  Qty: 90 tablet, Refills: 3                Details   vitamin D (CHOLECALCIFEROL) 50 MCG (2000 UT) TABS tablet Take 1 tablet by mouth daily  Qty: 90 tablet, Refills: 1    Associated Diagnoses: Vitamin D deficiency      furosemide (LASIX) 20 MG tablet Take 1 tablet by mouth daily  Qty: 60 tablet, Refills: 3    Associated Diagnoses: Essential hypertension; Other cirrhosis of liver (HCC); Swelling of lower extremity      omeprazole (PRILOSEC) 20 MG delayed release capsule Take 1 capsule by mouth daily  Qty: 90 capsule, Refills: 1    Associated Diagnoses: GERD without esophagitis      ondansetron (ZOFRAN) 4 MG tablet Take 1 tablet by mouth daily as needed for Nausea or Vomiting  Qty: 90 tablet, Refills: 1    Associated Diagnoses: Nausea      gabapentin (NEURONTIN) 600 MG tablet Take 1 tablet by mouth 3 times daily for 90 days.  Qty: 90 tablet, Refills: 2    Associated Diagnoses: Type 2 diabetes mellitus without complication, with long-term current use of insulin (HCC)             Time Spent on discharge is 40mins in the examination, evaluation, counseling and review of medications and discharge plan.      Signed:    Josue

## 2024-05-31 NOTE — PROGRESS NOTES
[facility]   Gastroenterology, Hepatology, &  Advanced Endoscopy      ASSESSMENT AND PLAN:  Pt eating on the side of the bed, awake and alert today.  IV fluids discontinued.  Doing well today, but still wants to decline lactulose as ordered.  No episodes of emesis.    -OK for Discharge from GI POV.    Family stated understanding to give 30cc of lactulose q 2 hours until diarrhea if noticed mental status changes at home.     Stressed the importance of being adherent with OP lactulose dosing. Once again, states she doesn't take lactulose as directed at home.     Goal is 3-4 BMS qd     - Pt has had multiple admissions  7/17/23, 8/2/23, 12/9/23, 1/09/24,3/14/24, 5/7/24 for AMS with hx of  cirrhosis/hepatic encephalopathy/esoph varices.    -She admits to noncompliance with her lactulose at home in the past.   -She follows with Dr Jovan VÁZQUEZ as a outpatient    - Transplant consideration referral to  as discussed during office visit with Dr. Sosa 1/24/24, pt has a appointment setup at  but not seen yet.     Ammonia 56<- 90 <-108 <- 65  Pt  AxOx3    Spoke with daughter today, discussed currently work up.  States she lives with her daughter in [place], and they give lactulose TID. In the past this has been a issue as patient refuses usually.    Hepatic encephalopathy/ALEJANDRO cirrhosis.  PLAN:  - Monitor for UGI bleed in the setting of varices. vascular ectasia.  - continue Xifaxan 400 mg TID  - Midodrine 5 mg TID  - Protonix 40 mg qd  - lactulose  20 g TID  - coreg 6.25 hx of varices (currently on hold)  - Aldactone 50 mg   - Lasix 20 mg qd  - IV hydration  -Hgb transfuse to keep >7 , Hgb currently 7.6  - No sign of gross bleed noted.  -K+ elevated at 5.3, K correction  - Follow up with Dr. Conrad as OP  - Continue Lactulose q 4 hours.      -MELD 3.0  25    HISTORY OF PRESENT ILLNESS:    Lisa Hunt is a 64 y.o. female history of Alejandro cirrhosis with ascites, DM, varices s/p banding,  who presents for

## 2024-05-31 NOTE — PROGRESS NOTES
[facility]  MD Tico Kiran MD Ali Hassan, MD Lisa Kniska, CNP   Tammie Stoner, ALFREDO Cuenca, NOA  Progress Note    5/31/2024    SUBJECTIVE:   5/28: Sitting on the edge of the bed, asking to be walked to the bathroom. Ambulates without difficulty. She denies dyspnea and is on room air. Appetite has improved somewhat. Her blood pressure is stable.     5/29: Asleep, somnolent, but arousable.  BP stable, normal.  No peripheral swelling.  No dyspnea.  No pain.    5/30: Sitting up in bed. No acute distress. Appetite is good. Did have a banana for breakfast. Denies any dyspnea. Asking if she can go home.     5/31: Laying in bed. No acute distress. Complains of some abdominal distension. Otherwise, no acute complaints. She is on room air, denies dyspnea. Her appetite is good. Blood pressure is stable.     PROBLEM LIST:    Principal Problem:    AMS (altered mental status)  Active Problems:    Hyperammonemic encephalopathy  Resolved Problems:    * No resolved hospital problems. *         DIET:    ADULT ORAL NUTRITION SUPPLEMENT; Lunch; Renal Oral Supplement  ADULT DIET; Regular; Low Sodium (2 gm); Low Potassium (Less than 3000 mg/day); High Fiber     MEDS (scheduled):    insulin lispro  15 Units SubCUTAneous TID WC    sodium bicarbonate  650 mg Oral 4x Daily    lactulose  20 g Oral 6 times per day    insulin glargine  50 Units SubCUTAneous Nightly    midodrine  5 mg Oral TID WC    rifAXIMin  400 mg Oral TID    sodium chloride flush  5-40 mL IntraVENous 2 times per day    melatonin  3 mg Oral Nightly    [Held by provider] carvedilol  6.25 mg Oral Daily    gabapentin  300 mg Oral TID    pantoprazole  40 mg Oral QAM AC    vitamin D  2,000 Units Oral Daily    insulin lispro  0-8 Units SubCUTAneous TID WC    insulin lispro  0-4 Units SubCUTAneous Nightly       MEDS (infusions):   sodium chloride      dextrose      dextrose         MEDS (prn):  sodium chloride flush, sodium  exacerbating the prerenal azotemia.  Chronic kidney disease, stage IIIb, baseline creatinine 1.2 to 1.4 mg/dL.  Etiology presumptively diabetic nephropathy and renal microvascular atherosclerotic disease in setting of longstanding diabetes, history of hypertension, though hepatorenal syndrome type II may be playing a role  As regards hepatorenal syndrome: In the absence of urinary indices, definitive diagnosis not yet made.  In order to diagnose by renal symptoms she would need to remain sodium chloride added after 24 hours of normal saline drip, as well as albumin infusions, total 1 g over 24 hours, and be normotensive.  Hyperglycemia, exacerbating the prerenal azotemia due to osmotic diuresis.  Note corrected sodium is 140 mmol/L     Azotemia improving with IV fluid resuscitation  No proteinuria   Repeat urine indices are still not consistent with hepatorenal syndrome.  Minimal proteinuria.  Rather concentrated urine  Cr 1.1   K 4.8   Phos 1.9   Hgb 7.6   Ferritin 48, iron 44, sat 17 %     Recommendations  Ferrous sulfate 325 mg daily at discharge   Agree- sodium bicarbonate   Low potassium diet   Encourage PO intake   Ok to discharge from renal standpoint   BMP in one week, please fax results to Dr. Gee's office  Follow up in the office in 3-4 weeks        Electronically signed by OK Ashraf CNP on 5/31/2024 at 10:30 AM

## 2024-05-31 NOTE — CARE COORDINATION
Care Coordination   The patient was admitted with altered mental status brought in by daughter after she vomited blood/ labs this am  ammonia is 56, k- 5.3, b/c 26/1.4 and bood sugar was 289. Per the physician plan to continue her ivf for 1 more day and if her labs are better she can be discharged home possibly tomorrow.   
Care Coordination  The patients lab work is worse this am her Nh4 level doubled from 56 to 111, wbc is 2.0 and h/h is 7.6/26.0. Her plan is to return home with her daughter once her labs are more stable.   
Care Coordination  The patients plan is to return home once her labs have improved. Her plan is to return home with her daughter.  
  Other Identified Issues/Barriers to RETURNING to current housing: non-compliance  Potential Assistance needed at discharge: N/A            Potential DME:    Patient expects to discharge to: House  Plan for transportation at discharge:      Financial    Payor: [facility] MEDICARE / Plan: [employer] DUAL [employer]VANTAGE / Product Type: *No Product type* /     Does insurance require precert for SNF: Yes    Potential assistance Purchasing Medications: No  Meds-to-Beds request: Yes      [facility] [employer] [id number] [place], [place] - [address] - P [phone] - F [phone]  [facility] [address]  [facility] [address]  Phone: [phone] Fax: [phone]      Notes:    Factors facilitating achievement of predicted outcomes: Family support and Cooperative    Barriers to discharge: Confusion and Cognitive deficit    Additional Case Management Notes: see above    The Plan for Transition of Care is related to the following treatment goals of Hyperammonemia (HCC) [E72.20]  Disorientation [R41.0]  AMS (altered mental status) [R41.82]    IF APPLICABLE: The Patient and/or patient representative Lisa and her family were provided with a choice of provider and agrees with the discharge plan. Freedom of choice list with basic dialogue that supports the patient's individualized plan of care/goals and shares the quality data associated with the providers was provided to:     Patient Representative Name:       The Patient and/or Patient Representative Agree with the Discharge Plan?      Marlena Subramanian RN  Case Management Department  Ph: [phone]

## 2024-05-31 NOTE — PROGRESS NOTES
CLINICAL PHARMACY NOTE: MEDS TO BEDS    Total # of Prescriptions Filled: 7   The following medications were delivered to the patient:  Lantus Solostar  Pen needles  Lantus  Humalog  Midodrine 2.5  Enulose 10/15  Sodium Bicarbonate 1  Coreg 6.25    Additional Documentation:   To ANNETTA De Luna

## 2024-05-31 NOTE — PLAN OF CARE
Problem: ABCDS Injury Assessment  Goal: Absence of physical injury  5/31/2024 1109 by Annamarie Adhikari RN  Outcome: Adequate for Discharge  5/30/2024 2329 by Ignacia Mays RN  Outcome: Progressing     Problem: Chronic Conditions and Co-morbidities  Goal: Patient's chronic conditions and co-morbidity symptoms are monitored and maintained or improved  5/31/2024 1109 by Annamarie Adhikari RN  Outcome: Adequate for Discharge  5/30/2024 2329 by Ignacia Mays RN  Outcome: Progressing     Problem: Discharge Planning  Goal: Discharge to home or other facility with appropriate resources  5/31/2024 1109 by Annamarie Adhikari RN  Outcome: Adequate for Discharge  5/30/2024 2329 by Ignacia Mays RN  Outcome: Progressing     Problem: Pain  Goal: Verbalizes/displays adequate comfort level or baseline comfort level  5/31/2024 1109 by Annamarie Adhikari RN  Outcome: Adequate for Discharge  5/30/2024 2329 by Ignacia Mays RN  Outcome: Progressing     Problem: Confusion  Goal: Confusion, delirium, dementia, or psychosis is improved or at baseline  Description: INTERVENTIONS:  1. Assess for possible contributors to thought disturbance, including medications, impaired vision or hearing, underlying metabolic abnormalities, dehydration, psychiatric diagnoses, and notify attending LIP  2. [place] high risk fall precautions, as indicated  3. Provide frequent short contacts to provide reality reorientation, refocusing and direction  4. Decrease environmental stimuli, including noise as appropriate  5. Monitor and intervene to maintain adequate nutrition, hydration, elimination, sleep and activity  6. If unable to ensure safety without constant attention obtain sitter and review sitter guidelines with assigned personnel  7. Initiate Psychosocial CNS and Spiritual Care consult, as indicated  Outcome: Adequate for Discharge     Problem: Safety - Adult  Goal: Free from fall injury  5/31/2024 1109 by Onofre

## 2024-05-31 NOTE — PROGRESS NOTES
CLINICAL PHARMACY NOTE: MEDS TO BEDS    Total # of Prescriptions Filled: 1   The following medications were delivered to the patient:  Ferosul 325    Additional Documentation:delivered to corrina De Luna

## 2024-05-31 NOTE — PROGRESS NOTES
Pt daughter at bedside. Reviewed discharge paperwork and ensured all medications were with her at discharge. Daughter is going to walk with patient down states does not need a wheelchair.

## 2024-06-03 ENCOUNTER — CARE COORDINATION (OUTPATIENT)
Dept: CARE COORDINATION | Age: 64
End: 2024-06-03

## 2024-06-03 NOTE — CARE COORDINATION
Care Transitions Note    Initial Call - Call within 2 business days of discharge: Yes     Patient Current Location:  Home: [address] [place] [id number]    -Care Transition Nurse spoke with the spouse/partner, Tulio  (PHI form verified; on file) by telephone via use of  to perform post hospital discharge assessment.   Provided introduction to self, and explanation of the Care Transition Nurse role  -Tulio states patient is feeling a little better but he has no further information to offer as he is currently in [place] while patient is in [place].  Tulio states he plans to see patient later and is agreeable to CTN contacting him at a later date.  Tulio stated his number is the correct number to call as patient will not answer her phone.  Tulio speaks English and does not require a .  -CTN will route to PCP front office pool under high priority need for TCM/hospital follow up appointment.       Patient: Lisa Hunt    Patient : 1960   MRN: [id number]    Reason for Admission: AMS  Discharge Date: 24  RURS: Readmission Risk Score: 30.5      Last Discharge Facility       Date Complaint Diagnosis Description Type Department Provider    24 Altered Mental Status Hyperammonemic encephalopathy ... ED to Hosp-Admission (Discharged) (ADMITTED) [facility] Josue Alves MD; Mirian Morrissey ...            Future Appointments         Provider Specialty Dept Phone    2024 2:00 PM Javier Alexis APRN - CNP Hepatobiliary Surgery [phone]    2024 10:00 AM Carolyne Roman APRN - CNP Gastroenterology [phone]    2024 2:30 PM Sidra Olvera APRN - CNP Endocrinology [phone]    2025 11:00 AM Tonie Hernandez DO Family Medicine [phone]                Monse Garrido RN

## 2024-06-04 ENCOUNTER — CARE COORDINATION (OUTPATIENT)
Dept: CARE COORDINATION | Age: 64
End: 2024-06-04

## 2024-06-04 NOTE — CARE COORDINATION
Care Transitions Note    Initial Call - Call within 2 business days of discharge: Yes     Attempted to reach patient's boyfriend Tulio to complete transitions of care follow up. Unable to reach.    Outreach Attempts:   HIPAA compliant voicemail left for spouse/partner , (PHI form verified; on file).     Patient: Lisa Hunt    Patient : 1960   MRN: [id number]    Reason for Admission: AMS  Discharge Date: 24  RURS: Readmission Risk Score: 30.5    Last Discharge Facility       Date Complaint Diagnosis Description Type Department Provider    24 Altered Mental Status Hyperammonemic encephalopathy ... ED to Hosp-Admission (Discharged) (ADMITTED) [facility] Josue Alves MD; Mirian Morrissey ...            Was this an external facility discharge? No    Follow Up Appointment:   Patient does not have a follow up appointment scheduled at time of call.  CTN routed yesterday to PCP front office pool under high priority need for TCM/hospital follow up appointment.   Future Appointments         Provider Specialty Dept Phone    2024 2:00 PM Javier Alexis, [place] - CNP Hepatobiliary Surgery [phone]    2024 10:00 AM Carolyne Roman APRN - CNP Gastroenterology [phone]    2024 2:30 PM Sidra Olvera APRN - CNP Endocrinology [phone]    2025 11:00 AM Tonie Hernandez DO Family Medicine [phone]            No further follow-up call indicated, attempt x 2 to complete initial care transition call.    Monse Garrido RN

## 2024-06-07 ENCOUNTER — TELEPHONE (OUTPATIENT)
Dept: FAMILY MEDICINE CLINIC | Age: 64
End: 2024-06-07

## 2024-06-07 NOTE — TELEPHONE ENCOUNTER
Pt needs appt for TCM and to fill out FMLA paperwork. Pt was discharged on 05/31/2024. Please advise where to place. Please call gisselle with appt [phone]

## 2024-06-10 PROBLEM — K31.819 GAVE (GASTRIC ANTRAL VASCULAR ECTASIA): Status: ACTIVE | Noted: 2024-06-10

## 2024-06-12 ENCOUNTER — OFFICE VISIT (OUTPATIENT)
Dept: HEMATOLOGY | Age: 64
End: 2024-06-12
Payer: MEDICARE

## 2024-06-12 VITALS
SYSTOLIC BLOOD PRESSURE: 124 MMHG | TEMPERATURE: 97.7 F | HEART RATE: 98 BPM | RESPIRATION RATE: 18 BRPM | DIASTOLIC BLOOD PRESSURE: 34 MMHG | HEIGHT: 62 IN | OXYGEN SATURATION: 100 % | BODY MASS INDEX: 32 KG/M2 | WEIGHT: 173.9 LBS

## 2024-06-12 DIAGNOSIS — K76.6 PORTAL HYPERTENSION (HCC): ICD-10-CM

## 2024-06-12 DIAGNOSIS — K76.82 HEPATIC ENCEPHALOPATHY (HCC): ICD-10-CM

## 2024-06-12 DIAGNOSIS — R16.0 LIVER MASS: ICD-10-CM

## 2024-06-12 DIAGNOSIS — K74.60 CIRRHOSIS OF LIVER WITH ASCITES, UNSPECIFIED HEPATIC CIRRHOSIS TYPE (HCC): Primary | ICD-10-CM

## 2024-06-12 DIAGNOSIS — R18.8 CIRRHOSIS OF LIVER WITH ASCITES, UNSPECIFIED HEPATIC CIRRHOSIS TYPE (HCC): Primary | ICD-10-CM

## 2024-06-12 LAB
ALBUMIN: 3.2 G/DL (ref 3.5–5.2)
ALP BLD-CCNC: 142 U/L (ref 35–104)
ALT SERPL-CCNC: 16 U/L (ref 0–32)
ANION GAP SERPL CALCULATED.3IONS-SCNC: 10 MMOL/L (ref 7–16)
AST SERPL-CCNC: 29 U/L (ref 0–31)
BASOPHILS ABSOLUTE: 0.02 K/UL (ref 0–0.2)
BASOPHILS RELATIVE PERCENT: 1 % (ref 0–2)
BILIRUB SERPL-MCNC: 1 MG/DL (ref 0–1.2)
BILIRUBIN DIRECT: 0.3 MG/DL (ref 0–0.3)
BILIRUBIN, INDIRECT: 0.7 MG/DL (ref 0–1)
BUN BLDV-MCNC: 29 MG/DL (ref 6–23)
CALCIUM SERPL-MCNC: 8.6 MG/DL (ref 8.6–10.2)
CHLORIDE BLD-SCNC: 102 MMOL/L (ref 98–107)
CO2: 24 MMOL/L (ref 22–29)
CREAT SERPL-MCNC: 1.7 MG/DL (ref 0.5–1)
EOSINOPHILS ABSOLUTE: 0.08 K/UL (ref 0.05–0.5)
EOSINOPHILS RELATIVE PERCENT: 4 % (ref 0–6)
GFR, ESTIMATED: 34 ML/MIN/1.73M2
GLUCOSE BLD-MCNC: 297 MG/DL (ref 74–99)
HCT VFR BLD CALC: 24.3 % (ref 34–48)
HEMOGLOBIN: 7.7 G/DL (ref 11.5–15.5)
INR BLD: 1.4
LYMPHOCYTES ABSOLUTE: 0.5 K/UL (ref 1.5–4)
LYMPHOCYTES RELATIVE PERCENT: 23 % (ref 20–42)
MCH RBC QN AUTO: 30.1 PG (ref 26–35)
MCHC RBC AUTO-ENTMCNC: 31.7 G/DL (ref 32–34.5)
MCV RBC AUTO: 94.9 FL (ref 80–99.9)
MONOCYTES ABSOLUTE: 0.08 K/UL (ref 0.1–0.95)
MONOCYTES RELATIVE PERCENT: 4 % (ref 2–12)
NEUTROPHILS ABSOLUTE: 1.53 K/UL (ref 1.8–7.3)
NEUTROPHILS RELATIVE PERCENT: 70 % (ref 43–80)
NUCLEATED RED BLOOD CELLS: 2 PER 100 WBC
PDW BLD-RTO: 16 % (ref 11.5–15)
PLATELET # BLD: 45 K/UL (ref 130–450)
PLATELET CONFIRMATION: NORMAL
PMV BLD AUTO: 12.9 FL (ref 7–12)
POTASSIUM SERPL-SCNC: 4.5 MMOL/L (ref 3.5–5)
PROTHROMBIN TIME: 15.4 SEC (ref 9.3–12.4)
RBC # BLD: 2.56 M/UL (ref 3.5–5.5)
RBC # BLD: ABNORMAL 10*6/UL
SODIUM BLD-SCNC: 136 MMOL/L (ref 132–146)
TOTAL PROTEIN: 7.9 G/DL (ref 6.4–8.3)
WBC # BLD: 2.2 K/UL (ref 4.5–11.5)

## 2024-06-12 PROCEDURE — 36415 COLL VENOUS BLD VENIPUNCTURE: CPT | Performed by: CLINICAL NURSE SPECIALIST

## 2024-06-12 PROCEDURE — 3074F SYST BP LT 130 MM HG: CPT | Performed by: CLINICAL NURSE SPECIALIST

## 2024-06-12 PROCEDURE — 99213 OFFICE O/P EST LOW 20 MIN: CPT | Performed by: CLINICAL NURSE SPECIALIST

## 2024-06-12 PROCEDURE — 3078F DIAST BP <80 MM HG: CPT | Performed by: CLINICAL NURSE SPECIALIST

## 2024-06-12 NOTE — PATIENT INSTRUCTIONS
Staff members for Dr Patrick, Dr Sosa, and Javier Alexis NP can be reached directly at [phone]

## 2024-06-12 NOTE — PROGRESS NOTES
Hepatobiliary and Pancreatic Surgery Progress Note    Patient's Name/Date of Birth: Lisa Hunt /1960 (64 y.o.)    Date: June 12, 2024     CC:Cirrhosis    HPI:  Ms. Hunt is a 62 yo English speaking F with long hx of ACOSTA cirrhosis for the last 12 yrs, DONNA, DM, GERD, HTN, who presents as new patient referral. She has seen GI in the past and had prior endoscopies with Dr. Arteaga. She recently was hospitalized early January due to decompensation requiring EGD wit Dr. Conrad and banding of esophageal varices and IR paracentesis. She has had a total of 4 paracenteses and three were within the last year. She denies hx of bloody stools or dark tarry stools. She does take rifaximin and lactulose and notices she gets confused when she does not have a bowel movement for a while. Her ammonia levels have also been persistently elevated. She has been told by someone in the past that she should be put on a transplant list. She denies any alcohol use or smoking. Her liver disease is from ACOSTA. She takes her spironolactone and lasix as prescribed. She has not had a heart cath. Last EKG showed RBB which is new.     6/12/2024 Interpretor Vasquez Giles via hospital interpretor program utilized for entire visit. Patient was recently hospitalized 5/26/2024 for hepatic encephalopathy, liver cirrhosis, altered mental status.  She was seen by Dr. Conrad and is currently taking lactulose 3 times daily.  She was discharged 5/31/2024.  Per patient and daughter patient has been sleeping all the time despite taking the lactulose and having 3 large bouts of watery diarrhea per day.  Last ammonia level on 5/30/2024 was 55.  Per pt and daughter, pt has some mild confusion off and on. Per patient's daughter she is following routinely with Dr. Conrad.  Patient reports frustration stating despite coming to her office visit she has been hospitalized several times, it was discussed with her that her cirrhosis has been decompensated which

## 2024-06-14 ENCOUNTER — TELEPHONE (OUTPATIENT)
Dept: HEMATOLOGY | Age: 64
End: 2024-06-14

## 2024-06-14 LAB — AFP: 2 UG/L

## 2024-06-14 NOTE — TELEPHONE ENCOUNTER
CT Abdomen approved through [facility]. Auth# [id number]. Valid 6/13/24 to 9/10/24. Pt is scheduled for [facility] 6/26/24. Arrive at 230 for a 3pm scan. NPO 3 hours prior. Called and LVM for Lisa to return call to clinic.

## 2024-06-18 ENCOUNTER — OFFICE VISIT (OUTPATIENT)
Dept: FAMILY MEDICINE CLINIC | Age: 64
End: 2024-06-18
Payer: MEDICARE

## 2024-06-18 VITALS
HEART RATE: 95 BPM | SYSTOLIC BLOOD PRESSURE: 126 MMHG | RESPIRATION RATE: 18 BRPM | DIASTOLIC BLOOD PRESSURE: 60 MMHG | WEIGHT: 174.8 LBS | BODY MASS INDEX: 31.97 KG/M2 | TEMPERATURE: 98.2 F | OXYGEN SATURATION: 99 %

## 2024-06-18 DIAGNOSIS — I10 ESSENTIAL HYPERTENSION: ICD-10-CM

## 2024-06-18 DIAGNOSIS — Z79.4 TYPE 2 DIABETES MELLITUS WITHOUT COMPLICATION, WITH LONG-TERM CURRENT USE OF INSULIN (HCC): ICD-10-CM

## 2024-06-18 DIAGNOSIS — K21.9 GERD WITHOUT ESOPHAGITIS: ICD-10-CM

## 2024-06-18 DIAGNOSIS — Z09 HOSPITAL DISCHARGE FOLLOW-UP: Primary | ICD-10-CM

## 2024-06-18 DIAGNOSIS — K74.69 OTHER CIRRHOSIS OF LIVER (HCC): ICD-10-CM

## 2024-06-18 DIAGNOSIS — R11.0 NAUSEA: ICD-10-CM

## 2024-06-18 DIAGNOSIS — E11.9 TYPE 2 DIABETES MELLITUS WITHOUT COMPLICATION, WITH LONG-TERM CURRENT USE OF INSULIN (HCC): ICD-10-CM

## 2024-06-18 DIAGNOSIS — E72.20 HYPERAMMONEMIA (HCC): ICD-10-CM

## 2024-06-18 DIAGNOSIS — E55.9 VITAMIN D DEFICIENCY: ICD-10-CM

## 2024-06-18 DIAGNOSIS — M79.89 SWELLING OF LOWER EXTREMITY: ICD-10-CM

## 2024-06-18 LAB — HBA1C MFR BLD: 9.2 %

## 2024-06-18 PROCEDURE — 99214 OFFICE O/P EST MOD 30 MIN: CPT | Performed by: STUDENT IN AN ORGANIZED HEALTH CARE EDUCATION/TRAINING PROGRAM

## 2024-06-18 PROCEDURE — 3074F SYST BP LT 130 MM HG: CPT | Performed by: STUDENT IN AN ORGANIZED HEALTH CARE EDUCATION/TRAINING PROGRAM

## 2024-06-18 PROCEDURE — 3046F HEMOGLOBIN A1C LEVEL >9.0%: CPT | Performed by: STUDENT IN AN ORGANIZED HEALTH CARE EDUCATION/TRAINING PROGRAM

## 2024-06-18 PROCEDURE — 83036 HEMOGLOBIN GLYCOSYLATED A1C: CPT | Performed by: STUDENT IN AN ORGANIZED HEALTH CARE EDUCATION/TRAINING PROGRAM

## 2024-06-18 PROCEDURE — 3078F DIAST BP <80 MM HG: CPT | Performed by: STUDENT IN AN ORGANIZED HEALTH CARE EDUCATION/TRAINING PROGRAM

## 2024-06-18 PROCEDURE — 1111F DSCHRG MED/CURRENT MED MERGE: CPT | Performed by: STUDENT IN AN ORGANIZED HEALTH CARE EDUCATION/TRAINING PROGRAM

## 2024-06-18 RX ORDER — GABAPENTIN 600 MG/1
600 TABLET ORAL 3 TIMES DAILY
Qty: 90 TABLET | Refills: 2 | Status: SHIPPED | OUTPATIENT
Start: 2024-06-18 | End: 2024-09-16

## 2024-06-18 RX ORDER — OMEPRAZOLE 20 MG/1
20 CAPSULE, DELAYED RELEASE ORAL DAILY
Qty: 90 CAPSULE | Refills: 1 | Status: SHIPPED | OUTPATIENT
Start: 2024-06-18

## 2024-06-18 RX ORDER — FUROSEMIDE 20 MG/1
20 TABLET ORAL DAILY
Qty: 60 TABLET | Refills: 3 | Status: SHIPPED | OUTPATIENT
Start: 2024-06-18

## 2024-06-18 RX ORDER — CHOLECALCIFEROL (VITAMIN D3) 50 MCG
2000 TABLET ORAL DAILY
Qty: 90 TABLET | Refills: 1 | Status: SHIPPED | OUTPATIENT
Start: 2024-06-18

## 2024-06-18 RX ORDER — LACTULOSE 10 G/15ML
20 SOLUTION ORAL EVERY 8 HOURS
Qty: 946 ML | Refills: 5 | Status: SHIPPED | OUTPATIENT
Start: 2024-06-18

## 2024-06-18 RX ORDER — ONDANSETRON 4 MG/1
4 TABLET, FILM COATED ORAL DAILY PRN
Qty: 90 TABLET | Refills: 1 | Status: SHIPPED | OUTPATIENT
Start: 2024-06-18

## 2024-06-18 NOTE — PROGRESS NOTES
Post-Discharge Transitional Care  Follow Up      Lisa Hunt   YOB: 1960    Date of Office Visit:  6/18/2024  Date of Hospital Admission: 5/26/24  Date of Hospital Discharge: 5/31/24  Risk of hospital readmission (high >=14%. Medium >=10%) :Readmission Risk Score: 30.5      Care management risk score Rising risk (score 2-5) and Complex Care (Scores >=6): No Risk Score On File     Non face to face  following discharge, date last encounter closed (first attempt may have been earlier): 06/04/2024    Call initiated 2 business days of discharge: Yes    ASSESSMENT/PLAN:   Hospital discharge follow-up  -     FL DISCHARGE MEDS RECONCILED W/ CURRENT OUTPATIENT MED LIST  Hyperammonemia (HCC)  -     lactulose (CHRONULAC) 10 GM/15ML solution; Take 30 mLs by mouth every 8 (eight) hours Titrate to goal of 2-3 bowel movement per day, Disp-946 mL, R-5Normal  Essential hypertension  -     furosemide (LASIX) 20 MG tablet; Take 1 tablet by mouth daily, Disp-60 tablet, R-3Normal  Other cirrhosis of liver (HCC)  -     furosemide (LASIX) 20 MG tablet; Take 1 tablet by mouth daily, Disp-60 tablet, R-3Normal  Swelling of lower extremity  -     furosemide (LASIX) 20 MG tablet; Take 1 tablet by mouth daily, Disp-60 tablet, R-3Normal  Type 2 diabetes mellitus without complication, with long-term current use of insulin (HCC)  -     gabapentin (NEURONTIN) 600 MG tablet; Take 1 tablet by mouth 3 times daily for 90 days., Disp-90 tablet, R-2Normal  -     POCT glycosylated hemoglobin (Hb A1C)  GERD without esophagitis  -     omeprazole (PRILOSEC) 20 MG delayed release capsule; Take 1 capsule by mouth daily, Disp-90 capsule, R-1Normal  Nausea  -     ondansetron (ZOFRAN) 4 MG tablet; Take 1 tablet by mouth daily as needed for Nausea or Vomiting, Disp-90 tablet, R-1Normal  Vitamin D deficiency  -     vitamin D (CHOLECALCIFEROL) 50 MCG (2000 UT) TABS tablet; Take 1 tablet by mouth daily, Disp-90 tablet, R-1Normal      Medical

## 2024-06-21 ENCOUNTER — TELEPHONE (OUTPATIENT)
Dept: HEMATOLOGY | Age: 64
End: 2024-06-21

## 2024-06-21 NOTE — TELEPHONE ENCOUNTER
Patient is ok for her CT abdomen and pelvis with IV contrast with a GFR of 34 per Dr. Sosa.      Electronically signed by Guerline Chao RN on 6/21/2024 at 8:04 AM

## 2024-06-26 ENCOUNTER — OFFICE VISIT (OUTPATIENT)
Dept: ENDOCRINOLOGY | Age: 64
End: 2024-06-26
Payer: MEDICARE

## 2024-06-26 VITALS
RESPIRATION RATE: 18 BRPM | WEIGHT: 179.2 LBS | HEART RATE: 61 BPM | DIASTOLIC BLOOD PRESSURE: 76 MMHG | HEIGHT: 63 IN | SYSTOLIC BLOOD PRESSURE: 131 MMHG | BODY MASS INDEX: 31.75 KG/M2 | OXYGEN SATURATION: 84 %

## 2024-06-26 DIAGNOSIS — E55.9 VITAMIN D DEFICIENCY: ICD-10-CM

## 2024-06-26 DIAGNOSIS — E11.65 TYPE 2 DIABETES MELLITUS WITH HYPERGLYCEMIA, WITH LONG-TERM CURRENT USE OF INSULIN (HCC): Primary | ICD-10-CM

## 2024-06-26 DIAGNOSIS — Z79.4 TYPE 2 DIABETES MELLITUS WITH HYPERGLYCEMIA, WITH LONG-TERM CURRENT USE OF INSULIN (HCC): Primary | ICD-10-CM

## 2024-06-26 DIAGNOSIS — R80.9 TYPE 2 DIABETES MELLITUS WITH DIABETIC MICROALBUMINURIA, WITH LONG-TERM CURRENT USE OF INSULIN (HCC): ICD-10-CM

## 2024-06-26 DIAGNOSIS — Z79.4 TYPE 2 DIABETES MELLITUS WITH DIABETIC MICROALBUMINURIA, WITH LONG-TERM CURRENT USE OF INSULIN (HCC): ICD-10-CM

## 2024-06-26 DIAGNOSIS — Z79.4 TYPE 2 DIABETES MELLITUS WITH DIABETIC POLYNEUROPATHY, WITH LONG-TERM CURRENT USE OF INSULIN (HCC): ICD-10-CM

## 2024-06-26 DIAGNOSIS — E66.9 OBESITY (BMI 30-39.9): ICD-10-CM

## 2024-06-26 DIAGNOSIS — K74.60 LIVER CIRRHOSIS SECONDARY TO NASH (HCC): ICD-10-CM

## 2024-06-26 DIAGNOSIS — E72.20 HYPERAMMONEMIA (HCC): ICD-10-CM

## 2024-06-26 DIAGNOSIS — K75.81 LIVER CIRRHOSIS SECONDARY TO NASH (HCC): ICD-10-CM

## 2024-06-26 DIAGNOSIS — E11.29 TYPE 2 DIABETES MELLITUS WITH DIABETIC MICROALBUMINURIA, WITH LONG-TERM CURRENT USE OF INSULIN (HCC): ICD-10-CM

## 2024-06-26 DIAGNOSIS — E11.42 TYPE 2 DIABETES MELLITUS WITH DIABETIC POLYNEUROPATHY, WITH LONG-TERM CURRENT USE OF INSULIN (HCC): ICD-10-CM

## 2024-06-26 PROCEDURE — 99214 OFFICE O/P EST MOD 30 MIN: CPT | Performed by: FAMILY MEDICINE

## 2024-06-26 PROCEDURE — 3046F HEMOGLOBIN A1C LEVEL >9.0%: CPT | Performed by: FAMILY MEDICINE

## 2024-06-26 PROCEDURE — 3078F DIAST BP <80 MM HG: CPT | Performed by: FAMILY MEDICINE

## 2024-06-26 PROCEDURE — 3075F SYST BP GE 130 - 139MM HG: CPT | Performed by: FAMILY MEDICINE

## 2024-06-26 NOTE — PATIENT INSTRUCTIONS
Lantus 50 units nightly      Humalog 18 units 3 times a day with meals plus sliding scale    Get labs done as soon as possible to get insulin pump approved.

## 2024-06-26 NOTE — PROGRESS NOTES
[facility] PHYSICIANS [employer] [facility] Department of Endocrinology Diabetes and Metabolism   [address]  [place] [address]  Phone: [phone]  Fax: [phone]    Date of Service: 6/26/2024    Primary Care Physician: Tonie Hernandez DO  Referring physician: No ref. provider found  Provider: OK Gilman CNP     Reason for the visit:  Hospital follow-up, type 2 diabetes    History of Present Illness:  The history is provided by the patient.  Ecuadorean  was used.  : thaddeus Odellator #[id number]. Accuracy of the patient data is fair, but daughter joint visit and was able to provide a more accurate history  Lisa Hunt is a very pleasant 64 y.o. female seen today for diabetes management     Admitted to hospital 5/26/2024 through 5/24/2024 for metabolic encephalopathy secondary to liver cirrhosis and hyperammonemia.  History of Alejandro cirrhosis with ascites.    Lisa Hunt was diagnosed with diabetes at age   and currently on Lantus 50 units nightly and Humalog 18 units 3 times a day with meals plus sliding scale      States she is taking lantus 30 units TID and Humalog 50 units in am and 50 units in PM      The patient has been checking blood sugar via amaury, but unfortunately did not bring reader with her to the office.    States blood sugars are >300  Most recent A1c results summarized below  Lab Results   Component Value Date/Time    LABA1C 9.2 06/18/2024 01:54 PM    LABA1C 7.9 03/15/2024 05:59 AM    LABA1C 9.2 11/29/2023 08:36 AM       Patient has had no hypoglycemic episodes  The patient hasn't been mindful of what has been eating and wasn't following diabetes diet    I reviewed current medications and the patient has no issues with diabetes medications  The patient is due for an eye exam.  The patient performs  own feet care  Microvascular complications:  No Retinopathy, Nephropathy or Neuropathy   Macrovascular complications: no CAD, PVD, or

## 2024-07-01 ENCOUNTER — HOSPITAL ENCOUNTER (OUTPATIENT)
Age: 64
Discharge: HOME | End: 2024-07-01
Payer: MEDICARE

## 2024-07-01 DIAGNOSIS — Z79.4 TYPE 2 DIABETES MELLITUS WITH HYPERGLYCEMIA, WITH LONG-TERM CURRENT USE OF INSULIN (HCC): ICD-10-CM

## 2024-07-01 DIAGNOSIS — E11.65 TYPE 2 DIABETES MELLITUS WITH HYPERGLYCEMIA, WITH LONG-TERM CURRENT USE OF INSULIN (HCC): ICD-10-CM

## 2024-07-01 PROCEDURE — 80048 BASIC METABOLIC PNL TOTAL CA: CPT

## 2024-07-01 PROCEDURE — 36415 COLL VENOUS BLD VENIPUNCTURE: CPT

## 2024-07-01 PROCEDURE — 84681 ASSAY OF C-PEPTIDE: CPT

## 2024-07-02 ENCOUNTER — TELEPHONE (OUTPATIENT)
Dept: ENDOCRINOLOGY | Age: 64
End: 2024-07-02

## 2024-07-02 DIAGNOSIS — Z79.4 TYPE 2 DIABETES MELLITUS WITH HYPERGLYCEMIA, WITH LONG-TERM CURRENT USE OF INSULIN (HCC): Primary | ICD-10-CM

## 2024-07-02 DIAGNOSIS — E11.65 TYPE 2 DIABETES MELLITUS WITH HYPERGLYCEMIA, WITH LONG-TERM CURRENT USE OF INSULIN (HCC): Primary | ICD-10-CM

## 2024-07-02 LAB — C PEPTIDE SERPL-MCNC: 2.4 NG/ML (ref 1.1–4.4)

## 2024-07-02 NOTE — TELEPHONE ENCOUNTER
I spoke to richi at the main lab at [facility] and she is going to look for the lab specimen to get bmp added will call me back to let me know if she found it.

## 2024-07-02 NOTE — TELEPHONE ENCOUNTER
I am not sure what happened, but when she had her C-peptide drawn for her pump, they did not draw the BMP and instead canceled the order.    [employer] needs both lab results at the same time in order to process the order.    I see she has a CAT scan scheduled for Monday.  Please ask her if she can stop by the lab when she comes for her CAT scan to get the labs redrawn.  I will order them today.    Patient requires a

## 2024-07-02 NOTE — TELEPHONE ENCOUNTER
Spoke to richi again at the lab could not find the tube they needed so they will call her to be redrawn on Monday when she comes in for ct scan

## 2024-07-08 ENCOUNTER — HOSPITAL ENCOUNTER (OUTPATIENT)
Age: 64
Discharge: HOME | End: 2024-07-08
Payer: MEDICARE

## 2024-07-08 ENCOUNTER — HOSPITAL ENCOUNTER (OUTPATIENT)
Dept: CT IMAGING | Age: 64
Discharge: HOME | End: 2024-07-10
Payer: MEDICARE

## 2024-07-08 DIAGNOSIS — K76.6 PORTAL HYPERTENSION (HCC): ICD-10-CM

## 2024-07-08 DIAGNOSIS — R16.0 LIVER MASS: ICD-10-CM

## 2024-07-08 DIAGNOSIS — E11.65 TYPE 2 DIABETES MELLITUS WITH HYPERGLYCEMIA, WITH LONG-TERM CURRENT USE OF INSULIN (HCC): ICD-10-CM

## 2024-07-08 DIAGNOSIS — K74.60 CIRRHOSIS OF LIVER WITH ASCITES, UNSPECIFIED HEPATIC CIRRHOSIS TYPE (HCC): ICD-10-CM

## 2024-07-08 DIAGNOSIS — Z79.4 TYPE 2 DIABETES MELLITUS WITH HYPERGLYCEMIA, WITH LONG-TERM CURRENT USE OF INSULIN (HCC): ICD-10-CM

## 2024-07-08 DIAGNOSIS — R18.8 CIRRHOSIS OF LIVER WITH ASCITES, UNSPECIFIED HEPATIC CIRRHOSIS TYPE (HCC): ICD-10-CM

## 2024-07-08 PROBLEM — N17.9 AKI (ACUTE KIDNEY INJURY) (HCC): Status: ACTIVE | Noted: 2024-07-08

## 2024-07-08 LAB
ANION GAP SERPL CALCULATED.3IONS-SCNC: 13 MMOL/L (ref 7–16)
BUN SERPL-MCNC: 21 MG/DL (ref 6–23)
CALCIUM SERPL-MCNC: 8.6 MG/DL (ref 8.6–10.2)
CHLORIDE SERPL-SCNC: 101 MMOL/L (ref 98–107)
CO2 SERPL-SCNC: 21 MMOL/L (ref 22–29)
CREAT SERPL-MCNC: 1.2 MG/DL (ref 0.5–1)
GFR, ESTIMATED: 52 ML/MIN/1.73M2
GLUCOSE SERPL-MCNC: 272 MG/DL (ref 74–99)
POTASSIUM SERPL-SCNC: 4.5 MMOL/L (ref 3.5–5)
SODIUM SERPL-SCNC: 135 MMOL/L (ref 132–146)

## 2024-07-08 PROCEDURE — 80048 BASIC METABOLIC PNL TOTAL CA: CPT

## 2024-07-08 PROCEDURE — 36415 COLL VENOUS BLD VENIPUNCTURE: CPT

## 2024-07-08 PROCEDURE — 6360000004 HC RX CONTRAST MEDICATION: Performed by: RADIOLOGY

## 2024-07-08 PROCEDURE — 84681 ASSAY OF C-PEPTIDE: CPT

## 2024-07-08 PROCEDURE — 74178 CT ABD&PLV WO CNTR FLWD CNTR: CPT

## 2024-07-08 RX ORDER — SODIUM CHLORIDE 0.9 % (FLUSH) 0.9 %
10 SYRINGE (ML) INJECTION
Status: ACTIVE | OUTPATIENT
Start: 2024-07-08 | End: 2024-07-09

## 2024-07-08 RX ADMIN — IOPAMIDOL 75 ML: 755 INJECTION, SOLUTION INTRAVENOUS at 14:14

## 2024-07-09 LAB — C PEPTIDE SERPL-MCNC: 2.5 NG/ML (ref 1.1–4.4)

## 2024-07-09 NOTE — TELEPHONE ENCOUNTER
Incoming message from Dr. Patrick that patient need to follow up in office with Dr. Sosa as soon as possible regarding recent CT scan. Called and spoke with Tulio. He stated he would have Lisa return call to clinic. Number provided.

## 2024-07-10 ENCOUNTER — TELEPHONE (OUTPATIENT)
Dept: HEMATOLOGY | Age: 64
End: 2024-07-10

## 2024-07-10 ENCOUNTER — OFFICE VISIT (OUTPATIENT)
Dept: HEMATOLOGY | Age: 64
End: 2024-07-10
Payer: MEDICARE

## 2024-07-10 VITALS
DIASTOLIC BLOOD PRESSURE: 54 MMHG | OXYGEN SATURATION: 99 % | WEIGHT: 179 LBS | HEIGHT: 63 IN | TEMPERATURE: 98.4 F | HEART RATE: 93 BPM | SYSTOLIC BLOOD PRESSURE: 154 MMHG | BODY MASS INDEX: 31.71 KG/M2

## 2024-07-10 DIAGNOSIS — K76.6 PORTAL HYPERTENSION (HCC): ICD-10-CM

## 2024-07-10 DIAGNOSIS — C22.0 HEPATOCELLULAR CARCINOMA (HCC): Primary | ICD-10-CM

## 2024-07-10 DIAGNOSIS — K75.81 NASH (NONALCOHOLIC STEATOHEPATITIS): ICD-10-CM

## 2024-07-10 DIAGNOSIS — E11.69 TYPE 2 DIABETES MELLITUS WITH OTHER SPECIFIED COMPLICATION, WITHOUT LONG-TERM CURRENT USE OF INSULIN (HCC): ICD-10-CM

## 2024-07-10 DIAGNOSIS — R18.8 CIRRHOSIS OF LIVER WITH ASCITES, UNSPECIFIED HEPATIC CIRRHOSIS TYPE (HCC): ICD-10-CM

## 2024-07-10 DIAGNOSIS — K74.60 CIRRHOSIS OF LIVER WITH ASCITES, UNSPECIFIED HEPATIC CIRRHOSIS TYPE (HCC): ICD-10-CM

## 2024-07-10 DIAGNOSIS — I85.10 SECONDARY ESOPHAGEAL VARICES WITHOUT BLEEDING (HCC): ICD-10-CM

## 2024-07-10 PROCEDURE — 99215 OFFICE O/P EST HI 40 MIN: CPT | Performed by: STUDENT IN AN ORGANIZED HEALTH CARE EDUCATION/TRAINING PROGRAM

## 2024-07-10 PROCEDURE — 3077F SYST BP >= 140 MM HG: CPT | Performed by: STUDENT IN AN ORGANIZED HEALTH CARE EDUCATION/TRAINING PROGRAM

## 2024-07-10 PROCEDURE — 3046F HEMOGLOBIN A1C LEVEL >9.0%: CPT | Performed by: STUDENT IN AN ORGANIZED HEALTH CARE EDUCATION/TRAINING PROGRAM

## 2024-07-10 PROCEDURE — 3078F DIAST BP <80 MM HG: CPT | Performed by: STUDENT IN AN ORGANIZED HEALTH CARE EDUCATION/TRAINING PROGRAM

## 2024-07-10 RX ORDER — RIFAXIMIN 550 MG/1
550 TABLET ORAL 2 TIMES DAILY
COMMUNITY
Start: 2024-06-30

## 2024-07-11 DIAGNOSIS — C22.0 HEPATOCELLULAR CARCINOMA (HCC): ICD-10-CM

## 2024-07-11 DIAGNOSIS — K76.82 HEPATIC ENCEPHALOPATHY (HCC): ICD-10-CM

## 2024-07-11 DIAGNOSIS — R16.0 LIVER MASS: Primary | ICD-10-CM

## 2024-07-12 ENCOUNTER — HOSPITAL ENCOUNTER (EMERGENCY)
Age: 64
Discharge: HOME | End: 2024-07-13
Attending: EMERGENCY MEDICINE
Payer: MEDICARE

## 2024-07-12 ENCOUNTER — HOSPITAL ENCOUNTER (OUTPATIENT)
Dept: INTERVENTIONAL RADIOLOGY/VASCULAR | Age: 64
End: 2024-07-12
Payer: MEDICARE

## 2024-07-12 ENCOUNTER — APPOINTMENT (OUTPATIENT)
Dept: GENERAL RADIOLOGY | Age: 64
End: 2024-07-12
Payer: MEDICARE

## 2024-07-12 VITALS
RESPIRATION RATE: 16 BRPM | SYSTOLIC BLOOD PRESSURE: 124 MMHG | DIASTOLIC BLOOD PRESSURE: 56 MMHG | HEART RATE: 79 BPM | TEMPERATURE: 98.1 F | OXYGEN SATURATION: 100 %

## 2024-07-12 DIAGNOSIS — K75.81 LIVER CIRRHOSIS SECONDARY TO NASH (HCC): ICD-10-CM

## 2024-07-12 DIAGNOSIS — K72.10 END STAGE LIVER DISEASE (HCC): ICD-10-CM

## 2024-07-12 DIAGNOSIS — K74.60 LIVER CIRRHOSIS SECONDARY TO NASH (HCC): ICD-10-CM

## 2024-07-12 DIAGNOSIS — Z51.5 HOSPICE CARE PATIENT: ICD-10-CM

## 2024-07-12 DIAGNOSIS — D64.9 ACUTE ON CHRONIC ANEMIA: Primary | ICD-10-CM

## 2024-07-12 DIAGNOSIS — C22.0 HEPATOCELLULAR CARCINOMA (HCC): ICD-10-CM

## 2024-07-12 LAB
ALBUMIN FLD-MCNC: 0.8 G/DL
ALBUMIN SERPL-MCNC: 3.1 G/DL (ref 3.5–5.2)
ALBUMIN SERPL-MCNC: 3.1 G/DL (ref 3.5–5.2)
ALP SERPL-CCNC: 121 U/L (ref 35–104)
ALP SERPL-CCNC: 132 U/L (ref 35–104)
ALT SERPL-CCNC: 13 U/L (ref 0–32)
ALT SERPL-CCNC: 15 U/L (ref 0–32)
AMMONIA PLAS-SCNC: 42 UMOL/L (ref 11–51)
AMYLASE FLD-CCNC: 10 U/L
ANION GAP SERPL CALCULATED.3IONS-SCNC: 10 MMOL/L (ref 7–16)
ANION GAP SERPL CALCULATED.3IONS-SCNC: 8 MMOL/L (ref 7–16)
APPEARANCE FLD: NORMAL
AST SERPL-CCNC: 23 U/L (ref 0–31)
AST SERPL-CCNC: 42 U/L (ref 0–31)
B-OH-BUTYR SERPL-MCNC: 0.15 MMOL/L (ref 0.02–0.27)
BASOPHILS # BLD: 0 K/UL (ref 0–0.2)
BASOPHILS # BLD: 0.01 K/UL (ref 0–0.2)
BASOPHILS NFR BLD: 0 % (ref 0–2)
BASOPHILS NFR BLD: 1 % (ref 0–2)
BILIRUB DIRECT SERPL-MCNC: 0.3 MG/DL (ref 0–0.3)
BILIRUB INDIRECT SERPL-MCNC: 0.4 MG/DL (ref 0–1)
BILIRUB SERPL-MCNC: 0.7 MG/DL (ref 0–1.2)
BILIRUB SERPL-MCNC: 0.8 MG/DL (ref 0–1.2)
BODY FLD TYPE: NORMAL
BUN SERPL-MCNC: 18 MG/DL (ref 6–23)
BUN SERPL-MCNC: 19 MG/DL (ref 6–23)
CALCIUM SERPL-MCNC: 8.5 MG/DL (ref 8.6–10.2)
CALCIUM SERPL-MCNC: 8.8 MG/DL (ref 8.6–10.2)
CHLORIDE SERPL-SCNC: 97 MMOL/L (ref 98–107)
CHLORIDE SERPL-SCNC: 98 MMOL/L (ref 98–107)
CLOT CHECK: NORMAL
CO2 SERPL-SCNC: 23 MMOL/L (ref 22–29)
CO2 SERPL-SCNC: 25 MMOL/L (ref 22–29)
COLOR FLD: YELLOW
CREAT SERPL-MCNC: 1.1 MG/DL (ref 0.5–1)
CREAT SERPL-MCNC: 1.1 MG/DL (ref 0.5–1)
EKG ATRIAL RATE: 74 BPM
EKG P AXIS: 63 DEGREES
EKG P-R INTERVAL: 182 MS
EKG Q-T INTERVAL: 436 MS
EKG QRS DURATION: 138 MS
EKG QTC CALCULATION (BAZETT): 483 MS
EKG R AXIS: -51 DEGREES
EKG T AXIS: 15 DEGREES
EKG VENTRICULAR RATE: 74 BPM
EOSINOPHIL # BLD: 0.04 K/UL (ref 0.05–0.5)
EOSINOPHIL # BLD: 0.05 K/UL (ref 0.05–0.5)
EOSINOPHILS RELATIVE PERCENT: 3 % (ref 0–6)
EOSINOPHILS RELATIVE PERCENT: 4 % (ref 0–6)
ERYTHROCYTE [DISTWIDTH] IN BLOOD BY AUTOMATED COUNT: 14.1 % (ref 11.5–15)
ERYTHROCYTE [DISTWIDTH] IN BLOOD BY AUTOMATED COUNT: 14.3 % (ref 11.5–15)
GFR, ESTIMATED: 55 ML/MIN/1.73M2
GFR, ESTIMATED: 56 ML/MIN/1.73M2
GLUCOSE BLD-MCNC: 248 MG/DL (ref 74–99)
GLUCOSE BLD-MCNC: 401 MG/DL (ref 74–99)
GLUCOSE BLD-MCNC: 483 MG/DL (ref 74–99)
GLUCOSE FLD-MCNC: 617 MG/DL
GLUCOSE SERPL-MCNC: 490 MG/DL (ref 74–99)
GLUCOSE SERPL-MCNC: 537 MG/DL (ref 74–99)
HCT VFR BLD AUTO: 21.1 % (ref 34–48)
HCT VFR BLD AUTO: 21.4 % (ref 34–48)
HGB BLD-MCNC: 6.5 G/DL (ref 11.5–15.5)
HGB BLD-MCNC: 6.7 G/DL (ref 11.5–15.5)
IMM GRANULOCYTES # BLD AUTO: <0.03 K/UL (ref 0–0.58)
IMM GRANULOCYTES # BLD AUTO: <0.03 K/UL (ref 0–0.58)
IMM GRANULOCYTES NFR BLD: 0 % (ref 0–5)
IMM GRANULOCYTES NFR BLD: 1 % (ref 0–5)
INR PPP: 1.4
LACTATE BLDV-SCNC: 2.6 MMOL/L (ref 0.5–2.2)
LDH FLD L TO P-CCNC: 59 U/L
LYMPHOCYTES NFR BLD: 0.33 K/UL (ref 1.5–4)
LYMPHOCYTES NFR BLD: 0.39 K/UL (ref 1.5–4)
LYMPHOCYTES RELATIVE PERCENT: 25 % (ref 20–42)
LYMPHOCYTES RELATIVE PERCENT: 27 % (ref 20–42)
MCH RBC QN AUTO: 27.5 PG (ref 26–35)
MCH RBC QN AUTO: 28.2 PG (ref 26–35)
MCHC RBC AUTO-ENTMCNC: 30.8 G/DL (ref 32–34.5)
MCHC RBC AUTO-ENTMCNC: 31.3 G/DL (ref 32–34.5)
MCV RBC AUTO: 89.4 FL (ref 80–99.9)
MCV RBC AUTO: 89.9 FL (ref 80–99.9)
MONOCYTES NFR BLD: 0.1 K/UL (ref 0.1–0.95)
MONOCYTES NFR BLD: 0.11 K/UL (ref 0.1–0.95)
MONOCYTES NFR BLD: 8 % (ref 2–12)
MONOCYTES NFR BLD: 8 % (ref 2–12)
MONOCYTES NFR FLD: 93 %
NEUTROPHILS NFR BLD: 62 % (ref 43–80)
NEUTROPHILS NFR BLD: 64 % (ref 43–80)
NEUTROPHILS NFR FLD: 7 %
NEUTS SEG NFR BLD: 0.85 K/UL (ref 1.8–7.3)
NEUTS SEG NFR BLD: 0.91 K/UL (ref 1.8–7.3)
PH VENOUS: 7.38 (ref 7.35–7.45)
PLATELET # BLD AUTO: 44 K/UL (ref 130–450)
PLATELET CONFIRMATION: NORMAL
PLATELET CONFIRMATION: NORMAL
PLATELET, FLUORESCENCE: 39 K/UL (ref 130–450)
PMV BLD AUTO: 12.9 FL (ref 7–12)
PMV BLD AUTO: 13.4 FL (ref 7–12)
POTASSIUM SERPL-SCNC: 4.3 MMOL/L (ref 3.5–5)
POTASSIUM SERPL-SCNC: 5.2 MMOL/L (ref 3.5–5)
PROT FLD-MCNC: 1.8 G/DL
PROT SERPL-MCNC: 8.1 G/DL (ref 6.4–8.3)
PROT SERPL-MCNC: 8.1 G/DL (ref 6.4–8.3)
PROTHROMBIN TIME: 15.4 SEC (ref 9.3–12.4)
RBC # BLD AUTO: 2.36 M/UL (ref 3.5–5.5)
RBC # BLD AUTO: 2.38 M/UL (ref 3.5–5.5)
RBC # FLD: <2000 CELLS/UL
SODIUM SERPL-SCNC: 130 MMOL/L (ref 132–146)
SODIUM SERPL-SCNC: 131 MMOL/L (ref 132–146)
SPECIMEN TYPE: NORMAL
WBC # FLD: 71 CELLS/UL
WBC OTHER # BLD: 1.3 K/UL (ref 4.5–11.5)
WBC OTHER # BLD: 1.5 K/UL (ref 4.5–11.5)

## 2024-07-12 PROCEDURE — C1729 CATH, DRAINAGE: HCPCS

## 2024-07-12 PROCEDURE — 82140 ASSAY OF AMMONIA: CPT

## 2024-07-12 PROCEDURE — 83615 LACTATE (LD) (LDH) ENZYME: CPT

## 2024-07-12 PROCEDURE — 82800 BLOOD PH: CPT

## 2024-07-12 PROCEDURE — 2580000003 HC RX 258

## 2024-07-12 PROCEDURE — 87070 CULTURE OTHR SPECIMN AEROBIC: CPT

## 2024-07-12 PROCEDURE — 83605 ASSAY OF LACTIC ACID: CPT

## 2024-07-12 PROCEDURE — 84157 ASSAY OF PROTEIN OTHER: CPT

## 2024-07-12 PROCEDURE — P9016 RBC LEUKOCYTES REDUCED: HCPCS

## 2024-07-12 PROCEDURE — 82150 ASSAY OF AMYLASE: CPT

## 2024-07-12 PROCEDURE — 86901 BLOOD TYPING SEROLOGIC RH(D): CPT

## 2024-07-12 PROCEDURE — 86850 RBC ANTIBODY SCREEN: CPT

## 2024-07-12 PROCEDURE — 99285 EMERGENCY DEPT VISIT HI MDM: CPT

## 2024-07-12 PROCEDURE — 36415 COLL VENOUS BLD VENIPUNCTURE: CPT

## 2024-07-12 PROCEDURE — 2500000003 HC RX 250 WO HCPCS: Performed by: PHYSICIAN ASSISTANT

## 2024-07-12 PROCEDURE — 85610 PROTHROMBIN TIME: CPT

## 2024-07-12 PROCEDURE — 6370000000 HC RX 637 (ALT 250 FOR IP)

## 2024-07-12 PROCEDURE — 80053 COMPREHEN METABOLIC PANEL: CPT

## 2024-07-12 PROCEDURE — 88112 CYTOPATH CELL ENHANCE TECH: CPT

## 2024-07-12 PROCEDURE — 49083 ABD PARACENTESIS W/IMAGING: CPT

## 2024-07-12 PROCEDURE — 71045 X-RAY EXAM CHEST 1 VIEW: CPT

## 2024-07-12 PROCEDURE — 86900 BLOOD TYPING SEROLOGIC ABO: CPT

## 2024-07-12 PROCEDURE — 89051 BODY FLUID CELL COUNT: CPT

## 2024-07-12 PROCEDURE — 86923 COMPATIBILITY TEST ELECTRIC: CPT

## 2024-07-12 PROCEDURE — 87205 SMEAR GRAM STAIN: CPT

## 2024-07-12 PROCEDURE — 93010 ELECTROCARDIOGRAM REPORT: CPT | Performed by: INTERNAL MEDICINE

## 2024-07-12 PROCEDURE — 82962 GLUCOSE BLOOD TEST: CPT

## 2024-07-12 PROCEDURE — 85025 COMPLETE CBC W/AUTO DIFF WBC: CPT

## 2024-07-12 PROCEDURE — 88305 TISSUE EXAM BY PATHOLOGIST: CPT

## 2024-07-12 PROCEDURE — 2709999900 IR US GUIDED PARACENTESIS

## 2024-07-12 PROCEDURE — 82010 KETONE BODYS QUAN: CPT

## 2024-07-12 PROCEDURE — 36430 TRANSFUSION BLD/BLD COMPNT: CPT

## 2024-07-12 PROCEDURE — 96372 THER/PROPH/DIAG INJ SC/IM: CPT

## 2024-07-12 PROCEDURE — 82248 BILIRUBIN DIRECT: CPT

## 2024-07-12 PROCEDURE — 82945 GLUCOSE OTHER FLUID: CPT

## 2024-07-12 PROCEDURE — 82042 OTHER SOURCE ALBUMIN QUAN EA: CPT

## 2024-07-12 PROCEDURE — 93005 ELECTROCARDIOGRAM TRACING: CPT

## 2024-07-12 RX ORDER — SODIUM CHLORIDE 9 MG/ML
INJECTION, SOLUTION INTRAVENOUS PRN
Status: CANCELLED | OUTPATIENT
Start: 2024-07-12

## 2024-07-12 RX ORDER — CALCIUM GLUCONATE 10 MG/ML
1000 INJECTION, SOLUTION INTRAVENOUS ONCE
Status: DISCONTINUED | OUTPATIENT
Start: 2024-07-12 | End: 2024-07-12

## 2024-07-12 RX ORDER — INSULIN LISPRO 100 [IU]/ML
10 INJECTION, SOLUTION INTRAVENOUS; SUBCUTANEOUS ONCE
Status: COMPLETED | OUTPATIENT
Start: 2024-07-12 | End: 2024-07-12

## 2024-07-12 RX ORDER — 0.9 % SODIUM CHLORIDE 0.9 %
1000 INTRAVENOUS SOLUTION INTRAVENOUS ONCE
Status: COMPLETED | OUTPATIENT
Start: 2024-07-12 | End: 2024-07-12

## 2024-07-12 RX ORDER — SODIUM CHLORIDE 9 MG/ML
INJECTION, SOLUTION INTRAVENOUS PRN
Status: DISCONTINUED | OUTPATIENT
Start: 2024-07-12 | End: 2024-07-13 | Stop reason: HOSPADM

## 2024-07-12 RX ORDER — LIDOCAINE HYDROCHLORIDE 20 MG/ML
INJECTION, SOLUTION INFILTRATION; PERINEURAL PRN
Status: COMPLETED | OUTPATIENT
Start: 2024-07-12 | End: 2024-07-12

## 2024-07-12 RX ADMIN — INSULIN LISPRO 10 UNITS: 100 INJECTION, SOLUTION INTRAVENOUS; SUBCUTANEOUS at 14:04

## 2024-07-12 RX ADMIN — INSULIN LISPRO 10 UNITS: 100 INJECTION, SOLUTION INTRAVENOUS; SUBCUTANEOUS at 15:30

## 2024-07-12 RX ADMIN — LIDOCAINE HYDROCHLORIDE 10 ML: 20 INJECTION, SOLUTION INFILTRATION; PERINEURAL at 10:21

## 2024-07-12 RX ADMIN — SODIUM CHLORIDE 1000 ML: 9 INJECTION, SOLUTION INTRAVENOUS at 13:40

## 2024-07-12 ASSESSMENT — LIFESTYLE VARIABLES: HOW MANY STANDARD DRINKS CONTAINING ALCOHOL DO YOU HAVE ON A TYPICAL DAY: PATIENT DOES NOT DRINK

## 2024-07-12 ASSESSMENT — PAIN - FUNCTIONAL ASSESSMENT
PAIN_FUNCTIONAL_ASSESSMENT: NONE - DENIES PAIN
PAIN_FUNCTIONAL_ASSESSMENT: NONE - DENIES PAIN

## 2024-07-12 NOTE — CONSENT
Informed Consent for Blood Component Transfusion Note    I have discussed with the patient the rationale for blood component transfusion; its benefits in treating or preventing fatigue, organ damage, or death; and its risk which includes mild transfusion reactions, rare risk of blood borne infection, or more serious but rare reactions. I have discussed the alternatives to transfusion, including the risk and consequences of not receiving transfusion. The patient had an opportunity to ask questions and had agreed to proceed with transfusion of blood components.    Electronically signed by Boni Harman MD on 7/12/24 at 12:21 PM EDT

## 2024-07-12 NOTE — PROGRESS NOTES
Patient eating and drinking well with no s/s of complications noted or reported.    Patient discharged, Site clean dry and intact. Discharge papers reviewed with patient, questions answered, discharge paper signed. Patient taken to door via ambulation. Patient in stable condition, no s/s of complications noted or reported.

## 2024-07-12 NOTE — PROCEDURES
PROCEDURE NOTE  Date: 7/12/2024   Name: Lisa Hunt  YOB: 1960    Procedures: Paracentesis  Critical lab value obtained from lab of Hgb: 6.5, bedside RN notified.

## 2024-07-12 NOTE — PROGRESS NOTES
RN spoke to RANJITH Roy about critical hemoglobin. RN called patient and family to notify them that they need to bring the patient to the emergency room to be evaluated. Family verbalized understanding.

## 2024-07-12 NOTE — PROGRESS NOTES
Patient arrived via ambulation with family to Radiology department for paracentesis. Allergies, home medications, H&P and fasting instructions reviewed with patient. Vital signs taken. 22g IV placed, blood obtained, IV flushed and prn adapter attached. Blood sample sent to lab for ordered tests.  Procedural instructions given, questions answered, understanding expressed and consent signed. Patient given fluoroscopy education, no questions at this time.

## 2024-07-12 NOTE — ED NOTES
[facility] called if patient will be admitted to inpatient and not observation then they need notified to D/C from Hospice. Call office [phone] if no answer call cell [phone].

## 2024-07-12 NOTE — ED NOTES
Patient IV's continuously infiltrating. Several attempts made by 2 RN's, unable to gain access due to brittle veins

## 2024-07-12 NOTE — ED PROVIDER NOTES
IntraVENous Stopped 7/12/24 1530)   insulin lispro (HUMALOG,ADMELOG) injection vial 10 Units (10 Units SubCUTAneous Given 7/12/24 1404)   insulin lispro (HUMALOG,ADMELOG) injection vial 10 Units (10 Units SubCUTAneous Given 7/12/24 1530)         Is this patient to be included in the SEP-1 core measure due to severe sepsis or septic shock? No   Exclusion criteria - the patient is NOT to be included for SEP-1 Core Measure due to:  2+ SIRS criteria are not met        Medical Decision Making/Differential Diagnosis:    CC/HPI Summary, Social Determinants of health, Records Reviewed, DDx, testing done/not done, ED Course, Reassessment, disposition considerations/shared decision making with patient, consults, disposition:      ED Course as of 07/14/24 0248   Fri Jul 12, 2024 1733 Spoke to daughters, hospice staff. Patient will be transfused 1u pRBC and discharged. [BG]      ED Course User Index  [BG] Boni Harman MD        Chronic Conditions:   Past Medical History:   Diagnosis Date    DONNA (acute kidney injury) (HCC) 07/18/2023    Cirrhosis (HCC)     Diabetes mellitus (HCC)     Diabetic neuropathy (HCC)     Esophageal varices without bleeding (HCC)     GERD (gastroesophageal reflux disease)     Hypertension     Intracranial bleed (HCC) 05/01/2023    Liver cirrhosis (HCC)     with secondary esophageal varices, no signs/sx's of hepatic encephalopathy    Low vitamin D level     SDH (subdural hematoma) (HCC) 05/02/2023    Thrombocytopenia (HCC)     Type 2 diabetes mellitus without complication (HCC)        CONSULTS: (Who and What was discussed)  IP CONSULT TO VASCULAR ACCESS TEAM    Discussion with Other Profesionals : Spoke to hospice care RN to confirm CODE STATUS.    Social Determinants : Patient has significant healthcare illiteracy    Records Reviewed : Outpatient Notes reviewed outpatient endocrinology notes from 6/26/2024, patient seen and evaluated for type II DM with hyperglycemia currently uncontrolled  advised to start taking Lantus 50 units nightly and Humalog 18 units 3 times a day with meals with plan for insulin pump.    CC/HPI Summary, DDx, ED Course, and Reassessment:    64 y.o. female who presents with weakness, fatigue and lightheadedness that occurred status post paracentesis as outpatient earlier today.     On initial evaluation patient is in no acute distress, appears fatigued.  Vital signs unremarkable.  On initial exam no focal deficits, lungs diminished bilaterally, heart sounds unremarkable, abdomen softly distended without tenderness to palpation, paracentesis site with dressing in place without strikethrough.  Presentation concerning for but not limited to: Acute on chronic anemia, electrode abnormality, acute liver failure to name a few.    Labs reviewed by me showed CMP with mild hyponatremia and hyperkalemia however sample was hemolyzed.  Renal function appears stable, mild lactic acidosis of 2.6.  Hyperglycemia to 490 which fluctuated throughout ED course.  Hypocalcemia.  LFTs with hypoalbuminemia and elevation of alk phos and AST.  CBC with chronic appearing leukopenia and acute anemia with hemoglobin hematocrit 6.7/21.4.  Patient was ordered for type and screen and transfuse PRBCs.  Consented to blood transfusion.    Imaging reviewed by me showed chest x-ray with no acute focal consolidation.    Patient received 1 L saline bolus, a total of 20 units insulin.  Ordered for prepare and transfuse 1 unit PRBCs.    On reevaluation patient reports no acute complaints.  Multiple attempts made for IV access eventually successful.    Patient signed out to incoming care team pending completion of transfusion.  Plan for likely discharge pending completion of transfusion.    Spoke to daughter and hospice RN, all parties agreeable and amenable to this time.    Disposition Considerations (Tests not ordered but considered, Shared Decision Making, Pt Expectation of Test or Tx.): Patient signed out to

## 2024-07-13 VITALS
HEART RATE: 70 BPM | RESPIRATION RATE: 15 BRPM | OXYGEN SATURATION: 99 % | DIASTOLIC BLOOD PRESSURE: 70 MMHG | SYSTOLIC BLOOD PRESSURE: 134 MMHG | TEMPERATURE: 97.7 F | BODY MASS INDEX: 31.71 KG/M2 | WEIGHT: 179 LBS

## 2024-07-13 LAB
ABO/RH: NORMAL
ANTIBODY SCREEN: NEGATIVE
ARM BAND NUMBER: NORMAL
BLOOD BANK BLOOD PRODUCT EXPIRATION DATE: NORMAL
BLOOD BANK DISPENSE STATUS: NORMAL
BLOOD BANK ISBT PRODUCT BLOOD TYPE: 6200
BLOOD BANK PRODUCT CODE: NORMAL
BLOOD BANK SAMPLE EXPIRATION: NORMAL
BLOOD BANK UNIT TYPE AND RH: NORMAL
BPU ID: NORMAL
COMPONENT: NORMAL
CROSSMATCH RESULT: NORMAL
HCT VFR BLD AUTO: 23.3 % (ref 34–48)
HGB BLD-MCNC: 7.6 G/DL (ref 11.5–15.5)
TRANSFUSION STATUS: NORMAL
UNIT DIVISION: 0
UNIT ISSUE DATE/TIME: NORMAL

## 2024-07-13 PROCEDURE — 85014 HEMATOCRIT: CPT

## 2024-07-13 PROCEDURE — 85018 HEMOGLOBIN: CPT

## 2024-07-13 NOTE — DISCHARGE INSTRUCTIONS
Follow-up with primary care doctor  Follow-up with hospice  If you notice any new worrisome symptoms please return to emergency department for evaluation

## 2024-07-13 NOTE — ED NOTES
[facility] was called. Triage nurse, Cinthia, was notified of pt's discharge status and pt being unable to get a ride home until after 0600. Cinthia to relay information to on-call nurse for follow up.

## 2024-07-14 LAB
MICROORGANISM SPEC CULT: NORMAL
MICROORGANISM/AGENT SPEC: NORMAL
SPECIMEN DESCRIPTION: NORMAL

## 2024-07-15 ENCOUNTER — TELEPHONE (OUTPATIENT)
Dept: FAMILY MEDICINE CLINIC | Age: 64
End: 2024-07-15

## 2024-07-15 LAB
MICROORGANISM SPEC CULT: NORMAL
MICROORGANISM/AGENT SPEC: NORMAL
SPECIMEN DESCRIPTION: NORMAL

## 2024-07-15 NOTE — OP NOTE
Operative Note  ______________________________________________________________      IR U/S GUIDED PARACENTESIS  [facility] SPECIAL PROCEDURES    Patient Name: Lisa Hunt   YOB: 1960  Medical Record Number: [id number]  Date of Procedure: 7/13/24  Room/Bed: Room/bed info not found    Pre-operative Diagnosis: Ascites    Post-operative Diagnosis: Ascites    Consent: Informed consent was obtained from the patient prior to the procedure. The details of the procedure, as well is its risks, benefits, and alternatives, were explained.      Anesthesia: Local anesthesia with approximately 10mL of 2% Lidocaine without epinephrine administered subcutaneously.    Performed by: RANJITH Alvarez under on-site supervision by Madison Ndiaye MD.    Estimated blood loss: Minimal    Complications: None    Specimens Obtained: Ascites Fluid    Procedure: Routine scanning of all four abdominal quadrants was performed using real-time ultrasound and revealed sufficient amount of ascites fluid present.  Decision was made to proceed with procedure.  After obtaining consent, a \"Time-Out\" was called to verify the correct patient, procedure/location, allergies, relevant medications held for procedure and that all equipment is functioning and available. The patient was then placed in the supine position with the head of the bed slightly elevated and the appropriate landmarks were identified. The skin over the puncture site in the left lower quadrant region was prepped with betadine and draped in a sterile fashion. Local anesthesia was obtained by infiltration using 2% Lidocaine without epinephrine. A 5 Georgian needle sheath catheter was then advanced into the abdominal cavity. Fluid return was clear straw colored.      A total volume of  2325mL was withdrawn. The catheter was then withdrawn and a sterile dressing was placed over the site. The patient tolerated the procedure well.     Complications: None.

## 2024-07-15 NOTE — TELEPHONE ENCOUNTER
Spoke to pt, daughter, and grandson. Grandson translated via phone in regards to pt lactulose medication. Pt stated insurance will not cover medication   and was wondering why. I spoke to pharmacy and they stated due to pat being on hospice the medication is not covered. LVM with gisselle to return call. Pt needs to speak with home health/palliative care I  regards to this since we did not put her on hospice.

## 2024-07-15 NOTE — BRIEF OP NOTE
Brief-Op Note  ______________________________________________________________      IR U/S GUIDED PARACENTESIS  [facility] SPECIAL PROCEDURES    Patient Name: Lisa Hunt   YOB: 1960  Medical Record Number: [id number]  Date of Procedure: 7/12/24  Room/Bed: Room/bed info not found      Pre-operative Diagnosis: Ascites    Post-operative Diagnosis: Ascites    Consent: Informed consent was obtained from the patient prior to the procedure. The details of the procedure, as well is its risks, benefits, and alternatives, were explained.      Anesthesia: Local anesthesia.    Performed by: RANJITH Alvarez under on-site supervision by Madison Ndiaye MD.    Estimated blood loss: Minimal    Complications: None    Specimen Obtained: 2325mL of clear straw colored ascites fluid was withdrawn.    (See radiology dictation in PACs for image review and additional procedural information)    Electronically signed by RANJITH Alvarez   DD: 7/15/24  9:09 AM

## 2024-07-17 ENCOUNTER — HOSPITAL ENCOUNTER (OUTPATIENT)
Dept: CT IMAGING | Age: 64
Discharge: HOME | End: 2024-07-19
Attending: STUDENT IN AN ORGANIZED HEALTH CARE EDUCATION/TRAINING PROGRAM
Payer: MEDICARE

## 2024-07-17 ENCOUNTER — HOSPITAL ENCOUNTER (OUTPATIENT)
Dept: INTERVENTIONAL RADIOLOGY/VASCULAR | Age: 64
Discharge: HOME | End: 2024-07-19
Attending: STUDENT IN AN ORGANIZED HEALTH CARE EDUCATION/TRAINING PROGRAM
Payer: MEDICARE

## 2024-07-17 VITALS
SYSTOLIC BLOOD PRESSURE: 140 MMHG | OXYGEN SATURATION: 98 % | TEMPERATURE: 98 F | HEART RATE: 77 BPM | RESPIRATION RATE: 17 BRPM | DIASTOLIC BLOOD PRESSURE: 70 MMHG

## 2024-07-17 VITALS
HEART RATE: 86 BPM | SYSTOLIC BLOOD PRESSURE: 140 MMHG | OXYGEN SATURATION: 100 % | RESPIRATION RATE: 16 BRPM | DIASTOLIC BLOOD PRESSURE: 55 MMHG

## 2024-07-17 DIAGNOSIS — C22.0 HEPATOCELLULAR CARCINOMA (HCC): ICD-10-CM

## 2024-07-17 LAB
BASOPHILS # BLD: 0.01 K/UL (ref 0–0.2)
BASOPHILS NFR BLD: 0 % (ref 0–2)
CEA SERPL-MCNC: 3.2 NG/ML (ref 0–5.2)
EOSINOPHIL # BLD: 0.12 K/UL (ref 0.05–0.5)
EOSINOPHILS RELATIVE PERCENT: 5 % (ref 0–6)
ERYTHROCYTE [DISTWIDTH] IN BLOOD BY AUTOMATED COUNT: 14.5 % (ref 11.5–15)
HCT VFR BLD AUTO: 28.8 % (ref 34–48)
HGB BLD-MCNC: 9 G/DL (ref 11.5–15.5)
IMM GRANULOCYTES # BLD AUTO: <0.03 K/UL (ref 0–0.58)
IMM GRANULOCYTES NFR BLD: 0 % (ref 0–5)
LYMPHOCYTES NFR BLD: 0.63 K/UL (ref 1.5–4)
LYMPHOCYTES RELATIVE PERCENT: 28 % (ref 20–42)
MCH RBC QN AUTO: 28.4 PG (ref 26–35)
MCHC RBC AUTO-ENTMCNC: 31.3 G/DL (ref 32–34.5)
MCV RBC AUTO: 90.9 FL (ref 80–99.9)
MONOCYTES NFR BLD: 0.15 K/UL (ref 0.1–0.95)
MONOCYTES NFR BLD: 7 % (ref 2–12)
NEUTROPHILS NFR BLD: 60 % (ref 43–80)
NEUTS SEG NFR BLD: 1.37 K/UL (ref 1.8–7.3)
PLATELET # BLD AUTO: 52 K/UL (ref 130–450)
PLATELET CONFIRMATION: NORMAL
PMV BLD AUTO: 12.4 FL (ref 7–12)
RBC # BLD AUTO: 3.17 M/UL (ref 3.5–5.5)
WBC OTHER # BLD: 2.3 K/UL (ref 4.5–11.5)

## 2024-07-17 PROCEDURE — 2709999900 CT NEEDLE BIOPSY LIVER PERCUTANEOUS

## 2024-07-17 PROCEDURE — 82378 CARCINOEMBRYONIC ANTIGEN: CPT

## 2024-07-17 PROCEDURE — 7100000011 HC PHASE II RECOVERY - ADDTL 15 MIN

## 2024-07-17 PROCEDURE — 2500000003 HC RX 250 WO HCPCS: Performed by: RADIOLOGY

## 2024-07-17 PROCEDURE — 6360000002 HC RX W HCPCS: Performed by: RADIOLOGY

## 2024-07-17 PROCEDURE — 36415 COLL VENOUS BLD VENIPUNCTURE: CPT

## 2024-07-17 PROCEDURE — 77012 CT SCAN FOR NEEDLE BIOPSY: CPT

## 2024-07-17 PROCEDURE — 85025 COMPLETE CBC W/AUTO DIFF WBC: CPT

## 2024-07-17 PROCEDURE — 7100000010 HC PHASE II RECOVERY - FIRST 15 MIN

## 2024-07-17 RX ORDER — FENTANYL CITRATE 50 UG/ML
INJECTION, SOLUTION INTRAMUSCULAR; INTRAVENOUS PRN
Status: COMPLETED | OUTPATIENT
Start: 2024-07-17 | End: 2024-07-17

## 2024-07-17 RX ORDER — LIDOCAINE HYDROCHLORIDE AND EPINEPHRINE BITARTRATE 20; .01 MG/ML; MG/ML
INJECTION, SOLUTION SUBCUTANEOUS PRN
Status: COMPLETED | OUTPATIENT
Start: 2024-07-17 | End: 2024-07-17

## 2024-07-17 RX ORDER — DIPHENHYDRAMINE HYDROCHLORIDE 50 MG/ML
INJECTION INTRAMUSCULAR; INTRAVENOUS PRN
Status: COMPLETED | OUTPATIENT
Start: 2024-07-17 | End: 2024-07-17

## 2024-07-17 RX ORDER — MIDAZOLAM HYDROCHLORIDE 2 MG/2ML
INJECTION, SOLUTION INTRAMUSCULAR; INTRAVENOUS PRN
Status: COMPLETED | OUTPATIENT
Start: 2024-07-17 | End: 2024-07-17

## 2024-07-17 RX ORDER — HYDRALAZINE HYDROCHLORIDE 20 MG/ML
INJECTION INTRAMUSCULAR; INTRAVENOUS PRN
Status: COMPLETED | OUTPATIENT
Start: 2024-07-17 | End: 2024-07-17

## 2024-07-17 RX ORDER — LIDOCAINE HYDROCHLORIDE 20 MG/ML
INJECTION, SOLUTION INFILTRATION; PERINEURAL PRN
Status: COMPLETED | OUTPATIENT
Start: 2024-07-17 | End: 2024-07-17

## 2024-07-17 RX ADMIN — FENTANYL CITRATE 25 MCG: 50 INJECTION, SOLUTION INTRAMUSCULAR; INTRAVENOUS at 10:12

## 2024-07-17 RX ADMIN — LIDOCAINE HYDROCHLORIDE,EPINEPHRINE BITARTRATE 8 ML: 20; .01 INJECTION, SOLUTION INFILTRATION; PERINEURAL at 10:15

## 2024-07-17 RX ADMIN — MIDAZOLAM HYDROCHLORIDE 0.5 MG: 1 INJECTION, SOLUTION INTRAMUSCULAR; INTRAVENOUS at 10:11

## 2024-07-17 RX ADMIN — LIDOCAINE HYDROCHLORIDE 10 ML: 20 INJECTION, SOLUTION INFILTRATION; PERINEURAL at 10:15

## 2024-07-17 RX ADMIN — DIPHENHYDRAMINE HYDROCHLORIDE 25 MG: 50 INJECTION, SOLUTION INTRAMUSCULAR; INTRAVENOUS at 10:10

## 2024-07-17 RX ADMIN — HYDRALAZINE HYDROCHLORIDE 5 MG: 20 INJECTION INTRAMUSCULAR; INTRAVENOUS at 10:24

## 2024-07-17 RX ADMIN — THROMBIN HUMAN 1 KIT: 2000 POWDER, FOR SOLUTION TOPICAL at 10:22

## 2024-07-17 NOTE — PROGRESS NOTES
Hepatobiliary and Pancreatic Surgery Progress Note    CC: Follow-up ACOSTA cirrhosis    Subjective:   1/24/24: Ms. Hunt is a 64 yo Hebrew speaking F with long hx of ACOSTA cirrhosis for the last 12 yrs, DONNA, DM, GERD, HTN, who presents as new patient referral. She has seen GI in the past and had prior endoscopies with Dr. Arteaga. She recently was hospitalized early January due to decompensation requiring EGD wit Dr. Conrad and banding of esophageal varices and IR paracentesis. She has had a total of 4 paracenteses and three were within the last year. She denies hx of bloody stools or dark tarry stools. She does take rifaximin and lactulose and notices she gets confused when she does not have a bowel movement for a while. Her ammonia levels have also been persistently elevated. She has been told by someone in the past that she should be put on a transplant list. She denies any alcohol use or smoking. Her liver disease is from ACOSTA. She takes her spironolactone and lasix as prescribed. She has not had a heart cath. Last EKG showed RBB which is new.     6/12: Interpretor Vasquez [id number] via hospital interpretor program utilized for entire visit. Patient was recently hospitalized 5/26/2024 for hepatic encephalopathy, liver cirrhosis, altered mental status.  She was seen by Dr. Conrad and is currently taking lactulose 3 times daily.  She was discharged 5/31/2024.  Per patient and daughter patient has been sleeping all the time despite taking the lactulose and having 3 large bouts of watery diarrhea per day.  Last ammonia level on 5/30/2024 was 55.  Per pt and daughter, pt has some mild confusion off and on. Per patient's daughter she is following routinely with Dr. Conrad.  Patient reports frustration stating despite coming to her office visit she has been hospitalized several times, it was discussed with her that her cirrhosis has been decompensated which has led to her hospitalizations. It was explained last hospital stay

## 2024-07-17 NOTE — PRE SEDATION
Sedation Pre-Procedure Note    Patient Name: Lisa Hunt   YOB: 1960  Room/Bed: Room/bed info not found  Medical Record Number: [id number]  Date: 2024   Time: 10:31 AM       Indication:  Liver mass, cirrhosis and portal hypertension colorectal bleed    Consent: I have discussed with the patient and/or the patient representative the indication, alternatives, and the possible risks and/or complications of the planned procedure and the anesthesia methods. The patient and/or patient representative appear to understand and agree to proceed.    Vital Signs:   There were no vitals filed for this visit.    Past Medical History:   has a past medical history of DONNA (acute kidney injury) (HCC), Cirrhosis (HCC), Diabetes mellitus (HCC), Diabetic neuropathy (HCC), Esophageal varices without bleeding (HCC), GERD (gastroesophageal reflux disease), Hypertension, Intracranial bleed (HCC), Liver cirrhosis (HCC), Low vitamin D level, SDH (subdural hematoma) (HCC), Thrombocytopenia (HCC), and Type 2 diabetes mellitus without complication (HCC).    Past Surgical History:   has a past surgical history that includes Upper gastrointestinal endoscopy (2022); Upper gastrointestinal endoscopy (2021); Upper gastrointestinal endoscopy (2019); Upper gastrointestinal endoscopy (2021);  section; Cholecystectomy; Appendectomy; Paracentesis (Left, 10/12/2023); Paracentesis (Left, 2024); Upper gastrointestinal endoscopy (N/A, 2024); Upper gastrointestinal endoscopy (N/A, 2024); Paracentesis (Left, 2024); and Upper gastrointestinal endoscopy (N/A, 3/15/2024).    Medications:   Scheduled Meds:   Continuous Infusions:   PRN Meds:   Home Meds:   Prior to Admission medications    Medication Sig Start Date End Date Taking? Authorizing Provider   XIFAXAN 550 MG tablet Take 1 tablet by mouth 2 times daily 24   Provider, MD Jamie   lactulose (CHRONULAC) 10 GM/15ML

## 2024-07-17 NOTE — PROGRESS NOTES
Patient arrival to procedure area via independent ambulation with family for an anticipated and scheduled liver biopsy.    Allergies, current home medications, and history & physical reviewed. Confirmation that patient adhered to the required fasting instructions prior to the procedure. Vital signs indicate a safe level to proceed with procedure. See flowsheets for detailed documentation of vital signs.    Detailed procedural instructions were provided to the patient, ensuring they understood each step of the upcoming procedure. Ample opportunity given to ask questions, to which satisfactory answers were provided. The patient demonstrated a clear understanding of the information.    Throughout the interaction, the patient appeared calm and cooperative. The patient was reassured and made comfortable in preparation for the procedure.

## 2024-07-17 NOTE — PROCEDURES
1034 Patient returned from adrenal biopsy. Report received from ANNETTA Chappell. Dressing to RUQ checked, clean, dry, and intact. Patient stable. No s/s of complications noted or reported. Vitals will be checked q 15min, see flow sheets. Last medications given at 1012. Patient will recover for at least 2 hours.     1145 Patient eating and drinking well with no s/s of complications noted or reported.    1215 Site was checked with every set of vitals. Site clean dry and intact. Discharge papers reviewed with patient, questions answered, discharge paper signed. Patient taken to door via wheelchair with family member. Patient in stable condition, no s/s of complications noted or reported.

## 2024-07-17 NOTE — BRIEF OP NOTE
Brief Postoperative Note      Patient: Lisa Hunt  YOB: 1960  MRN: [id number]    Date of Procedure: 7/17/2024    Cirrhosis. Liver mass and portal hypertension    Post-Op Diagnosis: Same       CT guided liver biopsy    LAYLA Ndiaye    Assistant:  * No surgical staff found *    Anesthesia: * No anesthesia type entered *    Estimated Blood Loss (mL): Minimal    Complications: None    Specimens:   * No specimens in log *    Implants:  * No implants in log *      Drains: * No LDAs found *    Findings:  Infection Present At Time Of Surgery (PATOS) (choose all levels that have infection present):  - Deep Infection (muscle/fascia) present as evidenced by abscess  Other Findings: liver is very soft    Electronically signed by IRLANDA Ndiaye MD on 7/17/2024 at 10:32 AM

## 2024-07-17 NOTE — PLAN OF CARE
Patient arrived for Y90 consult with Dr. Ndiaye. A family member was present also for consult.    Dr Ndiaye explained risks/ benefits of Y90 treatments. Dr Ndiaye reviewed imaging with patient. He explained this procedure is palitive and is not to get rid of cancer totally. He also recommends increasing water intake 6-8 glasses a day to help with renal function lab values. Vitals and labs were taken today also.      Dr Ndiaye performed a liver biopsy today on patient and wants to get results before moving ahead with Y90 treatment.

## 2024-07-18 LAB — NON-GYN CYTOLOGY REPORT: NORMAL

## 2024-07-19 ENCOUNTER — TELEPHONE (OUTPATIENT)
Dept: HEMATOLOGY | Age: 64
End: 2024-07-19

## 2024-07-19 NOTE — TELEPHONE ENCOUNTER
Called and LVM with patient regarding scheduling a follow up with Dr. Sosa. Liver biopsy was 7/17/24.

## 2024-07-23 LAB — SURGICAL PATHOLOGY REPORT: NORMAL

## 2024-08-01 ENCOUNTER — TELEPHONE (OUTPATIENT)
Dept: FAMILY MEDICINE CLINIC | Age: 64
End: 2024-08-01

## 2024-08-01 NOTE — TELEPHONE ENCOUNTER
Pt stated that she is very swollen and has a lot of water on her. Lasix is not helping.   Please call [phone]

## 2024-08-02 ENCOUNTER — HOSPITAL ENCOUNTER (INPATIENT)
Age: 64
LOS: 5 days | Discharge: HOME | End: 2024-08-07
Attending: STUDENT IN AN ORGANIZED HEALTH CARE EDUCATION/TRAINING PROGRAM | Admitting: INTERNAL MEDICINE
Payer: MEDICARE

## 2024-08-02 ENCOUNTER — APPOINTMENT (OUTPATIENT)
Dept: GENERAL RADIOLOGY | Age: 64
End: 2024-08-02
Payer: MEDICARE

## 2024-08-02 ENCOUNTER — APPOINTMENT (OUTPATIENT)
Dept: INTERVENTIONAL RADIOLOGY/VASCULAR | Age: 64
End: 2024-08-02
Payer: MEDICARE

## 2024-08-02 DIAGNOSIS — K92.2 GASTROINTESTINAL HEMORRHAGE, UNSPECIFIED GASTROINTESTINAL HEMORRHAGE TYPE: ICD-10-CM

## 2024-08-02 DIAGNOSIS — D64.9 ACUTE ON CHRONIC ANEMIA: ICD-10-CM

## 2024-08-02 DIAGNOSIS — R18.8 CIRRHOSIS OF LIVER WITH ASCITES, UNSPECIFIED HEPATIC CIRRHOSIS TYPE (HCC): Primary | ICD-10-CM

## 2024-08-02 DIAGNOSIS — E83.42 HYPOMAGNESEMIA: ICD-10-CM

## 2024-08-02 DIAGNOSIS — R73.9 HYPERGLYCEMIA: ICD-10-CM

## 2024-08-02 DIAGNOSIS — N17.9 AKI (ACUTE KIDNEY INJURY) (HCC): ICD-10-CM

## 2024-08-02 DIAGNOSIS — K74.60 CIRRHOSIS OF LIVER WITH ASCITES, UNSPECIFIED HEPATIC CIRRHOSIS TYPE (HCC): Primary | ICD-10-CM

## 2024-08-02 DIAGNOSIS — D64.9 ANEMIA, UNSPECIFIED TYPE: ICD-10-CM

## 2024-08-02 PROBLEM — K74.69 OTHER CIRRHOSIS OF LIVER (HCC): Status: RESOLVED | Noted: 2022-11-08 | Resolved: 2024-08-02

## 2024-08-02 PROBLEM — R41.82 AMS (ALTERED MENTAL STATUS): Status: RESOLVED | Noted: 2024-05-26 | Resolved: 2024-08-02

## 2024-08-02 PROBLEM — G93.41 METABOLIC ENCEPHALOPATHY: Status: RESOLVED | Noted: 2023-07-18 | Resolved: 2024-08-02

## 2024-08-02 PROBLEM — R41.82 ALTERED MENTAL STATUS: Status: RESOLVED | Noted: 2023-12-09 | Resolved: 2024-08-02

## 2024-08-02 LAB
ALBUMIN SERPL-MCNC: 3.1 G/DL (ref 3.5–5.2)
ALP SERPL-CCNC: 128 U/L (ref 35–104)
ALT SERPL-CCNC: 17 U/L (ref 0–32)
ANION GAP SERPL CALCULATED.3IONS-SCNC: 8 MMOL/L (ref 7–16)
AST SERPL-CCNC: 46 U/L (ref 0–31)
BASOPHILS # BLD: 0 K/UL (ref 0–0.2)
BASOPHILS NFR BLD: 0 % (ref 0–2)
BILIRUB SERPL-MCNC: 0.8 MG/DL (ref 0–1.2)
BUN SERPL-MCNC: 20 MG/DL (ref 6–23)
CALCIUM SERPL-MCNC: 8.2 MG/DL (ref 8.6–10.2)
CHLORIDE SERPL-SCNC: 104 MMOL/L (ref 98–107)
CO2 SERPL-SCNC: 21 MMOL/L (ref 22–29)
CREAT SERPL-MCNC: 1.4 MG/DL (ref 0.5–1)
EKG ATRIAL RATE: 96 BPM
EKG P AXIS: 43 DEGREES
EKG P-R INTERVAL: 182 MS
EKG Q-T INTERVAL: 386 MS
EKG QRS DURATION: 130 MS
EKG QTC CALCULATION (BAZETT): 487 MS
EKG R AXIS: -48 DEGREES
EKG T AXIS: -5 DEGREES
EKG VENTRICULAR RATE: 96 BPM
EOSINOPHIL # BLD: 0.05 K/UL (ref 0.05–0.5)
EOSINOPHILS RELATIVE PERCENT: 3 % (ref 0–6)
ERYTHROCYTE [DISTWIDTH] IN BLOOD BY AUTOMATED COUNT: 17.2 % (ref 11.5–15)
GFR, ESTIMATED: 41 ML/MIN/1.73M2
GLUCOSE BLD-MCNC: 356 MG/DL (ref 74–99)
GLUCOSE BLD-MCNC: 470 MG/DL (ref 74–99)
GLUCOSE BLD-MCNC: 485 MG/DL (ref 74–99)
GLUCOSE SERPL-MCNC: 379 MG/DL (ref 74–99)
HCT VFR BLD AUTO: 21 % (ref 34–48)
HCT VFR BLD AUTO: 23.4 % (ref 34–48)
HGB BLD-MCNC: 6.5 G/DL (ref 11.5–15.5)
HGB BLD-MCNC: 7.4 G/DL (ref 11.5–15.5)
IMM GRANULOCYTES # BLD AUTO: <0.03 K/UL (ref 0–0.58)
IMM GRANULOCYTES NFR BLD: 0 % (ref 0–5)
INR PPP: 1.4
LIPASE SERPL-CCNC: 27 U/L (ref 13–60)
LYMPHOCYTES NFR BLD: 0.38 K/UL (ref 1.5–4)
LYMPHOCYTES RELATIVE PERCENT: 24 % (ref 20–42)
MAGNESIUM SERPL-MCNC: 1.5 MG/DL (ref 1.6–2.6)
MCH RBC QN AUTO: 28.9 PG (ref 26–35)
MCHC RBC AUTO-ENTMCNC: 31 G/DL (ref 32–34.5)
MCV RBC AUTO: 93.3 FL (ref 80–99.9)
MONOCYTES NFR BLD: 0.11 K/UL (ref 0.1–0.95)
MONOCYTES NFR BLD: 7 % (ref 2–12)
NEUTROPHILS NFR BLD: 66 % (ref 43–80)
NEUTS SEG NFR BLD: 1.06 K/UL (ref 1.8–7.3)
PARTIAL THROMBOPLASTIN TIME: 28.7 SEC (ref 24.5–35.1)
PLATELET CONFIRMATION: NORMAL
PLATELET, FLUORESCENCE: 42 K/UL (ref 130–450)
PMV BLD AUTO: 13.2 FL (ref 7–12)
POTASSIUM SERPL-SCNC: 5.2 MMOL/L (ref 3.5–5)
PROT SERPL-MCNC: 7.5 G/DL (ref 6.4–8.3)
PROTHROMBIN TIME: 15.2 SEC (ref 9.3–12.4)
RBC # BLD AUTO: 2.25 M/UL (ref 3.5–5.5)
RBC # BLD: ABNORMAL 10*6/UL
RBC # BLD: ABNORMAL 10*6/UL
SODIUM SERPL-SCNC: 133 MMOL/L (ref 132–146)
TROPONIN I SERPL HS-MCNC: NORMAL NG/L (ref 0–14)
TROPONIN INTERP: NORMAL
TROPONIN T SERPL-MCNC: NORMAL NG/ML
WBC OTHER # BLD: 1.6 K/UL (ref 4.5–11.5)

## 2024-08-02 PROCEDURE — 30233N1 TRANSFUSION OF NONAUTOLOGOUS RED BLOOD CELLS INTO PERIPHERAL VEIN, PERCUTANEOUS APPROACH: ICD-10-PCS | Performed by: INTERNAL MEDICINE

## 2024-08-02 PROCEDURE — 2580000003 HC RX 258: Performed by: INTERNAL MEDICINE

## 2024-08-02 PROCEDURE — 85610 PROTHROMBIN TIME: CPT

## 2024-08-02 PROCEDURE — 86901 BLOOD TYPING SEROLOGIC RH(D): CPT

## 2024-08-02 PROCEDURE — 2060000000 HC ICU INTERMEDIATE R&B

## 2024-08-02 PROCEDURE — 85018 HEMOGLOBIN: CPT

## 2024-08-02 PROCEDURE — 6360000002 HC RX W HCPCS: Performed by: INTERNAL MEDICINE

## 2024-08-02 PROCEDURE — 85730 THROMBOPLASTIN TIME PARTIAL: CPT

## 2024-08-02 PROCEDURE — P9047 ALBUMIN (HUMAN), 25%, 50ML: HCPCS | Performed by: INTERNAL MEDICINE

## 2024-08-02 PROCEDURE — 6370000000 HC RX 637 (ALT 250 FOR IP): Performed by: STUDENT IN AN ORGANIZED HEALTH CARE EDUCATION/TRAINING PROGRAM

## 2024-08-02 PROCEDURE — 2500000003 HC RX 250 WO HCPCS: Performed by: PHYSICIAN ASSISTANT

## 2024-08-02 PROCEDURE — 36415 COLL VENOUS BLD VENIPUNCTURE: CPT

## 2024-08-02 PROCEDURE — 99285 EMERGENCY DEPT VISIT HI MDM: CPT

## 2024-08-02 PROCEDURE — 0W9G3ZZ DRAINAGE OF PERITONEAL CAVITY, PERCUTANEOUS APPROACH: ICD-10-PCS | Performed by: INTERNAL MEDICINE

## 2024-08-02 PROCEDURE — 80053 COMPREHEN METABOLIC PANEL: CPT

## 2024-08-02 PROCEDURE — 86923 COMPATIBILITY TEST ELECTRIC: CPT

## 2024-08-02 PROCEDURE — 06L38CZ OCCLUSION OF ESOPHAGEAL VEIN WITH EXTRALUMINAL DEVICE, VIA NATURAL OR ARTIFICIAL OPENING ENDOSCOPIC: ICD-10-PCS | Performed by: INTERNAL MEDICINE

## 2024-08-02 PROCEDURE — 83735 ASSAY OF MAGNESIUM: CPT

## 2024-08-02 PROCEDURE — 85025 COMPLETE CBC W/AUTO DIFF WBC: CPT

## 2024-08-02 PROCEDURE — 6370000000 HC RX 637 (ALT 250 FOR IP): Performed by: INTERNAL MEDICINE

## 2024-08-02 PROCEDURE — 85014 HEMATOCRIT: CPT

## 2024-08-02 PROCEDURE — 82962 GLUCOSE BLOOD TEST: CPT

## 2024-08-02 PROCEDURE — P9016 RBC LEUKOCYTES REDUCED: HCPCS

## 2024-08-02 PROCEDURE — C1729 CATH, DRAINAGE: HCPCS

## 2024-08-02 PROCEDURE — 83690 ASSAY OF LIPASE: CPT

## 2024-08-02 PROCEDURE — 93005 ELECTROCARDIOGRAM TRACING: CPT | Performed by: STUDENT IN AN ORGANIZED HEALTH CARE EDUCATION/TRAINING PROGRAM

## 2024-08-02 PROCEDURE — 99222 1ST HOSP IP/OBS MODERATE 55: CPT | Performed by: INTERNAL MEDICINE

## 2024-08-02 PROCEDURE — 6360000002 HC RX W HCPCS: Performed by: STUDENT IN AN ORGANIZED HEALTH CARE EDUCATION/TRAINING PROGRAM

## 2024-08-02 PROCEDURE — 71045 X-RAY EXAM CHEST 1 VIEW: CPT

## 2024-08-02 PROCEDURE — 86900 BLOOD TYPING SEROLOGIC ABO: CPT

## 2024-08-02 PROCEDURE — 49083 ABD PARACENTESIS W/IMAGING: CPT

## 2024-08-02 PROCEDURE — 93010 ELECTROCARDIOGRAM REPORT: CPT | Performed by: INTERNAL MEDICINE

## 2024-08-02 PROCEDURE — 99222 1ST HOSP IP/OBS MODERATE 55: CPT | Performed by: NURSE PRACTITIONER

## 2024-08-02 PROCEDURE — 86850 RBC ANTIBODY SCREEN: CPT

## 2024-08-02 RX ORDER — LACTULOSE 10 G/15ML
20 SOLUTION ORAL EVERY 8 HOURS
Status: DISCONTINUED | OUTPATIENT
Start: 2024-08-02 | End: 2024-08-07 | Stop reason: HOSPADM

## 2024-08-02 RX ORDER — POLYETHYLENE GLYCOL 3350 17 G/17G
17 POWDER, FOR SOLUTION ORAL DAILY PRN
Status: DISCONTINUED | OUTPATIENT
Start: 2024-08-02 | End: 2024-08-07 | Stop reason: HOSPADM

## 2024-08-02 RX ORDER — INSULIN LISPRO 100 [IU]/ML
0-4 INJECTION, SOLUTION INTRAVENOUS; SUBCUTANEOUS NIGHTLY
Status: DISCONTINUED | OUTPATIENT
Start: 2024-08-02 | End: 2024-08-07 | Stop reason: HOSPADM

## 2024-08-02 RX ORDER — POTASSIUM CHLORIDE 7.45 MG/ML
10 INJECTION INTRAVENOUS PRN
Status: DISCONTINUED | OUTPATIENT
Start: 2024-08-02 | End: 2024-08-07 | Stop reason: HOSPADM

## 2024-08-02 RX ORDER — ALBUMIN (HUMAN) 12.5 G/50ML
25 SOLUTION INTRAVENOUS EVERY 8 HOURS
Status: DISCONTINUED | OUTPATIENT
Start: 2024-08-02 | End: 2024-08-07 | Stop reason: HOSPADM

## 2024-08-02 RX ORDER — POTASSIUM CHLORIDE 20 MEQ/1
40 TABLET, EXTENDED RELEASE ORAL PRN
Status: DISCONTINUED | OUTPATIENT
Start: 2024-08-02 | End: 2024-08-07 | Stop reason: HOSPADM

## 2024-08-02 RX ORDER — MIDODRINE HYDROCHLORIDE 5 MG/1
5 TABLET ORAL
Status: DISCONTINUED | OUTPATIENT
Start: 2024-08-02 | End: 2024-08-07 | Stop reason: HOSPADM

## 2024-08-02 RX ORDER — LIDOCAINE HYDROCHLORIDE 20 MG/ML
INJECTION, SOLUTION INFILTRATION; PERINEURAL PRN
Status: COMPLETED | OUTPATIENT
Start: 2024-08-02 | End: 2024-08-02

## 2024-08-02 RX ORDER — SODIUM CHLORIDE 9 MG/ML
INJECTION, SOLUTION INTRAVENOUS PRN
Status: DISCONTINUED | OUTPATIENT
Start: 2024-08-02 | End: 2024-08-07 | Stop reason: HOSPADM

## 2024-08-02 RX ORDER — SODIUM CHLORIDE 0.9 % (FLUSH) 0.9 %
5-40 SYRINGE (ML) INJECTION EVERY 12 HOURS SCHEDULED
Status: DISCONTINUED | OUTPATIENT
Start: 2024-08-02 | End: 2024-08-07 | Stop reason: HOSPADM

## 2024-08-02 RX ORDER — INSULIN LISPRO 100 [IU]/ML
18 INJECTION, SOLUTION INTRAVENOUS; SUBCUTANEOUS
COMMUNITY

## 2024-08-02 RX ORDER — PANTOPRAZOLE SODIUM 40 MG/1
40 TABLET, DELAYED RELEASE ORAL
Status: DISCONTINUED | OUTPATIENT
Start: 2024-08-03 | End: 2024-08-06

## 2024-08-02 RX ORDER — DEXTROSE MONOHYDRATE 100 MG/ML
INJECTION, SOLUTION INTRAVENOUS CONTINUOUS PRN
Status: DISCONTINUED | OUTPATIENT
Start: 2024-08-02 | End: 2024-08-07 | Stop reason: HOSPADM

## 2024-08-02 RX ORDER — INSULIN GLARGINE 100 [IU]/ML
50 INJECTION, SOLUTION SUBCUTANEOUS NIGHTLY
Status: DISCONTINUED | OUTPATIENT
Start: 2024-08-02 | End: 2024-08-07 | Stop reason: HOSPADM

## 2024-08-02 RX ORDER — LANOLIN ALCOHOL/MO/W.PET/CERES
3 CREAM (GRAM) TOPICAL NIGHTLY PRN
Status: DISCONTINUED | OUTPATIENT
Start: 2024-08-02 | End: 2024-08-07 | Stop reason: HOSPADM

## 2024-08-02 RX ORDER — HYDRALAZINE HYDROCHLORIDE 20 MG/ML
10 INJECTION INTRAMUSCULAR; INTRAVENOUS EVERY 6 HOURS PRN
Status: DISCONTINUED | OUTPATIENT
Start: 2024-08-02 | End: 2024-08-07 | Stop reason: HOSPADM

## 2024-08-02 RX ORDER — MAGNESIUM SULFATE IN WATER 40 MG/ML
2000 INJECTION, SOLUTION INTRAVENOUS ONCE
Status: COMPLETED | OUTPATIENT
Start: 2024-08-02 | End: 2024-08-02

## 2024-08-02 RX ORDER — GLUCAGON 1 MG/ML
1 KIT INJECTION PRN
Status: DISCONTINUED | OUTPATIENT
Start: 2024-08-02 | End: 2024-08-07 | Stop reason: HOSPADM

## 2024-08-02 RX ORDER — CHOLECALCIFEROL (VITAMIN D3) 50 MCG
2000 TABLET ORAL DAILY
Status: DISCONTINUED | OUTPATIENT
Start: 2024-08-03 | End: 2024-08-07 | Stop reason: HOSPADM

## 2024-08-02 RX ORDER — SPIRONOLACTONE 50 MG/1
50 TABLET, FILM COATED ORAL DAILY
COMMUNITY

## 2024-08-02 RX ORDER — INSULIN LISPRO 100 [IU]/ML
4 INJECTION, SOLUTION INTRAVENOUS; SUBCUTANEOUS ONCE
Status: COMPLETED | OUTPATIENT
Start: 2024-08-02 | End: 2024-08-02

## 2024-08-02 RX ORDER — INSULIN LISPRO 100 [IU]/ML
0-8 INJECTION, SOLUTION INTRAVENOUS; SUBCUTANEOUS
Status: DISCONTINUED | OUTPATIENT
Start: 2024-08-02 | End: 2024-08-07 | Stop reason: HOSPADM

## 2024-08-02 RX ORDER — ONDANSETRON 2 MG/ML
4 INJECTION INTRAMUSCULAR; INTRAVENOUS EVERY 6 HOURS PRN
Status: DISCONTINUED | OUTPATIENT
Start: 2024-08-02 | End: 2024-08-07 | Stop reason: HOSPADM

## 2024-08-02 RX ORDER — CALCIUM CARBONATE 500 MG/1
500 TABLET, CHEWABLE ORAL 3 TIMES DAILY PRN
Status: DISCONTINUED | OUTPATIENT
Start: 2024-08-02 | End: 2024-08-07 | Stop reason: HOSPADM

## 2024-08-02 RX ORDER — SODIUM CHLORIDE 0.9 % (FLUSH) 0.9 %
5-40 SYRINGE (ML) INJECTION PRN
Status: DISCONTINUED | OUTPATIENT
Start: 2024-08-02 | End: 2024-08-07 | Stop reason: HOSPADM

## 2024-08-02 RX ORDER — GABAPENTIN 300 MG/1
600 CAPSULE ORAL 3 TIMES DAILY
Status: DISCONTINUED | OUTPATIENT
Start: 2024-08-02 | End: 2024-08-07 | Stop reason: HOSPADM

## 2024-08-02 RX ORDER — MAGNESIUM SULFATE IN WATER 40 MG/ML
2000 INJECTION, SOLUTION INTRAVENOUS PRN
Status: DISCONTINUED | OUTPATIENT
Start: 2024-08-02 | End: 2024-08-07 | Stop reason: HOSPADM

## 2024-08-02 RX ORDER — BENZONATATE 100 MG/1
100 CAPSULE ORAL 3 TIMES DAILY PRN
Status: DISCONTINUED | OUTPATIENT
Start: 2024-08-02 | End: 2024-08-07 | Stop reason: HOSPADM

## 2024-08-02 RX ORDER — INSULIN LISPRO 100 [IU]/ML
18 INJECTION, SOLUTION INTRAVENOUS; SUBCUTANEOUS
Status: DISCONTINUED | OUTPATIENT
Start: 2024-08-02 | End: 2024-08-07 | Stop reason: HOSPADM

## 2024-08-02 RX ORDER — FUROSEMIDE 20 MG/1
20 TABLET ORAL DAILY
Status: DISCONTINUED | OUTPATIENT
Start: 2024-08-03 | End: 2024-08-07 | Stop reason: HOSPADM

## 2024-08-02 RX ORDER — CARVEDILOL 3.12 MG/1
3.12 TABLET ORAL DAILY
Status: DISCONTINUED | OUTPATIENT
Start: 2024-08-03 | End: 2024-08-07 | Stop reason: HOSPADM

## 2024-08-02 RX ORDER — ONDANSETRON 4 MG/1
4 TABLET, ORALLY DISINTEGRATING ORAL EVERY 8 HOURS PRN
Status: DISCONTINUED | OUTPATIENT
Start: 2024-08-02 | End: 2024-08-07 | Stop reason: HOSPADM

## 2024-08-02 RX ADMIN — GABAPENTIN 600 MG: 300 CAPSULE ORAL at 21:19

## 2024-08-02 RX ADMIN — LACTULOSE 20 G: 20 SOLUTION ORAL at 21:19

## 2024-08-02 RX ADMIN — GABAPENTIN 600 MG: 300 CAPSULE ORAL at 17:02

## 2024-08-02 RX ADMIN — LIDOCAINE HYDROCHLORIDE 10 ML: 20 INJECTION, SOLUTION INFILTRATION; PERINEURAL at 11:38

## 2024-08-02 RX ADMIN — INSULIN LISPRO 4 UNITS: 100 INJECTION, SOLUTION INTRAVENOUS; SUBCUTANEOUS at 21:20

## 2024-08-02 RX ADMIN — SODIUM CHLORIDE, PRESERVATIVE FREE 10 ML: 5 INJECTION INTRAVENOUS at 21:20

## 2024-08-02 RX ADMIN — INSULIN GLARGINE 50 UNITS: 100 INJECTION, SOLUTION SUBCUTANEOUS at 21:19

## 2024-08-02 RX ADMIN — LACTULOSE 20 G: 20 SOLUTION ORAL at 17:03

## 2024-08-02 RX ADMIN — INSULIN LISPRO 4 UNITS: 100 INJECTION, SOLUTION INTRAVENOUS; SUBCUTANEOUS at 21:45

## 2024-08-02 RX ADMIN — Medication 3 MG: at 21:33

## 2024-08-02 RX ADMIN — RIFAXIMIN 550 MG: 550 TABLET ORAL at 22:27

## 2024-08-02 RX ADMIN — MAGNESIUM SULFATE HEPTAHYDRATE 2000 MG: 40 INJECTION, SOLUTION INTRAVENOUS at 15:13

## 2024-08-02 RX ADMIN — ALBUMIN (HUMAN) 25 G: 0.25 INJECTION, SOLUTION INTRAVENOUS at 17:09

## 2024-08-02 ASSESSMENT — PAIN DESCRIPTION - ORIENTATION: ORIENTATION: LEFT

## 2024-08-02 ASSESSMENT — PAIN SCALES - GENERAL
PAINLEVEL_OUTOF10: 2
PAINLEVEL_OUTOF10: 7

## 2024-08-02 ASSESSMENT — PAIN DESCRIPTION - DESCRIPTORS: DESCRIPTORS: ACHING

## 2024-08-02 ASSESSMENT — PAIN - FUNCTIONAL ASSESSMENT: PAIN_FUNCTIONAL_ASSESSMENT: 0-10

## 2024-08-02 ASSESSMENT — PAIN DESCRIPTION - LOCATION
LOCATION: ABDOMEN
LOCATION: SHOULDER

## 2024-08-02 NOTE — ED PROVIDER NOTES
[facility]U/NEURO  EMERGENCY DEPARTMENT ENCOUNTER    Pt Name: Lisa Hunt  MRN: [id number]  Birthdate 1960  Date of evaluation: 8/2/2024  Provider: Los Quintanilla MD  PCP: Tonie Hernandez DO  Note Started: 10:29 AM EDT 8/2/24    HPI     Patient is a 64 y.o. female presents with a chief complaint of   Chief Complaint   Patient presents with    Leg Swelling     Pt having swelling to legs and abdomen. Aaox4. Pt speaks mostly Brazilian.    .    Patient presents for liver cirrhosis.  Patient reportedly has been having increased swelling of her legs and her abdomen.  Patient also feeling lightheaded.  Patient has been having some dark tarry stool.  Patient states this is consistent with prior episodes where she was previously admitted after having a paracentesis.  Patient states that her abdomen is swollen.    Nursing Notes were all reviewed and agreed with or any disagreements were addressed in the HPI.    History From: patient    Review of Systems   Pertinent positives and negatives as per HPI.     Physical Exam  Vitals and nursing note reviewed.   Constitutional:       Appearance: She is well-developed.   HENT:      Head: Normocephalic and atraumatic.   Eyes:      Conjunctiva/sclera: Conjunctivae normal.   Cardiovascular:      Rate and Rhythm: Normal rate and regular rhythm.      Heart sounds: Normal heart sounds. No murmur heard.  Pulmonary:      Effort: Pulmonary effort is normal. No respiratory distress.      Breath sounds: Normal breath sounds. No wheezing or rales.   Abdominal:      General: Bowel sounds are normal. There is distension.      Palpations: Abdomen is soft.      Tenderness: There is no abdominal tenderness. There is no guarding or rebound.      Comments: Significantly distended   Musculoskeletal:      Cervical back: Normal range of motion and neck supple.      Right lower leg: Edema present.      Left lower leg: Edema present.   Skin:     General: Skin is warm and dry.   Neurological:       Mental Status: She is alert and oriented to person, place, and time.      Cranial Nerves: No cranial nerve deficit.      Coordination: Coordination normal.          Procedures       MDM     Amount and/or Complexity of Data Reviewed  Decide to obtain previous medical records or to obtain history from someone other than the patient: yes         ED Course as of 08/02/24 2108   Fri Aug 02, 2024   1112 EKG read interpreted by me.  Rate 96 bpm.  Left axis deviation.  Normal KS interval.  Poor R wave progression.  Bifascicular block.  Right bundle branch block.  Compared to prior EKG on July 12, 2024 with no significant changes [JM]      ED Course User Index  [JM] Los Quintanilla MD      Patient is a 64 y.o. female presenting with abdominal swelling  Patient's differential includes but is not limited to liver cirrhosis, GI bleed, anemia hypomagnesemia.  Tests reviewed: Patient did have labs drawn.  Patient had a paracentesis ordered.  Patient had her abdomen drained.  Patient did have labs drawn.  Patient has a low white count of 1.6.  Patient did have a hemoglobin of 6.5.  Blood was ordered.  Patient did have a low magnesium of 1.5.  Patient was given replacement.  Creatinine was mildly elevated at 1.4.  Glucose was mildly elevated at 376.  Gastroenterology came down and talked with patient.  Patient will be admitted for GI bleed.  Discussed case with internal medicine agreed to admit patient.        Patient received 35 minutes of critical care time and this excludes all billable procedures.      External notes reviewed: Patient's previous discharge summary on 7/17/2024.  Patient did have a paracentesis that was hypoglycemia and GI bleed and anemia.          CONSULTS:   IP CONSULT TO INTERNAL MEDICINE    Medications given in the emergency room:  Medications   0.9 % sodium chloride infusion (has no administration in time range)   benzocaine-menthol (CEPACOL SORE THROAT) lozenge 1 lozenge (has no administration in

## 2024-08-02 NOTE — BRIEF OP NOTE
Brief-Op Note  ______________________________________________________________      IR U/S GUIDED PARACENTESIS  [facility] EMERGENCY DEPARTMENT    Patient Name: Lisa Hunt   YOB: 1960  Medical Record Number: [id number]  Date of Procedure: 8/2/24  Room/Bed: 09/09      Pre-operative Diagnosis: Ascites    Post-operative Diagnosis: Ascites    Consent: Informed consent was obtained from the patient prior to the procedure. The details of the procedure, as well is its risks, benefits, and alternatives, were explained.      Anesthesia: Local anesthesia.    Performed by: RANJITH Alvarez under on-site supervision by Madison Ndiaye MD.    Estimated blood loss: Minimal    Complications: None    Specimen Obtained: 4600mL of clear straw colored ascites fluid was withdrawn.    (See radiology dictation in PACs for image review and additional procedural information)    Electronically signed by RANJITH Alvarez   DD: 8/2/24  2:33 PM

## 2024-08-02 NOTE — H&P
Social Determinants of Health     Financial Resource Strain: Low Risk  (3/29/2024)    Overall Financial Resource Strain (CARDIA)     Difficulty of Paying Living Expenses: Not hard at all   Food Insecurity: No Food Insecurity (5/26/2024)    Hunger Vital Sign     Worried About Running Out of Food in the Last Year: Never true     Ran Out of Food in the Last Year: Never true   Transportation Needs: No Transportation Needs (5/26/2024)    PRAPARE - Transportation     Lack of Transportation (Medical): No     Lack of Transportation (Non-Medical): No   Physical Activity: Inactive (2/21/2024)    Exercise Vital Sign     Days of Exercise per Week: 0 days     Minutes of Exercise per Session: 0 min   Stress: Not on file   Social Connections: Not on file   Intimate Partner Violence: Not on file   Housing Stability: Low Risk  (5/26/2024)    Housing Stability Vital Sign     Unable to Pay for Housing in the Last Year: No     Number of Places Lived in the Last Year: 1     Unstable Housing in the Last Year: No       Review of Systems  All bolded are positive; please see HPI  General:  Fever, chills, diaphoresis, fatigue, malaise, night sweats, weight loss  Psychological:  Anxiety, disorientation, hallucinations.  ENT:  Epistaxis, headaches, vertigo, visual changes.  Cardiovascular:  Chest pain, irregular heartbeats, palpitations, paroxysmal nocturnal dyspnea.  Respiratory:  Shortness of breath, coughing, sputum production, hemoptysis, wheezing, orthopnea.  Gastrointestinal:  Nausea, vomiting, diarrhea, heartburn, constipation, abdominal pain, hematemesis, hematochezia, melena, acholic stools  Genito-Urinary:  Dysuria, urgency, frequency, hematuria  Musculoskeletal:  Joint pain, joint stiffness, joint swelling, muscle pain  Neurology:  Headache, focal neurological deficits, weakness, numbness, paresthesia  Derm:  Rashes, ulcers, excoriations, bruising  Extremities:  Decreased ROM, peripheral edema, mottling    Physical  Hematocrit 21.0 (L) 34.0 - 48.0 %    MCV 93.3 80.0 - 99.9 fL    MCH 28.9 26.0 - 35.0 pg    MCHC 31.0 (L) 32.0 - 34.5 g/dL    RDW 17.2 (H) 11.5 - 15.0 %    MPV 13.2 (H) 7.0 - 12.0 fL    Platelet, Fluorescence 42 (L) 130 - 450 k/uL    Neutrophils % 66 43.0 - 80.0 %    Lymphocytes % 24 20.0 - 42.0 %    Monocytes % 7 2.0 - 12.0 %    Eosinophils % 3 0 - 6 %    Basophils % 0 0.0 - 2.0 %    Immature Granulocytes % 0 0.0 - 5.0 %    Neutrophils Absolute 1.06 (L) 1.80 - 7.30 k/uL    Lymphocytes Absolute 0.38 (L) 1.50 - 4.00 k/uL    Monocytes Absolute 0.11 0.10 - 0.95 k/uL    Eosinophils Absolute 0.05 0.05 - 0.50 k/uL    Basophils Absolute 0.00 0.00 - 0.20 k/uL    Immature Granulocytes Absolute <0.03 0.00 - 0.58 k/uL    RBC Morphology 1+ ANISOCYTOSIS     RBC Morphology 1+ POLYCHROMASIA    CMP    Collection Time: 08/02/24 10:32 AM   Result Value Ref Range    Sodium 133 132 - 146 mmol/L    Potassium 5.2 (H) 3.5 - 5.0 mmol/L    Chloride 104 98 - 107 mmol/L    CO2 21 (L) 22 - 29 mmol/L    Anion Gap 8 7 - 16 mmol/L    Glucose 379 (H) 74 - 99 mg/dL    BUN 20 6 - 23 mg/dL    Creatinine 1.4 (H) 0.50 - 1.00 mg/dL    Est, Glom Filt Rate 41 (L) >60 mL/min/1.73m2    Calcium 8.2 (L) 8.6 - 10.2 mg/dL    Total Protein 7.5 6.4 - 8.3 g/dL    Albumin 3.1 (L) 3.5 - 5.2 g/dL    Total Bilirubin 0.8 0.0 - 1.2 mg/dL    Alkaline Phosphatase 128 (H) 35 - 104 U/L    ALT 17 0 - 32 U/L    AST 46 (H) 0 - 31 U/L   Protime-INR    Collection Time: 08/02/24 10:32 AM   Result Value Ref Range    Protime 15.2 (H) 9.3 - 12.4 sec    INR 1.4    Magnesium    Collection Time: 08/02/24 10:32 AM   Result Value Ref Range    Magnesium 1.5 (L) 1.6 - 2.6 mg/dL   Lipase    Collection Time: 08/02/24 10:32 AM   Result Value Ref Range    Lipase 27 13 - 60 U/L   Troponin    Collection Time: 08/02/24 10:32 AM   Result Value Ref Range    Troponin, High Sensitivity Unable to perform testing: Specimen hemolyzed. 0 - 14 ng/L    Troponin T Unable to perform testing: Specimen  SSI  Pancytopenia    Routine labs in the morning.  DVT prophylaxis.  Please see orders for further management and care.  If there are any questions after 7am, please contact [facility] Hospitalist that is assuming care.      Additional work up or/and treatment plan may be added today or thereafter based on clinical progression. I am managing admission portion of patient care. Some medical issues are handled by other specialists. Additional work up and treatment should be done by my colleague hospitalist who assumes care.       Eugenie Luna,     3:52 PM  8/2/2024

## 2024-08-02 NOTE — OP NOTE
Operative Note  ______________________________________________________________      IR U/S GUIDED PARACENTESIS  [facility] EMERGENCY DEPARTMENT    Patient Name: Lisa Hunt   YOB: 1960  Medical Record Number: [id number]  Date of Procedure: 8/2/24  Room/Bed: 09/09    Pre-operative Diagnosis: Ascites    Post-operative Diagnosis: Ascites    Consent: Informed consent was obtained from the patient prior to the procedure. The details of the procedure, as well is its risks, benefits, and alternatives, were explained.      Anesthesia: Local anesthesia with approximately 10mL of 2% Lidocaine without epinephrine administered subcutaneously.    Performed by: RANJITH Alvarez under on-site supervision by Madison Ndiaey MD.    Estimated blood loss: Minimal    Complications: None    Specimens Obtained: Ascites Fluid    Procedure: Routine scanning of all four abdominal quadrants was performed using real-time ultrasound and revealed sufficient amount of ascites fluid present.  Decision was made to proceed with procedure.  After obtaining consent, a \"Time-Out\" was called to verify the correct patient, procedure/location, allergies, relevant medications held for procedure and that all equipment is functioning and available. The patient was then placed in the supine position with the head of the bed slightly elevated and the appropriate landmarks were identified. The skin over the puncture site in the right lower quadrant region was prepped with betadine and draped in a sterile fashion. Local anesthesia was obtained by infiltration using 2% Lidocaine without epinephrine. A 5 Tajik needle sheath catheter was then advanced into the abdominal cavity. Fluid return was clear straw colored.      A total volume of  4600mL was withdrawn. The catheter was then withdrawn and a sterile dressing was placed over the site. The patient tolerated the procedure well..     Albumin: none.    Thank

## 2024-08-02 NOTE — CONSULTS
[facility]   Gastroenterology, Hepatology, &  Advanced Endoscopy    Consult  Note    Patient Presents with/Seen in Consultation For      ACOSTA Cirrhosis  Anemia    HPI:      Lisa Hunt presented to the emergency department for evaluation of recurrent Ascites, weakness, and fatigue. Her last paracentesis was 7/14/24 with a Hgb of 6.5. Pt is well known to our practice with a history of Acute on chronic anemia. Diagnoses of 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 (HCC), End stage liver disease (HCC)with portal venous hypertension and splenomegaly. In the ED, she had a paracentesis today with 4.6 liters removed. She was found to have a Hgb of 6.5 during this ED visit.     Review of Systems  Aside from what was mentioned in the PMH and HPI, essentially unremarkable, all others negative.    Objective:     Patient Vitals for the past 8 hrs:   BP Temp Temp src Pulse Resp SpO2 Weight   08/02/24 1417 122/75 97.6 °F (36.4 °C) -- 100 18 100 % --   08/02/24 1255 (!) 149/81 98 °F (36.7 °C) -- 94 17 100 % --   08/02/24 1245 (!) 149/81 -- -- 96 17 100 % --   08/02/24 1240 138/69 98 °F (36.7 °C) Oral 94 16 100 % --   08/02/24 1230 138/69 -- -- -- -- -- --   08/02/24 1157 (!) 145/88 -- -- 79 17 99 % --   08/02/24 1143 (!) 145/58 -- -- 90 14 100 % --   08/02/24 1131 (!) 151/64 98 °F (36.7 °C) -- 62 17 100 % --   08/02/24 0925 136/72 97.9 °F (36.6 °C) Oral 100 -- 99 % 81.6 kg (180 lb)       General appearance: alert, awake, laying in bed, and cooperative  Eyes: conjunctivae/corneas clear. PERRLA, No Scleral Icterus  Lungs: clear to auscultation bilaterally  Heart: regular rate and rhythm, no murmur  Abdomen: soft, non-tender; bowel sounds normal; no masses,  no organomegaly, mild distention.  Extremities: full ROM, no edema  Pulses: 2+ and symmetric  Skin: Skin color, texture, turgor normal. No Jaundice  Neurologic: Grossly normal    0.9 % sodium chloride infusion, PRN  magnesium sulfate 2000 mg in 50 mL IVPB premix, Once

## 2024-08-02 NOTE — OR NURSING
Sterile, woven gauze and clear transparent dressing applied to LLQ.    The dressing application occurred in a well-lit room with strict adherence to aseptic technique. Procedure site revealed no signs of hematoma or complications.    The next dressing change and wound assessment should occur in 24 hours. No submersion in water for 72 hours post procedure.

## 2024-08-03 LAB
ALBUMIN SERPL-MCNC: 3 G/DL (ref 3.5–5.2)
ALP SERPL-CCNC: 101 U/L (ref 35–104)
ALT SERPL-CCNC: 11 U/L (ref 0–32)
ANION GAP SERPL CALCULATED.3IONS-SCNC: 11 MMOL/L (ref 7–16)
AST SERPL-CCNC: 23 U/L (ref 0–31)
BILIRUB SERPL-MCNC: 2.1 MG/DL (ref 0–1.2)
BUN SERPL-MCNC: 17 MG/DL (ref 6–23)
CALCIUM SERPL-MCNC: 7.9 MG/DL (ref 8.6–10.2)
CHLORIDE SERPL-SCNC: 103 MMOL/L (ref 98–107)
CHOLEST SERPL-MCNC: 77 MG/DL
CHOLEST SERPL-MCNC: 77 MG/DL
CLOT ANGLE.KAOLIN INDUCED BLD RES TEG: 59.1 DEG (ref 53–70)
CO2 SERPL-SCNC: 22 MMOL/L (ref 22–29)
CREAT SERPL-MCNC: 1.1 MG/DL (ref 0.5–1)
EPL-TEG: 1 % (ref 0–15)
ERYTHROCYTE [DISTWIDTH] IN BLOOD BY AUTOMATED COUNT: 17.2 % (ref 11.5–15)
ERYTHROCYTE [DISTWIDTH] IN BLOOD BY AUTOMATED COUNT: 17.2 % (ref 11.5–15)
G-TEG: 4.2 KDYN/CM2 (ref 4.5–11)
GFR, ESTIMATED: 59 ML/MIN/1.73M2
GLUCOSE BLD-MCNC: 126 MG/DL (ref 74–99)
GLUCOSE BLD-MCNC: 132 MG/DL (ref 74–99)
GLUCOSE BLD-MCNC: 176 MG/DL (ref 74–99)
GLUCOSE BLD-MCNC: 230 MG/DL (ref 74–99)
GLUCOSE BLD-MCNC: 99 MG/DL (ref 74–99)
GLUCOSE SERPL-MCNC: 209 MG/DL (ref 74–99)
HBA1C MFR BLD: 7.7 % (ref 4–5.6)
HBA1C MFR BLD: 7.7 % (ref 4–5.6)
HCT VFR BLD AUTO: 20.6 % (ref 34–48)
HCT VFR BLD AUTO: 20.8 % (ref 34–48)
HCT VFR BLD AUTO: 21.5 % (ref 34–48)
HCT VFR BLD AUTO: 22.3 % (ref 34–48)
HCT VFR BLD AUTO: 23.2 % (ref 34–48)
HCT VFR BLD AUTO: 27.8 % (ref 34–48)
HDLC SERPL-MCNC: 40 MG/DL
HDLC SERPL-MCNC: 41 MG/DL
HGB BLD-MCNC: 6.4 G/DL (ref 11.5–15.5)
HGB BLD-MCNC: 6.4 G/DL (ref 11.5–15.5)
HGB BLD-MCNC: 6.7 G/DL (ref 11.5–15.5)
HGB BLD-MCNC: 6.9 G/DL (ref 11.5–15.5)
HGB BLD-MCNC: 7.5 G/DL (ref 11.5–15.5)
HGB BLD-MCNC: 8.6 G/DL (ref 11.5–15.5)
INR PPP: 1.5
KINETICS TEG: 3.2 MIN (ref 1–3)
LDLC SERPL CALC-MCNC: 25 MG/DL
LDLC SERPL CALC-MCNC: 26 MG/DL
LY30 (LYSIS) TEG: 1 % (ref 0–8)
MA (MAX CLOT) TEG: 45.5 MM (ref 50–70)
MAGNESIUM SERPL-MCNC: 1.6 MG/DL (ref 1.6–2.6)
MCH RBC QN AUTO: 27.7 PG (ref 26–35)
MCH RBC QN AUTO: 29.2 PG (ref 26–35)
MCHC RBC AUTO-ENTMCNC: 30.9 G/DL (ref 32–34.5)
MCHC RBC AUTO-ENTMCNC: 32.3 G/DL (ref 32–34.5)
MCV RBC AUTO: 89.6 FL (ref 80–99.9)
MCV RBC AUTO: 90.3 FL (ref 80–99.9)
PARTIAL THROMBOPLASTIN TIME: 30.2 SEC (ref 24.5–35.1)
PHOSPHATE SERPL-MCNC: 3.1 MG/DL (ref 2.5–4.5)
PHOSPHATE SERPL-MCNC: 3.4 MG/DL (ref 2.5–4.5)
PLATELET # BLD AUTO: 33 K/UL (ref 130–450)
PLATELET # BLD AUTO: 34 K/UL (ref 130–450)
PLATELET CONFIRMATION: NORMAL
PLATELET CONFIRMATION: NORMAL
PMV BLD AUTO: 11.4 FL (ref 7–12)
PMV BLD AUTO: 12.2 FL (ref 7–12)
POTASSIUM SERPL-SCNC: 4.2 MMOL/L (ref 3.5–5)
PROT SERPL-MCNC: 6.4 G/DL (ref 6.4–8.3)
PROTHROMBIN TIME: 16.5 SEC (ref 9.3–12.4)
RBC # BLD AUTO: 2.49 M/UL (ref 3.5–5.5)
RBC # BLD AUTO: 2.57 M/UL (ref 3.5–5.5)
REACTION TIME TEG: 4.2 MIN (ref 5–10)
SODIUM SERPL-SCNC: 136 MMOL/L (ref 132–146)
T4 FREE SERPL-MCNC: 1.2 NG/DL (ref 0.9–1.7)
T4 FREE SERPL-MCNC: 1.2 NG/DL (ref 0.9–1.7)
TRIGL SERPL-MCNC: 56 MG/DL
TRIGL SERPL-MCNC: 57 MG/DL
TSH SERPL DL<=0.05 MIU/L-ACNC: 3.82 UIU/ML (ref 0.27–4.2)
TSH SERPL DL<=0.05 MIU/L-ACNC: 5.04 UIU/ML (ref 0.27–4.2)
VLDLC SERPL CALC-MCNC: 11 MG/DL
VLDLC SERPL CALC-MCNC: 11 MG/DL
WBC OTHER # BLD: 1.4 K/UL (ref 4.5–11.5)
WBC OTHER # BLD: 1.5 K/UL (ref 4.5–11.5)

## 2024-08-03 PROCEDURE — 6370000000 HC RX 637 (ALT 250 FOR IP): Performed by: INTERNAL MEDICINE

## 2024-08-03 PROCEDURE — 85027 COMPLETE CBC AUTOMATED: CPT

## 2024-08-03 PROCEDURE — 2060000000 HC ICU INTERMEDIATE R&B

## 2024-08-03 PROCEDURE — 85384 FIBRINOGEN ACTIVITY: CPT

## 2024-08-03 PROCEDURE — 85576 BLOOD PLATELET AGGREGATION: CPT

## 2024-08-03 PROCEDURE — 36430 TRANSFUSION BLD/BLD COMPNT: CPT

## 2024-08-03 PROCEDURE — 2580000003 HC RX 258: Performed by: INTERNAL MEDICINE

## 2024-08-03 PROCEDURE — 85347 COAGULATION TIME ACTIVATED: CPT

## 2024-08-03 PROCEDURE — 83036 HEMOGLOBIN GLYCOSYLATED A1C: CPT

## 2024-08-03 PROCEDURE — 82962 GLUCOSE BLOOD TEST: CPT

## 2024-08-03 PROCEDURE — 85390 FIBRINOLYSINS SCREEN I&R: CPT

## 2024-08-03 PROCEDURE — P9047 ALBUMIN (HUMAN), 25%, 50ML: HCPCS | Performed by: INTERNAL MEDICINE

## 2024-08-03 PROCEDURE — 99232 SBSQ HOSP IP/OBS MODERATE 35: CPT | Performed by: INTERNAL MEDICINE

## 2024-08-03 PROCEDURE — P9073 PLATELETS PHERESIS PATH REDU: HCPCS

## 2024-08-03 PROCEDURE — 84100 ASSAY OF PHOSPHORUS: CPT

## 2024-08-03 PROCEDURE — 6360000002 HC RX W HCPCS: Performed by: INTERNAL MEDICINE

## 2024-08-03 PROCEDURE — 80061 LIPID PANEL: CPT

## 2024-08-03 PROCEDURE — 85730 THROMBOPLASTIN TIME PARTIAL: CPT

## 2024-08-03 PROCEDURE — 83735 ASSAY OF MAGNESIUM: CPT

## 2024-08-03 PROCEDURE — 85014 HEMATOCRIT: CPT

## 2024-08-03 PROCEDURE — 84443 ASSAY THYROID STIM HORMONE: CPT

## 2024-08-03 PROCEDURE — 36415 COLL VENOUS BLD VENIPUNCTURE: CPT

## 2024-08-03 PROCEDURE — 85018 HEMOGLOBIN: CPT

## 2024-08-03 PROCEDURE — 85610 PROTHROMBIN TIME: CPT

## 2024-08-03 PROCEDURE — 84439 ASSAY OF FREE THYROXINE: CPT

## 2024-08-03 PROCEDURE — 80053 COMPREHEN METABOLIC PANEL: CPT

## 2024-08-03 PROCEDURE — 30233R1 TRANSFUSION OF NONAUTOLOGOUS PLATELETS INTO PERIPHERAL VEIN, PERCUTANEOUS APPROACH: ICD-10-PCS | Performed by: INTERNAL MEDICINE

## 2024-08-03 PROCEDURE — P9016 RBC LEUKOCYTES REDUCED: HCPCS

## 2024-08-03 RX ORDER — KETOROLAC TROMETHAMINE 30 MG/ML
30 INJECTION, SOLUTION INTRAMUSCULAR; INTRAVENOUS EVERY 6 HOURS PRN
Status: ACTIVE | OUTPATIENT
Start: 2024-08-03 | End: 2024-08-04

## 2024-08-03 RX ORDER — SODIUM CHLORIDE 9 MG/ML
INJECTION, SOLUTION INTRAVENOUS PRN
Status: DISCONTINUED | OUTPATIENT
Start: 2024-08-03 | End: 2024-08-07 | Stop reason: HOSPADM

## 2024-08-03 RX ADMIN — MIDODRINE HYDROCHLORIDE 5 MG: 5 TABLET ORAL at 12:25

## 2024-08-03 RX ADMIN — SODIUM CHLORIDE, PRESERVATIVE FREE 10 ML: 5 INJECTION INTRAVENOUS at 21:05

## 2024-08-03 RX ADMIN — RIFAXIMIN 550 MG: 550 TABLET ORAL at 21:05

## 2024-08-03 RX ADMIN — INSULIN LISPRO 18 UNITS: 100 INJECTION, SOLUTION INTRAVENOUS; SUBCUTANEOUS at 08:52

## 2024-08-03 RX ADMIN — ALBUMIN (HUMAN) 25 G: 0.25 INJECTION, SOLUTION INTRAVENOUS at 08:56

## 2024-08-03 RX ADMIN — MIDODRINE HYDROCHLORIDE 5 MG: 5 TABLET ORAL at 08:50

## 2024-08-03 RX ADMIN — GABAPENTIN 600 MG: 300 CAPSULE ORAL at 21:05

## 2024-08-03 RX ADMIN — WATER 1000 MG: 1 INJECTION INTRAMUSCULAR; INTRAVENOUS; SUBCUTANEOUS at 17:25

## 2024-08-03 RX ADMIN — RIFAXIMIN 550 MG: 550 TABLET ORAL at 08:50

## 2024-08-03 RX ADMIN — INSULIN LISPRO 18 UNITS: 100 INJECTION, SOLUTION INTRAVENOUS; SUBCUTANEOUS at 17:36

## 2024-08-03 RX ADMIN — LACTULOSE 20 G: 20 SOLUTION ORAL at 14:04

## 2024-08-03 RX ADMIN — LACTULOSE 20 G: 20 SOLUTION ORAL at 23:34

## 2024-08-03 RX ADMIN — ALBUMIN (HUMAN) 25 G: 0.25 INJECTION, SOLUTION INTRAVENOUS at 01:08

## 2024-08-03 RX ADMIN — CARVEDILOL 3.12 MG: 6.25 TABLET, FILM COATED ORAL at 08:50

## 2024-08-03 RX ADMIN — Medication 2000 UNITS: at 08:50

## 2024-08-03 RX ADMIN — INSULIN GLARGINE 50 UNITS: 100 INJECTION, SOLUTION SUBCUTANEOUS at 23:45

## 2024-08-03 RX ADMIN — GABAPENTIN 600 MG: 300 CAPSULE ORAL at 14:04

## 2024-08-03 RX ADMIN — MIDODRINE HYDROCHLORIDE 5 MG: 5 TABLET ORAL at 17:37

## 2024-08-03 RX ADMIN — ALBUMIN (HUMAN) 25 G: 0.25 INJECTION, SOLUTION INTRAVENOUS at 19:53

## 2024-08-03 RX ADMIN — INSULIN LISPRO 18 UNITS: 100 INJECTION, SOLUTION INTRAVENOUS; SUBCUTANEOUS at 12:24

## 2024-08-03 RX ADMIN — INSULIN LISPRO 2 UNITS: 100 INJECTION, SOLUTION INTRAVENOUS; SUBCUTANEOUS at 08:52

## 2024-08-03 RX ADMIN — FUROSEMIDE 20 MG: 20 TABLET ORAL at 08:50

## 2024-08-03 RX ADMIN — PANTOPRAZOLE SODIUM 40 MG: 40 TABLET, DELAYED RELEASE ORAL at 06:48

## 2024-08-03 RX ADMIN — GABAPENTIN 600 MG: 300 CAPSULE ORAL at 08:50

## 2024-08-03 RX ADMIN — SODIUM CHLORIDE, PRESERVATIVE FREE 10 ML: 5 INJECTION INTRAVENOUS at 08:57

## 2024-08-03 RX ADMIN — LACTULOSE 20 G: 20 SOLUTION ORAL at 06:48

## 2024-08-03 NOTE — PROGRESS NOTES
HOSPITALIST PROGRESS NOTES     ADMITTING DATE AND TIME: 8/2/2024 11:56 AM  had concerns including Leg Swelling (Pt having swelling to legs and abdomen. Aaox4. Pt speaks mostly Citizen of the [place]. ).    ADMIT DX: Acute on chronic anemia [D64.9]      SUBJECTIVE:  Patient is being followed for Acute on chronic anemia [D64.9]     Patient was seen examined and evaluated  Recent lab results, charts and pertinent diagnostic imaging reviewed   Ancillary service notes reviewed   Resting comfortably in bed    Care reviewed with nursing staff, medical and surgical specialty care, primary care and the patient's family as available.   ROS: denies fever, chills, cp, sob, n/v, HA unless stated above.     sodium chloride flush  5-40 mL IntraVENous 2 times per day    carvedilol  3.125 mg Oral Daily    furosemide  20 mg Oral Daily    gabapentin  600 mg Oral TID    insulin glargine  50 Units SubCUTAneous Nightly    insulin lispro  18 Units SubCUTAneous TID WC    lactulose  20 g Oral q8h    midodrine  5 mg Oral TID WC    pantoprazole  40 mg Oral QAM AC    vitamin D  2,000 Units Oral Daily    rifAXIMin  550 mg Oral BID    insulin lispro  0-8 Units SubCUTAneous TID WC    insulin lispro  0-4 Units SubCUTAneous Nightly    albumin human 25%  25 g IntraVENous Q8H     sodium chloride, , PRN  sodium chloride, , PRN  benzocaine-menthol, 1 lozenge, Q2H PRN  benzonatate, 100 mg, TID PRN  calcium carbonate, 500 mg, TID PRN  hydrALAZINE, 10 mg, Q6H PRN  melatonin, 3 mg, Nightly PRN  Polyvinyl Alcohol-Povidone PF, 1 drop, PRN  sodium chloride, 1 spray, PRN  sodium chloride flush, 5-40 mL, PRN  sodium chloride, , PRN  potassium chloride, 40 mEq, PRN   Or  potassium alternative oral replacement, 40 mEq, PRN   Or  potassium chloride, 10 mEq, PRN  magnesium sulfate, 2,000 mg, PRN  ondansetron, 4 mg, Q8H PRN    concerns about the content of this note or information contained within the body of this dictation they should be addressed directly with the author for clarification.    Electronically signed by Casper March MD on 8/3/2024 at 10:20 AM

## 2024-08-03 NOTE — PROGRESS NOTES
4 Eyes Skin Assessment     NAME:  Lisa Hunt  YOB: 1960  MEDICAL RECORD NUMBER:  [id number]    The patient is being assessed for  Admission    I agree that at least one RN has performed a thorough Head to Toe Skin Assessment on the patient. ALL assessment sites listed below have been assessed.      Areas assessed by both nurses:    Head, Face, Ears, Shoulders, Back, Chest, Arms, Elbows, Hands, Sacrum. Buttock, Coccyx, Ischium, Legs. Feet and Heels, and Under Medical Devices         Does the Patient have a Wound? No noted wound(s)       Kane Prevention initiated by RN: Yes  Wound Care Orders initiated by RN: No    Pressure Injury (Stage 3,4, Unstageable, DTI, NWPT, and Complex wounds) if present, place Wound referral order by RN under : No    New Ostomies, if present place, Ostomy referral order under : No     Nurse 1 eSignature: Electronically signed by Michela Farias RN on 8/3/24 at 2:19 AM EDT    **SHARE this note so that the co-signing nurse can place an eSignature**    Nurse 2 eSignature: Electronically signed by Genie Hussein RN on 8/3/24 at 2:32 AM EDT

## 2024-08-03 NOTE — CONSENT
Informed Consent for Blood Component Transfusion Note    I have discussed with the patient the rationale for blood component transfusion; its benefits in treating or preventing fatigue, organ damage, or death; and its risk which includes mild transfusion reactions, rare risk of blood borne infection, or more serious but rare reactions. I have discussed the alternatives to transfusion, including the risk and consequences of not receiving transfusion. The patient had an opportunity to ask questions and had agreed to proceed with transfusion of blood components.    Electronically signed by Casper March MD on 8/3/24 at 3:26 PM EDT

## 2024-08-03 NOTE — PROGRESS NOTES
Pt's blood sugar was 470 on arrival from ED. Messaged Dr.Milanes to Notify and ordered an additional 4 units of Humalog. 8 units in total given with sliding scale. Nightly Lantus also given

## 2024-08-04 ENCOUNTER — APPOINTMENT (OUTPATIENT)
Dept: ULTRASOUND IMAGING | Age: 64
End: 2024-08-04
Payer: MEDICARE

## 2024-08-04 LAB
ABO/RH: NORMAL
ALBUMIN SERPL-MCNC: 3.3 G/DL (ref 3.5–5.2)
ALP SERPL-CCNC: 87 U/L (ref 35–104)
ALT SERPL-CCNC: 10 U/L (ref 0–32)
ANION GAP SERPL CALCULATED.3IONS-SCNC: 10 MMOL/L (ref 7–16)
ANTIBODY SCREEN: NEGATIVE
ARM BAND NUMBER: NORMAL
ARM BAND NUMBER: NORMAL
AST SERPL-CCNC: 24 U/L (ref 0–31)
BASOPHILS # BLD: 0 K/UL (ref 0–0.2)
BASOPHILS NFR BLD: 0 % (ref 0–2)
BILIRUB SERPL-MCNC: 1.2 MG/DL (ref 0–1.2)
BLOOD BANK BLOOD PRODUCT EXPIRATION DATE: NORMAL
BLOOD BANK DISPENSE STATUS: NORMAL
BLOOD BANK ISBT PRODUCT BLOOD TYPE: 600
BLOOD BANK ISBT PRODUCT BLOOD TYPE: 6200
BLOOD BANK PRODUCT CODE: NORMAL
BLOOD BANK SAMPLE EXPIRATION: NORMAL
BLOOD BANK UNIT TYPE AND RH: NORMAL
BPU ID: NORMAL
BUN SERPL-MCNC: 18 MG/DL (ref 6–23)
CALCIUM SERPL-MCNC: 7.9 MG/DL (ref 8.6–10.2)
CHLORIDE SERPL-SCNC: 106 MMOL/L (ref 98–107)
CO2 SERPL-SCNC: 22 MMOL/L (ref 22–29)
COMPONENT: NORMAL
CREAT SERPL-MCNC: 1.1 MG/DL (ref 0.5–1)
CROSSMATCH RESULT: NORMAL
EOSINOPHIL # BLD: 0.03 K/UL (ref 0.05–0.5)
EOSINOPHILS RELATIVE PERCENT: 3 % (ref 0–6)
ERYTHROCYTE [DISTWIDTH] IN BLOOD BY AUTOMATED COUNT: 17.1 % (ref 11.5–15)
GFR, ESTIMATED: 56 ML/MIN/1.73M2
GLUCOSE BLD-MCNC: 108 MG/DL (ref 74–99)
GLUCOSE BLD-MCNC: 133 MG/DL (ref 74–99)
GLUCOSE BLD-MCNC: 143 MG/DL (ref 74–99)
GLUCOSE BLD-MCNC: 171 MG/DL (ref 74–99)
GLUCOSE SERPL-MCNC: 166 MG/DL (ref 74–99)
HCT VFR BLD AUTO: 23.2 % (ref 34–48)
HCT VFR BLD AUTO: 25.2 % (ref 34–48)
HCT VFR BLD AUTO: 25.7 % (ref 34–48)
HCT VFR BLD AUTO: 26.8 % (ref 34–48)
HGB BLD-MCNC: 7.5 G/DL (ref 11.5–15.5)
HGB BLD-MCNC: 7.8 G/DL (ref 11.5–15.5)
HGB BLD-MCNC: 8.1 G/DL (ref 11.5–15.5)
HGB BLD-MCNC: 8.6 G/DL (ref 11.5–15.5)
LYMPHOCYTES NFR BLD: 0.35 K/UL (ref 1.5–4)
LYMPHOCYTES RELATIVE PERCENT: 27 % (ref 20–42)
MAGNESIUM SERPL-MCNC: 1.6 MG/DL (ref 1.6–2.6)
MCH RBC QN AUTO: 29 PG (ref 26–35)
MCHC RBC AUTO-ENTMCNC: 32.3 G/DL (ref 32–34.5)
MCV RBC AUTO: 89.6 FL (ref 80–99.9)
METAMYELOCYTES ABSOLUTE COUNT: 0.01 K/UL (ref 0–0.12)
METAMYELOCYTES: 1 % (ref 0–1)
MONOCYTES NFR BLD: 0.03 K/UL (ref 0.1–0.95)
MONOCYTES NFR BLD: 3 % (ref 2–12)
NEUTROPHILS NFR BLD: 67 % (ref 43–80)
NEUTS SEG NFR BLD: 0.87 K/UL (ref 1.8–7.3)
PHOSPHATE SERPL-MCNC: 3.5 MG/DL (ref 2.5–4.5)
PLATELET CONFIRMATION: NORMAL
PLATELET, FLUORESCENCE: 34 K/UL (ref 130–450)
PMV BLD AUTO: 12 FL (ref 7–12)
POTASSIUM SERPL-SCNC: 4.3 MMOL/L (ref 3.5–5)
PROT SERPL-MCNC: 6.1 G/DL (ref 6.4–8.3)
RBC # BLD AUTO: 2.59 M/UL (ref 3.5–5.5)
RBC # BLD: ABNORMAL 10*6/UL
SODIUM SERPL-SCNC: 138 MMOL/L (ref 132–146)
TRANSFUSION STATUS: NORMAL
UNIT DIVISION: 0
UNIT ISSUE DATE/TIME: NORMAL
WBC OTHER # BLD: 1.3 K/UL (ref 4.5–11.5)

## 2024-08-04 PROCEDURE — 2580000003 HC RX 258: Performed by: INTERNAL MEDICINE

## 2024-08-04 PROCEDURE — 85014 HEMATOCRIT: CPT

## 2024-08-04 PROCEDURE — 80053 COMPREHEN METABOLIC PANEL: CPT

## 2024-08-04 PROCEDURE — 36415 COLL VENOUS BLD VENIPUNCTURE: CPT

## 2024-08-04 PROCEDURE — 99232 SBSQ HOSP IP/OBS MODERATE 35: CPT | Performed by: INTERNAL MEDICINE

## 2024-08-04 PROCEDURE — 82962 GLUCOSE BLOOD TEST: CPT

## 2024-08-04 PROCEDURE — 6370000000 HC RX 637 (ALT 250 FOR IP): Performed by: INTERNAL MEDICINE

## 2024-08-04 PROCEDURE — 6360000002 HC RX W HCPCS: Performed by: INTERNAL MEDICINE

## 2024-08-04 PROCEDURE — 2060000000 HC ICU INTERMEDIATE R&B

## 2024-08-04 PROCEDURE — 83735 ASSAY OF MAGNESIUM: CPT

## 2024-08-04 PROCEDURE — 85018 HEMOGLOBIN: CPT

## 2024-08-04 PROCEDURE — 84100 ASSAY OF PHOSPHORUS: CPT

## 2024-08-04 PROCEDURE — 76705 ECHO EXAM OF ABDOMEN: CPT

## 2024-08-04 PROCEDURE — 85025 COMPLETE CBC W/AUTO DIFF WBC: CPT

## 2024-08-04 PROCEDURE — P9047 ALBUMIN (HUMAN), 25%, 50ML: HCPCS | Performed by: INTERNAL MEDICINE

## 2024-08-04 RX ADMIN — MIDODRINE HYDROCHLORIDE 5 MG: 5 TABLET ORAL at 08:37

## 2024-08-04 RX ADMIN — GABAPENTIN 600 MG: 300 CAPSULE ORAL at 13:30

## 2024-08-04 RX ADMIN — PANTOPRAZOLE SODIUM 40 MG: 40 TABLET, DELAYED RELEASE ORAL at 06:11

## 2024-08-04 RX ADMIN — ALBUMIN (HUMAN) 25 G: 0.25 INJECTION, SOLUTION INTRAVENOUS at 01:59

## 2024-08-04 RX ADMIN — RIFAXIMIN 550 MG: 550 TABLET ORAL at 08:37

## 2024-08-04 RX ADMIN — RIFAXIMIN 550 MG: 550 TABLET ORAL at 20:06

## 2024-08-04 RX ADMIN — ALBUMIN (HUMAN) 25 G: 0.25 INJECTION, SOLUTION INTRAVENOUS at 16:23

## 2024-08-04 RX ADMIN — CARVEDILOL 3.12 MG: 6.25 TABLET, FILM COATED ORAL at 08:37

## 2024-08-04 RX ADMIN — LACTULOSE 20 G: 20 SOLUTION ORAL at 13:31

## 2024-08-04 RX ADMIN — LACTULOSE 20 G: 20 SOLUTION ORAL at 06:11

## 2024-08-04 RX ADMIN — INSULIN LISPRO 18 UNITS: 100 INJECTION, SOLUTION INTRAVENOUS; SUBCUTANEOUS at 13:31

## 2024-08-04 RX ADMIN — WATER 1000 MG: 1 INJECTION INTRAMUSCULAR; INTRAVENOUS; SUBCUTANEOUS at 16:22

## 2024-08-04 RX ADMIN — FUROSEMIDE 20 MG: 20 TABLET ORAL at 08:36

## 2024-08-04 RX ADMIN — INSULIN LISPRO 18 UNITS: 100 INJECTION, SOLUTION INTRAVENOUS; SUBCUTANEOUS at 08:37

## 2024-08-04 RX ADMIN — INSULIN LISPRO 18 UNITS: 100 INJECTION, SOLUTION INTRAVENOUS; SUBCUTANEOUS at 17:31

## 2024-08-04 RX ADMIN — ALBUMIN (HUMAN) 25 G: 0.25 INJECTION, SOLUTION INTRAVENOUS at 08:42

## 2024-08-04 RX ADMIN — SODIUM CHLORIDE, PRESERVATIVE FREE 10 ML: 5 INJECTION INTRAVENOUS at 20:07

## 2024-08-04 RX ADMIN — GABAPENTIN 600 MG: 300 CAPSULE ORAL at 20:06

## 2024-08-04 RX ADMIN — GABAPENTIN 600 MG: 300 CAPSULE ORAL at 08:36

## 2024-08-04 RX ADMIN — Medication 2000 UNITS: at 08:36

## 2024-08-04 RX ADMIN — INSULIN GLARGINE 50 UNITS: 100 INJECTION, SOLUTION SUBCUTANEOUS at 20:06

## 2024-08-04 RX ADMIN — SODIUM CHLORIDE, PRESERVATIVE FREE 10 ML: 5 INJECTION INTRAVENOUS at 09:15

## 2024-08-04 NOTE — PROGRESS NOTES
HOSPITALIST PROGRESS NOTES     ADMITTING DATE AND TIME: 8/2/2024 11:56 AM  had concerns including Leg Swelling (Pt having swelling to legs and abdomen. Aaox4. Pt speaks mostly Fijian. ).    ADMIT DX: Acute on chronic anemia [D64.9]      SUBJECTIVE:  Patient is being followed for Acute on chronic anemia [D64.9]     Patient was seen examined and evaluated  Recent lab results, charts and pertinent diagnostic imaging reviewed   Ancillary service notes reviewed   Resting comfortably in bed   was used for the encounter    Care reviewed with nursing staff, medical and surgical specialty care, primary care and the patient's family as available.   ROS: denies fever, chills, cp, sob, n/v, HA unless stated above.     cefTRIAXone (ROCEPHIN) IV  1,000 mg IntraVENous Q24H    sodium chloride flush  5-40 mL IntraVENous 2 times per day    carvedilol  3.125 mg Oral Daily    furosemide  20 mg Oral Daily    gabapentin  600 mg Oral TID    insulin glargine  50 Units SubCUTAneous Nightly    insulin lispro  18 Units SubCUTAneous TID WC    lactulose  20 g Oral q8h    [Held by provider] midodrine  5 mg Oral TID WC    pantoprazole  40 mg Oral QAM AC    vitamin D  2,000 Units Oral Daily    rifAXIMin  550 mg Oral BID    insulin lispro  0-8 Units SubCUTAneous TID WC    insulin lispro  0-4 Units SubCUTAneous Nightly    albumin human 25%  25 g IntraVENous Q8H     sodium chloride, , PRN  sodium chloride, , PRN  sodium chloride, , PRN  ketorolac, 30 mg, Q6H PRN  sodium chloride, , PRN  benzocaine-menthol, 1 lozenge, Q2H PRN  benzonatate, 100 mg, TID PRN  calcium carbonate, 500 mg, TID PRN  hydrALAZINE, 10 mg, Q6H PRN  melatonin, 3 mg, Nightly PRN  Polyvinyl Alcohol-Povidone PF, 1 drop, PRN  sodium chloride, 1 spray, PRN  sodium chloride flush, 5-40 mL, PRN  sodium chloride, ,  monitor blood sugar  Adjust insulin based on 24-hour requirement    Pancytopenia  Secondary to liver cirrhosis  Monitor, repeat CBC    Disposition: Pending the above                                                                 NOTE: This note was generated by the epic EMR system/Dragon speech recognition and may contain inherent errors or omissions not intended by the user. Grammatical errors, random word insertions, deletions, pronoun errors and incomplete sentences are occasional consequences of this technology due to software limitations. Not all errors are caught and corrected. If there are questions or concerns about the content of this note or information contained within the body of this dictation they should be addressed directly with the author for clarification.    Electronically signed by Casper March MD on 8/4/2024 at 1:25 PM

## 2024-08-05 LAB
ALBUMIN SERPL-MCNC: 3.8 G/DL (ref 3.5–5.2)
ALP SERPL-CCNC: 86 U/L (ref 35–104)
ALT SERPL-CCNC: 9 U/L (ref 0–32)
ANION GAP SERPL CALCULATED.3IONS-SCNC: 13 MMOL/L (ref 7–16)
AST SERPL-CCNC: 23 U/L (ref 0–31)
BASOPHILS # BLD: 0.01 K/UL (ref 0–0.2)
BASOPHILS NFR BLD: 1 % (ref 0–2)
BILIRUB SERPL-MCNC: 1.1 MG/DL (ref 0–1.2)
BUN SERPL-MCNC: 20 MG/DL (ref 6–23)
CALCIUM SERPL-MCNC: 8.3 MG/DL (ref 8.6–10.2)
CHLORIDE SERPL-SCNC: 107 MMOL/L (ref 98–107)
CO2 SERPL-SCNC: 21 MMOL/L (ref 22–29)
CREAT SERPL-MCNC: 1.2 MG/DL (ref 0.5–1)
EOSINOPHIL # BLD: 0.05 K/UL (ref 0.05–0.5)
EOSINOPHILS RELATIVE PERCENT: 4 % (ref 0–6)
ERYTHROCYTE [DISTWIDTH] IN BLOOD BY AUTOMATED COUNT: 17.2 % (ref 11.5–15)
GFR, ESTIMATED: 53 ML/MIN/1.73M2
GLUCOSE BLD-MCNC: 122 MG/DL (ref 74–99)
GLUCOSE BLD-MCNC: 124 MG/DL (ref 74–99)
GLUCOSE BLD-MCNC: 254 MG/DL (ref 74–99)
GLUCOSE BLD-MCNC: 286 MG/DL (ref 74–99)
GLUCOSE SERPL-MCNC: 132 MG/DL (ref 74–99)
HCT VFR BLD AUTO: 22.4 % (ref 34–48)
HCT VFR BLD AUTO: 23.8 % (ref 34–48)
HCT VFR BLD AUTO: 24.4 % (ref 34–48)
HCT VFR BLD AUTO: 28.1 % (ref 34–48)
HGB BLD-MCNC: 7.2 G/DL (ref 11.5–15.5)
HGB BLD-MCNC: 7.4 G/DL (ref 11.5–15.5)
HGB BLD-MCNC: 7.4 G/DL (ref 11.5–15.5)
HGB BLD-MCNC: 8.7 G/DL (ref 11.5–15.5)
IMM GRANULOCYTES # BLD AUTO: <0.03 K/UL (ref 0–0.58)
IMM GRANULOCYTES NFR BLD: 1 % (ref 0–5)
LYMPHOCYTES NFR BLD: 0.4 K/UL (ref 1.5–4)
LYMPHOCYTES RELATIVE PERCENT: 30 % (ref 20–42)
MAGNESIUM SERPL-MCNC: 1.6 MG/DL (ref 1.6–2.6)
MCH RBC QN AUTO: 27.9 PG (ref 26–35)
MCHC RBC AUTO-ENTMCNC: 30.3 G/DL (ref 32–34.5)
MCV RBC AUTO: 92.1 FL (ref 80–99.9)
MONOCYTES NFR BLD: 0.15 K/UL (ref 0.1–0.95)
MONOCYTES NFR BLD: 11 % (ref 2–12)
NEUTROPHILS NFR BLD: 53 % (ref 43–80)
NEUTS SEG NFR BLD: 0.7 K/UL (ref 1.8–7.3)
PHOSPHATE SERPL-MCNC: 3 MG/DL (ref 2.5–4.5)
PLATELET # BLD AUTO: 39 K/UL (ref 130–450)
PLATELET CONFIRMATION: NORMAL
PMV BLD AUTO: 12.8 FL (ref 7–12)
POTASSIUM SERPL-SCNC: 4.6 MMOL/L (ref 3.5–5)
PROT SERPL-MCNC: 6.4 G/DL (ref 6.4–8.3)
RBC # BLD AUTO: 2.65 M/UL (ref 3.5–5.5)
RBC # BLD: ABNORMAL 10*6/UL
SODIUM SERPL-SCNC: 141 MMOL/L (ref 132–146)
WBC OTHER # BLD: 1.3 K/UL (ref 4.5–11.5)

## 2024-08-05 PROCEDURE — 80053 COMPREHEN METABOLIC PANEL: CPT

## 2024-08-05 PROCEDURE — 2580000003 HC RX 258: Performed by: INTERNAL MEDICINE

## 2024-08-05 PROCEDURE — 6360000002 HC RX W HCPCS: Performed by: INTERNAL MEDICINE

## 2024-08-05 PROCEDURE — 85014 HEMATOCRIT: CPT

## 2024-08-05 PROCEDURE — 99231 SBSQ HOSP IP/OBS SF/LOW 25: CPT | Performed by: INTERNAL MEDICINE

## 2024-08-05 PROCEDURE — 2060000000 HC ICU INTERMEDIATE R&B

## 2024-08-05 PROCEDURE — 83735 ASSAY OF MAGNESIUM: CPT

## 2024-08-05 PROCEDURE — 36415 COLL VENOUS BLD VENIPUNCTURE: CPT

## 2024-08-05 PROCEDURE — 82962 GLUCOSE BLOOD TEST: CPT

## 2024-08-05 PROCEDURE — 6370000000 HC RX 637 (ALT 250 FOR IP): Performed by: INTERNAL MEDICINE

## 2024-08-05 PROCEDURE — 85018 HEMOGLOBIN: CPT

## 2024-08-05 PROCEDURE — 84100 ASSAY OF PHOSPHORUS: CPT

## 2024-08-05 PROCEDURE — 85025 COMPLETE CBC W/AUTO DIFF WBC: CPT

## 2024-08-05 PROCEDURE — P9047 ALBUMIN (HUMAN), 25%, 50ML: HCPCS | Performed by: INTERNAL MEDICINE

## 2024-08-05 PROCEDURE — 99231 SBSQ HOSP IP/OBS SF/LOW 25: CPT | Performed by: NURSE PRACTITIONER

## 2024-08-05 RX ADMIN — LACTULOSE 20 G: 20 SOLUTION ORAL at 00:18

## 2024-08-05 RX ADMIN — RIFAXIMIN 550 MG: 550 TABLET ORAL at 09:03

## 2024-08-05 RX ADMIN — Medication 2000 UNITS: at 09:02

## 2024-08-05 RX ADMIN — ALBUMIN (HUMAN) 25 G: 0.25 INJECTION, SOLUTION INTRAVENOUS at 00:38

## 2024-08-05 RX ADMIN — INSULIN LISPRO 18 UNITS: 100 INJECTION, SOLUTION INTRAVENOUS; SUBCUTANEOUS at 17:39

## 2024-08-05 RX ADMIN — WATER 1000 MG: 1 INJECTION INTRAMUSCULAR; INTRAVENOUS; SUBCUTANEOUS at 15:31

## 2024-08-05 RX ADMIN — MAGNESIUM SULFATE HEPTAHYDRATE 2000 MG: 40 INJECTION, SOLUTION INTRAVENOUS at 10:29

## 2024-08-05 RX ADMIN — SODIUM CHLORIDE, PRESERVATIVE FREE 10 ML: 5 INJECTION INTRAVENOUS at 09:03

## 2024-08-05 RX ADMIN — ALBUMIN (HUMAN) 25 G: 0.25 INJECTION, SOLUTION INTRAVENOUS at 17:48

## 2024-08-05 RX ADMIN — INSULIN GLARGINE 50 UNITS: 100 INJECTION, SOLUTION SUBCUTANEOUS at 22:12

## 2024-08-05 RX ADMIN — SODIUM CHLORIDE, PRESERVATIVE FREE 10 ML: 5 INJECTION INTRAVENOUS at 22:14

## 2024-08-05 RX ADMIN — CARVEDILOL 3.12 MG: 6.25 TABLET, FILM COATED ORAL at 09:03

## 2024-08-05 RX ADMIN — INSULIN LISPRO 4 UNITS: 100 INJECTION, SOLUTION INTRAVENOUS; SUBCUTANEOUS at 17:38

## 2024-08-05 RX ADMIN — LACTULOSE 20 G: 20 SOLUTION ORAL at 05:50

## 2024-08-05 RX ADMIN — LACTULOSE 20 G: 20 SOLUTION ORAL at 15:31

## 2024-08-05 RX ADMIN — GABAPENTIN 600 MG: 300 CAPSULE ORAL at 14:03

## 2024-08-05 RX ADMIN — PANTOPRAZOLE SODIUM 40 MG: 40 TABLET, DELAYED RELEASE ORAL at 05:50

## 2024-08-05 RX ADMIN — ALBUMIN (HUMAN) 25 G: 0.25 INJECTION, SOLUTION INTRAVENOUS at 09:02

## 2024-08-05 RX ADMIN — GABAPENTIN 600 MG: 300 CAPSULE ORAL at 09:03

## 2024-08-05 RX ADMIN — FUROSEMIDE 20 MG: 20 TABLET ORAL at 09:03

## 2024-08-05 RX ADMIN — RIFAXIMIN 550 MG: 550 TABLET ORAL at 22:13

## 2024-08-05 RX ADMIN — GABAPENTIN 600 MG: 300 CAPSULE ORAL at 22:13

## 2024-08-05 ASSESSMENT — PAIN SCALES - GENERAL: PAINLEVEL_OUTOF10: 0

## 2024-08-05 NOTE — CARE COORDINATION
Chart reviewed for transition of care at discharge. Admitted with acute on chronic anemia. Hb is 7.4 today. NPO for EGD today. One unit of blood is ordered. Palliative care is on consult. She is on Rocephin q 24 hours IV. History of ACOSTA cirrhosis with ascites, insulin-dependent diabetes mellitus, pancytopenia. In the ER, she had paracentesis with 4.6 L removed. Met with patient at bedside, wanted me to speak to speak to daughter. Spoke with Lisa who stated that the patient is living with her sister in [place], but is from [place]. Pt lives in a 2 story home with her daughter Kelsey. Her PCP is Dr Hernandez and she uses [facility]. She owns a walker. No HHC or reanna history. Daughter stated she was on Hospice, but they came and removed everything since they are seeking treatment. She is unsure which Hospice. She state her sister is primary care giver for mother. Lisa stated that the discharge plan is home with her sister when medically stable. CM/SW will follow. Will need therapy to assess for d/c needs when medically able. CM/SW will follow.  Electronically signed by Rony Gamble RN on 8/5/2024 at 3:36 PM    Case Management Assessment  Initial Evaluation    Date/Time of Evaluation: 8/5/2024 3:39 PM  Assessment Completed by: Rony Gamble RN    If patient is discharged prior to next notation, then this note serves as note for discharge by case management.    Patient Name: Lisa Hunt                   YOB: 1960  Diagnosis: Acute on chronic anemia [D64.9]                   Date / Time: 8/2/2024 11:56 AM    Patient Admission Status: Inpatient   Readmission Risk (Low < 19, Mod (19-27), High > 27): Readmission Risk Score: 30.4    Current PCP: Tonie Hernandez, DO  PCP verified by CM? Yes    Chart Reviewed: Yes      History Provided by: Child/Family  Patient Orientation: Alert and Oriented    Patient Cognition: Alert    Hospitalization in the last 30 days (Readmission):  No  with a choice of provider and agrees with the discharge plan. Freedom of choice list with basic dialogue that supports the patient's individualized plan of care/goals and shares the quality data associated with the providers was provided to:     Patient Representative Name:       The Patient and/or Patient Representative Agree with the Discharge Plan

## 2024-08-05 NOTE — ACP (ADVANCE CARE PLANNING)
Advance Care Planning   The patient has the following advanced directives on file:  Advance Directives       Power of  Living Will ACP-Advance Directive ACP-Power of     Not on File Not on File Not on File Not on File            The patient has appointed the following active healthcare agents:      The Patient has the following current code status:    Code Status: Full Code    Rony Gamble RN  8/5/2024

## 2024-08-05 NOTE — CONSULTS
Palliative Care Department  [phone]  Palliative Care Initial Consult  Provider Natalie Baxter, APRN - CNP    Lisa Hunt  [id number]  Hospital Day: 4  Date of Initial Consult: 8/5/2024  Referring Provider: Casper March MD  Palliative Medicine was consulted for assistance with: Goals of care    HPI:   Lisa Hunt is a 64 y.o. with a medical history of cirrhosis with recurrent ascites r/t ACOSTA, DONNA, DM, HTN who was admitted on 8/2/2024 from home with a CHIEF COMPLAINT of leg and abdominal swelling. In ED hgb 6.5. Paracentesis with 4.6L removed. CXR showing no acute cardiopulmonary disease. Recent hospitalization 5/26 where she was treated for altered mental status, DONNA and hyperammonemia. Patient admitted for further medical management.  EGD planned for 8/6.  Palliative medicine consulted to assist further with goals of care.    ASSESSMENT/PLAN:     Pertinent Hospital Diagnoses     Acute on chronic anemia; hx esophageal varices  ACOSTA; decompensated liver cirrhosis  Liver mass  S/p liver biopsy July 17, 2024  Following with Dr. Sosa, B and pancreatic surgery    Palliative Care Encounter / Counseling Regarding Goals of Care  Please see detailed goals of care discussion as below  At this time, Lisa Hunt, Does have capacity for medical decision-making.  Capacity is time limited and situation/question specific  During encounter Mahsa was surrogate medical decision-maker  Outcome of goals of care meeting:   Continue with current medical treatment  Continue full code  Patient awaiting liver biopsy result and discuss option with Dr. Sosa, before making other decisions  Patient informed, there was a miscommunication about hospice, her daughters misunderstood hospice philosophy, she is not ready to stop pursuing treatment  Goal is to be discharged home, no interested for short-term rehab  Daughters are in the process of taking patient back to [place]    Code status Full  without continue with treatments, they do not want hospice, and are currently in the process of arranging for her to return back to [place] in the near future, and pursue treatments there.  Discharge goal is to return back home to her daughter, patient not interested for short-term rehab.    -We discussed CODE STATUS, patient wishes to remain a full code, with no long-term vent support. she gave permission to contact daughter Lisa and update her of the above.      -Spoke with daughter Lisa over the phone, she informed, she was the one who received a phone call from someone recommended hospice, not remember the name, however comfort measures and hospice is patient and family want.  Plan is to continue to pursue treatment.  I did review with Lisa about her mother multiple medical comorbidities with more frequent ascites, anemia, and liver failure understanding, she would like to wait for biopsy results, discussed option with Dr. Sosa, however Lisa stated plan is for her mother to return to [place] as soon as they are able to arrange housing and transportation there.    OBJECTIVE:   Prognosis: Poor    Physical Exam:  /60   Pulse 80   Temp 97.8 °F (36.6 °C) (Temporal)   Resp 16   Ht 1.6 m (5' 2.99\")   Wt 81.6 kg (180 lb)   SpO2 97%   BMI 31.89 kg/m²   Constitutional:  Elderly, thin, NAD, awake, alert  Neck:  trachea midline, no JVD  Lungs:  CTA bilaterally  Heart::  RRR  Ext:  Moving all extremities, edema, pulses present  Skin:  Warm and dry, no rashes on visible skin  Psych: non-anxious affect  Neuro:  PERRL, Alert, grossly nonfocal; following commands    Objective data reviewed: labs, images, records, medication use, vitals, and chart    Discussed patient and the plan of care with the other IDT members: Patient and Family    Time/Communication  Greater than 50% of time spent, total 75 minutes in counseling and coordination of care at the bedside regarding goals of  care.    Thank you for allowing Palliative Medicine to participate in the care of Lisa Hunt.

## 2024-08-05 NOTE — PROGRESS NOTES
[facility]   Gastroenterology, Hepatology, &  Advanced Endoscopy    Progress Note      As patient is a Hospice. No EGD is planned per Dr. Conrad    Patient Presents with/Seen in Consultation For      ACOSTA Cirrhosis  Anemia    HPI:      Lisa Hunt presented to the emergency department for evaluation of recurrent Ascites, weakness, and fatigue. Her last paracentesis was 7/14/24 with a Hgb of 6.5. Pt is well known to our practice with a history of Acute on chronic anemia. Diagnoses of 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 (HCC), End stage liver disease (HCC)with portal venous hypertension and splenomegaly. In the ED, she had a paracentesis today with 4.6 liters removed. She was found to have a Hgb of 6.5 during this ED visit.     Review of Systems  Aside from what was mentioned in the PMH and HPI, essentially unremarkable, all others negative.    Objective:     Patient Vitals for the past 8 hrs:   BP Temp Temp src Pulse Resp SpO2   08/05/24 1043 (!) 120/54 97.7 °F (36.5 °C) Temporal 79 16 96 %   08/05/24 0829 (!) 125/57 98.2 °F (36.8 °C) -- 81 18 97 %       General appearance: alert, awake, laying in bed, and cooperative  Eyes: conjunctivae/corneas clear. PERRLA, No Scleral Icterus  Lungs: clear to auscultation bilaterally  Heart: regular rate and rhythm, no murmur  Abdomen: soft, non-tender; bowel sounds normal; no masses,  no organomegaly, mild distention.  Extremities: full ROM, no edema  Pulses: 2+ and symmetric  Skin: Skin color, texture, turgor normal. No Jaundice  Neurologic: Grossly normal    0.9 % sodium chloride infusion, PRN  0.9 % sodium chloride infusion, PRN  cefTRIAXone (ROCEPHIN) 1,000 mg in sterile water 10 mL IV syringe, Q24H  0.9 % sodium chloride infusion, PRN  0.9 % sodium chloride infusion, PRN  benzocaine-menthol (CEPACOL SORE THROAT) lozenge 1 lozenge, Q2H PRN  benzonatate (TESSALON) capsule 100 mg, TID PRN  calcium carbonate (TUMS) chewable tablet 500 mg, TID PRN  hydrALAZINE (APRESOLINE)  injection 10 mg, Q6H PRN  melatonin tablet 3 mg, Nightly PRN  Polyvinyl Alcohol-Povidone PF (REFRESH) 1.4-0.6 % ophthalmic solution 1 drop, PRN  sodium chloride (OCEAN, BABY AYR) 0.65 % nasal spray 1 spray, PRN  sodium chloride flush 0.9 % injection 5-40 mL, 2 times per day  sodium chloride flush 0.9 % injection 5-40 mL, PRN  0.9 % sodium chloride infusion, PRN  potassium chloride (KLOR-CON M) extended release tablet 40 mEq, PRN   Or  potassium bicarb-citric acid (EFFER-K) effervescent tablet 40 mEq, PRN   Or  potassium chloride 10 mEq/100 mL IVPB (Peripheral Line), PRN  magnesium sulfate 2000 mg in 50 mL IVPB premix, PRN  ondansetron (ZOFRAN-ODT) disintegrating tablet 4 mg, Q8H PRN   Or  ondansetron (ZOFRAN) injection 4 mg, Q6H PRN  polyethylene glycol (GLYCOLAX) packet 17 g, Daily PRN  carvedilol (COREG) tablet 3.125 mg, Daily  furosemide (LASIX) tablet 20 mg, Daily  gabapentin (NEURONTIN) capsule 600 mg, TID  insulin glargine (LANTUS) injection vial 50 Units, Nightly  insulin lispro (HUMALOG,ADMELOG) injection vial 18 Units, TID WC  lactulose (CHRONULAC) 10 GM/15ML solution 20 g, q8h  [Held by provider] midodrine (PROAMATINE) tablet 5 mg, TID WC  pantoprazole (PROTONIX) tablet 40 mg, QAM AC  vitamin D (CHOLECALCIFEROL) tablet 2,000 Units, Daily  rifAXIMin (XIFAXAN) tablet 550 mg, BID  insulin lispro (HUMALOG,ADMELOG) injection vial 0-8 Units, TID WC  insulin lispro (HUMALOG,ADMELOG) injection vial 0-4 Units, Nightly  albumin human 25% IV solution 25 g, Q8H  glucose chewable tablet 16 g, PRN  dextrose bolus 10% 125 mL, PRN   Or  dextrose bolus 10% 250 mL, PRN  glucagon injection 1 mg, PRN  dextrose 10 % infusion, Continuous PRN         Data Review  Recent Labs     08/03/24  0754 08/03/24  1748 08/04/24  0551 08/05/24  0550   INR  --  1.5  --   --    ALT 11  --  10 9   AST 23  --  24 23   ALKPHOS 101  --  87 86   BILITOT 2.1*  --  1.2 1.1       Lab Results   Component Value Date    WBC 1.3 (L) 08/05/2024    HGB 7.4

## 2024-08-05 NOTE — PROGRESS NOTES
HOSPITALIST PROGRESS NOTES     ADMITTING DATE AND TIME: 8/2/2024 11:56 AM  had concerns including Leg Swelling (Pt having swelling to legs and abdomen. Aaox4. Pt speaks mostly Samoan. ).    ADMIT DX: Acute on chronic anemia [D64.9]      SUBJECTIVE:  Patient is being followed for Acute on chronic anemia [D64.9]     Patient was seen examined and evaluated  Recent lab results, charts and pertinent diagnostic imaging reviewed   Ancillary service notes reviewed   Resting comfortably in bed    Care reviewed with nursing staff, medical and surgical specialty care, primary care and the patient's family as available.   ROS: denies fever, chills, cp, sob, n/v, HA unless stated above.     cefTRIAXone (ROCEPHIN) IV  1,000 mg IntraVENous Q24H    sodium chloride flush  5-40 mL IntraVENous 2 times per day    carvedilol  3.125 mg Oral Daily    furosemide  20 mg Oral Daily    gabapentin  600 mg Oral TID    insulin glargine  50 Units SubCUTAneous Nightly    insulin lispro  18 Units SubCUTAneous TID WC    lactulose  20 g Oral q8h    [Held by provider] midodrine  5 mg Oral TID WC    pantoprazole  40 mg Oral QAM AC    vitamin D  2,000 Units Oral Daily    rifAXIMin  550 mg Oral BID    insulin lispro  0-8 Units SubCUTAneous TID WC    insulin lispro  0-4 Units SubCUTAneous Nightly    albumin human 25%  25 g IntraVENous Q8H     sodium chloride, , PRN  sodium chloride, , PRN  sodium chloride, , PRN  sodium chloride, , PRN  benzocaine-menthol, 1 lozenge, Q2H PRN  benzonatate, 100 mg, TID PRN  calcium carbonate, 500 mg, TID PRN  hydrALAZINE, 10 mg, Q6H PRN  melatonin, 3 mg, Nightly PRN  Polyvinyl Alcohol-Povidone PF, 1 drop, PRN  sodium chloride, 1 spray, PRN  sodium chloride flush, 5-40 mL, PRN  sodium chloride, , PRN  potassium chloride, 40 mEq, PRN   Or  potassium alternative oral replacement, 40

## 2024-08-06 ENCOUNTER — ANESTHESIA EVENT (OUTPATIENT)
Dept: ENDOSCOPY | Age: 64
End: 2024-08-06
Payer: MEDICARE

## 2024-08-06 ENCOUNTER — ANESTHESIA (OUTPATIENT)
Dept: ENDOSCOPY | Age: 64
End: 2024-08-06
Payer: MEDICARE

## 2024-08-06 PROBLEM — R18.8 CIRRHOSIS OF LIVER WITH ASCITES (HCC): Status: ACTIVE | Noted: 2023-05-02

## 2024-08-06 PROBLEM — Z51.5 PALLIATIVE CARE BY SPECIALIST: Status: ACTIVE | Noted: 2024-08-06

## 2024-08-06 LAB
ALBUMIN SERPL-MCNC: 4.2 G/DL (ref 3.5–5.2)
ALP SERPL-CCNC: 96 U/L (ref 35–104)
ALT SERPL-CCNC: 9 U/L (ref 0–32)
ANION GAP SERPL CALCULATED.3IONS-SCNC: 11 MMOL/L (ref 7–16)
AST SERPL-CCNC: 24 U/L (ref 0–31)
BASOPHILS # BLD: 0.02 K/UL (ref 0–0.2)
BASOPHILS NFR BLD: 2 % (ref 0–2)
BILIRUB SERPL-MCNC: 1.4 MG/DL (ref 0–1.2)
BUN SERPL-MCNC: 22 MG/DL (ref 6–23)
CALCIUM SERPL-MCNC: 8.8 MG/DL (ref 8.6–10.2)
CHLORIDE SERPL-SCNC: 103 MMOL/L (ref 98–107)
CO2 SERPL-SCNC: 23 MMOL/L (ref 22–29)
CREAT SERPL-MCNC: 1.1 MG/DL (ref 0.5–1)
EOSINOPHIL # BLD: 0.05 K/UL (ref 0.05–0.5)
EOSINOPHILS RELATIVE PERCENT: 4 % (ref 0–6)
ERYTHROCYTE [DISTWIDTH] IN BLOOD BY AUTOMATED COUNT: 16.7 % (ref 11.5–15)
GFR, ESTIMATED: 57 ML/MIN/1.73M2
GLUCOSE BLD-MCNC: 130 MG/DL (ref 74–99)
GLUCOSE BLD-MCNC: 158 MG/DL (ref 74–99)
GLUCOSE BLD-MCNC: 183 MG/DL (ref 74–99)
GLUCOSE BLD-MCNC: 236 MG/DL (ref 74–99)
GLUCOSE SERPL-MCNC: 179 MG/DL (ref 74–99)
HCT VFR BLD AUTO: 23.5 % (ref 34–48)
HGB BLD-MCNC: 7.6 G/DL (ref 11.5–15.5)
LYMPHOCYTES NFR BLD: 0.29 K/UL (ref 1.5–4)
LYMPHOCYTES RELATIVE PERCENT: 23 % (ref 20–42)
MAGNESIUM SERPL-MCNC: 1.8 MG/DL (ref 1.6–2.6)
MCH RBC QN AUTO: 29.6 PG (ref 26–35)
MCHC RBC AUTO-ENTMCNC: 32.3 G/DL (ref 32–34.5)
MCV RBC AUTO: 91.4 FL (ref 80–99.9)
MONOCYTES NFR BLD: 0.05 K/UL (ref 0.1–0.95)
MONOCYTES NFR BLD: 4 % (ref 2–12)
NEUTROPHILS NFR BLD: 69 % (ref 43–80)
NEUTS SEG NFR BLD: 0.89 K/UL (ref 1.8–7.3)
PHOSPHATE SERPL-MCNC: 2.6 MG/DL (ref 2.5–4.5)
PLATELET # BLD AUTO: 35 K/UL (ref 130–450)
PLATELET CONFIRMATION: NORMAL
PMV BLD AUTO: 11.7 FL (ref 7–12)
POTASSIUM SERPL-SCNC: 4.4 MMOL/L (ref 3.5–5)
PROT SERPL-MCNC: 7.1 G/DL (ref 6.4–8.3)
RBC # BLD AUTO: 2.57 M/UL (ref 3.5–5.5)
RBC # BLD: ABNORMAL 10*6/UL
SODIUM SERPL-SCNC: 137 MMOL/L (ref 132–146)
WBC OTHER # BLD: 1.3 K/UL (ref 4.5–11.5)

## 2024-08-06 PROCEDURE — 2500000003 HC RX 250 WO HCPCS

## 2024-08-06 PROCEDURE — 3700000001 HC ADD 15 MINUTES (ANESTHESIA): Performed by: STUDENT IN AN ORGANIZED HEALTH CARE EDUCATION/TRAINING PROGRAM

## 2024-08-06 PROCEDURE — 6360000002 HC RX W HCPCS: Performed by: INTERNAL MEDICINE

## 2024-08-06 PROCEDURE — 2060000000 HC ICU INTERMEDIATE R&B

## 2024-08-06 PROCEDURE — 6370000000 HC RX 637 (ALT 250 FOR IP): Performed by: STUDENT IN AN ORGANIZED HEALTH CARE EDUCATION/TRAINING PROGRAM

## 2024-08-06 PROCEDURE — 2580000003 HC RX 258: Performed by: INTERNAL MEDICINE

## 2024-08-06 PROCEDURE — 3609013000 HC EGD TRANSORAL CONTROL BLEEDING ANY METHOD: Performed by: STUDENT IN AN ORGANIZED HEALTH CARE EDUCATION/TRAINING PROGRAM

## 2024-08-06 PROCEDURE — 7100000001 HC PACU RECOVERY - ADDTL 15 MIN: Performed by: STUDENT IN AN ORGANIZED HEALTH CARE EDUCATION/TRAINING PROGRAM

## 2024-08-06 PROCEDURE — 0W3P8ZZ CONTROL BLEEDING IN GASTROINTESTINAL TRACT, VIA NATURAL OR ARTIFICIAL OPENING ENDOSCOPIC: ICD-10-PCS | Performed by: INTERNAL MEDICINE

## 2024-08-06 PROCEDURE — 3700000000 HC ANESTHESIA ATTENDED CARE: Performed by: STUDENT IN AN ORGANIZED HEALTH CARE EDUCATION/TRAINING PROGRAM

## 2024-08-06 PROCEDURE — 6360000002 HC RX W HCPCS

## 2024-08-06 PROCEDURE — 82962 GLUCOSE BLOOD TEST: CPT

## 2024-08-06 PROCEDURE — 2709999900 HC NON-CHARGEABLE SUPPLY: Performed by: STUDENT IN AN ORGANIZED HEALTH CARE EDUCATION/TRAINING PROGRAM

## 2024-08-06 PROCEDURE — 36415 COLL VENOUS BLD VENIPUNCTURE: CPT

## 2024-08-06 PROCEDURE — 7100000000 HC PACU RECOVERY - FIRST 15 MIN: Performed by: STUDENT IN AN ORGANIZED HEALTH CARE EDUCATION/TRAINING PROGRAM

## 2024-08-06 PROCEDURE — 2720000010 HC SURG SUPPLY STERILE: Performed by: STUDENT IN AN ORGANIZED HEALTH CARE EDUCATION/TRAINING PROGRAM

## 2024-08-06 PROCEDURE — 3609012300 HC EGD BAND LIGATION ESOPHGEAL/GASTRIC VARICES: Performed by: STUDENT IN AN ORGANIZED HEALTH CARE EDUCATION/TRAINING PROGRAM

## 2024-08-06 PROCEDURE — 99223 1ST HOSP IP/OBS HIGH 75: CPT

## 2024-08-06 PROCEDURE — 99231 SBSQ HOSP IP/OBS SF/LOW 25: CPT | Performed by: NURSE PRACTITIONER

## 2024-08-06 PROCEDURE — 80053 COMPREHEN METABOLIC PANEL: CPT

## 2024-08-06 PROCEDURE — 84100 ASSAY OF PHOSPHORUS: CPT

## 2024-08-06 PROCEDURE — P9047 ALBUMIN (HUMAN), 25%, 50ML: HCPCS | Performed by: INTERNAL MEDICINE

## 2024-08-06 PROCEDURE — 83735 ASSAY OF MAGNESIUM: CPT

## 2024-08-06 PROCEDURE — 85025 COMPLETE CBC W/AUTO DIFF WBC: CPT

## 2024-08-06 PROCEDURE — 2580000003 HC RX 258

## 2024-08-06 PROCEDURE — 6370000000 HC RX 637 (ALT 250 FOR IP): Performed by: INTERNAL MEDICINE

## 2024-08-06 PROCEDURE — 99232 SBSQ HOSP IP/OBS MODERATE 35: CPT | Performed by: INTERNAL MEDICINE

## 2024-08-06 RX ORDER — DEXMEDETOMIDINE HYDROCHLORIDE 100 UG/ML
INJECTION, SOLUTION INTRAVENOUS PRN
Status: DISCONTINUED | OUTPATIENT
Start: 2024-08-06 | End: 2024-08-06 | Stop reason: SDUPTHER

## 2024-08-06 RX ORDER — PROPOFOL 10 MG/ML
INJECTION, EMULSION INTRAVENOUS PRN
Status: DISCONTINUED | OUTPATIENT
Start: 2024-08-06 | End: 2024-08-06 | Stop reason: SDUPTHER

## 2024-08-06 RX ORDER — PANTOPRAZOLE SODIUM 40 MG/1
40 TABLET, DELAYED RELEASE ORAL
Status: DISCONTINUED | OUTPATIENT
Start: 2024-08-06 | End: 2024-08-07 | Stop reason: HOSPADM

## 2024-08-06 RX ORDER — SODIUM CHLORIDE 9 MG/ML
INJECTION, SOLUTION INTRAVENOUS CONTINUOUS PRN
Status: DISCONTINUED | OUTPATIENT
Start: 2024-08-06 | End: 2024-08-06 | Stop reason: SDUPTHER

## 2024-08-06 RX ADMIN — INSULIN GLARGINE 50 UNITS: 100 INJECTION, SOLUTION SUBCUTANEOUS at 20:11

## 2024-08-06 RX ADMIN — INSULIN LISPRO 18 UNITS: 100 INJECTION, SOLUTION INTRAVENOUS; SUBCUTANEOUS at 17:07

## 2024-08-06 RX ADMIN — ALBUMIN (HUMAN) 25 G: 0.25 INJECTION, SOLUTION INTRAVENOUS at 17:06

## 2024-08-06 RX ADMIN — Medication 2000 UNITS: at 09:25

## 2024-08-06 RX ADMIN — WATER 1000 MG: 1 INJECTION INTRAMUSCULAR; INTRAVENOUS; SUBCUTANEOUS at 16:16

## 2024-08-06 RX ADMIN — PROPOFOL 30 MG: 10 INJECTION, EMULSION INTRAVENOUS at 14:02

## 2024-08-06 RX ADMIN — SODIUM CHLORIDE: 0.9 INJECTION, SOLUTION INTRAVENOUS at 13:57

## 2024-08-06 RX ADMIN — GABAPENTIN 600 MG: 300 CAPSULE ORAL at 20:11

## 2024-08-06 RX ADMIN — DEXMEDETOMIDINE HCL 8 MCG: 100 INJECTION INTRAVENOUS at 14:02

## 2024-08-06 RX ADMIN — ALBUMIN (HUMAN) 25 G: 0.25 INJECTION, SOLUTION INTRAVENOUS at 01:16

## 2024-08-06 RX ADMIN — PANTOPRAZOLE SODIUM 40 MG: 40 TABLET, DELAYED RELEASE ORAL at 16:17

## 2024-08-06 RX ADMIN — ALBUMIN (HUMAN) 25 G: 0.25 INJECTION, SOLUTION INTRAVENOUS at 11:22

## 2024-08-06 RX ADMIN — CARVEDILOL 3.12 MG: 6.25 TABLET, FILM COATED ORAL at 09:25

## 2024-08-06 RX ADMIN — SODIUM CHLORIDE, PRESERVATIVE FREE 10 ML: 5 INJECTION INTRAVENOUS at 09:26

## 2024-08-06 RX ADMIN — GABAPENTIN 600 MG: 300 CAPSULE ORAL at 09:24

## 2024-08-06 RX ADMIN — SODIUM CHLORIDE, PRESERVATIVE FREE 10 ML: 5 INJECTION INTRAVENOUS at 20:11

## 2024-08-06 RX ADMIN — PANTOPRAZOLE SODIUM 40 MG: 40 TABLET, DELAYED RELEASE ORAL at 06:01

## 2024-08-06 RX ADMIN — RIFAXIMIN 550 MG: 550 TABLET ORAL at 20:11

## 2024-08-06 RX ADMIN — LACTULOSE 20 G: 20 SOLUTION ORAL at 23:14

## 2024-08-06 RX ADMIN — PROPOFOL 30 MG: 10 INJECTION, EMULSION INTRAVENOUS at 13:59

## 2024-08-06 RX ADMIN — PROPOFOL 30 MG: 10 INJECTION, EMULSION INTRAVENOUS at 14:08

## 2024-08-06 RX ADMIN — LACTULOSE 20 G: 20 SOLUTION ORAL at 06:01

## 2024-08-06 RX ADMIN — RIFAXIMIN 550 MG: 550 TABLET ORAL at 09:24

## 2024-08-06 RX ADMIN — FUROSEMIDE 20 MG: 20 TABLET ORAL at 09:25

## 2024-08-06 ASSESSMENT — PAIN SCALES - GENERAL: PAINLEVEL_OUTOF10: 0

## 2024-08-06 NOTE — PROGRESS NOTES
Physical Therapy    PT order received and medical chart reviewed on 8/6/24. Pt off unit for EGD at time of attempted PT evaluation. Will re-attempt as able. Thank you.    Ila Zaidi, PT, DPT  [id number]

## 2024-08-06 NOTE — PROGRESS NOTES
Secured message sent to Dr. Conrad via perfect serve: Patient was given a tray this morning for breakfast. Patient refuses to eat says that she is to have an EGD today. Patient was hospice at home. Patient wants the test. Please advise.

## 2024-08-06 NOTE — PLAN OF CARE
Problem: Chronic Conditions and Co-morbidities  Goal: Patient's chronic conditions and co-morbidity symptoms are monitored and maintained or improved  8/6/2024 1244 by Layne Paz RN  Outcome: Progressing  8/6/2024 0655 by Blanquita العراقي RN  Outcome: Progressing     Problem: Discharge Planning  Goal: Discharge to home or other facility with appropriate resources  8/6/2024 1244 by Layne Paz RN  Outcome: Progressing  8/6/2024 0655 by Blanquita العراقي RN  Outcome: Progressing     Problem: Pain  Goal: Verbalizes/displays adequate comfort level or baseline comfort level  8/6/2024 1244 by Layne Paz RN  Outcome: Progressing  8/6/2024 0655 by Blanquita العراقي RN  Outcome: Progressing     Problem: Safety - Adult  Goal: Free from fall injury  8/6/2024 1244 by Layne Paz RN  Outcome: Progressing  8/6/2024 0655 by Blanquita العراقي RN  Outcome: Progressing

## 2024-08-06 NOTE — PROGRESS NOTES
HOSPITALIST PROGRESS NOTES     ADMITTING DATE AND TIME: 8/2/2024 11:56 AM  had concerns including Leg Swelling (Pt having swelling to legs and abdomen. Aaox4. Pt speaks mostly Singaporean. ).    ADMIT DX: Acute on chronic anemia [D64.9]      SUBJECTIVE:  Patient is being followed for Acute on chronic anemia [D64.9]     Patient was seen examined and evaluated  Recent lab results, charts and pertinent diagnostic imaging reviewed   Ancillary service notes reviewed   Resting comfortably in bed  Discussed with palliative medicine, GI, family    Care reviewed with nursing staff, medical and surgical specialty care, primary care and the patient's family as available.   ROS: denies fever, chills, cp, sob, n/v, HA unless stated above.     cefTRIAXone (ROCEPHIN) IV  1,000 mg IntraVENous Q24H    sodium chloride flush  5-40 mL IntraVENous 2 times per day    carvedilol  3.125 mg Oral Daily    furosemide  20 mg Oral Daily    gabapentin  600 mg Oral TID    insulin glargine  50 Units SubCUTAneous Nightly    insulin lispro  18 Units SubCUTAneous TID WC    lactulose  20 g Oral q8h    [Held by provider] midodrine  5 mg Oral TID WC    pantoprazole  40 mg Oral QAM AC    vitamin D  2,000 Units Oral Daily    rifAXIMin  550 mg Oral BID    insulin lispro  0-8 Units SubCUTAneous TID WC    insulin lispro  0-4 Units SubCUTAneous Nightly    albumin human 25%  25 g IntraVENous Q8H     sodium chloride, , PRN  sodium chloride, , PRN  sodium chloride, , PRN  sodium chloride, , PRN  benzocaine-menthol, 1 lozenge, Q2H PRN  benzonatate, 100 mg, TID PRN  calcium carbonate, 500 mg, TID PRN  hydrALAZINE, 10 mg, Q6H PRN  melatonin, 3 mg, Nightly PRN  Polyvinyl Alcohol-Povidone PF, 1 drop, PRN  sodium chloride, 1 spray, PRN  sodium chloride flush, 5-40 mL, PRN  sodium chloride, , PRN  potassium chloride, 40 mEq, PRN    DISPLAY PLAN FREE TEXT

## 2024-08-06 NOTE — ANESTHESIA PRE PROCEDURE
Department of Anesthesiology  Preprocedure Note       Name:  Lisa Hunt   Age:  64 y.o.  :  1960                                          MRN:  [id number]         Date:  2024      Surgeon: Surgeon(s):  Anil Conrad MD    Procedure: Procedure(s):  ESOPHAGOGASTRODUODENOSCOPY    Medications prior to admission:   Prior to Admission medications    Medication Sig Start Date End Date Taking? Authorizing Provider   insulin lispro, 1 Unit Dial, (HUMALOG/ADMELOG) 100 UNIT/ML SOPN Inject 18 Units into the skin 3 times daily (before meals) *Plus Sliding Scale*   Yes Jamie Palencia MD   spironolactone (ALDACTONE) 50 MG tablet Take 1 tablet by mouth daily   Yes Jamie Palencia MD   rifAXIMin (XIFAXAN) 550 MG tablet Take 1 tablet by mouth 2 times daily    Jamie Palencia MD   lactulose (CHRONULAC) 10 GM/15ML solution Take 30 mLs by mouth every 8 (eight) hours Titrate to goal of 2-3 bowel movement per day 24   Tonie Hernandez DO   furosemide (LASIX) 20 MG tablet Take 1 tablet by mouth daily 24   Tonie Hernandez DO   gabapentin (NEURONTIN) 600 MG tablet Take 1 tablet by mouth 3 times daily for 90 days. 24  Tonie Hernandez DO   omeprazole (PRILOSEC) 20 MG delayed release capsule Take 1 capsule by mouth daily 24   Tonie Hernandez DO   ondansetron (ZOFRAN) 4 MG tablet Take 1 tablet by mouth daily as needed for Nausea or Vomiting 24   Tonie Hernandez DO   vitamin D (CHOLECALCIFEROL) 50 MCG (2000 UT) TABS tablet Take 1 tablet by mouth daily 24   Tonie Hernandez DO   carvedilol (COREG) 6.25 MG tablet Take 0.5 tablets by mouth daily 24   Josue Alves MD   midodrine (PROAMATINE) 2.5 MG tablet Take 2 tablets by mouth 3 times daily (with meals) 24   Josue Alves MD   insulin glargine (LANTUS) 100 UNIT/ML injection vial Inject 50 Units into the skin nightly 24   Josue Alves MD       Current medications:    Current

## 2024-08-06 NOTE — BRIEF OP NOTE
Brief Postoperative Note      Patient: Lisa Hunt  YOB: 1960  MRN: [id number]    Date of Procedure: 8/6/2024    Pre-Op Diagnosis Codes:     * Esophageal varices without bleeding, unspecified esophageal varices type (HCC) [I85.00]  GAVE    Post-Op Diagnosis: Same        Procedure(s):  ESOPHAGOGASTRODUODENOSCOPY CONTROL HEMORRHAGE  ESOPHAGOGASTRODUODENOSCOPY BAND LIGATION    Surgeon(s):  Anil Conrad MD    Assistant:  * No surgical staff found *    Anesthesia: Monitor Anesthesia Care    Estimated Blood Loss (mL): less than 50     Complications: None    Specimens:   * No specimens in log *    Implants:  * No implants in log *      Drains: * No LDAs found *    Findings:    Grade II varices with one column having high risk features. One band placed.  Mild portal hypertensive gastropathy.  GAVE with active oozing. Treated with APC.  Normal duodenum.     Electronically signed by Anil Conrad MD on 8/6/2024 at 2:44 PM

## 2024-08-06 NOTE — CARE COORDINATION
CM Update: Met with patient at bedside to discuss transition of care plan. Discharge plan is Home with Kelsey (daughter). Called [place]sey. Unable to leave a voicemail. Called Lisa (daughter) and left voicemail. Awaiting call back. There was some confusion about the patient being home with hospice. According to notes the patient was with hospice care (agency unknown), but they discharged the patient d/t her wanting to seek further treatment. Patient came to hospital with recurrent Ascites, weakness and fatigue. Found to have anemia (Hgb 6.5), Ascites (4.6 liters removed in ED). Scheduled for EGD 8/6. CM/SW to follow. MT

## 2024-08-06 NOTE — PROGRESS NOTES
[facility]   Gastroenterology, Hepatology, &  Advanced Endoscopy    Progress Note      As patient is a Hospice. No EGD is planned per Dr. Conrad    Patient Presents with/Seen in Consultation For      ACOTSA Cirrhosis  Anemia    HPI:      Lisa Hunt presented to the emergency department for evaluation of recurrent Ascites, weakness, and fatigue. Her last paracentesis was 7/14/24 with a Hgb of 6.5. Pt is well known to our practice with a history of Acute on chronic anemia. Diagnoses of 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 (HCC), End stage liver disease (HCC)with portal venous hypertension and splenomegaly. In the ED, she had a paracentesis today with 4.6 liters removed. She was found to have a Hgb of 6.5 during this ED visit.     Review of Systems  Aside from what was mentioned in the PMH and HPI, essentially unremarkable, all others negative.    Objective:     Patient Vitals for the past 8 hrs:   Pulse SpO2 Weight   08/06/24 0420 88 99 % 83.7 kg (184 lb 9.6 oz)       General appearance: alert, awake, laying in bed, and cooperative  Eyes: conjunctivae/corneas clear. PERRLA, No Scleral Icterus  Lungs: clear to auscultation bilaterally  Heart: regular rate and rhythm, no murmur  Abdomen: soft, non-tender; bowel sounds normal; no masses,  no organomegaly, mild distention.  Extremities: full ROM, no edema  Pulses: 2+ and symmetric  Skin: Skin color, texture, turgor normal. No Jaundice  Neurologic: Grossly normal    0.9 % sodium chloride infusion, PRN  0.9 % sodium chloride infusion, PRN  cefTRIAXone (ROCEPHIN) 1,000 mg in sterile water 10 mL IV syringe, Q24H  0.9 % sodium chloride infusion, PRN  0.9 % sodium chloride infusion, PRN  benzocaine-menthol (CEPACOL SORE THROAT) lozenge 1 lozenge, Q2H PRN  benzonatate (TESSALON) capsule 100 mg, TID PRN  calcium carbonate (TUMS) chewable tablet 500 mg, TID PRN  hydrALAZINE (APRESOLINE) injection 10 mg, Q6H PRN  melatonin tablet 3 mg, Nightly PRN  Polyvinyl Alcohol-Povidone PF

## 2024-08-06 NOTE — ANESTHESIA POSTPROCEDURE EVALUATION
Department of Anesthesiology  Postprocedure Note    Patient: Lisa Hunt  MRN: [id number]  YOB: 1960  Date of evaluation: 8/6/2024    Procedure Summary       Date: 08/06/24 Room / Location: [place] [id number] / [facility]    Anesthesia Start: 1355 Anesthesia Stop: 1420    Procedures:       ESOPHAGOGASTRODUODENOSCOPY CONTROL HEMORRHAGE      ESOPHAGOGASTRODUODENOSCOPY BAND LIGATION Diagnosis:       Esophageal varices without bleeding, unspecified esophageal varices type (HCC)      (Esophageal varices without bleeding, unspecified esophageal varices type (HCC) [I85.00])    Surgeons: Anil Conrad MD Responsible Provider: Brent Phillips DO    Anesthesia Type: MAC ASA Status: 3            Anesthesia Type: No value filed.    Suzette Phase I: Suzette Score: 10    Suzette Phase II: Suzette Score: 10    Anesthesia Post Evaluation    Patient location during evaluation: bedside  Patient participation: complete - patient cannot participate  Level of consciousness: awake and alert  Airway patency: patent  Nausea & Vomiting: no nausea and no vomiting  Cardiovascular status: blood pressure returned to baseline  Respiratory status: acceptable  Hydration status: euvolemic  Multimodal analgesia pain management approach    No notable events documented.

## 2024-08-07 VITALS
HEIGHT: 63 IN | BODY MASS INDEX: 32.71 KG/M2 | WEIGHT: 184.6 LBS | TEMPERATURE: 97.9 F | OXYGEN SATURATION: 100 % | DIASTOLIC BLOOD PRESSURE: 51 MMHG | SYSTOLIC BLOOD PRESSURE: 143 MMHG | HEART RATE: 87 BPM | RESPIRATION RATE: 20 BRPM

## 2024-08-07 LAB
ALBUMIN SERPL-MCNC: 4.4 G/DL (ref 3.5–5.2)
ALP SERPL-CCNC: 80 U/L (ref 35–104)
ALT SERPL-CCNC: 9 U/L (ref 0–32)
ANION GAP SERPL CALCULATED.3IONS-SCNC: 12 MMOL/L (ref 7–16)
AST SERPL-CCNC: 24 U/L (ref 0–31)
BASOPHILS # BLD: 0 K/UL (ref 0–0.2)
BASOPHILS NFR BLD: 0 % (ref 0–2)
BILIRUB SERPL-MCNC: 2 MG/DL (ref 0–1.2)
BUN SERPL-MCNC: 21 MG/DL (ref 6–23)
CALCIUM SERPL-MCNC: 8.7 MG/DL (ref 8.6–10.2)
CHLORIDE SERPL-SCNC: 108 MMOL/L (ref 98–107)
CO2 SERPL-SCNC: 23 MMOL/L (ref 22–29)
CREAT SERPL-MCNC: 0.9 MG/DL (ref 0.5–1)
EOSINOPHIL # BLD: 0.04 K/UL (ref 0.05–0.5)
EOSINOPHILS RELATIVE PERCENT: 3 % (ref 0–6)
ERYTHROCYTE [DISTWIDTH] IN BLOOD BY AUTOMATED COUNT: 16.7 % (ref 11.5–15)
GFR, ESTIMATED: 69 ML/MIN/1.73M2
GLUCOSE BLD-MCNC: 107 MG/DL (ref 74–99)
GLUCOSE BLD-MCNC: 255 MG/DL (ref 74–99)
GLUCOSE BLD-MCNC: 74 MG/DL (ref 74–99)
GLUCOSE SERPL-MCNC: 70 MG/DL (ref 74–99)
HCT VFR BLD AUTO: 23.3 % (ref 34–48)
HGB BLD-MCNC: 7.4 G/DL (ref 11.5–15.5)
IMM GRANULOCYTES # BLD AUTO: <0.03 K/UL (ref 0–0.58)
IMM GRANULOCYTES NFR BLD: 0 % (ref 0–5)
LYMPHOCYTES NFR BLD: 0.33 K/UL (ref 1.5–4)
LYMPHOCYTES RELATIVE PERCENT: 26 % (ref 20–42)
MAGNESIUM SERPL-MCNC: 1.6 MG/DL (ref 1.6–2.6)
MCH RBC QN AUTO: 29.5 PG (ref 26–35)
MCHC RBC AUTO-ENTMCNC: 31.8 G/DL (ref 32–34.5)
MCV RBC AUTO: 92.8 FL (ref 80–99.9)
MONOCYTES NFR BLD: 0.12 K/UL (ref 0.1–0.95)
MONOCYTES NFR BLD: 10 % (ref 2–12)
NEUTROPHILS NFR BLD: 61 % (ref 43–80)
NEUTS SEG NFR BLD: 0.77 K/UL (ref 1.8–7.3)
PHOSPHATE SERPL-MCNC: 2.3 MG/DL (ref 2.5–4.5)
PLATELET # BLD AUTO: 36 K/UL (ref 130–450)
PLATELET CONFIRMATION: NORMAL
PMV BLD AUTO: 12.2 FL (ref 7–12)
POTASSIUM SERPL-SCNC: 4.1 MMOL/L (ref 3.5–5)
PROT SERPL-MCNC: 6.8 G/DL (ref 6.4–8.3)
RBC # BLD AUTO: 2.51 M/UL (ref 3.5–5.5)
SODIUM SERPL-SCNC: 143 MMOL/L (ref 132–146)
WBC OTHER # BLD: 1.3 K/UL (ref 4.5–11.5)

## 2024-08-07 PROCEDURE — 84100 ASSAY OF PHOSPHORUS: CPT

## 2024-08-07 PROCEDURE — 80053 COMPREHEN METABOLIC PANEL: CPT

## 2024-08-07 PROCEDURE — 83735 ASSAY OF MAGNESIUM: CPT

## 2024-08-07 PROCEDURE — 97166 OT EVAL MOD COMPLEX 45 MIN: CPT

## 2024-08-07 PROCEDURE — 6370000000 HC RX 637 (ALT 250 FOR IP): Performed by: INTERNAL MEDICINE

## 2024-08-07 PROCEDURE — 97535 SELF CARE MNGMENT TRAINING: CPT

## 2024-08-07 PROCEDURE — 99239 HOSP IP/OBS DSCHRG MGMT >30: CPT | Performed by: INTERNAL MEDICINE

## 2024-08-07 PROCEDURE — P9047 ALBUMIN (HUMAN), 25%, 50ML: HCPCS | Performed by: INTERNAL MEDICINE

## 2024-08-07 PROCEDURE — 6370000000 HC RX 637 (ALT 250 FOR IP): Performed by: STUDENT IN AN ORGANIZED HEALTH CARE EDUCATION/TRAINING PROGRAM

## 2024-08-07 PROCEDURE — 2580000003 HC RX 258: Performed by: INTERNAL MEDICINE

## 2024-08-07 PROCEDURE — 99232 SBSQ HOSP IP/OBS MODERATE 35: CPT | Performed by: NURSE PRACTITIONER

## 2024-08-07 PROCEDURE — 97161 PT EVAL LOW COMPLEX 20 MIN: CPT

## 2024-08-07 PROCEDURE — 85025 COMPLETE CBC W/AUTO DIFF WBC: CPT

## 2024-08-07 PROCEDURE — 36415 COLL VENOUS BLD VENIPUNCTURE: CPT

## 2024-08-07 PROCEDURE — 82962 GLUCOSE BLOOD TEST: CPT

## 2024-08-07 PROCEDURE — 6360000002 HC RX W HCPCS: Performed by: INTERNAL MEDICINE

## 2024-08-07 PROCEDURE — 97530 THERAPEUTIC ACTIVITIES: CPT

## 2024-08-07 RX ADMIN — PANTOPRAZOLE SODIUM 40 MG: 40 TABLET, DELAYED RELEASE ORAL at 06:08

## 2024-08-07 RX ADMIN — ALBUMIN (HUMAN) 25 G: 0.25 INJECTION, SOLUTION INTRAVENOUS at 08:41

## 2024-08-07 RX ADMIN — SODIUM CHLORIDE, PRESERVATIVE FREE 10 ML: 5 INJECTION INTRAVENOUS at 08:41

## 2024-08-07 RX ADMIN — ALBUMIN (HUMAN) 25 G: 0.25 INJECTION, SOLUTION INTRAVENOUS at 01:04

## 2024-08-07 RX ADMIN — FUROSEMIDE 20 MG: 20 TABLET ORAL at 08:40

## 2024-08-07 RX ADMIN — CARVEDILOL 3.12 MG: 6.25 TABLET, FILM COATED ORAL at 08:40

## 2024-08-07 RX ADMIN — GABAPENTIN 600 MG: 300 CAPSULE ORAL at 08:40

## 2024-08-07 RX ADMIN — LACTULOSE 20 G: 20 SOLUTION ORAL at 06:08

## 2024-08-07 RX ADMIN — Medication 2000 UNITS: at 08:39

## 2024-08-07 RX ADMIN — RIFAXIMIN 550 MG: 550 TABLET ORAL at 08:39

## 2024-08-07 NOTE — PROGRESS NOTES
Physician Progress Note      PATIENT:               CLINTON ADAMS  CSN #:                  [id number]  :                       1960  ADMIT DATE:       2024 11:56 AM  DISCH DATE:  RESPONDING  PROVIDER #:        JERI GARZA          QUERY TEXT:    Pt admitted with ACOSTA cirrhosis with ascites and has acute on chronic anemia   documented. If possible, please document in progress notes and discharge   summary further specificity regarding the acuity and type of anemia:    The medical record reflects the following:  Risk Factors: varices, portal hypertensive gastropathy,  Clinical Indicators: per EGD-GAVE with active oozing; (/2) hgb 6.5, 7.4, 6.4;   (8/3) hgb 6.7, 8.6, 7.5; (8/4) hgb 8.1, 8.6, 7.4  Treatment: serial h/h's, prbc transfusion, EGD with APC    Thank you,  Del RN, CCDS  [phone]  Options provided:  -- Anemia due to acute on chronic blood loss  -- Other - I will add my own diagnosis  -- Disagree - Not applicable / Not valid  -- Disagree - Clinically unable to determine / Unknown  -- Refer to Clinical Documentation Reviewer    PROVIDER RESPONSE TEXT:    This patient has acute on chronic blood loss anemia.    Query created by: Del Sandoval on 2024 8:23 PM      Electronically signed by:  JERI GARZA 2024 9:07 AM

## 2024-08-07 NOTE — PROGRESS NOTES
OCCUPATIONAL THERAPY INITIAL EVALUATION    [facility] [address] [place], [place]       Date:2024                                                  Patient Name: Lisa Hunt  MRN: [id number]  : 1960  Room: 85[facility]85[facility]    Evaluating OT: Miguelangel Ohara OTR/L [id number]    Referring Provider:     Casper March MD        Specific Provider Orders/Date: OT evaluation and treatment 24 9943    Diagnosis:  Acute on chronic anemia [D64.9]      Pertinent Medical History:  has a past medical history of DONNA (acute kidney injury) (HCC), Cirrhosis (HCC), Diabetes mellitus (HCC), Diabetic neuropathy (HCC), Esophageal varices without bleeding (HCC), GERD (gastroesophageal reflux disease), Hypertension, Intracranial bleed (HCC), Liver cirrhosis (HCC), Low vitamin D level, SDH (subdural hematoma) (HCC), Thrombocytopenia (HCC), and Type 2 diabetes mellitus without complication (HCC).   Past Surgical History:   Procedure Laterality Date    APPENDECTOMY       SECTION      CHOLECYSTECTOMY      CT NEEDLE BIOPSY LIVER PERCUTANEOUS  2024    CT NEEDLE BIOPSY LIVER PERCUTANEOUS 2024 IRLANDA Ndiaye MD [facility] CT    PARACENTESIS Left 10/12/2023    4400ml hazy yellow    PARACENTESIS Left 2024    2440cc yellow clear fluid removed.    PARACENTESIS Left 2024    4250cc clear yellow fluid removed.    UPPER GASTROINTESTINAL ENDOSCOPY  2022    [facility]    UPPER GASTROINTESTINAL ENDOSCOPY  2021    [facility]    UPPER GASTROINTESTINAL ENDOSCOPY  2019    [facility]    UPPER GASTROINTESTINAL ENDOSCOPY  2021    [facility]    UPPER GASTROINTESTINAL ENDOSCOPY N/A 2024    EGD BIOPSY performed by Anil Conrad MD at [facility] ENDOSCOPY    UPPER GASTROINTESTINAL ENDOSCOPY N/A 2024    EGD BAND LIGATION performed by Anil Conrad MD at [facility] ENDOSCOPY    UPPER GASTROINTESTINAL  facilitate/challenge dynamic balance, stand tolerance for increased safety and independence with ADLs  * Positioning to improve skin integrity, interaction with environment and functional independence    Recommended Adaptive Equipment: TBD      Inconsistent answers to PLOF questions ? /language barrier   Home Living:  Pt lives with family - dtr   in a 2 story house with 2 steps to enter    Bedroom setup: 2nd floor  standard bed   Bathroom setup: 2nd floor  - unknown tub vs WIS   1/2 bath on the first floor   Equipment owned: front wheeled walker   - does not use     Prior Level of Function:  Pt I with ADLs , A  with IADLs, and completed functional mobility with no AD   Falls: yes- reports several falls   Driving: no   Occupation/leisure: did not state     Pleasant and cooperative throughout session       Pain Level: no s/s of pain  Location: n/a  OT provided: n/a    Cognition: A&O: 4/4;    Follows single step directions: Good   Memory: Fair +   Sequencing: Fair +   Problem solving: Fair -   Judgement/safety: Fair   Attention:  Fair     Functional Assessment: AM-PAC Inpatient Daily Activity Raw Score: 19 /24     Initial Eval Status  Date: 8/7/2024   Treatment Status  Date: STG=LTG  Time frame: 10-14 days   Feeding Independent       Grooming Stand by assist   Standing at the sink for hand hygiene   Mod I    UB Dressing Min A   Simulated   Mod I    LB Dressing Min A   Brief and pants doffing and donning seated on commode to thread BLE into brief and standing to thread BLE into pants   Mod I    Bathing Min A   Simulated   Mod I    Toileting Min A   At  commode   Mod I    Bed Mobility  Rolling L/R: NT   Supine to sit: Stand by assist    Sit to supine: Stand by assist    Supine to sit: Mod I   Sit to supine: Mod I    Functional Transfers Sit to stand: Stand by assist    Stand to sit: Stand by assist    Stand pivot: NT   Mod I    Functional Mobility Min A  With no AD  in the room  and lobo  in order to increase

## 2024-08-07 NOTE — PROGRESS NOTES
[facility]   Gastroenterology, Hepatology, &  Advanced Endoscopy    Progress Note      As patient is a Hospice. No EGD is planned per Dr. Conrad    Patient Presents with/Seen in Consultation For      ACOSTA Cirrhosis  Anemia    HPI:      Lisa Hunt presented to the emergency department for evaluation of recurrent Ascites, weakness, and fatigue. Her last paracentesis was 7/14/24 with a Hgb of 6.5. Pt is well known to our practice with a history of Acute on chronic anemia. Diagnoses of 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 (HCC), End stage liver disease (HCC)with portal venous hypertension and splenomegaly. In the ED, she had a paracentesis today with 4.6 liters removed. She was found to have a Hgb of 6.5 during this ED visit.     Review of Systems  Aside from what was mentioned in the PMH and HPI, essentially unremarkable, all others negative.    Objective:     Patient Vitals for the past 8 hrs:   BP Temp Temp src Pulse Resp SpO2   08/07/24 1114 (!) 143/51 97.9 °F (36.6 °C) Oral 87 20 100 %   08/07/24 0733 (!) 131/53 97.9 °F (36.6 °C) Oral 88 17 98 %       General appearance: alert, awake, laying in bed, and cooperative  Eyes: conjunctivae/corneas clear. PERRLA, No Scleral Icterus  Lungs: clear to auscultation bilaterally  Heart: regular rate and rhythm, no murmur  Abdomen: soft, non-tender; bowel sounds normal; no masses,  no organomegaly, mild distention.  Extremities: full ROM, no edema  Pulses: 2+ and symmetric  Skin: Skin color, texture, turgor normal. No Jaundice  Neurologic: Grossly normal    pantoprazole (PROTONIX) tablet 40 mg, BID AC  0.9 % sodium chloride infusion, PRN  0.9 % sodium chloride infusion, PRN  cefTRIAXone (ROCEPHIN) 1,000 mg in sterile water 10 mL IV syringe, Q24H  0.9 % sodium chloride infusion, PRN  0.9 % sodium chloride infusion, PRN  benzocaine-menthol (CEPACOL SORE THROAT) lozenge 1 lozenge, Q2H PRN  benzonatate (TESSALON) capsule 100 mg, TID PRN  calcium carbonate (TUMS) chewable  tablet 500 mg, TID PRN  hydrALAZINE (APRESOLINE) injection 10 mg, Q6H PRN  melatonin tablet 3 mg, Nightly PRN  Polyvinyl Alcohol-Povidone PF (REFRESH) 1.4-0.6 % ophthalmic solution 1 drop, PRN  sodium chloride (OCEAN, BABY AYR) 0.65 % nasal spray 1 spray, PRN  sodium chloride flush 0.9 % injection 5-40 mL, 2 times per day  sodium chloride flush 0.9 % injection 5-40 mL, PRN  0.9 % sodium chloride infusion, PRN  potassium chloride (KLOR-CON M) extended release tablet 40 mEq, PRN   Or  potassium bicarb-citric acid (EFFER-K) effervescent tablet 40 mEq, PRN   Or  potassium chloride 10 mEq/100 mL IVPB (Peripheral Line), PRN  magnesium sulfate 2000 mg in 50 mL IVPB premix, PRN  ondansetron (ZOFRAN-ODT) disintegrating tablet 4 mg, Q8H PRN   Or  ondansetron (ZOFRAN) injection 4 mg, Q6H PRN  polyethylene glycol (GLYCOLAX) packet 17 g, Daily PRN  carvedilol (COREG) tablet 3.125 mg, Daily  furosemide (LASIX) tablet 20 mg, Daily  gabapentin (NEURONTIN) capsule 600 mg, TID  insulin glargine (LANTUS) injection vial 50 Units, Nightly  insulin lispro (HUMALOG,ADMELOG) injection vial 18 Units, TID WC  lactulose (CHRONULAC) 10 GM/15ML solution 20 g, q8h  [Held by provider] midodrine (PROAMATINE) tablet 5 mg, TID WC  vitamin D (CHOLECALCIFEROL) tablet 2,000 Units, Daily  rifAXIMin (XIFAXAN) tablet 550 mg, BID  insulin lispro (HUMALOG,ADMELOG) injection vial 0-8 Units, TID WC  insulin lispro (HUMALOG,ADMELOG) injection vial 0-4 Units, Nightly  albumin human 25% IV solution 25 g, Q8H  glucose chewable tablet 16 g, PRN  dextrose bolus 10% 125 mL, PRN   Or  dextrose bolus 10% 250 mL, PRN  glucagon injection 1 mg, PRN  dextrose 10 % infusion, Continuous PRN         Data Review  Recent Labs     08/05/24  0550 08/06/24  0617 08/07/24  0541   ALT 9 9 9   AST 23 24 24   ALKPHOS 86 96 80   BILITOT 1.1 1.4* 2.0*       Lab Results   Component Value Date    WBC 1.3 (L) 08/07/2024    HGB 7.4 (L) 08/07/2024    HCT 23.3 (L) 08/07/2024    PLT 36 (L)

## 2024-08-07 NOTE — DISCHARGE SUMMARY
[facility] Hospitalist Physician Discharge Summary       Tonie Hernandez DO  [address] [place] [address]  [phone]    Schedule an appointment as soon as possible for a visit in 1 week(s)  Hospital followup    Tonie Hernandez DO [address] [facility] [address]  [phone]          Anil Conrad MD  [address]  [facility] [address]  [phone]    Schedule an appointment as soon as possible for a visit in 1 month(s)  Hospital followup      Activity level: As tolerated     Dispo: Home with [facility] after GI clearance    Condition on discharge: Stable     Patient ID:  Lisa Hunt  [id number]  64 y.o.  1960    Admit date: 8/2/2024    Discharge date and time:  8/7/2024  12:37 PM    Admission Diagnoses: Principal Problem:    Acute on chronic anemia  Active Problems:    Type 2 diabetes mellitus without complication, with long-term current use of insulin (HCC)    Cirrhosis of liver with ascites (HCC)    Thrombocytopenia (HCC)    Palliative care by specialist  Resolved Problems:    * No resolved hospital problems. *      Discharge Diagnoses: Principal Problem:    Acute on chronic anemia  Active Problems:    Type 2 diabetes mellitus without complication, with long-term current use of insulin (HCC)    Cirrhosis of liver with ascites (HCC)    Thrombocytopenia (HCC)    Palliative care by specialist  Resolved Problems:    * No resolved hospital problems. *      Consults:  IP CONSULT TO INTERNAL MEDICINE  IP CONSULT TO PALLIATIVE CARE    Hospital Course:   Patient Lisa Hunt is a 64 y.o. presented with Acute on chronic anemia [D64.9]      64 y.o. female has a past medical history that includes ACOSTA cirrhosis with ascites, insulin-dependent diabetes mellitus, pancytopenia, presented to ER with swelling of her abdomen and legs.She is also feeling lightheaded. She has been having some dark stools. She does have a history of liver cirrhosis with recurrent ascites due to ACOSTA.  In the ER,

## 2024-08-07 NOTE — CARE COORDINATION
Chart reviewed. S/P EGD with band ligation 8/6 HGB 7.4  Await pt/ot evaluations. Possible discharge. Unable to reach patient's daughter Kelsey per phone. Spoke with her daughter Lisa per phone and plan is for patient to return to home where she lives with Kelsey.  Lisa declines any JOSAFAT and would be agreeable to C and a referral to [facility] for additonal assistance. She is from [place] and stated her sister, Kelsey would choose  a C agency. Referral made to [facility]. Await choice of C Agency. CM/SW will continue to follow Unable to reach [facility] and spoke with  daughter, Lisa per phone. Notified her of patient's discharge and she will inform Kelsey who is planning to visit her mother shortly. Also she was agreeable to have [facility] for PT/OT and palliative. Referral made to Tonie from [facility]. Await approval. CM/SW will continue to follow Family here to transport discussed with them regarding [facility] has approved and will call them to schedule a visit.

## 2024-08-07 NOTE — PROGRESS NOTES
Physical Therapy  Physical Therapy Initial Assessment     Name: Lisa Hunt  : 1960  MRN: [id number]      Date of Service: 2024    Evaluating PT:  Ila Zaidi PT, DPT [id number]    Room #:  8517/8517-B  Diagnosis:  Acute on chronic anemia [D64.9]  PMHx/PSHx:   has a past medical history of DONNA (acute kidney injury) (HCC), Cirrhosis (HCC), Diabetes mellitus (HCC), Diabetic neuropathy (HCC), Esophageal varices without bleeding (HCC), GERD (gastroesophageal reflux disease), Hypertension, Intracranial bleed (HCC), Liver cirrhosis (HCC), Low vitamin D level, SDH (subdural hematoma) (HCC), Thrombocytopenia (HCC), and Type 2 diabetes mellitus without complication (HCC).  Procedure/Surgery:  Paracentesis (24), EGD (24)  Precautions:  Fall risk, alarms, [place]anian-speaking (needs )  Equipment Needs:  TBD    SUBJECTIVE:    Pt lives with her daughter in a 2 story home with 2 steps to enter and no rail(s). Pt's bed and bath are on the second floor. Pt ambulated with no AD PTA. She owns a FWW.    OBJECTIVE:   Initial Evaluation  Date: 24 Treatment Date:  Short Term/ Long Term   Goals   AM-PAC 6 Clicks      Was pt agreeable to Eval/treatment? Yes     Does pt have pain? No c/o pain     Bed Mobility  Rolling: NT  Supine to sit: SBA  Sit to supine: NT  Scooting: SBA  Rolling: Independent  Supine to sit: Independent  Sit to supine: Independent  Scooting: Independent   Transfers Sit to stand: SBA  Stand to sit: SBA  Stand pivot: CGA with no AD  Sit to stand: Independent  Stand to sit: Independent  Stand pivot: Mod I with AAD   Ambulation    200 feet with no AD and CGA  >400 feet with AAD and Mod I   Stair negotiation: ascended and descended  NT  >12 steps with unilateral rail and Mod I   ROM BUE:  Refer to OT  BLE:  WFL     Strength BUE:  Refer to OT  BLE:  Not formally assessed     Balance Sitting EOB:  SBA  Dynamic Standing:  CGA with no AD  Sitting EOB:  Independent  Dynamic Standing:  Mod I  positioning - To prevent skin breakdown and contractures.  Skilled assessment of vitals.  Education - Provided for safety, role of PT in acute setting, benefits of upright mobility.    Pt's/ family goals   1. Return to PLOF    Prognosis is Good for reaching above PT goals.    Patient and or family understand(s) diagnosis, prognosis, and plan of care.  Yes     PHYSICAL THERAPY PLAN OF CARE:    PT POC is established based on physician order and patient diagnosis     Referring provider/PT Order:    Start   Ordering Provider    08/05/24 1545  PT eval and treat  Start:  08/05/24 1545,   End:  08/05/24 1545,   ONE TIME,   Standing Count:  1 Occurrences,   R,   Discontinue Reason:  Patient Discharge,   Status:  Canceled         Casper March MD      Diagnosis:  Acute on chronic anemia [D64.9]  Specific instructions for next treatment:  Increase mobility    Current Treatment Recommendations:     [x] Strengthening to improve independence with functional mobility   [x] ROM to improve independence with functional mobility   [x] Balance Training to improve static/dynamic balance and to reduce fall risk  [x] Endurance Training to improve activity tolerance during functional mobility   [x] Transfer Training to improve safety and independence with all functional transfers   [x] Gait Training to improve gait mechanics, endurance and assess need for appropriate assistive device  [x] Stair Training in preparation for safe discharge home and/or into the community   [x] Positioning to prevent skin breakdown and contractures  [x] Safety and Education Training   [x] Patient/Caregiver Education   [x] HEP  [] Other     PT long term treatment goals are located in above grid    Frequency of treatments: 2-5x/week x 1-2 weeks.    Time in  1020  Time out  1045    Total Treatment Time  10 minutes     Evaluation Time includes thorough review of current medical information, gathering information on past medical history/social history and prior  level of function, completion of standardized testing/informal observation of tasks, assessment of data and education on plan of care and goals.    CPT codes:  [x] Low Complexity PT evaluation 89111  [] Moderate Complexity PT evaluation 04925  [] High Complexity PT evaluation 46326  [] PT Re-evaluation 28588  [] Gait training 98992 0 minutes  [] Manual therapy 55640 0 minutes  [x] Therapeutic activities 97292 10 minutes  [] Therapeutic exercises 67869 0 minutes  [] Neuromuscular reeducation 73358 0 minutes     Ila Epp, PT, DPT  [id number]

## 2024-08-07 NOTE — PLAN OF CARE
Problem: Chronic Conditions and Co-morbidities  Goal: Patient's chronic conditions and co-morbidity symptoms are monitored and maintained or improved  8/7/2024 0119 by Marina Voss RN  Outcome: Progressing  8/6/2024 1244 by Layne Paz RN  Outcome: Progressing     Problem: Discharge Planning  Goal: Discharge to home or other facility with appropriate resources  8/7/2024 0119 by Marina Voss RN  Outcome: Progressing  8/6/2024 1244 by Layne Paz RN  Outcome: Progressing     Problem: Pain  Goal: Verbalizes/displays adequate comfort level or baseline comfort level  8/6/2024 1244 by Layne Paz RN  Outcome: Progressing     Problem: Safety - Adult  Goal: Free from fall injury  8/7/2024 0119 by Marina Voss RN  Outcome: Progressing  8/6/2024 1244 by Layne Paz RN  Outcome: Progressing

## 2024-08-08 ENCOUNTER — CARE COORDINATION (OUTPATIENT)
Dept: CARE COORDINATION | Age: 64
End: 2024-08-08

## 2024-08-08 ENCOUNTER — TELEPHONE (OUTPATIENT)
Dept: FAMILY MEDICINE CLINIC | Age: 64
End: 2024-08-08

## 2024-08-08 NOTE — CARE COORDINATION
-CTN phoned [facility] and spoke to Kavita regarding SOC.    Kavita states they are aware of patient's hospital discharge and are attempting to reach PCP office for orders.  CTN informed Kavita for home care to try daughter Kelsey first and then Mahsa if needed.  CTN provided phone numbers for patient's daughters and informed of need for .  -CTN will route to PCP front office pool under high priority need for TCM/hospital follow up appointment.

## 2024-08-08 NOTE — CARE COORDINATION
Care Transitions Note    Initial Call - Call within 2 business days of discharge: Yes    Patient Current Location:  Home: [address] [address]    -Initial call to patient's daughter Kelsey  (PHI form verified; on file) and received VM which was in English.  CTN left HIPAA compliant message requesting return call, CTN contact information provided.  Patient lives with Kelsey per IP CM note.  -CTN then phoned patient's daughter Mahsa  (PHI form verified; on file) as noted in EMR IP CM was able to reach Mahsa.  Mahsa states she is able to understand English more than she can speak it.  English is broken and at times hard for CTN to understand.  CTN inquired to Mahsa regarding patient's boyfriend Tulio as he is listed on patient's communication of release form from 2024 to call first.  CTN explained to Mahsa that when CTN outreached to Tulio in 2024 he really had no information to share with CTN regarding her mother.  Mahsa requests for Tulio to be removed.  Mahsa states she lives in [place] but does know her mother had to receive some tylenol last night for a fever.  Mahsa unsure about [facility], states maybe home care contacted her sister Kelsey.  Mahsa went on to say that Kelsey is off work on  and that she would require a .  CTN will attempt to reach patient's daughter Kelsey again using .    -CTN left message to patient's daughter Kelsey via use of .         Patient: Lisa Hunt    Patient : 1960   MRN: [id number]    Reason for Admission: acute on chronic anemia  Discharge Date: 24  RURS: Readmission Risk Score: 27.9      Last Discharge Facility       Date Complaint Diagnosis Description Type Department Provider    24 Leg Swelling Cirrhosis of liver with ascites, unspecified hepatic cirrhosis type (HCC) ... ED to Hosp-Admission (Discharged) (ADMITTED) [facility] [place] [place] Casper March MD; [place]...

## 2024-08-08 NOTE — TELEPHONE ENCOUNTER
[facility] called and they are going to provide home care she is getting discharged from hospital and wanted to let dr christianson know  fytimmy

## 2024-08-09 ENCOUNTER — TELEPHONE (OUTPATIENT)
Dept: FAMILY MEDICINE CLINIC | Age: 64
End: 2024-08-09

## 2024-08-09 ENCOUNTER — CARE COORDINATION (OUTPATIENT)
Dept: CARE COORDINATION | Age: 64
End: 2024-08-09

## 2024-08-09 NOTE — CARE COORDINATION
Care Transitions Note    Initial Call - Call within 2 business days of discharge: Yes      Outreach Attempts:   HIPAA compliant voicemail via use of  left for family, daughter Kelsey , (PHI form verified; on file), second attempt.      -CTN changed phone numbers in epic to reflect daughters to be called.    Patient: Lisa Hunt    Patient : 1960   MRN: [id number]    Reason for Admission: acute on chronic anemia  Discharge Date: 24  RURS: Readmission Risk Score: 27.9    Last Discharge Facility       Date Complaint Diagnosis Description Type Department Provider    24 Leg Swelling Cirrhosis of liver with ascites, unspecified hepatic cirrhosis type (HCC) ... ED to Hosp-Admission (Discharged) (ADMITTED) [facility] Casper March MD; [place]...            Was this an external facility discharge? No    Follow Up Appointment:   Patient does not have a follow up appointment scheduled at time of call.  CTN  routed yesterday to PCP front office pool under high priority need for TCM/hospital follow up appointment.   Future Appointments         Provider Specialty Dept Phone    2024 1:45 PM Kymberly Sosa MD Hepatobiliary Surgery [phone]    10/16/2024 10:30 AM Sidra Olvera APRN - CNP Endocrinology [phone]    2025 11:00 AM Tonie Hernandez DO Family Medicine [phone]            No further follow-up call indicated     Monse Garrido RN

## 2024-08-14 ENCOUNTER — OFFICE VISIT (OUTPATIENT)
Dept: HEMATOLOGY | Age: 64
End: 2024-08-14
Payer: MEDICARE

## 2024-08-14 ENCOUNTER — TELEPHONE (OUTPATIENT)
Dept: HEMATOLOGY | Age: 64
End: 2024-08-14

## 2024-08-14 VITALS
WEIGHT: 184.1 LBS | HEIGHT: 62 IN | DIASTOLIC BLOOD PRESSURE: 58 MMHG | SYSTOLIC BLOOD PRESSURE: 169 MMHG | BODY MASS INDEX: 33.88 KG/M2 | HEART RATE: 82 BPM | TEMPERATURE: 98.2 F | OXYGEN SATURATION: 97 %

## 2024-08-14 DIAGNOSIS — I85.10 SECONDARY ESOPHAGEAL VARICES WITHOUT BLEEDING (HCC): ICD-10-CM

## 2024-08-14 DIAGNOSIS — K75.81 NASH (NONALCOHOLIC STEATOHEPATITIS): ICD-10-CM

## 2024-08-14 DIAGNOSIS — K76.6 PORTAL HYPERTENSION (HCC): ICD-10-CM

## 2024-08-14 DIAGNOSIS — R18.8 CIRRHOSIS OF LIVER WITH ASCITES, UNSPECIFIED HEPATIC CIRRHOSIS TYPE (HCC): ICD-10-CM

## 2024-08-14 DIAGNOSIS — K74.60 CIRRHOSIS OF LIVER WITH ASCITES, UNSPECIFIED HEPATIC CIRRHOSIS TYPE (HCC): ICD-10-CM

## 2024-08-14 DIAGNOSIS — C22.0 HEPATOCELLULAR CARCINOMA (HCC): Primary | ICD-10-CM

## 2024-08-14 DIAGNOSIS — E11.69 TYPE 2 DIABETES MELLITUS WITH OTHER SPECIFIED COMPLICATION, WITHOUT LONG-TERM CURRENT USE OF INSULIN (HCC): ICD-10-CM

## 2024-08-14 PROCEDURE — 3078F DIAST BP <80 MM HG: CPT | Performed by: STUDENT IN AN ORGANIZED HEALTH CARE EDUCATION/TRAINING PROGRAM

## 2024-08-14 PROCEDURE — 99214 OFFICE O/P EST MOD 30 MIN: CPT | Performed by: STUDENT IN AN ORGANIZED HEALTH CARE EDUCATION/TRAINING PROGRAM

## 2024-08-14 PROCEDURE — 3051F HG A1C>EQUAL 7.0%<8.0%: CPT | Performed by: STUDENT IN AN ORGANIZED HEALTH CARE EDUCATION/TRAINING PROGRAM

## 2024-08-14 PROCEDURE — 3077F SYST BP >= 140 MM HG: CPT | Performed by: STUDENT IN AN ORGANIZED HEALTH CARE EDUCATION/TRAINING PROGRAM

## 2024-08-14 NOTE — PROGRESS NOTES
though her abdomen is starting to get a little bit more distended.  She does admit to eating high salt diet.  OBJECTIVE      Physical    BP (!) 169/58   Pulse 82   Temp 98.2 °F (36.8 °C)   Ht 1.575 m (5' 2\")   Wt 83.5 kg (184 lb 1.6 oz)   SpO2 97%   BMI 33.67 kg/m²       General appearance: appears in no acute distress  Lungs:respiratory effort normal without accessory numbers  Heart: + pedal edema, normal rate  Abdomen: soft, mildly distended nondistended, nontympanic, no guarding, no peritoneal signs, normoactive bowel sounds  Extremities: ROM normal, lower extremity edema    ASSESSMENT: 62 yo F with ACOSTA cirrhosis with recent hospitalization for acute decompensation. MELD Na = 19 (INR from 5/28/24) with 4 cm lesion right hepatic dome     PLAN:    -Her biopsy results were discussed with her and her family today in the office with an .  She does most likely have hepatocellular carcinoma in the setting of Acosta with active steatohepatitis and cirrhosis.  Cytology from her paracentesis was negative.  -The biopsies though there was a focal segment of carcinoma was insufficient to completely diagnose with further stains.  CK7 and CK20 positivity is unusual in the setting of hepatocellular carcinoma unless she has a mixed cholangio-/HCC  -Will refer her to medical oncology as I suspect she is going to need immunotherapy versus systemic treatment at some point.  -Will ask IR for repeat biopsies and evaluate for Y90 treatment.  -Given her recurrent decompensation episodes she is not a very good surgical candidate at this time also the lesion is abutting the right hepatic vein and does not appear to be amenable to ablation.  -Also needs to continue follow-up with GI to medically manage her cirrhosis  -Follow-up with me after radiology and oncology consults.    Thank you for the consultation and allowing me to take part in Ms. Hunt's care.    Greater than or equal to 35 minutes was spent providing

## 2024-09-04 ENCOUNTER — CLINICAL DOCUMENTATION (OUTPATIENT)
Dept: MRI IMAGING | Age: 64
End: 2024-09-04

## 2024-09-04 NOTE — PROGRESS NOTES
I used [facility] Provider portal to check for Prior Authorization needed for CPT code(s): 07934    Prior Authorization is not required.

## 2024-09-05 ENCOUNTER — TELEPHONE (OUTPATIENT)
Dept: FAMILY MEDICINE CLINIC | Age: 64
End: 2024-09-05

## 2024-09-05 ENCOUNTER — TELEPHONE (OUTPATIENT)
Dept: INTERVENTIONAL RADIOLOGY/VASCULAR | Age: 64
End: 2024-09-05

## 2024-09-05 NOTE — PRE-PROCEDURE INSTRUCTIONS
Pre procedure telephone call made to verify meds, allergies, history and any special arrangements needed for liver biopsy procedure. Blood thinners held.  Patient instructed to where and when to report for procedure.  Patient instructed to fast after midnight, may take AM meds with sip of water, dress comfortably, will need a  and approximate length of time they will be here.   Chart assembled with appropriate papers  1000 arrival time

## 2024-09-05 NOTE — TELEPHONE ENCOUNTER
Pt called in and wanted a letter to turn the [place] back on; the man on the phone said they faxed us the letter and we did not get it on the fax; I gave them the fax number  She was very irritated that we wouldn't just fax a letter over I had to explain to her with the help of the family member on the phone that we put the fax on her desk and she will let us know if she will sign it or not and we will let you know asap

## 2024-09-10 ENCOUNTER — HOSPITAL ENCOUNTER (OUTPATIENT)
Dept: NUCLEAR MEDICINE | Age: 64
Discharge: HOME | End: 2024-09-12
Attending: STUDENT IN AN ORGANIZED HEALTH CARE EDUCATION/TRAINING PROGRAM

## 2024-09-10 DIAGNOSIS — C22.0 HEPATOCELLULAR CARCINOMA (HCC): ICD-10-CM

## 2024-09-11 DIAGNOSIS — E72.20 HYPERAMMONEMIA (HCC): ICD-10-CM

## 2024-09-11 RX ORDER — INSULIN LISPRO 100 [IU]/ML
18 INJECTION, SOLUTION INTRAVENOUS; SUBCUTANEOUS
Qty: 4 ADJUSTABLE DOSE PRE-FILLED PEN SYRINGE | Refills: 1 | Status: SHIPPED | OUTPATIENT
Start: 2024-09-11

## 2024-09-11 RX ORDER — LACTULOSE 10 G/15ML
20 SOLUTION ORAL EVERY 8 HOURS
Qty: 946 ML | Refills: 5 | Status: SHIPPED | OUTPATIENT
Start: 2024-09-11

## 2024-09-12 PROBLEM — C22.0 HEPATOCELLULAR CARCINOMA (HCC): Status: ACTIVE | Noted: 2024-09-12

## 2024-09-13 ENCOUNTER — HOSPITAL ENCOUNTER (OUTPATIENT)
Dept: CT IMAGING | Age: 64
Discharge: HOME | End: 2024-09-15
Payer: MEDICARE

## 2024-09-13 ENCOUNTER — PREP FOR PROCEDURE (OUTPATIENT)
Age: 64
End: 2024-09-13

## 2024-09-13 ENCOUNTER — TELEPHONE (OUTPATIENT)
Age: 64
End: 2024-09-13

## 2024-09-13 VITALS
HEART RATE: 75 BPM | OXYGEN SATURATION: 98 % | RESPIRATION RATE: 17 BRPM | SYSTOLIC BLOOD PRESSURE: 142 MMHG | DIASTOLIC BLOOD PRESSURE: 62 MMHG | TEMPERATURE: 97.6 F

## 2024-09-13 DIAGNOSIS — R16.0 LIVER MASS: ICD-10-CM

## 2024-09-13 DIAGNOSIS — C22.0 HEPATOCELLULAR CARCINOMA (HCC): ICD-10-CM

## 2024-09-13 DIAGNOSIS — K31.819 GASTRIC ANTRAL VASCULAR ECTASIA: ICD-10-CM

## 2024-09-13 DIAGNOSIS — K76.82 HEPATIC ENCEPHALOPATHY (HCC): ICD-10-CM

## 2024-09-13 LAB
ANION GAP SERPL CALCULATED.3IONS-SCNC: 11 MMOL/L (ref 7–16)
BUN SERPL-MCNC: 24 MG/DL (ref 6–23)
CALCIUM SERPL-MCNC: 9.4 MG/DL (ref 8.6–10.2)
CHLORIDE SERPL-SCNC: 103 MMOL/L (ref 98–107)
CO2 SERPL-SCNC: 26 MMOL/L (ref 22–29)
CREAT SERPL-MCNC: 1 MG/DL (ref 0.5–1)
ERYTHROCYTE [DISTWIDTH] IN BLOOD BY AUTOMATED COUNT: 15.3 % (ref 11.5–15)
GFR, ESTIMATED: 62 ML/MIN/1.73M2
GLUCOSE SERPL-MCNC: 142 MG/DL (ref 74–99)
HCT VFR BLD AUTO: 30.2 % (ref 34–48)
HGB BLD-MCNC: 9.4 G/DL (ref 11.5–15.5)
INR PPP: 1.5
MCH RBC QN AUTO: 29.6 PG (ref 26–35)
MCHC RBC AUTO-ENTMCNC: 31.1 G/DL (ref 32–34.5)
MCV RBC AUTO: 95 FL (ref 80–99.9)
PLATELET # BLD AUTO: 35 K/UL (ref 130–450)
PLATELET CONFIRMATION: NORMAL
PMV BLD AUTO: 11.3 FL (ref 7–12)
POTASSIUM SERPL-SCNC: 4.2 MMOL/L (ref 3.5–5)
PROTHROMBIN TIME: 16.2 SEC (ref 9.3–12.4)
RBC # BLD AUTO: 3.18 M/UL (ref 3.5–5.5)
SODIUM SERPL-SCNC: 140 MMOL/L (ref 132–146)
WBC OTHER # BLD: 1.4 K/UL (ref 4.5–11.5)

## 2024-09-13 PROCEDURE — 7100000010 HC PHASE II RECOVERY - FIRST 15 MIN

## 2024-09-13 PROCEDURE — 6360000002 HC RX W HCPCS: Performed by: RADIOLOGY

## 2024-09-13 PROCEDURE — 7100000011 HC PHASE II RECOVERY - ADDTL 15 MIN

## 2024-09-13 PROCEDURE — 2500000003 HC RX 250 WO HCPCS: Performed by: RADIOLOGY

## 2024-09-13 PROCEDURE — 36415 COLL VENOUS BLD VENIPUNCTURE: CPT

## 2024-09-13 PROCEDURE — 77012 CT SCAN FOR NEEDLE BIOPSY: CPT

## 2024-09-13 PROCEDURE — 80048 BASIC METABOLIC PNL TOTAL CA: CPT

## 2024-09-13 PROCEDURE — 85610 PROTHROMBIN TIME: CPT

## 2024-09-13 PROCEDURE — 85027 COMPLETE CBC AUTOMATED: CPT

## 2024-09-13 PROCEDURE — 2709999900 CT NEEDLE BIOPSY LIVER PERCUTANEOUS

## 2024-09-13 RX ORDER — DIPHENHYDRAMINE HYDROCHLORIDE 50 MG/ML
INJECTION INTRAMUSCULAR; INTRAVENOUS PRN
Status: COMPLETED | OUTPATIENT
Start: 2024-09-13 | End: 2024-09-13

## 2024-09-13 RX ORDER — FENTANYL CITRATE 50 UG/ML
INJECTION, SOLUTION INTRAMUSCULAR; INTRAVENOUS PRN
Status: COMPLETED | OUTPATIENT
Start: 2024-09-13 | End: 2024-09-13

## 2024-09-13 RX ORDER — LIDOCAINE HYDROCHLORIDE AND EPINEPHRINE BITARTRATE 20; .01 MG/ML; MG/ML
INJECTION, SOLUTION SUBCUTANEOUS PRN
Status: COMPLETED | OUTPATIENT
Start: 2024-09-13 | End: 2024-09-13

## 2024-09-13 RX ORDER — MIDAZOLAM HYDROCHLORIDE 2 MG/2ML
INJECTION, SOLUTION INTRAMUSCULAR; INTRAVENOUS PRN
Status: COMPLETED | OUTPATIENT
Start: 2024-09-13 | End: 2024-09-13

## 2024-09-13 RX ORDER — KETOROLAC TROMETHAMINE 30 MG/ML
INJECTION, SOLUTION INTRAMUSCULAR; INTRAVENOUS PRN
Status: COMPLETED | OUTPATIENT
Start: 2024-09-13 | End: 2024-09-13

## 2024-09-13 RX ORDER — KETOROLAC TROMETHAMINE 30 MG/ML
15 INJECTION, SOLUTION INTRAMUSCULAR; INTRAVENOUS ONCE
Status: COMPLETED | OUTPATIENT
Start: 2024-09-13 | End: 2024-09-13

## 2024-09-13 RX ORDER — LIDOCAINE HYDROCHLORIDE 20 MG/ML
INJECTION, SOLUTION INFILTRATION; PERINEURAL PRN
Status: COMPLETED | OUTPATIENT
Start: 2024-09-13 | End: 2024-09-13

## 2024-09-13 RX ADMIN — LIDOCAINE HYDROCHLORIDE 8 ML: 20 INJECTION, SOLUTION INFILTRATION; PERINEURAL at 11:18

## 2024-09-13 RX ADMIN — MIDAZOLAM HYDROCHLORIDE 0.5 MG: 1 INJECTION, SOLUTION INTRAMUSCULAR; INTRAVENOUS at 11:20

## 2024-09-13 RX ADMIN — THROMBIN HUMAN 1 KIT: 2000 POWDER, FOR SOLUTION TOPICAL at 11:33

## 2024-09-13 RX ADMIN — LIDOCAINE HYDROCHLORIDE,EPINEPHRINE BITARTRATE 8 ML: 20; .01 INJECTION, SOLUTION INFILTRATION; PERINEURAL at 11:19

## 2024-09-13 RX ADMIN — MIDAZOLAM HYDROCHLORIDE 0.5 MG: 1 INJECTION, SOLUTION INTRAMUSCULAR; INTRAVENOUS at 11:15

## 2024-09-13 RX ADMIN — FENTANYL CITRATE 25 MCG: 50 INJECTION, SOLUTION INTRAMUSCULAR; INTRAVENOUS at 11:16

## 2024-09-13 RX ADMIN — DIPHENHYDRAMINE HYDROCHLORIDE 25 MG: 50 INJECTION, SOLUTION INTRAMUSCULAR; INTRAVENOUS at 11:14

## 2024-09-13 RX ADMIN — KETOROLAC TROMETHAMINE 15 MG: 30 INJECTION INTRAMUSCULAR; INTRAVENOUS at 13:06

## 2024-09-13 RX ADMIN — KETOROLAC TROMETHAMINE 15 MG: 30 INJECTION, SOLUTION INTRAMUSCULAR; INTRAVENOUS at 11:40

## 2024-09-13 RX ADMIN — FENTANYL CITRATE 25 MCG: 50 INJECTION, SOLUTION INTRAMUSCULAR; INTRAVENOUS at 11:21

## 2024-09-16 ENCOUNTER — TELEPHONE (OUTPATIENT)
Dept: INTERVENTIONAL RADIOLOGY/VASCULAR | Age: 64
End: 2024-09-16

## 2024-09-17 ENCOUNTER — TELEPHONE (OUTPATIENT)
Dept: HEMATOLOGY | Age: 64
End: 2024-09-17

## 2024-09-18 ENCOUNTER — OFFICE VISIT (OUTPATIENT)
Dept: HEMATOLOGY | Age: 64
End: 2024-09-18
Payer: MEDICARE

## 2024-09-18 ENCOUNTER — TELEPHONE (OUTPATIENT)
Dept: HEMATOLOGY | Age: 64
End: 2024-09-18

## 2024-09-18 VITALS
HEART RATE: 76 BPM | OXYGEN SATURATION: 96 % | SYSTOLIC BLOOD PRESSURE: 144 MMHG | RESPIRATION RATE: 18 BRPM | WEIGHT: 179.2 LBS | HEIGHT: 62 IN | TEMPERATURE: 98.6 F | DIASTOLIC BLOOD PRESSURE: 58 MMHG | BODY MASS INDEX: 32.97 KG/M2

## 2024-09-18 DIAGNOSIS — C22.0 HEPATOCELLULAR CARCINOMA (HCC): ICD-10-CM

## 2024-09-18 DIAGNOSIS — K72.10 CHRONIC LIVER FAILURE WITHOUT HEPATIC COMA (HCC): Primary | ICD-10-CM

## 2024-09-18 PROCEDURE — 99214 OFFICE O/P EST MOD 30 MIN: CPT | Performed by: STUDENT IN AN ORGANIZED HEALTH CARE EDUCATION/TRAINING PROGRAM

## 2024-09-18 PROCEDURE — 3078F DIAST BP <80 MM HG: CPT | Performed by: STUDENT IN AN ORGANIZED HEALTH CARE EDUCATION/TRAINING PROGRAM

## 2024-09-18 PROCEDURE — 3077F SYST BP >= 140 MM HG: CPT | Performed by: STUDENT IN AN ORGANIZED HEALTH CARE EDUCATION/TRAINING PROGRAM

## 2024-09-18 PROCEDURE — 99212 OFFICE O/P EST SF 10 MIN: CPT | Performed by: STUDENT IN AN ORGANIZED HEALTH CARE EDUCATION/TRAINING PROGRAM

## 2024-10-04 ENCOUNTER — CLINICAL DOCUMENTATION (OUTPATIENT)
Dept: INTERVENTIONAL RADIOLOGY/VASCULAR | Age: 64
End: 2024-10-04

## 2024-10-04 DIAGNOSIS — C22.0 HEPATOCELLULAR CARCINOMA (HCC): Primary | ICD-10-CM

## 2024-10-04 NOTE — PROGRESS NOTES
Pathology was called 9/27/2024 to check on status of biopsy results from 9/13/2024 liver biopsy. Slides were sent out and no result yet. Pathology was called again on 10/1/2024 & 10/4/2024 no results available. Lucia at Dr Sosa's office was notified that IR is calling patient to move Y-90 mapping scheduled for 10/8/2024 to 10/22/2024 due to this delay in the pathology results that are needed to submit for insurance authorization.

## 2024-10-07 ENCOUNTER — OFFICE VISIT (OUTPATIENT)
Dept: FAMILY MEDICINE CLINIC | Age: 64
End: 2024-10-07
Payer: MEDICAID

## 2024-10-07 VITALS
RESPIRATION RATE: 18 BRPM | SYSTOLIC BLOOD PRESSURE: 130 MMHG | HEART RATE: 78 BPM | WEIGHT: 175.6 LBS | TEMPERATURE: 97.8 F | DIASTOLIC BLOOD PRESSURE: 60 MMHG | OXYGEN SATURATION: 100 % | BODY MASS INDEX: 32.12 KG/M2

## 2024-10-07 DIAGNOSIS — Z79.4 TYPE 2 DIABETES MELLITUS WITHOUT COMPLICATION, WITH LONG-TERM CURRENT USE OF INSULIN (HCC): ICD-10-CM

## 2024-10-07 DIAGNOSIS — E11.9 TYPE 2 DIABETES MELLITUS WITHOUT COMPLICATION, WITH LONG-TERM CURRENT USE OF INSULIN (HCC): ICD-10-CM

## 2024-10-07 DIAGNOSIS — E72.20 HYPERAMMONEMIA (HCC): ICD-10-CM

## 2024-10-07 DIAGNOSIS — R11.0 NAUSEA: ICD-10-CM

## 2024-10-07 DIAGNOSIS — E11.65 POORLY CONTROLLED TYPE 2 DIABETES MELLITUS (HCC): Primary | ICD-10-CM

## 2024-10-07 DIAGNOSIS — R10.9 ACUTE ABDOMINAL PAIN: ICD-10-CM

## 2024-10-07 LAB
CHP ED QC CHECK: NORMAL
GLUCOSE BLD-MCNC: 289 MG/DL
SURGICAL PATHOLOGY REPORT: NORMAL

## 2024-10-07 PROCEDURE — 82962 GLUCOSE BLOOD TEST: CPT | Performed by: STUDENT IN AN ORGANIZED HEALTH CARE EDUCATION/TRAINING PROGRAM

## 2024-10-07 PROCEDURE — 3075F SYST BP GE 130 - 139MM HG: CPT | Performed by: STUDENT IN AN ORGANIZED HEALTH CARE EDUCATION/TRAINING PROGRAM

## 2024-10-07 PROCEDURE — 3051F HG A1C>EQUAL 7.0%<8.0%: CPT | Performed by: STUDENT IN AN ORGANIZED HEALTH CARE EDUCATION/TRAINING PROGRAM

## 2024-10-07 PROCEDURE — 99214 OFFICE O/P EST MOD 30 MIN: CPT | Performed by: STUDENT IN AN ORGANIZED HEALTH CARE EDUCATION/TRAINING PROGRAM

## 2024-10-07 PROCEDURE — 3078F DIAST BP <80 MM HG: CPT | Performed by: STUDENT IN AN ORGANIZED HEALTH CARE EDUCATION/TRAINING PROGRAM

## 2024-10-07 RX ORDER — LACTULOSE 10 G/15ML
20 SOLUTION ORAL EVERY 8 HOURS
Qty: 946 ML | Refills: 5 | Status: SHIPPED | OUTPATIENT
Start: 2024-10-07

## 2024-10-07 RX ORDER — GABAPENTIN 600 MG/1
600 TABLET ORAL 3 TIMES DAILY
Qty: 90 TABLET | Refills: 2 | Status: SHIPPED | OUTPATIENT
Start: 2024-10-07 | End: 2025-01-05

## 2024-10-07 RX ORDER — INSULIN LISPRO 100 [IU]/ML
18 INJECTION, SOLUTION INTRAVENOUS; SUBCUTANEOUS
Qty: 4 ADJUSTABLE DOSE PRE-FILLED PEN SYRINGE | Refills: 1 | Status: SHIPPED | OUTPATIENT
Start: 2024-10-07

## 2024-10-07 RX ORDER — INSULIN GLARGINE 100 [IU]/ML
50 INJECTION, SOLUTION SUBCUTANEOUS NIGHTLY
Qty: 10 ML | Refills: 3 | Status: SHIPPED | OUTPATIENT
Start: 2024-10-07

## 2024-10-07 RX ORDER — ONDANSETRON 4 MG/1
4 TABLET, FILM COATED ORAL DAILY PRN
Qty: 90 TABLET | Refills: 1 | Status: SHIPPED | OUTPATIENT
Start: 2024-10-07

## 2024-10-07 ASSESSMENT — ENCOUNTER SYMPTOMS
CONSTIPATION: 0
EYE PAIN: 0
VOMITING: 0
SORE THROAT: 0
RHINORRHEA: 0
ABDOMINAL PAIN: 1
BACK PAIN: 0
SHORTNESS OF BREATH: 0
EYE REDNESS: 0
NAUSEA: 0
SINUS PRESSURE: 0
COUGH: 0
DIARRHEA: 0
SINUS PAIN: 0

## 2024-10-07 NOTE — PROGRESS NOTES
persist.  Educational materials and/or home exercises printed for patient's review and were included in patient instructions on his/her After Visit Summary and given to patient at the end of visit.       Counseled regarding above diagnosis, including possible risks and complications,  especially if left uncontrolled.     Counseled regarding the possible side effects, risks, benefits and alternatives to treatment; patient and/or guardian verbalizes understanding, agrees, feels comfortable with and wishes to proceed with above treatment plan.     Advised patient to call with any new medication issues, and read all Rx info from pharmacy to assure aware of all possible risks and side effects of medication before taking.     Reviewed age and gender appropriate health screening exams and vaccinations.  Advised patient regarding importance of keeping up with recommended health maintenance and to schedule as soon as possible if overdue, as this is important in assessing for undiagnosed pathology, especially cancer, as well as protecting against potentially harmful/life threatening disease.       Patient and/or guardian verbalizes understanding and agrees with above counseling, assessment and plan.     All questions answered.     Return in about 3 months (around 1/7/2025), or if symptoms worsen or fail to improve.    An  electronic signature was used to authenticate this note.    --Tonie Hernandez DO on 10/7/2024 at 11:27 AM    This document was prepared at least partially through the use of voice recognition software. Although effort is taken to assure the accuracy of this document, it is possible that grammatical, syntax,  or spelling errors may occur.

## 2024-10-08 ENCOUNTER — HOSPITAL ENCOUNTER (OUTPATIENT)
Dept: INTERVENTIONAL RADIOLOGY/VASCULAR | Age: 64
Discharge: HOME | End: 2024-10-10
Attending: STUDENT IN AN ORGANIZED HEALTH CARE EDUCATION/TRAINING PROGRAM
Payer: MEDICARE

## 2024-10-08 ENCOUNTER — TELEPHONE (OUTPATIENT)
Dept: INTERVENTIONAL RADIOLOGY/VASCULAR | Age: 64
End: 2024-10-08

## 2024-10-08 VITALS
TEMPERATURE: 97.2 F | OXYGEN SATURATION: 99 % | HEART RATE: 95 BPM | DIASTOLIC BLOOD PRESSURE: 67 MMHG | RESPIRATION RATE: 16 BRPM | SYSTOLIC BLOOD PRESSURE: 180 MMHG

## 2024-10-08 DIAGNOSIS — C22.0 HEPATOCELLULAR CARCINOMA (HCC): ICD-10-CM

## 2024-10-08 PROCEDURE — 49083 ABD PARACENTESIS W/IMAGING: CPT

## 2024-10-08 PROCEDURE — 2500000003 HC RX 250 WO HCPCS: Performed by: PHYSICIAN ASSISTANT

## 2024-10-08 PROCEDURE — C1729 CATH, DRAINAGE: HCPCS

## 2024-10-08 RX ORDER — LIDOCAINE HYDROCHLORIDE 20 MG/ML
INJECTION, SOLUTION INFILTRATION; PERINEURAL PRN
Status: COMPLETED | OUTPATIENT
Start: 2024-10-08 | End: 2024-10-08

## 2024-10-08 RX ADMIN — LIDOCAINE HYDROCHLORIDE 10 ML: 20 INJECTION, SOLUTION INFILTRATION; PERINEURAL at 09:31

## 2024-10-08 NOTE — OP NOTE
Operative Note  ______________________________________________________________      IR U/S GUIDED PARACENTESIS  [facility] SPECIAL PROCEDURES    Patient Name: Lisa Hunt   YOB: 1960  Medical Record Number: [id number]  Date of Procedure: 10/8/24  Room/Bed: Room/bed info not found    Pre-operative Diagnosis: Ascites    Post-operative Diagnosis: Ascites    Consent: Informed consent was obtained from the patient prior to the procedure. The details of the procedure, as well is its risks, benefits, and alternatives, were explained.      Anesthesia: Local anesthesia with approximately 10mL of 2% Lidocaine without epinephrine administered subcutaneously.    Performed by: RANJITH Alvarez under on-site supervision by Madison Ndiaye MD.    Estimated blood loss: Minimal    Complications: None    Specimens Obtained: Ascites Fluid    Procedure: Routine scanning of all four abdominal quadrants was performed using real-time ultrasound and revealed sufficient amount of ascites fluid present.  Decision was made to proceed with procedure.  After obtaining consent, a \"Time-Out\" was called to verify the correct patient, procedure/location, allergies, relevant medications held for procedure and that all equipment is functioning and available. The patient was then placed in the supine position with the head of the bed slightly elevated and the appropriate landmarks were identified. The skin over the puncture site in the left lower quadrant region was prepped with betadine and draped in a sterile fashion. Local anesthesia was obtained by infiltration using 2% Lidocaine without epinephrine. A 5 Wolof needle sheath catheter was then advanced into the abdominal cavity. Fluid return was clear straw colored. The catheter was then withdrawn and a sterile dressing was placed over the site. The patient tolerated the procedure well.    A total volume of  3000mL was withdrawn.     Albumin: none.    Thank you for

## 2024-10-08 NOTE — BRIEF OP NOTE
Brief-Op Note  ______________________________________________________________      IR U/S GUIDED PARACENTESIS  [facility] SPECIAL PROCEDURES    Patient Name: Lisa Hunt   YOB: 1960  Medical Record Number: [id number]  Date of Procedure: 10/8/24  Room/Bed: Room/bed info not found      Pre-operative Diagnosis: Ascites    Post-operative Diagnosis: Ascites    Consent: Informed consent was obtained from the patient prior to the procedure. The details of the procedure, as well is its risks, benefits, and alternatives, were explained.      Anesthesia: Local anesthesia.    Performed by: RANJITH Alvarez under on-site supervision by Madison Ndiaye MD.    Estimated blood loss: Minimal    Complications: None    Specimen Obtained: 3000mL of clear straw colored ascites fluid was withdrawn.    (See radiology dictation in PACs for image review and additional procedural information)    Electronically signed by RANJITH Alvarez   DD: 10/8/24  11:06 AM

## 2024-10-08 NOTE — PRE-PROCEDURE INSTRUCTIONS
Spoke with pt's daughter, Lisa.  Pre procedure telephone call made to verify meds, allergies, history and any special arrangements needed for paracentesis procedure. Blood thinners held.  Patient instructed to where and when to report for procedure.  Patient take AM meds with sip of water, and dress comfortably  Chart assembled with appropriate papers  0900 arrival time

## 2024-10-09 DIAGNOSIS — C22.0 HEPATOCELLULAR CARCINOMA (HCC): Primary | ICD-10-CM

## 2024-10-14 ENCOUNTER — TELEPHONE (OUTPATIENT)
Dept: INTERVENTIONAL RADIOLOGY/VASCULAR | Age: 64
End: 2024-10-14

## 2024-10-14 NOTE — PRE-PROCEDURE INSTRUCTIONS
Call to pt, spoke with her daughter, Lisa.  Pre procedure telephone call made to verify meds, allergies, history and any special arrangements needed for y-90 mapping procedure. Blood thinners held.  Patient instructed to where and when to report for procedure.  Patient instructed to fast after midnight, may take AM meds with sip of water, dress comfortably, will need a  and approximate length of time they will be here.   Chart assembled with appropriate papers  0830 arrival time

## 2024-10-18 ENCOUNTER — CLINICAL DOCUMENTATION (OUTPATIENT)
Dept: INTERVENTIONAL RADIOLOGY/VASCULAR | Age: 64
End: 2024-10-18

## 2024-10-22 ENCOUNTER — HOSPITAL ENCOUNTER (OUTPATIENT)
Dept: INTERVENTIONAL RADIOLOGY/VASCULAR | Age: 64
Discharge: HOME | End: 2024-10-24
Payer: MEDICARE

## 2024-10-22 ENCOUNTER — HOSPITAL ENCOUNTER (OUTPATIENT)
Dept: NUCLEAR MEDICINE | Age: 64
Discharge: HOME | End: 2024-10-24
Payer: MEDICARE

## 2024-10-22 VITALS
DIASTOLIC BLOOD PRESSURE: 74 MMHG | RESPIRATION RATE: 18 BRPM | HEART RATE: 81 BPM | SYSTOLIC BLOOD PRESSURE: 124 MMHG | OXYGEN SATURATION: 96 %

## 2024-10-22 DIAGNOSIS — C22.0 HEPATOCELLULAR CARCINOMA (HCC): ICD-10-CM

## 2024-10-22 LAB
ALBUMIN SERPL-MCNC: 3.3 G/DL (ref 3.5–5.2)
ALP SERPL-CCNC: 219 U/L (ref 35–104)
ALT SERPL-CCNC: 26 U/L (ref 0–32)
AMMONIA PLAS-SCNC: 36 UMOL/L (ref 11–51)
ANION GAP SERPL CALCULATED.3IONS-SCNC: 10 MMOL/L (ref 7–16)
AST SERPL-CCNC: 62 U/L (ref 0–31)
BASOPHILS # BLD: 0.01 K/UL (ref 0–0.2)
BASOPHILS NFR BLD: 1 % (ref 0–2)
BILIRUB DIRECT SERPL-MCNC: 0.6 MG/DL (ref 0–0.3)
BILIRUB INDIRECT SERPL-MCNC: 1.4 MG/DL (ref 0–1)
BILIRUB SERPL-MCNC: 2 MG/DL (ref 0–1.2)
BUN SERPL-MCNC: 25 MG/DL (ref 6–23)
CALCIUM SERPL-MCNC: 9.7 MG/DL (ref 8.6–10.2)
CEA SERPL-MCNC: 3.4 NG/ML (ref 0–5.2)
CHLORIDE SERPL-SCNC: 99 MMOL/L (ref 98–107)
CO2 SERPL-SCNC: 24 MMOL/L (ref 22–29)
CREAT SERPL-MCNC: 1.6 MG/DL (ref 0.5–1)
EOSINOPHIL # BLD: 0.07 K/UL (ref 0.05–0.5)
EOSINOPHILS RELATIVE PERCENT: 4 % (ref 0–6)
ERYTHROCYTE [DISTWIDTH] IN BLOOD BY AUTOMATED COUNT: 14.2 % (ref 11.5–15)
GFR, ESTIMATED: 36 ML/MIN/1.73M2
GLUCOSE SERPL-MCNC: 374 MG/DL (ref 74–99)
HCT VFR BLD AUTO: 33 % (ref 34–48)
HGB BLD-MCNC: 10.5 G/DL (ref 11.5–15.5)
IMM GRANULOCYTES # BLD AUTO: <0.03 K/UL (ref 0–0.58)
IMM GRANULOCYTES NFR BLD: 1 % (ref 0–5)
INR PPP: 1.6
LYMPHOCYTES NFR BLD: 0.39 K/UL (ref 1.5–4)
LYMPHOCYTES RELATIVE PERCENT: 23 % (ref 20–42)
MCH RBC QN AUTO: 29.9 PG (ref 26–35)
MCHC RBC AUTO-ENTMCNC: 31.8 G/DL (ref 32–34.5)
MCV RBC AUTO: 94 FL (ref 80–99.9)
MONOCYTES NFR BLD: 0.13 K/UL (ref 0.1–0.95)
MONOCYTES NFR BLD: 8 % (ref 2–12)
NEUTROPHILS NFR BLD: 65 % (ref 43–80)
NEUTS SEG NFR BLD: 1.12 K/UL (ref 1.8–7.3)
PLATELET CONFIRMATION: NORMAL
PLATELET, FLUORESCENCE: 28 K/UL (ref 130–450)
PMV BLD AUTO: 12.5 FL (ref 7–12)
POTASSIUM SERPL-SCNC: 5.5 MMOL/L (ref 3.5–5)
PROT SERPL-MCNC: 9.1 G/DL (ref 6.4–8.3)
PROTHROMBIN TIME: 17.3 SEC (ref 9.3–12.4)
RBC # BLD AUTO: 3.51 M/UL (ref 3.5–5.5)
SODIUM SERPL-SCNC: 133 MMOL/L (ref 132–146)
WBC OTHER # BLD: 1.7 K/UL (ref 4.5–11.5)

## 2024-10-22 PROCEDURE — 82140 ASSAY OF AMMONIA: CPT

## 2024-10-22 PROCEDURE — 36247 INS CATH ABD/L-EXT ART 3RD: CPT

## 2024-10-22 PROCEDURE — 36415 COLL VENOUS BLD VENIPUNCTURE: CPT

## 2024-10-22 PROCEDURE — 7100000011 HC PHASE II RECOVERY - ADDTL 15 MIN

## 2024-10-22 PROCEDURE — 6360000002 HC RX W HCPCS: Performed by: RADIOLOGY

## 2024-10-22 PROCEDURE — 6360000004 HC RX CONTRAST MEDICATION: Performed by: RADIOLOGY

## 2024-10-22 PROCEDURE — 3430000000 HC RX DIAGNOSTIC RADIOPHARMACEUTICAL: Performed by: RADIOLOGY

## 2024-10-22 PROCEDURE — 82378 CARCINOEMBRYONIC ANTIGEN: CPT

## 2024-10-22 PROCEDURE — 82248 BILIRUBIN DIRECT: CPT

## 2024-10-22 PROCEDURE — 78803 RP LOCLZJ TUM SPECT 1 AREA: CPT

## 2024-10-22 PROCEDURE — 36245 INS CATH ABD/L-EXT ART 1ST: CPT

## 2024-10-22 PROCEDURE — C1894 INTRO/SHEATH, NON-LASER: HCPCS

## 2024-10-22 PROCEDURE — 82105 ALPHA-FETOPROTEIN SERUM: CPT

## 2024-10-22 PROCEDURE — 2500000003 HC RX 250 WO HCPCS: Performed by: RADIOLOGY

## 2024-10-22 PROCEDURE — 99153 MOD SED SAME PHYS/QHP EA: CPT

## 2024-10-22 PROCEDURE — 75774 ARTERY X-RAY EACH VESSEL: CPT

## 2024-10-22 PROCEDURE — 75726 ARTERY X-RAYS ABDOMEN: CPT

## 2024-10-22 PROCEDURE — 85610 PROTHROMBIN TIME: CPT

## 2024-10-22 PROCEDURE — 80053 COMPREHEN METABOLIC PANEL: CPT

## 2024-10-22 PROCEDURE — A9540 TC99M MAA: HCPCS | Performed by: RADIOLOGY

## 2024-10-22 PROCEDURE — 76377 3D RENDER W/INTRP POSTPROCES: CPT

## 2024-10-22 PROCEDURE — 7100000010 HC PHASE II RECOVERY - FIRST 15 MIN

## 2024-10-22 PROCEDURE — 85025 COMPLETE CBC W/AUTO DIFF WBC: CPT

## 2024-10-22 RX ORDER — HEPARIN SODIUM 10000 [USP'U]/ML
INJECTION, SOLUTION INTRAVENOUS; SUBCUTANEOUS PRN
Status: COMPLETED | OUTPATIENT
Start: 2024-10-22 | End: 2024-10-22

## 2024-10-22 RX ORDER — LIDOCAINE HYDROCHLORIDE 20 MG/ML
INJECTION, SOLUTION INFILTRATION; PERINEURAL PRN
Status: COMPLETED | OUTPATIENT
Start: 2024-10-22 | End: 2024-10-22

## 2024-10-22 RX ORDER — SODIUM CHLORIDE 9 MG/ML
INJECTION, SOLUTION INTRAVENOUS CONTINUOUS
Status: DISCONTINUED | OUTPATIENT
Start: 2024-10-22 | End: 2024-10-25 | Stop reason: HOSPADM

## 2024-10-22 RX ORDER — MIDAZOLAM HYDROCHLORIDE 2 MG/2ML
INJECTION, SOLUTION INTRAMUSCULAR; INTRAVENOUS PRN
Status: COMPLETED | OUTPATIENT
Start: 2024-10-22 | End: 2024-10-22

## 2024-10-22 RX ORDER — DIPHENHYDRAMINE HYDROCHLORIDE 50 MG/ML
INJECTION INTRAMUSCULAR; INTRAVENOUS PRN
Status: COMPLETED | OUTPATIENT
Start: 2024-10-22 | End: 2024-10-22

## 2024-10-22 RX ORDER — IOPAMIDOL 755 MG/ML
INJECTION, SOLUTION INTRAVASCULAR PRN
Status: COMPLETED | OUTPATIENT
Start: 2024-10-22 | End: 2024-10-22

## 2024-10-22 RX ORDER — LIDOCAINE HYDROCHLORIDE AND EPINEPHRINE BITARTRATE 20; .01 MG/ML; MG/ML
INJECTION, SOLUTION SUBCUTANEOUS PRN
Status: COMPLETED | OUTPATIENT
Start: 2024-10-22 | End: 2024-10-22

## 2024-10-22 RX ORDER — FENTANYL CITRATE 50 UG/ML
INJECTION, SOLUTION INTRAMUSCULAR; INTRAVENOUS PRN
Status: COMPLETED | OUTPATIENT
Start: 2024-10-22 | End: 2024-10-22

## 2024-10-22 RX ORDER — ONDANSETRON 2 MG/ML
4 INJECTION INTRAMUSCULAR; INTRAVENOUS EVERY 8 HOURS PRN
Status: DISCONTINUED | OUTPATIENT
Start: 2024-10-22 | End: 2024-10-25 | Stop reason: HOSPADM

## 2024-10-22 RX ORDER — HYDROMORPHONE HYDROCHLORIDE 1 MG/ML
INJECTION, SOLUTION INTRAMUSCULAR; INTRAVENOUS; SUBCUTANEOUS PRN
Status: COMPLETED | OUTPATIENT
Start: 2024-10-22 | End: 2024-10-22

## 2024-10-22 RX ADMIN — Medication 5000 UNITS: at 10:08

## 2024-10-22 RX ADMIN — MIDAZOLAM HYDROCHLORIDE 0.5 MG: 1 INJECTION, SOLUTION INTRAMUSCULAR; INTRAVENOUS at 11:37

## 2024-10-22 RX ADMIN — MIDAZOLAM HYDROCHLORIDE 0.5 MG: 1 INJECTION, SOLUTION INTRAMUSCULAR; INTRAVENOUS at 09:58

## 2024-10-22 RX ADMIN — Medication 5 MILLICURIE: at 11:54

## 2024-10-22 RX ADMIN — LIDOCAINE HYDROCHLORIDE,EPINEPHRINE BITARTRATE 6 ML: 20; .01 INJECTION, SOLUTION INFILTRATION; PERINEURAL at 10:02

## 2024-10-22 RX ADMIN — LIDOCAINE HYDROCHLORIDE 6 ML: 20 INJECTION, SOLUTION INFILTRATION; PERINEURAL at 10:03

## 2024-10-22 RX ADMIN — FENTANYL CITRATE 50 MCG: 50 INJECTION, SOLUTION INTRAMUSCULAR; INTRAVENOUS at 11:53

## 2024-10-22 RX ADMIN — FENTANYL CITRATE 25 MCG: 50 INJECTION, SOLUTION INTRAMUSCULAR; INTRAVENOUS at 11:38

## 2024-10-22 RX ADMIN — IOPAMIDOL 140 ML: 755 INJECTION, SOLUTION INTRAVENOUS at 12:10

## 2024-10-22 RX ADMIN — HYDROMORPHONE HYDROCHLORIDE 0.5 MG: 1 INJECTION, SOLUTION INTRAMUSCULAR; INTRAVENOUS; SUBCUTANEOUS at 11:57

## 2024-10-22 RX ADMIN — FENTANYL CITRATE 25 MCG: 50 INJECTION, SOLUTION INTRAMUSCULAR; INTRAVENOUS at 09:59

## 2024-10-22 RX ADMIN — DIPHENHYDRAMINE HYDROCHLORIDE 25 MG: 50 INJECTION, SOLUTION INTRAMUSCULAR; INTRAVENOUS at 09:58

## 2024-10-22 RX ADMIN — MIDAZOLAM HYDROCHLORIDE 1 MG: 1 INJECTION, SOLUTION INTRAMUSCULAR; INTRAVENOUS at 11:52

## 2024-10-22 NOTE — PROCEDURES
1320 Patient returned from Nuclear Medicine after Y90 mapping. Dressing to right groin checked, clean, dry, and intact. Patient stable. No s/s of complications noted or reported. Vitals will be checked q 15min, see flow sheets. Patient will remain on bedrest for a total of 3 hours before discharge.    1450 Patient eating and drinking well with no s/s of complications noted or reported.    1520 Site was checked with every set of vitals. Site clean dry and intact. Discharge papers reviewed with patient via  and questions answered.     1545 Patient taken to door via wheelchair. Patient in stable condition, no s/s of complications noted or reported.

## 2024-10-22 NOTE — PROCEDURES
0800 Patient arrived to Radiology department for Y90 mapping. Vital signs taken. IV placed, blood obtained, IV flushed and prn adapter attached. Blood sample sent to lab for ordered tests. Allergies, home medications, medical history, H&P and fasting instructions reviewed with patient. Paper chart reviewed for correct documents.    0908 Procedural instructions given, questions answered, understanding expressed and consent signed with video .

## 2024-10-22 NOTE — PRE SEDATION
Sedation Pre-Procedure Note    Patient Name: Lisa Hunt   YOB: 1960  Room/Bed: Room/bed info not found  Medical Record Number: [id number]  Date: 10/22/2024   Time: 1:47 PM       Indication:  Hepatocellualr neoplasm    Consent: I have discussed with the patient and/or the patient representative the indication, alternatives, and the possible risks and/or complications of the planned procedure and the anesthesia methods. The patient and/or patient representative appear to understand and agree to proceed.    Vital Signs:   Vitals:    10/22/24 1320   BP: 115/61   Pulse: 79   Resp:    SpO2: 94%       Past Medical History:   has a past medical history of DONNA (acute kidney injury) (HCC), Cirrhosis (HCC), Diabetes mellitus (HCC), Diabetic neuropathy (HCC), Esophageal varices without bleeding (HCC), GERD (gastroesophageal reflux disease), Hypertension, Intracranial bleed (HCC), Liver cirrhosis (HCC), Low vitamin D level, SDH (subdural hematoma), Thrombocytopenia (HCC), and Type 2 diabetes mellitus without complication (HCC).    Past Surgical History:   has a past surgical history that includes Upper gastrointestinal endoscopy (2022); Upper gastrointestinal endoscopy (2021); Upper gastrointestinal endoscopy (2019); Upper gastrointestinal endoscopy (2021);  section; Cholecystectomy; Appendectomy; Paracentesis (Left, 10/12/2023); Paracentesis (Left, 2024); Upper gastrointestinal endoscopy (N/A, 2024); Upper gastrointestinal endoscopy (N/A, 2024); Paracentesis (Left, 2024); Upper gastrointestinal endoscopy (N/A, 3/15/2024); CT NEEDLE BIOPSY LIVER PERCUTANEOUS (2024); Upper gastrointestinal endoscopy (N/A, 2024); Upper gastrointestinal endoscopy (N/A, 2024); and CT NEEDLE BIOPSY LIVER PERCUTANEOUS (2024).    Medications:   Scheduled Meds:   Continuous Infusions:   PRN Meds:   Home Meds:   Prior to Admission medications    Medication Sig

## 2024-10-22 NOTE — BRIEF OP NOTE
Brief Postoperative Note      Patient: Lisa Hunt  YOB: 1960  MRN: [id number]    Date of Procedure: 10/22/2024  Hepatocellular neoplasm    Post-Op Diagnosis: Same       * No procedures listed *    * No surgeons listed *    Assistant:  * No surgical staff found *    Anesthesia: * No anesthesia type entered *    Estimated Blood Loss (mL): Minimal    Complications: None    Specimens:   * No specimens in log *    Implants:  * No implants in log *      Drains: * No LDAs found *    Findings:  Infection Present At Time Of Surgery (PATOS) (choose all levels that have infection present):  No infection present  Other Findings: hypervascular mass at segment 7-8    Electronically signed by Madison Ndiaye MD on 10/22/2024 at 1:50 PM

## 2024-10-23 LAB — AFP SERPL-MCNC: <1.8 UG/L

## 2024-10-30 ENCOUNTER — HOSPITAL ENCOUNTER (EMERGENCY)
Age: 64
Discharge: HOME | End: 2024-10-30
Attending: EMERGENCY MEDICINE
Payer: MEDICARE

## 2024-10-30 ENCOUNTER — APPOINTMENT (OUTPATIENT)
Dept: INTERVENTIONAL RADIOLOGY/VASCULAR | Age: 64
End: 2024-10-30
Payer: MEDICARE

## 2024-10-30 VITALS
HEART RATE: 87 BPM | OXYGEN SATURATION: 98 % | RESPIRATION RATE: 18 BRPM | TEMPERATURE: 97.9 F | DIASTOLIC BLOOD PRESSURE: 87 MMHG | WEIGHT: 168 LBS | SYSTOLIC BLOOD PRESSURE: 134 MMHG | HEIGHT: 63 IN | BODY MASS INDEX: 29.77 KG/M2

## 2024-10-30 DIAGNOSIS — R18.8 OTHER ASCITES: Primary | ICD-10-CM

## 2024-10-30 DIAGNOSIS — R73.9 HYPERGLYCEMIA: ICD-10-CM

## 2024-10-30 LAB
ALBUMIN SERPL-MCNC: 2.9 G/DL (ref 3.5–5.2)
ALP SERPL-CCNC: 182 U/L (ref 35–104)
ALT SERPL-CCNC: 21 U/L (ref 0–32)
ANION GAP SERPL CALCULATED.3IONS-SCNC: 8 MMOL/L (ref 7–16)
APPEARANCE FLD: CLEAR
AST SERPL-CCNC: 46 U/L (ref 0–31)
BASOPHILS # BLD: 0.03 K/UL (ref 0–0.2)
BASOPHILS NFR BLD: 2 % (ref 0–2)
BILIRUB SERPL-MCNC: 2.5 MG/DL (ref 0–1.2)
BODY FLD TYPE: NORMAL
BUN SERPL-MCNC: 22 MG/DL (ref 6–23)
CALCIUM SERPL-MCNC: 8.4 MG/DL (ref 8.6–10.2)
CHLORIDE SERPL-SCNC: 99 MMOL/L (ref 98–107)
CLOT CHECK: NORMAL
CO2 SERPL-SCNC: 26 MMOL/L (ref 22–29)
COLOR FLD: NORMAL
CREAT SERPL-MCNC: 1.7 MG/DL (ref 0.5–1)
EOSINOPHIL # BLD: 0.06 K/UL (ref 0.05–0.5)
EOSINOPHILS RELATIVE PERCENT: 4 % (ref 0–6)
ERYTHROCYTE [DISTWIDTH] IN BLOOD BY AUTOMATED COUNT: 14.6 % (ref 11.5–15)
GFR, ESTIMATED: 34 ML/MIN/1.73M2
GLUCOSE SERPL-MCNC: 320 MG/DL (ref 74–99)
HCT VFR BLD AUTO: 29.1 % (ref 34–48)
HGB BLD-MCNC: 9.5 G/DL (ref 11.5–15.5)
INR PPP: 1.6
LYMPHOCYTES NFR BLD: 0.08 K/UL (ref 1.5–4)
LYMPHOCYTES RELATIVE PERCENT: 4 % (ref 20–42)
MCH RBC QN AUTO: 30.8 PG (ref 26–35)
MCHC RBC AUTO-ENTMCNC: 32.6 G/DL (ref 32–34.5)
MCV RBC AUTO: 94.5 FL (ref 80–99.9)
MONOCYTES NFR BLD: 0.13 K/UL (ref 0.1–0.95)
MONOCYTES NFR BLD: 7 % (ref 2–12)
MONOCYTES NFR FLD: 83 %
NEUTROPHILS NFR BLD: 83 % (ref 43–80)
NEUTROPHILS NFR FLD: 17 %
NEUTS SEG NFR BLD: 1.5 K/UL (ref 1.8–7.3)
PARTIAL THROMBOPLASTIN TIME: 28.6 SEC (ref 24.5–35.1)
PLATELET # BLD AUTO: 42 K/UL (ref 130–450)
PLATELET CONFIRMATION: NORMAL
PMV BLD AUTO: 12.1 FL (ref 7–12)
POTASSIUM SERPL-SCNC: 4.8 MMOL/L (ref 3.5–5)
PROT FLD-MCNC: 1.7 G/DL
PROT SERPL-MCNC: 8.4 G/DL (ref 6.4–8.3)
PROTHROMBIN TIME: 17.8 SEC (ref 9.3–12.4)
RBC # BLD AUTO: 3.08 M/UL (ref 3.5–5.5)
RBC # BLD: ABNORMAL 10*6/UL
RBC # FLD: <2000 CELLS/UL
SODIUM SERPL-SCNC: 133 MMOL/L (ref 132–146)
SPECIMEN TYPE: NORMAL
WBC # FLD: 147 CELLS/UL
WBC OTHER # BLD: 1.8 K/UL (ref 4.5–11.5)

## 2024-10-30 PROCEDURE — 2709999900 IR US GUIDED PARACENTESIS

## 2024-10-30 PROCEDURE — 85025 COMPLETE CBC W/AUTO DIFF WBC: CPT

## 2024-10-30 PROCEDURE — 84157 ASSAY OF PROTEIN OTHER: CPT

## 2024-10-30 PROCEDURE — 99284 EMERGENCY DEPT VISIT MOD MDM: CPT

## 2024-10-30 PROCEDURE — 49083 ABD PARACENTESIS W/IMAGING: CPT

## 2024-10-30 PROCEDURE — 87205 SMEAR GRAM STAIN: CPT

## 2024-10-30 PROCEDURE — 89051 BODY FLUID CELL COUNT: CPT

## 2024-10-30 PROCEDURE — 85730 THROMBOPLASTIN TIME PARTIAL: CPT

## 2024-10-30 PROCEDURE — 87070 CULTURE OTHR SPECIMN AEROBIC: CPT

## 2024-10-30 PROCEDURE — 2500000003 HC RX 250 WO HCPCS: Performed by: PHYSICIAN ASSISTANT

## 2024-10-30 PROCEDURE — 80053 COMPREHEN METABOLIC PANEL: CPT

## 2024-10-30 PROCEDURE — 85610 PROTHROMBIN TIME: CPT

## 2024-10-30 PROCEDURE — 6370000000 HC RX 637 (ALT 250 FOR IP): Performed by: EMERGENCY MEDICINE

## 2024-10-30 PROCEDURE — 96374 THER/PROPH/DIAG INJ IV PUSH: CPT

## 2024-10-30 RX ORDER — LIDOCAINE HYDROCHLORIDE 20 MG/ML
INJECTION, SOLUTION INFILTRATION; PERINEURAL PRN
Status: COMPLETED | OUTPATIENT
Start: 2024-10-30 | End: 2024-10-30

## 2024-10-30 RX ADMIN — INSULIN HUMAN 6 UNITS: 100 INJECTION, SOLUTION PARENTERAL at 12:23

## 2024-10-30 RX ADMIN — LIDOCAINE HYDROCHLORIDE 10 ML: 20 INJECTION, SOLUTION INFILTRATION; PERINEURAL at 10:25

## 2024-10-30 NOTE — BRIEF OP NOTE
Brief-Op Note  ______________________________________________________________      IR U/S GUIDED PARACENTESIS  [facility] EMERGENCY DEPARTMENT    Patient Name: Lisa Hunt   YOB: 1960  Medical Record Number: [id number]  Date of Procedure: 10/30/24  Room/Bed: [facility]      Pre-operative Diagnosis: Ascites    Post-operative Diagnosis: Ascites    Consent: Informed consent was obtained from the patient prior to the procedure. The details of the procedure, as well is its risks, benefits, and alternatives, were explained.      Anesthesia: Local anesthesia.    Performed by: RANJITH Alvarez under on-site supervision by Jerson Mireles MD.    Estimated blood loss: Minimal    Complications: None    Specimen Obtained: 5150mL of clear straw colored ascites fluid was withdrawn.    (See radiology dictation in PACs for image review and additional procedural information)    Electronically signed by RANJITH Alvarez   DD: 10/30/24  11:56 AM

## 2024-10-30 NOTE — ED PROVIDER NOTES
[facility] EMERGENCY DEPARTMENT  EMERGENCY DEPARTMENT ENCOUNTER        Pt Name: Lisa Hunt  MRN: [id number]  Birthdate 1960  Date of evaluation: 10/30/2024  Provider: David Sharma MD  PCP: Tonie Hernandez DO  Note Started: 8:51 AM EDT 10/30/24    CHIEF COMPLAINT       Chief Complaint   Patient presents with    Abdominal Pain     Abdominal pain and swelling. Had paracentesis 2-3 weeks ago.        HISTORY OF PRESENT ILLNESS: 1 or more Elements        Lisa Hunt is a 64 y.o. female who presents for abdominal distention and concerns for needing paracentesis.  Patient reports that she has a history of liver disease and reports that she receives paracentesis to help with her ascites.  She reports that 2 weeks ago she had one and now says she needs another one.  She reports that she feels distended.  She denies fever or chills.  She denies chest pain or shortness of breath.  She says she really does not have abdominal pain but says the chest feels full and stretched.  She denies any bleeding.  She denies any infectious symptoms.    Nursing Notes were all reviewed and agreed with or any disagreements were addressed in the HPI.      REVIEW OF EXTERNAL NOTE :       10- outpatient operative notes  10/8/24 interventional radiology notes    Chart Review/External Note Review    Last Echo reviewed by Me:  No results found for: \"LVEF\", \"LVEFMODE\"          Controlled Substance Monitoring:    Acute and Chronic Pain Monitoring:        No data to display                    REVIEW OF SYSTEMS :      Positives and Pertinent negatives as per HPI.     SURGICAL HISTORY     Past Surgical History:   Procedure Laterality Date    APPENDECTOMY       SECTION      CHOLECYSTECTOMY      CT NEEDLE BIOPSY LIVER PERCUTANEOUS  2024    CT NEEDLE BIOPSY LIVER PERCUTANEOUS 2024 SenthilIRLANDA MD [facility] CT    CT NEEDLE BIOPSY LIVER PERCUTANEOUS  2024    CT NEEDLE  record    FINAL IMPRESSION      1. Other ascites    2. Hyperglycemia          DISPOSITION/PLAN     DISPOSITION Decision To Discharge 10/30/2024 12:52:21 PM      PATIENT REFERRED TO:  Tonie Hernandez DO  [address] [place] [id number]  [phone]    Schedule an appointment as soon as possible for a visit       [facility] Emergency Department  [address] [address]  [phone]    If symptoms worsen      DISCHARGE MEDICATIONS:  Discharge Medication List as of 10/30/2024 12:55 PM          DISCONTINUED MEDICATIONS:  Discharge Medication List as of 10/30/2024 12:55 PM                 (Please note that portions of this note were completed with a voice recognition program.  Efforts were made to edit the dictations but occasionally words are mis-transcribed.)    David Sharma MD (electronically signed)            David Sharma MD  10/30/24 1809

## 2024-10-30 NOTE — OP NOTE
Operative Note  ______________________________________________________________      IR U/S GUIDED PARACENTESIS  [facility] EMERGENCY DEPARTMENT    Patient Name: Lisa Hunt   YOB: 1960  Medical Record Number: [id number]  Date of Procedure: 10/30/24  Room/Bed: [facility]    Pre-operative Diagnosis: Ascites    Post-operative Diagnosis: Ascites    Consent: Informed consent was obtained from the patient prior to the procedure. The details of the procedure, as well is its risks, benefits, and alternatives, were explained.      Anesthesia: Local anesthesia with approximately 10mL of 2% Lidocaine without epinephrine administered subcutaneously.    Performed by: RANJITH Alvarez under on-site supervision by Jerson Mireles MD.    Estimated blood loss: Minimal    Complications: None    Specimens Obtained: Ascites Fluid    Procedure: Routine scanning of all four abdominal quadrants was performed using real-time ultrasound and revealed sufficient amount of ascites fluid present.  Decision was made to proceed with procedure.  After obtaining consent, a \"Time-Out\" was called to verify the correct patient, procedure/location, allergies, relevant medications held for procedure and that all equipment is functioning and available. The patient was then placed in the supine position with the head of the bed slightly elevated and the appropriate landmarks were identified. The skin over the puncture site in the left lower quadrant region was prepped with betadine and draped in a sterile fashion. Local anesthesia was obtained by infiltration using 2% Lidocaine without epinephrine. A 5 Sami needle sheath catheter was then advanced into the abdominal cavity. Fluid return was clear straw colored. The catheter was then withdrawn and a sterile dressing was placed over the site. The patient tolerated the procedure well.    A total volume of  5150mL was withdrawn.     Albumin: none.    Thank you  for allowing Interventional Radiology to participate in the care of Lisa Gilbert.     Electronically signed by RANJITH Alvarez   DD: 10/30/24  11:56 AM

## 2024-11-02 LAB
MICROORGANISM SPEC CULT: NO GROWTH
MICROORGANISM/AGENT SPEC: NORMAL
SERVICE CMNT-IMP: NORMAL
SPECIMEN DESCRIPTION: NORMAL

## 2024-11-14 ENCOUNTER — HOSPITAL ENCOUNTER (OUTPATIENT)
Dept: INTERVENTIONAL RADIOLOGY/VASCULAR | Age: 64
Setting detail: OBSERVATION
Discharge: HOME | End: 2024-11-15
Attending: STUDENT IN AN ORGANIZED HEALTH CARE EDUCATION/TRAINING PROGRAM | Admitting: STUDENT IN AN ORGANIZED HEALTH CARE EDUCATION/TRAINING PROGRAM
Payer: MEDICARE

## 2024-11-14 ENCOUNTER — HOSPITAL ENCOUNTER (OUTPATIENT)
Dept: INTERVENTIONAL RADIOLOGY/VASCULAR | Age: 64
Discharge: HOME | End: 2024-11-16
Payer: MEDICARE

## 2024-11-14 ENCOUNTER — HOSPITAL ENCOUNTER (OUTPATIENT)
Dept: NUCLEAR MEDICINE | Age: 64
Discharge: HOME | End: 2024-11-16
Payer: MEDICARE

## 2024-11-14 VITALS
DIASTOLIC BLOOD PRESSURE: 71 MMHG | RESPIRATION RATE: 18 BRPM | SYSTOLIC BLOOD PRESSURE: 155 MMHG | HEART RATE: 96 BPM | OXYGEN SATURATION: 95 %

## 2024-11-14 DIAGNOSIS — R18.8 OTHER ASCITES: ICD-10-CM

## 2024-11-14 DIAGNOSIS — C22.0 HEPATOCELLULAR CARCINOMA (HCC): ICD-10-CM

## 2024-11-14 LAB
ALBUMIN FLD-MCNC: 0.8 G/DL
AMYLASE FLD-CCNC: 9 U/L
ANION GAP SERPL CALCULATED.3IONS-SCNC: 8 MMOL/L (ref 7–16)
APPEARANCE FLD: CLEAR
BASOPHILS # BLD: 0.01 K/UL (ref 0–0.2)
BASOPHILS NFR BLD: 1 % (ref 0–2)
BODY FLD TYPE: NORMAL
BUN SERPL-MCNC: 21 MG/DL (ref 6–23)
CALCIUM SERPL-MCNC: 8.9 MG/DL (ref 8.6–10.2)
CHLORIDE SERPL-SCNC: 102 MMOL/L (ref 98–107)
CLOT CHECK: NORMAL
CO2 SERPL-SCNC: 25 MMOL/L (ref 22–29)
COLOR FLD: YELLOW
CREAT SERPL-MCNC: 1.1 MG/DL (ref 0.5–1)
EOSINOPHIL # BLD: 0.05 K/UL (ref 0.05–0.5)
EOSINOPHILS RELATIVE PERCENT: 3 % (ref 0–6)
ERYTHROCYTE [DISTWIDTH] IN BLOOD BY AUTOMATED COUNT: 15.1 % (ref 11.5–15)
GFR, ESTIMATED: 55 ML/MIN/1.73M2
GLUCOSE BLD-MCNC: 466 MG/DL (ref 74–99)
GLUCOSE BLD-MCNC: 468 MG/DL (ref 74–99)
GLUCOSE BLD-MCNC: 482 MG/DL (ref 74–99)
GLUCOSE BLD-MCNC: >500 MG/DL (ref 74–99)
GLUCOSE FLD-MCNC: 316 MG/DL
GLUCOSE SERPL-MCNC: 303 MG/DL (ref 74–99)
HCT VFR BLD AUTO: 30.2 % (ref 34–48)
HGB BLD-MCNC: 9.6 G/DL (ref 11.5–15.5)
IMM GRANULOCYTES # BLD AUTO: <0.03 K/UL (ref 0–0.58)
IMM GRANULOCYTES NFR BLD: 0 % (ref 0–5)
INR PPP: 1.7
LDH FLD L TO P-CCNC: 54 U/L
LYMPHOCYTES NFR BLD: 0.37 K/UL (ref 1.5–4)
LYMPHOCYTES RELATIVE PERCENT: 25 % (ref 20–42)
MCH RBC QN AUTO: 30.6 PG (ref 26–35)
MCHC RBC AUTO-ENTMCNC: 31.8 G/DL (ref 32–34.5)
MCV RBC AUTO: 96.2 FL (ref 80–99.9)
MONOCYTES NFR BLD: 0.18 K/UL (ref 0.1–0.95)
MONOCYTES NFR BLD: 12 % (ref 2–12)
MONOCYTES NFR FLD: 96 %
NEUTROPHILS NFR BLD: 58 % (ref 43–80)
NEUTROPHILS NFR FLD: 4 %
NEUTS SEG NFR BLD: 0.85 K/UL (ref 1.8–7.3)
PLATELET # BLD AUTO: 30 K/UL (ref 130–450)
PLATELET CONFIRMATION: NORMAL
PMV BLD AUTO: 12.1 FL (ref 7–12)
POTASSIUM SERPL-SCNC: 4.8 MMOL/L (ref 3.5–5)
PROT FLD-MCNC: 1.6 G/DL
PROTHROMBIN TIME: 18.2 SEC (ref 9.3–12.4)
RBC # BLD AUTO: 3.14 M/UL (ref 3.5–5.5)
RBC # FLD: <2000 CELLS/UL
SODIUM SERPL-SCNC: 135 MMOL/L (ref 132–146)
SPECIMEN TYPE: NORMAL
WBC # FLD: 46 CELLS/UL
WBC OTHER # BLD: 1.5 K/UL (ref 4.5–11.5)

## 2024-11-14 PROCEDURE — 78803 RP LOCLZJ TUM SPECT 1 AREA: CPT

## 2024-11-14 PROCEDURE — 6370000000 HC RX 637 (ALT 250 FOR IP): Performed by: RADIOLOGY

## 2024-11-14 PROCEDURE — 2580000003 HC RX 258: Performed by: RADIOLOGY

## 2024-11-14 PROCEDURE — 2500000003 HC RX 250 WO HCPCS: Performed by: PHYSICIAN ASSISTANT

## 2024-11-14 PROCEDURE — 87070 CULTURE OTHR SPECIMN AEROBIC: CPT

## 2024-11-14 PROCEDURE — 79445 NUCLEAR RX INTRA-ARTERIAL: CPT

## 2024-11-14 PROCEDURE — 88305 TISSUE EXAM BY PATHOLOGIST: CPT

## 2024-11-14 PROCEDURE — C1889 IMPLANT/INSERT DEVICE, NOC: HCPCS

## 2024-11-14 PROCEDURE — 99153 MOD SED SAME PHYS/QHP EA: CPT

## 2024-11-14 PROCEDURE — 89051 BODY FLUID CELL COUNT: CPT

## 2024-11-14 PROCEDURE — 75726 ARTERY X-RAYS ABDOMEN: CPT

## 2024-11-14 PROCEDURE — 37243 VASC EMBOLIZE/OCCLUDE ORGAN: CPT

## 2024-11-14 PROCEDURE — 6360000004 HC RX CONTRAST MEDICATION: Performed by: RADIOLOGY

## 2024-11-14 PROCEDURE — 82150 ASSAY OF AMYLASE: CPT

## 2024-11-14 PROCEDURE — 36415 COLL VENOUS BLD VENIPUNCTURE: CPT

## 2024-11-14 PROCEDURE — 87205 SMEAR GRAM STAIN: CPT

## 2024-11-14 PROCEDURE — 83615 LACTATE (LD) (LDH) ENZYME: CPT

## 2024-11-14 PROCEDURE — 82042 OTHER SOURCE ALBUMIN QUAN EA: CPT

## 2024-11-14 PROCEDURE — 49083 ABD PARACENTESIS W/IMAGING: CPT

## 2024-11-14 PROCEDURE — G0379 DIRECT REFER HOSPITAL OBSERV: HCPCS

## 2024-11-14 PROCEDURE — 75774 ARTERY X-RAY EACH VESSEL: CPT

## 2024-11-14 PROCEDURE — 6370000000 HC RX 637 (ALT 250 FOR IP)

## 2024-11-14 PROCEDURE — 82945 GLUCOSE OTHER FLUID: CPT

## 2024-11-14 PROCEDURE — 36247 INS CATH ABD/L-EXT ART 3RD: CPT

## 2024-11-14 PROCEDURE — G0378 HOSPITAL OBSERVATION PER HR: HCPCS

## 2024-11-14 PROCEDURE — 85610 PROTHROMBIN TIME: CPT

## 2024-11-14 PROCEDURE — 85025 COMPLETE CBC W/AUTO DIFF WBC: CPT

## 2024-11-14 PROCEDURE — 88112 CYTOPATH CELL ENHANCE TECH: CPT

## 2024-11-14 PROCEDURE — 84157 ASSAY OF PROTEIN OTHER: CPT

## 2024-11-14 PROCEDURE — 3430000000 HC RX DIAGNOSTIC RADIOPHARMACEUTICAL: Performed by: RADIOLOGY

## 2024-11-14 PROCEDURE — 6360000002 HC RX W HCPCS: Performed by: RADIOLOGY

## 2024-11-14 PROCEDURE — 82962 GLUCOSE BLOOD TEST: CPT

## 2024-11-14 PROCEDURE — 80048 BASIC METABOLIC PNL TOTAL CA: CPT

## 2024-11-14 PROCEDURE — 36248 INS CATH ABD/L-EXT ART ADDL: CPT

## 2024-11-14 PROCEDURE — C2616 BRACHYTX, NON-STR,YTTRIUM-90: HCPCS | Performed by: RADIOLOGY

## 2024-11-14 RX ORDER — LIDOCAINE HYDROCHLORIDE 20 MG/ML
JELLY TOPICAL PRN
Status: DISCONTINUED | OUTPATIENT
Start: 2024-11-14 | End: 2024-11-14

## 2024-11-14 RX ORDER — CARVEDILOL 3.12 MG/1
3.12 TABLET ORAL DAILY
Status: DISCONTINUED | OUTPATIENT
Start: 2024-11-15 | End: 2024-11-15 | Stop reason: HOSPADM

## 2024-11-14 RX ORDER — ONDANSETRON 2 MG/ML
12 INJECTION INTRAMUSCULAR; INTRAVENOUS ONCE
Status: COMPLETED | OUTPATIENT
Start: 2024-11-14 | End: 2024-11-14

## 2024-11-14 RX ORDER — INSULIN LISPRO 100 [IU]/ML
0-16 INJECTION, SOLUTION INTRAVENOUS; SUBCUTANEOUS
Status: DISCONTINUED | OUTPATIENT
Start: 2024-11-14 | End: 2024-11-15 | Stop reason: HOSPADM

## 2024-11-14 RX ORDER — INSULIN LISPRO 100 [IU]/ML
16 INJECTION, SOLUTION INTRAVENOUS; SUBCUTANEOUS ONCE
Status: COMPLETED | OUTPATIENT
Start: 2024-11-14 | End: 2024-11-14

## 2024-11-14 RX ORDER — LIDOCAINE HYDROCHLORIDE 20 MG/ML
INJECTION, SOLUTION INFILTRATION; PERINEURAL PRN
Status: COMPLETED | OUTPATIENT
Start: 2024-11-14 | End: 2024-11-14

## 2024-11-14 RX ORDER — FUROSEMIDE 20 MG/1
20 TABLET ORAL DAILY
Status: DISCONTINUED | OUTPATIENT
Start: 2024-11-15 | End: 2024-11-15 | Stop reason: HOSPADM

## 2024-11-14 RX ORDER — SODIUM CHLORIDE 9 MG/ML
INJECTION, SOLUTION INTRAVENOUS CONTINUOUS
Status: DISCONTINUED | OUTPATIENT
Start: 2024-11-14 | End: 2024-11-15

## 2024-11-14 RX ORDER — HEPARIN SODIUM 10000 [USP'U]/ML
INJECTION, SOLUTION INTRAVENOUS; SUBCUTANEOUS PRN
Status: COMPLETED | OUTPATIENT
Start: 2024-11-14 | End: 2024-11-14

## 2024-11-14 RX ORDER — DEXTROSE MONOHYDRATE 100 MG/ML
INJECTION, SOLUTION INTRAVENOUS CONTINUOUS PRN
Status: DISCONTINUED | OUTPATIENT
Start: 2024-11-14 | End: 2024-11-15 | Stop reason: HOSPADM

## 2024-11-14 RX ORDER — DEXAMETHASONE SODIUM PHOSPHATE 10 MG/ML
10 INJECTION INTRAMUSCULAR; INTRAVENOUS ONCE
Status: COMPLETED | OUTPATIENT
Start: 2024-11-14 | End: 2024-11-14

## 2024-11-14 RX ORDER — LABETALOL HYDROCHLORIDE 5 MG/ML
10 INJECTION, SOLUTION INTRAVENOUS EVERY 4 HOURS PRN
Status: DISCONTINUED | OUTPATIENT
Start: 2024-11-14 | End: 2024-11-15 | Stop reason: HOSPADM

## 2024-11-14 RX ORDER — MIDAZOLAM HYDROCHLORIDE 2 MG/2ML
INJECTION, SOLUTION INTRAMUSCULAR; INTRAVENOUS PRN
Status: COMPLETED | OUTPATIENT
Start: 2024-11-14 | End: 2024-11-14

## 2024-11-14 RX ORDER — GLUCAGON 1 MG/ML
1 KIT INJECTION PRN
Status: DISCONTINUED | OUTPATIENT
Start: 2024-11-14 | End: 2024-11-15 | Stop reason: HOSPADM

## 2024-11-14 RX ORDER — SPIRONOLACTONE 25 MG/1
50 TABLET ORAL DAILY
Status: DISCONTINUED | OUTPATIENT
Start: 2024-11-15 | End: 2024-11-15 | Stop reason: HOSPADM

## 2024-11-14 RX ORDER — DIPHENHYDRAMINE HYDROCHLORIDE 50 MG/ML
50 INJECTION INTRAMUSCULAR; INTRAVENOUS ONCE
Status: COMPLETED | OUTPATIENT
Start: 2024-11-14 | End: 2024-11-14

## 2024-11-14 RX ORDER — FENTANYL CITRATE 50 UG/ML
INJECTION, SOLUTION INTRAMUSCULAR; INTRAVENOUS PRN
Status: COMPLETED | OUTPATIENT
Start: 2024-11-14 | End: 2024-11-14

## 2024-11-14 RX ORDER — HYDRALAZINE HYDROCHLORIDE 20 MG/ML
10 INJECTION INTRAMUSCULAR; INTRAVENOUS EVERY 4 HOURS PRN
Status: DISCONTINUED | OUTPATIENT
Start: 2024-11-14 | End: 2024-11-15 | Stop reason: HOSPADM

## 2024-11-14 RX ORDER — ONDANSETRON 2 MG/ML
4 INJECTION INTRAMUSCULAR; INTRAVENOUS EVERY 6 HOURS PRN
Status: DISCONTINUED | OUTPATIENT
Start: 2024-11-14 | End: 2024-11-15 | Stop reason: HOSPADM

## 2024-11-14 RX ORDER — ONDANSETRON 2 MG/ML
4 INJECTION INTRAMUSCULAR; INTRAVENOUS EVERY 8 HOURS PRN
Status: DISCONTINUED | OUTPATIENT
Start: 2024-11-14 | End: 2024-11-14

## 2024-11-14 RX ORDER — INSULIN GLARGINE 100 [IU]/ML
25 INJECTION, SOLUTION SUBCUTANEOUS NIGHTLY
Status: DISCONTINUED | OUTPATIENT
Start: 2024-11-14 | End: 2024-11-15 | Stop reason: HOSPADM

## 2024-11-14 RX ORDER — IOPAMIDOL 755 MG/ML
INJECTION, SOLUTION INTRAVASCULAR PRN
Status: COMPLETED | OUTPATIENT
Start: 2024-11-14 | End: 2024-11-14

## 2024-11-14 RX ORDER — LACTULOSE 10 G/15ML
20 SOLUTION ORAL EVERY 8 HOURS
Status: DISCONTINUED | OUTPATIENT
Start: 2024-11-14 | End: 2024-11-15 | Stop reason: HOSPADM

## 2024-11-14 RX ORDER — GABAPENTIN 300 MG/1
600 CAPSULE ORAL 3 TIMES DAILY
Status: DISCONTINUED | OUTPATIENT
Start: 2024-11-14 | End: 2024-11-15 | Stop reason: HOSPADM

## 2024-11-14 RX ADMIN — Medication 2.89 GBQ: at 15:54

## 2024-11-14 RX ADMIN — FENTANYL CITRATE 25 MCG: 50 INJECTION, SOLUTION INTRAMUSCULAR; INTRAVENOUS at 12:45

## 2024-11-14 RX ADMIN — INSULIN GLARGINE 25 UNITS: 100 INJECTION, SOLUTION SUBCUTANEOUS at 20:40

## 2024-11-14 RX ADMIN — Medication 5000 UNITS: at 11:21

## 2024-11-14 RX ADMIN — MIDAZOLAM HYDROCHLORIDE 1 MG: 1 INJECTION, SOLUTION INTRAMUSCULAR; INTRAVENOUS at 11:15

## 2024-11-14 RX ADMIN — FENTANYL CITRATE 50 MCG: 50 INJECTION, SOLUTION INTRAMUSCULAR; INTRAVENOUS at 11:15

## 2024-11-14 RX ADMIN — GABAPENTIN 600 MG: 300 CAPSULE ORAL at 20:40

## 2024-11-14 RX ADMIN — INSULIN LISPRO 16 UNITS: 100 INJECTION, SOLUTION INTRAVENOUS; SUBCUTANEOUS at 22:45

## 2024-11-14 RX ADMIN — DEXAMETHASONE SODIUM PHOSPHATE 10 MG: 10 INJECTION INTRAMUSCULAR; INTRAVENOUS at 09:31

## 2024-11-14 RX ADMIN — Medication 1 EACH: at 13:31

## 2024-11-14 RX ADMIN — DIPHENHYDRAMINE HYDROCHLORIDE 50 MG: 50 INJECTION INTRAMUSCULAR; INTRAVENOUS at 09:31

## 2024-11-14 RX ADMIN — INSULIN LISPRO 16 UNITS: 100 INJECTION, SOLUTION INTRAVENOUS; SUBCUTANEOUS at 20:41

## 2024-11-14 RX ADMIN — ONDANSETRON 12 MG: 2 INJECTION INTRAMUSCULAR; INTRAVENOUS at 09:32

## 2024-11-14 RX ADMIN — PIPERACILLIN AND TAZOBACTAM 3375 MG: 3; .375 INJECTION, POWDER, LYOPHILIZED, FOR SOLUTION INTRAVENOUS at 09:22

## 2024-11-14 RX ADMIN — LIDOCAINE HYDROCHLORIDE 10 ML: 20 INJECTION, SOLUTION INFILTRATION; PERINEURAL at 09:28

## 2024-11-14 RX ADMIN — LACTULOSE 20 G: 20 SOLUTION ORAL at 18:19

## 2024-11-14 RX ADMIN — SODIUM CHLORIDE: 9 INJECTION, SOLUTION INTRAVENOUS at 16:48

## 2024-11-14 RX ADMIN — IOPAMIDOL 60 ML: 755 INJECTION, SOLUTION INTRAVENOUS at 13:52

## 2024-11-14 NOTE — H&P
04/26/2021    [facility]    UPPER GASTROINTESTINAL ENDOSCOPY N/A 01/12/2024    EGD BIOPSY performed by Anil Conrad MD at [facility]    UPPER GASTROINTESTINAL ENDOSCOPY N/A 01/12/2024    EGD BAND LIGATION performed by Anil Conrad MD at [facility]    UPPER GASTROINTESTINAL ENDOSCOPY N/A 03/15/2024    ESOPHAGOGASTRODUODENOSCOPY POSSIBLE BAND LIGATION performed by Anil Conrad MD at [facility]    UPPER GASTROINTESTINAL ENDOSCOPY N/A 08/06/2024    ESOPHAGOGASTRODUODENOSCOPY CONTROL HEMORRHAGE performed by Anil Conrad MD at [facility]    UPPER GASTROINTESTINAL ENDOSCOPY N/A 08/06/2024    ESOPHAGOGASTRODUODENOSCOPY BAND LIGATION performed by Anil Conrad MD at [facility]       Prior to Admission medications    Medication Sig Start Date End Date Taking? Authorizing Provider   gabapentin (NEURONTIN) 600 MG tablet Take 1 tablet by mouth 3 times daily for 90 days. 10/7/24 1/5/25 Yes Tonie Hernandez DO   spironolactone (ALDACTONE) 50 MG tablet Take 1 tablet by mouth daily   Yes ProviderJamie MD   furosemide (LASIX) 20 MG tablet Take 1 tablet by mouth daily 6/18/24  Yes Tonie Hernandez DO   omeprazole (PRILOSEC) 20 MG delayed release capsule Take 1 capsule by mouth daily 6/18/24  Yes Tonie Hernandez DO   vitamin D (CHOLECALCIFEROL) 50 MCG (2000 UT) TABS tablet Take 1 tablet by mouth daily 6/18/24  Yes Tonie Hernandez DO   carvedilol (COREG) 6.25 MG tablet Take 0.5 tablets by mouth daily 5/30/24  Yes Josue Alves MD   lactulose (CHRONULAC) 10 GM/15ML solution Take 30 mLs by mouth every 8 (eight) hours Titrate to goal of 2-3 bowel movement per day 10/7/24   Tonie Hernandez DO   insulin glargine (LANTUS) 100 UNIT/ML injection vial Inject 50 Units into the skin nightly 10/7/24   Tonie Hernandez DO   insulin lispro, 1 Unit Dial, (HUMALOG/ADMELOG) 100 UNIT/ML SOPN Inject 18 Units into the skin 3 times daily (before meals) *Plus Sliding Scale* 10/7/24   Tonie Hernandez DO    ondansetron (ZOFRAN) 4 MG tablet Take 1 tablet by mouth daily as needed for Nausea or Vomiting  Patient not taking: Reported on 10/22/2024 10/7/24   Tonie Hernandez DO   rifAXIMin (XIFAXAN) 550 MG tablet Take 1 tablet by mouth 2 times daily  Patient not taking: Reported on 10/22/2024    ProviderJamie MD   midodrine (PROAMATINE) 2.5 MG tablet Take 2 tablets by mouth 3 times daily (with meals)  Patient not taking: Reported on 10/22/2024 5/30/24   Josue Alves MD       Allergies   Allergen Reactions    Fish Allergy      All seafood       Family History   Problem Relation Age of Onset    Diabetes Mother        Social History     Tobacco Use    Smoking status: Never     Passive exposure: Current    Smokeless tobacco: Never   Vaping Use    Vaping status: Never Used   Substance Use Topics    Alcohol use: Not Currently    Drug use: Not Currently         Review of Systems:   Pertinent positives and negatives as noted in HPI.    PHYSICAL EXAM:    Vitals:    11/14/24 1630   BP: (!) 150/64   Pulse:    Resp: 14   Temp: 97.7 °F (36.5 °C)   SpO2: 98%       - General: No acute distress.  - Head: NCAT.   - Eyes: Anicteric. Round symmetric pupils.  - CV: Regular rate.  - Respiratory: Respirations are non-labored on RA  - Abdomen: Soft, no tenderness, non distended. Ascites present.  - Skin: Warm. No rashes or ulcers.  - MSK: No obvious deformities. No edema.    LABS:  CBC  Recent Labs     11/14/24  0905   WBC 1.5*   HGB 9.6*   HCT 30.2*   PLT 30*     BMP  Recent Labs     11/14/24  0905      K 4.8      CO2 25   BUN 21   CREATININE 1.1*   CALCIUM 8.9     Liver Function  No results for input(s): \"AMYLASE\", \"LIPASE\", \"BILITOT\", \"BILIDIR\", \"AST\", \"ALT\", \"ALKPHOS\", \"LABALBU\" in the last 72 hours.    Invalid input(s): \"PROT\"  No results for input(s): \"LACTATE\" in the last 72 hours.  Recent Labs     11/14/24  0905   INR 1.7       RADIOLOGY    No results found.      ASSESSMENT/PLAN:    64 y.o. female with

## 2024-11-14 NOTE — OP NOTE
Due to language barrier,  from [facility] Video Remote Interpreting Service was utilized for Lisa Hunt for purposes of obtaining history of present illness and  all relevant PMH as it relates to today's procedure.  Risk and benefits were discussed with patient including ultimate complications, possibly death and consent was obtained.  The purpose of the procedure as well as procedural details was discussed in detail including post procedural instructions.    All questions and concerns were addressed and answered at this time and consent was obtained.     Operative Note  ______________________________________________________________      IR U/S GUIDED PARACENTESIS  [facility] SPECIAL PROCEDURES    Patient Name: Lisa Hunt   YOB: 1960  Medical Record Number: [id number]  Date of Procedure: 11/14/24  Room/Bed: Room/bed info not found    Pre-operative Diagnosis: Ascites    Post-operative Diagnosis: Ascites    Consent: Informed consent was obtained from the patient prior to the procedure. The details of the procedure, as well is its risks, benefits, and alternatives, were explained.      Anesthesia: Local anesthesia with approximately 10mL of 2% Lidocaine without epinephrine administered subcutaneously.    Performed by: RANJITH Alvarez under on-site supervision by Madison Ndiaye MD.    Estimated blood loss: Minimal    Complications: None    Specimens Obtained: Ascites Fluid    Procedure: Routine scanning of all four abdominal quadrants was performed using real-time ultrasound and revealed sufficient amount of ascites fluid present.  Decision was made to proceed with procedure.  After obtaining consent, a \"Time-Out\" was called to verify the correct patient, procedure/location, allergies, relevant medications held for procedure and that all equipment is functioning and available. The patient was then placed in the supine position with the head of the bed slightly

## 2024-11-14 NOTE — PROCEDURES
0830Patient arrived to Radiology department for paracentesis and  Y90 treatment. Allergies, home medications, H&P and fasting instructions reviewed with patient and . Vital signs taken. IV placed, blood obtained, IV flushed and prn adapter attached. Blood sample sent to lab for ordered tests.      Procedural instructions given for paracentesis and Y90 treatment by Dr Ndiaye and Kirill Su with assistance of  questions answered, understanding expressed and consent signed. Patient given fluoroscopy education, no questions at this time    Pt notified that Dr Ndiaye has requested that the  service be in use during procedure.  Pt notified that the camera will be covered/not facing patient so that privacy will be maintained but she can speak to  at all times, pt verbalized understanding and agreed.

## 2024-11-14 NOTE — BRIEF OP NOTE
Due to language barrier,  from [facility] Video Remote Interpreting Service was utilized for Lisa Hunt for purposes of obtaining history of present illness and  all relevant PMH as it relates to today's procedure.  Risk and benefits were discussed with patient including ultimate complications, possibly death and consent was obtained.  The purpose of the procedure as well as procedural details was discussed in detail including post procedural instructions.    All questions and concerns were addressed and answered at this time and consent was obtained.     Brief-Op Note  ______________________________________________________________      IR U/S GUIDED PARACENTESIS  [facility] SPECIAL PROCEDURES    Patient Name: Lisa Hunt   YOB: 1960  Medical Record Number: [id number]  Date of Procedure: 11/14/24  Room/Bed: Room/bed info not found      Pre-operative Diagnosis: Ascites    Post-operative Diagnosis: Ascites    Consent: Informed consent was obtained from the patient prior to the procedure. The details of the procedure, as well is its risks, benefits, and alternatives, were explained.      Anesthesia: Local anesthesia.    Performed by: RANJITH Alvarez under on-site supervision by Madison Ndiaye MD.    Estimated blood loss: Minimal    Complications: None    Specimen Obtained: 5340mL of clear straw colored ascites fluid was withdrawn.    (See radiology dictation in PACs for image review and additional procedural information)    Electronically signed by RANJITH Alvarez   DD: 11/14/24  9:31 AM

## 2024-11-14 NOTE — PRE SEDATION
Sedation Pre-Procedure Note    Patient Name: Lisa Hunt   YOB: 1960  Room/Bed: Room/bed info not found  Medical Record Number: [id number]  Date: 2024   Time: 2:24 PM       Indication:  hepatocellular neoplasm    Consent: I have discussed with the patient and/or the patient representative the indication, alternatives, and the possible risks and/or complications of the planned procedure and the anesthesia methods. The patient and/or patient representative appear to understand and agree to proceed.    Vital Signs:   Vitals:    24 1355   BP: (!) 161/80   Pulse: 98   Resp: 14   SpO2: 98%       Past Medical History:   has a past medical history of DONNA (acute kidney injury) (HCC), Cirrhosis (HCC), Diabetes mellitus (HCC), Diabetic neuropathy (HCC), Esophageal varices without bleeding (HCC), GERD (gastroesophageal reflux disease), Hypertension, Intracranial bleed (HCC), Liver cirrhosis (HCC), Low vitamin D level, SDH (subdural hematoma), Thrombocytopenia (HCC), and Type 2 diabetes mellitus without complication (HCC).    Past Surgical History:   has a past surgical history that includes Upper gastrointestinal endoscopy (2022); Upper gastrointestinal endoscopy (2021); Upper gastrointestinal endoscopy (2019); Upper gastrointestinal endoscopy (2021);  section; Cholecystectomy; Appendectomy; Paracentesis (Left, 10/12/2023); Paracentesis (Left, 2024); Upper gastrointestinal endoscopy (N/A, 2024); Upper gastrointestinal endoscopy (N/A, 2024); Paracentesis (Left, 2024); Upper gastrointestinal endoscopy (N/A, 03/15/2024); CT NEEDLE BIOPSY LIVER PERCUTANEOUS (2024); Upper gastrointestinal endoscopy (N/A, 2024); Upper gastrointestinal endoscopy (N/A, 2024); CT NEEDLE BIOPSY LIVER PERCUTANEOUS (2024); Paracentesis (Left, 10/30/2024); and Paracentesis (Left, 2024).    Medications:   Scheduled Meds:   Continuous  Infusions:   PRN Meds:   Home Meds:   Prior to Admission medications    Medication Sig Start Date End Date Taking? Authorizing Provider   gabapentin (NEURONTIN) 600 MG tablet Take 1 tablet by mouth 3 times daily for 90 days. 10/7/24 1/5/25 Yes Tonie Hernandez DO   spironolactone (ALDACTONE) 50 MG tablet Take 1 tablet by mouth daily   Yes Jamie Palencia MD   furosemide (LASIX) 20 MG tablet Take 1 tablet by mouth daily 6/18/24  Yes Tonie Hernandez DO   omeprazole (PRILOSEC) 20 MG delayed release capsule Take 1 capsule by mouth daily 6/18/24  Yes Tonie Hernandez DO   vitamin D (CHOLECALCIFEROL) 50 MCG (2000 UT) TABS tablet Take 1 tablet by mouth daily 6/18/24  Yes Tonie Hernandez DO   carvedilol (COREG) 6.25 MG tablet Take 0.5 tablets by mouth daily 5/30/24  Yes Josue Alves MD   lactulose (CHRONULAC) 10 GM/15ML solution Take 30 mLs by mouth every 8 (eight) hours Titrate to goal of 2-3 bowel movement per day 10/7/24   Tonie Hernandez DO   insulin glargine (LANTUS) 100 UNIT/ML injection vial Inject 50 Units into the skin nightly 10/7/24   Tonie Hernandez DO   insulin lispro, 1 Unit Dial, (HUMALOG/ADMELOG) 100 UNIT/ML SOPN Inject 18 Units into the skin 3 times daily (before meals) *Plus Sliding Scale* 10/7/24   Tonie Hernandez DO   ondansetron (ZOFRAN) 4 MG tablet Take 1 tablet by mouth daily as needed for Nausea or Vomiting  Patient not taking: Reported on 10/22/2024 10/7/24   Tonie Hernandez DO   rifAXIMin (XIFAXAN) 550 MG tablet Take 1 tablet by mouth 2 times daily  Patient not taking: Reported on 10/22/2024    Jamie Palencia MD   midodrine (PROAMATINE) 2.5 MG tablet Take 2 tablets by mouth 3 times daily (with meals)  Patient not taking: Reported on 10/22/2024 5/30/24   Josue Alves MD     Coumadin Use Last 7 Days:  no  Antiplatelet drug therapy use last 7 days: no  Other anticoagulant use last 7 days: no  Additional Medication Information:  n/A      Pre-Sedation Documentation and Exam:

## 2024-11-14 NOTE — BRIEF OP NOTE
Brief Postoperative Note      Patient: Lisa Hunt  YOB: 1960  MRN: [id number]    Date of Procedure: 11/14/2024    Hepatocellular neoplasm    Post-Op Diagnosis: Same       * No procedures listed *    * No surgeons listed *    Assistant:  * No surgical staff found *    Anesthesia: * No anesthesia type entered *    Estimated Blood Loss (mL): Minimal    Complications: None    Specimens:   * No specimens in log *    Implants:  Implant Name Type Inv. Item Serial No.  Lot No. LRB No. Used Action   COIL EMB  2 MMX4 CM DETACHABLE PLAT TUNGSTEN EMBOLD - [id number] Vascular coils COIL EMB  2 MMX4 CM DETACHABLE PLAT TUNGSTEN EMBOLD  [id number] Right 1 Implanted         Drains:   Urinary Catheter 11/14/24 Stauffer (Active)       Findings:  Infection Present At Time Of Surgery (PATOS) (choose all levels that have infection present):  No infection present  Other Findings: Y90 embolization of tumor and embolization of primary tract to the fistula.  From the middle hepatic artery    Electronically signed by Madison Ndiaye MD on 11/14/2024 at 2:26 PM

## 2024-11-14 NOTE — PROGRESS NOTES
4 Eyes Skin Assessment     NAME:  Lisa Hunt  YOB: 1960  MEDICAL RECORD NUMBER:  [id number]    The patient is being assessed for  Admission    I agree that at least one RN has performed a thorough Head to Toe Skin Assessment on the patient. ALL assessment sites listed below have been assessed.      Areas assessed by both nurses:    Head, Face, Ears, Shoulders, Back, Chest, Arms, Elbows, Hands, Sacrum. Buttock, Coccyx, Ischium, Legs. Feet and Heels, and Under Medical Devices         Does the Patient have a Wound? No noted wound(s)       Kane Prevention initiated by RN: Yes  Wound Care Orders initiated by RN: Yes    Pressure Injury (Stage 3,4, Unstageable, DTI, NWPT, and Complex wounds) if present, place Wound referral order by RN under : No    New Ostomies, if present place, Ostomy referral order under : No     Nurse 1 eSignature: Electronically signed by Mary House RN on 11/14/24 at 6:23 PM EST    **SHARE this note so that the co-signing nurse can place an eSignature**    Nurse 2 eSignature: {Esignature:[id number]}

## 2024-11-15 VITALS
HEIGHT: 65 IN | TEMPERATURE: 97.4 F | RESPIRATION RATE: 16 BRPM | HEART RATE: 86 BPM | DIASTOLIC BLOOD PRESSURE: 68 MMHG | WEIGHT: 170 LBS | BODY MASS INDEX: 28.32 KG/M2 | SYSTOLIC BLOOD PRESSURE: 122 MMHG | OXYGEN SATURATION: 98 %

## 2024-11-15 LAB
ALBUMIN SERPL-MCNC: 2.1 G/DL (ref 3.5–5.2)
ALP SERPL-CCNC: 151 U/L (ref 35–104)
ALT SERPL-CCNC: 20 U/L (ref 0–32)
ANION GAP SERPL CALCULATED.3IONS-SCNC: 8 MMOL/L (ref 7–16)
AST SERPL-CCNC: 39 U/L (ref 0–31)
BASOPHILS # BLD: 0 K/UL (ref 0–0.2)
BASOPHILS NFR BLD: 0 % (ref 0–2)
BILIRUB SERPL-MCNC: 1.7 MG/DL (ref 0–1.2)
BUN SERPL-MCNC: 25 MG/DL (ref 6–23)
CALCIUM SERPL-MCNC: 7.9 MG/DL (ref 8.6–10.2)
CHLORIDE SERPL-SCNC: 105 MMOL/L (ref 98–107)
CO2 SERPL-SCNC: 24 MMOL/L (ref 22–29)
CREAT SERPL-MCNC: 1.2 MG/DL (ref 0.5–1)
EOSINOPHIL # BLD: 0 K/UL (ref 0.05–0.5)
EOSINOPHILS RELATIVE PERCENT: 0 % (ref 0–6)
ERYTHROCYTE [DISTWIDTH] IN BLOOD BY AUTOMATED COUNT: 14.9 % (ref 11.5–15)
GFR, ESTIMATED: 50 ML/MIN/1.73M2
GLUCOSE BLD-MCNC: 207 MG/DL (ref 74–99)
GLUCOSE BLD-MCNC: 328 MG/DL (ref 74–99)
GLUCOSE BLD-MCNC: 431 MG/DL (ref 74–99)
GLUCOSE BLD-MCNC: 458 MG/DL (ref 74–99)
GLUCOSE BLD-MCNC: 467 MG/DL (ref 74–99)
GLUCOSE SERPL-MCNC: 305 MG/DL (ref 74–99)
HCT VFR BLD AUTO: 29 % (ref 34–48)
HGB BLD-MCNC: 9.1 G/DL (ref 11.5–15.5)
LYMPHOCYTES NFR BLD: 0.06 K/UL (ref 1.5–4)
LYMPHOCYTES RELATIVE PERCENT: 3 % (ref 20–42)
MCH RBC QN AUTO: 30.3 PG (ref 26–35)
MCHC RBC AUTO-ENTMCNC: 31.4 G/DL (ref 32–34.5)
MCV RBC AUTO: 96.7 FL (ref 80–99.9)
MONOCYTES NFR BLD: 0.04 K/UL (ref 0.1–0.95)
MONOCYTES NFR BLD: 2 % (ref 2–12)
NEUTROPHILS NFR BLD: 96 % (ref 43–80)
NEUTS SEG NFR BLD: 2.1 K/UL (ref 1.8–7.3)
NON-GYN CYTOLOGY REPORT: NORMAL
PLATELET # BLD AUTO: 26 K/UL (ref 130–450)
PLATELET CONFIRMATION: NORMAL
PMV BLD AUTO: 13 FL (ref 7–12)
POTASSIUM SERPL-SCNC: 4.5 MMOL/L (ref 3.5–5)
PROT SERPL-MCNC: 6.6 G/DL (ref 6.4–8.3)
RBC # BLD AUTO: 3 M/UL (ref 3.5–5.5)
RBC # BLD: ABNORMAL 10*6/UL
SODIUM SERPL-SCNC: 137 MMOL/L (ref 132–146)
WBC OTHER # BLD: 2.2 K/UL (ref 4.5–11.5)

## 2024-11-15 PROCEDURE — 85025 COMPLETE CBC W/AUTO DIFF WBC: CPT

## 2024-11-15 PROCEDURE — 80053 COMPREHEN METABOLIC PANEL: CPT

## 2024-11-15 PROCEDURE — 6370000000 HC RX 637 (ALT 250 FOR IP)

## 2024-11-15 PROCEDURE — G0378 HOSPITAL OBSERVATION PER HR: HCPCS

## 2024-11-15 PROCEDURE — 6370000000 HC RX 637 (ALT 250 FOR IP): Performed by: STUDENT IN AN ORGANIZED HEALTH CARE EDUCATION/TRAINING PROGRAM

## 2024-11-15 PROCEDURE — 36415 COLL VENOUS BLD VENIPUNCTURE: CPT

## 2024-11-15 PROCEDURE — 82962 GLUCOSE BLOOD TEST: CPT

## 2024-11-15 RX ORDER — INSULIN LISPRO 100 [IU]/ML
24 INJECTION, SOLUTION INTRAVENOUS; SUBCUTANEOUS ONCE
Status: COMPLETED | OUTPATIENT
Start: 2024-11-15 | End: 2024-11-15

## 2024-11-15 RX ORDER — INSULIN LISPRO 100 [IU]/ML
20 INJECTION, SOLUTION INTRAVENOUS; SUBCUTANEOUS ONCE
Status: COMPLETED | OUTPATIENT
Start: 2024-11-15 | End: 2024-11-15

## 2024-11-15 RX ORDER — INSULIN LISPRO 100 [IU]/ML
16 INJECTION, SOLUTION INTRAVENOUS; SUBCUTANEOUS ONCE
Status: COMPLETED | OUTPATIENT
Start: 2024-11-15 | End: 2024-11-15

## 2024-11-15 RX ADMIN — INSULIN LISPRO 12 UNITS: 100 INJECTION, SOLUTION INTRAVENOUS; SUBCUTANEOUS at 07:05

## 2024-11-15 RX ADMIN — INSULIN LISPRO 16 UNITS: 100 INJECTION, SOLUTION INTRAVENOUS; SUBCUTANEOUS at 00:57

## 2024-11-15 RX ADMIN — POTASSIUM BICARBONATE 40 MEQ: 782 TABLET, EFFERVESCENT ORAL at 05:26

## 2024-11-15 RX ADMIN — GABAPENTIN 600 MG: 300 CAPSULE ORAL at 08:43

## 2024-11-15 RX ADMIN — INSULIN LISPRO 24 UNITS: 100 INJECTION, SOLUTION INTRAVENOUS; SUBCUTANEOUS at 05:26

## 2024-11-15 RX ADMIN — FUROSEMIDE 20 MG: 20 TABLET ORAL at 08:43

## 2024-11-15 RX ADMIN — CARVEDILOL 3.12 MG: 3.12 TABLET, FILM COATED ORAL at 08:43

## 2024-11-15 RX ADMIN — SPIRONOLACTONE 50 MG: 25 TABLET ORAL at 08:43

## 2024-11-15 RX ADMIN — LACTULOSE 20 G: 20 SOLUTION ORAL at 08:43

## 2024-11-15 RX ADMIN — INSULIN LISPRO 20 UNITS: 100 INJECTION, SOLUTION INTRAVENOUS; SUBCUTANEOUS at 02:39

## 2024-11-15 NOTE — CARE COORDINATION
11/15/24 Transition of care:  Pt admitted OBS for Y90 treatment and paracentesis pt had 5L taken off Met with pt to discuss transition of care and discharge preferences  services were used Pt lives with her daughter in a ranch home with 2 stairs no handrail to enter Pt is mostly independent but daughter does help with ADLs when needed Pt uses no AD for ambulation Pt has no HHC or JOSAFAT at this time and currently declines as she does not want it to interfere with her application at [facility] Pt states she has applied and they have been out to the home to assess PCP is [facility] is [facility] on [facility] Plan is home with no needs identified at this time Daughter will transport     Pt states that [facility] has been out to assess her and that they were to send aides to the home but she has no heard anything from them Left a message for a Berkley Xiong at [facility] to see what services will be available and for her to call and follow up with the patient and/or daughter about services Electronically signed by Rony Patterson RN on 11/15/2024 at 8:54 AM     Case Management Assessment  Initial Evaluation    Date/Time of Evaluation: 11/15/2024 8:54 AM  Assessment Completed by: Rony Patterson RN    If patient is discharged prior to next notation, then this note serves as note for discharge by case management.    Patient Name: Lisa Hunt                   YOB: 1960  Diagnosis: Carcinoma of liver (HCC) [C22.0]                   Date / Time: 11/14/2024  4:29 PM    Patient Admission Status: Observation   Readmission Risk (Low < 19, Mod (19-27), High > 27): Readmission Risk Score: 27.9    Current PCP: Tonie Hernandez, DO  PCP verified by CM? Yes    Chart Reviewed: Yes      History Provided by: Patient  Patient Orientation: Alert and Oriented    Patient Cognition: Alert    Hospitalization in the last 30 days (Readmission):  No    If yes, Readmission Assessment in CM Navigator

## 2024-11-15 NOTE — PROGRESS NOTES
HEPATOBILIARY AND PANCREATIC SURGERY  DAILY PROGRESS NOTE  11/15/2024  Cc: s/p Y90 treatment    SUBJECTIVE:  Patient started diet.  However she has had hyperglycemia over 500 last evening.  Down to 328 this a.m..     OBJECTIVE:  BP (!) 105/49   Pulse 87   Temp 98 °F (36.7 °C) (Temporal)   Resp 16   Ht 1.651 m (5' 5\")   Wt 77.1 kg (170 lb)   SpO2 95%   BMI 28.29 kg/m²   I/O last 3 completed shifts:  In: -   Out: 1300 [Urine:1300]    GENERAL: No acute distress.  HEAD: NCAT.   EYES: Anicteric. Round symmetric pupils.  CV: RR.  LUNGS/CHEST: No increased work of breathing on RA  ABDOMEN: Soft, no tenderness, non distended. Ascites present     LABS:  CBC  Recent Labs     11/14/24  0905   WBC 1.5*   HGB 9.6*   HCT 30.2*   PLT 30*     BMP  Recent Labs     11/14/24  0905      K 4.8      CO2 25   BUN 21   CREATININE 1.1*   CALCIUM 8.9     Liver Function  No results for input(s): \"AMYLASE\", \"LIPASE\", \"BILITOT\", \"BILIDIR\", \"AST\", \"ALT\", \"ALKPHOS\", \"LABALBU\" in the last 72 hours.    Invalid input(s): \"PROT\"  No results for input(s): \"LACTATE\" in the last 72 hours.  Recent Labs     11/14/24  0905   INR 1.7       RADIOLOGY:  I have personally reviewed all relevant imaging:      ASSESSMENT/PLAN:  64 y.o. female with HCC admitted for observation status post Y90 treatment    Diet as tolerated  DM- high dose SSI, lantus 25 U, POCT glucose checks  Home meds as appropriate  Pain and nausea control as needed  PPI twice daily  Carafate  Morning LFTs  Discharge today pending labs    Plan discussed with Dr. Patrick    Electronically signed by Jorge Rivas DO on 11/15/2024 at 6:32 AM    Agree with above  Patient is known to have high blood sugars  Okay to discharge home today     Electronically signed by Kostas Patrick MD on 11/15/2024 at 7:54 AM

## 2024-11-15 NOTE — PLAN OF CARE
Problem: Chronic Conditions and Co-morbidities  Goal: Patient's chronic conditions and co-morbidity symptoms are monitored and maintained or improved  Outcome: Progressing     Problem: Skin/Tissue Integrity  Goal: Absence of new skin breakdown  Description: 1.  Monitor for areas of redness and/or skin breakdown  2.  Assess vascular access sites hourly  3.  Every 4-6 hours minimum:  Change oxygen saturation probe site  4.  Every 4-6 hours:  If on nasal continuous positive airway pressure, respiratory therapy assess nares and determine need for appliance change or resting period.  Outcome: Progressing     Problem: Safety - Adult  Goal: Free from fall injury  Outcome: Progressing     Problem: ABCDS Injury Assessment  Goal: Absence of physical injury  Outcome: Progressing

## 2024-11-18 ENCOUNTER — CARE COORDINATION (OUTPATIENT)
Dept: CARE COORDINATION | Age: 64
End: 2024-11-18

## 2024-11-18 NOTE — CARE COORDINATION
Care Transitions Note    Initial Call - Call within 2 business days of discharge: Yes    Attempted to reach patient via use of  for transitions of care follow up. Unable to reach patient.    Outreach Attempts:   Unable to leave message, \"person you are trying to reach cannot accept calls at his time\"  per phone automation, first attempt.    Patient: Lisa Hunt    Patient : 1960   MRN: [id number]    Reason for Admission: Hepatocellular carcinoma  Discharge Date: 11/15/24  RURS: Readmission Risk Score: 27.9    Last Discharge Facility       Date Complaint Diagnosis Description Type Department Provider    24  Hepatocellular carcinoma (HCC) Admission (Discharged) from [facility] IR EMBOLIZATION HEMORRHAGE SEYZ 8S [facility] Kostas Patrick III, MD            Was this an external facility discharge? No    Follow Up Appointment:   Patient does not have a follow up appointment scheduled at time of call.  CTN will route to PCP front office pool under high priority need for TCM/hospital follow up appointment.   Future Appointments         Provider Specialty Dept Phone    2024 1:15 PM Kymberly Sosa MD Hepatobiliary Surgery [phone]    2025 3:30 PM Anil Conrad MD Gastroenterology [phone]    2025 11:00 AM Tonie Hernandez DO Family Medicine [phone]            Plan for follow-up on next business day.      Monse Garrido RN

## 2024-11-19 ENCOUNTER — CARE COORDINATION (OUTPATIENT)
Dept: CARE COORDINATION | Age: 64
End: 2024-11-19

## 2024-11-19 NOTE — CARE COORDINATION
Care Transitions Note    Initial Call - Call within 2 business days of discharge: Yes    Attempted to reach patient for transitions of care follow up. Unable to reach patient.    Outreach Attempts: .   Unable to leave message,  \"person you are trying to reach cannot accept calls at his time\"  per phone automation, second attempt.  Unable to reach letter mailed to address on file.    Patient: Lisa Hunt    Patient : 1960   MRN: [id number]    Reason for Admission: Hepatocellular carcinoma  Discharge Date: 11/15/24  RURS: Readmission Risk Score: 27.9    Last Discharge Facility       Date Complaint Diagnosis Description Type Department Provider    24  Hepatocellular carcinoma (HCC) Admission (Discharged) from [facility] IR EMBOLIZATION HEMORRHAGE SEYZ 8S [facility]U Kostas Patrick III, MD            Was this an external facility discharge? No    Follow Up Appointment:   Patient does not have a follow up appointment scheduled at time of call.   CTN routed yesterday to PCP front office pool under high priority need for TCM/hospital follow up appointment.   Future Appointments         Provider Specialty Dept Phone    2024 1:15 PM Kymberly Sosa MD Hepatobiliary Surgery [phone]    2025 3:30 PM Anil Conrad MD Gastroenterology [phone]    2025 11:00 AM Tonie Hernandez DO Family Medicine [phone]            No further follow-up call indicated     Monse Garrido RN

## 2024-11-20 ENCOUNTER — OFFICE VISIT (OUTPATIENT)
Dept: HEMATOLOGY | Age: 64
End: 2024-11-20
Payer: MEDICARE

## 2024-11-20 VITALS
DIASTOLIC BLOOD PRESSURE: 84 MMHG | TEMPERATURE: 99.1 F | HEART RATE: 105 BPM | SYSTOLIC BLOOD PRESSURE: 137 MMHG | BODY MASS INDEX: 31.25 KG/M2 | OXYGEN SATURATION: 99 % | WEIGHT: 187.8 LBS

## 2024-11-20 DIAGNOSIS — R18.8 CIRRHOSIS OF LIVER WITH ASCITES, UNSPECIFIED HEPATIC CIRRHOSIS TYPE (HCC): Primary | ICD-10-CM

## 2024-11-20 DIAGNOSIS — K72.90 DECOMPENSATION OF CIRRHOSIS OF LIVER (HCC): ICD-10-CM

## 2024-11-20 DIAGNOSIS — C22.0 HEPATOCELLULAR CARCINOMA (HCC): ICD-10-CM

## 2024-11-20 DIAGNOSIS — K74.60 CIRRHOSIS OF LIVER WITH ASCITES, UNSPECIFIED HEPATIC CIRRHOSIS TYPE (HCC): Primary | ICD-10-CM

## 2024-11-20 DIAGNOSIS — K74.60 DECOMPENSATION OF CIRRHOSIS OF LIVER (HCC): ICD-10-CM

## 2024-11-20 LAB
ALBUMIN: 2.7 G/DL (ref 3.5–5.2)
ALP BLD-CCNC: 189 U/L (ref 35–104)
ALT SERPL-CCNC: 53 U/L (ref 0–32)
ANION GAP SERPL CALCULATED.3IONS-SCNC: 8 MMOL/L (ref 7–16)
AST SERPL-CCNC: 112 U/L (ref 0–31)
BASOPHILS ABSOLUTE: 0.03 K/UL (ref 0–0.2)
BASOPHILS RELATIVE PERCENT: 1 % (ref 0–2)
BILIRUB SERPL-MCNC: 2.9 MG/DL (ref 0–1.2)
BILIRUBIN DIRECT: 1.1 MG/DL (ref 0–0.3)
BILIRUBIN, INDIRECT: 1.8 MG/DL (ref 0–1)
BUN BLDV-MCNC: 34 MG/DL (ref 6–23)
CALCIUM SERPL-MCNC: 8.3 MG/DL (ref 8.6–10.2)
CHLORIDE BLD-SCNC: 103 MMOL/L (ref 98–107)
CO2: 22 MMOL/L (ref 22–29)
CREAT SERPL-MCNC: 1.3 MG/DL (ref 0.5–1)
EOSINOPHILS ABSOLUTE: 0.06 K/UL (ref 0.05–0.5)
EOSINOPHILS RELATIVE PERCENT: 2 % (ref 0–6)
GFR, ESTIMATED: 45 ML/MIN/1.73M2
GLUCOSE BLD-MCNC: 353 MG/DL (ref 74–99)
HCT VFR BLD CALC: 31.1 % (ref 34–48)
HEMOGLOBIN: 9.9 G/DL (ref 11.5–15.5)
INR BLD: 1.5
LYMPHOCYTES ABSOLUTE: 0.11 K/UL (ref 1.5–4)
LYMPHOCYTES RELATIVE PERCENT: 4 % (ref 20–42)
MCH RBC QN AUTO: 31.3 PG (ref 26–35)
MCHC RBC AUTO-ENTMCNC: 31.8 G/DL (ref 32–34.5)
MCV RBC AUTO: 98.4 FL (ref 80–99.9)
MONOCYTES ABSOLUTE: 0.09 K/UL (ref 0.1–0.95)
MONOCYTES RELATIVE PERCENT: 3 % (ref 2–12)
NEUTROPHILS ABSOLUTE: 3.01 K/UL (ref 1.8–7.3)
NEUTROPHILS RELATIVE PERCENT: 91 % (ref 43–80)
PDW BLD-RTO: 15.4 % (ref 11.5–15)
PLATELET # BLD: 36 K/UL (ref 130–450)
PLATELET CONFIRMATION: NORMAL
PMV BLD AUTO: 12.2 FL (ref 7–12)
POTASSIUM SERPL-SCNC: 4.9 MMOL/L (ref 3.5–5)
PROTHROMBIN TIME: 16.2 SEC (ref 9.3–12.4)
RBC # BLD: 3.16 M/UL (ref 3.5–5.5)
RBC # BLD: ABNORMAL 10*6/UL
SODIUM BLD-SCNC: 133 MMOL/L (ref 132–146)
TOTAL PROTEIN: 7.7 G/DL (ref 6.4–8.3)
WBC # BLD: 3.3 K/UL (ref 4.5–11.5)

## 2024-11-20 PROCEDURE — 3075F SYST BP GE 130 - 139MM HG: CPT | Performed by: STUDENT IN AN ORGANIZED HEALTH CARE EDUCATION/TRAINING PROGRAM

## 2024-11-20 PROCEDURE — 99214 OFFICE O/P EST MOD 30 MIN: CPT | Performed by: STUDENT IN AN ORGANIZED HEALTH CARE EDUCATION/TRAINING PROGRAM

## 2024-11-20 PROCEDURE — 3079F DIAST BP 80-89 MM HG: CPT | Performed by: STUDENT IN AN ORGANIZED HEALTH CARE EDUCATION/TRAINING PROGRAM

## 2024-11-20 PROCEDURE — 36415 COLL VENOUS BLD VENIPUNCTURE: CPT | Performed by: STUDENT IN AN ORGANIZED HEALTH CARE EDUCATION/TRAINING PROGRAM

## 2024-11-20 NOTE — DISCHARGE SUMMARY
rojizas que comienzan donde está el sitio de punción.  Pus que supura del sitio de punción.  Fiebre.     Tiene dolor que no mejora después de hina analgésicos.     Siente el estómago revuelto o no puede retener líquidos en el estómago.     Henson vientre se siente hinchado.     Tiene un moretón en el sitio donde le insertaron el catéter que se agranda más cada día.   Vigile de cerca los cambios en henson alessandro y asegúrese de comunicarse con henson médico si tiene algún problema.  Revisado: 25 octubre, 2023  Versión del contenido: 14.2  © 2024 [facility] [employer] [facility].   Las instrucciones de cuidado fueron adaptadas bajo licencia por [employer]. Si usted tiene preguntas sobre jerrod afección médica o sobre estas instrucciones, siempre pregunte a henson profesional de alessandro. [employer] niega toda garantía o responsabilidad por henson uso de esta información.       Follow up:   Kostas Patrick III, MD  [address]  [facility] [address] [phone]    Follow up         Signed:  Belinda Porras MD  11/20/2024  9:01 AM

## 2024-11-20 NOTE — PROGRESS NOTES
last hospital stay due to HE, managed by GI and she is here today for follow up MRI and recent CT Scan. Discussed with pt and daughter that patient has a 4 cm lesion on the liver requiring further investigation with triphasic CTA and follow up afterward with Dr. Sosa or Dr. Patrick, pt and daughter agree. Pt states she had vomiting yesterday several times of a large amount of yellow fluid. Denies coffee ground or bloody emesis, confirmed by daughter. States diarrhea is watery brown. Denies melena or hematochezia. Last EGD 3/15/24 with Dr Conrad showed Esophagus with scarring from previous banding. Grade I/II varices with no high risk features. Moderate portal hypertensive gastropathy. 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 Normal duodenum. She denies abd pain, swelling, or any lower extremity edema. Last paracentesis 3/14/24 for 4250cc clear yellow fluid. Denies weight loss. Re-discussed plan of care for triphasic CTA, labs to be repeated, and follow up with Dr Sosa or Dr Patrick post CTA, pt and daughter agree, they have no further questions.      7/17/24: Patient presents today for follow-up with new imaging.  Had CT abdomen pelvis with triphasic scan showing 4 cm arterially enhancing lesion in the right hepatic lobe at the dome, large amount of ascites.  She did have a paracentesis on 7/15 and already feels as if her abdomen is filling up with fluid again.  Removed 2.3 L at that time.  Requiring paracentesis every couple of weeks.  aFP was 2 on 6/12.      8/14/24: Patient presents today for follow-up after biopsy of the liver.  She was again hospitalized with acute liver decompensation and shortness of breath.  She required paracentesis last week.  Biopsies show a focal fragment of high-grade carcinoma in a background of steatohepatitis and cirrhosis.  CK7 and CK20 were strongly positive though not enough tissue for further classification.  Since discharge from the hospital she states she feels a lot

## 2024-11-21 ENCOUNTER — TELEPHONE (OUTPATIENT)
Dept: HEMATOLOGY | Age: 64
End: 2024-11-21

## 2024-11-21 NOTE — TELEPHONE ENCOUNTER
CT abdomen approved through [facility]. Order ID [id number]. Valid 11/20/24 to 2/14/25.   Scheduled for 12/13/24 at [facility], arrive at 12, scan 1230. NPO 4 hours. Called and LVM for patient, requested a return call.

## 2024-11-22 ENCOUNTER — HOSPITAL ENCOUNTER (OUTPATIENT)
Dept: INTERVENTIONAL RADIOLOGY/VASCULAR | Age: 64
Discharge: HOME | End: 2024-11-22
Payer: MEDICARE

## 2024-11-22 VITALS
OXYGEN SATURATION: 99 % | RESPIRATION RATE: 18 BRPM | TEMPERATURE: 97.8 F | HEART RATE: 101 BPM | SYSTOLIC BLOOD PRESSURE: 178 MMHG | DIASTOLIC BLOOD PRESSURE: 70 MMHG

## 2024-11-22 DIAGNOSIS — K74.60 CIRRHOSIS OF LIVER WITH ASCITES, UNSPECIFIED HEPATIC CIRRHOSIS TYPE (HCC): ICD-10-CM

## 2024-11-22 DIAGNOSIS — R18.8 CIRRHOSIS OF LIVER WITH ASCITES, UNSPECIFIED HEPATIC CIRRHOSIS TYPE (HCC): ICD-10-CM

## 2024-11-22 LAB — AFP: <1.8 UG/L

## 2024-11-22 PROCEDURE — 49083 ABD PARACENTESIS W/IMAGING: CPT

## 2024-11-22 PROCEDURE — 6360000002 HC RX W HCPCS: Performed by: RADIOLOGY

## 2024-11-22 PROCEDURE — C1729 CATH, DRAINAGE: HCPCS

## 2024-11-22 RX ORDER — LIDOCAINE HYDROCHLORIDE AND EPINEPHRINE BITARTRATE 20; .01 MG/ML; MG/ML
INJECTION, SOLUTION SUBCUTANEOUS PRN
Status: COMPLETED | OUTPATIENT
Start: 2024-11-22 | End: 2024-11-22

## 2024-11-22 RX ADMIN — LIDOCAINE HYDROCHLORIDE,EPINEPHRINE BITARTRATE 8 ML: 20; .01 INJECTION, SOLUTION INFILTRATION; PERINEURAL at 11:52

## 2024-11-22 NOTE — PROGRESS NOTES
Patient arrived via ambulation with  to Radiology department for paracentesis. Allergies, home medications, H&P and fasting instructions reviewed with patient. Vital signs taken.  Procedural instructions given, questions answered, understanding expressed and consent signed. Patient given fluoroscopy education, no questions at this time.

## 2024-11-26 DIAGNOSIS — C22.0 HEPATOCELLULAR CARCINOMA (HCC): Primary | ICD-10-CM

## 2024-11-26 DIAGNOSIS — Q27.33 ARTERIOVENOUS MALFORMATION OF LIVER: ICD-10-CM

## 2024-11-26 NOTE — TELEPHONE ENCOUNTER
2nd attempt to reach patient regarding scheduled CT scan. Called. No answer. LVM requesting return call to clinic.

## 2024-11-27 DIAGNOSIS — I10 ESSENTIAL HYPERTENSION: ICD-10-CM

## 2024-11-27 DIAGNOSIS — K74.69 OTHER CIRRHOSIS OF LIVER (HCC): ICD-10-CM

## 2024-11-27 DIAGNOSIS — M79.89 SWELLING OF LOWER EXTREMITY: ICD-10-CM

## 2024-11-27 RX ORDER — FUROSEMIDE 20 MG/1
20 TABLET ORAL DAILY
Qty: 60 TABLET | Refills: 3 | OUTPATIENT
Start: 2024-11-27

## 2024-11-27 RX ORDER — FUROSEMIDE 20 MG/1
20 TABLET ORAL DAILY
Qty: 60 TABLET | Refills: 3 | Status: SHIPPED | OUTPATIENT
Start: 2024-11-27

## 2024-11-27 NOTE — TELEPHONE ENCOUNTER
Name of Medication(s) Requested:  Requested Prescriptions     Pending Prescriptions Disp Refills    furosemide (LASIX) 20 MG tablet [Pharmacy Med Name: FUROSEMIDE 20MG TABLETS] 60 tablet 3     Sig: TAKE 1 TABLET BY MOUTH DAILY       Medication is on current medication list Yes    Dosage and directions were verified? Yes    Quantity verified: 30 day supply     Pharmacy Verified?  Yes    Last Appointment:  10/7/2024    Future appts:  Future Appointments   Date Time Provider Department Center   12/6/2024  9:00 AM [facility] ANGIO PARA/THORA SEYZ SPEC [facility] Rad/Car   12/13/2024 12:30 PM [facility] CT SCAN 3 SEYZ CT [facility] Rad[facility]Car   12/18/2024  7:00 AM [facility] ANGIO RM 12 SEYZ SPEC [facility] Rad/Car   12/20/2024  9:00 AM [facility] ANGIO PARA/THORA SE[facility] SPEC [facility] Rad/Car   1/3/2025  9:00 AM [facility] ANGIO PARA/THORA SEYZ SPEC [facility] Rad/Car   2/12/2025  3:30 PM [facility]   2/24/2025 11:00 AM Tonie Hernandez DO [facility] ECC DEP        (If no appt send self scheduling link. .REFILLAPPT)  Scheduling request sent?     [] Yes  [x] No    Does patient need updated?  [] Yes  [x] No

## 2024-12-04 ENCOUNTER — TELEPHONE (OUTPATIENT)
Dept: FAMILY MEDICINE CLINIC | Age: 64
End: 2024-12-04

## 2024-12-04 NOTE — TELEPHONE ENCOUNTER
If patient hit her head or anything from the fall, she needs evaluated in the ER. For home health, she can call her insurance to see who they cover then we can place a referral for home health     For an appointment, I would schedule her first available and we can look for openings

## 2024-12-04 NOTE — TELEPHONE ENCOUNTER
Daughter called in stating that Pt fell today and needs a follow up appt, noting available until 12/19/24. Also requesting a referral for social work due to needing help with home care.     Please call magdaleno Gonzalez.

## 2024-12-04 NOTE — TELEPHONE ENCOUNTER
Daughter notified. Daughter stated that Pt did not hit anything, she is ok and does not need ER. Scheduled for next available and will call with information regarding home care.

## 2024-12-05 ENCOUNTER — HOSPITAL ENCOUNTER (INPATIENT)
Age: 64
LOS: 12 days | Discharge: HOME HEALTH | DRG: 423 | End: 2024-12-17
Attending: EMERGENCY MEDICINE | Admitting: INTERNAL MEDICINE
Payer: MEDICARE

## 2024-12-05 ENCOUNTER — APPOINTMENT (OUTPATIENT)
Dept: GENERAL RADIOLOGY | Age: 64
DRG: 423 | End: 2024-12-05
Payer: MEDICARE

## 2024-12-05 ENCOUNTER — APPOINTMENT (OUTPATIENT)
Dept: CT IMAGING | Age: 64
DRG: 423 | End: 2024-12-05
Attending: EMERGENCY MEDICINE
Payer: MEDICARE

## 2024-12-05 DIAGNOSIS — T59.891A HYPERAMMONEMIC ENCEPHALOPATHY: ICD-10-CM

## 2024-12-05 DIAGNOSIS — G92.8 HYPERAMMONEMIC ENCEPHALOPATHY: ICD-10-CM

## 2024-12-05 DIAGNOSIS — K72.90 DECOMPENSATED CIRRHOSIS (HCC): ICD-10-CM

## 2024-12-05 DIAGNOSIS — I81 PORTAL VEIN THROMBOSIS: ICD-10-CM

## 2024-12-05 DIAGNOSIS — K74.60 DECOMPENSATED CIRRHOSIS (HCC): ICD-10-CM

## 2024-12-05 DIAGNOSIS — K76.82 HEPATIC ENCEPHALOPATHY (HCC): Primary | ICD-10-CM

## 2024-12-05 LAB
ALBUMIN SERPL-MCNC: 2.6 G/DL (ref 3.5–5.2)
ALP SERPL-CCNC: 213 U/L (ref 35–104)
ALT SERPL-CCNC: 21 U/L (ref 0–32)
AMMONIA PLAS-SCNC: 66 UMOL/L (ref 11–51)
ANION GAP SERPL CALCULATED.3IONS-SCNC: 13 MMOL/L (ref 7–16)
ANION GAP SERPL CALCULATED.3IONS-SCNC: 8 MMOL/L (ref 7–16)
AST SERPL-CCNC: 39 U/L (ref 0–31)
B-OH-BUTYR SERPL-MCNC: 0.06 MMOL/L (ref 0.02–0.27)
B.E.: -2 MMOL/L (ref -3–3)
BACTERIA URNS QL MICRO: ABNORMAL
BASOPHILS # BLD: 0.01 K/UL (ref 0–0.2)
BASOPHILS NFR BLD: 0 % (ref 0–2)
BILIRUB SERPL-MCNC: 2.5 MG/DL (ref 0–1.2)
BILIRUB UR QL STRIP: NEGATIVE
BILIRUB UR QL STRIP: NEGATIVE
BUN SERPL-MCNC: 36 MG/DL (ref 6–23)
BUN SERPL-MCNC: 38 MG/DL (ref 6–23)
CALCIUM SERPL-MCNC: 8 MG/DL (ref 8.6–10.2)
CALCIUM SERPL-MCNC: 8.5 MG/DL (ref 8.6–10.2)
CHLORIDE SERPL-SCNC: 100 MMOL/L (ref 98–107)
CHLORIDE SERPL-SCNC: 99 MMOL/L (ref 98–107)
CHLORIDE UR-SCNC: 25 MMOL/L
CLARITY UR: CLEAR
CLARITY UR: CLEAR
CO2 SERPL-SCNC: 20 MMOL/L (ref 22–29)
CO2 SERPL-SCNC: 22 MMOL/L (ref 22–29)
COHB: 0.2 % (ref 0–1.5)
COLOR UR: YELLOW
COLOR UR: YELLOW
COMMENT: ABNORMAL
CREAT SERPL-MCNC: 1.4 MG/DL (ref 0.5–1)
CREAT SERPL-MCNC: 1.6 MG/DL (ref 0.5–1)
CREAT UR-MCNC: 120.3 MG/DL (ref 29–226)
CRITICAL: ABNORMAL
DATE ANALYZED: ABNORMAL
DATE OF COLLECTION: ABNORMAL
EKG ATRIAL RATE: 85 BPM
EKG P AXIS: 44 DEGREES
EKG P-R INTERVAL: 158 MS
EKG Q-T INTERVAL: 434 MS
EKG QRS DURATION: 138 MS
EKG QTC CALCULATION (BAZETT): 516 MS
EKG R AXIS: -46 DEGREES
EKG T AXIS: 10 DEGREES
EKG VENTRICULAR RATE: 85 BPM
EOSINOPHIL # BLD: 0.05 K/UL (ref 0.05–0.5)
EOSINOPHILS RELATIVE PERCENT: 2 % (ref 0–6)
ERYTHROCYTE [DISTWIDTH] IN BLOOD BY AUTOMATED COUNT: 14.8 % (ref 11.5–15)
GFR, ESTIMATED: 36 ML/MIN/1.73M2
GFR, ESTIMATED: 42 ML/MIN/1.73M2
GLUCOSE BLD-MCNC: 307 MG/DL (ref 74–99)
GLUCOSE BLD-MCNC: 375 MG/DL (ref 74–99)
GLUCOSE BLD-MCNC: 435 MG/DL (ref 74–99)
GLUCOSE SERPL-MCNC: 352 MG/DL (ref 74–99)
GLUCOSE SERPL-MCNC: 421 MG/DL (ref 74–99)
GLUCOSE UR STRIP-MCNC: >=1000 MG/DL
GLUCOSE UR STRIP-MCNC: >=1000 MG/DL
HCO3: 22 MMOL/L (ref 22–26)
HCT VFR BLD AUTO: 29.8 % (ref 34–48)
HGB BLD-MCNC: 9.6 G/DL (ref 11.5–15.5)
HGB UR QL STRIP.AUTO: NEGATIVE
HGB UR QL STRIP.AUTO: NEGATIVE
HHB: 4.3 % (ref 0–5)
IMM GRANULOCYTES # BLD AUTO: <0.03 K/UL (ref 0–0.58)
IMM GRANULOCYTES NFR BLD: 1 % (ref 0–5)
INR PPP: 1.8
KETONES UR STRIP-MCNC: NEGATIVE MG/DL
KETONES UR STRIP-MCNC: NEGATIVE MG/DL
LAB: ABNORMAL
LACTATE BLDV-SCNC: 2 MMOL/L (ref 0.5–2.2)
LACTATE BLDV-SCNC: 3.3 MMOL/L (ref 0.5–2.2)
LEUKOCYTE ESTERASE UR QL STRIP: NEGATIVE
LEUKOCYTE ESTERASE UR QL STRIP: NEGATIVE
LIPASE SERPL-CCNC: 23 U/L (ref 13–60)
LYMPHOCYTES NFR BLD: 0.27 K/UL (ref 1.5–4)
LYMPHOCYTES RELATIVE PERCENT: 10 % (ref 20–42)
Lab: 1127
MCH RBC QN AUTO: 31.4 PG (ref 26–35)
MCHC RBC AUTO-ENTMCNC: 32.2 G/DL (ref 32–34.5)
MCV RBC AUTO: 97.4 FL (ref 80–99.9)
METHB: 0.3 % (ref 0–1.5)
MODE: ABNORMAL
MONOCYTES NFR BLD: 0.25 K/UL (ref 0.1–0.95)
MONOCYTES NFR BLD: 9 % (ref 2–12)
NEUTROPHILS NFR BLD: 78 % (ref 43–80)
NEUTS SEG NFR BLD: 2.09 K/UL (ref 1.8–7.3)
NITRITE UR QL STRIP: NEGATIVE
NITRITE UR QL STRIP: NEGATIVE
O2 SATURATION: 95.7 % (ref 92–98.5)
O2HB: 95.2 % (ref 94–97)
OPERATOR ID: ABNORMAL
OSMOLALITY UR: 552 MOSM/KG (ref 300–900)
PARTIAL THROMBOPLASTIN TIME: 27.7 SEC (ref 24.5–35.1)
PATIENT TEMP: 37 C
PCO2: 34.5 MMHG (ref 35–45)
PH BLOOD GAS: 7.42 (ref 7.35–7.45)
PH UR STRIP: 6 [PH] (ref 5–9)
PH UR STRIP: 6 [PH] (ref 5–9)
PLATELET # BLD AUTO: 59 K/UL (ref 130–450)
PLATELET CONFIRMATION: NORMAL
PMV BLD AUTO: 11.5 FL (ref 7–12)
PO2: 84.5 MMHG (ref 75–100)
POTASSIUM SERPL-SCNC: 4.6 MMOL/L (ref 3.5–5)
POTASSIUM SERPL-SCNC: 4.8 MMOL/L (ref 3.5–5)
PROT SERPL-MCNC: 8.4 G/DL (ref 6.4–8.3)
PROT UR STRIP-MCNC: NEGATIVE MG/DL
PROT UR STRIP-MCNC: NEGATIVE MG/DL
PROTHROMBIN TIME: 19.2 SEC (ref 9.3–12.4)
RBC # BLD AUTO: 3.06 M/UL (ref 3.5–5.5)
RBC #/AREA URNS HPF: ABNORMAL /HPF
SARS-COV-2 RDRP RESP QL NAA+PROBE: NOT DETECTED
SODIUM SERPL-SCNC: 130 MMOL/L (ref 132–146)
SODIUM SERPL-SCNC: 132 MMOL/L (ref 132–146)
SODIUM UR-SCNC: <20 MMOL/L
SOURCE, BLOOD GAS: ABNORMAL
SP GR UR STRIP: 1.01 (ref 1–1.03)
SP GR UR STRIP: 1.01 (ref 1–1.03)
SPECIMEN DESCRIPTION: NORMAL
T4 FREE SERPL-MCNC: 1.3 NG/DL (ref 0.9–1.7)
THB: 10.1 G/DL (ref 11.5–16.5)
TIME ANALYZED: 1139
TOTAL PROTEIN, URINE: 10 MG/DL (ref 0–12)
TROPONIN I SERPL HS-MCNC: 23 NG/L (ref 0–9)
TROPONIN I SERPL HS-MCNC: 35 NG/L (ref 0–9)
TSH SERPL DL<=0.05 MIU/L-ACNC: 7.58 UIU/ML (ref 0.27–4.2)
URINE TOTAL PROTEIN CREATININE RATIO: 0.08 (ref 0–0.2)
UROBILINOGEN UR STRIP-ACNC: 0.2 EU/DL (ref 0–1)
UROBILINOGEN UR STRIP-ACNC: 1 EU/DL (ref 0–1)
WBC #/AREA URNS HPF: ABNORMAL /HPF
WBC OTHER # BLD: 2.7 K/UL (ref 4.5–11.5)

## 2024-12-05 PROCEDURE — 83605 ASSAY OF LACTIC ACID: CPT

## 2024-12-05 PROCEDURE — 85610 PROTHROMBIN TIME: CPT

## 2024-12-05 PROCEDURE — 84300 ASSAY OF URINE SODIUM: CPT

## 2024-12-05 PROCEDURE — 82436 ASSAY OF URINE CHLORIDE: CPT

## 2024-12-05 PROCEDURE — 74177 CT ABD & PELVIS W/CONTRAST: CPT

## 2024-12-05 PROCEDURE — 2140000000 HC CCU INTERMEDIATE R&B

## 2024-12-05 PROCEDURE — 99223 1ST HOSP IP/OBS HIGH 75: CPT | Performed by: STUDENT IN AN ORGANIZED HEALTH CARE EDUCATION/TRAINING PROGRAM

## 2024-12-05 PROCEDURE — 71045 X-RAY EXAM CHEST 1 VIEW: CPT

## 2024-12-05 PROCEDURE — 82805 BLOOD GASES W/O2 SATURATION: CPT

## 2024-12-05 PROCEDURE — 6360000002 HC RX W HCPCS

## 2024-12-05 PROCEDURE — 82947 ASSAY GLUCOSE BLOOD QUANT: CPT

## 2024-12-05 PROCEDURE — 6370000000 HC RX 637 (ALT 250 FOR IP): Performed by: INTERNAL MEDICINE

## 2024-12-05 PROCEDURE — 76937 US GUIDE VASCULAR ACCESS: CPT

## 2024-12-05 PROCEDURE — P9047 ALBUMIN (HUMAN), 25%, 50ML: HCPCS

## 2024-12-05 PROCEDURE — 84156 ASSAY OF PROTEIN URINE: CPT

## 2024-12-05 PROCEDURE — 80053 COMPREHEN METABOLIC PANEL: CPT

## 2024-12-05 PROCEDURE — 84484 ASSAY OF TROPONIN QUANT: CPT

## 2024-12-05 PROCEDURE — 83935 ASSAY OF URINE OSMOLALITY: CPT

## 2024-12-05 PROCEDURE — 84443 ASSAY THYROID STIM HORMONE: CPT

## 2024-12-05 PROCEDURE — 85025 COMPLETE CBC W/AUTO DIFF WBC: CPT

## 2024-12-05 PROCEDURE — 81003 URINALYSIS AUTO W/O SCOPE: CPT

## 2024-12-05 PROCEDURE — 99223 1ST HOSP IP/OBS HIGH 75: CPT | Performed by: INTERNAL MEDICINE

## 2024-12-05 PROCEDURE — 83690 ASSAY OF LIPASE: CPT

## 2024-12-05 PROCEDURE — 84439 ASSAY OF FREE THYROXINE: CPT

## 2024-12-05 PROCEDURE — 87635 SARS-COV-2 COVID-19 AMP PRB: CPT

## 2024-12-05 PROCEDURE — 70450 CT HEAD/BRAIN W/O DYE: CPT

## 2024-12-05 PROCEDURE — 93005 ELECTROCARDIOGRAM TRACING: CPT | Performed by: EMERGENCY MEDICINE

## 2024-12-05 PROCEDURE — 2580000003 HC RX 258: Performed by: EMERGENCY MEDICINE

## 2024-12-05 PROCEDURE — 6370000000 HC RX 637 (ALT 250 FOR IP)

## 2024-12-05 PROCEDURE — 99285 EMERGENCY DEPT VISIT HI MDM: CPT

## 2024-12-05 PROCEDURE — 80048 BASIC METABOLIC PNL TOTAL CA: CPT

## 2024-12-05 PROCEDURE — 81001 URINALYSIS AUTO W/SCOPE: CPT

## 2024-12-05 PROCEDURE — 82010 KETONE BODYS QUAN: CPT

## 2024-12-05 PROCEDURE — 82570 ASSAY OF URINE CREATININE: CPT

## 2024-12-05 PROCEDURE — 82140 ASSAY OF AMMONIA: CPT

## 2024-12-05 PROCEDURE — 93010 ELECTROCARDIOGRAM REPORT: CPT | Performed by: INTERNAL MEDICINE

## 2024-12-05 PROCEDURE — 85730 THROMBOPLASTIN TIME PARTIAL: CPT

## 2024-12-05 PROCEDURE — 6360000002 HC RX W HCPCS: Performed by: INTERNAL MEDICINE

## 2024-12-05 PROCEDURE — 6360000004 HC RX CONTRAST MEDICATION: Performed by: RADIOLOGY

## 2024-12-05 PROCEDURE — 6370000000 HC RX 637 (ALT 250 FOR IP): Performed by: HOSPITALIST

## 2024-12-05 PROCEDURE — 87040 BLOOD CULTURE FOR BACTERIA: CPT

## 2024-12-05 PROCEDURE — 6370000000 HC RX 637 (ALT 250 FOR IP): Performed by: EMERGENCY MEDICINE

## 2024-12-05 RX ORDER — ONDANSETRON 4 MG/1
4 TABLET, ORALLY DISINTEGRATING ORAL EVERY 8 HOURS PRN
Status: DISCONTINUED | OUTPATIENT
Start: 2024-12-05 | End: 2024-12-05

## 2024-12-05 RX ORDER — INSULIN LISPRO 100 [IU]/ML
4 INJECTION, SOLUTION INTRAVENOUS; SUBCUTANEOUS ONCE
Status: COMPLETED | OUTPATIENT
Start: 2024-12-05 | End: 2024-12-05

## 2024-12-05 RX ORDER — SODIUM CHLORIDE 0.9 % (FLUSH) 0.9 %
5-40 SYRINGE (ML) INJECTION PRN
Status: DISCONTINUED | OUTPATIENT
Start: 2024-12-05 | End: 2024-12-17 | Stop reason: HOSPADM

## 2024-12-05 RX ORDER — SODIUM CHLORIDE 9 MG/ML
INJECTION, SOLUTION INTRAVENOUS PRN
Status: DISCONTINUED | OUTPATIENT
Start: 2024-12-05 | End: 2024-12-17 | Stop reason: HOSPADM

## 2024-12-05 RX ORDER — FUROSEMIDE 20 MG/1
20 TABLET ORAL DAILY
Status: DISCONTINUED | OUTPATIENT
Start: 2024-12-06 | End: 2024-12-06

## 2024-12-05 RX ORDER — MAGNESIUM SULFATE IN WATER 40 MG/ML
2000 INJECTION, SOLUTION INTRAVENOUS PRN
Status: DISCONTINUED | OUTPATIENT
Start: 2024-12-05 | End: 2024-12-17 | Stop reason: HOSPADM

## 2024-12-05 RX ORDER — ONDANSETRON 2 MG/ML
4 INJECTION INTRAMUSCULAR; INTRAVENOUS EVERY 6 HOURS PRN
Status: DISCONTINUED | OUTPATIENT
Start: 2024-12-05 | End: 2024-12-05

## 2024-12-05 RX ORDER — SODIUM CHLORIDE 0.9 % (FLUSH) 0.9 %
5-40 SYRINGE (ML) INJECTION EVERY 12 HOURS SCHEDULED
Status: DISCONTINUED | OUTPATIENT
Start: 2024-12-05 | End: 2024-12-17 | Stop reason: HOSPADM

## 2024-12-05 RX ORDER — DEXTROSE MONOHYDRATE 100 MG/ML
INJECTION, SOLUTION INTRAVENOUS CONTINUOUS PRN
Status: DISCONTINUED | OUTPATIENT
Start: 2024-12-05 | End: 2024-12-17 | Stop reason: HOSPADM

## 2024-12-05 RX ORDER — IOPAMIDOL 755 MG/ML
75 INJECTION, SOLUTION INTRAVASCULAR
Status: COMPLETED | OUTPATIENT
Start: 2024-12-05 | End: 2024-12-05

## 2024-12-05 RX ORDER — INSULIN GLARGINE 100 [IU]/ML
50 INJECTION, SOLUTION SUBCUTANEOUS NIGHTLY
Status: DISCONTINUED | OUTPATIENT
Start: 2024-12-05 | End: 2024-12-10

## 2024-12-05 RX ORDER — LACTULOSE 10 G/15ML
20 SOLUTION ORAL 3 TIMES DAILY
Status: DISCONTINUED | OUTPATIENT
Start: 2024-12-05 | End: 2024-12-08

## 2024-12-05 RX ORDER — SPIRONOLACTONE 25 MG/1
50 TABLET ORAL DAILY
Status: DISCONTINUED | OUTPATIENT
Start: 2024-12-06 | End: 2024-12-08

## 2024-12-05 RX ORDER — CALCIUM CARBONATE 500 MG/1
500 TABLET, CHEWABLE ORAL 3 TIMES DAILY PRN
Status: DISCONTINUED | OUTPATIENT
Start: 2024-12-05 | End: 2024-12-17 | Stop reason: HOSPADM

## 2024-12-05 RX ORDER — PROCHLORPERAZINE EDISYLATE 5 MG/ML
10 INJECTION INTRAMUSCULAR; INTRAVENOUS EVERY 6 HOURS PRN
Status: DISCONTINUED | OUTPATIENT
Start: 2024-12-05 | End: 2024-12-17 | Stop reason: HOSPADM

## 2024-12-05 RX ORDER — ALBUMIN (HUMAN) 12.5 G/50ML
25 SOLUTION INTRAVENOUS EVERY 8 HOURS
Status: DISCONTINUED | OUTPATIENT
Start: 2024-12-05 | End: 2024-12-13

## 2024-12-05 RX ORDER — INSULIN LISPRO 100 [IU]/ML
18 INJECTION, SOLUTION INTRAVENOUS; SUBCUTANEOUS
Status: DISCONTINUED | OUTPATIENT
Start: 2024-12-05 | End: 2024-12-10

## 2024-12-05 RX ORDER — POTASSIUM CHLORIDE 7.45 MG/ML
10 INJECTION INTRAVENOUS PRN
Status: DISCONTINUED | OUTPATIENT
Start: 2024-12-05 | End: 2024-12-17 | Stop reason: HOSPADM

## 2024-12-05 RX ORDER — PANTOPRAZOLE SODIUM 40 MG/1
40 TABLET, DELAYED RELEASE ORAL
Status: DISCONTINUED | OUTPATIENT
Start: 2024-12-06 | End: 2024-12-07

## 2024-12-05 RX ORDER — POTASSIUM CHLORIDE 1500 MG/1
40 TABLET, EXTENDED RELEASE ORAL PRN
Status: DISCONTINUED | OUTPATIENT
Start: 2024-12-05 | End: 2024-12-17 | Stop reason: HOSPADM

## 2024-12-05 RX ORDER — POLYETHYLENE GLYCOL 3350 17 G/17G
17 POWDER, FOR SOLUTION ORAL DAILY PRN
Status: DISCONTINUED | OUTPATIENT
Start: 2024-12-05 | End: 2024-12-17 | Stop reason: HOSPADM

## 2024-12-05 RX ORDER — CHOLECALCIFEROL (VITAMIN D3) 50 MCG
2000 TABLET ORAL DAILY
Status: DISCONTINUED | OUTPATIENT
Start: 2024-12-06 | End: 2024-12-17 | Stop reason: HOSPADM

## 2024-12-05 RX ORDER — 0.9 % SODIUM CHLORIDE 0.9 %
1000 INTRAVENOUS SOLUTION INTRAVENOUS ONCE
Status: COMPLETED | OUTPATIENT
Start: 2024-12-05 | End: 2024-12-05

## 2024-12-05 RX ORDER — BENZONATATE 100 MG/1
100 CAPSULE ORAL 3 TIMES DAILY PRN
Status: DISCONTINUED | OUTPATIENT
Start: 2024-12-05 | End: 2024-12-17 | Stop reason: HOSPADM

## 2024-12-05 RX ORDER — GABAPENTIN 600 MG/1
600 TABLET ORAL 3 TIMES DAILY
Status: DISCONTINUED | OUTPATIENT
Start: 2024-12-05 | End: 2024-12-05 | Stop reason: CLARIF

## 2024-12-05 RX ORDER — CARVEDILOL 3.12 MG/1
3.12 TABLET ORAL DAILY
Status: DISCONTINUED | OUTPATIENT
Start: 2024-12-06 | End: 2024-12-17 | Stop reason: HOSPADM

## 2024-12-05 RX ORDER — GLUCAGON 1 MG/ML
1 KIT INJECTION PRN
Status: DISCONTINUED | OUTPATIENT
Start: 2024-12-05 | End: 2024-12-17 | Stop reason: HOSPADM

## 2024-12-05 RX ORDER — LACTULOSE 10 G/15ML
30 SOLUTION ORAL ONCE
Status: COMPLETED | OUTPATIENT
Start: 2024-12-05 | End: 2024-12-05

## 2024-12-05 RX ORDER — GABAPENTIN 300 MG/1
600 CAPSULE ORAL 3 TIMES DAILY
Status: DISCONTINUED | OUTPATIENT
Start: 2024-12-05 | End: 2024-12-17 | Stop reason: HOSPADM

## 2024-12-05 RX ORDER — HYDRALAZINE HYDROCHLORIDE 20 MG/ML
10 INJECTION INTRAMUSCULAR; INTRAVENOUS EVERY 6 HOURS PRN
Status: DISCONTINUED | OUTPATIENT
Start: 2024-12-05 | End: 2024-12-17 | Stop reason: HOSPADM

## 2024-12-05 RX ADMIN — IOPAMIDOL 75 ML: 755 INJECTION, SOLUTION INTRAVENOUS at 13:12

## 2024-12-05 RX ADMIN — RIFAXIMIN 400 MG: 200 TABLET ORAL at 15:49

## 2024-12-05 RX ADMIN — LACTULOSE 20 G: 20 SOLUTION ORAL at 21:53

## 2024-12-05 RX ADMIN — GABAPENTIN 600 MG: 300 CAPSULE ORAL at 21:52

## 2024-12-05 RX ADMIN — INSULIN LISPRO 18 UNITS: 100 INJECTION, SOLUTION INTRAVENOUS; SUBCUTANEOUS at 19:14

## 2024-12-05 RX ADMIN — LACTULOSE 30 G: 20 SOLUTION ORAL at 14:22

## 2024-12-05 RX ADMIN — SODIUM CHLORIDE 1000 ML: 9 INJECTION, SOLUTION INTRAVENOUS at 14:19

## 2024-12-05 RX ADMIN — ALBUMIN (HUMAN) 25 G: 0.25 INJECTION, SOLUTION INTRAVENOUS at 22:11

## 2024-12-05 RX ADMIN — INSULIN GLARGINE 50 UNITS: 100 INJECTION, SOLUTION SUBCUTANEOUS at 21:57

## 2024-12-05 RX ADMIN — ALBUMIN (HUMAN) 25 G: 0.25 INJECTION, SOLUTION INTRAVENOUS at 15:47

## 2024-12-05 RX ADMIN — PROCHLORPERAZINE EDISYLATE 10 MG: 5 INJECTION INTRAMUSCULAR; INTRAVENOUS at 21:52

## 2024-12-05 RX ADMIN — INSULIN LISPRO 4 UNITS: 100 INJECTION, SOLUTION INTRAVENOUS; SUBCUTANEOUS at 21:52

## 2024-12-05 RX ADMIN — LACTULOSE 20 G: 20 SOLUTION ORAL at 15:43

## 2024-12-05 RX ADMIN — Medication 3 MG: at 21:53

## 2024-12-05 ASSESSMENT — LIFESTYLE VARIABLES
HOW MANY STANDARD DRINKS CONTAINING ALCOHOL DO YOU HAVE ON A TYPICAL DAY: PATIENT DOES NOT DRINK
HOW OFTEN DO YOU HAVE A DRINK CONTAINING ALCOHOL: NEVER

## 2024-12-05 NOTE — H&P
Inpatient H&P      PCP:  Tonie Hernandez DO  Admitting Physician:  Eugenie Luna DO  Consultants:  hepatobiliary  Chief Complaint:    Chief Complaint   Patient presents with    Abdominal Pain     Abdominal pain/distention. Hx of ascites and getting paracentesis.  this AM at home. Took home insulin and  for EMS. Patient is Slovenian speaking.         History of Present Illness  Lisa Hunt is a 64 y.o. female who presents to [facility] ER complaining of abdominal pain.    Lisa Hunt has a past medical history that includes HCC, diabetes, cirrhosis.    Lisa presents to the ER with complaint of abdominal pain and distention and altered mental status.  Her patient daughter stated she was altered from baseline and more lethargic than normal.  Her pain is increased over the past 3 days.  She does have cirrhosis secondary to Alejandro and hepatocellular carcinoma.  She requires paracentesis every few weeks.  Last paracentesis was 11/22.  She also recently had embolization of the tumor.  CT abdomen pelvis showed portal vein thrombosis extending into the major intrahepatic portal branches, splenic vein and superior portion of the superior mesenteric vein is new.  Large amount of ascites.  Low-attenuation foci in the hepatic dome may represent treated neoplasm.  Wedge-shaped low-attenuation regions within the liver may represent vascular flow phenomenon but splenic infarct are not excluded  Discussed patient's case with ED physician.    ER Course  Upon presentation to the ER, routine labwork was performed which revealed BUN 38, creatinine 1.6, lactic acid 3.3, glucose 352, ammonia 66, troponin 35, alk phos 213, bilirubin 2.5, TSH 7.58, WBC 2.7, platelets 59.  Imaging results are as outlined below in the Imaging section of this note.   Upon arrival to the ER, patient was 139/118.  The patient received lactulose, 1 L normal saline in the emergency room and was admitted to [facility].    Last

## 2024-12-05 NOTE — ED PROVIDER NOTES
ascites, intracranial hemorrhage, hypercapnic respiratory failure, hypoglycemia.  The patient did have labs and imaging which were reviewed by me demonstrating CBC with pancytopenia near the patient's baseline notable for platelets of 59, CMP with mildly elevated creatinine at 1.6, lactic acid 1.3, lipase 23, her high-sensitivity troponin 35, ammonia level 66, patient given  beta hydroxybutyrate 0.06, CT head demonstrates no acute process, CT abdomen pelvis demonstrates portal vein thrombosis.  The case was discussed with Dr. Sosa.  She states no acute treatment for hepatic vein thrombosis as patient has multiple collaterals.  The case was discussed with Dr. Paulino for internal medicine will admit the patient    Social determinant:  was used for encounter  Chronic conditions     Past Medical History:   Diagnosis Date    DONNA (acute kidney injury) (HCC) 07/18/2023    Cirrhosis (HCC)     Diabetes mellitus (HCC)     Diabetic neuropathy (HCC)     Esophageal varices without bleeding (HCC)     GERD (gastroesophageal reflux disease)     Hypertension     Intracranial bleed (HCC) 05/01/2023    Liver cirrhosis (HCC)     with secondary esophageal varices, no signs/sx's of hepatic encephalopathy    Low vitamin D level     SDH (subdural hematoma) 05/02/2023    Thrombocytopenia (HCC)     Type 2 diabetes mellitus without complication (HCC)        Records reviewed: Reviewed patient's medical record the patient did have a follow-up in the office on 11/20/2024 by Dr. Sosa.  The patient does have cirrhosis of the liver secondary to ACOSTA as well as hepatocellular carcinoma she did undergo  embolization of the tumor    Patient treated with   Medications   albumin human 25% IV solution 25 g (has no administration in time range)   lactulose (CHRONULAC) 10 GM/15ML solution 20 g (20 g Oral Given 12/5/24 [id number])   rifAXIMin (XIFAXAN) tablet 400 mg (has no administration in time range)   benzocaine-menthol (CEPACOL

## 2024-12-06 ENCOUNTER — APPOINTMENT (OUTPATIENT)
Dept: INTERVENTIONAL RADIOLOGY/VASCULAR | Age: 64
DRG: 423 | End: 2024-12-06
Payer: MEDICARE

## 2024-12-06 PROBLEM — I81 PORTAL VEIN THROMBOSIS: Status: ACTIVE | Noted: 2024-12-06

## 2024-12-06 LAB
ALBUMIN SERPL-MCNC: 3.1 G/DL (ref 3.5–5.2)
ALP SERPL-CCNC: 140 U/L (ref 35–104)
ALT SERPL-CCNC: 15 U/L (ref 0–32)
AMMONIA PLAS-SCNC: 64 UMOL/L (ref 11–51)
ANION GAP SERPL CALCULATED.3IONS-SCNC: 8 MMOL/L (ref 7–16)
AST SERPL-CCNC: 24 U/L (ref 0–31)
BILIRUB SERPL-MCNC: 2.5 MG/DL (ref 0–1.2)
BUN SERPL-MCNC: 32 MG/DL (ref 6–23)
CALCIUM SERPL-MCNC: 8.6 MG/DL (ref 8.6–10.2)
CHLORIDE SERPL-SCNC: 104 MMOL/L (ref 98–107)
CHOLEST SERPL-MCNC: 88 MG/DL
CO2 SERPL-SCNC: 24 MMOL/L (ref 22–29)
CREAT SERPL-MCNC: 1.3 MG/DL (ref 0.5–1)
ERYTHROCYTE [DISTWIDTH] IN BLOOD BY AUTOMATED COUNT: 14.5 % (ref 11.5–15)
FERRITIN SERPL-MCNC: 282 NG/ML
GFR, ESTIMATED: 48 ML/MIN/1.73M2
GLUCOSE BLD-MCNC: 175 MG/DL (ref 74–99)
GLUCOSE BLD-MCNC: 202 MG/DL (ref 74–99)
GLUCOSE BLD-MCNC: 244 MG/DL (ref 74–99)
GLUCOSE BLD-MCNC: 335 MG/DL (ref 74–99)
GLUCOSE SERPL-MCNC: 214 MG/DL (ref 74–99)
HBA1C MFR BLD: 8.8 % (ref 4–5.6)
HCT VFR BLD AUTO: 24 % (ref 34–48)
HDLC SERPL-MCNC: 29 MG/DL
HGB BLD-MCNC: 7.7 G/DL (ref 11.5–15.5)
INR PPP: 1.7
IRON SATN MFR SERPL: 41 % (ref 15–50)
IRON SERPL-MCNC: 46 UG/DL (ref 37–145)
LDLC SERPL CALC-MCNC: 47 MG/DL
MAGNESIUM SERPL-MCNC: 1.7 MG/DL (ref 1.6–2.6)
MCH RBC QN AUTO: 31.4 PG (ref 26–35)
MCHC RBC AUTO-ENTMCNC: 32.1 G/DL (ref 32–34.5)
MCV RBC AUTO: 98 FL (ref 80–99.9)
PHOSPHATE SERPL-MCNC: 2.2 MG/DL (ref 2.5–4.5)
PLATELET # BLD AUTO: 45 K/UL (ref 130–450)
PLATELET CONFIRMATION: NORMAL
PMV BLD AUTO: 11.4 FL (ref 7–12)
POTASSIUM SERPL-SCNC: 4.3 MMOL/L (ref 3.5–5)
PROT SERPL-MCNC: 7 G/DL (ref 6.4–8.3)
PROTHROMBIN TIME: 19.1 SEC (ref 9.3–12.4)
RBC # BLD AUTO: 2.45 M/UL (ref 3.5–5.5)
SODIUM SERPL-SCNC: 136 MMOL/L (ref 132–146)
TIBC SERPL-MCNC: 111 UG/DL (ref 250–450)
TRIGL SERPL-MCNC: 59 MG/DL
URATE SERPL-MCNC: 10.6 MG/DL (ref 2.4–5.7)
VLDLC SERPL CALC-MCNC: 12 MG/DL
WBC OTHER # BLD: 1.5 K/UL (ref 4.5–11.5)

## 2024-12-06 PROCEDURE — 83735 ASSAY OF MAGNESIUM: CPT

## 2024-12-06 PROCEDURE — 82947 ASSAY GLUCOSE BLOOD QUANT: CPT

## 2024-12-06 PROCEDURE — 6370000000 HC RX 637 (ALT 250 FOR IP): Performed by: INTERNAL MEDICINE

## 2024-12-06 PROCEDURE — 85610 PROTHROMBIN TIME: CPT

## 2024-12-06 PROCEDURE — 80053 COMPREHEN METABOLIC PANEL: CPT

## 2024-12-06 PROCEDURE — 83036 HEMOGLOBIN GLYCOSYLATED A1C: CPT

## 2024-12-06 PROCEDURE — 85027 COMPLETE CBC AUTOMATED: CPT

## 2024-12-06 PROCEDURE — 80061 LIPID PANEL: CPT

## 2024-12-06 PROCEDURE — 6360000002 HC RX W HCPCS: Performed by: PHYSICIAN ASSISTANT

## 2024-12-06 PROCEDURE — 2140000000 HC CCU INTERMEDIATE R&B

## 2024-12-06 PROCEDURE — 6360000002 HC RX W HCPCS

## 2024-12-06 PROCEDURE — 0W9G3ZZ DRAINAGE OF PERITONEAL CAVITY, PERCUTANEOUS APPROACH: ICD-10-PCS | Performed by: PHYSICIAN ASSISTANT

## 2024-12-06 PROCEDURE — 2580000003 HC RX 258: Performed by: INTERNAL MEDICINE

## 2024-12-06 PROCEDURE — 83540 ASSAY OF IRON: CPT

## 2024-12-06 PROCEDURE — 6360000002 HC RX W HCPCS: Performed by: STUDENT IN AN ORGANIZED HEALTH CARE EDUCATION/TRAINING PROGRAM

## 2024-12-06 PROCEDURE — 6370000000 HC RX 637 (ALT 250 FOR IP)

## 2024-12-06 PROCEDURE — 82728 ASSAY OF FERRITIN: CPT

## 2024-12-06 PROCEDURE — 36415 COLL VENOUS BLD VENIPUNCTURE: CPT

## 2024-12-06 PROCEDURE — 99222 1ST HOSP IP/OBS MODERATE 55: CPT | Performed by: STUDENT IN AN ORGANIZED HEALTH CARE EDUCATION/TRAINING PROGRAM

## 2024-12-06 PROCEDURE — 49083 ABD PARACENTESIS W/IMAGING: CPT

## 2024-12-06 PROCEDURE — 99232 SBSQ HOSP IP/OBS MODERATE 35: CPT

## 2024-12-06 PROCEDURE — 84550 ASSAY OF BLOOD/URIC ACID: CPT

## 2024-12-06 PROCEDURE — 99222 1ST HOSP IP/OBS MODERATE 55: CPT

## 2024-12-06 PROCEDURE — 84100 ASSAY OF PHOSPHORUS: CPT

## 2024-12-06 PROCEDURE — 82140 ASSAY OF AMMONIA: CPT

## 2024-12-06 PROCEDURE — P9047 ALBUMIN (HUMAN), 25%, 50ML: HCPCS

## 2024-12-06 PROCEDURE — C1729 CATH, DRAINAGE: HCPCS

## 2024-12-06 PROCEDURE — 83550 IRON BINDING TEST: CPT

## 2024-12-06 RX ORDER — LIDOCAINE HYDROCHLORIDE 20 MG/ML
INJECTION, SOLUTION INFILTRATION; PERINEURAL PRN
Status: COMPLETED | OUTPATIENT
Start: 2024-12-06 | End: 2024-12-06

## 2024-12-06 RX ORDER — HEPARIN SODIUM 5000 [USP'U]/ML
5000 INJECTION, SOLUTION INTRAVENOUS; SUBCUTANEOUS EVERY 8 HOURS SCHEDULED
Status: DISCONTINUED | OUTPATIENT
Start: 2024-12-06 | End: 2024-12-14

## 2024-12-06 RX ORDER — HEPARIN SODIUM 5000 [USP'U]/ML
5000 INJECTION, SOLUTION INTRAVENOUS; SUBCUTANEOUS EVERY 8 HOURS SCHEDULED
Status: DISCONTINUED | OUTPATIENT
Start: 2024-12-06 | End: 2024-12-06

## 2024-12-06 RX ORDER — FUROSEMIDE 10 MG/ML
40 INJECTION INTRAMUSCULAR; INTRAVENOUS 2 TIMES DAILY
Status: DISCONTINUED | OUTPATIENT
Start: 2024-12-06 | End: 2024-12-12

## 2024-12-06 RX ADMIN — ALBUMIN (HUMAN) 25 G: 0.25 INJECTION, SOLUTION INTRAVENOUS at 06:05

## 2024-12-06 RX ADMIN — SODIUM CHLORIDE, PRESERVATIVE FREE 10 ML: 5 INJECTION INTRAVENOUS at 08:24

## 2024-12-06 RX ADMIN — INSULIN LISPRO 18 UNITS: 100 INJECTION, SOLUTION INTRAVENOUS; SUBCUTANEOUS at 17:03

## 2024-12-06 RX ADMIN — RIFAXIMIN 400 MG: 200 TABLET ORAL at 08:23

## 2024-12-06 RX ADMIN — FUROSEMIDE 40 MG: 10 INJECTION, SOLUTION INTRAMUSCULAR; INTRAVENOUS at 19:31

## 2024-12-06 RX ADMIN — GABAPENTIN 600 MG: 300 CAPSULE ORAL at 08:22

## 2024-12-06 RX ADMIN — RIFAXIMIN 400 MG: 200 TABLET ORAL at 01:09

## 2024-12-06 RX ADMIN — HEPARIN SODIUM 5000 UNITS: 5000 INJECTION INTRAVENOUS; SUBCUTANEOUS at 20:57

## 2024-12-06 RX ADMIN — SPIRONOLACTONE 50 MG: 25 TABLET ORAL at 08:22

## 2024-12-06 RX ADMIN — SODIUM CHLORIDE, PRESERVATIVE FREE 10 ML: 5 INJECTION INTRAVENOUS at 19:32

## 2024-12-06 RX ADMIN — Medication 2000 UNITS: at 08:22

## 2024-12-06 RX ADMIN — LACTULOSE 20 G: 20 SOLUTION ORAL at 08:22

## 2024-12-06 RX ADMIN — SODIUM CHLORIDE, PRESERVATIVE FREE 10 ML: 5 INJECTION INTRAVENOUS at 16:56

## 2024-12-06 RX ADMIN — GABAPENTIN 600 MG: 300 CAPSULE ORAL at 20:57

## 2024-12-06 RX ADMIN — PANTOPRAZOLE SODIUM 40 MG: 40 TABLET, DELAYED RELEASE ORAL at 06:04

## 2024-12-06 RX ADMIN — GABAPENTIN 600 MG: 300 CAPSULE ORAL at 17:02

## 2024-12-06 RX ADMIN — SODIUM CHLORIDE, PRESERVATIVE FREE 10 ML: 5 INJECTION INTRAVENOUS at 21:02

## 2024-12-06 RX ADMIN — SODIUM CHLORIDE, PRESERVATIVE FREE 10 ML: 5 INJECTION INTRAVENOUS at 01:14

## 2024-12-06 RX ADMIN — LACTULOSE 20 G: 20 SOLUTION ORAL at 20:57

## 2024-12-06 RX ADMIN — LIDOCAINE HYDROCHLORIDE 10 ML: 20 INJECTION, SOLUTION INFILTRATION; PERINEURAL at 14:28

## 2024-12-06 RX ADMIN — LACTULOSE 20 G: 20 SOLUTION ORAL at 16:56

## 2024-12-06 RX ADMIN — INSULIN GLARGINE 50 UNITS: 100 INJECTION, SOLUTION SUBCUTANEOUS at 20:57

## 2024-12-06 RX ADMIN — ALBUMIN (HUMAN) 25 G: 0.25 INJECTION, SOLUTION INTRAVENOUS at 22:40

## 2024-12-06 RX ADMIN — FUROSEMIDE 20 MG: 20 TABLET ORAL at 08:22

## 2024-12-06 RX ADMIN — RIFAXIMIN 400 MG: 200 TABLET ORAL at 21:08

## 2024-12-06 RX ADMIN — CARVEDILOL 3.12 MG: 3.12 TABLET, FILM COATED ORAL at 08:22

## 2024-12-06 RX ADMIN — ALBUMIN (HUMAN) 25 G: 0.25 INJECTION, SOLUTION INTRAVENOUS at 16:56

## 2024-12-06 ASSESSMENT — PAIN SCALES - GENERAL: PAINLEVEL_OUTOF10: 0

## 2024-12-06 NOTE — PROCEDURES
PROCEDURE NOTE  Date: 12/6/2024   Name: Lisa Hunt  YOB: 1960    Procedures: Paracentesis  Discussed patient and IR procedure with bedside RN, all questions answered. Once labs resulted and within parameters, will call when able to send for patient.           none

## 2024-12-06 NOTE — BRIEF OP NOTE
Brief-Op Note  ______________________________________________________________      IR U/S GUIDED PARACENTESIS  [facility]WE [facility]    Patient Name: Lisa Hunt   YOB: 1960  Medical Record Number: [id number]  Date of Procedure: 12/6/24  Room/Bed: 6414/6414-B      Pre-operative Diagnosis: Ascites    Post-operative Diagnosis: Ascites    Consent: Informed consent was obtained from the patient prior to the procedure. The details of the procedure, as well is its risks, benefits, and alternatives, were explained.      Anesthesia: Local anesthesia.    Performed by: RANJITH Alvarez under on-site supervision by Jerson Mireles MD.    Estimated blood loss: Minimal    Complications: None    Specimen Obtained: 7350mL of clear straw colored ascites fluid was withdrawn.    (See radiology dictation in PACs for image review and additional procedural information)    Electronically signed by RANJITH Alvarez   DD: 12/6/24  5:29 PM

## 2024-12-06 NOTE — ACP (ADVANCE CARE PLANNING)
Advance Care Planning   Healthcare Decision Maker:    Primary Decision Maker: Lisa Fitch - Child - [phone]    Secondary Decision Maker: yolette fitch - Child - [phone]    Supplemental (Other) Decision Maker: gisselle lopez - Domestic Partner - [phone]    Click here to complete Healthcare Decision Makers including selection of the Healthcare Decision Maker Relationship (ie \"Primary\").

## 2024-12-07 LAB
ALBUMIN SERPL-MCNC: 3 G/DL (ref 3.5–5.2)
ALP SERPL-CCNC: 118 U/L (ref 35–104)
ALT SERPL-CCNC: 12 U/L (ref 0–32)
AMMONIA PLAS-SCNC: 38 UMOL/L (ref 11–51)
ANION GAP SERPL CALCULATED.3IONS-SCNC: 8 MMOL/L (ref 7–16)
AST SERPL-CCNC: 30 U/L (ref 0–31)
BILIRUB SERPL-MCNC: 2.9 MG/DL (ref 0–1.2)
BUN SERPL-MCNC: 24 MG/DL (ref 6–23)
CALCIUM SERPL-MCNC: 8.3 MG/DL (ref 8.6–10.2)
CHLORIDE SERPL-SCNC: 103 MMOL/L (ref 98–107)
CO2 SERPL-SCNC: 27 MMOL/L (ref 22–29)
CREAT SERPL-MCNC: 1.2 MG/DL (ref 0.5–1)
ERYTHROCYTE [DISTWIDTH] IN BLOOD BY AUTOMATED COUNT: 14.4 % (ref 11.5–15)
GFR, ESTIMATED: 51 ML/MIN/1.73M2
GLUCOSE BLD-MCNC: 180 MG/DL (ref 74–99)
GLUCOSE BLD-MCNC: 257 MG/DL (ref 74–99)
GLUCOSE BLD-MCNC: 261 MG/DL (ref 74–99)
GLUCOSE BLD-MCNC: 271 MG/DL (ref 74–99)
GLUCOSE SERPL-MCNC: 167 MG/DL (ref 74–99)
HCT VFR BLD AUTO: 23.1 % (ref 34–48)
HGB BLD-MCNC: 7.5 G/DL (ref 11.5–15.5)
MAGNESIUM SERPL-MCNC: 1.4 MG/DL (ref 1.6–2.6)
MCH RBC QN AUTO: 31.5 PG (ref 26–35)
MCHC RBC AUTO-ENTMCNC: 32.5 G/DL (ref 32–34.5)
MCV RBC AUTO: 97.1 FL (ref 80–99.9)
PHOSPHATE SERPL-MCNC: 2.3 MG/DL (ref 2.5–4.5)
PLATELET # BLD AUTO: 41 K/UL (ref 130–450)
PLATELET CONFIRMATION: NORMAL
PMV BLD AUTO: 10.9 FL (ref 7–12)
POTASSIUM SERPL-SCNC: 4 MMOL/L (ref 3.5–5)
PROT SERPL-MCNC: 6.1 G/DL (ref 6.4–8.3)
RBC # BLD AUTO: 2.38 M/UL (ref 3.5–5.5)
SODIUM SERPL-SCNC: 138 MMOL/L (ref 132–146)
WBC OTHER # BLD: 1.2 K/UL (ref 4.5–11.5)

## 2024-12-07 PROCEDURE — 2580000003 HC RX 258: Performed by: INTERNAL MEDICINE

## 2024-12-07 PROCEDURE — 6370000000 HC RX 637 (ALT 250 FOR IP): Performed by: INTERNAL MEDICINE

## 2024-12-07 PROCEDURE — 6360000002 HC RX W HCPCS: Performed by: STUDENT IN AN ORGANIZED HEALTH CARE EDUCATION/TRAINING PROGRAM

## 2024-12-07 PROCEDURE — 99232 SBSQ HOSP IP/OBS MODERATE 35: CPT | Performed by: STUDENT IN AN ORGANIZED HEALTH CARE EDUCATION/TRAINING PROGRAM

## 2024-12-07 PROCEDURE — 80053 COMPREHEN METABOLIC PANEL: CPT

## 2024-12-07 PROCEDURE — 6360000002 HC RX W HCPCS

## 2024-12-07 PROCEDURE — 84100 ASSAY OF PHOSPHORUS: CPT

## 2024-12-07 PROCEDURE — 2580000003 HC RX 258: Performed by: STUDENT IN AN ORGANIZED HEALTH CARE EDUCATION/TRAINING PROGRAM

## 2024-12-07 PROCEDURE — 99232 SBSQ HOSP IP/OBS MODERATE 35: CPT

## 2024-12-07 PROCEDURE — 82947 ASSAY GLUCOSE BLOOD QUANT: CPT

## 2024-12-07 PROCEDURE — 6370000000 HC RX 637 (ALT 250 FOR IP)

## 2024-12-07 PROCEDURE — 2500000003 HC RX 250 WO HCPCS: Performed by: STUDENT IN AN ORGANIZED HEALTH CARE EDUCATION/TRAINING PROGRAM

## 2024-12-07 PROCEDURE — 85027 COMPLETE CBC AUTOMATED: CPT

## 2024-12-07 PROCEDURE — 6370000000 HC RX 637 (ALT 250 FOR IP): Performed by: STUDENT IN AN ORGANIZED HEALTH CARE EDUCATION/TRAINING PROGRAM

## 2024-12-07 PROCEDURE — 2140000000 HC CCU INTERMEDIATE R&B

## 2024-12-07 PROCEDURE — 83735 ASSAY OF MAGNESIUM: CPT

## 2024-12-07 PROCEDURE — 82140 ASSAY OF AMMONIA: CPT

## 2024-12-07 PROCEDURE — P9047 ALBUMIN (HUMAN), 25%, 50ML: HCPCS

## 2024-12-07 PROCEDURE — 36415 COLL VENOUS BLD VENIPUNCTURE: CPT

## 2024-12-07 RX ORDER — MAGNESIUM SULFATE IN WATER 40 MG/ML
4000 INJECTION, SOLUTION INTRAVENOUS ONCE
Status: COMPLETED | OUTPATIENT
Start: 2024-12-07 | End: 2024-12-07

## 2024-12-07 RX ORDER — PANTOPRAZOLE SODIUM 40 MG/1
40 TABLET, DELAYED RELEASE ORAL
Status: DISCONTINUED | OUTPATIENT
Start: 2024-12-07 | End: 2024-12-17 | Stop reason: HOSPADM

## 2024-12-07 RX ADMIN — CARVEDILOL 3.12 MG: 3.12 TABLET, FILM COATED ORAL at 09:27

## 2024-12-07 RX ADMIN — ALBUMIN (HUMAN) 25 G: 0.25 INJECTION, SOLUTION INTRAVENOUS at 16:53

## 2024-12-07 RX ADMIN — GABAPENTIN 600 MG: 300 CAPSULE ORAL at 09:27

## 2024-12-07 RX ADMIN — FUROSEMIDE 40 MG: 10 INJECTION, SOLUTION INTRAMUSCULAR; INTRAVENOUS at 09:27

## 2024-12-07 RX ADMIN — INSULIN GLARGINE 50 UNITS: 100 INJECTION, SOLUTION SUBCUTANEOUS at 22:05

## 2024-12-07 RX ADMIN — SODIUM PHOSPHATE, MONOBASIC, MONOHYDRATE 30 MMOL: 276; 142 INJECTION, SOLUTION INTRAVENOUS at 18:39

## 2024-12-07 RX ADMIN — SODIUM CHLORIDE, PRESERVATIVE FREE 10 ML: 5 INJECTION INTRAVENOUS at 09:28

## 2024-12-07 RX ADMIN — FUROSEMIDE 40 MG: 10 INJECTION, SOLUTION INTRAMUSCULAR; INTRAVENOUS at 18:44

## 2024-12-07 RX ADMIN — HEPARIN SODIUM 5000 UNITS: 5000 INJECTION INTRAVENOUS; SUBCUTANEOUS at 15:26

## 2024-12-07 RX ADMIN — INSULIN LISPRO 18 UNITS: 100 INJECTION, SOLUTION INTRAVENOUS; SUBCUTANEOUS at 12:32

## 2024-12-07 RX ADMIN — GABAPENTIN 600 MG: 300 CAPSULE ORAL at 15:26

## 2024-12-07 RX ADMIN — HEPARIN SODIUM 5000 UNITS: 5000 INJECTION INTRAVENOUS; SUBCUTANEOUS at 22:05

## 2024-12-07 RX ADMIN — LACTULOSE 20 G: 20 SOLUTION ORAL at 22:05

## 2024-12-07 RX ADMIN — SPIRONOLACTONE 50 MG: 25 TABLET ORAL at 09:28

## 2024-12-07 RX ADMIN — RIFAXIMIN 550 MG: 550 TABLET ORAL at 22:10

## 2024-12-07 RX ADMIN — INSULIN LISPRO 18 UNITS: 100 INJECTION, SOLUTION INTRAVENOUS; SUBCUTANEOUS at 15:33

## 2024-12-07 RX ADMIN — PANTOPRAZOLE SODIUM 40 MG: 40 TABLET, DELAYED RELEASE ORAL at 05:50

## 2024-12-07 RX ADMIN — SODIUM CHLORIDE, PRESERVATIVE FREE 10 ML: 5 INJECTION INTRAVENOUS at 22:05

## 2024-12-07 RX ADMIN — LACTULOSE 20 G: 20 SOLUTION ORAL at 09:27

## 2024-12-07 RX ADMIN — MAGNESIUM SULFATE HEPTAHYDRATE 4000 MG: 40 INJECTION, SOLUTION INTRAVENOUS at 12:26

## 2024-12-07 RX ADMIN — PANTOPRAZOLE SODIUM 40 MG: 40 TABLET, DELAYED RELEASE ORAL at 15:26

## 2024-12-07 RX ADMIN — ALBUMIN (HUMAN) 25 G: 0.25 INJECTION, SOLUTION INTRAVENOUS at 22:05

## 2024-12-07 RX ADMIN — RIFAXIMIN 550 MG: 550 TABLET ORAL at 12:18

## 2024-12-07 RX ADMIN — HEPARIN SODIUM 5000 UNITS: 5000 INJECTION INTRAVENOUS; SUBCUTANEOUS at 06:13

## 2024-12-07 RX ADMIN — GABAPENTIN 600 MG: 300 CAPSULE ORAL at 22:04

## 2024-12-07 RX ADMIN — ALBUMIN (HUMAN) 25 G: 0.25 INJECTION, SOLUTION INTRAVENOUS at 05:53

## 2024-12-07 ASSESSMENT — PAIN SCALES - GENERAL: PAINLEVEL_OUTOF10: 0

## 2024-12-08 DIAGNOSIS — R18.8 ASCITES OF LIVER: Primary | ICD-10-CM

## 2024-12-08 LAB
ALBUMIN SERPL-MCNC: 3.3 G/DL (ref 3.5–5.2)
ALP SERPL-CCNC: 127 U/L (ref 35–104)
ALT SERPL-CCNC: 13 U/L (ref 0–32)
AMMONIA PLAS-SCNC: 79 UMOL/L (ref 11–51)
ANION GAP SERPL CALCULATED.3IONS-SCNC: 8 MMOL/L (ref 7–16)
AST SERPL-CCNC: 35 U/L (ref 0–31)
BILIRUB SERPL-MCNC: 2.6 MG/DL (ref 0–1.2)
BUN SERPL-MCNC: 23 MG/DL (ref 6–23)
CALCIUM SERPL-MCNC: 8.2 MG/DL (ref 8.6–10.2)
CHLORIDE SERPL-SCNC: 100 MMOL/L (ref 98–107)
CO2 SERPL-SCNC: 27 MMOL/L (ref 22–29)
CREAT SERPL-MCNC: 1.2 MG/DL (ref 0.5–1)
ERYTHROCYTE [DISTWIDTH] IN BLOOD BY AUTOMATED COUNT: 14.6 % (ref 11.5–15)
GFR, ESTIMATED: 49 ML/MIN/1.73M2
GLUCOSE BLD-MCNC: 238 MG/DL (ref 74–99)
GLUCOSE BLD-MCNC: 242 MG/DL (ref 74–99)
GLUCOSE BLD-MCNC: 304 MG/DL (ref 74–99)
GLUCOSE BLD-MCNC: 317 MG/DL (ref 74–99)
GLUCOSE SERPL-MCNC: 273 MG/DL (ref 74–99)
HCT VFR BLD AUTO: 22.7 % (ref 34–48)
HGB BLD-MCNC: 7.1 G/DL (ref 11.5–15.5)
MAGNESIUM SERPL-MCNC: 1.9 MG/DL (ref 1.6–2.6)
MCH RBC QN AUTO: 32.1 PG (ref 26–35)
MCHC RBC AUTO-ENTMCNC: 31.3 G/DL (ref 32–34.5)
MCV RBC AUTO: 102.7 FL (ref 80–99.9)
PHOSPHATE SERPL-MCNC: 2.7 MG/DL (ref 2.5–4.5)
PLATELET # BLD AUTO: 36 K/UL (ref 130–450)
PLATELET CONFIRMATION: NORMAL
PMV BLD AUTO: 10.9 FL (ref 7–12)
POTASSIUM SERPL-SCNC: 3.7 MMOL/L (ref 3.5–5)
PROT SERPL-MCNC: 6.2 G/DL (ref 6.4–8.3)
RBC # BLD AUTO: 2.21 M/UL (ref 3.5–5.5)
SODIUM SERPL-SCNC: 135 MMOL/L (ref 132–146)
WBC OTHER # BLD: 1 K/UL (ref 4.5–11.5)

## 2024-12-08 PROCEDURE — 6360000002 HC RX W HCPCS

## 2024-12-08 PROCEDURE — 2580000003 HC RX 258: Performed by: INTERNAL MEDICINE

## 2024-12-08 PROCEDURE — 99232 SBSQ HOSP IP/OBS MODERATE 35: CPT

## 2024-12-08 PROCEDURE — 6370000000 HC RX 637 (ALT 250 FOR IP): Performed by: STUDENT IN AN ORGANIZED HEALTH CARE EDUCATION/TRAINING PROGRAM

## 2024-12-08 PROCEDURE — 6370000000 HC RX 637 (ALT 250 FOR IP)

## 2024-12-08 PROCEDURE — 85027 COMPLETE CBC AUTOMATED: CPT

## 2024-12-08 PROCEDURE — 82947 ASSAY GLUCOSE BLOOD QUANT: CPT

## 2024-12-08 PROCEDURE — P9047 ALBUMIN (HUMAN), 25%, 50ML: HCPCS

## 2024-12-08 PROCEDURE — 84100 ASSAY OF PHOSPHORUS: CPT

## 2024-12-08 PROCEDURE — 6370000000 HC RX 637 (ALT 250 FOR IP): Performed by: INTERNAL MEDICINE

## 2024-12-08 PROCEDURE — 99232 SBSQ HOSP IP/OBS MODERATE 35: CPT | Performed by: STUDENT IN AN ORGANIZED HEALTH CARE EDUCATION/TRAINING PROGRAM

## 2024-12-08 PROCEDURE — 6360000002 HC RX W HCPCS: Performed by: STUDENT IN AN ORGANIZED HEALTH CARE EDUCATION/TRAINING PROGRAM

## 2024-12-08 PROCEDURE — 83735 ASSAY OF MAGNESIUM: CPT

## 2024-12-08 PROCEDURE — 82140 ASSAY OF AMMONIA: CPT

## 2024-12-08 PROCEDURE — 2140000000 HC CCU INTERMEDIATE R&B

## 2024-12-08 PROCEDURE — 36415 COLL VENOUS BLD VENIPUNCTURE: CPT

## 2024-12-08 PROCEDURE — 80053 COMPREHEN METABOLIC PANEL: CPT

## 2024-12-08 RX ORDER — ALBUMIN (HUMAN) 12.5 G/50ML
50 SOLUTION INTRAVENOUS
Qty: 200 ML | Refills: 0 | Status: SHIPPED | OUTPATIENT
Start: 2024-12-08

## 2024-12-08 RX ORDER — LACTULOSE 10 G/15ML
30 SOLUTION ORAL 3 TIMES DAILY
Status: DISCONTINUED | OUTPATIENT
Start: 2024-12-08 | End: 2024-12-17 | Stop reason: HOSPADM

## 2024-12-08 RX ORDER — SODIUM CHLORIDE 9 MG/ML
INJECTION, SOLUTION INTRAVENOUS CONTINUOUS
Status: DISCONTINUED | OUTPATIENT
Start: 2024-12-08 | End: 2024-12-11

## 2024-12-08 RX ORDER — SPIRONOLACTONE 25 MG/1
100 TABLET ORAL DAILY
Status: DISCONTINUED | OUTPATIENT
Start: 2024-12-08 | End: 2024-12-17 | Stop reason: HOSPADM

## 2024-12-08 RX ADMIN — Medication 2000 UNITS: at 09:10

## 2024-12-08 RX ADMIN — SODIUM CHLORIDE, PRESERVATIVE FREE 10 ML: 5 INJECTION INTRAVENOUS at 22:01

## 2024-12-08 RX ADMIN — SODIUM CHLORIDE: 9 INJECTION, SOLUTION INTRAVENOUS at 19:56

## 2024-12-08 RX ADMIN — ALBUMIN (HUMAN) 25 G: 0.25 INJECTION, SOLUTION INTRAVENOUS at 21:46

## 2024-12-08 RX ADMIN — GABAPENTIN 600 MG: 300 CAPSULE ORAL at 09:10

## 2024-12-08 RX ADMIN — RIFAXIMIN 550 MG: 550 TABLET ORAL at 21:47

## 2024-12-08 RX ADMIN — INSULIN LISPRO 18 UNITS: 100 INJECTION, SOLUTION INTRAVENOUS; SUBCUTANEOUS at 17:24

## 2024-12-08 RX ADMIN — ALBUMIN (HUMAN) 25 G: 0.25 INJECTION, SOLUTION INTRAVENOUS at 17:14

## 2024-12-08 RX ADMIN — PANTOPRAZOLE SODIUM 40 MG: 40 TABLET, DELAYED RELEASE ORAL at 17:11

## 2024-12-08 RX ADMIN — FUROSEMIDE 40 MG: 10 INJECTION, SOLUTION INTRAMUSCULAR; INTRAVENOUS at 09:11

## 2024-12-08 RX ADMIN — INSULIN LISPRO 18 UNITS: 100 INJECTION, SOLUTION INTRAVENOUS; SUBCUTANEOUS at 10:46

## 2024-12-08 RX ADMIN — INSULIN GLARGINE 50 UNITS: 100 INJECTION, SOLUTION SUBCUTANEOUS at 21:47

## 2024-12-08 RX ADMIN — INSULIN LISPRO 18 UNITS: 100 INJECTION, SOLUTION INTRAVENOUS; SUBCUTANEOUS at 12:10

## 2024-12-08 RX ADMIN — HEPARIN SODIUM 5000 UNITS: 5000 INJECTION INTRAVENOUS; SUBCUTANEOUS at 21:47

## 2024-12-08 RX ADMIN — SODIUM CHLORIDE, PRESERVATIVE FREE 10 ML: 5 INJECTION INTRAVENOUS at 11:12

## 2024-12-08 RX ADMIN — CARVEDILOL 3.12 MG: 3.12 TABLET, FILM COATED ORAL at 09:10

## 2024-12-08 RX ADMIN — LACTULOSE 20 G: 20 SOLUTION ORAL at 09:11

## 2024-12-08 RX ADMIN — PANTOPRAZOLE SODIUM 40 MG: 40 TABLET, DELAYED RELEASE ORAL at 05:57

## 2024-12-08 RX ADMIN — SPIRONOLACTONE 100 MG: 25 TABLET ORAL at 09:10

## 2024-12-08 RX ADMIN — GABAPENTIN 600 MG: 300 CAPSULE ORAL at 13:54

## 2024-12-08 RX ADMIN — RIFAXIMIN 550 MG: 550 TABLET ORAL at 09:11

## 2024-12-08 RX ADMIN — GABAPENTIN 600 MG: 300 CAPSULE ORAL at 21:47

## 2024-12-08 RX ADMIN — ALBUMIN (HUMAN) 25 G: 0.25 INJECTION, SOLUTION INTRAVENOUS at 05:55

## 2024-12-08 RX ADMIN — LACTULOSE 30 G: 20 SOLUTION ORAL at 21:47

## 2024-12-08 RX ADMIN — LACTULOSE 30 G: 20 SOLUTION ORAL at 13:53

## 2024-12-08 ASSESSMENT — PAIN SCALES - GENERAL: PAINLEVEL_OUTOF10: 0

## 2024-12-09 ENCOUNTER — APPOINTMENT (OUTPATIENT)
Dept: CT IMAGING | Age: 64
DRG: 423 | End: 2024-12-09
Attending: STUDENT IN AN ORGANIZED HEALTH CARE EDUCATION/TRAINING PROGRAM
Payer: MEDICARE

## 2024-12-09 LAB
ALBUMIN SERPL-MCNC: 4.2 G/DL (ref 3.5–5.2)
ALP SERPL-CCNC: 120 U/L (ref 35–104)
ALT SERPL-CCNC: 11 U/L (ref 0–32)
AMMONIA PLAS-SCNC: 55 UMOL/L (ref 11–51)
ANION GAP SERPL CALCULATED.3IONS-SCNC: 12 MMOL/L (ref 7–16)
AST SERPL-CCNC: 32 U/L (ref 0–31)
BILIRUB SERPL-MCNC: 3.5 MG/DL (ref 0–1.2)
BUN SERPL-MCNC: 17 MG/DL (ref 6–23)
CALCIUM SERPL-MCNC: 9.1 MG/DL (ref 8.6–10.2)
CHLORIDE SERPL-SCNC: 100 MMOL/L (ref 98–107)
CO2 SERPL-SCNC: 26 MMOL/L (ref 22–29)
CREAT SERPL-MCNC: 1.1 MG/DL (ref 0.5–1)
ERYTHROCYTE [DISTWIDTH] IN BLOOD BY AUTOMATED COUNT: 14.1 % (ref 11.5–15)
GFR, ESTIMATED: 54 ML/MIN/1.73M2
GLUCOSE BLD-MCNC: 136 MG/DL (ref 74–99)
GLUCOSE BLD-MCNC: 176 MG/DL (ref 74–99)
GLUCOSE BLD-MCNC: 193 MG/DL (ref 74–99)
GLUCOSE BLD-MCNC: 252 MG/DL (ref 74–99)
GLUCOSE SERPL-MCNC: 127 MG/DL (ref 74–99)
HCT VFR BLD AUTO: 25.2 % (ref 34–48)
HGB BLD-MCNC: 8.1 G/DL (ref 11.5–15.5)
INR PPP: 2
MCH RBC QN AUTO: 31.6 PG (ref 26–35)
MCHC RBC AUTO-ENTMCNC: 32.1 G/DL (ref 32–34.5)
MCV RBC AUTO: 98.4 FL (ref 80–99.9)
PLATELET # BLD AUTO: 40 K/UL (ref 130–450)
PLATELET CONFIRMATION: NORMAL
PMV BLD AUTO: 10.4 FL (ref 7–12)
POTASSIUM SERPL-SCNC: 3.8 MMOL/L (ref 3.5–5)
PROT SERPL-MCNC: 7.2 G/DL (ref 6.4–8.3)
PROTHROMBIN TIME: 21.8 SEC (ref 9.3–12.4)
RBC # BLD AUTO: 2.56 M/UL (ref 3.5–5.5)
SODIUM SERPL-SCNC: 138 MMOL/L (ref 132–146)
WBC OTHER # BLD: 1.5 K/UL (ref 4.5–11.5)

## 2024-12-09 PROCEDURE — 6360000002 HC RX W HCPCS: Performed by: STUDENT IN AN ORGANIZED HEALTH CARE EDUCATION/TRAINING PROGRAM

## 2024-12-09 PROCEDURE — 99222 1ST HOSP IP/OBS MODERATE 55: CPT | Performed by: INTERNAL MEDICINE

## 2024-12-09 PROCEDURE — 82947 ASSAY GLUCOSE BLOOD QUANT: CPT

## 2024-12-09 PROCEDURE — 6370000000 HC RX 637 (ALT 250 FOR IP)

## 2024-12-09 PROCEDURE — 2140000000 HC CCU INTERMEDIATE R&B

## 2024-12-09 PROCEDURE — 6370000000 HC RX 637 (ALT 250 FOR IP): Performed by: INTERNAL MEDICINE

## 2024-12-09 PROCEDURE — 85610 PROTHROMBIN TIME: CPT

## 2024-12-09 PROCEDURE — 99232 SBSQ HOSP IP/OBS MODERATE 35: CPT

## 2024-12-09 PROCEDURE — 99231 SBSQ HOSP IP/OBS SF/LOW 25: CPT

## 2024-12-09 PROCEDURE — 80053 COMPREHEN METABOLIC PANEL: CPT

## 2024-12-09 PROCEDURE — 2580000003 HC RX 258: Performed by: INTERNAL MEDICINE

## 2024-12-09 PROCEDURE — 74175 CTA ABDOMEN W/CONTRAST: CPT

## 2024-12-09 PROCEDURE — 99232 SBSQ HOSP IP/OBS MODERATE 35: CPT | Performed by: NURSE PRACTITIONER

## 2024-12-09 PROCEDURE — 2580000003 HC RX 258: Performed by: RADIOLOGY

## 2024-12-09 PROCEDURE — 97535 SELF CARE MNGMENT TRAINING: CPT

## 2024-12-09 PROCEDURE — 85027 COMPLETE CBC AUTOMATED: CPT

## 2024-12-09 PROCEDURE — 6370000000 HC RX 637 (ALT 250 FOR IP): Performed by: STUDENT IN AN ORGANIZED HEALTH CARE EDUCATION/TRAINING PROGRAM

## 2024-12-09 PROCEDURE — 97165 OT EVAL LOW COMPLEX 30 MIN: CPT

## 2024-12-09 PROCEDURE — 36415 COLL VENOUS BLD VENIPUNCTURE: CPT

## 2024-12-09 PROCEDURE — 6360000002 HC RX W HCPCS

## 2024-12-09 PROCEDURE — P9047 ALBUMIN (HUMAN), 25%, 50ML: HCPCS

## 2024-12-09 PROCEDURE — 82140 ASSAY OF AMMONIA: CPT

## 2024-12-09 PROCEDURE — 6360000004 HC RX CONTRAST MEDICATION: Performed by: RADIOLOGY

## 2024-12-09 RX ORDER — IOPAMIDOL 755 MG/ML
75 INJECTION, SOLUTION INTRAVASCULAR
Status: COMPLETED | OUTPATIENT
Start: 2024-12-09 | End: 2024-12-09

## 2024-12-09 RX ORDER — SODIUM CHLORIDE 9 MG/ML
INJECTION, SOLUTION INTRAVENOUS ONCE
Status: COMPLETED | OUTPATIENT
Start: 2024-12-09 | End: 2024-12-09

## 2024-12-09 RX ORDER — INSULIN LISPRO 100 [IU]/ML
0-8 INJECTION, SOLUTION INTRAVENOUS; SUBCUTANEOUS
Status: DISCONTINUED | OUTPATIENT
Start: 2024-12-09 | End: 2024-12-10

## 2024-12-09 RX ADMIN — HEPARIN SODIUM 5000 UNITS: 5000 INJECTION INTRAVENOUS; SUBCUTANEOUS at 21:07

## 2024-12-09 RX ADMIN — Medication 2000 UNITS: at 10:30

## 2024-12-09 RX ADMIN — LACTULOSE 30 G: 20 SOLUTION ORAL at 21:07

## 2024-12-09 RX ADMIN — GABAPENTIN 600 MG: 300 CAPSULE ORAL at 10:30

## 2024-12-09 RX ADMIN — INSULIN LISPRO 18 UNITS: 100 INJECTION, SOLUTION INTRAVENOUS; SUBCUTANEOUS at 10:53

## 2024-12-09 RX ADMIN — INSULIN LISPRO 4 UNITS: 100 INJECTION, SOLUTION INTRAVENOUS; SUBCUTANEOUS at 21:17

## 2024-12-09 RX ADMIN — ALBUMIN (HUMAN) 25 G: 0.25 INJECTION, SOLUTION INTRAVENOUS at 22:46

## 2024-12-09 RX ADMIN — IOPAMIDOL 75 ML: 755 INJECTION, SOLUTION INTRAVENOUS at 09:26

## 2024-12-09 RX ADMIN — SODIUM CHLORIDE, PRESERVATIVE FREE 10 ML: 5 INJECTION INTRAVENOUS at 10:31

## 2024-12-09 RX ADMIN — GABAPENTIN 600 MG: 300 CAPSULE ORAL at 21:07

## 2024-12-09 RX ADMIN — ALBUMIN (HUMAN) 25 G: 0.25 INJECTION, SOLUTION INTRAVENOUS at 14:48

## 2024-12-09 RX ADMIN — HEPARIN SODIUM 5000 UNITS: 5000 INJECTION INTRAVENOUS; SUBCUTANEOUS at 05:38

## 2024-12-09 RX ADMIN — LACTULOSE 30 G: 20 SOLUTION ORAL at 10:30

## 2024-12-09 RX ADMIN — LACTULOSE 30 G: 20 SOLUTION ORAL at 14:35

## 2024-12-09 RX ADMIN — CARVEDILOL 3.12 MG: 3.12 TABLET, FILM COATED ORAL at 10:30

## 2024-12-09 RX ADMIN — RIFAXIMIN 550 MG: 550 TABLET ORAL at 10:30

## 2024-12-09 RX ADMIN — SODIUM CHLORIDE: 9 INJECTION, SOLUTION INTRAVENOUS at 14:47

## 2024-12-09 RX ADMIN — PANTOPRAZOLE SODIUM 40 MG: 40 TABLET, DELAYED RELEASE ORAL at 05:38

## 2024-12-09 RX ADMIN — HEPARIN SODIUM 5000 UNITS: 5000 INJECTION INTRAVENOUS; SUBCUTANEOUS at 14:35

## 2024-12-09 RX ADMIN — INSULIN LISPRO 2 UNITS: 100 INJECTION, SOLUTION INTRAVENOUS; SUBCUTANEOUS at 18:08

## 2024-12-09 RX ADMIN — GABAPENTIN 600 MG: 300 CAPSULE ORAL at 14:35

## 2024-12-09 RX ADMIN — INSULIN GLARGINE 50 UNITS: 100 INJECTION, SOLUTION SUBCUTANEOUS at 21:07

## 2024-12-09 RX ADMIN — INSULIN LISPRO 18 UNITS: 100 INJECTION, SOLUTION INTRAVENOUS; SUBCUTANEOUS at 18:08

## 2024-12-09 RX ADMIN — ALBUMIN (HUMAN) 25 G: 0.25 INJECTION, SOLUTION INTRAVENOUS at 05:40

## 2024-12-09 RX ADMIN — PANTOPRAZOLE SODIUM 40 MG: 40 TABLET, DELAYED RELEASE ORAL at 14:35

## 2024-12-09 RX ADMIN — RIFAXIMIN 550 MG: 550 TABLET ORAL at 21:08

## 2024-12-09 ASSESSMENT — PAIN SCALES - GENERAL
PAINLEVEL_OUTOF10: 0
PAINLEVEL_OUTOF10: 0

## 2024-12-10 ENCOUNTER — APPOINTMENT (OUTPATIENT)
Dept: INTERVENTIONAL RADIOLOGY/VASCULAR | Age: 64
DRG: 423 | End: 2024-12-10
Attending: STUDENT IN AN ORGANIZED HEALTH CARE EDUCATION/TRAINING PROGRAM
Payer: MEDICARE

## 2024-12-10 LAB
ALBUMIN SERPL-MCNC: 4.5 G/DL (ref 3.5–5.2)
ALP SERPL-CCNC: 123 U/L (ref 35–104)
ALT SERPL-CCNC: 11 U/L (ref 0–32)
AMMONIA PLAS-SCNC: 60 UMOL/L (ref 11–51)
ANION GAP SERPL CALCULATED.3IONS-SCNC: 12 MMOL/L (ref 7–16)
AST SERPL-CCNC: 31 U/L (ref 0–31)
BILIRUB SERPL-MCNC: 3.2 MG/DL (ref 0–1.2)
BUN SERPL-MCNC: 15 MG/DL (ref 6–23)
CALCIUM SERPL-MCNC: 8.8 MG/DL (ref 8.6–10.2)
CHLORIDE SERPL-SCNC: 101 MMOL/L (ref 98–107)
CO2 SERPL-SCNC: 25 MMOL/L (ref 22–29)
CREAT SERPL-MCNC: 1.1 MG/DL (ref 0.5–1)
ERYTHROCYTE [DISTWIDTH] IN BLOOD BY AUTOMATED COUNT: 14.3 % (ref 11.5–15)
GFR, ESTIMATED: 57 ML/MIN/1.73M2
GLUCOSE BLD-MCNC: 258 MG/DL (ref 74–99)
GLUCOSE BLD-MCNC: 288 MG/DL (ref 74–99)
GLUCOSE BLD-MCNC: 373 MG/DL (ref 74–99)
GLUCOSE SERPL-MCNC: 243 MG/DL (ref 74–99)
HCT VFR BLD AUTO: 21.7 % (ref 34–48)
HGB BLD-MCNC: 7 G/DL (ref 11.5–15.5)
INR PPP: 2.2
MCH RBC QN AUTO: 31.8 PG (ref 26–35)
MCHC RBC AUTO-ENTMCNC: 32.3 G/DL (ref 32–34.5)
MCV RBC AUTO: 98.6 FL (ref 80–99.9)
MICROORGANISM SPEC CULT: NORMAL
MICROORGANISM SPEC CULT: NORMAL
PLATELET # BLD AUTO: 35 K/UL (ref 130–450)
PLATELET CONFIRMATION: NORMAL
PMV BLD AUTO: 11.4 FL (ref 7–12)
POTASSIUM SERPL-SCNC: 4.1 MMOL/L (ref 3.5–5)
PROT SERPL-MCNC: 7.2 G/DL (ref 6.4–8.3)
PROTHROMBIN TIME: 24 SEC (ref 9.3–12.4)
RBC # BLD AUTO: 2.2 M/UL (ref 3.5–5.5)
SERVICE CMNT-IMP: NORMAL
SERVICE CMNT-IMP: NORMAL
SODIUM SERPL-SCNC: 138 MMOL/L (ref 132–146)
SPECIMEN DESCRIPTION: NORMAL
SPECIMEN DESCRIPTION: NORMAL
WBC OTHER # BLD: 1.2 K/UL (ref 4.5–11.5)

## 2024-12-10 PROCEDURE — 36247 INS CATH ABD/L-EXT ART 3RD: CPT

## 2024-12-10 PROCEDURE — 82947 ASSAY GLUCOSE BLOOD QUANT: CPT

## 2024-12-10 PROCEDURE — 6370000000 HC RX 637 (ALT 250 FOR IP)

## 2024-12-10 PROCEDURE — 6360000002 HC RX W HCPCS: Performed by: STUDENT IN AN ORGANIZED HEALTH CARE EDUCATION/TRAINING PROGRAM

## 2024-12-10 PROCEDURE — B40B1ZZ PLAIN RADIOGRAPHY OF OTHER INTRA-ABDOMINAL ARTERIES USING LOW OSMOLAR CONTRAST: ICD-10-PCS | Performed by: RADIOLOGY

## 2024-12-10 PROCEDURE — 80053 COMPREHEN METABOLIC PANEL: CPT

## 2024-12-10 PROCEDURE — 85610 PROTHROMBIN TIME: CPT

## 2024-12-10 PROCEDURE — 49083 ABD PARACENTESIS W/IMAGING: CPT

## 2024-12-10 PROCEDURE — 6360000004 HC RX CONTRAST MEDICATION: Performed by: RADIOLOGY

## 2024-12-10 PROCEDURE — 96365 THER/PROPH/DIAG IV INF INIT: CPT

## 2024-12-10 PROCEDURE — 6370000000 HC RX 637 (ALT 250 FOR IP): Performed by: INTERNAL MEDICINE

## 2024-12-10 PROCEDURE — 04L33DZ OCCLUSION OF HEPATIC ARTERY WITH INTRALUMINAL DEVICE, PERCUTANEOUS APPROACH: ICD-10-PCS | Performed by: RADIOLOGY

## 2024-12-10 PROCEDURE — 6360000002 HC RX W HCPCS

## 2024-12-10 PROCEDURE — 37243 VASC EMBOLIZE/OCCLUDE ORGAN: CPT

## 2024-12-10 PROCEDURE — 99232 SBSQ HOSP IP/OBS MODERATE 35: CPT | Performed by: INTERNAL MEDICINE

## 2024-12-10 PROCEDURE — 2580000003 HC RX 258: Performed by: INTERNAL MEDICINE

## 2024-12-10 PROCEDURE — 99153 MOD SED SAME PHYS/QHP EA: CPT

## 2024-12-10 PROCEDURE — 76377 3D RENDER W/INTRP POSTPROCES: CPT

## 2024-12-10 PROCEDURE — 75726 ARTERY X-RAYS ABDOMEN: CPT

## 2024-12-10 PROCEDURE — 85027 COMPLETE CBC AUTOMATED: CPT

## 2024-12-10 PROCEDURE — 6360000002 HC RX W HCPCS: Performed by: RADIOLOGY

## 2024-12-10 PROCEDURE — 75774 ARTERY X-RAY EACH VESSEL: CPT

## 2024-12-10 PROCEDURE — 2140000000 HC CCU INTERMEDIATE R&B

## 2024-12-10 PROCEDURE — 36415 COLL VENOUS BLD VENIPUNCTURE: CPT

## 2024-12-10 PROCEDURE — C1729 CATH, DRAINAGE: HCPCS

## 2024-12-10 PROCEDURE — B4021ZZ PLAIN RADIOGRAPHY OF HEPATIC ARTERY USING LOW OSMOLAR CONTRAST: ICD-10-PCS | Performed by: RADIOLOGY

## 2024-12-10 PROCEDURE — 6370000000 HC RX 637 (ALT 250 FOR IP): Performed by: STUDENT IN AN ORGANIZED HEALTH CARE EDUCATION/TRAINING PROGRAM

## 2024-12-10 PROCEDURE — 2580000003 HC RX 258: Performed by: STUDENT IN AN ORGANIZED HEALTH CARE EDUCATION/TRAINING PROGRAM

## 2024-12-10 PROCEDURE — 99152 MOD SED SAME PHYS/QHP 5/>YRS: CPT

## 2024-12-10 PROCEDURE — 36248 INS CATH ABD/L-EXT ART ADDL: CPT

## 2024-12-10 PROCEDURE — 99232 SBSQ HOSP IP/OBS MODERATE 35: CPT

## 2024-12-10 PROCEDURE — 0W9G3ZZ DRAINAGE OF PERITONEAL CAVITY, PERCUTANEOUS APPROACH: ICD-10-PCS | Performed by: PHYSICIAN ASSISTANT

## 2024-12-10 PROCEDURE — 99232 SBSQ HOSP IP/OBS MODERATE 35: CPT | Performed by: NURSE PRACTITIONER

## 2024-12-10 PROCEDURE — 2580000003 HC RX 258: Performed by: RADIOLOGY

## 2024-12-10 PROCEDURE — C1894 INTRO/SHEATH, NON-LASER: HCPCS

## 2024-12-10 PROCEDURE — 37242 VASC EMBOLIZE/OCCLUDE ARTERY: CPT

## 2024-12-10 PROCEDURE — 82140 ASSAY OF AMMONIA: CPT

## 2024-12-10 PROCEDURE — P9047 ALBUMIN (HUMAN), 25%, 50ML: HCPCS

## 2024-12-10 RX ORDER — SODIUM CHLORIDE 9 MG/ML
INJECTION, SOLUTION INTRAVENOUS CONTINUOUS
Status: DISCONTINUED | OUTPATIENT
Start: 2024-12-10 | End: 2024-12-10

## 2024-12-10 RX ORDER — FENTANYL CITRATE 50 UG/ML
INJECTION, SOLUTION INTRAMUSCULAR; INTRAVENOUS PRN
Status: COMPLETED | OUTPATIENT
Start: 2024-12-10 | End: 2024-12-10

## 2024-12-10 RX ORDER — DIPHENHYDRAMINE HYDROCHLORIDE 50 MG/ML
INJECTION INTRAMUSCULAR; INTRAVENOUS PRN
Status: COMPLETED | OUTPATIENT
Start: 2024-12-10 | End: 2024-12-10

## 2024-12-10 RX ORDER — ONDANSETRON 2 MG/ML
4 INJECTION INTRAMUSCULAR; INTRAVENOUS EVERY 8 HOURS PRN
Status: DISCONTINUED | OUTPATIENT
Start: 2024-12-10 | End: 2024-12-17 | Stop reason: HOSPADM

## 2024-12-10 RX ORDER — INSULIN LISPRO 100 [IU]/ML
0-18 INJECTION, SOLUTION INTRAVENOUS; SUBCUTANEOUS
Status: DISCONTINUED | OUTPATIENT
Start: 2024-12-10 | End: 2024-12-16

## 2024-12-10 RX ORDER — INSULIN LISPRO 100 [IU]/ML
12 INJECTION, SOLUTION INTRAVENOUS; SUBCUTANEOUS
Status: DISCONTINUED | OUTPATIENT
Start: 2024-12-11 | End: 2024-12-13

## 2024-12-10 RX ORDER — LIDOCAINE HYDROCHLORIDE 20 MG/ML
INJECTION, SOLUTION INFILTRATION; PERINEURAL PRN
Status: COMPLETED | OUTPATIENT
Start: 2024-12-10 | End: 2024-12-10

## 2024-12-10 RX ORDER — HEPARIN SODIUM 10000 [USP'U]/ML
INJECTION, SOLUTION INTRAVENOUS; SUBCUTANEOUS PRN
Status: COMPLETED | OUTPATIENT
Start: 2024-12-10 | End: 2024-12-10

## 2024-12-10 RX ORDER — INSULIN GLARGINE 100 [IU]/ML
35 INJECTION, SOLUTION SUBCUTANEOUS NIGHTLY
Status: DISCONTINUED | OUTPATIENT
Start: 2024-12-10 | End: 2024-12-12

## 2024-12-10 RX ORDER — IOPAMIDOL 755 MG/ML
INJECTION, SOLUTION INTRAVASCULAR PRN
Status: COMPLETED | OUTPATIENT
Start: 2024-12-10 | End: 2024-12-10

## 2024-12-10 RX ORDER — MIDAZOLAM HYDROCHLORIDE 2 MG/2ML
INJECTION, SOLUTION INTRAMUSCULAR; INTRAVENOUS PRN
Status: COMPLETED | OUTPATIENT
Start: 2024-12-10 | End: 2024-12-10

## 2024-12-10 RX ADMIN — INSULIN LISPRO 4 UNITS: 100 INJECTION, SOLUTION INTRAVENOUS; SUBCUTANEOUS at 18:45

## 2024-12-10 RX ADMIN — RIFAXIMIN 550 MG: 550 TABLET ORAL at 13:38

## 2024-12-10 RX ADMIN — HEPARIN SODIUM 5000 UNITS: 5000 INJECTION INTRAVENOUS; SUBCUTANEOUS at 20:49

## 2024-12-10 RX ADMIN — SODIUM CHLORIDE: 9 INJECTION, SOLUTION INTRAVENOUS at 14:01

## 2024-12-10 RX ADMIN — LIDOCAINE HYDROCHLORIDE 10 ML: 20 INJECTION, SOLUTION INFILTRATION; PERINEURAL at 10:26

## 2024-12-10 RX ADMIN — ALBUMIN (HUMAN) 25 G: 0.25 INJECTION, SOLUTION INTRAVENOUS at 05:50

## 2024-12-10 RX ADMIN — IOPAMIDOL 150 ML: 755 INJECTION, SOLUTION INTRAVENOUS at 12:09

## 2024-12-10 RX ADMIN — INSULIN LISPRO 18 UNITS: 100 INJECTION, SOLUTION INTRAVENOUS; SUBCUTANEOUS at 18:45

## 2024-12-10 RX ADMIN — FENTANYL CITRATE 25 MCG: 50 INJECTION, SOLUTION INTRAMUSCULAR; INTRAVENOUS at 10:46

## 2024-12-10 RX ADMIN — Medication 5000 UNITS: at 10:35

## 2024-12-10 RX ADMIN — Medication 2000 UNITS: at 13:38

## 2024-12-10 RX ADMIN — MIDAZOLAM HYDROCHLORIDE 0.5 MG: 1 INJECTION, SOLUTION INTRAMUSCULAR; INTRAVENOUS at 10:45

## 2024-12-10 RX ADMIN — SODIUM CHLORIDE, PRESERVATIVE FREE 10 ML: 5 INJECTION INTRAVENOUS at 20:51

## 2024-12-10 RX ADMIN — ALBUMIN (HUMAN) 25 G: 0.25 INJECTION, SOLUTION INTRAVENOUS at 15:26

## 2024-12-10 RX ADMIN — PANTOPRAZOLE SODIUM 40 MG: 40 TABLET, DELAYED RELEASE ORAL at 05:53

## 2024-12-10 RX ADMIN — INSULIN LISPRO 15 UNITS: 100 INJECTION, SOLUTION INTRAVENOUS; SUBCUTANEOUS at 20:50

## 2024-12-10 RX ADMIN — CARVEDILOL 3.12 MG: 3.12 TABLET, FILM COATED ORAL at 13:38

## 2024-12-10 RX ADMIN — INSULIN GLARGINE 35 UNITS: 100 INJECTION, SOLUTION SUBCUTANEOUS at 20:49

## 2024-12-10 RX ADMIN — GABAPENTIN 600 MG: 300 CAPSULE ORAL at 20:39

## 2024-12-10 RX ADMIN — PHYTONADIONE 10 MG: 10 INJECTION, EMULSION INTRAMUSCULAR; INTRAVENOUS; SUBCUTANEOUS at 14:15

## 2024-12-10 RX ADMIN — GABAPENTIN 600 MG: 300 CAPSULE ORAL at 13:37

## 2024-12-10 RX ADMIN — RIFAXIMIN 550 MG: 550 TABLET ORAL at 20:39

## 2024-12-10 RX ADMIN — SODIUM CHLORIDE, PRESERVATIVE FREE 10 ML: 5 INJECTION INTRAVENOUS at 14:56

## 2024-12-10 RX ADMIN — DIPHENHYDRAMINE HYDROCHLORIDE 25 MG: 50 INJECTION, SOLUTION INTRAMUSCULAR; INTRAVENOUS at 10:45

## 2024-12-10 NOTE — PROCEDURES
PROCEDURE NOTE  Date: 12/10/2024   Name: Lisa Hunt  YOB: 1960    Procedures: AVM embolization    Patient arrived to Radiology department for AVM embolization. Allergies, home medications, H&P and fasting instructions reviewed with patient. Vital signs taken. , IV flushed and prn adapter attached.   Procedural instructions given, questions answered, understanding expressed and consent signed. Patient given fluoroscopy education, no questions at this time.

## 2024-12-10 NOTE — OR NURSING
6f angioseal deployed at this time. Bed rest 2 hours and keep right leg straight during this time.

## 2024-12-10 NOTE — PROCEDURES
PROCEDURE NOTE  Date: 12/10/2024   Name: Lisa Hunt  YOB: 1960    Procedures AVM embolization    Discussed patient and IR procedure with bedside RN, all questions answered. Please draw repeat PT/INR. Will call when able to send for patient.

## 2024-12-10 NOTE — PRE SEDATION
ISCH/INFARC (11/14/2024); and Paracentesis (Left, 12/10/2024).    Medications:   Scheduled Meds:    phytonadione (ADULT) (VITAMIN K) 10 mg in sodium chloride 0.9 % 100 mL IVPB  10 mg IntraVENous Once    insulin lispro  0-8 Units SubCUTAneous 4x Daily AC & HS    [Held by provider] spironolactone  100 mg Oral Daily    lactulose  30 g Oral TID    pantoprazole  40 mg Oral BID AC    rifAXIMin  550 mg Oral BID    heparin (porcine)  5,000 Units SubCUTAneous 3 times per day    [Held by provider] furosemide  40 mg IntraVENous BID    albumin human 25%  25 g IntraVENous Q8H    sodium chloride flush  5-40 mL IntraVENous 2 times per day    carvedilol  3.125 mg Oral Daily    insulin glargine  50 Units SubCUTAneous Nightly    insulin lispro  18 Units SubCUTAneous TID AC    vitamin D  2,000 Units Oral Daily    gabapentin  600 mg Oral TID     Continuous Infusions:    sodium chloride 65 mL/hr at 12/08/24 1956    sodium chloride      dextrose       PRN Meds: sodium phosphate 13.53 mmol in sodium chloride 0.9 % 250 mL IVPB **OR** sodium phosphate 27.06 mmol in sodium chloride 0.9 % 250 mL IVPB, benzocaine-menthol, benzonatate, calcium carbonate, hydrALAZINE, melatonin, Polyvinyl Alcohol-Povidone PF, sodium chloride, sodium chloride flush, sodium chloride, potassium chloride **OR** potassium alternative oral replacement **OR** potassium chloride, magnesium sulfate, polyethylene glycol, prochlorperazine, glucose, dextrose bolus **OR** dextrose bolus, glucagon (rDNA), dextrose  Home Meds:   Prior to Admission medications    Medication Sig Start Date End Date Taking? Authorizing Provider   furosemide (LASIX) 20 MG tablet TAKE 1 TABLET BY MOUTH DAILY 11/27/24  Yes Tonie Hernandez DO   lactulose (CHRONULAC) 10 GM/15ML solution Take 30 mLs by mouth every 8 (eight) hours Titrate to goal of 2-3 bowel movement per day 10/7/24  Yes Tonie Hernandez DO   insulin glargine (LANTUS) 100 UNIT/ML injection vial Inject 50 Units into the skin nightly

## 2024-12-10 NOTE — BRIEF OP NOTE
Brief Postoperative Note      Patient: Lisa Hunt  YOB: 1960  MRN: [id number]    Date of Procedure: 12/10/2024    Hepatic AV fistula, hepatocellular neoplasm    Post-Op Diagnosis: Same       * No procedures listed *    * No surgeons listed *    Assistant:  * No surgical staff found *    Anesthesia: * No anesthesia type entered *    Estimated Blood Loss (mL): Minimal    Complications: Other: none    Specimens:   * No specimens in log *    Implants:  Implant Name Type Inv. Item Serial No.  Lot No. LRB No. Used Action   Embosphere 100-300     [id number]  1 Implanted   COIL EMB  2 MMX6 CM DETACHABLE PLAT TUNGSTEN EMBOLD - [id number] Vascular coils COIL EMB  2 MMX6 CM DETACHABLE PLAT TUNGSTEN EMBOLD  [employer]- [id number]  1 Implanted         Drains:   [REMOVED] Urinary Catheter 11/14/24 Stauffer (Removed)   $ Urethral catheter insertion Inserted for procedure 11/14/24 2030   Catheter Indications Perioperative use for selected surgical procedures 11/14/24 2000   Site Assessment Pink 11/14/24 2030   Urine Color Yellow 11/15/24 0629   Urine Appearance Clear 11/15/24 0629   Collection Container Standard 11/14/24 2204   Securement Method Leg strap 11/14/24 2204   Catheter Care  Soap and water 11/15/24 0629   Catheter Best Practices  Drainage tube clipped to bed;Catheter secured to thigh;Tamper seal intact;Bag below bladder;Bag not on floor;Lack of dependent loop in tubing;Drainage bag less than half full 11/14/24 2030   Status Draining 11/14/24 2030   Output (mL) 150 mL 11/15/24 0100       Findings:  Infection Present At Time Of Surgery (PATOS) (choose all levels that have infection present):  No infection present  Other Findings: TAE to tumor blush and then coil embolization of the  branch feeding the AV Fistula    Electronically signed by Madison Ndiaye MD on 12/10/2024 at 12:48 PM

## 2024-12-11 LAB
ALBUMIN SERPL-MCNC: 4.1 G/DL (ref 3.5–5.2)
ALP SERPL-CCNC: 103 U/L (ref 35–104)
ALT SERPL-CCNC: 9 U/L (ref 0–32)
AMMONIA PLAS-SCNC: 84 UMOL/L (ref 11–51)
ANION GAP SERPL CALCULATED.3IONS-SCNC: 13 MMOL/L (ref 7–16)
AST SERPL-CCNC: 28 U/L (ref 0–31)
BILIRUB SERPL-MCNC: 3.1 MG/DL (ref 0–1.2)
BUN SERPL-MCNC: 14 MG/DL (ref 6–23)
CALCIUM SERPL-MCNC: 8.5 MG/DL (ref 8.6–10.2)
CHLORIDE SERPL-SCNC: 106 MMOL/L (ref 98–107)
CO2 SERPL-SCNC: 21 MMOL/L (ref 22–29)
CREAT SERPL-MCNC: 1.1 MG/DL (ref 0.5–1)
ERYTHROCYTE [DISTWIDTH] IN BLOOD BY AUTOMATED COUNT: 14.4 % (ref 11.5–15)
GFR, ESTIMATED: 55 ML/MIN/1.73M2
GLUCOSE BLD-MCNC: 138 MG/DL (ref 74–99)
GLUCOSE BLD-MCNC: 200 MG/DL (ref 74–99)
GLUCOSE BLD-MCNC: 208 MG/DL (ref 74–99)
GLUCOSE BLD-MCNC: 261 MG/DL (ref 74–99)
GLUCOSE SERPL-MCNC: 187 MG/DL (ref 74–99)
HCT VFR BLD AUTO: 21.4 % (ref 34–48)
HCT VFR BLD AUTO: 26.5 % (ref 34–48)
HGB BLD-MCNC: 6.9 G/DL (ref 11.5–15.5)
HGB BLD-MCNC: 8.1 G/DL (ref 11.5–15.5)
MCH RBC QN AUTO: 32.5 PG (ref 26–35)
MCHC RBC AUTO-ENTMCNC: 32.2 G/DL (ref 32–34.5)
MCV RBC AUTO: 100.9 FL (ref 80–99.9)
PLATELET # BLD AUTO: 39 K/UL (ref 130–450)
PLATELET CONFIRMATION: NORMAL
PMV BLD AUTO: 11.9 FL (ref 7–12)
POTASSIUM SERPL-SCNC: 4.4 MMOL/L (ref 3.5–5)
PROT SERPL-MCNC: 6.7 G/DL (ref 6.4–8.3)
RBC # BLD AUTO: 2.12 M/UL (ref 3.5–5.5)
SODIUM SERPL-SCNC: 140 MMOL/L (ref 132–146)
WBC OTHER # BLD: 1.4 K/UL (ref 4.5–11.5)

## 2024-12-11 PROCEDURE — 6360000002 HC RX W HCPCS

## 2024-12-11 PROCEDURE — 36415 COLL VENOUS BLD VENIPUNCTURE: CPT

## 2024-12-11 PROCEDURE — 86901 BLOOD TYPING SEROLOGIC RH(D): CPT

## 2024-12-11 PROCEDURE — 2140000000 HC CCU INTERMEDIATE R&B

## 2024-12-11 PROCEDURE — 99232 SBSQ HOSP IP/OBS MODERATE 35: CPT

## 2024-12-11 PROCEDURE — 6360000002 HC RX W HCPCS: Performed by: STUDENT IN AN ORGANIZED HEALTH CARE EDUCATION/TRAINING PROGRAM

## 2024-12-11 PROCEDURE — 82140 ASSAY OF AMMONIA: CPT

## 2024-12-11 PROCEDURE — 6370000000 HC RX 637 (ALT 250 FOR IP): Performed by: STUDENT IN AN ORGANIZED HEALTH CARE EDUCATION/TRAINING PROGRAM

## 2024-12-11 PROCEDURE — 86900 BLOOD TYPING SEROLOGIC ABO: CPT

## 2024-12-11 PROCEDURE — 6370000000 HC RX 637 (ALT 250 FOR IP): Performed by: INTERNAL MEDICINE

## 2024-12-11 PROCEDURE — 6370000000 HC RX 637 (ALT 250 FOR IP)

## 2024-12-11 PROCEDURE — 99232 SBSQ HOSP IP/OBS MODERATE 35: CPT | Performed by: NURSE PRACTITIONER

## 2024-12-11 PROCEDURE — 30233N1 TRANSFUSION OF NONAUTOLOGOUS RED BLOOD CELLS INTO PERIPHERAL VEIN, PERCUTANEOUS APPROACH: ICD-10-PCS | Performed by: INTERNAL MEDICINE

## 2024-12-11 PROCEDURE — 85014 HEMATOCRIT: CPT

## 2024-12-11 PROCEDURE — 86923 COMPATIBILITY TEST ELECTRIC: CPT

## 2024-12-11 PROCEDURE — 80053 COMPREHEN METABOLIC PANEL: CPT

## 2024-12-11 PROCEDURE — P9047 ALBUMIN (HUMAN), 25%, 50ML: HCPCS

## 2024-12-11 PROCEDURE — 99232 SBSQ HOSP IP/OBS MODERATE 35: CPT | Performed by: INTERNAL MEDICINE

## 2024-12-11 PROCEDURE — 36430 TRANSFUSION BLD/BLD COMPNT: CPT

## 2024-12-11 PROCEDURE — 82947 ASSAY GLUCOSE BLOOD QUANT: CPT

## 2024-12-11 PROCEDURE — 86850 RBC ANTIBODY SCREEN: CPT

## 2024-12-11 PROCEDURE — 85027 COMPLETE CBC AUTOMATED: CPT

## 2024-12-11 PROCEDURE — P9016 RBC LEUKOCYTES REDUCED: HCPCS

## 2024-12-11 PROCEDURE — 2580000003 HC RX 258: Performed by: INTERNAL MEDICINE

## 2024-12-11 PROCEDURE — 85018 HEMOGLOBIN: CPT

## 2024-12-11 RX ORDER — SODIUM CHLORIDE 9 MG/ML
INJECTION, SOLUTION INTRAVENOUS PRN
Status: DISCONTINUED | OUTPATIENT
Start: 2024-12-11 | End: 2024-12-17 | Stop reason: HOSPADM

## 2024-12-11 RX ORDER — DIPHENHYDRAMINE HYDROCHLORIDE 50 MG/ML
25 INJECTION INTRAMUSCULAR; INTRAVENOUS ONCE
Status: COMPLETED | OUTPATIENT
Start: 2024-12-11 | End: 2024-12-11

## 2024-12-11 RX ORDER — POLYETHYLENE GLYCOL 3350 17 G/17G
17 POWDER, FOR SOLUTION ORAL DAILY
Status: DISCONTINUED | OUTPATIENT
Start: 2024-12-11 | End: 2024-12-17 | Stop reason: HOSPADM

## 2024-12-11 RX ADMIN — INSULIN LISPRO 12 UNITS: 100 INJECTION, SOLUTION INTRAVENOUS; SUBCUTANEOUS at 16:10

## 2024-12-11 RX ADMIN — INSULIN GLARGINE 35 UNITS: 100 INJECTION, SOLUTION SUBCUTANEOUS at 20:46

## 2024-12-11 RX ADMIN — LACTULOSE 30 G: 20 SOLUTION ORAL at 09:09

## 2024-12-11 RX ADMIN — CARVEDILOL 3.12 MG: 3.12 TABLET, FILM COATED ORAL at 09:09

## 2024-12-11 RX ADMIN — INSULIN LISPRO 9 UNITS: 100 INJECTION, SOLUTION INTRAVENOUS; SUBCUTANEOUS at 12:43

## 2024-12-11 RX ADMIN — HEPARIN SODIUM 5000 UNITS: 5000 INJECTION INTRAVENOUS; SUBCUTANEOUS at 13:19

## 2024-12-11 RX ADMIN — POLYETHYLENE GLYCOL 3350 17 G: 17 POWDER, FOR SOLUTION ORAL at 13:22

## 2024-12-11 RX ADMIN — GABAPENTIN 600 MG: 300 CAPSULE ORAL at 09:09

## 2024-12-11 RX ADMIN — HEPARIN SODIUM 5000 UNITS: 5000 INJECTION INTRAVENOUS; SUBCUTANEOUS at 06:19

## 2024-12-11 RX ADMIN — DIPHENHYDRAMINE HYDROCHLORIDE 25 MG: 50 INJECTION INTRAMUSCULAR; INTRAVENOUS at 11:54

## 2024-12-11 RX ADMIN — RIFAXIMIN 550 MG: 550 TABLET ORAL at 20:46

## 2024-12-11 RX ADMIN — SODIUM CHLORIDE, PRESERVATIVE FREE 10 ML: 5 INJECTION INTRAVENOUS at 20:47

## 2024-12-11 RX ADMIN — GABAPENTIN 600 MG: 300 CAPSULE ORAL at 13:20

## 2024-12-11 RX ADMIN — HEPARIN SODIUM 5000 UNITS: 5000 INJECTION INTRAVENOUS; SUBCUTANEOUS at 20:46

## 2024-12-11 RX ADMIN — ALBUMIN (HUMAN) 25 G: 0.25 INJECTION, SOLUTION INTRAVENOUS at 16:12

## 2024-12-11 RX ADMIN — RIFAXIMIN 550 MG: 550 TABLET ORAL at 09:09

## 2024-12-11 RX ADMIN — Medication 2000 UNITS: at 09:10

## 2024-12-11 RX ADMIN — INSULIN LISPRO 12 UNITS: 100 INJECTION, SOLUTION INTRAVENOUS; SUBCUTANEOUS at 12:44

## 2024-12-11 RX ADMIN — PANTOPRAZOLE SODIUM 40 MG: 40 TABLET, DELAYED RELEASE ORAL at 16:10

## 2024-12-11 RX ADMIN — ALBUMIN (HUMAN) 25 G: 0.25 INJECTION, SOLUTION INTRAVENOUS at 00:46

## 2024-12-11 RX ADMIN — SODIUM CHLORIDE, PRESERVATIVE FREE 10 ML: 5 INJECTION INTRAVENOUS at 09:12

## 2024-12-11 RX ADMIN — ALBUMIN (HUMAN) 25 G: 0.25 INJECTION, SOLUTION INTRAVENOUS at 06:15

## 2024-12-11 RX ADMIN — LACTULOSE 30 G: 20 SOLUTION ORAL at 13:20

## 2024-12-11 RX ADMIN — INSULIN LISPRO 12 UNITS: 100 INJECTION, SOLUTION INTRAVENOUS; SUBCUTANEOUS at 09:10

## 2024-12-11 RX ADMIN — INSULIN LISPRO 6 UNITS: 100 INJECTION, SOLUTION INTRAVENOUS; SUBCUTANEOUS at 16:11

## 2024-12-11 RX ADMIN — GABAPENTIN 600 MG: 300 CAPSULE ORAL at 20:46

## 2024-12-11 RX ADMIN — PANTOPRAZOLE SODIUM 40 MG: 40 TABLET, DELAYED RELEASE ORAL at 06:19

## 2024-12-11 RX ADMIN — LACTULOSE 30 G: 20 SOLUTION ORAL at 20:46

## 2024-12-11 RX ADMIN — INSULIN LISPRO 3 UNITS: 100 INJECTION, SOLUTION INTRAVENOUS; SUBCUTANEOUS at 09:11

## 2024-12-12 PROBLEM — K76.82 ACUTE HEPATIC ENCEPHALOPATHY (HCC): Status: RESOLVED | Noted: 2023-07-18 | Resolved: 2024-12-12

## 2024-12-12 PROBLEM — E55.9 VITAMIN D DEFICIENCY: Status: RESOLVED | Noted: 2023-07-18 | Resolved: 2024-12-12

## 2024-12-12 PROBLEM — I81 PORTAL VEIN THROMBOSIS: Status: RESOLVED | Noted: 2024-12-06 | Resolved: 2024-12-12

## 2024-12-12 LAB
ABO/RH: NORMAL
ALBUMIN SERPL-MCNC: 4.8 G/DL (ref 3.5–5.2)
ALP SERPL-CCNC: 119 U/L (ref 35–104)
ALT SERPL-CCNC: 13 U/L (ref 0–32)
AMMONIA PLAS-SCNC: 36 UMOL/L (ref 11–51)
ANION GAP SERPL CALCULATED.3IONS-SCNC: 13 MMOL/L (ref 7–16)
ANTIBODY SCREEN: NEGATIVE
ARM BAND NUMBER: NORMAL
AST SERPL-CCNC: 38 U/L (ref 0–31)
BILIRUB SERPL-MCNC: 5.9 MG/DL (ref 0–1.2)
BLOOD BANK BLOOD PRODUCT EXPIRATION DATE: NORMAL
BLOOD BANK DISPENSE STATUS: NORMAL
BLOOD BANK ISBT PRODUCT BLOOD TYPE: 6200
BLOOD BANK PRODUCT CODE: NORMAL
BLOOD BANK SAMPLE EXPIRATION: NORMAL
BLOOD BANK UNIT TYPE AND RH: NORMAL
BPU ID: NORMAL
BUN SERPL-MCNC: 14 MG/DL (ref 6–23)
CALCIUM SERPL-MCNC: 9.4 MG/DL (ref 8.6–10.2)
CHLORIDE SERPL-SCNC: 104 MMOL/L (ref 98–107)
CO2 SERPL-SCNC: 21 MMOL/L (ref 22–29)
COMPONENT: NORMAL
CREAT SERPL-MCNC: 1 MG/DL (ref 0.5–1)
CROSSMATCH RESULT: NORMAL
ERYTHROCYTE [DISTWIDTH] IN BLOOD BY AUTOMATED COUNT: 15.6 % (ref 11.5–15)
GFR, ESTIMATED: 64 ML/MIN/1.73M2
GLUCOSE BLD-MCNC: 191 MG/DL (ref 74–99)
GLUCOSE BLD-MCNC: 207 MG/DL (ref 74–99)
GLUCOSE BLD-MCNC: 208 MG/DL (ref 74–99)
GLUCOSE BLD-MCNC: 251 MG/DL (ref 74–99)
GLUCOSE BLD-MCNC: 259 MG/DL (ref 74–99)
GLUCOSE SERPL-MCNC: 210 MG/DL (ref 74–99)
HCT VFR BLD AUTO: 26.8 % (ref 34–48)
HGB BLD-MCNC: 8.5 G/DL (ref 11.5–15.5)
MCH RBC QN AUTO: 31.5 PG (ref 26–35)
MCHC RBC AUTO-ENTMCNC: 31.7 G/DL (ref 32–34.5)
MCV RBC AUTO: 99.3 FL (ref 80–99.9)
PLATELET # BLD AUTO: 35 K/UL (ref 130–450)
PLATELET CONFIRMATION: NORMAL
PMV BLD AUTO: 11.8 FL (ref 7–12)
POTASSIUM SERPL-SCNC: 4.3 MMOL/L (ref 3.5–5)
PROT SERPL-MCNC: 7.6 G/DL (ref 6.4–8.3)
RBC # BLD AUTO: 2.7 M/UL (ref 3.5–5.5)
SODIUM SERPL-SCNC: 138 MMOL/L (ref 132–146)
TRANSFUSION STATUS: NORMAL
UNIT DIVISION: 0
UNIT ISSUE DATE/TIME: NORMAL
WBC OTHER # BLD: 1.5 K/UL (ref 4.5–11.5)

## 2024-12-12 PROCEDURE — 6370000000 HC RX 637 (ALT 250 FOR IP): Performed by: INTERNAL MEDICINE

## 2024-12-12 PROCEDURE — 99232 SBSQ HOSP IP/OBS MODERATE 35: CPT

## 2024-12-12 PROCEDURE — 82140 ASSAY OF AMMONIA: CPT

## 2024-12-12 PROCEDURE — 2580000003 HC RX 258: Performed by: INTERNAL MEDICINE

## 2024-12-12 PROCEDURE — 82947 ASSAY GLUCOSE BLOOD QUANT: CPT

## 2024-12-12 PROCEDURE — 6360000002 HC RX W HCPCS

## 2024-12-12 PROCEDURE — 6370000000 HC RX 637 (ALT 250 FOR IP)

## 2024-12-12 PROCEDURE — 99232 SBSQ HOSP IP/OBS MODERATE 35: CPT | Performed by: INTERNAL MEDICINE

## 2024-12-12 PROCEDURE — 99232 SBSQ HOSP IP/OBS MODERATE 35: CPT | Performed by: NURSE PRACTITIONER

## 2024-12-12 PROCEDURE — 97530 THERAPEUTIC ACTIVITIES: CPT

## 2024-12-12 PROCEDURE — 6360000002 HC RX W HCPCS: Performed by: STUDENT IN AN ORGANIZED HEALTH CARE EDUCATION/TRAINING PROGRAM

## 2024-12-12 PROCEDURE — 80053 COMPREHEN METABOLIC PANEL: CPT

## 2024-12-12 PROCEDURE — P9047 ALBUMIN (HUMAN), 25%, 50ML: HCPCS

## 2024-12-12 PROCEDURE — 6370000000 HC RX 637 (ALT 250 FOR IP): Performed by: STUDENT IN AN ORGANIZED HEALTH CARE EDUCATION/TRAINING PROGRAM

## 2024-12-12 PROCEDURE — 2140000000 HC CCU INTERMEDIATE R&B

## 2024-12-12 PROCEDURE — 97535 SELF CARE MNGMENT TRAINING: CPT

## 2024-12-12 PROCEDURE — 85027 COMPLETE CBC AUTOMATED: CPT

## 2024-12-12 PROCEDURE — 97161 PT EVAL LOW COMPLEX 20 MIN: CPT

## 2024-12-12 PROCEDURE — 36415 COLL VENOUS BLD VENIPUNCTURE: CPT

## 2024-12-12 RX ORDER — INSULIN LISPRO 100 [IU]/ML
12 INJECTION, SOLUTION INTRAVENOUS; SUBCUTANEOUS
DISCHARGE
Start: 2024-12-12 | End: 2024-12-13

## 2024-12-12 RX ORDER — INSULIN GLARGINE 100 [IU]/ML
38 INJECTION, SOLUTION SUBCUTANEOUS NIGHTLY
Status: DISCONTINUED | OUTPATIENT
Start: 2024-12-12 | End: 2024-12-13

## 2024-12-12 RX ORDER — POLYETHYLENE GLYCOL 3350 17 G/17G
17 POWDER, FOR SOLUTION ORAL DAILY PRN
Qty: 10 PACKET | Refills: 0 | Status: SHIPPED | OUTPATIENT
Start: 2024-12-12 | End: 2025-01-11

## 2024-12-12 RX ORDER — BUMETANIDE 1 MG/1
1 TABLET ORAL DAILY
DISCHARGE
Start: 2024-12-12 | End: 2024-12-13

## 2024-12-12 RX ORDER — BENZONATATE 100 MG/1
100 CAPSULE ORAL 3 TIMES DAILY PRN
Qty: 15 CAPSULE | Refills: 0 | Status: SHIPPED | OUTPATIENT
Start: 2024-12-12 | End: 2024-12-19

## 2024-12-12 RX ORDER — INSULIN GLARGINE 100 [IU]/ML
38 INJECTION, SOLUTION SUBCUTANEOUS NIGHTLY
DISCHARGE
Start: 2024-12-12 | End: 2024-12-13

## 2024-12-12 RX ORDER — BUMETANIDE 1 MG/1
1 TABLET ORAL DAILY
Status: DISCONTINUED | OUTPATIENT
Start: 2024-12-12 | End: 2024-12-17 | Stop reason: HOSPADM

## 2024-12-12 RX ORDER — FUROSEMIDE 40 MG/1
20 TABLET ORAL DAILY
Qty: 30 TABLET | Refills: 0 | Status: SHIPPED | OUTPATIENT
Start: 2024-12-12 | End: 2024-12-12 | Stop reason: HOSPADM

## 2024-12-12 RX ADMIN — LACTULOSE 30 G: 20 SOLUTION ORAL at 20:34

## 2024-12-12 RX ADMIN — ALBUMIN (HUMAN) 25 G: 0.25 INJECTION, SOLUTION INTRAVENOUS at 23:44

## 2024-12-12 RX ADMIN — LACTULOSE 30 G: 20 SOLUTION ORAL at 13:14

## 2024-12-12 RX ADMIN — RIFAXIMIN 550 MG: 550 TABLET ORAL at 20:34

## 2024-12-12 RX ADMIN — HEPARIN SODIUM 5000 UNITS: 5000 INJECTION INTRAVENOUS; SUBCUTANEOUS at 20:34

## 2024-12-12 RX ADMIN — BUMETANIDE 1 MG: 1 TABLET ORAL at 14:06

## 2024-12-12 RX ADMIN — RIFAXIMIN 550 MG: 550 TABLET ORAL at 09:07

## 2024-12-12 RX ADMIN — HEPARIN SODIUM 5000 UNITS: 5000 INJECTION INTRAVENOUS; SUBCUTANEOUS at 13:13

## 2024-12-12 RX ADMIN — ALBUMIN (HUMAN) 25 G: 0.25 INJECTION, SOLUTION INTRAVENOUS at 09:09

## 2024-12-12 RX ADMIN — SODIUM CHLORIDE, PRESERVATIVE FREE 10 ML: 5 INJECTION INTRAVENOUS at 09:07

## 2024-12-12 RX ADMIN — GABAPENTIN 600 MG: 300 CAPSULE ORAL at 09:07

## 2024-12-12 RX ADMIN — INSULIN LISPRO 6 UNITS: 100 INJECTION, SOLUTION INTRAVENOUS; SUBCUTANEOUS at 13:15

## 2024-12-12 RX ADMIN — PANTOPRAZOLE SODIUM 40 MG: 40 TABLET, DELAYED RELEASE ORAL at 05:46

## 2024-12-12 RX ADMIN — INSULIN LISPRO 9 UNITS: 100 INJECTION, SOLUTION INTRAVENOUS; SUBCUTANEOUS at 20:34

## 2024-12-12 RX ADMIN — CARVEDILOL 3.12 MG: 3.12 TABLET, FILM COATED ORAL at 09:07

## 2024-12-12 RX ADMIN — INSULIN GLARGINE 38 UNITS: 100 INJECTION, SOLUTION SUBCUTANEOUS at 20:35

## 2024-12-12 RX ADMIN — ALBUMIN (HUMAN) 25 G: 0.25 INJECTION, SOLUTION INTRAVENOUS at 00:34

## 2024-12-12 RX ADMIN — ALBUMIN (HUMAN) 25 G: 0.25 INJECTION, SOLUTION INTRAVENOUS at 14:51

## 2024-12-12 RX ADMIN — Medication 2000 UNITS: at 09:07

## 2024-12-12 RX ADMIN — INSULIN LISPRO 6 UNITS: 100 INJECTION, SOLUTION INTRAVENOUS; SUBCUTANEOUS at 09:09

## 2024-12-12 RX ADMIN — INSULIN LISPRO 12 UNITS: 100 INJECTION, SOLUTION INTRAVENOUS; SUBCUTANEOUS at 09:09

## 2024-12-12 RX ADMIN — PANTOPRAZOLE SODIUM 40 MG: 40 TABLET, DELAYED RELEASE ORAL at 15:10

## 2024-12-12 RX ADMIN — GABAPENTIN 600 MG: 300 CAPSULE ORAL at 13:14

## 2024-12-12 RX ADMIN — HEPARIN SODIUM 5000 UNITS: 5000 INJECTION INTRAVENOUS; SUBCUTANEOUS at 05:46

## 2024-12-12 RX ADMIN — INSULIN LISPRO 12 UNITS: 100 INJECTION, SOLUTION INTRAVENOUS; SUBCUTANEOUS at 13:14

## 2024-12-12 RX ADMIN — GABAPENTIN 600 MG: 300 CAPSULE ORAL at 20:34

## 2024-12-12 RX ADMIN — SODIUM CHLORIDE, PRESERVATIVE FREE 10 ML: 5 INJECTION INTRAVENOUS at 20:35

## 2024-12-12 RX ADMIN — INSULIN LISPRO 12 UNITS: 100 INJECTION, SOLUTION INTRAVENOUS; SUBCUTANEOUS at 18:11

## 2024-12-13 LAB
ALBUMIN SERPL-MCNC: 4.8 G/DL (ref 3.5–5.2)
ALP SERPL-CCNC: 108 U/L (ref 35–104)
ALT SERPL-CCNC: 11 U/L (ref 0–32)
AMMONIA PLAS-SCNC: 39 UMOL/L (ref 11–51)
ANION GAP SERPL CALCULATED.3IONS-SCNC: 11 MMOL/L (ref 7–16)
AST SERPL-CCNC: 32 U/L (ref 0–31)
BILIRUB SERPL-MCNC: 4.4 MG/DL (ref 0–1.2)
BUN SERPL-MCNC: 14 MG/DL (ref 6–23)
CALCIUM SERPL-MCNC: 9.2 MG/DL (ref 8.6–10.2)
CHLORIDE SERPL-SCNC: 105 MMOL/L (ref 98–107)
CO2 SERPL-SCNC: 23 MMOL/L (ref 22–29)
CREAT SERPL-MCNC: 1.1 MG/DL (ref 0.5–1)
ERYTHROCYTE [DISTWIDTH] IN BLOOD BY AUTOMATED COUNT: 15.6 % (ref 11.5–15)
GFR, ESTIMATED: 54 ML/MIN/1.73M2
GLUCOSE BLD-MCNC: 160 MG/DL (ref 74–99)
GLUCOSE BLD-MCNC: 165 MG/DL (ref 74–99)
GLUCOSE BLD-MCNC: 178 MG/DL (ref 74–99)
GLUCOSE BLD-MCNC: 182 MG/DL (ref 74–99)
GLUCOSE BLD-MCNC: 225 MG/DL (ref 74–99)
GLUCOSE SERPL-MCNC: 171 MG/DL (ref 74–99)
HCT VFR BLD AUTO: 22.7 % (ref 34–48)
HGB BLD-MCNC: 7.2 G/DL (ref 11.5–15.5)
MCH RBC QN AUTO: 31.3 PG (ref 26–35)
MCHC RBC AUTO-ENTMCNC: 31.7 G/DL (ref 32–34.5)
MCV RBC AUTO: 98.7 FL (ref 80–99.9)
PLATELET # BLD AUTO: 34 K/UL (ref 130–450)
PLATELET CONFIRMATION: NORMAL
PMV BLD AUTO: 11.6 FL (ref 7–12)
POTASSIUM SERPL-SCNC: 3.9 MMOL/L (ref 3.5–5)
PROT SERPL-MCNC: 7.2 G/DL (ref 6.4–8.3)
RBC # BLD AUTO: 2.3 M/UL (ref 3.5–5.5)
SODIUM SERPL-SCNC: 139 MMOL/L (ref 132–146)
WBC OTHER # BLD: 1.7 K/UL (ref 4.5–11.5)

## 2024-12-13 PROCEDURE — 6370000000 HC RX 637 (ALT 250 FOR IP): Performed by: INTERNAL MEDICINE

## 2024-12-13 PROCEDURE — P9047 ALBUMIN (HUMAN), 25%, 50ML: HCPCS

## 2024-12-13 PROCEDURE — 6370000000 HC RX 637 (ALT 250 FOR IP)

## 2024-12-13 PROCEDURE — 82140 ASSAY OF AMMONIA: CPT

## 2024-12-13 PROCEDURE — 6360000002 HC RX W HCPCS: Performed by: STUDENT IN AN ORGANIZED HEALTH CARE EDUCATION/TRAINING PROGRAM

## 2024-12-13 PROCEDURE — 6360000002 HC RX W HCPCS

## 2024-12-13 PROCEDURE — 99232 SBSQ HOSP IP/OBS MODERATE 35: CPT

## 2024-12-13 PROCEDURE — 2140000000 HC CCU INTERMEDIATE R&B

## 2024-12-13 PROCEDURE — 80053 COMPREHEN METABOLIC PANEL: CPT

## 2024-12-13 PROCEDURE — 85027 COMPLETE CBC AUTOMATED: CPT

## 2024-12-13 PROCEDURE — 36415 COLL VENOUS BLD VENIPUNCTURE: CPT

## 2024-12-13 PROCEDURE — 6370000000 HC RX 637 (ALT 250 FOR IP): Performed by: STUDENT IN AN ORGANIZED HEALTH CARE EDUCATION/TRAINING PROGRAM

## 2024-12-13 PROCEDURE — 82947 ASSAY GLUCOSE BLOOD QUANT: CPT

## 2024-12-13 PROCEDURE — 99232 SBSQ HOSP IP/OBS MODERATE 35: CPT | Performed by: INTERNAL MEDICINE

## 2024-12-13 PROCEDURE — 2580000003 HC RX 258: Performed by: INTERNAL MEDICINE

## 2024-12-13 RX ORDER — INSULIN LISPRO 100 [IU]/ML
12 INJECTION, SOLUTION INTRAVENOUS; SUBCUTANEOUS
Qty: 10 ML | Refills: 0 | Status: SHIPPED | OUTPATIENT
Start: 2024-12-13

## 2024-12-13 RX ORDER — INSULIN GLARGINE 100 [IU]/ML
32 INJECTION, SOLUTION SUBCUTANEOUS NIGHTLY
Status: DISCONTINUED | OUTPATIENT
Start: 2024-12-13 | End: 2024-12-15

## 2024-12-13 RX ORDER — INSULIN LISPRO 100 [IU]/ML
10 INJECTION, SOLUTION INTRAVENOUS; SUBCUTANEOUS
Status: DISCONTINUED | OUTPATIENT
Start: 2024-12-14 | End: 2024-12-15

## 2024-12-13 RX ORDER — IBUPROFEN 200 MG
400 TABLET ORAL ONCE
Status: COMPLETED | OUTPATIENT
Start: 2024-12-13 | End: 2024-12-13

## 2024-12-13 RX ORDER — INSULIN GLARGINE 100 [IU]/ML
38 INJECTION, SOLUTION SUBCUTANEOUS NIGHTLY
Qty: 10 ML | Refills: 0 | Status: SHIPPED | OUTPATIENT
Start: 2024-12-13

## 2024-12-13 RX ORDER — BUMETANIDE 1 MG/1
1 TABLET ORAL DAILY
Qty: 30 TABLET | Refills: 0 | Status: SHIPPED | OUTPATIENT
Start: 2024-12-13

## 2024-12-13 RX ADMIN — IBUPROFEN 400 MG: 200 TABLET, FILM COATED ORAL at 05:28

## 2024-12-13 RX ADMIN — Medication 2000 UNITS: at 08:56

## 2024-12-13 RX ADMIN — POLYETHYLENE GLYCOL 3350 17 G: 17 POWDER, FOR SOLUTION ORAL at 08:56

## 2024-12-13 RX ADMIN — INSULIN LISPRO 12 UNITS: 100 INJECTION, SOLUTION INTRAVENOUS; SUBCUTANEOUS at 11:24

## 2024-12-13 RX ADMIN — INSULIN LISPRO 6 UNITS: 100 INJECTION, SOLUTION INTRAVENOUS; SUBCUTANEOUS at 11:24

## 2024-12-13 RX ADMIN — RIFAXIMIN 550 MG: 550 TABLET ORAL at 21:10

## 2024-12-13 RX ADMIN — INSULIN LISPRO 3 UNITS: 100 INJECTION, SOLUTION INTRAVENOUS; SUBCUTANEOUS at 17:19

## 2024-12-13 RX ADMIN — BUMETANIDE 1 MG: 1 TABLET ORAL at 08:55

## 2024-12-13 RX ADMIN — INSULIN LISPRO 3 UNITS: 100 INJECTION, SOLUTION INTRAVENOUS; SUBCUTANEOUS at 09:11

## 2024-12-13 RX ADMIN — INSULIN LISPRO 12 UNITS: 100 INJECTION, SOLUTION INTRAVENOUS; SUBCUTANEOUS at 17:19

## 2024-12-13 RX ADMIN — SODIUM CHLORIDE, PRESERVATIVE FREE 10 ML: 5 INJECTION INTRAVENOUS at 08:56

## 2024-12-13 RX ADMIN — HEPARIN SODIUM 5000 UNITS: 5000 INJECTION INTRAVENOUS; SUBCUTANEOUS at 05:28

## 2024-12-13 RX ADMIN — INSULIN LISPRO 12 UNITS: 100 INJECTION, SOLUTION INTRAVENOUS; SUBCUTANEOUS at 09:12

## 2024-12-13 RX ADMIN — INSULIN GLARGINE 32 UNITS: 100 INJECTION, SOLUTION SUBCUTANEOUS at 21:10

## 2024-12-13 RX ADMIN — GABAPENTIN 600 MG: 300 CAPSULE ORAL at 14:05

## 2024-12-13 RX ADMIN — PANTOPRAZOLE SODIUM 40 MG: 40 TABLET, DELAYED RELEASE ORAL at 17:10

## 2024-12-13 RX ADMIN — INSULIN LISPRO 3 UNITS: 100 INJECTION, SOLUTION INTRAVENOUS; SUBCUTANEOUS at 21:10

## 2024-12-13 RX ADMIN — Medication 2000 UNITS: at 09:10

## 2024-12-13 RX ADMIN — SODIUM CHLORIDE, PRESERVATIVE FREE 10 ML: 5 INJECTION INTRAVENOUS at 21:10

## 2024-12-13 RX ADMIN — LACTULOSE 20 G: 20 SOLUTION ORAL at 14:05

## 2024-12-13 RX ADMIN — GABAPENTIN 600 MG: 300 CAPSULE ORAL at 08:56

## 2024-12-13 RX ADMIN — PANTOPRAZOLE SODIUM 40 MG: 40 TABLET, DELAYED RELEASE ORAL at 05:28

## 2024-12-13 RX ADMIN — RIFAXIMIN 550 MG: 550 TABLET ORAL at 08:56

## 2024-12-13 RX ADMIN — ALBUMIN (HUMAN) 25 G: 0.25 INJECTION, SOLUTION INTRAVENOUS at 08:54

## 2024-12-13 RX ADMIN — GABAPENTIN 600 MG: 300 CAPSULE ORAL at 21:09

## 2024-12-13 RX ADMIN — CARVEDILOL 3.12 MG: 3.12 TABLET, FILM COATED ORAL at 08:55

## 2024-12-13 RX ADMIN — HEPARIN SODIUM 5000 UNITS: 5000 INJECTION INTRAVENOUS; SUBCUTANEOUS at 14:05

## 2024-12-14 LAB
ALBUMIN SERPL-MCNC: 4 G/DL (ref 3.5–5.2)
ALP SERPL-CCNC: 104 U/L (ref 35–104)
ALT SERPL-CCNC: 9 U/L (ref 0–32)
AMMONIA PLAS-SCNC: 69 UMOL/L (ref 11–51)
ANION GAP SERPL CALCULATED.3IONS-SCNC: 10 MMOL/L (ref 7–16)
ANION GAP SERPL CALCULATED.3IONS-SCNC: 11 MMOL/L (ref 7–16)
AST SERPL-CCNC: 27 U/L (ref 0–31)
BILIRUB SERPL-MCNC: 4.1 MG/DL (ref 0–1.2)
BUN SERPL-MCNC: 21 MG/DL (ref 6–23)
BUN SERPL-MCNC: 22 MG/DL (ref 6–23)
CALCIUM SERPL-MCNC: 8.5 MG/DL (ref 8.6–10.2)
CALCIUM SERPL-MCNC: 9.1 MG/DL (ref 8.6–10.2)
CHLORIDE SERPL-SCNC: 105 MMOL/L (ref 98–107)
CHLORIDE SERPL-SCNC: 106 MMOL/L (ref 98–107)
CO2 SERPL-SCNC: 21 MMOL/L (ref 22–29)
CO2 SERPL-SCNC: 22 MMOL/L (ref 22–29)
CREAT SERPL-MCNC: 1.7 MG/DL (ref 0.5–1)
CREAT SERPL-MCNC: 1.8 MG/DL (ref 0.5–1)
ERYTHROCYTE [DISTWIDTH] IN BLOOD BY AUTOMATED COUNT: 15.2 % (ref 11.5–15)
GFR, ESTIMATED: 31 ML/MIN/1.73M2
GFR, ESTIMATED: 33 ML/MIN/1.73M2
GLUCOSE BLD-MCNC: 177 MG/DL (ref 74–99)
GLUCOSE BLD-MCNC: 181 MG/DL (ref 74–99)
GLUCOSE BLD-MCNC: 273 MG/DL (ref 74–99)
GLUCOSE BLD-MCNC: 314 MG/DL (ref 74–99)
GLUCOSE SERPL-MCNC: 146 MG/DL (ref 74–99)
GLUCOSE SERPL-MCNC: 252 MG/DL (ref 74–99)
HCT VFR BLD AUTO: 22.5 % (ref 34–48)
HGB BLD-MCNC: 7.1 G/DL (ref 11.5–15.5)
MCH RBC QN AUTO: 31.7 PG (ref 26–35)
MCHC RBC AUTO-ENTMCNC: 31.6 G/DL (ref 32–34.5)
MCV RBC AUTO: 100.4 FL (ref 80–99.9)
PLATELET # BLD AUTO: 31 K/UL (ref 130–450)
PLATELET CONFIRMATION: NORMAL
PMV BLD AUTO: 11.3 FL (ref 7–12)
POTASSIUM SERPL-SCNC: 4.5 MMOL/L (ref 3.5–5)
POTASSIUM SERPL-SCNC: 4.6 MMOL/L (ref 3.5–5)
PROT SERPL-MCNC: 6.3 G/DL (ref 6.4–8.3)
RBC # BLD AUTO: 2.24 M/UL (ref 3.5–5.5)
SODIUM SERPL-SCNC: 136 MMOL/L (ref 132–146)
SODIUM SERPL-SCNC: 139 MMOL/L (ref 132–146)
WBC OTHER # BLD: 1.6 K/UL (ref 4.5–11.5)

## 2024-12-14 PROCEDURE — 85027 COMPLETE CBC AUTOMATED: CPT

## 2024-12-14 PROCEDURE — 6370000000 HC RX 637 (ALT 250 FOR IP)

## 2024-12-14 PROCEDURE — 2140000000 HC CCU INTERMEDIATE R&B

## 2024-12-14 PROCEDURE — 82947 ASSAY GLUCOSE BLOOD QUANT: CPT

## 2024-12-14 PROCEDURE — 82140 ASSAY OF AMMONIA: CPT

## 2024-12-14 PROCEDURE — 99232 SBSQ HOSP IP/OBS MODERATE 35: CPT

## 2024-12-14 PROCEDURE — 6370000000 HC RX 637 (ALT 250 FOR IP): Performed by: STUDENT IN AN ORGANIZED HEALTH CARE EDUCATION/TRAINING PROGRAM

## 2024-12-14 PROCEDURE — 6370000000 HC RX 637 (ALT 250 FOR IP): Performed by: INTERNAL MEDICINE

## 2024-12-14 PROCEDURE — 80053 COMPREHEN METABOLIC PANEL: CPT

## 2024-12-14 PROCEDURE — 2580000003 HC RX 258: Performed by: INTERNAL MEDICINE

## 2024-12-14 PROCEDURE — 80048 BASIC METABOLIC PNL TOTAL CA: CPT

## 2024-12-14 PROCEDURE — 36415 COLL VENOUS BLD VENIPUNCTURE: CPT

## 2024-12-14 PROCEDURE — 99232 SBSQ HOSP IP/OBS MODERATE 35: CPT | Performed by: INTERNAL MEDICINE

## 2024-12-14 RX ADMIN — CARVEDILOL 3.12 MG: 3.12 TABLET, FILM COATED ORAL at 08:01

## 2024-12-14 RX ADMIN — RIFAXIMIN 550 MG: 550 TABLET ORAL at 20:10

## 2024-12-14 RX ADMIN — INSULIN LISPRO 9 UNITS: 100 INJECTION, SOLUTION INTRAVENOUS; SUBCUTANEOUS at 20:18

## 2024-12-14 RX ADMIN — PANTOPRAZOLE SODIUM 40 MG: 40 TABLET, DELAYED RELEASE ORAL at 06:07

## 2024-12-14 RX ADMIN — SPIRONOLACTONE 100 MG: 25 TABLET ORAL at 08:01

## 2024-12-14 RX ADMIN — PANTOPRAZOLE SODIUM 40 MG: 40 TABLET, DELAYED RELEASE ORAL at 16:20

## 2024-12-14 RX ADMIN — INSULIN LISPRO 3 UNITS: 100 INJECTION, SOLUTION INTRAVENOUS; SUBCUTANEOUS at 11:54

## 2024-12-14 RX ADMIN — GABAPENTIN 600 MG: 300 CAPSULE ORAL at 08:02

## 2024-12-14 RX ADMIN — POLYETHYLENE GLYCOL 3350 17 G: 17 POWDER, FOR SOLUTION ORAL at 08:02

## 2024-12-14 RX ADMIN — INSULIN LISPRO 10 UNITS: 100 INJECTION, SOLUTION INTRAVENOUS; SUBCUTANEOUS at 08:01

## 2024-12-14 RX ADMIN — LACTULOSE 30 G: 20 SOLUTION ORAL at 13:39

## 2024-12-14 RX ADMIN — GABAPENTIN 600 MG: 300 CAPSULE ORAL at 20:09

## 2024-12-14 RX ADMIN — INSULIN GLARGINE 32 UNITS: 100 INJECTION, SOLUTION SUBCUTANEOUS at 20:09

## 2024-12-14 RX ADMIN — INSULIN LISPRO 10 UNITS: 100 INJECTION, SOLUTION INTRAVENOUS; SUBCUTANEOUS at 16:19

## 2024-12-14 RX ADMIN — RIFAXIMIN 550 MG: 550 TABLET ORAL at 08:02

## 2024-12-14 RX ADMIN — LACTULOSE 30 G: 20 SOLUTION ORAL at 20:09

## 2024-12-14 RX ADMIN — INSULIN LISPRO 10 UNITS: 100 INJECTION, SOLUTION INTRAVENOUS; SUBCUTANEOUS at 11:53

## 2024-12-14 RX ADMIN — INSULIN LISPRO 3 UNITS: 100 INJECTION, SOLUTION INTRAVENOUS; SUBCUTANEOUS at 08:01

## 2024-12-14 RX ADMIN — INSULIN LISPRO 12 UNITS: 100 INJECTION, SOLUTION INTRAVENOUS; SUBCUTANEOUS at 16:20

## 2024-12-14 RX ADMIN — LACTULOSE 30 G: 20 SOLUTION ORAL at 08:01

## 2024-12-14 RX ADMIN — GABAPENTIN 600 MG: 300 CAPSULE ORAL at 13:39

## 2024-12-14 RX ADMIN — Medication 2000 UNITS: at 08:01

## 2024-12-14 RX ADMIN — SODIUM CHLORIDE, PRESERVATIVE FREE 10 ML: 5 INJECTION INTRAVENOUS at 08:01

## 2024-12-14 RX ADMIN — SODIUM CHLORIDE, PRESERVATIVE FREE 10 ML: 5 INJECTION INTRAVENOUS at 20:09

## 2024-12-14 RX ADMIN — BUMETANIDE 1 MG: 1 TABLET ORAL at 08:02

## 2024-12-15 LAB
ALBUMIN SERPL-MCNC: 4.2 G/DL (ref 3.5–5.2)
ALP SERPL-CCNC: 119 U/L (ref 35–104)
ALT SERPL-CCNC: 10 U/L (ref 0–32)
AMMONIA PLAS-SCNC: 50 UMOL/L (ref 11–51)
ANION GAP SERPL CALCULATED.3IONS-SCNC: 13 MMOL/L (ref 7–16)
AST SERPL-CCNC: 27 U/L (ref 0–31)
BILIRUB SERPL-MCNC: 3.5 MG/DL (ref 0–1.2)
BUN SERPL-MCNC: 23 MG/DL (ref 6–23)
CALCIUM SERPL-MCNC: 9 MG/DL (ref 8.6–10.2)
CHLORIDE SERPL-SCNC: 105 MMOL/L (ref 98–107)
CO2 SERPL-SCNC: 19 MMOL/L (ref 22–29)
CREAT SERPL-MCNC: 1.5 MG/DL (ref 0.5–1)
ERYTHROCYTE [DISTWIDTH] IN BLOOD BY AUTOMATED COUNT: 15.2 % (ref 11.5–15)
GFR, ESTIMATED: 39 ML/MIN/1.73M2
GLUCOSE BLD-MCNC: 104 MG/DL (ref 74–99)
GLUCOSE BLD-MCNC: 253 MG/DL (ref 74–99)
GLUCOSE BLD-MCNC: 330 MG/DL (ref 74–99)
GLUCOSE BLD-MCNC: 371 MG/DL (ref 74–99)
GLUCOSE SERPL-MCNC: 376 MG/DL (ref 74–99)
HCT VFR BLD AUTO: 25.5 % (ref 34–48)
HGB BLD-MCNC: 7.8 G/DL (ref 11.5–15.5)
MAGNESIUM SERPL-MCNC: 1.4 MG/DL (ref 1.6–2.6)
MAGNESIUM SERPL-MCNC: 1.7 MG/DL (ref 1.6–2.6)
MCH RBC QN AUTO: 31.7 PG (ref 26–35)
MCHC RBC AUTO-ENTMCNC: 30.6 G/DL (ref 32–34.5)
MCV RBC AUTO: 103.7 FL (ref 80–99.9)
PHOSPHATE SERPL-MCNC: 2.6 MG/DL (ref 2.5–4.5)
PLATELET # BLD AUTO: 41 K/UL (ref 130–450)
PLATELET CONFIRMATION: NORMAL
PMV BLD AUTO: 11.8 FL (ref 7–12)
POTASSIUM SERPL-SCNC: 4.5 MMOL/L (ref 3.5–5)
PROT SERPL-MCNC: 7.2 G/DL (ref 6.4–8.3)
RBC # BLD AUTO: 2.46 M/UL (ref 3.5–5.5)
SODIUM SERPL-SCNC: 137 MMOL/L (ref 132–146)
WBC OTHER # BLD: 1.6 K/UL (ref 4.5–11.5)

## 2024-12-15 PROCEDURE — 99232 SBSQ HOSP IP/OBS MODERATE 35: CPT | Performed by: INTERNAL MEDICINE

## 2024-12-15 PROCEDURE — 6370000000 HC RX 637 (ALT 250 FOR IP): Performed by: STUDENT IN AN ORGANIZED HEALTH CARE EDUCATION/TRAINING PROGRAM

## 2024-12-15 PROCEDURE — 6370000000 HC RX 637 (ALT 250 FOR IP): Performed by: INTERNAL MEDICINE

## 2024-12-15 PROCEDURE — 6370000000 HC RX 637 (ALT 250 FOR IP)

## 2024-12-15 PROCEDURE — 82140 ASSAY OF AMMONIA: CPT

## 2024-12-15 PROCEDURE — 82947 ASSAY GLUCOSE BLOOD QUANT: CPT

## 2024-12-15 PROCEDURE — 83735 ASSAY OF MAGNESIUM: CPT

## 2024-12-15 PROCEDURE — 85027 COMPLETE CBC AUTOMATED: CPT

## 2024-12-15 PROCEDURE — 80053 COMPREHEN METABOLIC PANEL: CPT

## 2024-12-15 PROCEDURE — 2580000003 HC RX 258: Performed by: INTERNAL MEDICINE

## 2024-12-15 PROCEDURE — 36415 COLL VENOUS BLD VENIPUNCTURE: CPT

## 2024-12-15 PROCEDURE — 2140000000 HC CCU INTERMEDIATE R&B

## 2024-12-15 PROCEDURE — 6360000002 HC RX W HCPCS: Performed by: STUDENT IN AN ORGANIZED HEALTH CARE EDUCATION/TRAINING PROGRAM

## 2024-12-15 PROCEDURE — 99232 SBSQ HOSP IP/OBS MODERATE 35: CPT | Performed by: STUDENT IN AN ORGANIZED HEALTH CARE EDUCATION/TRAINING PROGRAM

## 2024-12-15 PROCEDURE — 84100 ASSAY OF PHOSPHORUS: CPT

## 2024-12-15 RX ORDER — INSULIN GLARGINE 100 [IU]/ML
38 INJECTION, SOLUTION SUBCUTANEOUS NIGHTLY
Status: DISCONTINUED | OUTPATIENT
Start: 2024-12-15 | End: 2024-12-16

## 2024-12-15 RX ORDER — INSULIN LISPRO 100 [IU]/ML
13 INJECTION, SOLUTION INTRAVENOUS; SUBCUTANEOUS
Status: DISCONTINUED | OUTPATIENT
Start: 2024-12-15 | End: 2024-12-16

## 2024-12-15 RX ORDER — MAGNESIUM SULFATE IN WATER 40 MG/ML
2000 INJECTION, SOLUTION INTRAVENOUS PRN
Status: DISCONTINUED | OUTPATIENT
Start: 2024-12-15 | End: 2024-12-17 | Stop reason: HOSPADM

## 2024-12-15 RX ORDER — LANOLIN ALCOHOL/MO/W.PET/CERES
400 CREAM (GRAM) TOPICAL DAILY
Status: COMPLETED | OUTPATIENT
Start: 2024-12-15 | End: 2024-12-17

## 2024-12-15 RX ADMIN — CARVEDILOL 3.12 MG: 3.12 TABLET, FILM COATED ORAL at 08:14

## 2024-12-15 RX ADMIN — RIFAXIMIN 550 MG: 550 TABLET ORAL at 20:21

## 2024-12-15 RX ADMIN — LACTULOSE 30 G: 20 SOLUTION ORAL at 20:21

## 2024-12-15 RX ADMIN — LACTULOSE 30 G: 20 SOLUTION ORAL at 13:54

## 2024-12-15 RX ADMIN — INSULIN LISPRO 13 UNITS: 100 INJECTION, SOLUTION INTRAVENOUS; SUBCUTANEOUS at 17:11

## 2024-12-15 RX ADMIN — PANTOPRAZOLE SODIUM 40 MG: 40 TABLET, DELAYED RELEASE ORAL at 06:02

## 2024-12-15 RX ADMIN — MAGNESIUM SULFATE HEPTAHYDRATE 2000 MG: 40 INJECTION, SOLUTION INTRAVENOUS at 10:10

## 2024-12-15 RX ADMIN — Medication 3 MG: at 20:21

## 2024-12-15 RX ADMIN — RIFAXIMIN 550 MG: 550 TABLET ORAL at 08:14

## 2024-12-15 RX ADMIN — LACTULOSE 30 G: 20 SOLUTION ORAL at 08:13

## 2024-12-15 RX ADMIN — POLYETHYLENE GLYCOL 3350 17 G: 17 POWDER, FOR SOLUTION ORAL at 08:14

## 2024-12-15 RX ADMIN — PANTOPRAZOLE SODIUM 40 MG: 40 TABLET, DELAYED RELEASE ORAL at 17:12

## 2024-12-15 RX ADMIN — GABAPENTIN 600 MG: 300 CAPSULE ORAL at 13:53

## 2024-12-15 RX ADMIN — INSULIN LISPRO 9 UNITS: 100 INJECTION, SOLUTION INTRAVENOUS; SUBCUTANEOUS at 17:11

## 2024-12-15 RX ADMIN — BUMETANIDE 1 MG: 1 TABLET ORAL at 08:14

## 2024-12-15 RX ADMIN — INSULIN LISPRO 10 UNITS: 100 INJECTION, SOLUTION INTRAVENOUS; SUBCUTANEOUS at 06:15

## 2024-12-15 RX ADMIN — Medication 2000 UNITS: at 08:14

## 2024-12-15 RX ADMIN — INSULIN LISPRO 12 UNITS: 100 INJECTION, SOLUTION INTRAVENOUS; SUBCUTANEOUS at 11:38

## 2024-12-15 RX ADMIN — SODIUM CHLORIDE, PRESERVATIVE FREE 10 ML: 5 INJECTION INTRAVENOUS at 20:22

## 2024-12-15 RX ADMIN — INSULIN LISPRO 15 UNITS: 100 INJECTION, SOLUTION INTRAVENOUS; SUBCUTANEOUS at 06:14

## 2024-12-15 RX ADMIN — GABAPENTIN 600 MG: 300 CAPSULE ORAL at 20:21

## 2024-12-15 RX ADMIN — SODIUM CHLORIDE, PRESERVATIVE FREE 10 ML: 5 INJECTION INTRAVENOUS at 08:14

## 2024-12-15 RX ADMIN — Medication 400 MG: at 13:53

## 2024-12-15 RX ADMIN — GABAPENTIN 600 MG: 300 CAPSULE ORAL at 08:14

## 2024-12-15 RX ADMIN — INSULIN LISPRO 13 UNITS: 100 INJECTION, SOLUTION INTRAVENOUS; SUBCUTANEOUS at 11:38

## 2024-12-15 RX ADMIN — INSULIN GLARGINE 38 UNITS: 100 INJECTION, SOLUTION SUBCUTANEOUS at 20:21

## 2024-12-15 ASSESSMENT — PAIN SCALES - GENERAL: PAINLEVEL_OUTOF10: 0

## 2024-12-16 ENCOUNTER — APPOINTMENT (OUTPATIENT)
Dept: INTERVENTIONAL RADIOLOGY/VASCULAR | Age: 64
DRG: 423 | End: 2024-12-16
Payer: MEDICARE

## 2024-12-16 LAB
ALBUMIN SERPL-MCNC: 4.1 G/DL (ref 3.5–5.2)
ALP SERPL-CCNC: 106 U/L (ref 35–104)
ALT SERPL-CCNC: 10 U/L (ref 0–32)
AMMONIA PLAS-SCNC: 48 UMOL/L (ref 11–51)
ANION GAP SERPL CALCULATED.3IONS-SCNC: 11 MMOL/L (ref 7–16)
AST SERPL-CCNC: 27 U/L (ref 0–31)
BILIRUB SERPL-MCNC: 3.8 MG/DL (ref 0–1.2)
BUN SERPL-MCNC: 22 MG/DL (ref 6–23)
CALCIUM SERPL-MCNC: 9.2 MG/DL (ref 8.6–10.2)
CHLORIDE SERPL-SCNC: 103 MMOL/L (ref 98–107)
CO2 SERPL-SCNC: 22 MMOL/L (ref 22–29)
CREAT SERPL-MCNC: 1.3 MG/DL (ref 0.5–1)
ERYTHROCYTE [DISTWIDTH] IN BLOOD BY AUTOMATED COUNT: 14.7 % (ref 11.5–15)
GFR, ESTIMATED: 45 ML/MIN/1.73M2
GLUCOSE BLD-MCNC: 124 MG/DL (ref 74–99)
GLUCOSE BLD-MCNC: 130 MG/DL (ref 74–99)
GLUCOSE BLD-MCNC: 138 MG/DL (ref 74–99)
GLUCOSE BLD-MCNC: 153 MG/DL (ref 74–99)
GLUCOSE SERPL-MCNC: 83 MG/DL (ref 74–99)
HCT VFR BLD AUTO: 24.5 % (ref 34–48)
HGB BLD-MCNC: 7.9 G/DL (ref 11.5–15.5)
INR PPP: 1.8
MAGNESIUM SERPL-MCNC: 1.5 MG/DL (ref 1.6–2.6)
MCH RBC QN AUTO: 32.1 PG (ref 26–35)
MCHC RBC AUTO-ENTMCNC: 32.2 G/DL (ref 32–34.5)
MCV RBC AUTO: 99.6 FL (ref 80–99.9)
PHOSPHATE SERPL-MCNC: 2.7 MG/DL (ref 2.5–4.5)
PLATELET # BLD AUTO: 40 K/UL (ref 130–450)
PLATELET CONFIRMATION: NORMAL
PMV BLD AUTO: 12.9 FL (ref 7–12)
POTASSIUM SERPL-SCNC: 4.1 MMOL/L (ref 3.5–5)
PROT SERPL-MCNC: 7.1 G/DL (ref 6.4–8.3)
PROTHROMBIN TIME: 19.5 SEC (ref 9.3–12.4)
RBC # BLD AUTO: 2.46 M/UL (ref 3.5–5.5)
SODIUM SERPL-SCNC: 136 MMOL/L (ref 132–146)
WBC OTHER # BLD: 1.9 K/UL (ref 4.5–11.5)

## 2024-12-16 PROCEDURE — 6370000000 HC RX 637 (ALT 250 FOR IP): Performed by: STUDENT IN AN ORGANIZED HEALTH CARE EDUCATION/TRAINING PROGRAM

## 2024-12-16 PROCEDURE — 85027 COMPLETE CBC AUTOMATED: CPT

## 2024-12-16 PROCEDURE — 6370000000 HC RX 637 (ALT 250 FOR IP)

## 2024-12-16 PROCEDURE — C1729 CATH, DRAINAGE: HCPCS

## 2024-12-16 PROCEDURE — 6360000002 HC RX W HCPCS: Performed by: RADIOLOGY

## 2024-12-16 PROCEDURE — 2580000003 HC RX 258: Performed by: INTERNAL MEDICINE

## 2024-12-16 PROCEDURE — 6360000002 HC RX W HCPCS: Performed by: PHYSICIAN ASSISTANT

## 2024-12-16 PROCEDURE — 99232 SBSQ HOSP IP/OBS MODERATE 35: CPT | Performed by: NURSE PRACTITIONER

## 2024-12-16 PROCEDURE — P9047 ALBUMIN (HUMAN), 25%, 50ML: HCPCS | Performed by: INTERNAL MEDICINE

## 2024-12-16 PROCEDURE — 2140000000 HC CCU INTERMEDIATE R&B

## 2024-12-16 PROCEDURE — 6370000000 HC RX 637 (ALT 250 FOR IP): Performed by: INTERNAL MEDICINE

## 2024-12-16 PROCEDURE — 82140 ASSAY OF AMMONIA: CPT

## 2024-12-16 PROCEDURE — 83735 ASSAY OF MAGNESIUM: CPT

## 2024-12-16 PROCEDURE — 6360000002 HC RX W HCPCS: Performed by: STUDENT IN AN ORGANIZED HEALTH CARE EDUCATION/TRAINING PROGRAM

## 2024-12-16 PROCEDURE — 36415 COLL VENOUS BLD VENIPUNCTURE: CPT

## 2024-12-16 PROCEDURE — 99233 SBSQ HOSP IP/OBS HIGH 50: CPT | Performed by: INTERNAL MEDICINE

## 2024-12-16 PROCEDURE — 0W9G3ZZ DRAINAGE OF PERITONEAL CAVITY, PERCUTANEOUS APPROACH: ICD-10-PCS | Performed by: PHYSICIAN ASSISTANT

## 2024-12-16 PROCEDURE — 6360000002 HC RX W HCPCS: Performed by: INTERNAL MEDICINE

## 2024-12-16 PROCEDURE — 84100 ASSAY OF PHOSPHORUS: CPT

## 2024-12-16 PROCEDURE — 49083 ABD PARACENTESIS W/IMAGING: CPT

## 2024-12-16 PROCEDURE — 80053 COMPREHEN METABOLIC PANEL: CPT

## 2024-12-16 PROCEDURE — 82947 ASSAY GLUCOSE BLOOD QUANT: CPT

## 2024-12-16 PROCEDURE — 96365 THER/PROPH/DIAG IV INF INIT: CPT

## 2024-12-16 PROCEDURE — 85610 PROTHROMBIN TIME: CPT

## 2024-12-16 RX ORDER — INSULIN GLARGINE 100 [IU]/ML
32 INJECTION, SOLUTION SUBCUTANEOUS NIGHTLY
Status: DISCONTINUED | OUTPATIENT
Start: 2024-12-16 | End: 2024-12-17 | Stop reason: HOSPADM

## 2024-12-16 RX ORDER — ALBUMIN (HUMAN) 12.5 G/50ML
25 SOLUTION INTRAVENOUS ONCE
Status: COMPLETED | OUTPATIENT
Start: 2024-12-16 | End: 2024-12-16

## 2024-12-16 RX ORDER — LIDOCAINE HYDROCHLORIDE 20 MG/ML
INJECTION, SOLUTION INFILTRATION; PERINEURAL PRN
Status: COMPLETED | OUTPATIENT
Start: 2024-12-16 | End: 2024-12-16

## 2024-12-16 RX ORDER — MAGNESIUM SULFATE IN WATER 40 MG/ML
2000 INJECTION, SOLUTION INTRAVENOUS ONCE
Status: DISCONTINUED | OUTPATIENT
Start: 2024-12-16 | End: 2024-12-16

## 2024-12-16 RX ORDER — INSULIN LISPRO 100 [IU]/ML
0-12 INJECTION, SOLUTION INTRAVENOUS; SUBCUTANEOUS
Status: DISCONTINUED | OUTPATIENT
Start: 2024-12-17 | End: 2024-12-17 | Stop reason: HOSPADM

## 2024-12-16 RX ORDER — INSULIN LISPRO 100 [IU]/ML
10 INJECTION, SOLUTION INTRAVENOUS; SUBCUTANEOUS
Status: DISCONTINUED | OUTPATIENT
Start: 2024-12-17 | End: 2024-12-17 | Stop reason: HOSPADM

## 2024-12-16 RX ADMIN — INSULIN LISPRO 13 UNITS: 100 INJECTION, SOLUTION INTRAVENOUS; SUBCUTANEOUS at 16:54

## 2024-12-16 RX ADMIN — PANTOPRAZOLE SODIUM 40 MG: 40 TABLET, DELAYED RELEASE ORAL at 06:01

## 2024-12-16 RX ADMIN — GABAPENTIN 600 MG: 300 CAPSULE ORAL at 08:07

## 2024-12-16 RX ADMIN — Medication 400 MG: at 08:07

## 2024-12-16 RX ADMIN — INSULIN LISPRO 3 UNITS: 100 INJECTION, SOLUTION INTRAVENOUS; SUBCUTANEOUS at 20:31

## 2024-12-16 RX ADMIN — RIFAXIMIN 550 MG: 550 TABLET ORAL at 20:32

## 2024-12-16 RX ADMIN — MAGNESIUM SULFATE HEPTAHYDRATE 2000 MG: 40 INJECTION, SOLUTION INTRAVENOUS at 08:06

## 2024-12-16 RX ADMIN — BUMETANIDE 1 MG: 1 TABLET ORAL at 08:07

## 2024-12-16 RX ADMIN — ALBUMIN (HUMAN) 25 G: 0.25 INJECTION, SOLUTION INTRAVENOUS at 10:48

## 2024-12-16 RX ADMIN — CARVEDILOL 3.12 MG: 3.12 TABLET, FILM COATED ORAL at 08:07

## 2024-12-16 RX ADMIN — Medication 2000 UNITS: at 08:07

## 2024-12-16 RX ADMIN — SODIUM CHLORIDE, PRESERVATIVE FREE 10 ML: 5 INJECTION INTRAVENOUS at 20:32

## 2024-12-16 RX ADMIN — ONDANSETRON 4 MG: 2 INJECTION INTRAMUSCULAR; INTRAVENOUS at 21:38

## 2024-12-16 RX ADMIN — INSULIN LISPRO 13 UNITS: 100 INJECTION, SOLUTION INTRAVENOUS; SUBCUTANEOUS at 06:13

## 2024-12-16 RX ADMIN — LACTULOSE 30 G: 20 SOLUTION ORAL at 08:07

## 2024-12-16 RX ADMIN — CALCIUM CARBONATE 500 MG: 500 TABLET, CHEWABLE ORAL at 20:31

## 2024-12-16 RX ADMIN — LACTULOSE 30 G: 20 SOLUTION ORAL at 14:22

## 2024-12-16 RX ADMIN — RIFAXIMIN 550 MG: 550 TABLET ORAL at 08:07

## 2024-12-16 RX ADMIN — PANTOPRAZOLE SODIUM 40 MG: 40 TABLET, DELAYED RELEASE ORAL at 16:55

## 2024-12-16 RX ADMIN — INSULIN GLARGINE 32 UNITS: 100 INJECTION, SOLUTION SUBCUTANEOUS at 20:31

## 2024-12-16 RX ADMIN — GABAPENTIN 600 MG: 300 CAPSULE ORAL at 20:32

## 2024-12-16 RX ADMIN — GABAPENTIN 600 MG: 300 CAPSULE ORAL at 14:22

## 2024-12-16 RX ADMIN — SODIUM CHLORIDE, PRESERVATIVE FREE 10 ML: 5 INJECTION INTRAVENOUS at 08:06

## 2024-12-16 RX ADMIN — Medication 3 MG: at 20:31

## 2024-12-16 RX ADMIN — LIDOCAINE HYDROCHLORIDE 10 ML: 20 INJECTION, SOLUTION INFILTRATION; PERINEURAL at 10:59

## 2024-12-16 RX ADMIN — POLYETHYLENE GLYCOL 3350 17 G: 17 POWDER, FOR SOLUTION ORAL at 08:07

## 2024-12-16 RX ADMIN — INSULIN LISPRO 13 UNITS: 100 INJECTION, SOLUTION INTRAVENOUS; SUBCUTANEOUS at 11:58

## 2024-12-16 NOTE — PROCEDURES
PROCEDURE NOTE  Date: 12/16/2024   Name: Lisa Hunt  YOB: 1960    Procedures: Paracentesis    Patient arrived to Radiology department for paracentesis. Allergies, home medications, H&P and fasting instructions reviewed with patient. Vital signs taken.   Procedural instructions given, questions answered, understanding expressed and consent signed. Patient given fluoroscopy education, no questions at this time.

## 2024-12-16 NOTE — OR NURSING
Paracentesis catheter removed. 3950cc bright clear yellow fluid removed. Patient tolerated well. Puncture site dressed with 4x4 and tegaderm.

## 2024-12-17 VITALS
DIASTOLIC BLOOD PRESSURE: 44 MMHG | HEIGHT: 65 IN | HEART RATE: 67 BPM | OXYGEN SATURATION: 100 % | RESPIRATION RATE: 18 BRPM | WEIGHT: 171.5 LBS | TEMPERATURE: 98.4 F | SYSTOLIC BLOOD PRESSURE: 105 MMHG | BODY MASS INDEX: 28.57 KG/M2

## 2024-12-17 LAB
ALBUMIN SERPL-MCNC: 3.7 G/DL (ref 3.5–5.2)
ALP SERPL-CCNC: 112 U/L (ref 35–104)
ALT SERPL-CCNC: 8 U/L (ref 0–32)
AMMONIA PLAS-SCNC: 67 UMOL/L (ref 11–51)
ANION GAP SERPL CALCULATED.3IONS-SCNC: 10 MMOL/L (ref 7–16)
AST SERPL-CCNC: 26 U/L (ref 0–31)
BILIRUB SERPL-MCNC: 2.5 MG/DL (ref 0–1.2)
BUN SERPL-MCNC: 26 MG/DL (ref 6–23)
CALCIUM SERPL-MCNC: 8.9 MG/DL (ref 8.6–10.2)
CHLORIDE SERPL-SCNC: 102 MMOL/L (ref 98–107)
CO2 SERPL-SCNC: 24 MMOL/L (ref 22–29)
CREAT SERPL-MCNC: 1.6 MG/DL (ref 0.5–1)
ERYTHROCYTE [DISTWIDTH] IN BLOOD BY AUTOMATED COUNT: 14.9 % (ref 11.5–15)
GFR, ESTIMATED: 36 ML/MIN/1.73M2
GLUCOSE BLD-MCNC: 218 MG/DL (ref 74–99)
GLUCOSE BLD-MCNC: 242 MG/DL (ref 74–99)
GLUCOSE SERPL-MCNC: 214 MG/DL (ref 74–99)
HCT VFR BLD AUTO: 23.9 % (ref 34–48)
HGB BLD-MCNC: 7.6 G/DL (ref 11.5–15.5)
MCH RBC QN AUTO: 31.9 PG (ref 26–35)
MCHC RBC AUTO-ENTMCNC: 31.8 G/DL (ref 32–34.5)
MCV RBC AUTO: 100.4 FL (ref 80–99.9)
PLATELET # BLD AUTO: 49 K/UL (ref 130–450)
PLATELET CONFIRMATION: NORMAL
PMV BLD AUTO: 12.9 FL (ref 7–12)
POTASSIUM SERPL-SCNC: 4.4 MMOL/L (ref 3.5–5)
PROT SERPL-MCNC: 6.5 G/DL (ref 6.4–8.3)
RBC # BLD AUTO: 2.38 M/UL (ref 3.5–5.5)
SODIUM SERPL-SCNC: 136 MMOL/L (ref 132–146)
WBC OTHER # BLD: 1.6 K/UL (ref 4.5–11.5)

## 2024-12-17 PROCEDURE — 36415 COLL VENOUS BLD VENIPUNCTURE: CPT

## 2024-12-17 PROCEDURE — 99232 SBSQ HOSP IP/OBS MODERATE 35: CPT | Performed by: NURSE PRACTITIONER

## 2024-12-17 PROCEDURE — 6370000000 HC RX 637 (ALT 250 FOR IP)

## 2024-12-17 PROCEDURE — 80053 COMPREHEN METABOLIC PANEL: CPT

## 2024-12-17 PROCEDURE — 85027 COMPLETE CBC AUTOMATED: CPT

## 2024-12-17 PROCEDURE — 6370000000 HC RX 637 (ALT 250 FOR IP): Performed by: INTERNAL MEDICINE

## 2024-12-17 PROCEDURE — 6370000000 HC RX 637 (ALT 250 FOR IP): Performed by: STUDENT IN AN ORGANIZED HEALTH CARE EDUCATION/TRAINING PROGRAM

## 2024-12-17 PROCEDURE — 82140 ASSAY OF AMMONIA: CPT

## 2024-12-17 PROCEDURE — 2580000003 HC RX 258: Performed by: INTERNAL MEDICINE

## 2024-12-17 PROCEDURE — 82947 ASSAY GLUCOSE BLOOD QUANT: CPT

## 2024-12-17 RX ORDER — SPIRONOLACTONE 100 MG/1
100 TABLET, FILM COATED ORAL DAILY
Qty: 30 TABLET | Refills: 3 | Status: SHIPPED | OUTPATIENT
Start: 2024-12-17

## 2024-12-17 RX ADMIN — PANTOPRAZOLE SODIUM 40 MG: 40 TABLET, DELAYED RELEASE ORAL at 05:46

## 2024-12-17 RX ADMIN — Medication 400 MG: at 09:15

## 2024-12-17 RX ADMIN — CARVEDILOL 3.12 MG: 3.12 TABLET, FILM COATED ORAL at 09:14

## 2024-12-17 RX ADMIN — INSULIN LISPRO 4 UNITS: 100 INJECTION, SOLUTION INTRAVENOUS; SUBCUTANEOUS at 09:26

## 2024-12-17 RX ADMIN — POLYETHYLENE GLYCOL 3350 17 G: 17 POWDER, FOR SOLUTION ORAL at 09:15

## 2024-12-17 RX ADMIN — RIFAXIMIN 550 MG: 550 TABLET ORAL at 09:14

## 2024-12-17 RX ADMIN — BUMETANIDE 1 MG: 1 TABLET ORAL at 09:14

## 2024-12-17 RX ADMIN — LACTULOSE 30 G: 20 SOLUTION ORAL at 09:14

## 2024-12-17 RX ADMIN — SODIUM CHLORIDE, PRESERVATIVE FREE 10 ML: 5 INJECTION INTRAVENOUS at 09:14

## 2024-12-17 RX ADMIN — GABAPENTIN 600 MG: 300 CAPSULE ORAL at 09:14

## 2024-12-17 RX ADMIN — Medication 2000 UNITS: at 09:15

## 2024-12-17 RX ADMIN — INSULIN LISPRO 10 UNITS: 100 INJECTION, SOLUTION INTRAVENOUS; SUBCUTANEOUS at 09:26

## 2024-12-17 NOTE — BRIEF OP NOTE
Brief-Op Note  ______________________________________________________________      IR U/S GUIDED PARACENTESIS  [facility]    Patient Name: Lisa Hunt   YOB: 1960  Medical Record Number: [id number]  Date of Procedure: 12/16/24  Room/Bed: 6414/6414-B      Pre-operative Diagnosis: Ascites    Post-operative Diagnosis: Ascites    Consent: Informed consent was obtained from the patient prior to the procedure. The details of the procedure, as well is its risks, benefits, and alternatives, were explained.      Anesthesia: Local anesthesia.    Performed by: RANJITH Alvarez under on-site supervision by Madison Ndiaye MD.    Estimated blood loss: Minimal    Complications: None    Specimen Obtained: 3950mL of clear straw colored ascites fluid was withdrawn.    (See radiology dictation in PACs for image review and additional procedural information)    Electronically signed by RANJITH Alvarez   DD: 12/17/24  12:03 PM

## 2024-12-17 NOTE — PROGRESS NOTES
[facility] Hospitalist Progress Note    Admitting Date and Time: 12/5/2024  9:42 AM  Admit Dx: Portal vein thrombosis [I81]  Hepatic encephalopathy (HCC) [K76.82]  Decompensated cirrhosis (HCC) [K72.90, K74.60]  Lisa presents to the ER with complaint of abdominal pain and distention and altered mental status. Her patient daughter stated she was altered from baseline and more lethargic than normal. Her pain is increased over the past 3 days. She does have cirrhosis secondary to Alejandro and hepatocellular carcinoma. She requires paracentesis every few weeks. Last paracentesis was 11/22. She also recently had embolization of the tumor. CT abdomen pelvis showed portal vein thrombosis extending into the major intrahepatic portal branches, splenic vein and superior portion of the superior mesenteric vein is new. Large amount of ascites. Low-attenuation foci in the hepatic dome may represent treated neoplasm. Wedge-shaped low-attenuation regions within the liver may represent vascular flow phenomenon but splenic infarct are not excluded       Subjective:  Patient is being followed for Portal vein thrombosis [I81]  Hepatic encephalopathy (HCC) [K76.82]  Decompensated cirrhosis (HCC) [K72.90, K74.60]   Patient seen and examined.   used,Ciara #[id number]  Patient states she is feeling better after having the paracentesis today.    ROS: denies fever, chills, cp, sob, n/v, HA unless stated above.      insulin glargine  32 Units SubCUTAneous Nightly    insulin lispro  13 Units SubCUTAneous TID AC    magnesium oxide  400 mg Oral Daily    bumetanide  1 mg Oral Daily    polyethylene glycol  17 g Oral Daily    insulin lispro  0-18 Units SubCUTAneous 4x Daily AC & HS    spironolactone  100 mg Oral Daily    lactulose  30 g Oral TID    pantoprazole  40 mg Oral BID AC    rifAXIMin  550 mg Oral BID    sodium chloride flush  5-40 mL IntraVENous 2 times per day    carvedilol  3.125 mg Oral Daily    vitamin D  2,000 
      HOSPITALIST PROGRESS NOTE    Patient's name: Lisa Hunt  : 1960  Admission date: 2024  Date of service: 2024   Primary care physician: Tonie Hernandez DO    Lisa Hunt is a 64 y.o. female who presents to [facility] ER complaining of abdominal pain.     Lisa Hunt has a past medical history that includes HCC, diabetes, cirrhosis.     Her past medical history is significant for Alejandro cirrhosis, hepatocellular carcinoma, diabetes mellitus, diabetic neuropathy, esophageal varices, GERD, hypertension, intracranial bleed, leukocytopenia, and type 2 diabetes.     Lisa presents to the ER with complaint of abdominal pain and distention and altered mental status.  Her patient daughter stated she was altered from baseline and more lethargic than normal.  Her pain is increased over the past 3 days.  She does have cirrhosis secondary to Alejandro and hepatocellular carcinoma.  She requires paracentesis every few weeks.  Last paracentesis was .  She also recently had embolization of the tumor.  CT abdomen pelvis showed portal vein thrombosis extending into the major intrahepatic portal branches, splenic vein and superior portion of the superior mesenteric vein is new.  Large amount of ascites.  Low-attenuation foci in the hepatic dome may represent treated neoplasm.  Wedge-shaped low-attenuation regions within the liver may represent vascular flow phenomenon but splenic infarct are not excluded  Discussed patient's case with ED physician.     ER Course  Upon presentation to the ER, routine labwork was performed which revealed BUN 38, creatinine 1.6, lactic acid 3.3, glucose 352, ammonia 66, troponin 35, alk phos 213, bilirubin 2.5, TSH 7.58, WBC 2.7, platelets 59.  Imaging results are as outlined below in the Imaging section of this note.   Upon arrival to the ER, patient was 139/118.  The patient received lactulose, 1 L normal saline in the emergency room and was admitted to [facility] 
      HOSPITALIST PROGRESS NOTE    Patient's name: Lisa Hunt  : 1960  Admission date: 2024  Date of service: 2024   Primary care physician: Tonie Hernandez DO    Lisa Hunt is a 64 y.o. female who presents to [facility] ER complaining of abdominal pain.     Lisa Hunt has a past medical history that includes HCC, diabetes, cirrhosis.     Her past medical history is significant for Alejandro cirrhosis, hepatocellular carcinoma, diabetes mellitus, diabetic neuropathy, esophageal varices, GERD, hypertension, intracranial bleed, leukocytopenia, and type 2 diabetes.     Lisa presents to the ER with complaint of abdominal pain and distention and altered mental status.  Her patient daughter stated she was altered from baseline and more lethargic than normal.  Her pain is increased over the past 3 days.  She does have cirrhosis secondary to Alejandro and hepatocellular carcinoma.  She requires paracentesis every few weeks.  Last paracentesis was .  She also recently had embolization of the tumor.  CT abdomen pelvis showed portal vein thrombosis extending into the major intrahepatic portal branches, splenic vein and superior portion of the superior mesenteric vein is new.  Large amount of ascites.  Low-attenuation foci in the hepatic dome may represent treated neoplasm.  Wedge-shaped low-attenuation regions within the liver may represent vascular flow phenomenon but splenic infarct are not excluded  Discussed patient's case with ED physician.     ER Course  Upon presentation to the ER, routine labwork was performed which revealed BUN 38, creatinine 1.6, lactic acid 3.3, glucose 352, ammonia 66, troponin 35, alk phos 213, bilirubin 2.5, TSH 7.58, WBC 2.7, platelets 59.  Imaging results are as outlined below in the Imaging section of this note.   Upon arrival to the ER, patient was 139/118.  The patient received lactulose, 1 L normal saline in the emergency room and was admitted to [facility] 
      HOSPITALIST PROGRESS NOTE    Patient's name: Lisa Hunt  : 1960  Admission date: 2024  Date of service: 2024   Primary care physician: Tonie Hernandez DO    Lisa Hunt is a 64 y.o. female who presents to [facility] ER complaining of abdominal pain.     Lisa Hunt has a past medical history that includes HCC, diabetes, cirrhosis.     Her past medical history is significant for Alejandro cirrhosis, hepatocellular carcinoma, diabetes mellitus, diabetic neuropathy, esophageal varices, GERD, hypertension, intracranial bleed, leukocytopenia, and type 2 diabetes.     Lisa presents to the ER with complaint of abdominal pain and distention and altered mental status.  Her patient daughter stated she was altered from baseline and more lethargic than normal.  Her pain is increased over the past 3 days.  She does have cirrhosis secondary to Alejandro and hepatocellular carcinoma.  She requires paracentesis every few weeks.  Last paracentesis was .  She also recently had embolization of the tumor.  CT abdomen pelvis showed portal vein thrombosis extending into the major intrahepatic portal branches, splenic vein and superior portion of the superior mesenteric vein is new.  Large amount of ascites.  Low-attenuation foci in the hepatic dome may represent treated neoplasm.  Wedge-shaped low-attenuation regions within the liver may represent vascular flow phenomenon but splenic infarct are not excluded  Discussed patient's case with ED physician.     ER Course  Upon presentation to the ER, routine labwork was performed which revealed BUN 38, creatinine 1.6, lactic acid 3.3, glucose 352, ammonia 66, troponin 35, alk phos 213, bilirubin 2.5, TSH 7.58, WBC 2.7, platelets 59.  Imaging results are as outlined below in the Imaging section of this note.   Upon arrival to the ER, patient was 139/118.  The patient received lactulose, 1 L normal saline in the emergency room and was admitted to [facility] 
      HOSPITALIST PROGRESS NOTE    Patient's name: Lisa Hunt  : 1960  Admission date: 2024  Date of service: 2024   Primary care physician: Tonie Hernandez DO    Lisa Hunt is a 64 y.o. female who presents to [facility] ER complaining of abdominal pain.     Lisa Hunt has a past medical history that includes HCC, diabetes, cirrhosis.     Her past medical history is significant for Alejandro cirrhosis, hepatocellular carcinoma, diabetes mellitus, diabetic neuropathy, esophageal varices, GERD, hypertension, intracranial bleed, leukocytopenia, and type 2 diabetes.     Lisa presents to the ER with complaint of abdominal pain and distention and altered mental status.  Her patient daughter stated she was altered from baseline and more lethargic than normal.  Her pain is increased over the past 3 days.  She does have cirrhosis secondary to Alejandro and hepatocellular carcinoma.  She requires paracentesis every few weeks.  Last paracentesis was .  She also recently had embolization of the tumor.  CT abdomen pelvis showed portal vein thrombosis extending into the major intrahepatic portal branches, splenic vein and superior portion of the superior mesenteric vein is new.  Large amount of ascites.  Low-attenuation foci in the hepatic dome may represent treated neoplasm.  Wedge-shaped low-attenuation regions within the liver may represent vascular flow phenomenon but splenic infarct are not excluded  Discussed patient's case with ED physician.     ER Course  Upon presentation to the ER, routine labwork was performed which revealed BUN 38, creatinine 1.6, lactic acid 3.3, glucose 352, ammonia 66, troponin 35, alk phos 213, bilirubin 2.5, TSH 7.58, WBC 2.7, platelets 59.  Imaging results are as outlined below in the Imaging section of this note.   Upon arrival to the ER, patient was 139/118.  The patient received lactulose, 1 L normal saline in the emergency room and was admitted to [facility] 
  Gastroenterology, Hepatology, &  Advanced Endoscopy    Progress Note      Reason for Consult: Decompensated Cirrhosis    HPI:   Patient remains distended. She is alert and oriented.     Lisa Hunt is a 64 y.o. female w/ PMH of  has a past medical history of DONNA (acute kidney injury) (HCC), Cirrhosis (HCC), Diabetes mellitus (HCC), Diabetic neuropathy (HCC), Esophageal varices without bleeding (HCC), GERD (gastroesophageal reflux disease), Hypertension, Intracranial bleed (HCC), Liver cirrhosis (HCC), Low vitamin D level, SDH (subdural hematoma), Thrombocytopenia (HCC), and Type 2 diabetes mellitus without complication (HCC). who presents to the ED with abdominal pain and large volume ascites. 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 She has had several admissions previously for hepatic decompensation. She has undergone y90 treatment for her HCC.         MELD 3.0: 21 at 12/10/2024  8:06 AM  MELD-Na: 21 at 12/10/2024  8:06 AM  Calculated from:  Serum Creatinine: 1.1 mg/dL at 12/10/2024  6:35 AM  Serum Sodium: 138 mmol/L (Using max of 137 mmol/L) at 12/10/2024  6:35 AM  Total Bilirubin: 3.2 mg/dL at 12/10/2024  6:35 AM  Serum Albumin: 4.5 g/dL (Using max of 3.5 g/dL) at 12/10/2024  6:35 AM  INR(ratio): 2.2 at 12/10/2024  8:06 AM  Age at listing (hypothetical): 64 years  Sex: Female at 12/10/2024  8:06 AM     CEA   Date Value Ref Range Status   10/22/2024 3.4 0.0 - 5.2 ng/mL Final     Comment:     The Roche \"ECLIA\" assay is used.  Results obtained with different assay methods cannot be   used interchangeably.     07/17/2024 3.2 0.0 - 5.2 ng/mL Final     Comment:     The Roche \"ECLIA\" assay is used.  Results obtained with different assay methods cannot be   used interchangeably.       Lipase   Date Value Ref Range Status   12/05/2024 23 13 - 60 U/L Final   08/02/2024 27 13 - 60 U/L Final   05/26/2024 41 13 - 60 U/L Final         CT Result (most 
  Gastroenterology, Hepatology, &  Advanced Endoscopy    Progress Note      Reason for Consult: Decompensated Cirrhosis    HPI:   Patient remains distended. She is alert and oriented.     Lisa Hunt is a 64 y.o. female w/ PMH of  has a past medical history of DONNA (acute kidney injury) (HCC), Cirrhosis (HCC), Diabetes mellitus (HCC), Diabetic neuropathy (HCC), Esophageal varices without bleeding (HCC), GERD (gastroesophageal reflux disease), Hypertension, Intracranial bleed (HCC), Liver cirrhosis (HCC), Low vitamin D level, SDH (subdural hematoma), Thrombocytopenia (HCC), and Type 2 diabetes mellitus without complication (HCC). who presents to the ED with abdominal pain and large volume ascites. 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 She has had several admissions previously for hepatic decompensation. She has undergone y90 treatment for her HCC.         MELD 3.0: 21 at 12/11/2024  7:02 AM  MELD-Na: 20 at 12/11/2024  7:02 AM  Calculated from:  Serum Creatinine: 1.1 mg/dL at 12/11/2024  7:02 AM  Serum Sodium: 140 mmol/L (Using max of 137 mmol/L) at 12/11/2024  7:02 AM  Total Bilirubin: 3.1 mg/dL at 12/11/2024  7:02 AM  Serum Albumin: 4.1 g/dL (Using max of 3.5 g/dL) at 12/11/2024  7:02 AM  INR(ratio): 2.2 at 12/10/2024  8:06 AM  Age at listing (hypothetical): 64 years  Sex: Female at 12/11/2024  7:02 AM     CEA   Date Value Ref Range Status   10/22/2024 3.4 0.0 - 5.2 ng/mL Final     Comment:     The Roche \"ECLIA\" assay is used.  Results obtained with different assay methods cannot be   used interchangeably.     07/17/2024 3.2 0.0 - 5.2 ng/mL Final     Comment:     The Roche \"ECLIA\" assay is used.  Results obtained with different assay methods cannot be   used interchangeably.       Lipase   Date Value Ref Range Status   12/05/2024 23 13 - 60 U/L Final   08/02/2024 27 13 - 60 U/L Final   05/26/2024 41 13 - 60 U/L Final         CT Result (most 
  Gastroenterology, Hepatology, &  Advanced Endoscopy    Progress Note    Reason for Consult: Decompensated Cirrhosis    Limitations to history :  device utilized      Pt sitting at bedside without any pain or current concerns.      HPI:   Patient remains distended. She is alert and oriented.     Lisa Hunt is a 64 y.o. female w/ PMH of  has a past medical history of DONNA (acute kidney injury) (HCC), Cirrhosis (HCC), Diabetes mellitus (HCC), Diabetic neuropathy (HCC), Esophageal varices without bleeding (HCC), GERD (gastroesophageal reflux disease), Hypertension, Intracranial bleed (HCC), Liver cirrhosis (HCC), Low vitamin D level, SDH (subdural hematoma), Thrombocytopenia (HCC), and Type 2 diabetes mellitus without complication (HCC). who presents to the ED with abdominal pain and large volume ascites. 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 She has had several admissions previously for hepatic decompensation. She has undergone y90 treatment for her HCC.         MELD 3.0: 22 at 12/16/2024  8:54 AM  MELD-Na: 22 at 12/16/2024  8:54 AM  Calculated from:  Serum Creatinine: 1.3 mg/dL at 12/16/2024  5:19 AM  Serum Sodium: 136 mmol/L at 12/16/2024  5:19 AM  Total Bilirubin: 3.8 mg/dL at 12/16/2024  5:19 AM  Serum Albumin: 4.1 g/dL (Using max of 3.5 g/dL) at 12/16/2024  5:19 AM  INR(ratio): 1.8 at 12/16/2024  8:54 AM  Age at listing (hypothetical): 64 years  Sex: Female at 12/16/2024  8:54 AM     CEA   Date Value Ref Range Status   10/22/2024 3.4 0.0 - 5.2 ng/mL Final     Comment:     The Roche \"ECLIA\" assay is used.  Results obtained with different assay methods cannot be   used interchangeably.     07/17/2024 3.2 0.0 - 5.2 ng/mL Final     Comment:     The Roche \"ECLIA\" assay is used.  Results obtained with different assay methods cannot be   used interchangeably.       Lipase   Date Value Ref Range Status   12/05/2024 23 13 - 60 U/L 
  Gastroenterology, Hepatology, &  Advanced Endoscopy    Progress Note    Reason for Consult: Decompensated Cirrhosis    Limitations to history :  device utilized      Pt sitting at bedside without any pain or current concerns.      HPI:   Patient remains distended. She is alert and oriented.     Lisa Hunt is a 64 y.o. female w/ PMH of  has a past medical history of DONNA (acute kidney injury) (HCC), Cirrhosis (HCC), Diabetes mellitus (HCC), Diabetic neuropathy (HCC), Esophageal varices without bleeding (HCC), GERD (gastroesophageal reflux disease), Hypertension, Intracranial bleed (HCC), Liver cirrhosis (HCC), Low vitamin D level, SDH (subdural hematoma), Thrombocytopenia (HCC), and Type 2 diabetes mellitus without complication (HCC). who presents to the ED with abdominal pain and large volume ascites. 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 She has had several admissions previously for hepatic decompensation. She has undergone y90 treatment for her HCC.         MELD 3.0: 23 at 12/17/2024  5:31 AM  MELD-Na: 22 at 12/17/2024  5:31 AM  Calculated from:  Serum Creatinine: 1.6 mg/dL at 12/17/2024  5:31 AM  Serum Sodium: 136 mmol/L at 12/17/2024  5:31 AM  Total Bilirubin: 2.5 mg/dL at 12/17/2024  5:31 AM  Serum Albumin: 3.7 g/dL (Using max of 3.5 g/dL) at 12/17/2024  5:31 AM  INR(ratio): 1.8 at 12/16/2024  8:54 AM  Age at listing (hypothetical): 64 years  Sex: Female at 12/17/2024  5:31 AM     CEA   Date Value Ref Range Status   10/22/2024 3.4 0.0 - 5.2 ng/mL Final     Comment:     The Roche \"ECLIA\" assay is used.  Results obtained with different assay methods cannot be   used interchangeably.     07/17/2024 3.2 0.0 - 5.2 ng/mL Final     Comment:     The Roche \"ECLIA\" assay is used.  Results obtained with different assay methods cannot be   used interchangeably.       Lipase   Date Value Ref Range Status   12/05/2024 23 13 - 60 U/L 
  Radiology Procedure Waiver   Name: Lisa Hunt  : 1960  MRN: [id number]    Date:  24    Time: 12:55 PM EST    Benefits of immediately proceeding with Radiology exam(s) without pre-testing outweigh the risks or are not indicated as specified below and therefore the following is/are being waived:    [] Pregnancy test   [] Patients LMP on-time and regular.   [] Patient had Tubal Ligation or has other Contraception Device.   [] Patient  is Menopausal or Premenarcheal.    [] Patient had Full or Partial Hysterectomy.    [] Protocol for Iodine allergy    [] MRI Questionnaire     [x] BUN/Creatinine   [] Patient age w/no hx of renal dysfunction.   [] Patient on Dialysis.   [] Recent Normal Labs.  Electronically signed by Anna Kenyon DO on 24 at 12:55 PM EST            
 The  visited the patient. Lisa spoke with even more excitement today as she was stated to be headed home. The patient spoke with Gratitude and excitement about the goodness of God and her relationship with him. The  provided sustaining ministry and prayer for the patient on her journey.  
20 gauge PIV placed with ultrasound due to poor vascular status.. Pt. Tolerated well.  
4 Eyes Skin Assessment     NAME:  Lisa Hunt  YOB: 1960  MEDICAL RECORD NUMBER:  [id number]    The patient is being assessed for  Admission    I agree that at least one RN has performed a thorough Head to Toe Skin Assessment on the patient. ALL assessment sites listed below have been assessed.      Areas assessed by both nurses:    Head, Face, Ears, Shoulders, Back, Chest, Arms, Elbows, Hands, Sacrum. Buttock, Coccyx, Ischium, Legs. Feet and Heels, and Under Medical Devices         Does the Patient have a Wound? No noted wound(s)       Kane Prevention initiated by RN: Yes  Wound Care Orders initiated by RN: No    Pressure Injury (Stage 3,4, Unstageable, DTI, NWPT, and Complex wounds) if present, place Wound referral order by RN under : No    New Ostomies, if present place, Ostomy referral order under : No     Nurse 1 eSignature: Electronically signed by Ron Bose RN on 12/6/24 at 2:16 AM EST    **SHARE this note so that the co-signing nurse can place an eSignature**    Nurse 2 eSignature: Electronically signed by Kym Alexis RN on 12/6/24 at 5:31 AM EST   
[facility]  MD Tico Kiran MD Ali Hassan, MD Lisa Kniska, CNP   Tammie Stoner, ALFREDO Cuenca, CNP  Consultation  Patient's Name: Lisa Hunt  1:39 PM  12/12/2024    12/7: Seen in her room comfortable does have 2+ edema in lower extremity abdomen continue to be distended    12.8 : Discussed earlier today with Dr. Riddle she would like to proceed with CT triphasic  Blood pressure stable  Has not been taking her lactulose as prescribed    12/9: Sitting on the edge of the bed, no acute distress. S/p CTA. For IR AVM embolization tomorrow. She denies dyspnea. She is having many loose bowel movements which she attributes to the lactulose. Vital signs are stable. PO intake is good.    12/10: Seen while laying in bed. S/p AVM embolization today in IR. Only complaint is that her legs are swollen. She denies dyspnea, chest pain, or palpitations.     12/11 seen in her room sitting in chair on room air comfortable azotemia stable    12/12: Seen while sitting up in the chair. No acute distress. Feels well today. Still has lower extremity edema, though seems to be somewhat better. She denies dyspnea, chest pain, or palpitations. Oral intake is good.     History of Present Ilness:         Ms. Hunt is a pleasant 64-year-old woman who presented to the hospital with abdominal pain and altered mental status. Her daughter brought her to the hospital as she was altered from her normal baseline and seemed to be more lethargic over the past 3 days.  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  She does require chronic paracentesis treatments.  She has been scheduled for paracentesis today.  CT of the abdomen and pelvis in the ED showed portal vein thrombosis extending into the major intrahepatic portal branches, splenic vein, and superior portion of the superior mesenteric vein which is a new finding along with a large amount of 
[facility]  MD Tico Kiran MD Ali Hassan, MD Lisa Kniska, NOA Stoner, ALFREDO Cuenca, CNP  Consultation  Patient's Name: Lisa Hunt  4:06 PM  12/7/2024 12/7: Seen in her room comfortable does have 2+ edema in lower extremity abdomen continue to be distended    History of Present Ilness:         Ms. Hunt is a pleasant 64-year-old woman who presented to the hospital with abdominal pain and altered mental status. Her daughter brought her to the hospital as she was altered from her normal baseline and seemed to be more lethargic over the past 3 days.  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  She does require chronic paracentesis treatments.  She has been scheduled for paracentesis today.  CT of the abdomen and pelvis in the ED showed portal vein thrombosis extending into the major intrahepatic portal branches, splenic vein, and superior portion of the superior mesenteric vein which is a new finding along with a large amount of ascites.  Hepatobiliary surgery is on and does not recommend anticoagulation at this time due to bleeding risk.  Collateralization was noted on imaging.  Decision was made to admit with decompensated cirrhosis.  Her past medical history is significant for Alejandro cirrhosis, hepatocellular carcinoma, diabetes mellitus, diabetic neuropathy, esophageal varices, GERD, hypertension, intracranial bleed, leukocytopenia, and type 2 diabetes.         We were consulted for chronic kidney disease.  She is known to our service from previous hospitalizations as she has a history of chronic kidney disease stage IIIb.  Her typical baseline creatinine level is in the range of 1.2 to 1.4 mg/dL.  Etiology is presumptively diabetic nephropathy and renal microvascular atherosclerotic disease in the setting of longstanding diabetes, hypertension, though hepatorenal syndrome type II is likely playing a 
[facility]  MD Tico Kiran MD Ali Hassan, MD Lisa Kniska, NOA Stoner, ALFREDO Cuenca, CNP  Consultation  Patient's Name: Lisa Hunt  5:01 PM  12/8/2024    12/7: Seen in her room comfortable does have 2+ edema in lower extremity abdomen continue to be distended    12.8 : Discussed earlier today with Dr. Riddle she would like to proceed with CT triphasic  Blood pressure stable  Has not been taking her lactulose as prescribed    History of Present Ilness:         Ms. Hunt is a pleasant 64-year-old woman who presented to the hospital with abdominal pain and altered mental status. Her daughter brought her to the hospital as she was altered from her normal baseline and seemed to be more lethargic over the past 3 days.  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  She does require chronic paracentesis treatments.  She has been scheduled for paracentesis today.  CT of the abdomen and pelvis in the ED showed portal vein thrombosis extending into the major intrahepatic portal branches, splenic vein, and superior portion of the superior mesenteric vein which is a new finding along with a large amount of ascites.  Hepatobiliary surgery is on and does not recommend anticoagulation at this time due to bleeding risk.  Collateralization was noted on imaging.  Decision was made to admit with decompensated cirrhosis.  Her past medical history is significant for Alejandro cirrhosis, hepatocellular carcinoma, diabetes mellitus, diabetic neuropathy, esophageal varices, GERD, hypertension, intracranial bleed, leukocytopenia, and type 2 diabetes.         We were consulted for chronic kidney disease.  She is known to our service from previous hospitalizations as she has a history of chronic kidney disease stage IIIb.  Her typical baseline creatinine level is in the range of 1.2 to 1.4 mg/dL.  Etiology is presumptively diabetic 
[facility]  MD Tico Kiran MD Ali Hassan, MD Lisa Kniska, NOA Stoner, ALFREDO Cuenca, CNP  Consultation  Patient's Name: Lisa Hunt  5:33 PM  12/10/2024    12/7: Seen in her room comfortable does have 2+ edema in lower extremity abdomen continue to be distended    12.8 : Discussed earlier today with Dr. Riddle she would like to proceed with CT triphasic  Blood pressure stable  Has not been taking her lactulose as prescribed    12/9: Sitting on the edge of the bed, no acute distress. S/p CTA. For IR AVM embolization tomorrow. She denies dyspnea. She is having many loose bowel movements which she attributes to the lactulose. Vital signs are stable. PO intake is good.    12/10: Seen while laying in bed. S/p AVM embolization today in IR. Only complaint is that her legs are swollen. She denies dyspnea, chest pain, or palpitations.     History of Present Ilness:         Ms. Hunt is a pleasant 64-year-old woman who presented to the hospital with abdominal pain and altered mental status. Her daughter brought her to the hospital as she was altered from her normal baseline and seemed to be more lethargic over the past 3 days.  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  She does require chronic paracentesis treatments.  She has been scheduled for paracentesis today.  CT of the abdomen and pelvis in the ED showed portal vein thrombosis extending into the major intrahepatic portal branches, splenic vein, and superior portion of the superior mesenteric vein which is a new finding along with a large amount of ascites.  Hepatobiliary surgery is on and does not recommend anticoagulation at this time due to bleeding risk.  Collateralization was noted on imaging.  Decision was made to admit with decompensated cirrhosis.  Her past medical history is significant for Alejandro cirrhosis, hepatocellular carcinoma, diabetes 
[facility]  MD Tico Kiran, MD Monica Oswald, CNP   Tammie Stoner, ALFREDO Cuenca, CNP  Consultation  Patient's Name: Lisa Hunt  2:43 PM  12/15/2024    Subjective  12/7: Seen in her room comfortable does have 2+ edema in lower extremity abdomen continue to be distended    12.8 : Discussed earlier today with Dr. Riddle she would like to proceed with CT triphasic  Blood pressure stable  Has not been taking her lactulose as prescribed    12/9: Sitting on the edge of the bed, no acute distress. S/p CTA. For IR AVM embolization tomorrow. She denies dyspnea. She is having many loose bowel movements which she attributes to the lactulose. Vital signs are stable. PO intake is good.    12/10: Seen while laying in bed. S/p AVM embolization today in IR. Only complaint is that her legs are swollen. She denies dyspnea, chest pain, or palpitations.     12/11 seen in her room sitting in chair on room air comfortable azotemia stable    12/12: Seen while sitting up in the chair. No acute distress. Feels well today. Still has lower extremity edema, though seems to be somewhat better. She denies dyspnea, chest pain, or palpitations. Oral intake is good.     12/13: Seen while laying in bed, no acute distress. She is on room air. Sleepy today. Lower extremity edema seems to be a bit better. Vital signs are stable.    12/14: Feels distended in her abdomen.  Uncomfortable though in no pain.  No dyspnea.  Ongoing fatigue and generalized weakness.  Continued lower extremity edema.    12/15: No new complaint or issue.  In good spirits.  Other status quo, still has lower extremity edema, abdominal distention, fatigue and generalized weakness.  ROS otherwise unremarkable.    History of Present Ilness:         Ms. Hunt is a pleasant 64-year-old woman who presented to the hospital with abdominal pain and altered mental status. Her daughter brought her to the 
Attempted to call report for paracentesis twice and waited on hold for 10 minutes with no answer.  
Blood transfusion consent signed and placed in patient's chart  
Consult noted. Patient currently out of room.  
ENDOCRINOLOGY PROGRESS NOTE      Date of admission: 12/5/2024  Date of service: 12/10/2024  Admitting physician: Eugenie Luna DO   Primary Care Physician: Tonie Hernandez DO  Consultant physician: Tae Gaines MD     Reason for the consultation:  Uncontrolled DM    History of Present Illness:  The history is provided by the patient. Accuracy of the patient data is good. Patient speaks Maldivian.    Lisa Hunt is a very pleasant 64 y.o. old female with PMH of T2DM, HTN, liver cirrhosis, hepatocellular carcinoma s/p y90 treatment and other listed below admitted to [facility] on 12/5/2024 because of abdominal pain and altered mental status, endocrine service was consulted for diabetes management.    Subjective   BS improving, no acute events     Inpatient diet:   Carb Restricted diet     Point of care glucose monitoring   (Independently reviewed)   Recent Labs     12/08/24  1717 12/08/24  2137 12/09/24  0556 12/09/24  1049 12/09/24  1621 12/09/24  2105 12/10/24  0613 12/10/24  1736   POCGLU 238* 242* 176* 136* 193* 252* 288* 258*       Scheduled Meds:   insulin lispro  0-8 Units SubCUTAneous 4x Daily AC & HS    [Held by provider] spironolactone  100 mg Oral Daily    lactulose  30 g Oral TID    pantoprazole  40 mg Oral BID AC    rifAXIMin  550 mg Oral BID    heparin (porcine)  5,000 Units SubCUTAneous 3 times per day    [Held by provider] furosemide  40 mg IntraVENous BID    albumin human 25%  25 g IntraVENous Q8H    sodium chloride flush  5-40 mL IntraVENous 2 times per day    carvedilol  3.125 mg Oral Daily    insulin glargine  50 Units SubCUTAneous Nightly    insulin lispro  18 Units SubCUTAneous TID AC    vitamin D  2,000 Units Oral Daily    gabapentin  600 mg Oral TID       PRN Meds:   ondansetron, 4 mg, Q8H PRN  sodium phosphate 13.53 mmol in sodium chloride 0.9 % 250 mL IVPB, 0.16 mmol/kg, PRN   Or  sodium phosphate 27.06 mmol in sodium chloride 0.9 % 250 mL IVPB, 0.32 mmol/kg, PRN  benzocaine-menthol, 1 
ENDOCRINOLOGY PROGRESS NOTE      Date of admission: 12/5/2024  Date of service: 12/11/2024  Admitting physician: Eugenie Luna DO   Primary Care Physician: Tonie Hernandez DO  Consultant physician: Tae Gaines MD     Reason for the consultation:  Uncontrolled DM    History of Present Illness:  The history is provided by the patient. Accuracy of the patient data is good. Patient speaks British.    Lisa Hunt is a very pleasant 64 y.o. old female with PMH of T2DM, HTN, liver cirrhosis, hepatocellular carcinoma s/p y90 treatment and other listed below admitted to [facility] on 12/5/2024 because of abdominal pain and altered mental status, endocrine service was consulted for diabetes management.    Subjective   I saw and examined the patient at bedside this afternoon, no acute events overnight, appetite is not very good.  No hypoglycemia.    Inpatient diet:   Carb Restricted diet     Point of care glucose monitoring   (Independently reviewed)   Recent Labs     12/09/24  1621 12/09/24  2105 12/10/24  0613 12/10/24  1736 12/10/24  2040 12/11/24  0613 12/11/24  1059 12/11/24  1558   POCGLU 193* 252* 288* 258* 373* 200* 261* 208*       Scheduled Meds:   polyethylene glycol  17 g Oral Daily    insulin glargine  35 Units SubCUTAneous Nightly    insulin lispro  12 Units SubCUTAneous TID AC    insulin lispro  0-18 Units SubCUTAneous 4x Daily AC & HS    [Held by provider] spironolactone  100 mg Oral Daily    lactulose  30 g Oral TID    pantoprazole  40 mg Oral BID AC    rifAXIMin  550 mg Oral BID    heparin (porcine)  5,000 Units SubCUTAneous 3 times per day    [Held by provider] furosemide  40 mg IntraVENous BID    albumin human 25%  25 g IntraVENous Q8H    sodium chloride flush  5-40 mL IntraVENous 2 times per day    carvedilol  3.125 mg Oral Daily    vitamin D  2,000 Units Oral Daily    gabapentin  600 mg Oral TID       PRN Meds:   sodium chloride, , PRN  ondansetron, 4 mg, Q8H PRN  sodium phosphate 13.53 mmol in 
ENDOCRINOLOGY PROGRESS NOTE      Date of admission: 12/5/2024  Date of service: 12/13/2024  Admitting physician: Eugenie Luna DO   Primary Care Physician: Tonie Hernandez DO  Consultant physician: Tae Gaines MD     Reason for the consultation:  Uncontrolled DM    History of Present Illness:  The history is provided by the patient. Accuracy of the patient data is good. Patient speaks Cypriot.    Lisa Hunt is a very pleasant 64 y.o. old female with PMH of T2DM, HTN, liver cirrhosis, hepatocellular carcinoma s/p y90 treatment and other listed below admitted to [facility] on 12/5/2024 because of abdominal pain and altered mental status, endocrine service was consulted for diabetes management.    Subjective   Seen at bedside today, no acute events overnight, glucose level improving.  No hypoglycemia    Inpatient diet:   Carb Restricted diet     Point of care glucose monitoring   (Independently reviewed)   Recent Labs     12/12/24  1138 12/12/24  1509 12/12/24  1725 12/12/24  2031 12/13/24  0523 12/13/24  0847 12/13/24  1055 12/13/24  1710   POCGLU 207* 208* 251* 259* 182* 178* 225* 165*       Scheduled Meds:   insulin glargine  38 Units SubCUTAneous Nightly    bumetanide  1 mg Oral Daily    polyethylene glycol  17 g Oral Daily    insulin lispro  12 Units SubCUTAneous TID AC    insulin lispro  0-18 Units SubCUTAneous 4x Daily AC & HS    spironolactone  100 mg Oral Daily    lactulose  30 g Oral TID    pantoprazole  40 mg Oral BID AC    rifAXIMin  550 mg Oral BID    heparin (porcine)  5,000 Units SubCUTAneous 3 times per day    sodium chloride flush  5-40 mL IntraVENous 2 times per day    carvedilol  3.125 mg Oral Daily    vitamin D  2,000 Units Oral Daily    gabapentin  600 mg Oral TID       PRN Meds:   sodium chloride, , PRN  ondansetron, 4 mg, Q8H PRN  sodium phosphate 13.53 mmol in sodium chloride 0.9 % 250 mL IVPB, 0.16 mmol/kg, PRN   Or  sodium phosphate 27.06 mmol in sodium chloride 0.9 % 250 mL IVPB, 
ENDOCRINOLOGY PROGRESS NOTE      Date of admission: 12/5/2024  Date of service: 12/15/2024  Admitting physician: Eugenie Luna DO   Primary Care Physician: Tonei Hernandez DO  Consultant physician: Tae Gaines MD     Reason for the consultation:  Uncontrolled DM    History of Present Illness:  The history is provided by the patient. Accuracy of the patient data is good. Patient speaks Paraguayan.    Lisa Hunt is a very pleasant 64 y.o. old female with PMH of T2DM, HTN, liver cirrhosis, hepatocellular carcinoma s/p y90 treatment and other listed below admitted to [facility] on 12/5/2024 because of abdominal pain and altered mental status, endocrine service was consulted for diabetes management.    Subjective   The patient was seen today, no acute events overnight, glucose level in range most of the time.  No hypoglycemia.    Inpatient diet:   Carb Restricted diet     Point of care glucose monitoring   (Independently reviewed)   Recent Labs     12/14/24  0608 12/14/24  1114 12/14/24  1608 12/14/24  2014 12/15/24  0604 12/15/24  1104 12/15/24  1635 12/15/24  2019   POCGLU 181* 177* 314* 273* 371* 330* 253* 104*       Scheduled Meds:   insulin glargine  38 Units SubCUTAneous Nightly    insulin lispro  13 Units SubCUTAneous TID AC    magnesium oxide  400 mg Oral Daily    bumetanide  1 mg Oral Daily    polyethylene glycol  17 g Oral Daily    insulin lispro  0-18 Units SubCUTAneous 4x Daily AC & HS    [Held by provider] spironolactone  100 mg Oral Daily    lactulose  30 g Oral TID    pantoprazole  40 mg Oral BID AC    rifAXIMin  550 mg Oral BID    sodium chloride flush  5-40 mL IntraVENous 2 times per day    carvedilol  3.125 mg Oral Daily    vitamin D  2,000 Units Oral Daily    gabapentin  600 mg Oral TID       PRN Meds:   magnesium sulfate, 2,000 mg, PRN  sodium chloride, , PRN  ondansetron, 4 mg, Q8H PRN  sodium phosphate 13.53 mmol in sodium chloride 0.9 % 250 mL IVPB, 0.16 mmol/kg, PRN   Or  sodium phosphate 
ENDOCRINOLOGY PROGRESS NOTE      Date of admission: 12/5/2024  Date of service: 12/16/2024  Admitting physician: Eugenie Luna DO   Primary Care Physician: Tonie Hernandez DO  Consultant physician: Tae Gaines MD     Reason for the consultation:  Uncontrolled DM    History of Present Illness:  The history is provided by the patient. Accuracy of the patient data is good. Patient speaks Mauritanian.    Lisa Hunt is a very pleasant 64 y.o. old female with PMH of T2DM, HTN, liver cirrhosis, hepatocellular carcinoma s/p y90 treatment and other listed below admitted to [facility] on 12/5/2024 because of abdominal pain and altered mental status, endocrine service was consulted for diabetes management.    Subjective   I saw the patient at bedside this afternoon, no acute events overnight, glucose level improving    Inpatient diet:   Carb Restricted diet     Point of care glucose monitoring   (Independently reviewed)   Recent Labs     12/14/24  2014 12/15/24  0604 12/15/24  1104 12/15/24  1635 12/15/24  2019 12/16/24  0558 12/16/24  1151 12/16/24  1600   POCGLU 273* 371* 330* 253* 104* 130* 138* 124*       Scheduled Meds:   insulin glargine  32 Units SubCUTAneous Nightly    insulin lispro  13 Units SubCUTAneous TID AC    magnesium oxide  400 mg Oral Daily    bumetanide  1 mg Oral Daily    polyethylene glycol  17 g Oral Daily    insulin lispro  0-18 Units SubCUTAneous 4x Daily AC & HS    spironolactone  100 mg Oral Daily    lactulose  30 g Oral TID    pantoprazole  40 mg Oral BID AC    rifAXIMin  550 mg Oral BID    sodium chloride flush  5-40 mL IntraVENous 2 times per day    carvedilol  3.125 mg Oral Daily    vitamin D  2,000 Units Oral Daily    gabapentin  600 mg Oral TID       PRN Meds:   magnesium sulfate, 2,000 mg, PRN  sodium chloride, , PRN  ondansetron, 4 mg, Q8H PRN  sodium phosphate 13.53 mmol in sodium chloride 0.9 % 250 mL IVPB, 0.16 mmol/kg, PRN   Or  sodium phosphate 27.06 mmol in sodium chloride 0.9 % 250 
Endocrine consult sent.  
HEPATOBILIARY AND PANCREATIC SURGERY  DAILY PROGRESS NOTE  12/6/2024  Cc: abd pain and distention     SUBJECTIVE:  No new complaints or overnight events. Had a BM. Intermittent nausea, no vomiting.     OBJECTIVE:  /64   Pulse 96   Temp 97.9 °F (36.6 °C) (Temporal)   Resp 18   Ht 1.651 m (5' 5\")   Wt 85.2 kg (187 lb 13.3 oz)   SpO2 100%   BMI 31.26 kg/m²   No intake/output data recorded.    GENERAL: No acute distress.  HEAD: NCAT.   EYES: Anicteric. Round symmetric pupils.  CV: RR.  LUNGS/CHEST: No increased work of breathing on RA.  ABDOMEN: Soft, mild diffuse tenderness, distended.    LABS:  CBC  Recent Labs     12/05/24  1049   WBC 2.7*   HGB 9.6*   HCT 29.8*   PLT 59*     BMP  Recent Labs     12/05/24  1956   *   K 4.6      CO2 22   BUN 36*   CREATININE 1.4*   CALCIUM 8.0*     Liver Function  Recent Labs     12/05/24  1049   LIPASE 23   BILITOT 2.5*   AST 39*   ALT 21   ALKPHOS 213*     No results for input(s): \"LACTATE\" in the last 72 hours.  Recent Labs     12/05/24  1420   INR 1.8       RADIOLOGY:  I have personally reviewed all relevant imaging:      ASSESSMENT/PLAN:  64 y.o. female with history of ACOSTA cirrhosis and HCC s/p Y90 tx 11/14, IR paracentesis 11/22 presenting with decompensated cirrhosis and portal vein thrombosis involving splenic vein     Do not recommend anticoagulation at this time due to bleeding risk and presence of collateral flow likely indicating chronic nature of PV thrombosis   She needs medical management of her cirrhosis, no surgical intervention indicated  OK for dvt ppx with subq heparin but no full anticoagulation   MELD-Na 25 based on labs 12/5  NPO, IVF, continue with albumin 25% 25 g tid  IR consult for paracentesis   continue lactulose and rifaximin  GI consult for assistance with decompensated cirrhosis   Nephro consult for CKD  Palliative consult  Appreciate all consultant recommendations     Plan discussed with Dr. Sosa    Electronically signed 
HEPATOBILIARY AND PANCREATIC SURGERY  DAILY PROGRESS NOTE  12/7/2024  Cc: abd pain and distention     SUBJECTIVE:  IR paracentesis 12/6: 7350mL removed. Having multiple BM. Pain is controlled.    OBJECTIVE:  BP (!) 136/55   Pulse 95   Temp 97.5 °F (36.4 °C)   Resp 20   Ht 1.651 m (5' 5\")   Wt 84.6 kg (186 lb 8.2 oz)   SpO2 100%   BMI 31.04 kg/m²   No intake/output data recorded.    GENERAL: No acute distress.  HEAD: NCAT.   EYES: Anicteric. Round symmetric pupils.  CV: RR.  LUNGS/CHEST: No increased work of breathing on RA.  ABDOMEN: Soft, minimal tenderness, mildly distended.    LABS:  CBC  Recent Labs     12/06/24  0706   WBC 1.5*   HGB 7.7*   HCT 24.0*   PLT 45*     BMP  Recent Labs     12/06/24  0706      K 4.3      CO2 24   BUN 32*   CREATININE 1.3*   CALCIUM 8.6     Liver Function  Recent Labs     12/05/24  1049 12/06/24  0706   LIPASE 23  --    BILITOT 2.5* 2.5*   AST 39* 24   ALT 21 15   ALKPHOS 213* 140*     No results for input(s): \"LACTATE\" in the last 72 hours.  Recent Labs     12/06/24  0706   INR 1.7       RADIOLOGY:  I have personally reviewed all relevant imaging:      ASSESSMENT/PLAN:  64 y.o. female with history of ACOSTA cirrhosis and HCC s/p Y90 tx 11/14, IR paracentesis 11/22 presenting with decompensated cirrhosis and portal vein thrombosis involving splenic vein     Do not recommend anticoagulation at this time due to bleeding risk and presence of collateral flow likely indicating chronic nature of PV thrombosis   She needs medical management of her cirrhosis, no surgical intervention indicated  OK for dvt ppx with subq heparin but no full anticoagulation   MELD-Na 25 based on labs 12/5  Diet as tolerated  continue with albumin 25% 25 g tid  IR paracentesis 12/6: 7350mL removed   continue lactulose and rifaximin  GI, palliative, and nephro following, appreciate all consultants input    Plan discussed with Dr. Sosa    Electronically signed by Jorge Rivas DO on 
HEPATOBILIARY AND PANCREATIC SURGERY  DAILY PROGRESS NOTE  12/8/2024  Cc: abd pain and distention     SUBJECTIVE:  Pt doing well this morning. Awake and up in bed. Denies any pain this morning. Reports tolerating her diet and having flatus.    OBJECTIVE:  BP (!) 146/60   Pulse 97   Temp 98.6 °F (37 °C) (Oral)   Resp 19   Ht 1.651 m (5' 5\")   Wt 84.6 kg (186 lb 8.2 oz)   SpO2 98%   BMI 31.04 kg/m²   I/O last 3 completed shifts:  In: 113.9 [P.O.:100; I.V.:10; IV Piggyback:3.9]  Out: -     GENERAL: No acute distress.  HEAD: NCAT.   EYES: Anicteric. Round symmetric pupils.  CV: RR and normotensive  LUNGS/CHEST: No increased work of breathing on room air.  ABDOMEN: Soft, nontender, mildly distended.    LABS:  CBC  Recent Labs     12/07/24  0616   WBC 1.2*   HGB 7.5*   HCT 23.1*   PLT 41*     BMP  Recent Labs     12/07/24  0616      K 4.0      CO2 27   BUN 24*   CREATININE 1.2*   CALCIUM 8.3*     Liver Function  Recent Labs     12/05/24  1049 12/06/24  0706 12/07/24  0616   LIPASE 23  --   --    BILITOT 2.5*   < > 2.9*   AST 39*   < > 30   ALT 21   < > 12   ALKPHOS 213*   < > 118*    < > = values in this interval not displayed.     No results for input(s): \"LACTATE\" in the last 72 hours.  Recent Labs     12/06/24  0706   INR 1.7       RADIOLOGY:  I have personally reviewed all relevant imaging:      ASSESSMENT/PLAN:  64 y.o. female with history of ACOSTA cirrhosis and HCC s/p Y90 11/14, IR paracentesis 11/22 presenting with decompensated cirrhosis and portal vein thrombosis involving splenic vein     - Do not recommend anticoagulation at this time due to thrombocytopenia and presence of collateral flow likely indicating chronic nature of PV thrombosis  - GI recs apprecaited for medical management of her cirrhosis  - OK for DVT PPx  - MELD-Na 25 based on labs 12/5  - Diet as tolerated  - Continue with albumin 25% 25g tid  - IR paracentesis 12/6- 7350mL removed, monitor for recurrent abdominal distention  - 
[facility] Surgery Note:     CT triphasic reviewed. No further enhancing lesions but does have an AVM which needs embolized. This was planned on outpatient basis but discussed with Dr. Ndiaye. Can embolize while inpatient. This will mean another contrast load and will discuss with nephrology.     Kymberly Sosa MD    
Notified attending of patient reporting some itching 15 mins into blood transfusion.  
OCCUPATIONAL THERAPY INITIAL EVALUATION    [facility]  [address], [place]      Date:2024                                                  Patient Name: Lisa Hunt  MRN: [id number]  : 1960  Room: [facility]    Evaluating OT: GADIEL Myers, OTR/L  # [id number]    Referring Provider:  Luigi Low MD   Specific Provider Orders:  \"OT Eval and Treat\"  24    Diagnosis: Portal vein thrombosis [I81]  Hepatic encephalopathy (HCC) [K76.82]  Decompensated cirrhosis (HCC) [K72.90, K74.60]    Pt was admitted w/ Abdominal distension, Extremity swelling - hx of recurrent ascites     Pertinent Medical History:  Pt has a past medical history of DONNA (acute kidney injury) (HCC), Cirrhosis (HCC), Diabetes mellitus (HCC), Diabetic neuropathy (HCC), Esophageal varices without bleeding (HCC), GERD (gastroesophageal reflux disease), Hypertension, Intracranial bleed (HCC), Liver cirrhosis (HCC), Low vitamin D level, SDH (subdural hematoma), Thrombocytopenia (HCC), and Type 2 diabetes mellitus without complication (HCC).,  has a past surgical history that includes Upper gastrointestinal endoscopy (2022); Upper gastrointestinal endoscopy (2021); Upper gastrointestinal endoscopy (2019); Upper gastrointestinal endoscopy (2021);  section; Cholecystectomy; Appendectomy; Paracentesis (Left, 10/12/2023); Paracentesis (Left, 2024); Upper gastrointestinal endoscopy (N/A, 2024); Upper gastrointestinal endoscopy (N/A, 2024); Paracentesis (Left, 2024); Upper gastrointestinal endoscopy (N/A, 03/15/2024); CT NEEDLE BIOPSY LIVER PERCUTANEOUS (2024); Upper gastrointestinal endoscopy (N/A, 2024); Upper gastrointestinal endoscopy (N/A, 2024); CT NEEDLE BIOPSY LIVER PERCUTANEOUS (2024); Paracentesis (Left, 10/30/2024); Paracentesis (Left, 2024); and IR EMBOLIZATION 
Occupational Therapy  OT BEDSIDE TREATMENT NOTE   [facility]  [address] [place], [place]        Date:2024  Patient Name: Lisa Hunt  MRN: [id number]  : 1960  Room: 64/6414     Per OT Eval:    Evaluating OT: GADIEL Myers, OTR/L  # [id number]     Referring Provider:  Luigi Low MD   Specific Provider Orders:  \"OT Eval and Treat\"  24     Diagnosis: Portal vein thrombosis [I81]  Hepatic encephalopathy (HCC) [K76.82]  Decompensated cirrhosis (HCC) [K72.90, K74.60]     Pt was admitted w/ Abdominal distension, Extremity swelling - hx of recurrent ascites      Pertinent Medical History:  Pt has a past medical history of DONNA (acute kidney injury) (HCC), Cirrhosis (HCC), Diabetes mellitus (HCC), Diabetic neuropathy (HCC), Esophageal varices without bleeding (HCC), GERD (gastroesophageal reflux disease), Hypertension, Intracranial bleed (HCC), Liver cirrhosis (HCC), Low vitamin D level, SDH (subdural hematoma), Thrombocytopenia (HCC), and Type 2 diabetes mellitus without complication (HCC).,  has a past surgical history that includes Upper gastrointestinal endoscopy (2022); Upper gastrointestinal endoscopy (2021); Upper gastrointestinal endoscopy (2019); Upper gastrointestinal endoscopy (2021);  section; Cholecystectomy; Appendectomy; Paracentesis (Left, 10/12/2023); Paracentesis (Left, 2024); Upper gastrointestinal endoscopy (N/A, 2024); Upper gastrointestinal endoscopy (N/A, 2024); Paracentesis (Left, 2024); Upper gastrointestinal endoscopy (N/A, 03/15/2024); CT NEEDLE BIOPSY LIVER PERCUTANEOUS (2024); Upper gastrointestinal endoscopy (N/A, 2024); Upper gastrointestinal endoscopy (N/A, 2024); CT NEEDLE BIOPSY LIVER PERCUTANEOUS (2024); Paracentesis (Left, 10/30/2024); Paracentesis (Left, 2024); and IR EMBOLIZATION TUMOR/ORGAN ISCH/INFARC 
Patient gait is very unsteady and weak walking to the bathroom. Ordered a walker and bedside commode.   
Per IR MD plan to do AVM embolization at 10am on 12/10/24. Patient will need a new stat PT/INR, NPO after midnight, and ok to continue to heparin injection.   
Physical Therapy    PT order received and medical chart reviewed on 12/10/24. PT evaluation was attempted this AM (at 0940) but pt was off unit for AVM embolization with IR. Will re-attempt as able. Thank you.    Ila Zaidi, PT, DPT  [id number]      
Physical Therapy  Physical Therapy Initial Assessment     Name: Lisa Hunt  : 1960  MRN: [id number]      Date of Service: 2024    Evaluating PT:  Jm Smith PT, DPT    Room #:  6414/6414-B  Diagnosis:  Portal vein thrombosis [I81]  Hepatic encephalopathy (HCC) [K76.82]  Decompensated cirrhosis (HCC) [K72.90, K74.60]  PMHx/PSHx:  DM, HTN, liver cirrhosis, hepatocellular CA  Procedure/Surgery:  s/p paracentesis , s/p IR procedure 12/10  Precautions:  fall risk, Haitian speaking  Equipment Owned:   Equipment Needs:  TBD    SUBJECTIVE:    Pt lives with dtr in a 2 story home with 1 steps to enter and no handrail.  Bed/bath is on 2nd floor.  Pt ambulated with ww PRN PTA.    OBJECTIVE:   Initial Evaluation  Date: 24 Treatment Short Term/ Long Term   Goals   AM-PAC 6 Clicks      Was pt agreeable to Eval/treatment? yes     Does pt have pain? Unrated abdominal pain     Bed Mobility  Rolling: NT  Supine to sit: NT  Sit to supine: NT  Scooting: SBA  Rolling: mod I  Supine to sit: mod I  Sit to supine: mod I  Scooting: mod I   Transfers Sit to stand: CGA  Stand to sit: CGA  Stand pivot: CGA with ww  Sit to stand: mod I  Stand to sit: mod I  Stand pivot: mod I with AAD   Ambulation    50'x2 with ww CGA  250'+ with AAD mod I   Stair negotiation: ascended and descended  NT  10 steps with 1 handrail mod I     Strength/ROM:   BLE grossly 4/5  BLE AROM WFL    Balance:   Static Sitting: Supervision  Dynamic Sitting: Supervision  Static Standing: SBA with ww  Dynamic Standing: CGA with ww    Pt is A & O x 3  Sensation:  Pt denies numbness and tingling to extremities  Edema:  abdominal swelling    Vitals:  SpO2 and HR were stable during session    Therapeutic Exercises:    Transfers: STS x2, cued for hand placement and postural correction  Ambulation: 50'x2 with ww  BLE AROM    Patient education  Pt educated on role of PT, importance of functional mobility during hospital stay, safety with 
Protime-INR ordered STAT per ALICJA Dietz at the stat lab desk notified that it needs done as soon as possible for procedure.   
Reason for consult:  uncontrolled diabetes    A1C: 8.8 (12/6/24)            Patient states the following concerns/barriers to diabetes self-management:     [] None       [] Medication cost   [] Food cost/availability        [] Reading  [] Hearing   [] Vision                [] Work    [] Transportation  [] No insurance  [] Physical limitations    [x] Other: language        Patient states the following about their diabetes/health:   [x]  Has had diabetes for 20-25 years  [x]  Has had diabetes education in the past  [x]  Tests bg 2-3 times a day  [x]  Drinks mostly water  [x]   Eats 2 meals and 2 snacks a day    Diabetes survival packet provided to:   [x] Patient     [] Other:    Information given:   Definition of diabetes   Target glucose ranges/A1C   Self-monitoring of blood glucose   Prevention/symptoms/treatment of hypo-/hyperglycemia   Medication adherence             The plate method/meal planning guidelines             The benefits of exercise and recommendations             Reducing the risk of chronic complications     Diabetes medications reviewed (use, purpose, action): Lantus 50 u pm, humalog 18 u with meals + ss            Post-education Assessment  [x]  Attentive to teaching  [x]  Answered questions appropriately when asked   [x]  Seems able to apply concepts to daily lifestyle  [x]  Seems motivated to do well  [x]  Verbalized an understanding of plate method  [x]  Verbalized an understanding of prescribed antidiabetes medications   [x]  Verbalized an understanding of target glucose ranges/A1C level  [x]  Expresses an intent to comply with treatment plan   []  Showed very little interest in complying with treatment plan   []  Seems to have trouble applying concepts to daily lifestyle     COMMENTS:          Recommendations:   [] Carbohydrate-controlled diet    [] Script for glucometer and supplies (per preference of patient's insurance)  [] Script for insulin pens and pen needles (if insulin is ordered at 
Spiritual Health History and Assessment/Progress Note  [facility] [place]    (P) Palliative Care, Spiritual/Emotional Needs, Crisis,  ,  ,      Name: Lisa Hunt MRN: [id number]    Age: 64 y.o.     Sex: female   Language: Chadian   Sikh: Anglican   Decompensated cirrhosis (HCC)     Date: 12/10/2024                           Spiritual Assessment began in [facility]        Referral/Consult From: (P) Palliative Care, Rounding   Encounter Overview/Reason: (P) Palliative Care, Spiritual/Emotional Needs, Crisis  Service Provided For: (P) Patient    The  visited the patient and provided Language services during the visit. 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 The  provided sustaining ministry presence, prayer, and active living.     Maria C, Belief, Meaning:   Patient identifies as spiritual, is connected with a maria c tradition or spiritual practice, and has beliefs or practices that help with coping during difficult times  Family/Friends No family/friends present      Importance and Influence:  Patient has spiritual/personal beliefs that influence decisions regarding their health  Family/Friends No family/friends present    Community:  Patient is connected with a spiritual community and feels well-supported. Support system includes: Children and Extended family  Family/Friends No family/friends present    Assessment and Plan of Care:     Patient Interventions include: Facilitated expression of thoughts and feelings, Explored spiritual coping/struggle/distress, Engaged in theological reflection, Affirmed coping skills/support systems, and Facilitated life review and/ or legacy  Family/Friends Interventions include: No family/friends present    Patient Plan of Care: Other: The  will follow as needed.  Family/Friends Plan of Care: Other: no Family present.    Electronically signed by Chaplain Neeta on 
Used [employer] to notify Dr George about pt's HGB 7.2;  acknowledged information.  
25.0-29.9)    Estimated Daily Nutrient Needs:  Energy Requirements Based On: Formula  Weight Used for Energy Requirements: Current  Energy (kcal/day): 2742-1100 (REE 1369 x 1.2 SF)  Weight Used for Protein Requirements: Ideal  Protein (g/day): 75-85 (1.3-1.5g/kg IBW)  Method Used for Fluid Requirements: 1 ml/kcal  Fluid (ml/day): 3931-5289    Nutrition Diagnosis:   Inadequate oral intake related to inadequate protein-energy intake (hx of liver cirrhosis) as evidenced by poor intake prior to admission, intake 51-75%    Nutrition Interventions:   Food and/or Nutrient Delivery: Continue Current Diet, Start Oral Nutrition Supplement  Nutrition Education/Counseling: Education/Counseling not indicated  Coordination of Nutrition Care: Continue to monitor while inpatient       Goals:  Goals: Meet at least 75% of estimated needs, by next RD assessment  Type of Goal: New goal  Previous Goal Met: New Goal    Nutrition Monitoring and Evaluation:   Behavioral-Environmental Outcomes: None Identified  Food/Nutrient Intake Outcomes: Food and Nutrient Intake, Supplement Intake  Physical Signs/Symptoms Outcomes: Biochemical Data, Chewing or Swallowing, GI Status, Hemodynamic Status, Meal Time Behavior, Fluid Status or Edema, Weight, Skin, Nutrition Focused Physical Findings    Discharge Planning:    No discharge needs at this time     Keagan Garcia RD, LD  Contact: [phone]    
in sodium chloride 0.9 % 250 mL IVPB, 0.32 mmol/kg, PRN  benzocaine-menthol, 1 lozenge, Q2H PRN  benzonatate, 100 mg, TID PRN  calcium carbonate, 500 mg, TID PRN  hydrALAZINE, 10 mg, Q6H PRN  melatonin, 3 mg, Nightly PRN  Polyvinyl Alcohol-Povidone PF, 1 drop, PRN  sodium chloride, 1 spray, PRN  sodium chloride flush, 5-40 mL, PRN  sodium chloride, , PRN  potassium chloride, 40 mEq, PRN   Or  potassium alternative oral replacement, 40 mEq, PRN   Or  potassium chloride, 10 mEq, PRN  magnesium sulfate, 2,000 mg, PRN  polyethylene glycol, 17 g, Daily PRN  prochlorperazine, 10 mg, Q6H PRN  glucose, 4 tablet, PRN  dextrose bolus, 125 mL, PRN   Or  dextrose bolus, 250 mL, PRN  glucagon (rDNA), 1 mg, PRN  dextrose, , Continuous PRN      Continuous Infusions:   sodium chloride      sodium chloride      dextrose         Review of Systems  All systems reviewed. All negative except for symptoms mentioned in HPI     OBJECTIVE    BP (!) 125/41   Pulse 81   Temp 98.1 °F (36.7 °C) (Oral)   Resp 19   Ht 1.651 m (5' 5\")   Wt 82.7 kg (182 lb 4.8 oz)   SpO2 99%   BMI 30.34 kg/m²     Intake/Output Summary (Last 24 hours) at 12/14/2024 1336  Last data filed at 12/13/2024 2110  Gross per 24 hour   Intake 10 ml   Output --   Net 10 ml           Physical examination:  General: awake alert, oriented x3  HEENT: normocephalic non traumatic, no exophthalmos   Neck: supple, No thyroid tenderness,  Pulm: good equal air entry no added sounds  CVS: S1 + S2  Abd: soft lax, distended abdomen  Skin: warm, no lesions, no rash. No open wounds, no ulcers   Neuro: CN intact, sensation decreased bilateral , muscle power normal  Psych: normal mood, and affect    Review of Laboratory Data:  I personally reviewed the following labs:   Recent Labs     12/12/24  0625 12/13/24  0600 12/14/24  0720   WBC 1.5* 1.7* 1.6*   RBC 2.70* 2.30* 2.24*   HGB 8.5* 7.2* 7.1*   HCT 26.8* 22.7* 22.5*   MCV 99.3 98.7 100.4*   MCH 31.5 31.3 31.7   MCHC 31.7* 31.7* 31.6* 
was sitting on the side of the bed, alert and oriented, with full capacity for medical decision.  She tells me she had discussed with her daughters over the weekend about her CODE STATUS, she tells me that she wishes to be resuscitated, she is not ready to give up or consider comfort measures, she is not interested to discuss hospice.  Plan is to continue to pursue full aggressive treatment, further informing she has a strong Yazdanism belief, and trust that she will get better.  She reports no further need from palliative medicine standpoint.  She denies having any symptoms.  We will sign off for now.  Please reconsult if new PM needs arises.    Prognosis: Guarded    OBJECTIVE:     BP (!) 126/50   Pulse 91   Temp 98 °F (36.7 °C) (Oral)   Resp 16   Ht 1.651 m (5' 5\")   Wt 82.6 kg (182 lb 1.6 oz)   SpO2 100%   BMI 30.30 kg/m²     Physical Examination:  Gen: elderly, thin, NAD, awake, alert   Lungs: respirations easy   Heart: regular rate and rhythm, distant heart tones,   Abdomen: normoactive bowel sounds, soft, non-tender  Extremities: no clubbing, cyanosis or edema  Skin: warm, dry without rashes, lesions, bruising  Neuro: awake, alert, oriented x 3, follows commands, no gross neurologic deficit    Objective data reviewed: labs, images, records, medication use, vitals, and chart    Time/Communication  Greater than 50% of time spent, total 25 minutes in counseling and coordination of care at the bedside regarding goals of care and symptom management.    Thank you for allowing Palliative Medicine to participate in the care of Lisa Hunt.    Note: This report was completed using computerize voiced recognition software.  Every effort has been made to ensure accuracy; however, inadvertent computerized transcription errors may be present.   
2.12*  --  2.70*   HGB 7.0* 6.9* 8.1* 8.5*   HCT 21.7* 21.4* 26.5* 26.8*   MCV 98.6 100.9*  --  99.3   MCH 31.8 32.5  --  31.5   MCHC 32.3 32.2  --  31.7*   RDW 14.3 14.4  --  15.6*   PLT 35* 39*  --  35*   MPV 11.4 11.9  --  11.8     Recent Labs     12/10/24  0635 12/11/24  0702 12/12/24  0625    140 138   K 4.1 4.4 4.3    106 104   CO2 25 21* 21*   BUN 15 14 14   CREATININE 1.1* 1.1* 1.0   GLUCOSE 243* 187* 210*   CALCIUM 8.8 8.5* 9.4   BILITOT 3.2* 3.1* 5.9*   ALKPHOS 123* 103 119*   AST 31 28 38*   ALT 11 9 13     Beta-Hydroxybutyrate   Date Value Ref Range Status   12/05/2024 0.06 0.02 - 0.27 mmol/L Final   07/12/2024 0.15 0.02 - 0.27 mmol/L Final   05/26/2024 0.11 0.02 - 0.27 mmol/L Final     Lab Results   Component Value Date/Time    LABA1C 8.8 12/06/2024 07:04 AM    LABA1C 7.7 08/03/2024 10:11 AM    LABA1C 7.7 08/03/2024 07:55 AM     Lab Results   Component Value Date/Time    TSH 7.58 (H) 12/05/2024 10:49 AM    T4FREE 1.3 12/05/2024 10:49 AM     Lab Results   Component Value Date/Time    LABA1C 8.8 12/06/2024 07:04 AM    GLUCOSE 210 12/12/2024 06:25 AM    MALBCR  mg/g 07/05/2023 01:38 PM     Lab Results   Component Value Date/Time    TRIG 59 12/06/2024 07:04 AM    HDL 29 12/06/2024 07:04 AM    CHOL 88 12/06/2024 07:04 AM       Blood culture   No results found for: \"BC\"    Radiology:  IR US GUIDED PARACENTESIS   Final Result   Successful ultrasound-guided left paracentesis.         IR LOREE ART CATH ABD PELV LOWER EXT INIT 3RD+ ORDER   Final Result   1. There are foci of tumor blush extending from branches to segment 5.  There   is also brisk communication between the hepatic artery and portal fistula   involving a large branches also feeding the tumor vessels.  The tumor blush   was embolized with trans arterial embolization  TAE to slow flow.   2. 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 
pantoprazole (PROTONIX) tablet 40 mg  40 mg Oral QAM AC Eugenie Luna, DO   40 mg at 12/06/24 0604    spironolactone (ALDACTONE) tablet 50 mg  50 mg Oral Daily Eugenie Luna, DO   50 mg at 12/06/24 0822    vitamin D (CHOLECALCIFEROL) tablet 2,000 Units  2,000 Units Oral Daily Eugenie Luna, DO   2,000 Units at 12/06/24 0822    glucose chewable tablet 16 g  4 tablet Oral PRN Eugenie Luna, DO        dextrose bolus 10% 125 mL  125 mL IntraVENous PRN Eugenie Luna, DO        Or    dextrose bolus 10% 250 mL  250 mL IntraVENous PRN Eugenie Luna, DO        glucagon injection 1 mg  1 mg SubCUTAneous PRN Eugenie Luna, DO        dextrose 10 % infusion   IntraVENous Continuous PRN Eugenie Luna, DO        gabapentin (NEURONTIN) capsule 600 mg  600 mg Oral TID Eugenie Luna, DO   600 mg at 12/06/24 0822       PRN Medications  benzocaine-menthol, benzonatate, calcium carbonate, hydrALAZINE, melatonin, Polyvinyl Alcohol-Povidone PF, sodium chloride, sodium chloride flush, sodium chloride, potassium chloride **OR** potassium alternative oral replacement **OR** potassium chloride, magnesium sulfate, polyethylene glycol, prochlorperazine, glucose, dextrose bolus **OR** dextrose bolus, glucagon (rDNA), dextrose    Most Recent Labs  Lab Results   Component Value Date    WBC 1.5 (L) 12/06/2024    HGB 7.7 (L) 12/06/2024    HCT 24.0 (L) 12/06/2024    PLT 45 (L) 12/06/2024     12/06/2024    K 4.3 12/06/2024     12/06/2024    CREATININE 1.3 (H) 12/06/2024    BUN 32 (H) 12/06/2024    CO2 24 12/06/2024    GLUCOSE 214 (H) 12/06/2024    ALT 15 12/06/2024    AST 24 12/06/2024    INR 1.7 12/06/2024    APTT 27.7 12/05/2024    TSH 7.58 (H) 12/05/2024    LABA1C 8.8 (H) 12/06/2024       CT HEAD WO CONTRAST   Final Result   Portal vein thrombosis extending into the major intrahepatic portal branches,   splenic vein and superior portion of the superior mesenteric vein is new   since 
significantly changed.    There is no evidence of abdominal wall hernia.  Bone windows reveal no  evidence of acute fracture t or destructive bony lesion.    Impression  Portal vein thrombosis extending into the major intrahepatic portal branches,  splenic vein and superior portion of the superior mesenteric vein is new  since 07/08/2024.    Large amount of ascites has increased.    Low-attenuation foci within the hepatic dome may represent treated neoplasm.  Local recurrence is difficult to exclude but there is no evidence of new  lesion elsewhere within the liver.    Findings consistent with cirrhosis with portal venous hypertension and  splenomegaly.    Wedge-shaped low-attenuation regions within the liver may represent vascular  flow phenomenon but splenic infarcts are not excluded.    There is no evidence of acute intracranial abnormality.       Previous Endoscopies: No colonoscopy on file  No flexible sigmoidoscopy on file    Home Medications:  Current Facility-Administered Medications   Medication Dose Route Frequency Provider Last Rate Last Admin    pantoprazole (PROTONIX) tablet 40 mg  40 mg Oral BID Anil Galvan MD        rifAXIMin (XIFAXAN) tablet 550 mg  550 mg Oral BID Jorge Rivas DO        magnesium sulfate 4000 mg in 100 mL IVPB premix  4,000 mg IntraVENous Once Luigi Low MD        sodium phosphate 30 mmol in sodium chloride 0.9 % 500 mL IVPB  30 mmol IntraVENous Once Luigi Low MD        sodium phosphate 13.53 mmol in sodium chloride 0.9 % 250 mL IVPB  0.16 mmol/kg IntraVENous PRN Nilsa George MD        Or    sodium phosphate 27.06 mmol in sodium chloride 0.9 % 250 mL IVPB  0.32 mmol/kg IntraVENous PRN Nilsa George MD        heparin (porcine) injection 5,000 Units  5,000 Units SubCUTAneous 3 times per day Brent Veras DO   5,000 Units at 12/07/24 0613    furosemide (LASIX) injection 40 mg  40 mg IntraVENous BID Tasha Cuenca APRN - CNP   40 mg at 12/07/24 0924 
23.1 (L) 12/07/2024    PLT 41 (L) 12/07/2024     12/07/2024    K 4.0 12/07/2024     12/07/2024    CREATININE 1.2 (H) 12/07/2024    BUN 24 (H) 12/07/2024    CO2 27 12/07/2024    GLUCOSE 167 (H) 12/07/2024    ALT 12 12/07/2024    AST 30 12/07/2024    INR 1.7 12/06/2024    APTT 27.7 12/05/2024    TSH 7.58 (H) 12/05/2024    LABA1C 8.8 (H) 12/06/2024       CT HEAD WO CONTRAST   Final Result   Portal vein thrombosis extending into the major intrahepatic portal branches,   splenic vein and superior portion of the superior mesenteric vein is new   since 07/08/2024.      Large amount of ascites has increased.      Low-attenuation foci within the hepatic dome may represent treated neoplasm.   Local recurrence is difficult to exclude but there is no evidence of new   lesion elsewhere within the liver.      Findings consistent with cirrhosis with portal venous hypertension and   splenomegaly.      Wedge-shaped low-attenuation regions within the liver may represent vascular   flow phenomenon but splenic infarcts are not excluded.      There is no evidence of acute intracranial abnormality.         CT ABDOMEN PELVIS W IV CONTRAST Additional Contrast? None   Final Result   Portal vein thrombosis extending into the major intrahepatic portal branches,   splenic vein and superior portion of the superior mesenteric vein is new   since 07/08/2024.      Large amount of ascites has increased.      Low-attenuation foci within the hepatic dome may represent treated neoplasm.   Local recurrence is difficult to exclude but there is no evidence of new   lesion elsewhere within the liver.      Findings consistent with cirrhosis with portal venous hypertension and   splenomegaly.      Wedge-shaped low-attenuation regions within the liver may represent vascular   flow phenomenon but splenic infarcts are not excluded.      There is no evidence of acute intracranial abnormality.         XR CHEST PORTABLE   Final Result   Low volumes 
melatonin tablet 3 mg  3 mg Oral Nightly PRN Eugenie Luna, DO   3 mg at 12/05/24 2153    Polyvinyl Alcohol-Povidone PF (REFRESH) 1.4-0.6 % ophthalmic solution 1 drop  1 drop Both Eyes PRN Eugenie Luna M, DO        sodium chloride (OCEAN, BABY AYR) 0.65 % nasal spray 1 spray  1 spray Each Nostril PRN Eugenie Luna, DO        sodium chloride flush 0.9 % injection 5-40 mL  5-40 mL IntraVENous 2 times per day Eugenie Luna, DO   10 mL at 12/08/24 2201    sodium chloride flush 0.9 % injection 5-40 mL  5-40 mL IntraVENous PRN Eugenie Luna M, DO   10 mL at 12/06/24 1932    0.9 % sodium chloride infusion   IntraVENous PRN Eugenie Luna, DO        potassium chloride (KLOR-CON M) extended release tablet 40 mEq  40 mEq Oral PRN Eugenie Luna, DO        Or    potassium bicarb-citric acid (EFFER-K) effervescent tablet 40 mEq  40 mEq Oral PRN Eugenie Luna M, DO        Or    potassium chloride 10 mEq/100 mL IVPB (Peripheral Line)  10 mEq IntraVENous PRN Eugenie Luna, DO        magnesium sulfate 2000 mg in 50 mL IVPB premix  2,000 mg IntraVENous PRN Eugenie Luna M, DO        polyethylene glycol (GLYCOLAX) packet 17 g  17 g Oral Daily PRN Eugenie Luna, DO        prochlorperazine (COMPAZINE) injection 10 mg  10 mg IntraVENous Q6H PRN Eugenie Luna, DO   10 mg at 12/05/24 2152    carvedilol (COREG) tablet 3.125 mg  3.125 mg Oral Daily Eugenie Luna, DO   3.125 mg at 12/08/24 0910    insulin glargine (LANTUS) injection vial 50 Units  50 Units SubCUTAneous Nightly Eugenie Luna, DO   50 Units at 12/08/24 2147    insulin lispro (HUMALOG,ADMELOG) injection vial 18 Units  18 Units SubCUTAneous TID AC Eugenie Luna, DO   18 Units at 12/08/24 1724    vitamin D (CHOLECALCIFEROL) tablet 2,000 Units  2,000 Units Oral Daily Eugenie Luna DO   2,000 Units at 12/08/24 0910    glucose chewable tablet 16 g  4 tablet Oral PRN Eugenie Luna DO        dextrose bolus 
  4. No signs of significant tumor blush in segment 6 and 8   5. No signs of tumor blush in the left hepatic arteries that extend near   segment 4 and 5   6. 3D reconstruction at a separate workstation was performed to evaluate for   residual tumor blush given the overlap of arteries feeding this region and   the large arterial-venous fistula   7. Successful closure of the right groin using a 6 [place] Angio-Seal.         IR VASC EMBOLIZE OCCLUDE ARTERY   Final Result   1. There are foci of tumor blush extending from branches to segment 5.  There   is also brisk communication between the hepatic artery and portal fistula   involving a large branches also feeding the tumor vessels.  The tumor blush   was embolized with trans arterial embolization  TAE to slow flow.   2. 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   3. No signs of significant residual tumor blush for the neoplasm noted at   segment 7-8 hepatic artery angiogram   4. No signs of significant tumor blush in segment 6 and 8   5. No signs of tumor blush in the left hepatic arteries that extend near   segment 4 and 5   6. 3D reconstruction at a separate workstation was performed to evaluate for   residual tumor blush given the overlap of arteries feeding this region and   the large arterial-venous fistula   7. Successful closure of the right groin using a 6 [place] Angio-Seal.         IR EMBOLIZATION TUMOR/ORGAN   Final Result   1. There are foci of tumor blush extending from branches to segment 5.  There   is also brisk communication between the hepatic artery and portal fistula   involving a large branches also feeding the tumor vessels.  The tumor blush   was embolized with trans arterial embolization  TAE to slow flow.   2. 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 
  with development of a necrotic component in the more anterior aspect of the   lesion with overall mild decrease in overall bulkiness of the lesion.         CT HEAD WO CONTRAST   Final Result   Portal vein thrombosis extending into the major intrahepatic portal branches,   splenic vein and superior portion of the superior mesenteric vein is new   since 07/08/2024.      Large amount of ascites has increased.      Low-attenuation foci within the hepatic dome may represent treated neoplasm.   Local recurrence is difficult to exclude but there is no evidence of new   lesion elsewhere within the liver.      Findings consistent with cirrhosis with portal venous hypertension and   splenomegaly.      Wedge-shaped low-attenuation regions within the liver may represent vascular   flow phenomenon but splenic infarcts are not excluded.      There is no evidence of acute intracranial abnormality.         CT ABDOMEN PELVIS W IV CONTRAST Additional Contrast? None   Final Result   Portal vein thrombosis extending into the major intrahepatic portal branches,   splenic vein and superior portion of the superior mesenteric vein is new   since 07/08/2024.      Large amount of ascites has increased.      Low-attenuation foci within the hepatic dome may represent treated neoplasm.   Local recurrence is difficult to exclude but there is no evidence of new   lesion elsewhere within the liver.      Findings consistent with cirrhosis with portal venous hypertension and   splenomegaly.      Wedge-shaped low-attenuation regions within the liver may represent vascular   flow phenomenon but splenic infarcts are not excluded.      There is no evidence of acute intracranial abnormality.         XR CHEST PORTABLE   Final Result   Low volumes with mild chronic changes seen throughout the lung fields with   vascular congestion.  No definite acute parenchymal disease.         IR US GUIDED PARACENTESIS    (Results Pending)   IR INTERVENTIONAL RADIOLOGY 
 The tumor blush   was embolized with trans arterial embolization  TAE to slow flow.   2. 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   3. No signs of significant residual tumor blush for the neoplasm noted at   segment 7-8 hepatic artery angiogram   4. No signs of significant tumor blush in segment 6 and 8   5. No signs of tumor blush in the left hepatic arteries that extend near   segment 4 and 5   6. 3D reconstruction at a separate workstation was performed to evaluate for   residual tumor blush given the overlap of arteries feeding this region and   the large arterial-venous fistula   7. Successful closure of the right groin using a 6 Romansh Angio-Seal.         IR EMBOLIZATION TUMOR/ORGAN   Final Result   1. There are foci of tumor blush extending from branches to segment 5.  There   is also brisk communication between the hepatic artery and portal fistula   involving a large branches also feeding the tumor vessels.  The tumor blush   was embolized with trans arterial embolization  TAE to slow flow.   2. 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   3. No signs of significant residual tumor blush for the neoplasm noted at   segment 7-8 hepatic artery angiogram   4. No signs of significant tumor blush in segment 6 and 8   5. No signs of tumor blush in the left hepatic arteries that extend near   segment 4 and 5   6. 3D reconstruction at a separate workstation was performed to evaluate for   residual tumor blush given the overlap of arteries feeding this region and   the large arterial-venous fistula   7. Successful closure of the right groin using a 6 Romansh Angio-Seal.         IR ANGIOGRAM HEPATIC   Final Result   1. There are foci of tumor blush extending from 
voice recognition software. Every effort was made to ensure accuracy; however, inadvertent computerized transcription errors may be present.    I can be reached through [employer].   
  6. 3D reconstruction at a separate workstation was performed to evaluate for   residual tumor blush given the overlap of arteries feeding this region and   the large arterial-venous fistula   7. Successful closure of the right groin using a 6 Amharic Angio-Seal.         IR EMBOLIZATION TUMOR/ORGAN   Final Result   1. There are foci of tumor blush extending from branches to segment 5.  There   is also brisk communication between the hepatic artery and portal fistula   involving a large branches also feeding the tumor vessels.  The tumor blush   was embolized with trans arterial embolization  TAE to slow flow.   2. 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   3. No signs of significant residual tumor blush for the neoplasm noted at   segment 7-8 hepatic artery angiogram   4. No signs of significant tumor blush in segment 6 and 8   5. No signs of tumor blush in the left hepatic arteries that extend near   segment 4 and 5   6. 3D reconstruction at a separate workstation was performed to evaluate for   residual tumor blush given the overlap of arteries feeding this region and   the large arterial-venous fistula   7. Successful closure of the right groin using a 6 Amharic Angio-Seal.         IR ANGIOGRAM HEPATIC   Final Result   1. There are foci of tumor blush extending from branches to segment 5.  There   is also brisk communication between the hepatic artery and portal fistula   involving a large branches also feeding the tumor vessels.  The tumor blush   was embolized with trans arterial embolization  TAE to slow flow.   2. 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   3. No signs of significant residual tumor blush for the neoplasm noted at   segment 7-8 hepatic 
  segment 4 and 5   6. 3D reconstruction at a separate workstation was performed to evaluate for   residual tumor blush given the overlap of arteries feeding this region and   the large arterial-venous fistula   7. Successful closure of the right groin using a 6 Latvian Angio-Seal.         IR ANGIOGRAM SELECTIVE EACH ADDITIONAL VESSEL   Final Result   1. There are foci of tumor blush extending from branches to segment 5.  There   is also brisk communication between the hepatic artery and portal fistula   involving a large branches also feeding the tumor vessels.  The tumor blush   was embolized with trans arterial embolization  TAE to slow flow.   2. 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   3. No signs of significant residual tumor blush for the neoplasm noted at   segment 7-8 hepatic artery angiogram   4. No signs of significant tumor blush in segment 6 and 8   5. No signs of tumor blush in the left hepatic arteries that extend near   segment 4 and 5   6. 3D reconstruction at a separate workstation was performed to evaluate for   residual tumor blush given the overlap of arteries feeding this region and   the large arterial-venous fistula   7. Successful closure of the right groin using a 6 Latvian Angio-Seal.         IR ANGIOGRAM SELECTIVE EACH ADDITIONAL VESSEL   Final Result   1. There are foci of tumor blush extending from branches to segment 5.  There   is also brisk communication between the hepatic artery and portal fistula   involving a large branches also feeding the tumor vessels.  The tumor blush   was embolized with trans arterial embolization  TAE to slow flow.   2. 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   3. No signs of significant residual 
rashes, induration    Assessment:        Hospital Problems             Last Modified POA    * (Principal) Decompensated cirrhosis (HCC) 12/5/2024 Yes    Cirrhosis of liver with ascites (HCC) 12/5/2024 Yes    Thrombocytopenia (HCC) 12/5/2024 Yes    Type 2 diabetes mellitus with hyperglycemia 12/5/2024 Yes    Ascites of liver 12/5/2024 Yes    Hepatocellular carcinoma (HCC) 12/5/2024 Yes       Plan:        ACOSTA cirrhosis-status post paracentesis 7.3 L drawn on December 6.  Pending repeat paracentesis tomorrow.  Ammonia within normal limits now.  Continue albumin per nephrology.  Holding diuretics due to DONNA.  DONNA-on CKD.  Baseline 1.2-1.4 creatinine.  Holding diuretics due to prerenal azotemia.  Nephrology following.  Follow labs.  Type 2 diabetes-endocrinology following.  A1c 8.8.  Continue Lantus.  SSI as needed.  Humalog 10 units 3 times daily.  Portal vein thrombosis-hepatobiliary recommends no anticoagulation at this time due to bleeding risk and presence of collateral flow. -CT triphasic performed. AVM embolization done.   Anemia-hemoglobin 7.8 from 7.1.  Likely secondary to cirrhosis.  Transfuse under 7.    Greater than 35 minutes spent on physical exam, chart, assessment and plan.    Jennifer Banda MD  12/15/2024  11:37 AM    
questions or concerns.     Discussed with Anil Conrad MD  Gastroenterology/Hepatology  [facility]  
tumor blush given the overlap of arteries feeding this region and   the large arterial-venous fistula   7. Successful closure of the right groin using a 6 Norwegian Angio-Seal.         CTA TRIPHASIC LIVER   Final Result   Status post recent Y90 microsphere embolization of the lead malignant lesion   of the right lobe of the liver with signs of partial post treatment response   with development of a necrotic component in the more anterior aspect of the   lesion with overall mild decrease in overall bulkiness of the lesion.         IR US GUIDED PARACENTESIS   Final Result   Successful ultrasound-guided therapeutic paracentesis.         CT HEAD WO CONTRAST   Final Result   Portal vein thrombosis extending into the major intrahepatic portal branches,   splenic vein and superior portion of the superior mesenteric vein is new   since 07/08/2024.      Large amount of ascites has increased.      Low-attenuation foci within the hepatic dome may represent treated neoplasm.   Local recurrence is difficult to exclude but there is no evidence of new   lesion elsewhere within the liver.      Findings consistent with cirrhosis with portal venous hypertension and   splenomegaly.      Wedge-shaped low-attenuation regions within the liver may represent vascular   flow phenomenon but splenic infarcts are not excluded.      There is no evidence of acute intracranial abnormality.         CT ABDOMEN PELVIS W IV CONTRAST Additional Contrast? None   Final Result   Portal vein thrombosis extending into the major intrahepatic portal branches,   splenic vein and superior portion of the superior mesenteric vein is new   since 07/08/2024.      Large amount of ascites has increased.      Low-attenuation foci within the hepatic dome may represent treated neoplasm.   Local recurrence is difficult to exclude but there is no evidence of new   lesion elsewhere within the liver.      Findings consistent with cirrhosis with portal venous hypertension and 
Result   Low volumes with mild chronic changes seen throughout the lung fields with   vascular congestion.  No definite acute parenchymal disease.             Assessment  Chronic kidney disease, stage IIIb, baseline creatinine 1.2 to 1.4 mg/dL. Etiology presumptively diabetic nephropathy and renal microvascular atherosclerotic disease in setting of longstanding diabetes, history of hypertension, though hepatorenal syndrome type II may be playing a role   Diabetes mellitus    Thrombocytopenia    ACOSTA cirrhosis     Sharp bump in her creatinine from 1.1 to 1.8 mg/dL, with mild improvement later in the day to 1.7 mg/dL.  Pressure did drop a fair amount, though not strictly low at the moment.  Already received Bumex dose today    Urine Na and Cl < 20 consistent with hepatorenal syndrome    Cr is 1.6 mg/dL (slightly above her baseline)  Swelling has improved     Recommendations  Continue Bumex at discharge  Continue aldactone at discharge   Paracentesis as clinically warranted per primary   SPA 25 g per 4 L at the time of her paracentesis  Follow labs  Ok to discharge from a renal standpoint  BMP in one week, please fax results to the office   Follow up in the office in 3-4 weeks     Electronically signed by OK Ashraf CNP on 12/17/2024 at 9:06 AM  
creatinine at 1.1  Urine Na and Cl < 20 consistent with hepatorenal syndrome    Plan  Stop IV fluid  Plan to start diuretic in form of Lasix tomorrow along with planning Aldactone the day after  Continue albumin 25 g every 8 hours   paracentesis as clinically warranted per primary  Follow labs  Monitor intake and output   Continue supportive renal care         Electronically signed by Miky Taylor MD on 12/11/2024 at 1:34 PM

## 2024-12-17 NOTE — PLAN OF CARE
Problem: ABCDS Injury Assessment  Goal: Absence of physical injury  Outcome: Progressing     Problem: Skin/Tissue Integrity  Goal: Absence of new skin breakdown  Description: 1.  Monitor for areas of redness and/or skin breakdown  2.  Assess vascular access sites hourly  3.  Every 4-6 hours minimum:  Change oxygen saturation probe site  4.  Every 4-6 hours:  If on nasal continuous positive airway pressure, respiratory therapy assess nares and determine need for appliance change or resting period.  Outcome: Progressing     
  Problem: Chronic Conditions and Co-morbidities  Goal: Patient's chronic conditions and co-morbidity symptoms are monitored and maintained or improved  12/9/2024 1241 by Chiquita Zhou RN  Outcome: Progressing  12/8/2024 2323 by Melissa Arguelles RN  Outcome: Not Progressing  Flowsheets (Taken 12/8/2024 2000)  Care Plan - Patient's Chronic Conditions and Co-Morbidity Symptoms are Monitored and Maintained or Improved: Monitor and assess patient's chronic conditions and comorbid symptoms for stability, deterioration, or improvement     Problem: Discharge Planning  Goal: Discharge to home or other facility with appropriate resources  12/9/2024 1241 by Chiquita Zhou RN  Outcome: Progressing  12/8/2024 2323 by Melissa Arguelles RN  Outcome: Not Progressing  Flowsheets (Taken 12/8/2024 2000)  Discharge to home or other facility with appropriate resources: Identify barriers to discharge with patient and caregiver     
  Problem: Chronic Conditions and Co-morbidities  Goal: Patient's chronic conditions and co-morbidity symptoms are monitored and maintained or improved  Outcome: Not Progressing  Flowsheets (Taken 12/8/2024 2000)  Care Plan - Patient's Chronic Conditions and Co-Morbidity Symptoms are Monitored and Maintained or Improved: Monitor and assess patient's chronic conditions and comorbid symptoms for stability, deterioration, or improvement     Problem: Discharge Planning  Goal: Discharge to home or other facility with appropriate resources  Outcome: Not Progressing  Flowsheets (Taken 12/8/2024 2000)  Discharge to home or other facility with appropriate resources: Identify barriers to discharge with patient and caregiver     Problem: Chronic Conditions and Co-morbidities  Goal: Patient's chronic conditions and co-morbidity symptoms are monitored and maintained or improved  Outcome: Not Progressing  Flowsheets (Taken 12/8/2024 2000)  Care Plan - Patient's Chronic Conditions and Co-Morbidity Symptoms are Monitored and Maintained or Improved: Monitor and assess patient's chronic conditions and comorbid symptoms for stability, deterioration, or improvement     Problem: Discharge Planning  Goal: Discharge to home or other facility with appropriate resources  Outcome: Not Progressing  Flowsheets (Taken 12/8/2024 2000)  Discharge to home or other facility with appropriate resources: Identify barriers to discharge with patient and caregiver     
  Problem: Safety - Adult  Goal: Free from fall injury  12/10/2024 0035 by Varghese Stiles RN  Outcome: Progressing  12/9/2024 1241 by Chiquita Zhou RN  Outcome: Progressing     Problem: Chronic Conditions and Co-morbidities  Goal: Patient's chronic conditions and co-morbidity symptoms are monitored and maintained or improved  12/10/2024 0035 by Varghese Stiles RN  Outcome: Progressing  12/9/2024 1241 by Chiquita Zhou RN  Outcome: Progressing     Problem: Discharge Planning  Goal: Discharge to home or other facility with appropriate resources  12/10/2024 0035 by Varghese Stiles RN  Outcome: Progressing  12/9/2024 1241 by Chiquita Zhou RN  Outcome: Progressing     Problem: ABCDS Injury Assessment  Goal: Absence of physical injury  12/10/2024 0035 by Varghese Stiles RN  Outcome: Progressing  12/9/2024 1241 by Chiquita Zhou RN  Outcome: Progressing     Problem: Skin/Tissue Integrity  Goal: Absence of new skin breakdown  Description: 1.  Monitor for areas of redness and/or skin breakdown  2.  Assess vascular access sites hourly  3.  Every 4-6 hours minimum:  Change oxygen saturation probe site  4.  Every 4-6 hours:  If on nasal continuous positive airway pressure, respiratory therapy assess nares and determine need for appliance change or resting period.  12/10/2024 0035 by Varghese Stiles RN  Outcome: Progressing  12/9/2024 1241 by Chiquita Zhou RN  Outcome: Progressing     Problem: Pain  Goal: Verbalizes/displays adequate comfort level or baseline comfort level  12/10/2024 0035 by Varghese Stiles RN  Outcome: Progressing  12/9/2024 1241 by Chiquita Zhou RN  Outcome: Progressing     
  Problem: Safety - Adult  Goal: Free from fall injury  12/10/2024 1154 by Chiquita Zhou RN  Outcome: Progressing  12/10/2024 0035 by Varghese Stiles RN  Outcome: Progressing     Problem: Chronic Conditions and Co-morbidities  Goal: Patient's chronic conditions and co-morbidity symptoms are monitored and maintained or improved  12/10/2024 1154 by Chiquita Zhou RN  Outcome: Progressing  12/10/2024 0035 by Varghese Stiles RN  Outcome: Progressing  Flowsheets (Taken 12/9/2024 2005)  Care Plan - Patient's Chronic Conditions and Co-Morbidity Symptoms are Monitored and Maintained or Improved:   Monitor and assess patient's chronic conditions and comorbid symptoms for stability, deterioration, or improvement   Collaborate with multidisciplinary team to address chronic and comorbid conditions and prevent exacerbation or deterioration   Update acute care plan with appropriate goals if chronic or comorbid symptoms are exacerbated and prevent overall improvement and discharge     Problem: Discharge Planning  Goal: Discharge to home or other facility with appropriate resources  12/10/2024 1154 by Chiquita Zhou RN  Outcome: Progressing  12/10/2024 0035 by Varghese Stiles RN  Outcome: Progressing  Flowsheets (Taken 12/9/2024 2005)  Discharge to home or other facility with appropriate resources: Identify barriers to discharge with patient and caregiver     Problem: ABCDS Injury Assessment  Goal: Absence of physical injury  12/10/2024 1154 by Chiquita Zhou RN  Outcome: Progressing  12/10/2024 0035 by Varghese Stiles RN  Outcome: Progressing     Problem: Skin/Tissue Integrity  Goal: Absence of new skin breakdown  Description: 1.  Monitor for areas of redness and/or skin breakdown  2.  Assess vascular access sites hourly  3.  Every 4-6 hours minimum:  Change oxygen saturation probe site  4.  Every 4-6 hours:  If on nasal continuous positive airway pressure, respiratory therapy assess nares and determine 
  Problem: Safety - Adult  Goal: Free from fall injury  12/11/2024 1029 by Carlton Schulte RN  Outcome: Progressing  12/11/2024 0638 by Kym Alexis RN  Outcome: Progressing     Problem: Chronic Conditions and Co-morbidities  Goal: Patient's chronic conditions and co-morbidity symptoms are monitored and maintained or improved  12/11/2024 1029 by Carlton Schulte RN  Outcome: Progressing  12/11/2024 0638 by Kym Alexis RN  Outcome: Progressing     Problem: Discharge Planning  Goal: Discharge to home or other facility with appropriate resources  12/11/2024 1029 by Carlton Schulte RN  Outcome: Progressing  12/11/2024 0638 by Kym Alexis RN  Outcome: Progressing     Problem: ABCDS Injury Assessment  Goal: Absence of physical injury  12/11/2024 1029 by Carlton Schulte RN  Outcome: Progressing  12/11/2024 0638 by Kym Alexis RN  Outcome: Progressing     Problem: Skin/Tissue Integrity  Goal: Absence of new skin breakdown  Description: 1.  Monitor for areas of redness and/or skin breakdown  2.  Assess vascular access sites hourly  3.  Every 4-6 hours minimum:  Change oxygen saturation probe site  4.  Every 4-6 hours:  If on nasal continuous positive airway pressure, respiratory therapy assess nares and determine need for appliance change or resting period.  12/11/2024 1029 by Carlton Schulte RN  Outcome: Progressing  12/11/2024 0638 by Kym Alexis RN  Outcome: Progressing     Problem: Pain  Goal: Verbalizes/displays adequate comfort level or baseline comfort level  12/11/2024 1029 by Carlton Schulte RN  Outcome: Progressing  12/11/2024 0638 by Kym Alexis RN  Outcome: Progressing  Flowsheets (Taken 12/10/2024 2045)  Verbalizes/displays adequate comfort level or baseline comfort level: Encourage patient to monitor pain and request assistance     
  Problem: Safety - Adult  Goal: Free from fall injury  12/12/2024 1500 by Beatris Jarvis RN  Outcome: Progressing  12/12/2024 0219 by Jacque Hussein RN  Outcome: Progressing     Problem: Chronic Conditions and Co-morbidities  Goal: Patient's chronic conditions and co-morbidity symptoms are monitored and maintained or improved  12/12/2024 1500 by Beatris Jarvis RN  Outcome: Progressing  12/12/2024 0219 by Jacque Hussein RN  Outcome: Progressing     Problem: ABCDS Injury Assessment  Goal: Absence of physical injury  12/12/2024 1500 by Beatris Jarvis RN  Outcome: Progressing  12/12/2024 0219 by Jacque Hussein RN  Outcome: Progressing     Problem: Skin/Tissue Integrity  Goal: Absence of new skin breakdown  Description: 1.  Monitor for areas of redness and/or skin breakdown  2.  Assess vascular access sites hourly  3.  Every 4-6 hours minimum:  Change oxygen saturation probe site  4.  Every 4-6 hours:  If on nasal continuous positive airway pressure, respiratory therapy assess nares and determine need for appliance change or resting period.  12/12/2024 1500 by Beatris Jarvis RN  Outcome: Progressing  12/12/2024 0219 by Jacque Hussein RN  Outcome: Progressing     
  Problem: Safety - Adult  Goal: Free from fall injury  12/13/2024 0024 by Jacque Hussein RN  Outcome: Progressing  12/12/2024 1500 by Beatris Jarvis RN  Outcome: Progressing     Problem: Chronic Conditions and Co-morbidities  Goal: Patient's chronic conditions and co-morbidity symptoms are monitored and maintained or improved  12/13/2024 0024 by Jacque Hussein RN  Outcome: Progressing  12/12/2024 1500 by Beatris Jarvis RN  Outcome: Progressing     Problem: Discharge Planning  Goal: Discharge to home or other facility with appropriate resources  Outcome: Progressing     Problem: ABCDS Injury Assessment  Goal: Absence of physical injury  12/13/2024 0024 by Jacque Hussein RN  Outcome: Progressing  12/12/2024 1500 by Beatris Jarvis RN  Outcome: Progressing     Problem: Skin/Tissue Integrity  Goal: Absence of new skin breakdown  Description: 1.  Monitor for areas of redness and/or skin breakdown  2.  Assess vascular access sites hourly  3.  Every 4-6 hours minimum:  Change oxygen saturation probe site  4.  Every 4-6 hours:  If on nasal continuous positive airway pressure, respiratory therapy assess nares and determine need for appliance change or resting period.  12/13/2024 0024 by Jacque Hussein RN  Outcome: Progressing  12/12/2024 1500 by Beatris Jarvis RN  Outcome: Progressing     Problem: Pain  Goal: Verbalizes/displays adequate comfort level or baseline comfort level  Outcome: Progressing     Problem: Nutrition Deficit:  Goal: Optimize nutritional status  Outcome: Progressing     
  Problem: Safety - Adult  Goal: Free from fall injury  12/13/2024 2208 by Melissa Arguelles RN  Outcome: Progressing  12/13/2024 2208 by Melissa Arguelles RN  Outcome: Progressing     Problem: Chronic Conditions and Co-morbidities  Goal: Patient's chronic conditions and co-morbidity symptoms are monitored and maintained or improved  12/13/2024 2208 by Melissa Arguelles RN  Outcome: Progressing  12/13/2024 2208 by Melissa Arguelles RN  Outcome: Progressing  Flowsheets (Taken 12/13/2024 2133)  Care Plan - Patient's Chronic Conditions and Co-Morbidity Symptoms are Monitored and Maintained or Improved: Monitor and assess patient's chronic conditions and comorbid symptoms for stability, deterioration, or improvement     Problem: Discharge Planning  Goal: Discharge to home or other facility with appropriate resources  12/13/2024 2208 by Melissa Arguelles RN  Outcome: Progressing  12/13/2024 2208 by Melissa Arguelles RN  Outcome: Progressing  Flowsheets (Taken 12/13/2024 2133)  Discharge to home or other facility with appropriate resources: Identify barriers to discharge with patient and caregiver     Problem: ABCDS Injury Assessment  Goal: Absence of physical injury  12/13/2024 2208 by Melissa Arguelles RN  Outcome: Progressing  12/13/2024 2208 by Melissa Arguelles RN  Outcome: Progressing     Problem: Skin/Tissue Integrity  Goal: Absence of new skin breakdown  Description: 1.  Monitor for areas of redness and/or skin breakdown  2.  Assess vascular access sites hourly  3.  Every 4-6 hours minimum:  Change oxygen saturation probe site  4.  Every 4-6 hours:  If on nasal continuous positive airway pressure, respiratory therapy assess nares and determine need for appliance change or resting period.  12/13/2024 2208 by Melissa Arguelles RN  Outcome: Progressing  12/13/2024 2208 by Melissa Arguelles RN  Outcome: Progressing     Problem: Pain  Goal: Verbalizes/displays adequate comfort level or baseline comfort 
  Problem: Safety - Adult  Goal: Free from fall injury  12/14/2024 0914 by Wilver Reed RN  Outcome: Progressing     Problem: Discharge Planning  Goal: Discharge to home or other facility with appropriate resources  12/14/2024 0914 by Wilver Reed RN  Outcome: Progressing     Problem: ABCDS Injury Assessment  Goal: Absence of physical injury  12/14/2024 2131 by Eve Fuentes RN  Outcome: Progressing  12/14/2024 0914 by Wilver Reed RN  Outcome: Progressing     Problem: Skin/Tissue Integrity  Goal: Absence of new skin breakdown  Description: 1.  Monitor for areas of redness and/or skin breakdown  2.  Assess vascular access sites hourly  3.  Every 4-6 hours minimum:  Change oxygen saturation probe site  4.  Every 4-6 hours:  If on nasal continuous positive airway pressure, respiratory therapy assess nares and determine need for appliance change or resting period.  12/14/2024 2131 by Eve Fuentes RN  Outcome: Progressing  12/14/2024 0914 by Wilver Reed RN  Outcome: Progressing     Problem: Pain  Goal: Verbalizes/displays adequate comfort level or baseline comfort level  12/14/2024 2131 by Eve Fuentes RN  Outcome: Progressing  12/14/2024 0914 by Wilver Reed RN  Outcome: Progressing  Flowsheets (Taken 12/13/2024 2352 by Varghese Stiles RN)  Verbalizes/displays adequate comfort level or baseline comfort level: Encourage patient to monitor pain and request assistance     
  Problem: Safety - Adult  Goal: Free from fall injury  12/15/2024 2218 by Eve Fuentes RN  Outcome: Progressing  12/15/2024 0824 by Wilver Reed RN  Outcome: Progressing     Problem: Discharge Planning  Goal: Discharge to home or other facility with appropriate resources  12/15/2024 0824 by Wilver Reed RN  Outcome: Progressing     Problem: ABCDS Injury Assessment  Goal: Absence of physical injury  12/15/2024 2218 by Eve Fuentes RN  Outcome: Progressing  12/15/2024 0824 by Wilver Reed RN  Outcome: Progressing     Problem: Skin/Tissue Integrity  Goal: Absence of new skin breakdown  Description: 1.  Monitor for areas of redness and/or skin breakdown  2.  Assess vascular access sites hourly  3.  Every 4-6 hours minimum:  Change oxygen saturation probe site  4.  Every 4-6 hours:  If on nasal continuous positive airway pressure, respiratory therapy assess nares and determine need for appliance change or resting period.  12/15/2024 2218 by Eve Fuentes RN  Outcome: Progressing  12/15/2024 0824 by Wilver Reed RN  Outcome: Progressing     
  Problem: Safety - Adult  Goal: Free from fall injury  12/16/2024 0849 by Wilver Reed RN  Outcome: Progressing  12/15/2024 2218 by Eve Fuentes RN  Outcome: Progressing     Problem: Chronic Conditions and Co-morbidities  Goal: Patient's chronic conditions and co-morbidity symptoms are monitored and maintained or improved  Outcome: Progressing     Problem: Discharge Planning  Goal: Discharge to home or other facility with appropriate resources  Outcome: Progressing     Problem: ABCDS Injury Assessment  Goal: Absence of physical injury  12/16/2024 0849 by Wilver Reed RN  Outcome: Progressing  12/15/2024 2218 by Eve Fuentes RN  Outcome: Progressing     Problem: Skin/Tissue Integrity  Goal: Absence of new skin breakdown  Description: 1.  Monitor for areas of redness and/or skin breakdown  2.  Assess vascular access sites hourly  3.  Every 4-6 hours minimum:  Change oxygen saturation probe site  4.  Every 4-6 hours:  If on nasal continuous positive airway pressure, respiratory therapy assess nares and determine need for appliance change or resting period.  12/16/2024 0849 by Wilver Reed RN  Outcome: Progressing  12/15/2024 2218 by Eve Fuentes RN  Outcome: Progressing     Problem: Pain  Goal: Verbalizes/displays adequate comfort level or baseline comfort level  12/16/2024 0849 by Wilver Reed RN  Outcome: Progressing  12/15/2024 2218 by Eve Fuentes RN  Outcome: Progressing     Problem: Nutrition Deficit:  Goal: Optimize nutritional status  Outcome: Progressing     
  Problem: Safety - Adult  Goal: Free from fall injury  Outcome: Progressing     Problem: Chronic Conditions and Co-morbidities  Goal: Patient's chronic conditions and co-morbidity symptoms are monitored and maintained or improved  Outcome: Progressing     Problem: Discharge Planning  Goal: Discharge to home or other facility with appropriate resources  Outcome: Progressing     Problem: ABCDS Injury Assessment  Goal: Absence of physical injury  12/15/2024 0824 by Wilver Reed RN  Outcome: Progressing  12/14/2024 2131 by Eve Fuentes RN  Outcome: Progressing     Problem: Skin/Tissue Integrity  Goal: Absence of new skin breakdown  Description: 1.  Monitor for areas of redness and/or skin breakdown  2.  Assess vascular access sites hourly  3.  Every 4-6 hours minimum:  Change oxygen saturation probe site  4.  Every 4-6 hours:  If on nasal continuous positive airway pressure, respiratory therapy assess nares and determine need for appliance change or resting period.  12/15/2024 0824 by Wilver Reed RN  Outcome: Progressing  12/14/2024 2131 by Eve Fuentes RN  Outcome: Progressing     Problem: Pain  Goal: Verbalizes/displays adequate comfort level or baseline comfort level  12/15/2024 0824 by Wilver Reed RN  Outcome: Progressing  12/14/2024 2131 by Eve Fuentes RN  Outcome: Progressing     Problem: Nutrition Deficit:  Goal: Optimize nutritional status  Outcome: Progressing     
  Problem: Safety - Adult  Goal: Free from fall injury  Outcome: Progressing     Problem: Chronic Conditions and Co-morbidities  Goal: Patient's chronic conditions and co-morbidity symptoms are monitored and maintained or improved  Outcome: Progressing     Problem: Discharge Planning  Goal: Discharge to home or other facility with appropriate resources  Outcome: Progressing     Problem: ABCDS Injury Assessment  Goal: Absence of physical injury  Outcome: Progressing     Problem: Skin/Tissue Integrity  Goal: Absence of new skin breakdown  Description: 1.  Monitor for areas of redness and/or skin breakdown  2.  Assess vascular access sites hourly  3.  Every 4-6 hours minimum:  Change oxygen saturation probe site  4.  Every 4-6 hours:  If on nasal continuous positive airway pressure, respiratory therapy assess nares and determine need for appliance change or resting period.  Outcome: Progressing     Problem: Pain  Goal: Verbalizes/displays adequate comfort level or baseline comfort level  Outcome: Progressing     
  Problem: Safety - Adult  Goal: Free from fall injury  Outcome: Progressing     Problem: Chronic Conditions and Co-morbidities  Goal: Patient's chronic conditions and co-morbidity symptoms are monitored and maintained or improved  Outcome: Progressing     Problem: Discharge Planning  Goal: Discharge to home or other facility with appropriate resources  Outcome: Progressing     Problem: ABCDS Injury Assessment  Goal: Absence of physical injury  Outcome: Progressing     Problem: Skin/Tissue Integrity  Goal: Absence of new skin breakdown  Description: 1.  Monitor for areas of redness and/or skin breakdown  2.  Assess vascular access sites hourly  3.  Every 4-6 hours minimum:  Change oxygen saturation probe site  4.  Every 4-6 hours:  If on nasal continuous positive airway pressure, respiratory therapy assess nares and determine need for appliance change or resting period.  Outcome: Progressing     Problem: Pain  Goal: Verbalizes/displays adequate comfort level or baseline comfort level  Outcome: Progressing  Flowsheets  Taken 12/16/2024 2330  Verbalizes/displays adequate comfort level or baseline comfort level:   Encourage patient to monitor pain and request assistance   Assess pain using appropriate pain scale  Taken 12/16/2024 2015  Verbalizes/displays adequate comfort level or baseline comfort level:   Encourage patient to monitor pain and request assistance   Assess pain using appropriate pain scale     Problem: Nutrition Deficit:  Goal: Optimize nutritional status  Outcome: Progressing     
  Problem: Safety - Adult  Goal: Free from fall injury  Outcome: Progressing     Problem: Chronic Conditions and Co-morbidities  Goal: Patient's chronic conditions and co-morbidity symptoms are monitored and maintained or improved  Outcome: Progressing     Problem: Discharge Planning  Goal: Discharge to home or other facility with appropriate resources  Outcome: Progressing     Problem: ABCDS Injury Assessment  Goal: Absence of physical injury  Outcome: Progressing     Problem: Skin/Tissue Integrity  Goal: Absence of new skin breakdown  Description: 1.  Monitor for areas of redness and/or skin breakdown  2.  Assess vascular access sites hourly  3.  Every 4-6 hours minimum:  Change oxygen saturation probe site  4.  Every 4-6 hours:  If on nasal continuous positive airway pressure, respiratory therapy assess nares and determine need for appliance change or resting period.  Outcome: Progressing     Problem: Pain  Goal: Verbalizes/displays adequate comfort level or baseline comfort level  Outcome: Progressing  Flowsheets (Taken 12/10/2024 2045)  Verbalizes/displays adequate comfort level or baseline comfort level: Encourage patient to monitor pain and request assistance     
  Problem: Safety - Adult  Goal: Free from fall injury  Outcome: Progressing     Problem: Chronic Conditions and Co-morbidities  Goal: Patient's chronic conditions and co-morbidity symptoms are monitored and maintained or improved  Outcome: Progressing  Flowsheets (Taken 12/13/2024 2133)  Care Plan - Patient's Chronic Conditions and Co-Morbidity Symptoms are Monitored and Maintained or Improved: Monitor and assess patient's chronic conditions and comorbid symptoms for stability, deterioration, or improvement     Problem: Discharge Planning  Goal: Discharge to home or other facility with appropriate resources  Outcome: Progressing  Flowsheets (Taken 12/13/2024 2133)  Discharge to home or other facility with appropriate resources: Identify barriers to discharge with patient and caregiver     Problem: ABCDS Injury Assessment  Goal: Absence of physical injury  Outcome: Progressing     Problem: Skin/Tissue Integrity  Goal: Absence of new skin breakdown  Description: 1.  Monitor for areas of redness and/or skin breakdown  2.  Assess vascular access sites hourly  3.  Every 4-6 hours minimum:  Change oxygen saturation probe site  4.  Every 4-6 hours:  If on nasal continuous positive airway pressure, respiratory therapy assess nares and determine need for appliance change or resting period.  Outcome: Progressing     Problem: Pain  Goal: Verbalizes/displays adequate comfort level or baseline comfort level  Outcome: Progressing     Problem: Nutrition Deficit:  Goal: Optimize nutritional status  Outcome: Progressing     
  Problem: Safety - Adult  Goal: Free from fall injury  Outcome: Progressing     Problem: Chronic Conditions and Co-morbidities  Goal: Patient's chronic conditions and co-morbidity symptoms are monitored and maintained or improved  Outcome: Progressing  Flowsheets (Taken 12/7/2024 2203)  Care Plan - Patient's Chronic Conditions and Co-Morbidity Symptoms are Monitored and Maintained or Improved: Monitor and assess patient's chronic conditions and comorbid symptoms for stability, deterioration, or improvement     Problem: Discharge Planning  Goal: Discharge to home or other facility with appropriate resources  Outcome: Progressing  Flowsheets (Taken 12/7/2024 2203)  Discharge to home or other facility with appropriate resources: Identify barriers to discharge with patient and caregiver     Problem: ABCDS Injury Assessment  Goal: Absence of physical injury  12/7/2024 2338 by Kym Alexis, RN  Outcome: Progressing  12/7/2024 1752 by No Humphries, RN  Outcome: Progressing     Problem: Skin/Tissue Integrity  Goal: Absence of new skin breakdown  Description: 1.  Monitor for areas of redness and/or skin breakdown  2.  Assess vascular access sites hourly  3.  Every 4-6 hours minimum:  Change oxygen saturation probe site  4.  Every 4-6 hours:  If on nasal continuous positive airway pressure, respiratory therapy assess nares and determine need for appliance change or resting period.  12/7/2024 2338 by Kym Alexis, RN  Outcome: Progressing  12/7/2024 1752 by No Humphries, RN  Outcome: Progressing     Problem: Pain  Goal: Verbalizes/displays adequate comfort level or baseline comfort level  Outcome: Progressing  Flowsheets (Taken 12/7/2024 2145)  Verbalizes/displays adequate comfort level or baseline comfort level: Encourage patient to monitor pain and request assistance     
  Problem: Safety - Adult  Goal: Free from fall injury  Outcome: Progressing  Flowsheets (Taken 12/6/2024 2355)  Free From Fall Injury: Instruct family/caregiver on patient safety     Problem: Chronic Conditions and Co-morbidities  Goal: Patient's chronic conditions and co-morbidity symptoms are monitored and maintained or improved  Outcome: Progressing  Flowsheets (Taken 12/6/2024 2000)  Care Plan - Patient's Chronic Conditions and Co-Morbidity Symptoms are Monitored and Maintained or Improved: Monitor and assess patient's chronic conditions and comorbid symptoms for stability, deterioration, or improvement     Problem: Discharge Planning  Goal: Discharge to home or other facility with appropriate resources  Outcome: Progressing  Flowsheets (Taken 12/6/2024 2000)  Discharge to home or other facility with appropriate resources: Identify barriers to discharge with patient and caregiver     Problem: ABCDS Injury Assessment  Goal: Absence of physical injury  Outcome: Progressing  Flowsheets  Taken 12/7/2024 0137  Absence of Physical Injury: Implement safety measures based on patient assessment  Taken 12/6/2024 1025  Absence of Physical Injury: Implement safety measures based on patient assessment     Problem: Skin/Tissue Integrity  Goal: Absence of new skin breakdown  Description: 1.  Monitor for areas of redness and/or skin breakdown  2.  Assess vascular access sites hourly  3.  Every 4-6 hours minimum:  Change oxygen saturation probe site  4.  Every 4-6 hours:  If on nasal continuous positive airway pressure, respiratory therapy assess nares and determine need for appliance change or resting period.  Outcome: Progressing     
  Problem: Safety - Adult  Goal: Free from fall injury  Outcome: Progressing  Flowsheets (Taken 12/6/2024 2355)  Free From Fall Injury: Instruct family/caregiver on patient safety     Problem: Chronic Conditions and Co-morbidities  Goal: Patient's chronic conditions and co-morbidity symptoms are monitored and maintained or improved  Outcome: Progressing  Flowsheets (Taken 12/6/2024 2000)  Care Plan - Patient's Chronic Conditions and Co-Morbidity Symptoms are Monitored and Maintained or Improved: Monitor and assess patient's chronic conditions and comorbid symptoms for stability, deterioration, or improvement     Problem: Discharge Planning  Goal: Discharge to home or other facility with appropriate resources  Outcome: Progressing  Flowsheets (Taken 12/6/2024 2000)  Discharge to home or other facility with appropriate resources: Identify barriers to discharge with patient and caregiver     Problem: ABCDS Injury Assessment  Goal: Absence of physical injury  Outcome: Progressing  Flowsheets  Taken 12/7/2024 0137  Absence of Physical Injury: Implement safety measures based on patient assessment  Taken 12/6/2024 1805  Absence of Physical Injury: Implement safety measures based on patient assessment     Problem: Skin/Tissue Integrity  Goal: Absence of new skin breakdown  Description: 1.  Monitor for areas of redness and/or skin breakdown  2.  Assess vascular access sites hourly  3.  Every 4-6 hours minimum:  Change oxygen saturation probe site  4.  Every 4-6 hours:  If on nasal continuous positive airway pressure, respiratory therapy assess nares and determine need for appliance change or resting period.  Outcome: Progressing     
 used:  #[id number]    Electronically signed by Peg Salcedo MD on 12/17/2024 at 9:48 AM    
or resting period.  12/14/2024 0914 by Wilver Reed RN  Outcome: Progressing  12/13/2024 2208 by Melissa Arguelles RN  Outcome: Progressing  12/13/2024 2208 by Melissa Arguelles RN  Outcome: Progressing     Problem: Pain  Goal: Verbalizes/displays adequate comfort level or baseline comfort level  12/14/2024 0914 by Wilver Reed RN  Outcome: Progressing  Flowsheets (Taken 12/13/2024 2352 by Varghese Stiles RN)  Verbalizes/displays adequate comfort level or baseline comfort level: Encourage patient to monitor pain and request assistance  12/13/2024 2208 by Melissa Arguelles RN  Outcome: Progressing  12/13/2024 2208 by Melissa Arguelles RN  Outcome: Progressing     Problem: Nutrition Deficit:  Goal: Optimize nutritional status  12/14/2024 0914 by Wilver Reed RN  Outcome: Progressing  12/13/2024 2208 by Melissa Arguelles RN  Outcome: Progressing  12/13/2024 2208 by Melissa Arguelles RN  Outcome: Progressing

## 2024-12-17 NOTE — OP NOTE
Operative Note  ______________________________________________________________      IR U/S GUIDED PARACENTESIS  [facility] 6WE IMCU    Patient Name: Lisa Hunt   YOB: 1960  Medical Record Number: [id number]  Date of Procedure: 12/16/24  Room/Bed: 6414/6414-B    Pre-operative Diagnosis: Ascites    Post-operative Diagnosis: Ascites    Consent: Informed consent was obtained from the patient prior to the procedure. The details of the procedure, as well is its risks, benefits, and alternatives, were explained.      Anesthesia: Local anesthesia with approximately 10mL of 2% Lidocaine without epinephrine administered subcutaneously.    Performed by: RANJITH Alvarez under on-site supervision by Madison Ndiaye MD.    Estimated blood loss: Minimal    Complications: None    Specimens Obtained: Ascites Fluid    Procedure: Routine scanning of all four abdominal quadrants was performed using real-time ultrasound and revealed sufficient amount of ascites fluid present.  Decision was made to proceed with procedure.  After obtaining consent, a \"Time-Out\" was called to verify the correct patient, procedure/location, allergies, relevant medications held for procedure and that all equipment is functioning and available. The patient was then placed in the supine position with the head of the bed slightly elevated and the appropriate landmarks were identified. The skin over the puncture site in the left lower quadrant region was prepped with betadine and draped in a sterile fashion. Local anesthesia was obtained by infiltration using 2% Lidocaine without epinephrine. A 5 Slovenian needle sheath catheter was then advanced into the abdominal cavity. Fluid return was clear straw colored. The catheter was then withdrawn and a sterile dressing was placed over the site. The patient tolerated the procedure well.    A total volume of  3950mL was withdrawn.     Albumin: 50g IV given.    Thank you for allowing Interventional Radiology 
Operative Note  ______________________________________________________________      IR U/S GUIDED PARACENTESIS  [facility] 6WE IMCU    Patient Name: Lisa Hunt   YOB: 1960  Medical Record Number: [id number]  Date of Procedure: 12/6/24  Room/Bed: 6414/6414-B    Pre-operative Diagnosis: Ascites    Post-operative Diagnosis: Ascites    Consent: Informed consent was obtained from the patient prior to the procedure. The details of the procedure, as well is its risks, benefits, and alternatives, were explained.      Anesthesia: Local anesthesia with approximately 10mL of 2% Lidocaine without epinephrine administered subcutaneously.    Performed by: RANJITH Alvarez under on-site supervision by Jerson Mireles MD.    Estimated blood loss: Minimal    Complications: None    Specimens Obtained: Ascites Fluid    Procedure: Routine scanning of all four abdominal quadrants was performed using real-time ultrasound and revealed sufficient amount of ascites fluid present.  Decision was made to proceed with procedure.  After obtaining consent, a \"Time-Out\" was called to verify the correct patient, procedure/location, allergies, relevant medications held for procedure and that all equipment is functioning and available. The patient was then placed in the supine position with the head of the bed slightly elevated and the appropriate landmarks were identified. The skin over the puncture site in the left lower quadrant region was prepped with betadine and draped in a sterile fashion. Local anesthesia was obtained by infiltration using 2% Lidocaine without epinephrine. A 5 Nigerian needle sheath catheter was then advanced into the abdominal cavity. Fluid return was clear straw colored. The catheter was then withdrawn and a sterile dressing was placed over the site. The patient tolerated the procedure well.    A total volume of  7350mL was withdrawn.     Albumin: none.    Thank you for allowing Interventional Radiology to participate 
None known

## 2024-12-17 NOTE — CONSULTS
Palliative Care Department  [phone]  Palliative Care Initial Consult  Provider OK Kimball - CNP      PATIENT: Lisa Hunt  : 1960  MRN: [id number]  ADMISSION DATE: 2024  9:42 AM  Referring Provider:  Jorge Rivas DO    Palliative Medicine was consulted on hospital day 1 for assistance with Goals of care, overwhelmed symptoms, psychosocial distress  HPI:     Clinical Summary:Lisa Hunt is a 64 y.o. y/o female with a history of esophageal varices, cirrhosis with recurrent ascites r/t ACOSTA, DONNA, DM, HTN, who presented to [facility] on 2024 with abdominal pain, admitted for ecompensated cirrhosis and portal vein thrombosis involving splenic vein.     ASSESSMENT/PLAN:     Pertinent Hospital Diagnoses     Abdominal pain  Decompensated cirrhosis of the liver with ascites  Portal vein thrombosis involving splenic vein  Thrombocytopenia  History of hepatocellular carcinoma      Palliative Care Encounter / Counseling Regarding Goals of Care  Please see detailed goals of care discussion as below  At this time, Lisa Hunt, Does have capacity for medical decision-making.  Capacity is time limited and situation/question specific  Outcome of goals of care meeting:  Continue with current medical treatment    Code status Full Code  Advanced Directives: no POA or living will in epic  Surrogate/Legal NOK:  Kelsey Fitch (child) [phone]  Mahsa Fitch (child) [phone]  Tulio Reed (domestic partner) [phone]    Spiritual assessment: no spiritual distress identified  Bereavement and grief: to be determined  Referrals to: none today    Thank you for the opportunity to participate in the care of Lisa Hunt.     OK Coy CNP  Palliative Medicine     SUBJECTIVE:     Details of Conversation:       Chart reviewed the patient seen at the bedside, she was awake, alert and oriented, and able to participate in meaningful conversation.  Patient 
[facility]  MD Tico Kiran MD Ali Hassan, MD Lisa Kniska, ALFREDO Bhakta CNP  Consultation  Patient's Name: Lisa Hunt  1:23 PM  12/6/2024    Nephrologist: Tico Gee MD    Reason for Consult:  Chronic kidney disease   Requesting Physician:  Tonie Hernandez DO    Chief Complaint:  Abdominal discomfort and AMS     History Obtained From: Patient, chart    History of Present Ilness:         Ms. Hunt is a pleasant 64-year-old woman who presented to the hospital with abdominal pain and altered mental status. Her daughter brought her to the hospital as she was altered from her normal baseline and seemed to be more lethargic over the past 3 days.  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  She does require chronic paracentesis treatments.  She has been scheduled for paracentesis today.  CT of the abdomen and pelvis in the ED showed portal vein thrombosis extending into the major intrahepatic portal branches, splenic vein, and superior portion of the superior mesenteric vein which is a new finding along with a large amount of ascites.  Hepatobiliary surgery is on and does not recommend anticoagulation at this time due to bleeding risk.  Collateralization was noted on imaging.  Decision was made to admit with decompensated cirrhosis.  Her past medical history is significant for Alejandro cirrhosis, hepatocellular carcinoma, diabetes mellitus, diabetic neuropathy, esophageal varices, GERD, hypertension, intracranial bleed, leukocytopenia, and type 2 diabetes.         We were consulted for chronic kidney disease.  She is known to our service from previous hospitalizations as she has a history of chronic kidney disease stage IIIb.  Her typical baseline creatinine level is in the range of 1.2 to 1.4 mg/dL.  Etiology is presumptively diabetic nephropathy and renal microvascular atherosclerotic 
ENDOCRINOLOGY INITIAL CONSULTATION NOTE      Date of admission: 12/5/2024  Date of service: 12/9/2024  Admitting physician: Eugenie Luna DO   Primary Care Physician: Tonie Hernandez DO  Consultant physician: Tae Gaines MD     Reason for the consultation:  Uncontrolled DM    History of Present Illness:  The history is provided by the patient. Accuracy of the patient data is good. Patient speaks Haitian.    Lisa Hunt is a very pleasant 64 y.o. old female with PMH of T2DM, HTN, liver cirrhosis, hepatocellular carcinoma s/p y90 treatment and other listed below admitted to [facility] on 12/5/2024 because of abdominal pain and altered mental status, endocrine service was consulted for diabetes management.    Prior to admission  The patient was diagnosed with type 2 DM at the age 40. Prior to admission patient was on insulin long-acting 90 units and short-acting 18 units with meals. Patient has had rare hypoglycemic episodes. Patient has not been eating consistent carbohydrate meals, self-blood glucose monitoring has been above goal prior to admission. In addition, patient reports having neuropathy in her legs. The patient is not up to date with yearly diabetic eye exam.     Lab Results   Component Value Date/Time    LABA1C 8.8 12/06/2024 07:04 AM       Inpatient diet:   Carb Restricted diet     Point of care glucose monitoring   (Independently reviewed)   Recent Labs     12/07/24  1216 12/07/24  1531 12/07/24  2201 12/08/24  0603 12/08/24  1054 12/08/24  1717 12/08/24  2137 12/09/24  0556   POCGLU 257* 261* 271* 304* 317* 238* 242* 176*       Past medical history:   Past Medical History:   Diagnosis Date    DONNA (acute kidney injury) (HCC) 07/18/2023    Cirrhosis (HCC)     Diabetes mellitus (HCC)     Diabetic neuropathy (HCC)     Esophageal varices without bleeding (HCC)     GERD (gastroesophageal reflux disease)     Hypertension     Intracranial bleed (HCC) 05/01/2023    Liver cirrhosis (HCC)     with 
Orthopedics
Session ID: [id number]  Language: Swedish   ID: #[id number]   Name: Ivy
Session ID: [id number]  Language: Italian   ID: #[id number]   Name: Jake
Session ID: [id number]  Language: Lao   ID: #[id number]   Name: Lalo
Session ID: [id number]  Language: Latisha   ID: #[id number]   Name: Deidre
Session ID: [id number]  Language: Italian   ID: #[id number]   Name: Ruslan
Session ID: [id number]  Language: Yi   ID: #[id number]   Name: Omero
Session ID: [id number]  Language: Malay   ID: #[id number]   Name: Kwasi
Session ID: [id number]  Language: Luxembourgish   ID: #[id number]   Name: Herlinda
Session ID: [id number]  Language: Portuguese   ID: #[id number]   Name: Erik
Session ID: [id number]  Language: Polish   ID: #[id number]   Name: Tito
Session ID: [id number]  Language: Turkmen   ID: #[id number]   Name: Javier
Session ID: [id number]  Language: Turkish   ID: #[id number]   Name: Akhil
Session ID: [id number]  Language: Maltese   ID: #[id number]   Name: Luigi
Session ID: [id number]  Language: Kinyarwanda   ID: #[id number]   Name: Zulma Beltran
Session ID: [id number]  Language: Vietnamese   ID: #[id number]   Name: Mariann
Session ID: [id number]  Language: Sami   ID: #[id number]   Name: Reshma
Session ID: [id number]  Language: Luxembourgish   ID: #[id number]   Name: Michael
Session ID: [id number]  Language: Albanian   ID: #[id number]   Name: Ruby
Session ID: [id number]  Language: Latisha   ID: #[id number]   Name: Naiage: Emmanuelle   ID: #[id number]   Name: Patito
Session ID: [id number]  Language: English   ID: #[id number]   Name: Mattie
Session ID: [id number]  Language: Macedonian   ID: #[id number]   Name: Luke
Session ID: [id number]  Language: Japanese   ID: #[id number]   Name: Yousif
Session ID: [id number]  Language: Albanian   ID: #[id number]   Name: Altaf
Session ID: [id number]  Language: Macedonian   ID: #[id number]   Name: Bear
use: Not Currently         Review of Systems   Pertinent positives and negatives as noted in HPI.      PHYSICAL EXAM:    Vitals:    12/05/24 1400   BP: (!) 113/54   Pulse: 85   Resp: 14   Temp:    SpO2: 99%       GENERAL: Sitting up in bed, uncomfortable.  HEAD: NCAT.   EYES: Anicteric. Round symmetric pupils.  CV: RR.  LUNGS/CHEST: No increased work of breathing on RA.  ABDOMEN: Soft, diffuse mild tenderness, severely distended, ascitic fluid wave present. No guarding or rigidity.  SKIN: No cyanosis. No obvious rashes or ulcers.  MSK: BL LE pitting edema.    LABS:  CBC  Recent Labs     12/05/24  1049   WBC 2.7*   HGB 9.6*   HCT 29.8*   PLT 59*     BMP  Recent Labs     12/05/24  1049      K 4.8   CL 99   CO2 20*   BUN 38*   CREATININE 1.6*   CALCIUM 8.5*     Liver Function  Recent Labs     12/05/24  1049   LIPASE 23   BILITOT 2.5*   AST 39*   ALT 21   ALKPHOS 213*     No results for input(s): \"LACTATE\" in the last 72 hours.  No results for input(s): \"INR\" in the last 72 hours.    Invalid input(s): \"PT\", \"PTT\"    RADIOLOGY:  I have personally reviewed all relevant imaging:          ASSESSMENT/PLAN:    64 y.o. female with history of ACOSTA cirrhosis and HCC s/p Y90 tx 11/14, IR paracentesis 11/22 presenting with decompensated cirrhosis and portal vein thrombosis involving splenic vein    Do not recommend anticoagulation at this time due to bleeding risk and presence of collateral flow  Admit to medicine, hx of poorly controlled diabetes and CKD  MELD-Na 21 based on labs 11/20, repeat INR pending   NPO, IVF with albumin 25% 25 g tid  IR consult for paracentesis   Resume lactulose and rifaximin  GI consult for assistance with decompensated cirrhosis   Nephro consult for CKD  Palliative consult    Plan discussed with Dr. Ian Rivas DO  General Surgery Resident, PGY-1    Electronically signed by Jorge Rivas DO on 12/5/24 at 2:17 PM EST    
UPPER GASTROINTESTINAL ENDOSCOPY  02/07/2022    [facility]    UPPER GASTROINTESTINAL ENDOSCOPY  11/01/2021    [facility]    UPPER GASTROINTESTINAL ENDOSCOPY  12/16/2019    [facility]    UPPER GASTROINTESTINAL ENDOSCOPY  04/26/2021    [facility]    UPPER GASTROINTESTINAL ENDOSCOPY N/A 01/12/2024    EGD BIOPSY [place] [facility]    UPPER GASTROINTESTINAL ENDOSCOPY N/A 01/12/2024    EGD BAND LIGATION performed [place] [facility]    UPPER GASTROINTESTINAL ENDOSCOPY N/A 03/15/2024    ESOPHAGOGASTRODUODENOSCOPY POSSIBLE BAND LIGATION performed [place] [facility]    UPPER GASTROINTESTINAL ENDOSCOPY N/A 08/06/2024    ESOPHAGOGASTRODUODENOSCOPY CONTROL HEMORRHAGE performed by Anil Conrad MD at [facility]    UPPER GASTROINTESTINAL ENDOSCOPY N/A 08/06/2024    ESOPHAGOGASTRODUODENOSCOPY BAND LIGATION performed by Anil Conrad MD at [facility]        Family History:       Problem Relation Age of Onset    Diabetes Mother         Social History:   Social History     Tobacco Use    Smoking status: Never     Passive exposure: Current    Smokeless tobacco: Never   Vaping Use    Vaping status: Never Used   Substance Use Topics    Alcohol use: Not Currently    Drug use: Not Currently        ALLERGIES:   Allergies   Allergen Reactions    Fish Allergy      All seafood       Home Medications:  Current Facility-Administered Medications   Medication Dose Route Frequency Provider Last Rate Last Admin    albumin human 25% IV solution 25 g  25 g IntraVENous Q8H Jorge Rivsa  mL/hr at 12/06/24 0605 25 g at 12/06/24 0605    lactulose (CHRONULAC) 10 GM/15ML solution 20 g  20 g Oral TID Jorge Rivas DO   20 g at 12/05/24 2153    rifAXIMin (XIFAXAN) tablet 400 mg  400 mg Oral TID Jorge Rivas DO   400 mg at 12/06/24 0109    benzocaine-menthol (CEPACOL SORE THROAT) lozenge 1 lozenge  1 lozenge Oral Q2H

## 2024-12-17 NOTE — CARE COORDINATION
Social Work/ Case Management Transition of Care Planning (Conchis Ponce, [phone]):     Per report and chart review Pt is on room air. Pt is on IV albumin human q8. . PT 16/24, OT 17/24 Pt has LIANG, SW spoke with RN about the necessity for  as Pt did well with therapy and is A&Ox4. WBC 1.7, RBC 2.3, Hgb 7.2. Pt's plan is to discharge home with [facility]. Pt denied palliative/hospice at home. SW/EVIE to follow.   Conchis Ponce, JOSY  12/13/2024      
Social Work/ Case Management Transition of Care Planning (Conchis Ponce, JOSY [phone]):     Per report and chart review Pt is on room air. Pt Mag 1.5, WBC 1.9. Pt for IR Paracentesis today. Pt and family declined Palliative/ Hospice services. Pt plans to discharge home with daughter and [facility], orders are in and they are following for discharge date. Family will transport home at discharge. SW/CM to follow.  JOSY Turner  12/16/2024    
Social Work/ Case Management Transition of Care Planning (Conchis Ponce, JOSY [phone]):     Per report and chart review Pt is on 2L NC. Pt on IV fluids, Pt on IV lasix 2x daily, Pt on IV albumin human q8. Pt for AVM embolization tomorrow with IR. Diabetic educator consulted. Endocrine consulted. PT/OT evals pending. Pt agreeable to [facility], referral made to [facility]. SW/CM to follow.  JOSY Turner  12/9/2024    
Social Work/ Case Management Transition of Care Planning (Conchis Ponce, JOSY [phone]):     Per report and chart review Pt is on room air. PT pending, OT 17/24. Pt received 1 unit RBC yesterday.  Hgb 8.5, Platelets 35, WBC 1.5. Per attending GI has signed off. Charge RN checking with Nephro for plan/sign off. Pt's plan remains home with [facility], orders are in and they have accepted. Family will transport. CLINT/EVIE to follow.  JOSY Turner  12/12/2024    
Social Work/ Case Management Transition of Care Planning (Conchis Ponce, JOSY [phone]):     Per report and chart review Pt is on 2L NC. Pt on IV fluids, IV Lasix 2x daily, IV Albumin Human q8. Pt for IR AVM embolization today, IR Paracentesis today. [facility] can not accept, referral given to [facility]. PT/OT evals pending. SW/CM to follow.  JOSY Turner  12/10/2024    
Social Work/ Case Management Transition of Care Planning (Conchis Ponce, NEELAMW [phone]):     Discharge order noted. [facility] notified. Pt's plan is home with [facility] and her daughter will transport.     SW spoke with Pt's daughter Lisa about discharge, she is calling a friend who will come to pick her up. RN aware.    Conchis Ponce, JOSY  12/17/2024    
Spouse/Significant Other Type of Home: House  Primary Care Giver: Self  Patient Support Systems include: Children, Family Members   Current Financial resources:    Current community resources:    Current services prior to admission: None            Current DME:              Type of Home Care services:  None    ADLS  Prior functional level: Independent in ADLs/IADLs  Current functional level: Independent in ADLs/IADLs    PT AM-PAC:   /24  OT AM-PAC:   /24    Family can provide assistance at DC: Yes  Would you like Case Management to discuss the discharge plan with any other family members/significant others, and if so, who? Yes  Plans to Return to Present Housing: Yes  Other Identified Issues/Barriers to RETURNING to current housing:   Potential Assistance needed at discharge: N/A            Potential DME:    Patient expects to discharge to: House  Plan for transportation at discharge:      Financial    Payor: BCBS MEDICARE / Plan: [employer] DUAL ADVANTAGE / Product Type: *No Product type* /     Does insurance require precert for SNF: Yes    Potential assistance Purchasing Medications: No  Meds-to-Beds request:        [id number] [place], [place] - [address] [phone] -  [phone]  [facility] [address]  [facility] [place] [address]  Phone: [phone] Fax: [phone]      Notes:    Factors facilitating achievement of predicted outcomes: Family support, Cooperative, and Pleasant    Barriers to discharge: Pain    Additional Case Management Notes: discharge home with no needs    The Plan for Transition of Care is related to the following treatment goals of Portal vein thrombosis [I81]  Hepatic encephalopathy (HCC) [K76.82]  Decompensated cirrhosis (HCC) [K72.90, K74.60]    IF APPLICABLE: The Patient and/or patient representative Lisa and her family were provided with a choice of provider and agrees with the discharge plan. Freedom of choice list with basic dialogue that supports the

## 2024-12-17 NOTE — DISCHARGE INSTRUCTIONS
Your information:  Name: Lisa Hunt  : 1960    Your instructions:    Be compliant with medications.   Follow up with PCP, nephrology, GI, Endocrinology.   Continue Bumex and Aldactone.       What to do after you leave the hospital:    Recommended diet: diabetic diet    Recommended activity: activity as tolerated        The following personal items were collected during your admission and were returned to you:    Belongings  Dental Appliances: None  Vision - Corrective Lenses: None  Hearing Aid: None  Clothing: Undergarments, At bedside, Slippers, Socks, Shirt, Sweater  Jewelry: Ring  Body Piercings Removed: N/A  Electronic Devices: None  Weapons (Notify Protective Services/Security): None  Other Valuables: Sent home  Home Medications: None  Valuables Given To: Family (Comment)  Provide Name(s) of Who Valuable(s) Were Given To: daughter  Responsible person(s) in the waiting room: daughter  Patient approves for provider to speak to responsible person post operatively: Yes    Information obtained by:  By signing below, I understand that if any problems occur once I leave the hospital I am to contact Dr. Hernandez or in the event of an emergency dial 911.  I understand and acknowledge receipt of the instructions indicated above.

## 2024-12-17 NOTE — DISCHARGE SUMMARY
GM/15ML solution  Commonly known as: CHRONULAC  Take 30 mLs by mouth every 8 (eight) hours Titrate to goal of 2-3 bowel movement per day     omeprazole 20 MG delayed release capsule  Commonly known as: PRILOSEC  Take 1 capsule by mouth daily     ondansetron 4 MG tablet  Commonly known as: ZOFRAN  Take 1 tablet by mouth daily as needed for Nausea or Vomiting     rifAXIMin 550 MG tablet  Commonly known as: XIFAXAN     vitamin D 50 MCG (2000 UT) Tabs tablet  Commonly known as: CHOLECALCIFEROL  Take 1 tablet by mouth daily            STOP taking these medications      furosemide 20 MG tablet  Commonly known as: LASIX     insulin lispro (1 Unit Dial) 100 UNIT/ML Sopn  Commonly known as: HUMALOG/ADMELOG  Replaced by: insulin lispro 100 UNIT/ML Soln injection vial     midodrine 2.5 MG tablet  Commonly known as: PROAMATINE               Where to Get Your Medications        These medications were sent to [id number] - [place], [place] - [address] - P [phone] - F [phone]  [facility] [facility] [address]      Phone: [phone]   albumin human 25% 25 % IV solution  benzonatate 100 MG capsule  bumetanide 1 MG tablet  insulin glargine 100 UNIT/ML injection vial  insulin lispro 100 UNIT/ML Soln injection vial  polyethylene glycol 17 g packet  spironolactone 100 MG tablet           35 minutes was spent in preparing discharge papers, discussing discharge with patient, medication review, etc.    Signed:  Electronically signed by Peg Salcedo MD on 12/17/2024 at 9:45 AM

## 2024-12-18 ENCOUNTER — CARE COORDINATION (OUTPATIENT)
Dept: CARE COORDINATION | Age: 64
End: 2024-12-18

## 2024-12-18 ENCOUNTER — TELEPHONE (OUTPATIENT)
Dept: INTERVENTIONAL RADIOLOGY/VASCULAR | Age: 64
End: 2024-12-18

## 2024-12-18 NOTE — TELEPHONE ENCOUNTER
Special Procedures Pre-Procedure Telephone Call    Patient Name: Lisa Hunt  MRN: [id number]  : 1960  Date of Procedure: 2024  Planned Procedure: paracentesis    Pre procedure telephone call initiated to verify patient and procedure details.    This nurse provided a HIPAA-compliant voicemail on the contact number provided in the patient's demographic information. The voicemail included the following details:    Appointment time change from 0900 to 1200

## 2024-12-18 NOTE — CARE COORDINATION
-CTN phoned [facility] by [employer] and spoke to Rivas.  Rivas took needed information and states CTN will receive return call regarding SOC date.

## 2024-12-18 NOTE — CARE COORDINATION
Care Transitions Note    Initial Call - Call within 2 business days of discharge: Yes    Attempted to reach patient via use of  for transitions of care follow up. Unable to reach patient.    Outreach Attempts:   Unable to leave message, \"person you are trying to reach can not accept calls at this time\" per phone automation,  first attempt.    Patient: Lisa Hunt    Patient : 1960   MRN: [id number]    Reason for Admission: Hepatic encephalopathy   Discharge Date: 24  RURS: Readmission Risk Score: 24.4    Last Discharge Facility       Date Complaint Diagnosis Description Type Department Provider    24 Abdominal Pain Hepatic encephalopathy (HCC) ... ED to Hosp-Admission (Discharged) (ADMITTED) [facility] Peg Salcedo MD; Kostas ...            Was this an external facility discharge? No    Follow Up Appointment:   Patient does not have a follow up appointment scheduled at time of call.  CTN will route to PCP front office pool under high priority need for TCM/hospital follow up appointment.   Future Appointments         Provider Specialty Dept Phone    2024 12:00 PM (Arrive by 11:30 AM) [facility] [phone]    1/3/2025 9:00 AM (Arrive by 8:30 AM) [facility] [phone]    2025 1:30 PM Tonie Hernandez DO Family Medicine [phone]    2025 1:30 PM Tae Gaines MD Endocrinology [phone]    2025 3:30 PM Anil Conrad MD Gastroenterology [phone]    2025 11:00 AM Tonie Hernandez DO Family Medicine [phone]            Plan for follow-up on next business day.      Monse Garrido RN

## 2024-12-19 ENCOUNTER — CARE COORDINATION (OUTPATIENT)
Dept: CARE COORDINATION | Age: 64
End: 2024-12-19

## 2024-12-19 NOTE — CARE COORDINATION
Care Transitions Note    Initial Call - Call within 2 business days of discharge: Yes    Attempted to reach patient via use of  for transitions of care follow up. Unable to reach patient.    Outreach Attempts:    Unable to leave message, \"person you are trying to reach can not accept calls at this time\" per phone automation, second attempt.   Unable to reach letter was mailed to address on file on 24 per EMR.  CTN will exclude from Care Transition calls until 25 (one month) due to being unable to reach after two consecutive admissions.     Patient: Lisa Hunt    Patient : 1960   MRN: [id number]    Reason for Admission: Hepatic encephalopathy   Discharge Date: 24  RURS: Readmission Risk Score: 24.4    Last Discharge Facility       Date Complaint Diagnosis Description Type Department Provider    24 Abdominal Pain Hepatic encephalopathy (HCC) ... ED to Hosp-Admission (Discharged) (ADMITTED) [facility] Peg Salcedo MD; Kostas, ...            Was this an external facility discharge? No    Follow Up Appointment:   Patient has hospital follow up appointment scheduled greater than 14 days after discharge.  Noted in EMR PCP OV on 25 changed to HFU by office staff.    Future Appointments         Provider Specialty Dept Phone    2024 12:00 PM (Arrive by 11:30 AM) [facility] [phone]    1/3/2025 9:00 AM (Arrive by 8:30 AM) [facility] [phone]    2025 1:30 PM Tonie Hernandez DO Family Medicine [phone]    2025 1:30 PM Tae Gaines MD Endocrinology [phone]    2025 3:30 PM Anil Conrad MD Gastroenterology [phone]    2025 11:00 AM Tonie Hernandez DO Family Medicine [phone]            No further follow-up call indicated     Monse Garrido RN

## 2024-12-20 ENCOUNTER — TELEPHONE (OUTPATIENT)
Age: 64
End: 2024-12-20

## 2024-12-20 NOTE — TELEPHONE ENCOUNTER
Called all phone numbers on file for this patient to schedule an appt with Carolyne keith available per Dr Conrad, unable to reach patient. Will try later. Electronically signed by Natalia Hobson MA on 12/20/24 at 11:06 AM EST

## 2024-12-21 RX ORDER — SPIRONOLACTONE 100 MG/1
100 TABLET, FILM COATED ORAL DAILY
Qty: 90 TABLET | OUTPATIENT
Start: 2024-12-21

## 2024-12-26 ENCOUNTER — TELEPHONE (OUTPATIENT)
Dept: FAMILY MEDICINE CLINIC | Age: 64
End: 2024-12-26

## 2024-12-26 NOTE — TELEPHONE ENCOUNTER
[facility] care called in stating they have not been able to get a hold of pt to discuss home health. They were calling to let  know they are closing home health referral at this time due to not being able to get in touch with pt. Just STEVEN

## 2024-12-30 ENCOUNTER — TELEPHONE (OUTPATIENT)
Dept: HEMATOLOGY | Age: 64
End: 2024-12-30

## 2024-12-30 ENCOUNTER — TELEPHONE (OUTPATIENT)
Dept: FAMILY MEDICINE CLINIC | Age: 64
End: 2024-12-30

## 2024-12-30 NOTE — TELEPHONE ENCOUNTER
Nurse triage line was called. Spoke with daughter and they wanted to know if it was safe for pt to travel to [facility]. Spoke with  and she stated pt would have to speak with her specialist Doctors like Dr. Sosa her pancreas doctor in regards to this. Advised pt daughter to contact them. Pt daughter agreeable. Provider aware.

## 2024-12-30 NOTE — TELEPHONE ENCOUNTER
The patients daughter Lisa called the office. She wanted to know if the patient would be able to move to [place]. I reached out to Dr Sosa and Lucia Nixon MA and the following was sent from their end   Dr. Sosa said she can move whenever she wants... but she needs to know she is very sick and needs treatment...   and would like her to follow up..     I called the patient back and spoke with her daughter Lisa and reviewed the above with her. I also reminded her that the patient has a follow up appt scheduled in [facility] on 01/15/2025 at 3:15 pm. All of the above has been confirmed by the patients daughter Lisa  Electronically signed by Julissa Castanon MA on 12/30/2024 at 1:44 PM

## 2025-01-04 ENCOUNTER — HOSPITAL ENCOUNTER (INPATIENT)
Age: 65
LOS: 7 days | Discharge: HOME | End: 2025-01-11
Attending: EMERGENCY MEDICINE | Admitting: HOSPITALIST
Payer: MEDICARE

## 2025-01-04 ENCOUNTER — APPOINTMENT (OUTPATIENT)
Dept: CT IMAGING | Age: 65
End: 2025-01-04
Attending: EMERGENCY MEDICINE
Payer: MEDICARE

## 2025-01-04 DIAGNOSIS — K76.82 HEPATIC ENCEPHALOPATHY (HCC): ICD-10-CM

## 2025-01-04 DIAGNOSIS — R10.9 ABDOMINAL PAIN, UNSPECIFIED ABDOMINAL LOCATION: Primary | ICD-10-CM

## 2025-01-04 DIAGNOSIS — E87.5 HYPERKALEMIA: ICD-10-CM

## 2025-01-04 LAB
ALBUMIN SERPL-MCNC: 3.5 G/DL (ref 3.5–5.2)
ALP SERPL-CCNC: 179 U/L (ref 35–104)
ALT SERPL-CCNC: 21 U/L (ref 0–32)
AMMONIA PLAS-SCNC: 91 UMOL/L (ref 11–51)
ANION GAP SERPL CALCULATED.3IONS-SCNC: 7 MMOL/L (ref 7–16)
AST SERPL-CCNC: 54 U/L (ref 0–31)
BACTERIA URNS QL MICRO: ABNORMAL
BASOPHILS # BLD: 0 K/UL (ref 0–0.2)
BASOPHILS NFR BLD: 0 % (ref 0–2)
BILIRUB SERPL-MCNC: 2.2 MG/DL (ref 0–1.2)
BILIRUB UR QL STRIP: NEGATIVE
BUN SERPL-MCNC: 27 MG/DL (ref 6–23)
CALCIUM SERPL-MCNC: 9 MG/DL (ref 8.6–10.2)
CHLORIDE SERPL-SCNC: 104 MMOL/L (ref 98–107)
CLARITY UR: CLEAR
CO2 SERPL-SCNC: 22 MMOL/L (ref 22–29)
COLOR UR: YELLOW
CREAT SERPL-MCNC: 1.6 MG/DL (ref 0.5–1)
EOSINOPHIL # BLD: 0.04 K/UL (ref 0.05–0.5)
EOSINOPHILS RELATIVE PERCENT: 2 % (ref 0–6)
ERYTHROCYTE [DISTWIDTH] IN BLOOD BY AUTOMATED COUNT: 14.6 % (ref 11.5–15)
GFR, ESTIMATED: 36 ML/MIN/1.73M2
GLUCOSE BLD-MCNC: 211 MG/DL (ref 74–99)
GLUCOSE SERPL-MCNC: 160 MG/DL (ref 74–99)
GLUCOSE UR STRIP-MCNC: NEGATIVE MG/DL
HCT VFR BLD AUTO: 23.7 % (ref 34–48)
HGB BLD-MCNC: 7.8 G/DL (ref 11.5–15.5)
HGB UR QL STRIP.AUTO: NEGATIVE
INR PPP: 1.5
KETONES UR STRIP-MCNC: NEGATIVE MG/DL
LACTATE BLDV-SCNC: 2 MMOL/L (ref 0.5–2.2)
LEUKOCYTE ESTERASE UR QL STRIP: NEGATIVE
LIPASE SERPL-CCNC: 26 U/L (ref 13–60)
LYMPHOCYTES NFR BLD: 0.26 K/UL (ref 1.5–4)
LYMPHOCYTES RELATIVE PERCENT: 11 % (ref 20–42)
MAGNESIUM SERPL-MCNC: 1.9 MG/DL (ref 1.6–2.6)
MCH RBC QN AUTO: 32.2 PG (ref 26–35)
MCHC RBC AUTO-ENTMCNC: 32.9 G/DL (ref 32–34.5)
MCV RBC AUTO: 97.9 FL (ref 80–99.9)
METAMYELOCYTES ABSOLUTE COUNT: 0.02 K/UL (ref 0–0.12)
METAMYELOCYTES: 1 % (ref 0–1)
MONOCYTES NFR BLD: 0.1 K/UL (ref 0.1–0.95)
MONOCYTES NFR BLD: 4 % (ref 2–12)
MYELOCYTES ABSOLUTE COUNT: 0.02 K/UL
MYELOCYTES: 1 %
NEUTROPHILS NFR BLD: 80 % (ref 43–80)
NEUTS SEG NFR BLD: 1.84 K/UL (ref 1.8–7.3)
NITRITE UR QL STRIP: NEGATIVE
PH UR STRIP: 6.5 [PH] (ref 5–9)
PLATELET # BLD AUTO: 31 K/UL (ref 130–450)
PLATELET CONFIRMATION: NORMAL
PMV BLD AUTO: 13 FL (ref 7–12)
POTASSIUM SERPL-SCNC: 5.9 MMOL/L (ref 3.5–5)
PROMYELOCYTES ABSOLUTE COUNT: 0.02 K/UL
PROMYELOCYTES: 1 %
PROT SERPL-MCNC: 8.1 G/DL (ref 6.4–8.3)
PROT UR STRIP-MCNC: NEGATIVE MG/DL
PROTHROMBIN TIME: 16.1 SEC (ref 9.3–12.4)
RBC # BLD AUTO: 2.42 M/UL (ref 3.5–5.5)
RBC # BLD: ABNORMAL 10*6/UL
RBC #/AREA URNS HPF: ABNORMAL /HPF
SODIUM SERPL-SCNC: 133 MMOL/L (ref 132–146)
SP GR UR STRIP: 1.01 (ref 1–1.03)
UROBILINOGEN UR STRIP-ACNC: 0.2 EU/DL (ref 0–1)
WBC #/AREA URNS HPF: ABNORMAL /HPF
WBC OTHER # BLD: 2.3 K/UL (ref 4.5–11.5)

## 2025-01-04 PROCEDURE — 96365 THER/PROPH/DIAG IV INF INIT: CPT

## 2025-01-04 PROCEDURE — 83605 ASSAY OF LACTIC ACID: CPT

## 2025-01-04 PROCEDURE — 99285 EMERGENCY DEPT VISIT HI MDM: CPT

## 2025-01-04 PROCEDURE — 80053 COMPREHEN METABOLIC PANEL: CPT

## 2025-01-04 PROCEDURE — 6360000004 HC RX CONTRAST MEDICATION: Performed by: RADIOLOGY

## 2025-01-04 PROCEDURE — 85610 PROTHROMBIN TIME: CPT

## 2025-01-04 PROCEDURE — 87086 URINE CULTURE/COLONY COUNT: CPT

## 2025-01-04 PROCEDURE — 2500000003 HC RX 250 WO HCPCS: Performed by: EMERGENCY MEDICINE

## 2025-01-04 PROCEDURE — 82140 ASSAY OF AMMONIA: CPT

## 2025-01-04 PROCEDURE — 2140000000 HC CCU INTERMEDIATE R&B

## 2025-01-04 PROCEDURE — 6370000000 HC RX 637 (ALT 250 FOR IP): Performed by: EMERGENCY MEDICINE

## 2025-01-04 PROCEDURE — 74177 CT ABD & PELVIS W/CONTRAST: CPT

## 2025-01-04 PROCEDURE — 85025 COMPLETE CBC W/AUTO DIFF WBC: CPT

## 2025-01-04 PROCEDURE — 83690 ASSAY OF LIPASE: CPT

## 2025-01-04 PROCEDURE — 99223 1ST HOSP IP/OBS HIGH 75: CPT | Performed by: HOSPITALIST

## 2025-01-04 PROCEDURE — 83735 ASSAY OF MAGNESIUM: CPT

## 2025-01-04 PROCEDURE — 81001 URINALYSIS AUTO W/SCOPE: CPT

## 2025-01-04 PROCEDURE — 6360000002 HC RX W HCPCS: Performed by: EMERGENCY MEDICINE

## 2025-01-04 PROCEDURE — 93005 ELECTROCARDIOGRAM TRACING: CPT | Performed by: EMERGENCY MEDICINE

## 2025-01-04 PROCEDURE — 82962 GLUCOSE BLOOD TEST: CPT

## 2025-01-04 RX ORDER — SODIUM CHLORIDE 0.9 % (FLUSH) 0.9 %
5-40 SYRINGE (ML) INJECTION EVERY 12 HOURS SCHEDULED
Status: DISCONTINUED | OUTPATIENT
Start: 2025-01-04 | End: 2025-01-11 | Stop reason: HOSPADM

## 2025-01-04 RX ORDER — IOPAMIDOL 755 MG/ML
75 INJECTION, SOLUTION INTRAVASCULAR
Status: COMPLETED | OUTPATIENT
Start: 2025-01-04 | End: 2025-01-04

## 2025-01-04 RX ORDER — MAGNESIUM SULFATE IN WATER 40 MG/ML
2000 INJECTION, SOLUTION INTRAVENOUS PRN
Status: DISCONTINUED | OUTPATIENT
Start: 2025-01-04 | End: 2025-01-09

## 2025-01-04 RX ORDER — POLYETHYLENE GLYCOL 3350 17 G/17G
17 POWDER, FOR SOLUTION ORAL DAILY PRN
Status: DISCONTINUED | OUTPATIENT
Start: 2025-01-04 | End: 2025-01-11 | Stop reason: HOSPADM

## 2025-01-04 RX ORDER — SODIUM CHLORIDE 0.9 % (FLUSH) 0.9 %
5-40 SYRINGE (ML) INJECTION PRN
Status: DISCONTINUED | OUTPATIENT
Start: 2025-01-04 | End: 2025-01-11 | Stop reason: HOSPADM

## 2025-01-04 RX ORDER — CALCIUM GLUCONATE 20 MG/ML
1000 INJECTION, SOLUTION INTRAVENOUS ONCE
Status: COMPLETED | OUTPATIENT
Start: 2025-01-04 | End: 2025-01-04

## 2025-01-04 RX ORDER — ONDANSETRON 4 MG/1
4 TABLET, ORALLY DISINTEGRATING ORAL EVERY 8 HOURS PRN
Status: DISCONTINUED | OUTPATIENT
Start: 2025-01-04 | End: 2025-01-11 | Stop reason: HOSPADM

## 2025-01-04 RX ORDER — LACTULOSE 10 G/15ML
20 SOLUTION ORAL ONCE
Status: COMPLETED | OUTPATIENT
Start: 2025-01-04 | End: 2025-01-04

## 2025-01-04 RX ORDER — SODIUM CHLORIDE 9 MG/ML
INJECTION, SOLUTION INTRAVENOUS PRN
Status: DISCONTINUED | OUTPATIENT
Start: 2025-01-04 | End: 2025-01-11 | Stop reason: HOSPADM

## 2025-01-04 RX ORDER — DEXTROSE MONOHYDRATE 25 G/50ML
25 INJECTION, SOLUTION INTRAVENOUS ONCE
Status: COMPLETED | OUTPATIENT
Start: 2025-01-04 | End: 2025-01-04

## 2025-01-04 RX ORDER — ONDANSETRON 2 MG/ML
4 INJECTION INTRAMUSCULAR; INTRAVENOUS EVERY 6 HOURS PRN
Status: DISCONTINUED | OUTPATIENT
Start: 2025-01-04 | End: 2025-01-11 | Stop reason: HOSPADM

## 2025-01-04 RX ADMIN — INSULIN HUMAN 10 UNITS: 100 INJECTION, SOLUTION PARENTERAL at 20:36

## 2025-01-04 RX ADMIN — CALCIUM GLUCONATE 1000 MG: 20 INJECTION, SOLUTION INTRAVENOUS at 20:25

## 2025-01-04 RX ADMIN — LACTULOSE 20 G: 20 SOLUTION ORAL at 20:25

## 2025-01-04 RX ADMIN — IOPAMIDOL 75 ML: 755 INJECTION, SOLUTION INTRAVENOUS at 19:04

## 2025-01-04 RX ADMIN — DEXTROSE MONOHYDRATE 25 G: 25 INJECTION, SOLUTION INTRAVENOUS at 20:30

## 2025-01-04 NOTE — ED PROVIDER NOTES
HPI:  25,   Time: 4:48 PM CLIFFORD Hunt is a 64 y.o. female presenting to the ED for abd pain, beginning unk ago.  The complaint has been persistent, mild in severity, and worsened by nothing.  Brought in by EMS.  Abdominal pain.  History limited  from pt .  History of hepatic encephalopathy.  No fevers chills or sweats.  No nausea.  Positive diarrhea.    Review of Systems:   Pertinent positives and negatives are stated within HPI, all other systems reviewed and are negative.          --------------------------------------------- PAST HISTORY ---------------------------------------------  Past Medical History:  has a past medical history of DONNA (acute kidney injury) (HCC), Cirrhosis (HCC), Diabetes mellitus (HCC), Diabetic neuropathy (HCC), Esophageal varices without bleeding (HCC), GERD (gastroesophageal reflux disease), Hypertension, Intracranial bleed (HCC), Liver cirrhosis (HCC), Low vitamin D level, SDH (subdural hematoma), Thrombocytopenia (HCC), and Type 2 diabetes mellitus without complication (HCC).    Past Surgical History:  has a past surgical history that includes Upper gastrointestinal endoscopy (2022); Upper gastrointestinal endoscopy (2021); Upper gastrointestinal endoscopy (2019); Upper gastrointestinal endoscopy (2021);  section; Cholecystectomy; Appendectomy; Paracentesis (Left, 10/12/2023); Paracentesis (Left, 2024); Upper gastrointestinal endoscopy (N/A, 2024); Upper gastrointestinal endoscopy (N/A, 2024); Paracentesis (Left, 2024); Upper gastrointestinal endoscopy (N/A, 03/15/2024); CT NEEDLE BIOPSY LIVER PERCUTANEOUS (2024); Upper gastrointestinal endoscopy (N/A, 2024); Upper gastrointestinal endoscopy (N/A, 2024); CT NEEDLE BIOPSY LIVER PERCUTANEOUS (2024); Paracentesis (Left, 10/30/2024); Paracentesis (Left, 2024); IR EMBOLIZATION TUMOR/ORGAN ISCH/INFARC (2024); Paracentesis (Left,

## 2025-01-05 LAB
ALBUMIN SERPL-MCNC: 3.1 G/DL (ref 3.5–5.2)
ALP SERPL-CCNC: 158 U/L (ref 35–104)
ALT SERPL-CCNC: 17 U/L (ref 0–32)
AMMONIA PLAS-SCNC: 80 UMOL/L (ref 11–51)
ANION GAP SERPL CALCULATED.3IONS-SCNC: 9 MMOL/L (ref 7–16)
AST SERPL-CCNC: 40 U/L (ref 0–31)
BASOPHILS # BLD: 0.01 K/UL (ref 0–0.2)
BASOPHILS NFR BLD: 1 % (ref 0–2)
BILIRUB SERPL-MCNC: 2.2 MG/DL (ref 0–1.2)
BUN SERPL-MCNC: 27 MG/DL (ref 6–23)
CALCIUM SERPL-MCNC: 9.1 MG/DL (ref 8.6–10.2)
CHLORIDE SERPL-SCNC: 105 MMOL/L (ref 98–107)
CHP ED QC CHECK: NORMAL
CO2 SERPL-SCNC: 21 MMOL/L (ref 22–29)
CREAT SERPL-MCNC: 1.5 MG/DL (ref 0.5–1)
EOSINOPHIL # BLD: 0.07 K/UL (ref 0.05–0.5)
EOSINOPHILS RELATIVE PERCENT: 4 % (ref 0–6)
ERYTHROCYTE [DISTWIDTH] IN BLOOD BY AUTOMATED COUNT: 14.7 % (ref 11.5–15)
GFR, ESTIMATED: 40 ML/MIN/1.73M2
GLUCOSE BLD-MCNC: 239 MG/DL
GLUCOSE BLD-MCNC: 239 MG/DL (ref 74–99)
GLUCOSE BLD-MCNC: 258 MG/DL (ref 74–99)
GLUCOSE BLD-MCNC: 355 MG/DL (ref 74–99)
GLUCOSE BLD-MCNC: 399 MG/DL (ref 74–99)
GLUCOSE SERPL-MCNC: 228 MG/DL (ref 74–99)
HBA1C MFR BLD: 6.9 % (ref 4–5.6)
HCT VFR BLD AUTO: 22.8 % (ref 34–48)
HGB BLD-MCNC: 7.5 G/DL (ref 11.5–15.5)
IMM GRANULOCYTES # BLD AUTO: <0.03 K/UL (ref 0–0.58)
IMM GRANULOCYTES NFR BLD: 0 % (ref 0–5)
LYMPHOCYTES NFR BLD: 0.32 K/UL (ref 1.5–4)
LYMPHOCYTES RELATIVE PERCENT: 20 % (ref 20–42)
MCH RBC QN AUTO: 32.8 PG (ref 26–35)
MCHC RBC AUTO-ENTMCNC: 32.9 G/DL (ref 32–34.5)
MCV RBC AUTO: 99.6 FL (ref 80–99.9)
MONOCYTES NFR BLD: 0.12 K/UL (ref 0.1–0.95)
MONOCYTES NFR BLD: 8 % (ref 2–12)
NEUTROPHILS NFR BLD: 67 % (ref 43–80)
NEUTS SEG NFR BLD: 1.06 K/UL (ref 1.8–7.3)
PLATELET # BLD AUTO: 22 K/UL (ref 130–450)
PLATELET CONFIRMATION: NORMAL
PMV BLD AUTO: 11.7 FL (ref 7–12)
POTASSIUM SERPL-SCNC: 5 MMOL/L (ref 3.5–5)
PROT SERPL-MCNC: 7.2 G/DL (ref 6.4–8.3)
RBC # BLD AUTO: 2.29 M/UL (ref 3.5–5.5)
RBC # BLD: ABNORMAL 10*6/UL
SODIUM SERPL-SCNC: 135 MMOL/L (ref 132–146)
WBC OTHER # BLD: 1.6 K/UL (ref 4.5–11.5)

## 2025-01-05 PROCEDURE — 6370000000 HC RX 637 (ALT 250 FOR IP): Performed by: INTERNAL MEDICINE

## 2025-01-05 PROCEDURE — 99223 1ST HOSP IP/OBS HIGH 75: CPT | Performed by: STUDENT IN AN ORGANIZED HEALTH CARE EDUCATION/TRAINING PROGRAM

## 2025-01-05 PROCEDURE — P9047 ALBUMIN (HUMAN), 25%, 50ML: HCPCS | Performed by: STUDENT IN AN ORGANIZED HEALTH CARE EDUCATION/TRAINING PROGRAM

## 2025-01-05 PROCEDURE — 82962 GLUCOSE BLOOD TEST: CPT

## 2025-01-05 PROCEDURE — 6370000000 HC RX 637 (ALT 250 FOR IP): Performed by: HOSPITALIST

## 2025-01-05 PROCEDURE — 82140 ASSAY OF AMMONIA: CPT

## 2025-01-05 PROCEDURE — 36415 COLL VENOUS BLD VENIPUNCTURE: CPT

## 2025-01-05 PROCEDURE — 2500000003 HC RX 250 WO HCPCS: Performed by: HOSPITALIST

## 2025-01-05 PROCEDURE — 85025 COMPLETE CBC W/AUTO DIFF WBC: CPT

## 2025-01-05 PROCEDURE — 2140000000 HC CCU INTERMEDIATE R&B

## 2025-01-05 PROCEDURE — 6360000002 HC RX W HCPCS: Performed by: STUDENT IN AN ORGANIZED HEALTH CARE EDUCATION/TRAINING PROGRAM

## 2025-01-05 PROCEDURE — 99232 SBSQ HOSP IP/OBS MODERATE 35: CPT | Performed by: INTERNAL MEDICINE

## 2025-01-05 PROCEDURE — 80053 COMPREHEN METABOLIC PANEL: CPT

## 2025-01-05 PROCEDURE — 83036 HEMOGLOBIN GLYCOSYLATED A1C: CPT

## 2025-01-05 RX ORDER — ONDANSETRON 4 MG/1
4 TABLET, FILM COATED ORAL DAILY PRN
Status: DISCONTINUED | OUTPATIENT
Start: 2025-01-05 | End: 2025-01-05 | Stop reason: ALTCHOICE

## 2025-01-05 RX ORDER — PANTOPRAZOLE SODIUM 40 MG/1
40 TABLET, DELAYED RELEASE ORAL
Status: DISCONTINUED | OUTPATIENT
Start: 2025-01-05 | End: 2025-01-08

## 2025-01-05 RX ORDER — BUMETANIDE 1 MG/1
1 TABLET ORAL DAILY
Status: DISCONTINUED | OUTPATIENT
Start: 2025-01-05 | End: 2025-01-11 | Stop reason: HOSPADM

## 2025-01-05 RX ORDER — CARVEDILOL 3.12 MG/1
3.12 TABLET ORAL DAILY
Status: DISCONTINUED | OUTPATIENT
Start: 2025-01-05 | End: 2025-01-11 | Stop reason: HOSPADM

## 2025-01-05 RX ORDER — INSULIN LISPRO 100 [IU]/ML
0-16 INJECTION, SOLUTION INTRAVENOUS; SUBCUTANEOUS
Status: DISCONTINUED | OUTPATIENT
Start: 2025-01-05 | End: 2025-01-11 | Stop reason: HOSPADM

## 2025-01-05 RX ORDER — DEXTROSE MONOHYDRATE 25 G/50ML
50 INJECTION, SOLUTION INTRAVENOUS PRN
Status: DISCONTINUED | OUTPATIENT
Start: 2025-01-05 | End: 2025-01-11 | Stop reason: HOSPADM

## 2025-01-05 RX ORDER — LACTULOSE 10 G/15ML
20 SOLUTION ORAL EVERY 8 HOURS
Status: DISCONTINUED | OUTPATIENT
Start: 2025-01-05 | End: 2025-01-08

## 2025-01-05 RX ORDER — INSULIN GLARGINE 100 [IU]/ML
10 INJECTION, SOLUTION SUBCUTANEOUS DAILY
Status: DISCONTINUED | OUTPATIENT
Start: 2025-01-05 | End: 2025-01-06

## 2025-01-05 RX ORDER — POLYETHYLENE GLYCOL 3350 17 G/17G
17 POWDER, FOR SOLUTION ORAL DAILY PRN
Status: DISCONTINUED | OUTPATIENT
Start: 2025-01-05 | End: 2025-01-05 | Stop reason: SDUPTHER

## 2025-01-05 RX ORDER — GABAPENTIN 300 MG/1
600 CAPSULE ORAL 3 TIMES DAILY
Status: DISCONTINUED | OUTPATIENT
Start: 2025-01-05 | End: 2025-01-11 | Stop reason: HOSPADM

## 2025-01-05 RX ORDER — GLUCAGON 1 MG/ML
1 KIT INJECTION PRN
Status: DISCONTINUED | OUTPATIENT
Start: 2025-01-05 | End: 2025-01-11 | Stop reason: HOSPADM

## 2025-01-05 RX ORDER — ALBUMIN (HUMAN) 12.5 G/50ML
25 SOLUTION INTRAVENOUS EVERY 8 HOURS
Status: COMPLETED | OUTPATIENT
Start: 2025-01-05 | End: 2025-01-08

## 2025-01-05 RX ORDER — DEXTROSE MONOHYDRATE 100 MG/ML
INJECTION, SOLUTION INTRAVENOUS CONTINUOUS PRN
Status: DISCONTINUED | OUTPATIENT
Start: 2025-01-05 | End: 2025-01-11 | Stop reason: HOSPADM

## 2025-01-05 RX ORDER — INSULIN LISPRO 100 [IU]/ML
0-4 INJECTION, SOLUTION INTRAVENOUS; SUBCUTANEOUS
Status: DISCONTINUED | OUTPATIENT
Start: 2025-01-05 | End: 2025-01-05

## 2025-01-05 RX ORDER — DEXTROSE MONOHYDRATE 25 G/50ML
25 INJECTION, SOLUTION INTRAVENOUS PRN
Status: DISCONTINUED | OUTPATIENT
Start: 2025-01-05 | End: 2025-01-11 | Stop reason: HOSPADM

## 2025-01-05 RX ADMIN — GABAPENTIN 600 MG: 300 CAPSULE ORAL at 09:11

## 2025-01-05 RX ADMIN — SODIUM CHLORIDE, PRESERVATIVE FREE 10 ML: 5 INJECTION INTRAVENOUS at 09:25

## 2025-01-05 RX ADMIN — SODIUM CHLORIDE, PRESERVATIVE FREE 10 ML: 5 INJECTION INTRAVENOUS at 20:16

## 2025-01-05 RX ADMIN — INSULIN LISPRO 12 UNITS: 100 INJECTION, SOLUTION INTRAVENOUS; SUBCUTANEOUS at 16:33

## 2025-01-05 RX ADMIN — PANTOPRAZOLE SODIUM 40 MG: 40 TABLET, DELAYED RELEASE ORAL at 09:12

## 2025-01-05 RX ADMIN — LACTULOSE 20 G: 20 SOLUTION ORAL at 13:43

## 2025-01-05 RX ADMIN — ALBUMIN (HUMAN) 25 G: 0.25 INJECTION, SOLUTION INTRAVENOUS at 14:09

## 2025-01-05 RX ADMIN — GABAPENTIN 600 MG: 300 CAPSULE ORAL at 20:16

## 2025-01-05 RX ADMIN — CARVEDILOL 3.12 MG: 6.25 TABLET, FILM COATED ORAL at 09:12

## 2025-01-05 RX ADMIN — LACTULOSE 20 G: 20 SOLUTION ORAL at 05:20

## 2025-01-05 RX ADMIN — INSULIN LISPRO 1 UNITS: 100 INJECTION, SOLUTION INTRAVENOUS; SUBCUTANEOUS at 09:12

## 2025-01-05 RX ADMIN — RIFAXIMIN 400 MG: 200 TABLET ORAL at 21:39

## 2025-01-05 RX ADMIN — INSULIN LISPRO 16 UNITS: 100 INJECTION, SOLUTION INTRAVENOUS; SUBCUTANEOUS at 20:16

## 2025-01-05 RX ADMIN — GABAPENTIN 600 MG: 300 CAPSULE ORAL at 13:43

## 2025-01-05 RX ADMIN — LACTULOSE 20 G: 20 SOLUTION ORAL at 20:16

## 2025-01-05 RX ADMIN — ALBUMIN (HUMAN) 25 G: 0.25 INJECTION, SOLUTION INTRAVENOUS at 21:43

## 2025-01-05 RX ADMIN — INSULIN GLARGINE 10 UNITS: 100 INJECTION, SOLUTION SUBCUTANEOUS at 09:11

## 2025-01-05 RX ADMIN — RIFAXIMIN 400 MG: 200 TABLET ORAL at 09:11

## 2025-01-05 RX ADMIN — BUMETANIDE 1 MG: 1 TABLET ORAL at 09:12

## 2025-01-05 ASSESSMENT — PAIN SCALES - GENERAL: PAINLEVEL_OUTOF10: 0

## 2025-01-05 NOTE — ED NOTES
Spoke with patient via . Patient stated her daughter called an ambulance for her because she was not feeling good but she is feeling better now and wants to go home. Patient not oriented to time. Patient was educated on her abnormal labs and was advised it was in her best interest to get treated in the emergency room. Patient consented with a request to contact her daughter. Telephone number on file was dialed with no answer.

## 2025-01-05 NOTE — ED NOTES
Patient ambulated to the restroom at this time with supervision. Patient appears unsteady on her feet.

## 2025-01-05 NOTE — CONSULTS
Gastroenterology, Hepatology, &  Advanced Endoscopy    Consult Note      Reason for Consult: Cirrhosis, HCC, Lightheadedness     HPI:   Lisa Hunt is a 64 y.o. female w/ PMH of  has a past medical history of DONNA (acute kidney injury) (HCC), Cirrhosis (HCC), Diabetes mellitus (HCC), Diabetic neuropathy (HCC), Esophageal varices without bleeding (HCC), GERD (gastroesophageal reflux disease), Hypertension, Intracranial bleed (HCC), Liver cirrhosis (HCC), Low vitamin D level, SDH (subdural hematoma), Thrombocytopenia (HCC), and Type 2 diabetes mellitus without complication (HCC). who presents to the ED with lightheadedness and dizziness. She reports that she is not confused and has no delay in her thought process. She does have abdominal distention and thinks that she would benefit from having a paracentesis. Regarding her HCC, she is s/p y90 therapy and being closely monitored.     This information was obtained through use of the interpretor services.     Recent Labs     01/04/25  1703 01/05/25  0805   INR 1.5  --    ALT 21 17   AST 54* 40*   ALKPHOS 179* 158*   BILITOT 2.2* 2.2*     Lab Results   Component Value Date    WBC 1.6 (L) 01/05/2025    HGB 7.5 (L) 01/05/2025    HCT 22.8 (L) 01/05/2025    PLT 22 (L) 01/05/2025     01/05/2025    K 5.0 01/05/2025     01/05/2025    CREATININE 1.5 (H) 01/05/2025    BUN 27 (H) 01/05/2025    CO2 21 (L) 01/05/2025    FOLATE 15.5 07/18/2023    [id number] 731 11/29/2023    AMMONIA 80 (H) 01/05/2025    GLUCOSE 239 01/05/2025    INR 1.5 01/04/2025    PROTIME 16.1 (H) 01/04/2025    TSH 7.58 (H) 12/05/2024    LABA1C 6.9 (H) 01/05/2025     Lab Results   Component Value Date    INR 1.5 01/04/2025    INR 1.8 12/16/2024    INR 2.2 12/10/2024    PROTIME 16.1 (H) 01/04/2025    PROTIME 19.5 (H) 12/16/2024    PROTIME 24.0 (H) 12/10/2024      MELD 3.0: 21 at 1/5/2025  8:05 AM  MELD-Na: 19 at 1/5/2025  8:05 AM  Calculated from:  Serum Creatinine: 1.5 mg/dL at 1/5/2025  8:05

## 2025-01-05 NOTE — H&P
Hospital Medicine History & Physical      PCP: Tonie Hernandez DO    Date of Admission: 1/4/2025    Date of Service: Pt seen/examined on 1/4/2025 and is admitted to Inpatient with expected LOS greater than two midnights due to medical therapy.      Chief Complaint:  had concerns including Abdominal Pain (Started today. Per the family the pt had the same pain when her ammonia was high. ).    History Of Present Illness:    Ms. Lisa Hunt, a 64 y.o. year old female  with PMH of hepatic cirrhosis secondary to liver cancer, ascites, hepatic encephalopathy, GERD, HTN, and type 2 diabetes    Pt presented to ED for evaluation of worsening weakness and abdominal pain.  Patient was seen oriented, reports she is faithfully taking lactulose every day, and she typically has 3 bowel movements every day, no diarrhea, she denies fever, chills, cough, shortness of breath, she was recently admitted from December 5 to December 17, 2024 for decompensated cirrhosis, had paracentesis on December 16 with almost 4 L clear yellow fluid removed.  Patient also declined palliative/hospice care during his hospital stay    CT abdomen pelvis in the ED showed  1. No acute intra-abdominal or pelvic process.  2. Similar cirrhotic appearance of the liver which is status post recent Y 90  procedure.  3. Similar appearance of the right upper lobe lesion which appears to have  prominent areas of necrosis.  4. Splenomegaly with multiple infarcts similar to prior imaging.  5. Moderate volume ascites.  6. Moderate colonic stool burden.       I have personally reviewed and interpreted the Initial workups as summarized below:     Patient is hemodynamic stable with good saturation on room air,  Urinalysis with 1+ bacteria  Ammonia level 91 baseline is in the 40s to 50s.  WBC 2.3K, H/H stable at 7.8/23.7, platelet chronic thrombocytopenia platelets 31K  Renal function patient has CKD stage IIIb, potassium 5.9,  Glucose 160, last A1c was 8.8% on  Left 12/16/2024    3950cc bright cler yellow fluid.    UPPER GASTROINTESTINAL ENDOSCOPY  02/07/2022    [facility]    UPPER GASTROINTESTINAL ENDOSCOPY  11/01/2021    [facility]    UPPER GASTROINTESTINAL ENDOSCOPY  12/16/2019    [facility]    UPPER GASTROINTESTINAL ENDOSCOPY  04/26/2021    [facility]    UPPER GASTROINTESTINAL ENDOSCOPY N/A 01/12/2024    EGD BIOPSY [facility] by [facility]il Conrad [place] [facility]    UPPER GASTROINTESTINAL ENDOSCOPY N/A 01/12/2024    EGD BAND LIGATION performed by [facility]il [place]ad [place] [facility]    UPPER GASTROINTESTINAL ENDOSCOPY N/A 03/15/2024    ESOPHAGOGASTRODUODENOSCOPY POSSIBLE BAND LIGATION performed by Anil Conrad MD at [facility]    UPPER GASTROINTESTINAL ENDOSCOPY N/A 08/06/2024    ESOPHAGOGASTRODUODENOSCOPY CONTROL HEMORRHAGE performed by Anil Conrad MD at [facility]    UPPER GASTROINTESTINAL ENDOSCOPY N/A 08/06/2024    ESOPHAGOGASTRODUODENOSCOPY BAND LIGATION performed by Anil Conrad MD at [facility]       Medications Prior to Admission:      Prior to Admission medications    Medication Sig Start Date End Date Taking? Authorizing Provider   spironolactone (ALDACTONE) 100 MG tablet Take 1 tablet by mouth daily 12/17/24   Peg Salcedo MD   bumetanide (BUMEX) 1 MG tablet Take 1 tablet by mouth daily 12/13/24   Nilsa George MD   insulin glargine (LANTUS) 100 UNIT/ML injection vial Inject 38 Units into the skin nightly 12/13/24   Nilsa George MD   insulin lispro (HUMALOG,ADMELOG) 100 UNIT/ML SOLN injection vial Inject 12 Units into the skin 3 times daily (before meals) 12/13/24   Nilsa George MD   polyethylene glycol (GLYCOLAX) 17 g packet Take 1 packet by mouth daily as needed for Constipation 12/12/24 1/11/25  Nilsa George MD   albumin human 25% 25 % IV solution Infuse 200 mLs intravenously every 14 days With paracentesis 12/8/24   Anil Conrad MD   lactulose (CHRONULAC) 10 GM/15ML solution Take

## 2025-01-05 NOTE — PROGRESS NOTES
[facility] Hospitalist Progress Note      Synopsis: Patient with a history of hepatic cirrhosis secondary to liver cancer, ascites, hepatic encephalopathy, GERD, hypertension, type 2 diabetes, admitted on 1/4/2025 after presenting to the ED with worsening weakness and abdominal pain.  Reports taking her lactulose as scheduled, and has 3 bowel movements daily.  Recently admitted for decompensated liver cirrhosis and discharged on 12/17.  Had a paracentesis on 12/16 and almost 4 L of fluid were removed.  Denied hospice or palliative care during that hospital stay.    Subjective  Reports continued abdominal pain  Exam:  BP (!) 146/59   Pulse 78   Temp 97.9 °F (36.6 °C)   Resp 17   Wt 64 kg (141 lb)   SpO2 100%   BMI 23.46 kg/m²     General appearance: No acute distress, pt is well oriented  HEENT: Normal cephalic, atraumatic without obvious deformity. Pupils equal, round, and reactive to light.  Extra ocular muscles intact. Conjunctivae/corneas clear.  Neck: Supple, with full range of motion. No jugular venous distention. Trachea midline.  Respiratory: On room air, clear to auscultation, no significant wheezing/crackles  Cardiovascular:  regular rhythm with rate in acceptable range.  Abdomen:  soft, no tenderness or rebound pain, normal bowel sound  Musculoskeletal: No clubbing, cyanosis, no significant edema of bilateral lower extremities.    Skin: Normal skin color.  No rashes or lesions.  Neurologic:  Neurovascularly intact without any focal sensory/motor deficits. Cranial nerves: II-XII intact, grossly non-focal.  Psychiatric: Alert and oriented, thought content appropriate, normal insight       Medications:  Reviewed    Infusion Medications    dextrose      sodium chloride       Scheduled Medications    bumetanide  1 mg Oral Daily    carvedilol  3.125 mg Oral Daily    gabapentin  600 mg Oral TID    lactulose  20 g Oral q8h    pantoprazole  40 mg Oral QAM AC    rifAXIMin  400 mg Oral TID    insulin glargine

## 2025-01-06 PROBLEM — R10.9 ABDOMINAL PAIN: Status: ACTIVE | Noted: 2025-01-06

## 2025-01-06 LAB
ALBUMIN SERPL-MCNC: 3.8 G/DL (ref 3.5–5.2)
ALP SERPL-CCNC: 149 U/L (ref 35–104)
ALT SERPL-CCNC: 18 U/L (ref 0–32)
AMMONIA PLAS-SCNC: 85 UMOL/L (ref 11–51)
ANION GAP SERPL CALCULATED.3IONS-SCNC: 11 MMOL/L (ref 7–16)
AST SERPL-CCNC: 39 U/L (ref 0–31)
BASOPHILS # BLD: 0.01 K/UL (ref 0–0.2)
BASOPHILS NFR BLD: 1 % (ref 0–2)
BILIRUB SERPL-MCNC: 2.1 MG/DL (ref 0–1.2)
BUN SERPL-MCNC: 27 MG/DL (ref 6–23)
CALCIUM SERPL-MCNC: 9.6 MG/DL (ref 8.6–10.2)
CHLORIDE SERPL-SCNC: 101 MMOL/L (ref 98–107)
CO2 SERPL-SCNC: 23 MMOL/L (ref 22–29)
CREAT SERPL-MCNC: 1.4 MG/DL (ref 0.5–1)
EKG ATRIAL RATE: 77 BPM
EKG P AXIS: 38 DEGREES
EKG P-R INTERVAL: 172 MS
EKG Q-T INTERVAL: 428 MS
EKG QRS DURATION: 132 MS
EKG QTC CALCULATION (BAZETT): 484 MS
EKG R AXIS: -56 DEGREES
EKG T AXIS: 11 DEGREES
EKG VENTRICULAR RATE: 77 BPM
EOSINOPHIL # BLD: 0.06 K/UL (ref 0.05–0.5)
EOSINOPHILS RELATIVE PERCENT: 5 % (ref 0–6)
ERYTHROCYTE [DISTWIDTH] IN BLOOD BY AUTOMATED COUNT: 14.6 % (ref 11.5–15)
GFR, ESTIMATED: 41 ML/MIN/1.73M2
GLUCOSE BLD-MCNC: 203 MG/DL (ref 74–99)
GLUCOSE BLD-MCNC: 214 MG/DL (ref 74–99)
GLUCOSE BLD-MCNC: 395 MG/DL (ref 74–99)
GLUCOSE BLD-MCNC: 404 MG/DL (ref 74–99)
GLUCOSE SERPL-MCNC: 193 MG/DL (ref 74–99)
HCT VFR BLD AUTO: 22.6 % (ref 34–48)
HGB BLD-MCNC: 7.3 G/DL (ref 11.5–15.5)
IMM GRANULOCYTES # BLD AUTO: <0.03 K/UL (ref 0–0.58)
IMM GRANULOCYTES NFR BLD: 1 % (ref 0–5)
LYMPHOCYTES NFR BLD: 0.23 K/UL (ref 1.5–4)
LYMPHOCYTES RELATIVE PERCENT: 20 % (ref 20–42)
MCH RBC QN AUTO: 32.4 PG (ref 26–35)
MCHC RBC AUTO-ENTMCNC: 32.3 G/DL (ref 32–34.5)
MCV RBC AUTO: 100.4 FL (ref 80–99.9)
MICROORGANISM SPEC CULT: ABNORMAL
MICROORGANISM SPEC CULT: ABNORMAL
MONOCYTES NFR BLD: 0.13 K/UL (ref 0.1–0.95)
MONOCYTES NFR BLD: 11 % (ref 2–12)
NEUTROPHILS NFR BLD: 63 % (ref 43–80)
NEUTS SEG NFR BLD: 0.74 K/UL (ref 1.8–7.3)
PLATELET # BLD AUTO: 23 K/UL (ref 130–450)
PLATELET CONFIRMATION: NORMAL
PMV BLD AUTO: 12.1 FL (ref 7–12)
POTASSIUM SERPL-SCNC: 4.6 MMOL/L (ref 3.5–5)
PROT SERPL-MCNC: 7.7 G/DL (ref 6.4–8.3)
RBC # BLD AUTO: 2.25 M/UL (ref 3.5–5.5)
RBC # BLD: ABNORMAL 10*6/UL
SERVICE CMNT-IMP: ABNORMAL
SODIUM SERPL-SCNC: 135 MMOL/L (ref 132–146)
SPECIMEN DESCRIPTION: ABNORMAL
WBC OTHER # BLD: 1.2 K/UL (ref 4.5–11.5)

## 2025-01-06 PROCEDURE — 97165 OT EVAL LOW COMPLEX 30 MIN: CPT

## 2025-01-06 PROCEDURE — 85025 COMPLETE CBC W/AUTO DIFF WBC: CPT

## 2025-01-06 PROCEDURE — 97161 PT EVAL LOW COMPLEX 20 MIN: CPT

## 2025-01-06 PROCEDURE — 99232 SBSQ HOSP IP/OBS MODERATE 35: CPT | Performed by: STUDENT IN AN ORGANIZED HEALTH CARE EDUCATION/TRAINING PROGRAM

## 2025-01-06 PROCEDURE — P9047 ALBUMIN (HUMAN), 25%, 50ML: HCPCS | Performed by: STUDENT IN AN ORGANIZED HEALTH CARE EDUCATION/TRAINING PROGRAM

## 2025-01-06 PROCEDURE — 99232 SBSQ HOSP IP/OBS MODERATE 35: CPT | Performed by: NURSE PRACTITIONER

## 2025-01-06 PROCEDURE — 6370000000 HC RX 637 (ALT 250 FOR IP): Performed by: STUDENT IN AN ORGANIZED HEALTH CARE EDUCATION/TRAINING PROGRAM

## 2025-01-06 PROCEDURE — 6370000000 HC RX 637 (ALT 250 FOR IP): Performed by: HOSPITALIST

## 2025-01-06 PROCEDURE — 97530 THERAPEUTIC ACTIVITIES: CPT

## 2025-01-06 PROCEDURE — 93010 ELECTROCARDIOGRAM REPORT: CPT | Performed by: INTERNAL MEDICINE

## 2025-01-06 PROCEDURE — 36415 COLL VENOUS BLD VENIPUNCTURE: CPT

## 2025-01-06 PROCEDURE — 82962 GLUCOSE BLOOD TEST: CPT

## 2025-01-06 PROCEDURE — 80053 COMPREHEN METABOLIC PANEL: CPT

## 2025-01-06 PROCEDURE — 2500000003 HC RX 250 WO HCPCS: Performed by: HOSPITALIST

## 2025-01-06 PROCEDURE — 6370000000 HC RX 637 (ALT 250 FOR IP): Performed by: INTERNAL MEDICINE

## 2025-01-06 PROCEDURE — 2140000000 HC CCU INTERMEDIATE R&B

## 2025-01-06 PROCEDURE — 6360000002 HC RX W HCPCS: Performed by: STUDENT IN AN ORGANIZED HEALTH CARE EDUCATION/TRAINING PROGRAM

## 2025-01-06 PROCEDURE — 82140 ASSAY OF AMMONIA: CPT

## 2025-01-06 RX ORDER — INSULIN LISPRO 100 [IU]/ML
10 INJECTION, SOLUTION INTRAVENOUS; SUBCUTANEOUS
Status: DISCONTINUED | OUTPATIENT
Start: 2025-01-06 | End: 2025-01-11 | Stop reason: HOSPADM

## 2025-01-06 RX ORDER — INSULIN GLARGINE 100 [IU]/ML
20 INJECTION, SOLUTION SUBCUTANEOUS DAILY
Status: DISCONTINUED | OUTPATIENT
Start: 2025-01-07 | End: 2025-01-08

## 2025-01-06 RX ADMIN — GABAPENTIN 600 MG: 300 CAPSULE ORAL at 20:40

## 2025-01-06 RX ADMIN — LACTULOSE 20 G: 20 SOLUTION ORAL at 13:43

## 2025-01-06 RX ADMIN — PANTOPRAZOLE SODIUM 40 MG: 40 TABLET, DELAYED RELEASE ORAL at 05:56

## 2025-01-06 RX ADMIN — SODIUM CHLORIDE, PRESERVATIVE FREE 10 ML: 5 INJECTION INTRAVENOUS at 20:40

## 2025-01-06 RX ADMIN — GABAPENTIN 600 MG: 300 CAPSULE ORAL at 13:42

## 2025-01-06 RX ADMIN — ALBUMIN (HUMAN) 25 G: 0.25 INJECTION, SOLUTION INTRAVENOUS at 05:59

## 2025-01-06 RX ADMIN — LACTULOSE 20 G: 20 SOLUTION ORAL at 20:40

## 2025-01-06 RX ADMIN — RIFAXIMIN 400 MG: 200 TABLET ORAL at 13:42

## 2025-01-06 RX ADMIN — RIFAXIMIN 400 MG: 200 TABLET ORAL at 20:52

## 2025-01-06 RX ADMIN — ALBUMIN (HUMAN) 25 G: 0.25 INJECTION, SOLUTION INTRAVENOUS at 20:47

## 2025-01-06 RX ADMIN — INSULIN LISPRO 16 UNITS: 100 INJECTION, SOLUTION INTRAVENOUS; SUBCUTANEOUS at 11:23

## 2025-01-06 RX ADMIN — INSULIN LISPRO 10 UNITS: 100 INJECTION, SOLUTION INTRAVENOUS; SUBCUTANEOUS at 17:16

## 2025-01-06 RX ADMIN — INSULIN GLARGINE 10 UNITS: 100 INJECTION, SOLUTION SUBCUTANEOUS at 09:47

## 2025-01-06 RX ADMIN — GABAPENTIN 600 MG: 300 CAPSULE ORAL at 09:47

## 2025-01-06 RX ADMIN — INSULIN LISPRO 10 UNITS: 100 INJECTION, SOLUTION INTRAVENOUS; SUBCUTANEOUS at 12:23

## 2025-01-06 RX ADMIN — RIFAXIMIN 400 MG: 200 TABLET ORAL at 09:47

## 2025-01-06 RX ADMIN — INSULIN LISPRO 4 UNITS: 100 INJECTION, SOLUTION INTRAVENOUS; SUBCUTANEOUS at 20:40

## 2025-01-06 RX ADMIN — INSULIN LISPRO 4 UNITS: 100 INJECTION, SOLUTION INTRAVENOUS; SUBCUTANEOUS at 08:05

## 2025-01-06 RX ADMIN — SODIUM CHLORIDE, PRESERVATIVE FREE 10 ML: 5 INJECTION INTRAVENOUS at 09:48

## 2025-01-06 RX ADMIN — ALBUMIN (HUMAN) 25 G: 0.25 INJECTION, SOLUTION INTRAVENOUS at 13:50

## 2025-01-06 RX ADMIN — LACTULOSE 20 G: 20 SOLUTION ORAL at 05:56

## 2025-01-06 RX ADMIN — INSULIN LISPRO 4 UNITS: 100 INJECTION, SOLUTION INTRAVENOUS; SUBCUTANEOUS at 17:16

## 2025-01-06 NOTE — PLAN OF CARE
Problem: Chronic Conditions and Co-morbidities  Goal: Patient's chronic conditions and co-morbidity symptoms are monitored and maintained or improved  1/6/2025 0036 by Crystal Lopez RN  Outcome: Progressing  1/5/2025 1332 by Delaney Kline RN  Outcome: Progressing     Problem: Discharge Planning  Goal: Discharge to home or other facility with appropriate resources  1/6/2025 0036 by Crystal Lopez RN  Outcome: Progressing  1/5/2025 1332 by Delaney Kline RN  Outcome: Progressing     Problem: ABCDS Injury Assessment  Goal: Absence of physical injury  1/6/2025 0036 by Crystal Lopez RN  Outcome: Progressing  1/5/2025 1332 by Delaney Kline RN  Outcome: Progressing     Problem: Safety - Adult  Goal: Free from fall injury  Outcome: Progressing

## 2025-01-06 NOTE — PLAN OF CARE
Problem: Chronic Conditions and Co-morbidities  Goal: Patient's chronic conditions and co-morbidity symptoms are monitored and maintained or improved  1/6/2025 1004 by Lyubov Louise RN  Outcome: Progressing     Problem: Discharge Planning  Goal: Discharge to home or other facility with appropriate resources  1/6/2025 1004 by Lyubov Louise RN  Outcome: Progressing     Problem: ABCDS Injury Assessment  Goal: Absence of physical injury  1/6/2025 1004 by Lyubov Louise RN  Outcome: Progressing     Problem: Safety - Adult  Goal: Free from fall injury  1/6/2025 1004 by Lyubov Louise RN  Outcome: Progressing

## 2025-01-06 NOTE — PROGRESS NOTES
Physical Therapy  Initial Assessment     Name: Lisa Hunt  : 1960  MRN: [id number]      Date of Service: 2025    Evaluating PT: Jacky Brody, PT, DPT [id number]      Room #:  6521/6521-A  Diagnosis:  Hepatic encephalopathy (HCC) [K76.82]  Hyperkalemia [E87.5]  Abdominal pain, unspecified abdominal location [R10.9]  Acute metabolic encephalopathy [G93.41]  PMHx/PSHx:   has a past medical history of DONNA (acute kidney injury) (HCC), Cirrhosis (HCC), Diabetes mellitus (HCC), Diabetic neuropathy (HCC), Esophageal varices without bleeding (HCC), GERD (gastroesophageal reflux disease), Hypertension, Intracranial bleed (HCC), Liver cirrhosis (HCC), Low vitamin D level, SDH (subdural hematoma), Thrombocytopenia (HCC), and Type 2 diabetes mellitus without complication (HCC).  Procedure/Surgery:  NA  Precautions:  Fall risk, Romanian speaking  Equipment Needs:  TBD    SUBJECTIVE:    Pt lives with her daughter in a 1 story house. Pt ambulated with WW prior to admission.    OBJECTIVE:   Initial Evaluation  Date: 25 Treatment Date: Short Term/ Long Term   Goals   AM-PAC 6 Clicks      Was pt agreeable to Eval/treatment? Yes     Does pt have pain? No complaints of pain     Bed Mobility  Rolling: NT  Supine to sit: SBA  Sit to supine: SBA  Scooting: SBA  Rolling: Independent   Supine to sit: Independent   Sit to supine: Independent   Scooting: Independent    Transfers Sit to stand: Livier  Stand to sit: Livier  Stand pivot: Livier without AD  Sit to stand: Supervision  Stand to sit: Supervision  Stand pivot: Supervision with WW   Ambulation   75 feet without AD with Livier  >400 feet with WW with Supervision   Stair negotiation: ascended and descended NT  4 steps with 1 rail with SBA   ROM BUE: Refer to OT note  BLE: WFL     Strength BUE: Refer to OT note  BLE: NT     Balance Sitting EOB: SBA  Dynamic Standing: Livier without AD  Sitting EOB: Independent   Dynamic Standing: Supervision with WW     Pt is A & O x:

## 2025-01-06 NOTE — PATIENT CARE CONFERENCE
[facility] Quality Flow/Interdisciplinary Rounds Progress Note        Quality Flow Rounds held on January 6, 2025    Disciplines Attending:  Bedside Nurse, , , and Nursing Unit Leadership    Lisa Hunt was admitted on 1/4/2025  3:44 PM    Anticipated Discharge Date:       Disposition:    Kane Score:  Kane Scale Score: 20    [facility] RISK OF UNPLANNED READMISSION 2.0             29 Total Score        Discussed patient goal for the day, patient clinical progression, and barriers to discharge.  The following Goal(s) of the Day/Commitment(s) have been identified:  Diagnostics - Report Results and Labs - Report Results      Rosmery Richard RN  January 6, 2025

## 2025-01-06 NOTE — PROGRESS NOTES
Occupational Therapy  OCCUPATIONAL THERAPY INITIAL EVALUATION    [facility]  [address], [place]      Date:2025                                                Patient Name: Lisa Hunt  MRN: [id number]  : 1960  Room: [address] [place] [address]    Evaluating OT: Tico Pugh OTR/L #[id number]     Referring Provider: Nehemiah Green MD   Specific Provider Orders/Date: OT eval and treat 25    Diagnosis: Hepatic encephalopathy (HCC) [K76.82]  Hyperkalemia [E87.5]  Abdominal pain, unspecified abdominal location [R10.9]  Acute metabolic encephalopathy [G93.41]   Pt admitted to hospital with abdominal pain and weakness     Pertinent Medical History:  has a past medical history of DONNA (acute kidney injury) (HCC), Cirrhosis (HCC), Diabetes mellitus (HCC), Diabetic neuropathy (HCC), Esophageal varices without bleeding (HCC), GERD (gastroesophageal reflux disease), Hypertension, Intracranial bleed (HCC), Liver cirrhosis (HCC), Low vitamin D level, SDH (subdural hematoma), Thrombocytopenia (HCC), and Type 2 diabetes mellitus without complication (HCC).       Precautions:  Fall Risk, Slovenian speaking    Assessment of current deficits    [x] Functional mobility  [x]ADLs  [x] Strength               []Cognition    [x] Functional transfers   [x] IADLs         [x] Safety Awareness   [x]Endurance    [] Fine Coordination              [x] Balance      [] Vision/perception   []Sensation     []Gross Motor Coordination  [] ROM  [] Delirium                   [] Motor Control     OT PLAN OF CARE   OT POC based on physician orders, patient diagnosis and results of clinical assessment    Frequency/Duration 1-5 days/wk for 2 weeks PRN   Specific OT Treatment Interventions to include:   * Instruction/training on adapted ADL techniques and AE recommendations to increase functional independence within precautions       * Training on energy conservation strategies, correct breathing  retraining: simulated UB/LB self-care tasks and standing grooming tasks while cuing on sequencing, modified techniques, posture, safety and energy conservation techniques. Skilled monitoring of HR, O2 sats and pts response to treatment.      Rehab Potential: Good  for established goals     Patient / Family Goal: return home      Patient and/or family were instructed on functional diagnosis, prognosis/goals and OT plan of care. Demonstrated fair- understanding. Difficult to fully assess due to language barrier      Eval Complexity: Low    Time In: 1415  Time Out: 1440  Total Treatment Time: 10 minutes    Min Units   OT Eval Low 97165  x  1   OT Eval Medium 32783      OT Eval High 70722      OT Re-Eval 54199       Therapeutic Ex 66130       Therapeutic Activities 15841  9  1   ADL/Self Care 36135  1     Orthotic Management 84595       Manual 33602     Neuro Re-Ed 76696       Non-Billable Time          Evaluation Time additionally includes thorough review of current medical information, gathering information on past medical history/social history and prior level of function, interpretation of standardized testing/informal observation of tasks, assessment of data and development of plan of care and goals.          Tico Pugh OTR/L #[id number]

## 2025-01-06 NOTE — PROGRESS NOTES
Patient will need platelets to be at 50 or above before proceeding with paracentesis. Please order platelets for tomorrow, and re-draw platelets after. Discussed patient and IR procedure with bedside RN, all questions answered.

## 2025-01-06 NOTE — PROGRESS NOTES
Gastroenterology, Hepatology, &  Advanced Endoscopy    Progress Note        Reason for Consult: Cirrhosis, HCC, Lightheadedness     HPI:   Lisa Hunt is a 64 y.o. female w/ PMH of  has a past medical history of DONNA (acute kidney injury) (HCC), Cirrhosis (HCC), Diabetes mellitus (HCC), Diabetic neuropathy (HCC), Esophageal varices without bleeding (HCC), GERD (gastroesophageal reflux disease), Hypertension, Intracranial bleed (HCC), Liver cirrhosis (HCC), Low vitamin D level, SDH (subdural hematoma), Thrombocytopenia (HCC), and Type 2 diabetes mellitus without complication (HCC). who presents to the ED with lightheadedness and dizziness. She reports that she is not confused and has no delay in her thought process. She does have abdominal distention and thinks that she would benefit from having a paracentesis. Regarding her HCC, she is s/p y90 therapy and being closely monitored.       Recent Labs     01/04/25  1703 01/05/25  0805 01/06/25  0523   INR 1.5  --   --    ALT 21 17 18   AST 54* 40* 39*   ALKPHOS 179* 158* 149*   BILITOT 2.2* 2.2* 2.1*     Lab Results   Component Value Date    WBC 1.2 (L) 01/06/2025    HGB 7.3 (L) 01/06/2025    HCT 22.6 (L) 01/06/2025    PLT 23 (L) 01/06/2025     01/06/2025    K 4.6 01/06/2025     01/06/2025    CREATININE 1.4 (H) 01/06/2025    BUN 27 (H) 01/06/2025    CO2 23 01/06/2025    FOLATE 15.5 07/18/2023    [id number] 731 11/29/2023    AMMONIA 85 (H) 01/06/2025    GLUCOSE 193 (H) 01/06/2025    INR 1.5 01/04/2025    PROTIME 16.1 (H) 01/04/2025    TSH 7.58 (H) 12/05/2024    LABA1C 6.9 (H) 01/05/2025     Lab Results   Component Value Date    INR 1.5 01/04/2025    INR 1.8 12/16/2024    INR 2.2 12/10/2024    PROTIME 16.1 (H) 01/04/2025    PROTIME 19.5 (H) 12/16/2024    PROTIME 24.0 (H) 12/10/2024      MELD 3.0: 19 at 1/6/2025  5:23 AM  MELD-Na: 19 at 1/6/2025  5:23 AM  Calculated from:  Serum Creatinine: 1.4 mg/dL at 1/6/2025  5:23 AM  Serum Sodium: 135 mmol/L at

## 2025-01-06 NOTE — PROGRESS NOTES
Notified Basilia Kenyon NP of patients blood sugar 355. Per order, give 16U Humalog and notify provider.

## 2025-01-06 NOTE — CARE COORDINATION
Transition of care: GI following. Plan for IR paracentesis. IV albumin 25g q 8 hrs x 9 doses.  Ammonia 85 and other labs noted. Met with pt in room. Communicated with pt with use of video . Pt said she lives with her daughter, Lisa, in a 1 story home. Her daughter help pt with bathing. Family and friends provide transportation. DME- [employer] which pt does use at home. Denies any home care services. Plan is to return home when medically ready. No needs voiced at present. PCP is Dr LAYTON Hernandez and pharmacy is [facility] on [address]. Cm/sw will follow.         Case Management Assessment  Initial Evaluation    Date/Time of Evaluation: 1/6/2025 3:16 PM  Assessment Completed by: Odilia Akhtar RN    If patient is discharged prior to next notation, then this note serves as note for discharge by case management.    Patient Name: Lisa Hunt                   YOB: 1960  Diagnosis: Hepatic encephalopathy (HCC) [K76.82]  Hyperkalemia [E87.5]  Abdominal pain, unspecified abdominal location [R10.9]  Acute metabolic encephalopathy [G93.41]                   Date / Time: 1/4/2025  3:44 PM    Patient Admission Status: Inpatient   Readmission Risk (Low < 19, Mod (19-27), High > 27): Readmission Risk Score: 29.1    Current PCP: Tonie Hernandez, DO  PCP verified by CM? Yes    Chart Reviewed: Yes      History Provided by: Patient  Patient Orientation: Alert and Oriented    Patient Cognition: Alert    Hospitalization in the last 30 days (Readmission):  Yes    If yes, Readmission Assessment in  Navigator will be completed.    Advance Directives:      Code Status: Full Code     Discharge Planning:    Patient lives with: Family Members Type of Home: House  Primary Care Giver: Self  Patient Support Systems include: Children, Family Members     ADLS  Prior functional level: Assistance with the following:, Bathing      Family can provide assistance at DC: Yes  Would you like Case Management to discuss the

## 2025-01-06 NOTE — PROGRESS NOTES
[facility]   IN-PATIENT SERVICE      Progress Note    1/6/2025    11:31 AM    Name:   Lisa Hunt  MRN:     [id number]     Acct:      [id number]   Room:   [address] [place] [address]-A  IP Day:  2  Admit Date:  1/4/2025  3:44 PM    PCP:   Tonie Hernandez DO  Code Status:  Full Code    Subjective:     C/C:   Chief Complaint   Patient presents with    Abdominal Pain     Started today. Per the family the pt had the same pain when her ammonia was high.      Interval History Status: not changed.     Pending paracentesis today  She will need [id number] paracentesis o/p    Ammonia remains high   On lactulose   She is having 3-5 bm per day     BG remains high  I added humalog tid scheduled with [facility]SI prn   Increased lantus to 20 units daily     Brief History:      Patient with a history of hepatic cirrhosis secondary to liver cancer, ascites, hepatic encephalopathy, GERD, hypertension, type 2 diabetes, admitted on 1/4/2025 after presenting to the ED with worsening weakness and abdominal pain.  Reports taking her lactulose as scheduled, and has 3 bowel movements daily.  Recently admitted for decompensated liver cirrhosis and discharged on 12/17.  Had a paracentesis on 12/16 and almost 4 L of fluid were removed.  Denied hospice or palliative care during that hospital stay.     Review of Systems:     Constitutional:  negative for chills, fevers, sweats  Respiratory:  negative for cough, dyspnea on exertion, shortness of breath, wheezing  Cardiovascular:  negative for chest pain, chest pressure/discomfort, lower extremity edema, palpitations  Gastrointestinal:  negative for abdominal pain, constipation, diarrhea, nausea, vomiting  Neurological:  negative for dizziness, headache    Medications:     Allergies:    Allergies   Allergen Reactions    Fish Allergy      All seafood       Current Meds:   Scheduled Meds:    [START ON 1/7/2025] insulin glargine  20 Units SubCUTAneous Daily    insulin lispro  10 Units  120 ml       Labs:  Hematology:  Recent Labs     01/04/25 1703 01/05/25  0805 01/06/25  0523   WBC 2.3* 1.6* 1.2*   RBC 2.42* 2.29* 2.25*   HGB 7.8* 7.5* 7.3*   HCT 23.7* 22.8* 22.6*   MCV 97.9 99.6 100.4*   MCH 32.2 32.8 32.4   MCHC 32.9 32.9 32.3   RDW 14.6 14.7 14.6   PLT 31* 22* 23*   MPV 13.0* 11.7 12.1*   INR 1.5  --   --      Chemistry:  Recent Labs     01/04/25 1703 01/05/25 0805 01/05/25 1138 01/06/25 0523    135  --  135   K 5.9* 5.0  --  4.6    105  --  101   CO2 22 21*  --  23   GLUCOSE 160* 228* 239 193*   BUN 27* 27*  --  27*   CREATININE 1.6* 1.5*  --  1.4*   MG 1.9  --   --   --    ANIONGAP 7 9  --  11   LABGLOM 36* 40*  --  41*   CALCIUM 9.0 9.1  --  9.6     Recent Labs     01/04/25 1703 01/04/25 2018 01/05/25 0518 01/05/25 0805 01/05/25  1137 01/05/25  1621 01/05/25 2001 01/06/25 0523 01/06/25  1115 01/06/25  1117   LABA1C  --   --   --  6.9*  --   --   --   --   --   --    AST 54*  --   --  40*  --   --   --  39*  --   --    ALT 21  --   --  17  --   --   --  18  --   --    ALKPHOS 179*  --   --  158*  --   --   --  149*  --   --    BILITOT 2.2*  --   --  2.2*  --   --   --  2.1*  --   --    AMMONIA 91*  --   --  80*  --   --   --  85*  --   --    LIPASE 26  --   --   --   --   --   --   --   --   --    POCGLU  --    < > 258*  --  239* 399* 355*  --  395* 404*    < > = values in this interval not displayed.     ABG:  Lab Results   Component Value Date/Time    PH 7.422 12/05/2024 11:27 AM    PCO2 34.5 12/05/2024 11:27 AM    PO2 84.5 12/05/2024 11:27 AM    HCO3 22.0 12/05/2024 11:27 AM    BE -2.0 12/05/2024 11:27 AM    THB 10.1 12/05/2024 11:27 AM    O2SAT 95.7 12/05/2024 11:27 AM     Lab Results   Component Value Date/Time    SPECIAL Site: Body Fluid 11/14/2024 09:45 AM     Lab Results   Component Value Date/Time    CULTURE NO GROWTH 5 DAYS 12/05/2024 10:49 AM    CULTURE NO GROWTH 5 DAYS 12/05/2024 10:49 AM       Radiology:  CT ABDOMEN PELVIS W IV CONTRAST Additional

## 2025-01-06 NOTE — ACP (ADVANCE CARE PLANNING)
Advance Care Planning   The patient has the following advanced directives on file:  Advance Directives       Power of  Living Will ACP-Advance Directive ACP-Power of     Not on File Not on File Not on File Not on File            PT'S CONTACTS:     Primary Decision Maker: Lisa Fitch - Child - [phone]    Secondary Decision Maker: yolette fitch - Child - [phone]    The Patient has the following current code status:    Code Status: Full Code

## 2025-01-06 NOTE — PROGRESS NOTES
4 Eyes Skin Assessment     NAME:  Lisa Hunt  YOB: 1960  MEDICAL RECORD NUMBER:  [id number]    The patient is being assessed for  Admission    I agree that at least one RN has performed a thorough Head to Toe Skin Assessment on the patient. ALL assessment sites listed below have been assessed.      Areas assessed by both nurses:    Head, Face, Ears, Shoulders, Back, Chest, Arms, Elbows, Hands, Sacrum. Buttock, Coccyx, Ischium, Legs. Feet and Heels, and Under Medical Devices         Does the Patient have a Wound? No noted wound(s)       Kane Prevention initiated by RN: No  Wound Care Orders initiated by RN: No    Pressure Injury (Stage 3,4, Unstageable, DTI, NWPT, and Complex wounds) if present, place Wound referral order by RN under : No    New Ostomies, if present place, Ostomy referral order under : No     Nurse 1 eSignature: Electronically signed by Delaney Curiel RN on 1/5/25 at 7:05 PM EST    **SHARE this note so that the co-signing nurse can place an eSignature**    Nurse 2 eSignature: Electronically signed by Lyubov Louise RN on 1/5/25 at 7:06 PM EST

## 2025-01-07 LAB
ALBUMIN SERPL-MCNC: 3.9 G/DL (ref 3.5–5.2)
ALP SERPL-CCNC: 134 U/L (ref 35–104)
ALT SERPL-CCNC: 15 U/L (ref 0–32)
AMMONIA PLAS-SCNC: 106 UMOL/L (ref 11–51)
ANION GAP SERPL CALCULATED.3IONS-SCNC: 13 MMOL/L (ref 7–16)
AST SERPL-CCNC: 39 U/L (ref 0–31)
BASOPHILS # BLD: 0.01 K/UL (ref 0–0.2)
BASOPHILS # BLD: 0.01 K/UL (ref 0–0.2)
BASOPHILS NFR BLD: 1 % (ref 0–2)
BASOPHILS NFR BLD: 1 % (ref 0–2)
BILIRUB SERPL-MCNC: 2.3 MG/DL (ref 0–1.2)
BUN SERPL-MCNC: 23 MG/DL (ref 6–23)
CALCIUM SERPL-MCNC: 9.1 MG/DL (ref 8.6–10.2)
CHLORIDE SERPL-SCNC: 101 MMOL/L (ref 98–107)
CO2 SERPL-SCNC: 19 MMOL/L (ref 22–29)
CREAT SERPL-MCNC: 1.2 MG/DL (ref 0.5–1)
EOSINOPHIL # BLD: 0.07 K/UL (ref 0.05–0.5)
EOSINOPHIL # BLD: 0.08 K/UL (ref 0.05–0.5)
EOSINOPHILS RELATIVE PERCENT: 4 % (ref 0–6)
EOSINOPHILS RELATIVE PERCENT: 4 % (ref 0–6)
ERYTHROCYTE [DISTWIDTH] IN BLOOD BY AUTOMATED COUNT: 14.4 % (ref 11.5–15)
ERYTHROCYTE [DISTWIDTH] IN BLOOD BY AUTOMATED COUNT: 14.5 % (ref 11.5–15)
ERYTHROCYTE [DISTWIDTH] IN BLOOD BY AUTOMATED COUNT: 14.5 % (ref 11.5–15)
ERYTHROCYTE [DISTWIDTH] IN BLOOD BY AUTOMATED COUNT: 14.6 % (ref 11.5–15)
GFR, ESTIMATED: 52 ML/MIN/1.73M2
GLUCOSE BLD-MCNC: 183 MG/DL (ref 74–99)
GLUCOSE BLD-MCNC: 229 MG/DL (ref 74–99)
GLUCOSE BLD-MCNC: 238 MG/DL (ref 74–99)
GLUCOSE BLD-MCNC: 259 MG/DL (ref 74–99)
GLUCOSE SERPL-MCNC: 213 MG/DL (ref 74–99)
HCT VFR BLD AUTO: 22.4 % (ref 34–48)
HCT VFR BLD AUTO: 23.9 % (ref 34–48)
HCT VFR BLD AUTO: 23.9 % (ref 34–48)
HCT VFR BLD AUTO: 24.4 % (ref 34–48)
HGB BLD-MCNC: 7.6 G/DL (ref 11.5–15.5)
HGB BLD-MCNC: 7.7 G/DL (ref 11.5–15.5)
HGB BLD-MCNC: 7.8 G/DL (ref 11.5–15.5)
HGB BLD-MCNC: 7.9 G/DL (ref 11.5–15.5)
IMM GRANULOCYTES # BLD AUTO: <0.03 K/UL (ref 0–0.58)
IMM GRANULOCYTES # BLD AUTO: <0.03 K/UL (ref 0–0.58)
IMM GRANULOCYTES NFR BLD: 1 % (ref 0–5)
IMM GRANULOCYTES NFR BLD: 1 % (ref 0–5)
LYMPHOCYTES NFR BLD: 0.25 K/UL (ref 1.5–4)
LYMPHOCYTES NFR BLD: 0.31 K/UL (ref 1.5–4)
LYMPHOCYTES RELATIVE PERCENT: 12 % (ref 20–42)
LYMPHOCYTES RELATIVE PERCENT: 19 % (ref 20–42)
MCH RBC QN AUTO: 32.2 PG (ref 26–35)
MCH RBC QN AUTO: 32.3 PG (ref 26–35)
MCH RBC QN AUTO: 32.5 PG (ref 26–35)
MCH RBC QN AUTO: 33 PG (ref 26–35)
MCHC RBC AUTO-ENTMCNC: 32.2 G/DL (ref 32–34.5)
MCHC RBC AUTO-ENTMCNC: 32.4 G/DL (ref 32–34.5)
MCHC RBC AUTO-ENTMCNC: 32.6 G/DL (ref 32–34.5)
MCHC RBC AUTO-ENTMCNC: 33.9 G/DL (ref 32–34.5)
MCV RBC AUTO: 102.6 FL (ref 80–99.9)
MCV RBC AUTO: 95.3 FL (ref 80–99.9)
MCV RBC AUTO: 99.6 FL (ref 80–99.9)
MCV RBC AUTO: 99.6 FL (ref 80–99.9)
MONOCYTES NFR BLD: 0.19 K/UL (ref 0.1–0.95)
MONOCYTES NFR BLD: 0.29 K/UL (ref 0.1–0.95)
MONOCYTES NFR BLD: 12 % (ref 2–12)
MONOCYTES NFR BLD: 14 % (ref 2–12)
NEUTROPHILS NFR BLD: 64 % (ref 43–80)
NEUTROPHILS NFR BLD: 69 % (ref 43–80)
NEUTS SEG NFR BLD: 1.03 K/UL (ref 1.8–7.3)
NEUTS SEG NFR BLD: 1.41 K/UL (ref 1.8–7.3)
PLATELET # BLD AUTO: 27 K/UL (ref 130–450)
PLATELET # BLD AUTO: 30 K/UL (ref 130–450)
PLATELET # BLD AUTO: 33 K/UL (ref 130–450)
PLATELET CONFIRMATION: NORMAL
PLATELET, FLUORESCENCE: 25 K/UL (ref 130–450)
PMV BLD AUTO: 11.5 FL (ref 7–12)
PMV BLD AUTO: 11.9 FL (ref 7–12)
PMV BLD AUTO: 12.1 FL (ref 7–12)
PMV BLD AUTO: 12.7 FL (ref 7–12)
POTASSIUM SERPL-SCNC: 5 MMOL/L (ref 3.5–5)
PROT SERPL-MCNC: 7.7 G/DL (ref 6.4–8.3)
RBC # BLD AUTO: 2.33 M/UL (ref 3.5–5.5)
RBC # BLD AUTO: 2.35 M/UL (ref 3.5–5.5)
RBC # BLD AUTO: 2.4 M/UL (ref 3.5–5.5)
RBC # BLD AUTO: 2.45 M/UL (ref 3.5–5.5)
RBC # BLD: ABNORMAL 10*6/UL
SODIUM SERPL-SCNC: 133 MMOL/L (ref 132–146)
WBC OTHER # BLD: 1.6 K/UL (ref 4.5–11.5)
WBC OTHER # BLD: 1.6 K/UL (ref 4.5–11.5)
WBC OTHER # BLD: 2.1 K/UL (ref 4.5–11.5)
WBC OTHER # BLD: 2.1 K/UL (ref 4.5–11.5)

## 2025-01-07 PROCEDURE — 2140000000 HC CCU INTERMEDIATE R&B

## 2025-01-07 PROCEDURE — P9073 PLATELETS PHERESIS PATH REDU: HCPCS

## 2025-01-07 PROCEDURE — 2500000003 HC RX 250 WO HCPCS: Performed by: HOSPITALIST

## 2025-01-07 PROCEDURE — 82962 GLUCOSE BLOOD TEST: CPT

## 2025-01-07 PROCEDURE — 6370000000 HC RX 637 (ALT 250 FOR IP): Performed by: STUDENT IN AN ORGANIZED HEALTH CARE EDUCATION/TRAINING PROGRAM

## 2025-01-07 PROCEDURE — 6370000000 HC RX 637 (ALT 250 FOR IP): Performed by: INTERNAL MEDICINE

## 2025-01-07 PROCEDURE — P9047 ALBUMIN (HUMAN), 25%, 50ML: HCPCS | Performed by: STUDENT IN AN ORGANIZED HEALTH CARE EDUCATION/TRAINING PROGRAM

## 2025-01-07 PROCEDURE — 6370000000 HC RX 637 (ALT 250 FOR IP): Performed by: HOSPITALIST

## 2025-01-07 PROCEDURE — 99232 SBSQ HOSP IP/OBS MODERATE 35: CPT | Performed by: STUDENT IN AN ORGANIZED HEALTH CARE EDUCATION/TRAINING PROGRAM

## 2025-01-07 PROCEDURE — 86923 COMPATIBILITY TEST ELECTRIC: CPT

## 2025-01-07 PROCEDURE — 82140 ASSAY OF AMMONIA: CPT

## 2025-01-07 PROCEDURE — 86850 RBC ANTIBODY SCREEN: CPT

## 2025-01-07 PROCEDURE — 86901 BLOOD TYPING SEROLOGIC RH(D): CPT

## 2025-01-07 PROCEDURE — 80053 COMPREHEN METABOLIC PANEL: CPT

## 2025-01-07 PROCEDURE — 86900 BLOOD TYPING SEROLOGIC ABO: CPT

## 2025-01-07 PROCEDURE — 85025 COMPLETE CBC W/AUTO DIFF WBC: CPT

## 2025-01-07 PROCEDURE — 6360000002 HC RX W HCPCS: Performed by: STUDENT IN AN ORGANIZED HEALTH CARE EDUCATION/TRAINING PROGRAM

## 2025-01-07 PROCEDURE — 85027 COMPLETE CBC AUTOMATED: CPT

## 2025-01-07 PROCEDURE — 36415 COLL VENOUS BLD VENIPUNCTURE: CPT

## 2025-01-07 PROCEDURE — 36430 TRANSFUSION BLD/BLD COMPNT: CPT

## 2025-01-07 RX ORDER — SODIUM CHLORIDE 9 MG/ML
INJECTION, SOLUTION INTRAVENOUS PRN
Status: DISCONTINUED | OUTPATIENT
Start: 2025-01-07 | End: 2025-01-09

## 2025-01-07 RX ADMIN — RIFAXIMIN 400 MG: 200 TABLET ORAL at 09:30

## 2025-01-07 RX ADMIN — SODIUM CHLORIDE, PRESERVATIVE FREE 10 ML: 5 INJECTION INTRAVENOUS at 22:23

## 2025-01-07 RX ADMIN — ALBUMIN (HUMAN) 25 G: 0.25 INJECTION, SOLUTION INTRAVENOUS at 22:22

## 2025-01-07 RX ADMIN — INSULIN LISPRO 10 UNITS: 100 INJECTION, SOLUTION INTRAVENOUS; SUBCUTANEOUS at 18:22

## 2025-01-07 RX ADMIN — RIFAXIMIN 400 MG: 200 TABLET ORAL at 22:51

## 2025-01-07 RX ADMIN — PANTOPRAZOLE SODIUM 40 MG: 40 TABLET, DELAYED RELEASE ORAL at 05:14

## 2025-01-07 RX ADMIN — RIFAXIMIN 400 MG: 200 TABLET ORAL at 15:24

## 2025-01-07 RX ADMIN — ALBUMIN (HUMAN) 25 G: 0.25 INJECTION, SOLUTION INTRAVENOUS at 05:18

## 2025-01-07 RX ADMIN — LACTULOSE 20 G: 20 SOLUTION ORAL at 22:04

## 2025-01-07 RX ADMIN — INSULIN LISPRO 10 UNITS: 100 INJECTION, SOLUTION INTRAVENOUS; SUBCUTANEOUS at 12:38

## 2025-01-07 RX ADMIN — SODIUM CHLORIDE, PRESERVATIVE FREE 10 ML: 5 INJECTION INTRAVENOUS at 09:30

## 2025-01-07 RX ADMIN — ALBUMIN (HUMAN) 25 G: 0.25 INJECTION, SOLUTION INTRAVENOUS at 17:05

## 2025-01-07 RX ADMIN — INSULIN GLARGINE 20 UNITS: 100 INJECTION, SOLUTION SUBCUTANEOUS at 09:30

## 2025-01-07 RX ADMIN — GABAPENTIN 600 MG: 300 CAPSULE ORAL at 22:03

## 2025-01-07 RX ADMIN — INSULIN LISPRO 4 UNITS: 100 INJECTION, SOLUTION INTRAVENOUS; SUBCUTANEOUS at 18:22

## 2025-01-07 RX ADMIN — INSULIN LISPRO 4 UNITS: 100 INJECTION, SOLUTION INTRAVENOUS; SUBCUTANEOUS at 22:16

## 2025-01-07 RX ADMIN — INSULIN LISPRO 8 UNITS: 100 INJECTION, SOLUTION INTRAVENOUS; SUBCUTANEOUS at 12:38

## 2025-01-07 RX ADMIN — LACTULOSE 20 G: 20 SOLUTION ORAL at 15:24

## 2025-01-07 RX ADMIN — GABAPENTIN 600 MG: 300 CAPSULE ORAL at 15:24

## 2025-01-07 RX ADMIN — GABAPENTIN 600 MG: 300 CAPSULE ORAL at 09:30

## 2025-01-07 RX ADMIN — LACTULOSE 20 G: 20 SOLUTION ORAL at 05:14

## 2025-01-07 ASSESSMENT — PAIN SCALES - GENERAL
PAINLEVEL_OUTOF10: 0

## 2025-01-07 NOTE — PATIENT CARE CONFERENCE
[facility] Quality Flow/Interdisciplinary Rounds Progress Note        Quality Flow Rounds held on January 7, 2025    Disciplines Attending:  Bedside Nurse, , , and Nursing Unit Leadership    Lisa Hunt was admitted on 1/4/2025  3:44 PM    Anticipated Discharge Date:       Disposition:    Kane Score:  Kane Scale Score: 19    BS RISK OF UNPLANNED READMISSION 2.0             30.1 Total Score        Discussed patient goal for the day, patient clinical progression, and barriers to discharge.  The following Goal(s) of the Day/Commitment(s) have been identified:  Labs - Report Results      Rosmery Richard RN  January 7, 2025

## 2025-01-07 NOTE — PROGRESS NOTES
Discussed patient and IR procedure with bedside RN, all questions answered. Pt still needs plts ordered and transfused before proceeding with paracentesis.

## 2025-01-07 NOTE — CARE COORDINATION
Transition of care update: Platelets 25 and other labs noted. Transfuse 1 unit platelets. IR for paracentesis. IV albumin 25g q 8 hrs x 9 doses. On RA. Plan is to return home with her daughter when medically ready. Cm/sw will follow for discharge needs.

## 2025-01-07 NOTE — PROGRESS NOTES
RN messaged Dr. Alves regarding pt needing to get a PLT transfusion prior to getting a paracentesis today       Arianna Amaro RN

## 2025-01-07 NOTE — PROGRESS NOTES
Hospitalist Progress Note      SYNOPSIS: Patient admitted on 2025 for Acute metabolic encephalopathy  Patient with a history of hepatic cirrhosis secondary to liver cancer, ascites, hepatic encephalopathy, GERD, hypertension, type 2 diabetes, admitted on 2025 after presenting to the ED with worsening weakness and abdominal pain.  Reports taking her lactulose as scheduled, and has 3 bowel movements daily.  Recently admitted for decompensated liver cirrhosis and discharged on .  Had a paracentesis on  and almost 4 L of fluid were removed.    platelet count 25, transfused 1 bag of platelet for paracentesis      SUBJECTIVE:  Stable overnight. No other overnight issues reported.   Session code 71246, patient does complain of abdominal distention, denies any abdominal pain.  Patient alert oriented responding to my questions appropriately  Discussed with nurse present at bedside  Records reviewed.         Temp (24hrs), Av.9 °F (36.6 °C), Min:97 °F (36.1 °C), Max:98.5 °F (36.9 °C)    DIET: ADULT DIET; Dysphagia - Soft and Bite Sized; 4 carb choices (60 gm/meal)  CODE: Full Code    Intake/Output Summary (Last 24 hours) at 2025 1109  Last data filed at 2025 1847  Gross per 24 hour   Intake 120 ml   Output --   Net 120 ml       Review of Systems  All bolded are positive; please see HPI  General:  Fever, chills, diaphoresis, fatigue, malaise, night sweats, weight loss  Psychological:  Anxiety, disorientation, hallucinations.  ENT:  Epistaxis, headaches, vertigo, visual changes.  Cardiovascular:  Chest pain, irregular heartbeats, palpitations, paroxysmal nocturnal dyspnea.  Respiratory:  Shortness of breath, coughing, sputum production, hemoptysis, wheezing, orthopnea.  Gastrointestinal:  Nausea, vomiting, diarrhea, heartburn, constipation, abdominal pain, hematemesis, hematochezia, melena, acholic stools  Genito-Urinary:  Dysuria, urgency, frequency, hematuria  Musculoskeletal:  Joint  DAILY    +++++++++++++++++++++++++++++++++++++++++++++++++  Josue Alves MD   Hospitalist  [place], [place]  +++++++++++++++++++++++++++++++++++++++++++++++++  NOTE: This report was transcribed using voice recognition software. Every effort was made to ensure accuracy; however, inadvertent computerized transcription errors may be present.

## 2025-01-07 NOTE — PLAN OF CARE
Problem: Chronic Conditions and Co-morbidities  Goal: Patient's chronic conditions and co-morbidity symptoms are monitored and maintained or improved  1/7/2025 1101 by Arianna Amaro RN  Outcome: Progressing     Problem: Discharge Planning  Goal: Discharge to home or other facility with appropriate resources  1/7/2025 1101 by Arianna Amaro RN  Outcome: Progressing     Problem: ABCDS Injury Assessment  Goal: Absence of physical injury  1/7/2025 1101 by Arianna Amaro RN  Outcome: Progressing     Problem: Safety - Adult  Goal: Free from fall injury  1/7/2025 1101 by Arianna Amaro RN  Outcome: Progressing     Problem: Pain  Goal: Verbalizes/displays adequate comfort level or baseline comfort level  Outcome: Progressing

## 2025-01-08 ENCOUNTER — APPOINTMENT (OUTPATIENT)
Dept: INTERVENTIONAL RADIOLOGY/VASCULAR | Age: 65
End: 2025-01-08
Payer: MEDICARE

## 2025-01-08 LAB
ALBUMIN SERPL-MCNC: 5 G/DL (ref 3.5–5.2)
ALBUMIN SERPL-MCNC: 5.2 G/DL (ref 3.5–5.2)
ALP SERPL-CCNC: 128 U/L (ref 35–104)
ALP SERPL-CCNC: 135 U/L (ref 35–104)
ALT SERPL-CCNC: 14 U/L (ref 0–32)
ALT SERPL-CCNC: 14 U/L (ref 0–32)
AMMONIA PLAS-SCNC: 113 UMOL/L (ref 11–51)
ANION GAP SERPL CALCULATED.3IONS-SCNC: 11 MMOL/L (ref 7–16)
ANION GAP SERPL CALCULATED.3IONS-SCNC: 22 MMOL/L (ref 7–16)
ARM BAND NUMBER: NORMAL
ARM BAND NUMBER: NORMAL
AST SERPL-CCNC: 33 U/L (ref 0–31)
AST SERPL-CCNC: 35 U/L (ref 0–31)
B.E.: -1.7 MMOL/L (ref -3–3)
BASOPHILS # BLD: 0 K/UL (ref 0–0.2)
BASOPHILS NFR BLD: 0 % (ref 0–2)
BILIRUB SERPL-MCNC: 3.1 MG/DL (ref 0–1.2)
BILIRUB SERPL-MCNC: 3.4 MG/DL (ref 0–1.2)
BLASTS ABSOLUTE COUNT: 0.02 K/UL
BLASTS: 1 %
BLOOD BANK BLOOD PRODUCT EXPIRATION DATE: NORMAL
BLOOD BANK BLOOD PRODUCT EXPIRATION DATE: NORMAL
BLOOD BANK DISPENSE STATUS: NORMAL
BLOOD BANK DISPENSE STATUS: NORMAL
BLOOD BANK ISBT PRODUCT BLOOD TYPE: 6200
BLOOD BANK ISBT PRODUCT BLOOD TYPE: 6200
BLOOD BANK PRODUCT CODE: NORMAL
BLOOD BANK PRODUCT CODE: NORMAL
BLOOD BANK UNIT TYPE AND RH: NORMAL
BLOOD BANK UNIT TYPE AND RH: NORMAL
BPU ID: NORMAL
BPU ID: NORMAL
BUN SERPL-MCNC: 23 MG/DL (ref 6–23)
BUN SERPL-MCNC: 26 MG/DL (ref 6–23)
CALCIUM SERPL-MCNC: 10 MG/DL (ref 8.6–10.2)
CALCIUM SERPL-MCNC: 10.3 MG/DL (ref 8.6–10.2)
CHLORIDE SERPL-SCNC: 101 MMOL/L (ref 98–107)
CHLORIDE SERPL-SCNC: 109 MMOL/L (ref 98–107)
CO2 SERPL-SCNC: 13 MMOL/L (ref 22–29)
CO2 SERPL-SCNC: 23 MMOL/L (ref 22–29)
COHB: 0.8 % (ref 0–1.5)
COMPONENT: NORMAL
COMPONENT: NORMAL
CREAT SERPL-MCNC: 1 MG/DL (ref 0.5–1)
CREAT SERPL-MCNC: 1.2 MG/DL (ref 0.5–1)
CRITICAL: ABNORMAL
DATE ANALYZED: ABNORMAL
DATE OF COLLECTION: ABNORMAL
EOSINOPHIL # BLD: 0.06 K/UL (ref 0.05–0.5)
EOSINOPHILS RELATIVE PERCENT: 3 % (ref 0–6)
ERYTHROCYTE [DISTWIDTH] IN BLOOD BY AUTOMATED COUNT: 14.6 % (ref 11.5–15)
ERYTHROCYTE [DISTWIDTH] IN BLOOD BY AUTOMATED COUNT: 14.6 % (ref 11.5–15)
GFR, ESTIMATED: 50 ML/MIN/1.73M2
GFR, ESTIMATED: 64 ML/MIN/1.73M2
GLUCOSE BLD-MCNC: 150 MG/DL (ref 74–99)
GLUCOSE BLD-MCNC: 152 MG/DL (ref 74–99)
GLUCOSE BLD-MCNC: 201 MG/DL (ref 74–99)
GLUCOSE BLD-MCNC: 261 MG/DL (ref 74–99)
GLUCOSE BLD-MCNC: 293 MG/DL (ref 74–99)
GLUCOSE SERPL-MCNC: 141 MG/DL (ref 74–99)
GLUCOSE SERPL-MCNC: 322 MG/DL (ref 74–99)
HCO3: 22 MMOL/L (ref 22–26)
HCT VFR BLD AUTO: 21.2 % (ref 34–48)
HCT VFR BLD AUTO: 22.1 % (ref 34–48)
HGB BLD-MCNC: 7 G/DL (ref 11.5–15.5)
HGB BLD-MCNC: 7.2 G/DL (ref 11.5–15.5)
HHB: 5.2 % (ref 0–5)
LAB: ABNORMAL
LACTATE BLDV-SCNC: 1.5 MMOL/L (ref 0.5–2.2)
LYMPHOCYTES NFR BLD: 0.19 K/UL (ref 1.5–4)
LYMPHOCYTES RELATIVE PERCENT: 9 % (ref 20–42)
Lab: 1553
MAGNESIUM SERPL-MCNC: 1.8 MG/DL (ref 1.6–2.6)
MCH RBC QN AUTO: 32 PG (ref 26–35)
MCH RBC QN AUTO: 32.6 PG (ref 26–35)
MCHC RBC AUTO-ENTMCNC: 32.6 G/DL (ref 32–34.5)
MCHC RBC AUTO-ENTMCNC: 33 G/DL (ref 32–34.5)
MCV RBC AUTO: 100 FL (ref 80–99.9)
MCV RBC AUTO: 96.8 FL (ref 80–99.9)
METHB: 0.8 % (ref 0–1.5)
MODE: ABNORMAL
MONOCYTES NFR BLD: 0.2 K/UL (ref 0.1–0.95)
MONOCYTES NFR BLD: 10 % (ref 2–12)
NEUTROPHILS NFR BLD: 78 % (ref 43–80)
NEUTS SEG NFR BLD: 1.64 K/UL (ref 1.8–7.3)
O2 SATURATION: 94.7 % (ref 92–98.5)
O2HB: 93.2 % (ref 94–97)
OPERATOR ID: ABNORMAL
PATIENT TEMP: 37 C
PCO2: 32.7 MMHG (ref 35–45)
PH BLOOD GAS: 7.45 (ref 7.35–7.45)
PHOSPHATE SERPL-MCNC: 2 MG/DL (ref 2.5–4.5)
PLATELET # BLD AUTO: 25 K/UL (ref 130–450)
PLATELET # BLD AUTO: 27 K/UL (ref 130–450)
PLATELET CONFIRMATION: NORMAL
PMV BLD AUTO: 11.8 FL (ref 7–12)
PMV BLD AUTO: 12 FL (ref 7–12)
PO2: 78.1 MMHG (ref 75–100)
POTASSIUM SERPL-SCNC: 4.6 MMOL/L (ref 3.5–5)
POTASSIUM SERPL-SCNC: 4.8 MMOL/L (ref 3.5–5)
PROT SERPL-MCNC: 8.4 G/DL (ref 6.4–8.3)
PROT SERPL-MCNC: 8.8 G/DL (ref 6.4–8.3)
RBC # BLD AUTO: 2.19 M/UL (ref 3.5–5.5)
RBC # BLD AUTO: 2.21 M/UL (ref 3.5–5.5)
RBC # BLD: ABNORMAL 10*6/UL
SODIUM SERPL-SCNC: 135 MMOL/L (ref 132–146)
SODIUM SERPL-SCNC: 144 MMOL/L (ref 132–146)
SOURCE, BLOOD GAS: ABNORMAL
THB: 8.1 G/DL (ref 11.5–16.5)
TIME ANALYZED: 1559
TRANSFUSION STATUS: NORMAL
TRANSFUSION STATUS: NORMAL
UNIT DIVISION: 0
UNIT DIVISION: 0
UNIT ISSUE DATE/TIME: NORMAL
UNIT ISSUE DATE/TIME: NORMAL
WBC OTHER # BLD: 1.6 K/UL (ref 4.5–11.5)
WBC OTHER # BLD: 2.1 K/UL (ref 4.5–11.5)

## 2025-01-08 PROCEDURE — 6360000002 HC RX W HCPCS: Performed by: STUDENT IN AN ORGANIZED HEALTH CARE EDUCATION/TRAINING PROGRAM

## 2025-01-08 PROCEDURE — 6370000000 HC RX 637 (ALT 250 FOR IP): Performed by: INTERNAL MEDICINE

## 2025-01-08 PROCEDURE — 85025 COMPLETE CBC W/AUTO DIFF WBC: CPT

## 2025-01-08 PROCEDURE — 99291 CRITICAL CARE FIRST HOUR: CPT | Performed by: INTERNAL MEDICINE

## 2025-01-08 PROCEDURE — 36415 COLL VENOUS BLD VENIPUNCTURE: CPT

## 2025-01-08 PROCEDURE — 2000000000 HC ICU R&B

## 2025-01-08 PROCEDURE — 6370000000 HC RX 637 (ALT 250 FOR IP): Performed by: STUDENT IN AN ORGANIZED HEALTH CARE EDUCATION/TRAINING PROGRAM

## 2025-01-08 PROCEDURE — P9047 ALBUMIN (HUMAN), 25%, 50ML: HCPCS | Performed by: STUDENT IN AN ORGANIZED HEALTH CARE EDUCATION/TRAINING PROGRAM

## 2025-01-08 PROCEDURE — 97530 THERAPEUTIC ACTIVITIES: CPT

## 2025-01-08 PROCEDURE — 2500000003 HC RX 250 WO HCPCS: Performed by: HOSPITALIST

## 2025-01-08 PROCEDURE — 80053 COMPREHEN METABOLIC PANEL: CPT

## 2025-01-08 PROCEDURE — 82805 BLOOD GASES W/O2 SATURATION: CPT

## 2025-01-08 PROCEDURE — 82140 ASSAY OF AMMONIA: CPT

## 2025-01-08 PROCEDURE — 6370000000 HC RX 637 (ALT 250 FOR IP): Performed by: HOSPITALIST

## 2025-01-08 PROCEDURE — 83605 ASSAY OF LACTIC ACID: CPT

## 2025-01-08 PROCEDURE — 84100 ASSAY OF PHOSPHORUS: CPT

## 2025-01-08 PROCEDURE — 99233 SBSQ HOSP IP/OBS HIGH 50: CPT | Performed by: STUDENT IN AN ORGANIZED HEALTH CARE EDUCATION/TRAINING PROGRAM

## 2025-01-08 PROCEDURE — 82010 KETONE BODYS QUAN: CPT

## 2025-01-08 PROCEDURE — 83735 ASSAY OF MAGNESIUM: CPT

## 2025-01-08 PROCEDURE — 82962 GLUCOSE BLOOD TEST: CPT

## 2025-01-08 PROCEDURE — 85027 COMPLETE CBC AUTOMATED: CPT

## 2025-01-08 PROCEDURE — 76705 ECHO EXAM OF ABDOMEN: CPT

## 2025-01-08 RX ORDER — DEXTROSE MONOHYDRATE, SODIUM CHLORIDE, AND POTASSIUM CHLORIDE 50; 1.49; 4.5 G/1000ML; G/1000ML; G/1000ML
INJECTION, SOLUTION INTRAVENOUS CONTINUOUS PRN
Status: DISCONTINUED | OUTPATIENT
Start: 2025-01-08 | End: 2025-01-09

## 2025-01-08 RX ORDER — SODIUM CHLORIDE 9 MG/ML
INJECTION, SOLUTION INTRAVENOUS CONTINUOUS
Status: DISCONTINUED | OUTPATIENT
Start: 2025-01-08 | End: 2025-01-08 | Stop reason: CLARIF

## 2025-01-08 RX ORDER — POTASSIUM CHLORIDE 7.45 MG/ML
10 INJECTION INTRAVENOUS PRN
Status: DISCONTINUED | OUTPATIENT
Start: 2025-01-08 | End: 2025-01-09

## 2025-01-08 RX ORDER — MAGNESIUM SULFATE IN WATER 40 MG/ML
2000 INJECTION, SOLUTION INTRAVENOUS PRN
Status: DISCONTINUED | OUTPATIENT
Start: 2025-01-08 | End: 2025-01-09

## 2025-01-08 RX ORDER — 0.9 % SODIUM CHLORIDE 0.9 %
15 INTRAVENOUS SOLUTION INTRAVENOUS ONCE
Status: DISCONTINUED | OUTPATIENT
Start: 2025-01-08 | End: 2025-01-09

## 2025-01-08 RX ORDER — LACTULOSE 10 G/15ML
20 SOLUTION ORAL
Status: DISCONTINUED | OUTPATIENT
Start: 2025-01-08 | End: 2025-01-10

## 2025-01-08 RX ORDER — PANTOPRAZOLE SODIUM 40 MG/1
40 TABLET, DELAYED RELEASE ORAL
Status: DISCONTINUED | OUTPATIENT
Start: 2025-01-09 | End: 2025-01-11 | Stop reason: HOSPADM

## 2025-01-08 RX ORDER — INSULIN GLARGINE 100 [IU]/ML
24 INJECTION, SOLUTION SUBCUTANEOUS DAILY
Status: DISCONTINUED | OUTPATIENT
Start: 2025-01-09 | End: 2025-01-09

## 2025-01-08 RX ORDER — FUROSEMIDE 10 MG/ML
20 INJECTION INTRAMUSCULAR; INTRAVENOUS DAILY
Status: DISCONTINUED | OUTPATIENT
Start: 2025-01-08 | End: 2025-01-08

## 2025-01-08 RX ORDER — FUROSEMIDE 20 MG/1
20 TABLET ORAL DAILY
Status: DISCONTINUED | OUTPATIENT
Start: 2025-01-09 | End: 2025-01-11 | Stop reason: HOSPADM

## 2025-01-08 RX ADMIN — LACTULOSE 20 G: 10 SOLUTION ORAL at 20:00

## 2025-01-08 RX ADMIN — SODIUM CHLORIDE, PRESERVATIVE FREE 10 ML: 5 INJECTION INTRAVENOUS at 08:03

## 2025-01-08 RX ADMIN — PANTOPRAZOLE SODIUM 40 MG: 40 TABLET, DELAYED RELEASE ORAL at 05:54

## 2025-01-08 RX ADMIN — INSULIN LISPRO 8 UNITS: 100 INJECTION, SOLUTION INTRAVENOUS; SUBCUTANEOUS at 08:02

## 2025-01-08 RX ADMIN — ALBUMIN (HUMAN) 25 G: 0.25 INJECTION, SOLUTION INTRAVENOUS at 05:55

## 2025-01-08 RX ADMIN — SODIUM CHLORIDE, PRESERVATIVE FREE 10 ML: 5 INJECTION INTRAVENOUS at 20:41

## 2025-01-08 RX ADMIN — LACTULOSE 20 G: 10 SOLUTION ORAL at 16:29

## 2025-01-08 RX ADMIN — LACTULOSE 20 G: 10 SOLUTION ORAL at 18:00

## 2025-01-08 RX ADMIN — INSULIN LISPRO 10 UNITS: 100 INJECTION, SOLUTION INTRAVENOUS; SUBCUTANEOUS at 11:50

## 2025-01-08 RX ADMIN — LACTULOSE 20 G: 10 SOLUTION ORAL at 22:02

## 2025-01-08 RX ADMIN — LACTULOSE 20 G: 10 SOLUTION ORAL at 14:42

## 2025-01-08 RX ADMIN — INSULIN LISPRO 8 UNITS: 100 INJECTION, SOLUTION INTRAVENOUS; SUBCUTANEOUS at 11:51

## 2025-01-08 RX ADMIN — INSULIN GLARGINE 20 UNITS: 100 INJECTION, SOLUTION SUBCUTANEOUS at 08:02

## 2025-01-08 RX ADMIN — RIFAXIMIN 400 MG: 200 TABLET ORAL at 14:42

## 2025-01-08 RX ADMIN — INSULIN LISPRO 10 UNITS: 100 INJECTION, SOLUTION INTRAVENOUS; SUBCUTANEOUS at 08:03

## 2025-01-08 RX ADMIN — LACTULOSE 20 G: 10 SOLUTION ORAL at 11:50

## 2025-01-08 RX ADMIN — RIFAXIMIN 400 MG: 200 TABLET ORAL at 08:03

## 2025-01-08 RX ADMIN — LACTULOSE 20 G: 20 SOLUTION ORAL at 05:54

## 2025-01-08 RX ADMIN — INSULIN LISPRO 4 UNITS: 100 INJECTION, SOLUTION INTRAVENOUS; SUBCUTANEOUS at 20:39

## 2025-01-08 RX ADMIN — RIFAXIMIN 400 MG: 200 TABLET ORAL at 20:40

## 2025-01-08 RX ADMIN — GABAPENTIN 600 MG: 300 CAPSULE ORAL at 08:03

## 2025-01-08 ASSESSMENT — PAIN SCALES - GENERAL
PAINLEVEL_OUTOF10: 0
PAINLEVEL_OUTOF10: 0

## 2025-01-08 NOTE — PROGRESS NOTES
Notified attending of pt blood sugar raising after 20 units of lantus and 18 units of humalog and pt barely eating breakfast. Repeat lunch blood sugar increased to 293.    Belinda Wagner RN

## 2025-01-08 NOTE — CONSULTS
Anil Conrad [place] [facility]    UPPER GASTROINTESTINAL ENDOSCOPY N/A 08/06/2024    ESOPHAGOGASTRODUODENOSCOPY BAND LIGATION performed by Anil Conrad MD at [facility]       Medications Prior to Admission:    Prior to Admission medications    Medication Sig Start Date End Date Taking? Authorizing Provider   spironolactone (ALDACTONE) 100 MG tablet Take 1 tablet by mouth daily 12/17/24   Peg Salcedo MD   bumetanide (BUMEX) 1 MG tablet Take 1 tablet by mouth daily 12/13/24   Nilsa George MD   insulin glargine (LANTUS) 100 UNIT/ML injection vial Inject 38 Units into the skin nightly 12/13/24   Nilsa George MD   insulin lispro (HUMALOG,ADMELOG) 100 UNIT/ML SOLN injection vial Inject 12 Units into the skin 3 times daily (before meals) 12/13/24   Nilsa George MD   polyethylene glycol (GLYCOLAX) 17 g packet Take 1 packet by mouth daily as needed for Constipation  Patient not taking: Reported on 1/5/2025 12/12/24 1/11/25  Nilsa George MD   albumin human 25% 25 % IV solution Infuse 200 mLs intravenously every 14 days With paracentesis 12/8/24   Anil Conrad MD   lactulose (CHRONULAC) 10 GM/15ML solution Take 30 mLs by mouth every 8 (eight) hours Titrate to goal of 2-3 bowel movement per day 10/7/24   Tonie Hernandez DO   gabapentin (NEURONTIN) 600 MG tablet Take 1 tablet by mouth 3 times daily for 90 days. 10/7/24 1/5/25  Tonie Hernandez DO   ondansetron (ZOFRAN) 4 MG tablet Take 1 tablet by mouth daily as needed for Nausea or Vomiting  Patient not taking: Reported on 1/5/2025 10/7/24   Tonie Hernandez DO   rifAXIMin (XIFAXAN) 550 MG tablet Take 1 tablet by mouth 2 times daily    ProviderJamie MD   omeprazole (PRILOSEC) 20 MG delayed release capsule Take 1 capsule by mouth daily  Patient not taking: Reported on 1/5/2025 6/18/24   Tonie Hernandez DO   vitamin D (CHOLECALCIFEROL) 50 MCG (2000 UT) TABS tablet Take 1 tablet by mouth daily  Patient not taking: Reported on 1/5/2025  consultants.  Critical Care time is documented if appropriate.      AMS  Hepatic encephalopathy  Cirrhosis  HAGMA  DKA  GERD    Continue dka protocol  Continue lacutlose and rifaximen  Continue ICU level of care due to high risk of clinical decompensation. See resident note above    Critical Care time: 35 minutes    Jes Nova DO

## 2025-01-08 NOTE — PROGRESS NOTES
Attending notified of paracentesis canceled d/t not enough fluid showing in abdomen on US    Belinda Wagner RN

## 2025-01-08 NOTE — PROGRESS NOTES
4 Eyes Skin Assessment     NAME:  Lisa Hunt  YOB: 1960  MEDICAL RECORD NUMBER:  [id number]    The patient is being assessed for  Transfer to New Unit    I agree that at least one RN has performed a thorough Head to Toe Skin Assessment on the patient. ALL assessment sites listed below have been assessed.      Areas assessed by both nurses:    Head, Face, Ears, Shoulders, Back, Chest, Arms, Elbows, Hands, Sacrum. Buttock, Coccyx, Ischium, Legs. Feet and Heels, and Under Medical Devices         Does the Patient have a Wound? No noted wound(s)       Kane Prevention initiated by RN: Yes  Wound Care Orders initiated by RN: No    Pressure Injury (Stage 3,4, Unstageable, DTI, NWPT, and Complex wounds) if present, place Wound referral order by RN under : No    New Ostomies, if present place, Ostomy referral order under : No     Nurse 1 eSignature: Electronically signed by Jani Yañez RN on 1/8/25 at 6:59 PM EST    **SHARE this note so that the co-signing nurse can place an eSignature**    Nurse 2 eSignature: {Esignature:[id number]}

## 2025-01-08 NOTE — PROGRESS NOTES
Physician Progress Note      PATIENT:               CLINTON ADAMS  [facility] #:                  [id number]  :                       1960  ADMIT DATE:       2025 3:44 PM  DISCH DATE:  RESPONDING  PROVIDER #:        Josue Alves MD          QUERY TEXT:    Patient admitted with altered mental status and cirrhosis.   Noted   documentation of metabolic encephalopathy in H&P and Hepatic encephalopathy in   Gastroenterology consult  If possible, please document in progress notes and discharge summary if you   are evaluating and /or treating any of the following:    The medical record reflects the following:  Risk Factors: AMS  Clinical Indicators: per H&P  Acute metabolic encephalopathy from hepatic   cirrhosis, ammonia level elevated higher than baseline; per gastroenterology   consult  Hepatic Encephalopathy: - Lactulose TID with goal 3 BM daily. -   Rifaximin.  Treatment: Lactulose, Serial labs, Gastroenterology consult  Options provided:  -- metabolic encephalopathy confirmed and hepatic encephalopathy ruled out  -- hepatic encephalopathy confirmed and metabolic encephalopathy ruled out  -- metabolic encephalopathy and hepatic encephalopathy confirmed  -- Other - I will add my own diagnosis  -- Disagree - Not applicable / Not valid  -- Disagree - Clinically unable to determine / Unknown  -- Refer to Clinical Documentation Reviewer    PROVIDER RESPONSE TEXT:    After study, hepatic encephalopathy confirmed and metabolic encephalopathy   ruled out.    Query created by: Princess Monroy on 2025 9:45 AM      Electronically signed by:  Josue Alves MD 2025 10:09 AM

## 2025-01-08 NOTE — CARE COORDINATION
Transition of care update: platelets 27, ammonia 113 and other labs noted. Lactulose 20g q 2 hrs. IR paracentesis cancelled due to not enough fluid showing in abd on US. Hem/onc consulted. On RA.Plan is to return home with her daughter when medically ready. Cm/sw will follow for discharge needs.

## 2025-01-08 NOTE — PROGRESS NOTES
Hospitalist Progress Note      SYNOPSIS: Patient admitted on 2025 for Acute metabolic encephalopathy  Patient with a history of hepatic cirrhosis secondary to liver cancer, ascites, hepatic encephalopathy, GERD, hypertension, type 2 diabetes, admitted on 2025 after presenting to the ED with worsening weakness and abdominal pain.  Reports taking her lactulose as scheduled, and has 3 bowel movements daily.  Recently admitted for decompensated liver cirrhosis and discharged on .  Had a paracentesis on  and almost 4 L of fluid were removed.   Patient was transfused 2 bags of platelet for paracentesis  Persistent thrombocytopenia, USG did not show enough fluid for paracentesis.  Ammonia was trending up lactulose increased to q2h  New bmp showed acidosis with anion gap of 22, Started on regular insulin iv fluid, transfer to ICU    SUBJECTIVE:  Stable overnight. No other overnight issues reported.   Interpretor used  Patient seen and examined at bedside todat am , alert answering to my questions appropriately  No bleeding  Abdomen distended  Records reviewed.       Temp (24hrs), Av.4 °F (36.9 °C), Min:96.9 °F (36.1 °C), Max:100.9 °F (38.3 °C)    DIET: ADULT DIET; Dysphagia - Soft and Bite Sized; 4 carb choices (60 gm/meal)  CODE: Full Code    Intake/Output Summary (Last 24 hours) at 2025 1107  Last data filed at 2025 0957  Gross per 24 hour   Intake 120 ml   Output --   Net 120 ml       Review of Systems  All bolded are positive; please see HPI  General:  Fever, chills, diaphoresis, fatigue, malaise, night sweats, weight loss  Psychological:  Anxiety, disorientation, hallucinations.  ENT:  Epistaxis, headaches, vertigo, visual changes.  Cardiovascular:  Chest pain, irregular heartbeats, palpitations, paroxysmal nocturnal dyspnea.  Respiratory:  Shortness of breath, coughing, sputum production, hemoptysis, wheezing, orthopnea.  Gastrointestinal:  Nausea, vomiting, diarrhea, heartburn,  input(s): \"CKTOTAL\", \"TROPONINI\" in the last 72 hours.    Chronic labs:    Lab Results   Component Value Date    CHOL 88 12/06/2024    TRIG 59 12/06/2024    HDL 29 (L) 12/06/2024    TSH 7.58 (H) 12/05/2024    INR 1.5 01/04/2025    LABA1C 6.9 (H) 01/05/2025       Radiology: REVIEWED DAILY    +++++++++++++++++++++++++++++++++++++++++++++++++  Josue Alves MD   Hospitalist  [place], [place]  +++++++++++++++++++++++++++++++++++++++++++++++++  NOTE: This report was transcribed using voice recognition software. Every effort was made to ensure accuracy; however, inadvertent computerized transcription errors may be present.

## 2025-01-08 NOTE — PROGRESS NOTES
Physical Therapy  Treatment    Name: Lisa Hunt  : 1960  MRN: [id number]      Date of Service: 2025    Evaluating PT: Jacky Brody, PT, DPT [id number]      Room #:  6521/6521-A  Diagnosis:  Hepatic encephalopathy (HCC) [K76.82]  Hyperkalemia [E87.5]  Abdominal pain, unspecified abdominal location [R10.9]  Acute metabolic encephalopathy [G93.41]  PMHx/PSHx:   has a past medical history of DONNA (acute kidney injury) (HCC), Cirrhosis (HCC), Diabetes mellitus (HCC), Diabetic neuropathy (HCC), Esophageal varices without bleeding (HCC), GERD (gastroesophageal reflux disease), Hypertension, Intracranial bleed (HCC), Liver cirrhosis (HCC), Low vitamin D level, SDH (subdural hematoma), Thrombocytopenia (HCC), and Type 2 diabetes mellitus without complication (HCC).  Procedure/Surgery:  NA  Precautions:  Fall risk, Maldivian speaking  Equipment Needs:  TBD    SUBJECTIVE:    Pt lives with her daughter in a 1 story house. Pt ambulated with WW prior to admission.    OBJECTIVE:   Initial Evaluation  Date: 25 Treatment Date: 24 Short Term/ Long Term   Goals   AM-PAC 6 Clicks     Was pt agreeable to Eval/treatment? Yes Yes    Does pt have pain? No complaints of pain No complaints of pain    Bed Mobility  Rolling: NT  Supine to sit: SBA  Sit to supine: SBA  Scooting: SBA Rolling: NT  Supine to sit: SBA  Sit to supine: SBA  Scooting: SBA Rolling: Independent   Supine to sit: Independent   Sit to supine: Independent   Scooting: Independent    Transfers Sit to stand: Livier  Stand to sit: Livier  Stand pivot: Livier without AD Sit to stand: Livier  Stand to sit: Livier  Stand pivot: Livier without AD Sit to stand: Supervision  Stand to sit: Supervision  Stand pivot: Supervision with WW   Ambulation   75 feet without AD with Livier 25, 50, 25 feet without AD with Livier >400 feet with WW with Supervision   Stair negotiation: ascended and descended NT NT 4 steps with 1 rail with SBA   ROM BUE: Refer to OT note  BLE:  WFL NT    Strength BUE: Refer to OT note  BLE: NT NT    Balance Sitting EOB: SBA  Dynamic Standing: Livier without AD Sitting EOB: SBA  Dynamic Standing: Livier without AD Sitting EOB: Independent   Dynamic Standing: Supervision with WW     Pt is A & O x: 4 to person, place, month/year, and situation.   Sensation: Denies numbness and tingling of extremities.   Edema: Unremarkable    Patient education  Pt educated on PT role in acute care setting.    Patient response to education:   Pt verbalized understanding Pt demonstrated skill Pt requires further education in this area   Yes NA No     ASSESSMENT:    Comments:    Pt was in bed upon room entry; agreeable to PT treatment. Pt completed all mobility noted above. Pt ambulated multiple times in room without AD. Gait was slow and unsteady. Pt held onto therapist for support. Pt completed transfers from commode and ambulated back to bed. Pt was left in bed with all needs met at conclusion of session.    Treatment:  Patient practiced and was instructed in the following treatment:    Therapeutic activities:  Bed mobility: Cues for technique during bed mobility transfers.  Transfers: Cues for hand placement during sit <> stand transfers. Pt completed multiple transfers from different surface heights (EOB x1, commode x1).  Ambulation: Cues for safety when ambulating in room. Pt ambulated multiple bouts.  Vitals and symptoms were closely monitored throughout session.    PLAN:    Patient is making Fair progress towards established goals. Will continue with current POC.      Time in: 1151  Time out: 1215    Total Treatment Time 24 minutes     CPT codes:  [] Gait training 42697 0 minutes  [] Manual therapy 54039 0 minutes  [x] Therapeutic activities 27732 24 minutes  [] Therapeutic exercises 85551 0 minutes  [] Neuromuscular reeducation 27253 0 minutes      Jacky Brody, PT, DPT   [id number]

## 2025-01-08 NOTE — PLAN OF CARE
Problem: Chronic Conditions and Co-morbidities  Goal: Patient's chronic conditions and co-morbidity symptoms are monitored and maintained or improved  1/8/2025 0037 by Estelle Garrett RN  Outcome: Progressing     Problem: ABCDS Injury Assessment  Goal: Absence of physical injury  1/8/2025 0037 by Estelle Garrett, RN  Outcome: Progressing     Problem: Safety - Adult  Goal: Free from fall injury  1/8/2025 0037 by Estelle Garrett, RN  Outcome: Progressing     Problem: Pain  Goal: Verbalizes/displays adequate comfort level or baseline comfort level  1/8/2025 0037 by Estelle Garrett, RN  Outcome: Progressing

## 2025-01-08 NOTE — PROGRESS NOTES
Anuradha Doss NP regarding patient having increased temperature and weakness, also experiencing a bit more lethargy.   Estelle Garrett RN

## 2025-01-08 NOTE — BRIEF OP NOTE
Brief-Op Note  ______________________________________________________________      LIMITED U/S ABDOMEN FOR POSS. PARACENTESIS  [facility] 4WE MICU    Patient Name: Lisa Hunt   YOB: 1960  Medical Record Number: [id number]  Date of Procedure: 1/8/25  Room/Bed: [address] [place] [address]      Pre-operative Diagnosis: Ascites    Post-operative Diagnosis: minimal ascites insufficient for paracentesis.    Consent: Informed consent was obtained from the patient prior to the procedure. The details of the procedure, as well is its risks, benefits, and alternatives, were explained.      Performed by: RANJITH Alvarez under on-site supervision by Madison Ndiaye MD.    Procedure: Routine scanning of all four abdominal quadrants was performed using real-time ultrasound and revealed insufficient amount of ascites fluid present.  Decision was made not to proceed with procedure due to insufficient fluid present.  Risks and benefits were discussed with patient/family and agree not to proceed at this time. (See radiology dictation in PACs for image review and additional procedural information)    Complications: None    Specimen Obtained: None    Thank you for allowing Interventional Radiology to participate in the care of Lisa Hunt.     Electronically signed by RANJITH Alvarez   DD: 1/8/25  4:23 PM

## 2025-01-08 NOTE — PLAN OF CARE
Problem: Chronic Conditions and Co-morbidities  Goal: Patient's chronic conditions and co-morbidity symptoms are monitored and maintained or improved  1/8/2025 1220 by Belinda Wagner RN  Outcome: Progressing  1/8/2025 0037 by Estelle Garrett RN  Outcome: Progressing     Problem: Discharge Planning  Goal: Discharge to home or other facility with appropriate resources  Outcome: Progressing     Problem: ABCDS Injury Assessment  Goal: Absence of physical injury  1/8/2025 1220 by Belinda Wagner RN  Outcome: Progressing  1/8/2025 0037 by Estelle Garrett RN  Outcome: Progressing     Problem: Safety - Adult  Goal: Free from fall injury  1/8/2025 0037 by Estelle Garrett RN  Outcome: Progressing     Problem: Pain  Goal: Verbalizes/displays adequate comfort level or baseline comfort level  1/8/2025 0037 by Estelle Garrett RN  Outcome: Progressing

## 2025-01-08 NOTE — CONSULTS
signs of significant tumor blush in segment 6 and 8 5. No signs of tumor blush in the left hepatic arteries that extend near segment 4 and 5 6. 3D reconstruction at a separate workstation was performed to evaluate for residual tumor blush given the overlap of arteries feeding this region and the large arterial-venous fistula 7. Successful closure of the right groin using a 6 [place] Angio-Seal.     IR ANGIOGRAM SELECTIVE EACH ADDITIONAL VESSEL    Result Date: 12/10/2024  PROCEDURE: DOS: 12/10/2024 1:12 pm 1. Ultrasound guidance for access 2. Catheter placement and diagnostic angiogram of celiac artery, 1st order selection 3. Catheter placement and diagnostic angiogram of the proper Hepatic artery 2nd order catheter placement and diagnostic angiogram 4. Catheter placement and diagnostic angiogram of the left hepatic artery 3rd order 5. Catheter placement and diagnostic angiogram of the right hepatic artery to segment 7 and 8 6. Catheter placement and diagnostic angiogram of the hepatic artery to segment 5 with (TAE) embospheres placement and embolization of aggressive tumor blush 7. Catheter placement and diagnostic angiogram of the hepatic artery to segment 5 with coil embolization for the arterial-portal fistula 8. Catheter placement and diagnostic angiogram of the hepatic artery to segment 8 and 6 : Dr. Ndiaye ASSISTANT: None The procedure, risks, limitations, and alternatives were discussed. All questions answered. Written informed consent obtained. Maximal sterile barrier technique, hand hygiene, skin prep, and sterile ultrasound techniques were used. Percutaneous site was sterilely prepped and draped. Time out performed. Documentation ultrasound image demonstrates a patent vessel and antegrade flow. After administration of local anesthesia, a tiny skin incision was made and the artery was cannulated with a micropuncture set. Sheath was placed. Celiac artery selected.  Diagnostic angiogram performed. The  1.6, Hgb 7.2, platelets 27.     Consultation for pancytopenia.    Attending addendum:  64 years old female with cirrhosis, hepatocellular carcinoma status post transarterial radioembolization in November 2024.  Hematology consulted for pancytopenia.    Transferred to ICU for DKA.  Chronic stable pancytopenia related to underlying cirrhosis.  Trend CBC.  Transfuse PRBCs for hemoglobin less than 7, platelets for platelet count less than 10,000 or active bleeding. Peripheral smear requested.   Follows with Dr. Sosa for hepatocellular carcinoma.  Recent radioembolization.  Child Laird C.  Will continue to follow.  Thank you for the consult.      [place] - CNP  Electronically signed 1/8/2025 at 11:43 AM  Patient seen and examined.  Agree with CNP assessment and plan.  Note updated.  Adonay Zacarias MD

## 2025-01-08 NOTE — PROCEDURES
Patient arrived via bed to Radiology department for Paracentesis. Allergies, home medications, H&P and fasting instructions reviewed with patient. Vital signs taken. IV placed, blood obtained, IV flushed and prn adapter attached.   Procedural instructions given, questions answered, understanding expressed and consent signed.  no questions at this time.

## 2025-01-08 NOTE — PROGRESS NOTES
Anuradha Doss NP regarding patient platelet result at 30, after transfusion.   Orders for morning labs at 0400.    Estelle Garrett RN

## 2025-01-08 NOTE — PROGRESS NOTES
Attending made aware of increase of ammonia levels and pt getting paracentesis today when rounding on unit    Belinda Wagner RN

## 2025-01-08 NOTE — PROGRESS NOTES
Nurse to nurse given to billy in MICU. All questions answered. Daughter notified of pt being transferred and new room number.    Belinda Wagner RN'

## 2025-01-08 NOTE — PROCEDURES
PROCEDURE NOTE  Date: 1/8/2025   Name: Lisa Hunt  YOB: 1960    Procedures: Paracentesis    Discussed patient and IR procedure with bedside RN, all questions answered. Will call when able to send for patient.

## 2025-01-08 NOTE — PROGRESS NOTES
[facility] Quality Flow/Interdisciplinary Rounds Progress Note        Quality Flow Rounds held on January 8, 2025    Disciplines Attending:  Bedside Nurse, , , and Nursing Unit Leadership    Lisa Hunt was admitted on 1/4/2025  3:44 PM    Anticipated Discharge Date:       Disposition:    Kane Score:  Kane Scale Score: 20    [facility] RISK OF UNPLANNED READMISSION 2.0             29 Total Score        Discussed patient goal for the day, patient clinical progression, and barriers to discharge.  The following Goal(s) of the Day/Commitment(s) have been identified:  downgrade/discharge planning       Estrellita Park RN  January 8, 2025

## 2025-01-08 NOTE — PROGRESS NOTES
Gastroenterology, Hepatology, &  Advanced Endoscopy    Progress Note        Reason for Consult: Cirrhosis, HCC, Lightheadedness     HPI:   Lisa Hunt is a 64 y.o. female w/ PMH of  has a past medical history of DONNA (acute kidney injury) (HCC), Cirrhosis (HCC), Diabetes mellitus (HCC), Diabetic neuropathy (HCC), Esophageal varices without bleeding (HCC), GERD (gastroesophageal reflux disease), Hypertension, Intracranial bleed (HCC), Liver cirrhosis (HCC), Low vitamin D level, SDH (subdural hematoma), Thrombocytopenia (HCC), and Type 2 diabetes mellitus without complication (HCC). who presents to the ED with lightheadedness and dizziness. She reports that she is not confused and has no delay in her thought process. She does have abdominal distention and thinks that she would benefit from having a paracentesis. Regarding her HCC, she is s/p y90 therapy and being closely monitored.       Recent Labs     01/06/25  0523 01/07/25  0450   ALT 18 15   AST 39* 39*   ALKPHOS 149* 134*   BILITOT 2.1* 2.3*     Lab Results   Component Value Date    WBC 2.1 (L) 01/07/2025    HGB 7.6 (L) 01/07/2025    HCT 22.4 (L) 01/07/2025    PLT 30 (L) 01/07/2025     01/07/2025    K 5.0 01/07/2025     01/07/2025    CREATININE 1.2 (H) 01/07/2025    BUN 23 01/07/2025    CO2 19 (L) 01/07/2025    FOLATE 15.5 07/18/2023    [id number] 731 11/29/2023    AMMONIA 113 (H) 01/08/2025    GLUCOSE 213 (H) 01/07/2025    INR 1.5 01/04/2025    PROTIME 16.1 (H) 01/04/2025    TSH 7.58 (H) 12/05/2024    LABA1C 6.9 (H) 01/05/2025     Lab Results   Component Value Date    INR 1.5 01/04/2025    INR 1.8 12/16/2024    INR 2.2 12/10/2024    PROTIME 16.1 (H) 01/04/2025    PROTIME 19.5 (H) 12/16/2024    PROTIME 24.0 (H) 12/10/2024      MELD 3.0: 19 at 1/6/2025  5:23 AM  MELD-Na: 19 at 1/6/2025  5:23 AM  Calculated from:  Serum Creatinine: 1.4 mg/dL at 1/6/2025  5:23 AM  Serum Sodium: 135 mmol/L at 1/6/2025  5:23 AM  Total Bilirubin: 2.1 mg/dL at  presents to the ED with lightheadedness and dizziness. She is not encephalopathic on admission.     PLAN:   Lightheadedness:  - Most likely medication induced as she does not appear frankly encephalopathic.   - Will d/c diuretics and carvedilol for now.  - May also need to hold Gabapentin based on response.    2. Hepatic Encephalopathy:  -Pt awake and alert x 3  - Lactulose TID with goal 3 BM daily.  - Rifaximin.    3. Ascites:  - Paracentesis w/ albumin replacement and fluid studies. Ordered.  - Will likely require paracentesis as outpatient q 2 weeks which I will arrange.     4. GAVE:  - PPI BID.  - Monitor Hgb closely.  - If suggestion of bleeding, would need to transfuse platelets. No suggestion at this time.     We will follow closely.    Greater than or equal to 35 minutes was spent providing face-to-face patient care, including:  and coordinating care, reviewing the chart, labs, and diagnostics, as well as medical decision making. Greater than 50% of this time was spent instructing and counseling the patient face to face regarding findings and recommendations.    Thank you for including us in the care of this patient. Please do not hesitate to contact us with any additional questions or concerns.    Case discussed and reviewed by Dr. Conrad      Thank you for including us in the care of this patient. Please do not hesitate to contact us with any additional questions or concerns.    Discussed with Anil Conrad MD  Gastroenterology/Hepatology  Advanced Endoscopy

## 2025-01-09 LAB
AMMONIA PLAS-SCNC: 98 UMOL/L (ref 11–51)
ANION GAP SERPL CALCULATED.3IONS-SCNC: 12 MMOL/L (ref 7–16)
ANION GAP SERPL CALCULATED.3IONS-SCNC: 13 MMOL/L (ref 7–16)
B-OH-BUTYR SERPL-MCNC: 0.06 MMOL/L (ref 0.02–0.27)
BASOPHILS # BLD: 0.02 K/UL (ref 0–0.2)
BASOPHILS NFR BLD: 1 % (ref 0–2)
BUN SERPL-MCNC: 28 MG/DL (ref 6–23)
BUN SERPL-MCNC: 30 MG/DL (ref 6–23)
CALCIUM SERPL-MCNC: 10.1 MG/DL (ref 8.6–10.2)
CALCIUM SERPL-MCNC: 9.7 MG/DL (ref 8.6–10.2)
CHLORIDE SERPL-SCNC: 103 MMOL/L (ref 98–107)
CHLORIDE SERPL-SCNC: 103 MMOL/L (ref 98–107)
CO2 SERPL-SCNC: 21 MMOL/L (ref 22–29)
CO2 SERPL-SCNC: 21 MMOL/L (ref 22–29)
CREAT SERPL-MCNC: 1.3 MG/DL (ref 0.5–1)
CREAT SERPL-MCNC: 1.5 MG/DL (ref 0.5–1)
EOSINOPHIL # BLD: 0.14 K/UL (ref 0.05–0.5)
EOSINOPHILS RELATIVE PERCENT: 6 % (ref 0–6)
ERYTHROCYTE [DISTWIDTH] IN BLOOD BY AUTOMATED COUNT: 14.8 % (ref 11.5–15)
GFR, ESTIMATED: 39 ML/MIN/1.73M2
GFR, ESTIMATED: 47 ML/MIN/1.73M2
GLUCOSE BLD-MCNC: 192 MG/DL (ref 74–99)
GLUCOSE BLD-MCNC: 196 MG/DL (ref 74–99)
GLUCOSE BLD-MCNC: 248 MG/DL (ref 74–99)
GLUCOSE BLD-MCNC: 264 MG/DL (ref 74–99)
GLUCOSE BLD-MCNC: 277 MG/DL (ref 74–99)
GLUCOSE BLD-MCNC: 303 MG/DL (ref 74–99)
GLUCOSE SERPL-MCNC: 234 MG/DL (ref 74–99)
GLUCOSE SERPL-MCNC: 276 MG/DL (ref 74–99)
HCT VFR BLD AUTO: 21.3 % (ref 34–48)
HGB BLD-MCNC: 7 G/DL (ref 11.5–15.5)
LYMPHOCYTES NFR BLD: 0.36 K/UL (ref 1.5–4)
LYMPHOCYTES RELATIVE PERCENT: 16 % (ref 20–42)
MAGNESIUM SERPL-MCNC: 1.8 MG/DL (ref 1.6–2.6)
MAGNESIUM SERPL-MCNC: 1.9 MG/DL (ref 1.6–2.6)
MCH RBC QN AUTO: 32.4 PG (ref 26–35)
MCHC RBC AUTO-ENTMCNC: 32.9 G/DL (ref 32–34.5)
MCV RBC AUTO: 98.6 FL (ref 80–99.9)
MONOCYTES NFR BLD: 0.26 K/UL (ref 0.1–0.95)
MONOCYTES NFR BLD: 11 % (ref 2–12)
NEUTROPHILS NFR BLD: 66 % (ref 43–80)
NEUTS SEG NFR BLD: 1.52 K/UL (ref 1.8–7.3)
NUCLEATED RED BLOOD CELLS: 1 PER 100 WBC
PHOSPHATE SERPL-MCNC: 2.4 MG/DL (ref 2.5–4.5)
PHOSPHATE SERPL-MCNC: 2.9 MG/DL (ref 2.5–4.5)
PLATELET # BLD AUTO: 24 K/UL (ref 130–450)
PLATELET CONFIRMATION: NORMAL
PMV BLD AUTO: 11.4 FL (ref 7–12)
POTASSIUM SERPL-SCNC: 4.2 MMOL/L (ref 3.5–5)
POTASSIUM SERPL-SCNC: 4.4 MMOL/L (ref 3.5–5)
RBC # BLD AUTO: 2.16 M/UL (ref 3.5–5.5)
RBC # BLD: ABNORMAL 10*6/UL
SODIUM SERPL-SCNC: 136 MMOL/L (ref 132–146)
SODIUM SERPL-SCNC: 137 MMOL/L (ref 132–146)
WBC OTHER # BLD: 2.3 K/UL (ref 4.5–11.5)

## 2025-01-09 PROCEDURE — 2500000003 HC RX 250 WO HCPCS: Performed by: HOSPITALIST

## 2025-01-09 PROCEDURE — 82140 ASSAY OF AMMONIA: CPT

## 2025-01-09 PROCEDURE — 99232 SBSQ HOSP IP/OBS MODERATE 35: CPT | Performed by: STUDENT IN AN ORGANIZED HEALTH CARE EDUCATION/TRAINING PROGRAM

## 2025-01-09 PROCEDURE — 97530 THERAPEUTIC ACTIVITIES: CPT

## 2025-01-09 PROCEDURE — 1200000000 HC SEMI PRIVATE

## 2025-01-09 PROCEDURE — 6360000002 HC RX W HCPCS

## 2025-01-09 PROCEDURE — 6370000000 HC RX 637 (ALT 250 FOR IP): Performed by: HOSPITALIST

## 2025-01-09 PROCEDURE — 6370000000 HC RX 637 (ALT 250 FOR IP): Performed by: STUDENT IN AN ORGANIZED HEALTH CARE EDUCATION/TRAINING PROGRAM

## 2025-01-09 PROCEDURE — 84100 ASSAY OF PHOSPHORUS: CPT

## 2025-01-09 PROCEDURE — 85025 COMPLETE CBC W/AUTO DIFF WBC: CPT

## 2025-01-09 PROCEDURE — 80048 BASIC METABOLIC PNL TOTAL CA: CPT

## 2025-01-09 PROCEDURE — 36415 COLL VENOUS BLD VENIPUNCTURE: CPT

## 2025-01-09 PROCEDURE — 6370000000 HC RX 637 (ALT 250 FOR IP): Performed by: INTERNAL MEDICINE

## 2025-01-09 PROCEDURE — 2580000003 HC RX 258

## 2025-01-09 PROCEDURE — 2500000003 HC RX 250 WO HCPCS

## 2025-01-09 PROCEDURE — 6370000000 HC RX 637 (ALT 250 FOR IP)

## 2025-01-09 PROCEDURE — 83735 ASSAY OF MAGNESIUM: CPT

## 2025-01-09 PROCEDURE — 99233 SBSQ HOSP IP/OBS HIGH 50: CPT | Performed by: INTERNAL MEDICINE

## 2025-01-09 PROCEDURE — 82962 GLUCOSE BLOOD TEST: CPT

## 2025-01-09 PROCEDURE — 97535 SELF CARE MNGMENT TRAINING: CPT

## 2025-01-09 RX ORDER — INSULIN GLARGINE 100 [IU]/ML
24 INJECTION, SOLUTION SUBCUTANEOUS NIGHTLY
Status: DISCONTINUED | OUTPATIENT
Start: 2025-01-09 | End: 2025-01-09

## 2025-01-09 RX ORDER — INSULIN GLARGINE 100 [IU]/ML
24 INJECTION, SOLUTION SUBCUTANEOUS DAILY
Status: DISCONTINUED | OUTPATIENT
Start: 2025-01-09 | End: 2025-01-10

## 2025-01-09 RX ADMIN — INSULIN HUMAN 5 UNITS: 100 INJECTION, SOLUTION PARENTERAL at 03:02

## 2025-01-09 RX ADMIN — INSULIN LISPRO 8 UNITS: 100 INJECTION, SOLUTION INTRAVENOUS; SUBCUTANEOUS at 07:08

## 2025-01-09 RX ADMIN — INSULIN LISPRO 4 UNITS: 100 INJECTION, SOLUTION INTRAVENOUS; SUBCUTANEOUS at 22:58

## 2025-01-09 RX ADMIN — PIPERACILLIN AND TAZOBACTAM 4500 MG: 4; .5 INJECTION, POWDER, FOR SOLUTION INTRAVENOUS at 18:25

## 2025-01-09 RX ADMIN — PANTOPRAZOLE SODIUM 40 MG: 40 TABLET, DELAYED RELEASE ORAL at 07:07

## 2025-01-09 RX ADMIN — LACTULOSE 20 G: 10 SOLUTION ORAL at 17:24

## 2025-01-09 RX ADMIN — RIFAXIMIN 400 MG: 200 TABLET ORAL at 23:27

## 2025-01-09 RX ADMIN — LACTULOSE 20 G: 10 SOLUTION ORAL at 10:24

## 2025-01-09 RX ADMIN — PIPERACILLIN AND TAZOBACTAM 4500 MG: 4; .5 INJECTION, POWDER, FOR SOLUTION INTRAVENOUS at 10:32

## 2025-01-09 RX ADMIN — INSULIN LISPRO 12 UNITS: 100 INJECTION, SOLUTION INTRAVENOUS; SUBCUTANEOUS at 17:25

## 2025-01-09 RX ADMIN — LACTULOSE 20 G: 10 SOLUTION ORAL at 16:07

## 2025-01-09 RX ADMIN — LACTULOSE 20 G: 10 SOLUTION ORAL at 11:22

## 2025-01-09 RX ADMIN — RIFAXIMIN 400 MG: 200 TABLET ORAL at 13:33

## 2025-01-09 RX ADMIN — LACTULOSE 20 G: 10 SOLUTION ORAL at 08:13

## 2025-01-09 RX ADMIN — LACTULOSE 20 G: 10 SOLUTION ORAL at 22:54

## 2025-01-09 RX ADMIN — LACTULOSE 20 G: 10 SOLUTION ORAL at 02:00

## 2025-01-09 RX ADMIN — INSULIN LISPRO 4 UNITS: 100 INJECTION, SOLUTION INTRAVENOUS; SUBCUTANEOUS at 11:21

## 2025-01-09 RX ADMIN — RIFAXIMIN 400 MG: 200 TABLET ORAL at 08:12

## 2025-01-09 RX ADMIN — INSULIN LISPRO 10 UNITS: 100 INJECTION, SOLUTION INTRAVENOUS; SUBCUTANEOUS at 11:22

## 2025-01-09 RX ADMIN — SODIUM CHLORIDE, PRESERVATIVE FREE 10 ML: 5 INJECTION INTRAVENOUS at 22:57

## 2025-01-09 RX ADMIN — LACTULOSE 20 G: 10 SOLUTION ORAL at 06:11

## 2025-01-09 RX ADMIN — INSULIN LISPRO 10 UNITS: 100 INJECTION, SOLUTION INTRAVENOUS; SUBCUTANEOUS at 17:24

## 2025-01-09 RX ADMIN — LACTULOSE 20 G: 10 SOLUTION ORAL at 13:33

## 2025-01-09 RX ADMIN — SODIUM PHOSPHATE, MONOBASIC, MONOHYDRATE AND SODIUM PHOSPHATE, DIBASIC, ANHYDROUS 15 MMOL: 142; 276 INJECTION, SOLUTION INTRAVENOUS at 13:35

## 2025-01-09 RX ADMIN — INSULIN LISPRO 10 UNITS: 100 INJECTION, SOLUTION INTRAVENOUS; SUBCUTANEOUS at 08:12

## 2025-01-09 RX ADMIN — PANTOPRAZOLE SODIUM 40 MG: 40 TABLET, DELAYED RELEASE ORAL at 16:06

## 2025-01-09 RX ADMIN — INSULIN GLARGINE 24 UNITS: 100 INJECTION, SOLUTION SUBCUTANEOUS at 11:21

## 2025-01-09 RX ADMIN — LACTULOSE 20 G: 10 SOLUTION ORAL at 00:06

## 2025-01-09 ASSESSMENT — PAIN SCALES - GENERAL
PAINLEVEL_OUTOF10: 0

## 2025-01-09 NOTE — PROGRESS NOTES
Comprehensive Nutrition Assessment    Type and Reason for Visit:  Initial, LOS (ICU LOS)    Nutrition Recommendations/Plan:   Recommend carbohydrate controlled diet & glucerna BID     Malnutrition Assessment:  Malnutrition Status:  At risk for malnutrition (01/09/25 1442)    Context:  Acute Illness     Findings of the 6 clinical characteristics of malnutrition:  Energy Intake:  Mild decrease in energy intake  Weight Loss:  Unable to assess (large wt fluctuations/frequent paracentesis)     Body Fat Loss:  Unable to assess     Muscle Mass Loss:  Unable to assess    Fluid Accumulation:  No fluid accumulation     Strength:  Not Performed    Nutrition Assessment:    pt admit w/ abdominal pain and elevated ammonia. Recently admitted last month d/t hepatic encephalopathy/cirrhosis s/p paracentesis. PMHx liver cirrhosis 2/2 liver CA, s/p Y90 treatment 11/2024, DM, GAVE, esophageal varices s/p banding, recurrent ascites requiring paracentesis. Paracentesis this admit was cancelled d/t insufficent ascites. Course complicated by DKA w/ transfer to MICU 1/8. Will start ONS and recommend Carb Controlled diet. Will monitor.    Nutrition Related Findings:    A&Ox3, Belarusian speaking, active BS, abd distention, gross ascites, no edema, fluids WDL, jaundice (bili 3.1), ammonia 98, hypophosphatemia, hyperlgycemia Wound Type: None       Current Nutrition Intake & Therapies:    Average Meal Intake: 26-50% (average)  Average Supplements Intake: None Ordered  ADULT DIET; Regular    Anthropometric Measures:  Height: 165 cm (5' 4.96\")  Ideal Body Weight (IBW): 125 lbs (57 kg)    Admission Body Weight: 72.6 kg (160 lb) (1/9 bed)  Current Body Weight: 72.6 kg (160 lb) (1/9 bed scale), 128 % IBW.    Current BMI (kg/m2): 26.7  Usual Body Weight:  (171-192# measured wt range x 1 year per EMR)                          BMI Categories: Overweight (BMI 25.0-29.9)    Estimated Daily Nutrient Needs:  Energy Requirements Based On: Formula  Weight

## 2025-01-09 NOTE — PROGRESS NOTES
correct breathing pattern and techniques to improve independence/tolerance for self-care routine  * Functional transfer/mobility training/DME recommendations for increased independence, safety, and fall prevention  * Patient/Family education to increase follow through with safety techniques and functional independence  * Recommendation of environmental modifications for increased safety with functional transfers/mobility and ADLs  * Therapeutic exercise to improve motor endurance, ROM, and functional strength for ADLs/functional transfers  * Therapeutic activities to facilitate/challenge dynamic balance, stand tolerance for increased safety and independence with ADLs  * Therapeutic activities to facilitate gross/fine motor skills for increased independence with ADLs  * Neuro-muscular re-education: facilitation of righting/equilibrium reactions, midline orientation, scapular stability/mobility, normalization of muscle tone, and facilitation of volitional active controled movement    Recommended Adaptive Equipment:  TBD     Home Living: Pt lives with daughter in a 1 story home    Bathroom setup: walk-in shower    Equipment owned: w.w    Prior Level of Function: mod I with ADLs , mod I with IADLs; ambulated with w/w    Pain Level: Pt denies pain this session  Cognition: A&O: 2/4 (self/location); Follows 1 step directions   Memory:  continue to assess   Sequencing:  fair   Problem solving:  fair   Judgement/safety:  fair     Functional Assessment:  AM-PAC Daily Activity Raw Score: 16/24   Initial Eval Status  Date: 1/6/25 Treatment Status  Date: 1/9/25 STGs = LTGs  Time frame: 10-14 days   Feeding Independent  Set-up      Grooming Minimal Assist  Minimal Assist  For hair care seated in chair.   Modified [place]    UB Dressing Stand by Assist  Min A   Modified [place]    LB Dressing Minimal Assist  NT  This session   Modified [place]    Bathing Minimal Assist Moderate Assist   Modified [place]   mobility/transfer techniques: with focus on safety, body mechanics, and precautions   Proper Positioning/Alignment: for optimal healing, skin integrity, to prevent breakdown, decrease edema, and reduce risk of contracture.  Skilled Monitoring of Vitals: to include BP, spO2, and HR throughout session to maximize safety.  Sitting/Standing Balance/Tolerance: to increase balance and activity tolerance during ADLs and facilitate proper posture and positioning.  Delirium Prevention: Environmental and sensory modifications assessed and implemented to decrease ICU acquired delirium and to improve overall orientation, mentation and pt interaction with family/staff.      Time In: 0915  Time Out: 0955  Total Treatment Time: 40 minutes    Min Units   OT Eval Low 24881      OT Eval Medium 15455      OT Eval High 69180      OT Re-Eval 99561       Therapeutic Ex 26844       Therapeutic Activities 93279 10 1   ADL/Self Care 36566 30 2    Orthotic Management 24951       Manual 78069     Neuro Re-Ed 82065       Non-Billable Time            Marisa Huitron OTD, OTR/L, [id number]

## 2025-01-09 NOTE — PROGRESS NOTES
Hospitalist Progress Note      SYNOPSIS: Patient admitted on 2025 for Acute metabolic encephalopathy  Patient with a history of hepatic cirrhosis secondary to liver cancer, ascites, hepatic encephalopathy, GERD, hypertension, type 2 diabetes, admitted on 2025 after presenting to the ED with worsening weakness and abdominal pain.  Reports taking her lactulose as scheduled, and has 3 bowel movements daily.  Recently admitted for decompensated liver cirrhosis and discharged on .  Had a paracentesis on  and almost 4 L of fluid were removed.   Patient was transfused 2 bags of platelet for paracentesis  Persistent thrombocytopenia, USG did not show enough fluid for paracentesis.  Ammonia was trending up lactulose increased to q2h  New bmp showed acidosis with anion gap of 22, had to be transferred to intensive care unit, repeat BMP showed improvement in anion gap.      SUBJECTIVE:  Stable overnight. No other overnight issues reported.   Patient seen and examined at bedside today am,  Records reviewed.         Temp (24hrs), Av.7 °F (37.6 °C), Min:99 °F (37.2 °C), Max:100 °F (37.8 °C)    DIET: ADULT DIET; Regular  CODE: Full Code    Intake/Output Summary (Last 24 hours) at 2025 1156  Last data filed at 2025 0200  Gross per 24 hour   Intake 0 ml   Output 650 ml   Net -650 ml       Review of Systems  All bolded are positive; please see HPI  General:  Fever, chills, diaphoresis, fatigue, malaise, night sweats, weight loss  Psychological:  Anxiety, disorientation, hallucinations.  ENT:  Epistaxis, headaches, vertigo, visual changes.  Cardiovascular:  Chest pain, irregular heartbeats, palpitations, paroxysmal nocturnal dyspnea.  Respiratory:  Shortness of breath, coughing, sputum production, hemoptysis, wheezing, orthopnea.  Gastrointestinal:  Nausea, vomiting, diarrhea, heartburn, constipation, abdominal pain, hematemesis, hematochezia, melena, acholic stools  Genito-Urinary:  Dysuria, urgency,

## 2025-01-09 NOTE — PROGRESS NOTES
4 Eyes Skin Assessment     NAME:  Lisa Hunt  YOB: 1960  MEDICAL RECORD NUMBER:  [id number]    The patient is being assessed for  Transfer to New Unit    I agree that at least one RN has performed a thorough Head to Toe Skin Assessment on the patient. ALL assessment sites listed below have been assessed.      Areas assessed by both nurses:    Head, Face, Ears, Shoulders, Back, Chest, Arms, Elbows, Hands, Sacrum. Buttock, Coccyx, Ischium, and Legs. Feet and Heels  Bilateral heels-soft and boggy  Generalized birthmarks     Does the Patient have a Wound? No noted wound(s)       [place]ane Prevention initiated by RN: Yes  Wound Care Orders initiated by RN: No    Pressure Injury (Stage 3,4, Unstageable, DTI, NWPT, and Complex wounds) if present, place Wound referral order by RN under : No    New Ostomies, if present place, Ostomy referral order under : No     Nurse 1 eSignature: Electronically signed by Samantha Faith RN on 1/9/25 at 6:14 PM EST    **SHARE this note so that the co-signing nurse can place an eSignature**    Nurse 2 eSignature: Electronically signed by Ellie Jacinto RN on 1/9/25 at 6:16 PM EST

## 2025-01-09 NOTE — PROGRESS NOTES
[facility]  Hematology/Oncology  Consult      Patient Name: Lisa Hunt  YOB: 1960  PCP: Tonei Hernandez DO   Referring Provider:      Reason for Consultation:   Chief Complaint   Patient presents with    Abdominal Pain     Started today. Per the family the pt had the same pain when her ammonia was high.         History of Present Illness: This is a 64-year-old female patient following with Dr. Molina and hepatobiliary surgery for HCC s/p Y90 tx 11/14, requiring multiple IR paracenteses. She has not undergone systemic therapy, she was to see Dr. Molina in September however was lost to follow up. She is s/p a hospitalization last month for decompensated cirrhosis.  Patient presented to the ED for evaluation of abdominal pain. CT A/P showed no acute intra-abdominal or pelvic process. Similar cirrhotic appearance of the liver which is status post recent Y 90 procedure. Similar appearance of the right upper lobe lesion which appears to have prominent areas of necrosis. Splenomegaly with multiple infarcts similar to prior imaging. Moderate volume ascites. Moderate colonic stool burden. GI was consulted with plans for paracentesis, however paracentesis was canceled today due to no enough fluid. CMP with Cr 1.2, GFR 52. LFT's with alk phos 134, AST 39, ammonia 113, total bili 2.3. CBC With WBC 1.6, Hgb 7.2, platelets 27. Consultation for pancytopenia.     Review of systems: Limited.  Patient is lethargic.      Diagnostic Data:     Past Medical History:   Diagnosis Date    DONNA (acute kidney injury) (HCC) 07/18/2023    Cirrhosis (HCC)     Diabetes mellitus (HCC)     Diabetic neuropathy (HCC)     Esophageal varices without bleeding (HCC)     GERD (gastroesophageal reflux disease)     Hypertension     Intracranial bleed (HCC) 05/01/2023    Liver cirrhosis (HCC)     with secondary esophageal varices, no signs/sx's of hepatic encephalopathy    Low vitamin D level     SDH (subdural hematoma)  CONDITION: Stable, unchanged MATERIALS: 6 Fr sheath 5 Fr C2 Angle glide Micropuncture 6 Fijian Angio-Seal True select catheter Synchro soft wire renegade hi Johnathan catheter and synchro soft wire Microcatheter Fathom guidewire 2 x 6 cm Embold coil ANESTHESIA: Moderate sedation was administered and monitored by dedicated nursing personnel under direct supervision by the . 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 There are foci of tumor blush extending from branches to segment 5.  There is also brisk communication between the hepatic artery and portal fistula involving a large branches also feeding the tumor vessels.  The tumor blush was embolized with trans arterial embolization  TAE to slow flow. 2. The arterial-portal fistula was coil embolized using a 2 x 6 cm  Embold after embolization of the tumor blush was performed to close access to this region permanently. 3. No signs of significant residual tumor blush for the neoplasm noted at segment 7-8 hepatic artery angiogram 4. No signs of significant tumor blush in segment 6 and 8 5. No signs of tumor blush in the left hepatic arteries that extend near segment 4 and 5 6. 3D reconstruction at a separate workstation was performed to evaluate for residual tumor blush given the overlap of arteries feeding this region and the large arterial-venous fistula 7. Successful closure of the right groin using a 6 Fijian Angio-Seal.     IR US GUIDED

## 2025-01-09 NOTE — PROGRESS NOTES
Physical Therapy  Treatment    Name: Lisa Hunt  : 1960  MRN: [id number]      Date of Service: 2025    Evaluating PT: Jacky Brody, PT, DPT [id number]      Room #:  4413/4413-A  Diagnosis:  Hepatic encephalopathy (HCC) [K76.82]  Hyperkalemia [E87.5]  Abdominal pain, unspecified abdominal location [R10.9]  Acute metabolic encephalopathy [G93.41]  PMHx/PSHx:   has a past medical history of DONNA (acute kidney injury) (HCC), Cirrhosis (HCC), Diabetes mellitus (HCC), Diabetic neuropathy (HCC), Esophageal varices without bleeding (HCC), GERD (gastroesophageal reflux disease), Hypertension, Intracranial bleed (HCC), Liver cirrhosis (HCC), Low vitamin D level, SDH (subdural hematoma), Thrombocytopenia (HCC), and Type 2 diabetes mellitus without complication (HCC).  Procedure/Surgery:  NA  Precautions:  Fall risk, North [place] speaking, Alarms   Equipment Needs:  TBD      Session ID: [id number]  Language: [place] Korean   ID: #[id number]   Name: Gonzales      SUBJECTIVE:    Pt lives with her daughter in a 1 story house. Pt ambulated with WW prior to admission.    OBJECTIVE:   Initial Evaluation  Date: 25 Treatment Date: 25 Short Term/ Long Term   Goals   AM-PAC 6 Clicks     Was pt agreeable to Eval/treatment? Yes Yes    Does pt have pain? No complaints of pain No complaints of pain    Bed Mobility  Rolling: NT  Supine to sit: SBA  Sit to supine: SBA  Scooting: SBA Rolling: SBA  Supine to sit: SBA  Sit to supine: NT  Scooting: SBA Rolling: Independent   Supine to sit: Independent   Sit to supine: Independent   Scooting: Independent    Transfers Sit to stand: [place]  Stand to sit: Livier  Stand pivot: Livier without AD Sit to stand: SBA  Stand to sit: SBA  Stand pivot: Min A without AD Sit to stand: Supervision  Stand to sit: Supervision  Stand pivot: Supervision with WW   Ambulation   75 feet without AD with Livier 50 feet with no AD Min A  >400 feet with WW with Supervision   Stair negotiation:  ascended and descended NT NT 4 steps with 1 rail with SBA   ROM BUE: Refer to OT note  BLE: WFL NT    Strength BUE: Refer to OT note  BLE: NT NT    Balance Sitting EOB: SBA  Dynamic Standing: [place] without AD Sitting EOB: SBA    Dynamic Standing: Min A without AD Sitting EOB: Independent   Dynamic Standing: Supervision with WW     Pt is A & O x: 4 to person, place, month/year, and situation.   Sensation: Denies numbness and tingling of extremities.   Edema: Unremarkable    Vitals:   HR at rest: 94 bpm HR at end of session: 93 bpm   SpO2 at rest: 100% SpO2 at end of session 100%   BP at rest: 124/50 mmHg BP at end of session 122/47 mmHg   BP chair: 123/47, HR 92    Patient education  Pt educated on PT role in acute care setting.    Patient response to education:   Pt verbalized understanding Pt demonstrated skill Pt requires further education in this area   Yes NA No     ASSESSMENT:    Comments:    Pt was in bed upon room entry; agreeable to PT treatment. Pt completed all mobility noted above.  Pt assisted with STS and pivot transfer to chair.  Completed STS and ambulation in hallway with light assistance.  Pt assisted on/off BSC.  Pt left seated in chair with chair alarm in place.     Treatment:  Patient practiced and was instructed in the following treatment:    Bed mobility training - pt given verbal and tactile cues to facilitate proper sequencing and safety during rolling and supine>sit as well as provided with physical assistance to complete task    STS and pivot transfer training - pt educated on proper hand and foot placement, safety and sequencing, and use of no AD to safely complete sit<>stand and pivot transfers with hands on assistance to complete task safely    Gait training- pt was given verbal and tactile cues to facilitate safety/balance during ambulation as well as provided with physical assistance.     PLAN:    Patient is making Fair progress towards established goals. Will continue with current POC.

## 2025-01-09 NOTE — PROGRESS NOTES
[facility]  Department of Internal Medicine   Internal Medicine Residency   MICU Progress Note    Patient:  Lisa Hunt 64 y.o. female  MRN: [id number]     Date of Service: 1/9/2025    Allergy: Fish allergy    Subjective     Patient was seen at bedside this morning. She was alert and oriented x 3. She is tolerating regular diet and is able to walk in the hallway. Denies any shortness of breath, chest pain.     24h change: No acute overnight events  ROS: Denies Fever/chills/CP/SOB/N/V/D/C/Dysuria/Blood in stool or urine  Objective     VS: BP (!) 133/55   Pulse 97   Temp 100 °F (37.8 °C) (Temporal)   Resp 28   Ht 1.65 m (5' 4.96\")   Wt 64 kg (141 lb)   SpO2 100%   BMI 23.49 kg/m²           I & O - 24hr:   Intake/Output Summary (Last 24 hours) at 1/9/2025 1302  Last data filed at 1/9/2025 0200  Gross per 24 hour   Intake 0 ml   Output 650 ml   Net -650 ml       Physical Exam:  General Appearance: alert, appears stated age, and cooperative  Neck: no adenopathy, no carotid bruit, no JVD, supple, symmetrical, trachea midline, and thyroid not enlarged, symmetric, no tenderness/mass/nodules  Lung: clear to auscultation bilaterally  Heart: regular rate and rhythm, S1, S2 normal, no murmur, click, rub or gallop  Abdomen:  soft, distended, abdominal sounds present   Extremities:  extremities normal, atraumatic, no cyanosis or edema  Musculoskeletal: No joint swelling, no muscle tenderness. ROM normal in all joints of extremities.   Neurologic: Mental status: Alert, oriented, thought content appropriate    Lines     site day    Art line   None    TLC None    PICC None    Hemoaccess None    Oxygen:    None  Lab Results   Component Value Date/Time    PH 7.445 01/08/2025 03:53 PM    PCO2 32.7 01/08/2025 03:53 PM    PO2 78.1 01/08/2025 03:53 PM    HCO3 22.0 01/08/2025 03:53 PM    BE -1.7 01/08/2025 03:53 PM    THB 8.1 01/08/2025 03:53 PM    O2SAT 94.7 01/08/2025 03:53 PM        Medications

## 2025-01-09 NOTE — PLAN OF CARE
Problem: Chronic Conditions and Co-morbidities  Goal: Patient's chronic conditions and co-morbidity symptoms are monitored and maintained or improved  Outcome: Progressing  Flowsheets (Taken 1/8/2025 2000)  Care Plan - Patient's Chronic Conditions and Co-Morbidity Symptoms are Monitored and Maintained or Improved:   Monitor and assess patient's chronic conditions and comorbid symptoms for stability, deterioration, or improvement   Collaborate with multidisciplinary team to address chronic and comorbid conditions and prevent exacerbation or deterioration   Update acute care plan with appropriate goals if chronic or comorbid symptoms are exacerbated and prevent overall improvement and discharge     Problem: Discharge Planning  Goal: Discharge to home or other facility with appropriate resources  Outcome: Progressing  Flowsheets (Taken 1/8/2025 2000)  Discharge to home or other facility with appropriate resources:   Identify barriers to discharge with patient and caregiver   Arrange for needed discharge resources and transportation as appropriate   Identify discharge learning needs (meds, wound care, etc)     Problem: ABCDS Injury Assessment  Goal: Absence of physical injury  Outcome: Progressing  Flowsheets (Taken 1/8/2025 2100)  Absence of Physical Injury: Implement safety measures based on patient assessment     Problem: Safety - Adult  Goal: Free from fall injury  Outcome: Progressing  Flowsheets (Taken 1/8/2025 2100)  Free From Fall Injury: Instruct family/caregiver on patient safety     Problem: Pain  Goal: Verbalizes/displays adequate comfort level or baseline comfort level  Outcome: Progressing  Flowsheets (Taken 1/8/2025 2000)  Verbalizes/displays adequate comfort level or baseline comfort level:   Encourage patient to monitor pain and request assistance   Assess pain using appropriate pain scale   Administer analgesics based on type and severity of pain and evaluate response   Implement non-pharmacological  measures as appropriate and evaluate response

## 2025-01-09 NOTE — PROGRESS NOTES
Consult noted. Chart reviewed. Patient resting at time of encounter. Patient known to diabetes education. Last encounter on 12/9/24. Per chart, patient appears well managed at this time. Floor nurse states patient manages diabetes well and will re-consult us if patient desires further education.

## 2025-01-10 LAB
AMMONIA PLAS-SCNC: 83 UMOL/L (ref 11–51)
ANION GAP SERPL CALCULATED.3IONS-SCNC: 12 MMOL/L (ref 7–16)
ANION GAP SERPL CALCULATED.3IONS-SCNC: 18 MMOL/L (ref 7–16)
BASOPHILS # BLD: 0 K/UL (ref 0–0.2)
BASOPHILS NFR BLD: 0 % (ref 0–2)
BUN SERPL-MCNC: 28 MG/DL (ref 6–23)
BUN SERPL-MCNC: 28 MG/DL (ref 6–23)
CALCIUM SERPL-MCNC: 9.2 MG/DL (ref 8.6–10.2)
CALCIUM SERPL-MCNC: 9.5 MG/DL (ref 8.6–10.2)
CHLORIDE SERPL-SCNC: 105 MMOL/L (ref 98–107)
CHLORIDE SERPL-SCNC: 107 MMOL/L (ref 98–107)
CO2 SERPL-SCNC: 15 MMOL/L (ref 22–29)
CO2 SERPL-SCNC: 20 MMOL/L (ref 22–29)
CREAT SERPL-MCNC: 1.1 MG/DL (ref 0.5–1)
CREAT SERPL-MCNC: 1.4 MG/DL (ref 0.5–1)
EOSINOPHIL # BLD: 0.16 K/UL (ref 0.05–0.5)
EOSINOPHILS RELATIVE PERCENT: 9 % (ref 0–6)
ERYTHROCYTE [DISTWIDTH] IN BLOOD BY AUTOMATED COUNT: 14.9 % (ref 11.5–15)
GFR, ESTIMATED: 41 ML/MIN/1.73M2
GFR, ESTIMATED: 56 ML/MIN/1.73M2
GLUCOSE BLD-MCNC: 190 MG/DL (ref 74–99)
GLUCOSE BLD-MCNC: 234 MG/DL (ref 74–99)
GLUCOSE BLD-MCNC: 245 MG/DL (ref 74–99)
GLUCOSE BLD-MCNC: 268 MG/DL (ref 74–99)
GLUCOSE SERPL-MCNC: 196 MG/DL (ref 74–99)
GLUCOSE SERPL-MCNC: 203 MG/DL (ref 74–99)
HCT VFR BLD AUTO: 20.7 % (ref 34–48)
HCT VFR BLD AUTO: 28.8 % (ref 34–48)
HGB BLD-MCNC: 6.7 G/DL (ref 11.5–15.5)
HGB BLD-MCNC: 9.1 G/DL (ref 11.5–15.5)
LYMPHOCYTES NFR BLD: 0.14 K/UL (ref 1.5–4)
LYMPHOCYTES RELATIVE PERCENT: 8 % (ref 20–42)
MAGNESIUM SERPL-MCNC: 1.9 MG/DL (ref 1.6–2.6)
MCH RBC QN AUTO: 32.5 PG (ref 26–35)
MCHC RBC AUTO-ENTMCNC: 32.4 G/DL (ref 32–34.5)
MCV RBC AUTO: 100.5 FL (ref 80–99.9)
MONOCYTES NFR BLD: 0.22 K/UL (ref 0.1–0.95)
MONOCYTES NFR BLD: 12 % (ref 2–12)
NEUTROPHILS NFR BLD: 71 % (ref 43–80)
NEUTS SEG NFR BLD: 1.28 K/UL (ref 1.8–7.3)
PHOSPHATE SERPL-MCNC: 3.4 MG/DL (ref 2.5–4.5)
PLATELET # BLD AUTO: 24 K/UL (ref 130–450)
PLATELET CONFIRMATION: NORMAL
PMV BLD AUTO: 12.4 FL (ref 7–12)
POTASSIUM SERPL-SCNC: 4.1 MMOL/L (ref 3.5–5)
POTASSIUM SERPL-SCNC: 4.3 MMOL/L (ref 3.5–5)
RBC # BLD AUTO: 2.06 M/UL (ref 3.5–5.5)
RBC # BLD: ABNORMAL 10*6/UL
SODIUM SERPL-SCNC: 137 MMOL/L (ref 132–146)
SODIUM SERPL-SCNC: 140 MMOL/L (ref 132–146)
WBC OTHER # BLD: 1.8 K/UL (ref 4.5–11.5)

## 2025-01-10 PROCEDURE — 6360000002 HC RX W HCPCS

## 2025-01-10 PROCEDURE — 6370000000 HC RX 637 (ALT 250 FOR IP): Performed by: HOSPITALIST

## 2025-01-10 PROCEDURE — P9016 RBC LEUKOCYTES REDUCED: HCPCS

## 2025-01-10 PROCEDURE — 80048 BASIC METABOLIC PNL TOTAL CA: CPT

## 2025-01-10 PROCEDURE — 2580000003 HC RX 258

## 2025-01-10 PROCEDURE — 85018 HEMOGLOBIN: CPT

## 2025-01-10 PROCEDURE — 83735 ASSAY OF MAGNESIUM: CPT

## 2025-01-10 PROCEDURE — 6370000000 HC RX 637 (ALT 250 FOR IP): Performed by: STUDENT IN AN ORGANIZED HEALTH CARE EDUCATION/TRAINING PROGRAM

## 2025-01-10 PROCEDURE — 36415 COLL VENOUS BLD VENIPUNCTURE: CPT

## 2025-01-10 PROCEDURE — 6370000000 HC RX 637 (ALT 250 FOR IP)

## 2025-01-10 PROCEDURE — 99232 SBSQ HOSP IP/OBS MODERATE 35: CPT | Performed by: STUDENT IN AN ORGANIZED HEALTH CARE EDUCATION/TRAINING PROGRAM

## 2025-01-10 PROCEDURE — 6370000000 HC RX 637 (ALT 250 FOR IP): Performed by: INTERNAL MEDICINE

## 2025-01-10 PROCEDURE — 84100 ASSAY OF PHOSPHORUS: CPT

## 2025-01-10 PROCEDURE — 85025 COMPLETE CBC W/AUTO DIFF WBC: CPT

## 2025-01-10 PROCEDURE — 2500000003 HC RX 250 WO HCPCS: Performed by: HOSPITALIST

## 2025-01-10 PROCEDURE — 82140 ASSAY OF AMMONIA: CPT

## 2025-01-10 PROCEDURE — 82962 GLUCOSE BLOOD TEST: CPT

## 2025-01-10 PROCEDURE — 1200000000 HC SEMI PRIVATE

## 2025-01-10 PROCEDURE — 85014 HEMATOCRIT: CPT

## 2025-01-10 RX ORDER — LEVOFLOXACIN 500 MG/1
500 TABLET, FILM COATED ORAL DAILY
Status: DISCONTINUED | OUTPATIENT
Start: 2025-01-10 | End: 2025-01-10 | Stop reason: SDUPTHER

## 2025-01-10 RX ORDER — LACTULOSE 10 G/15ML
20 SOLUTION ORAL EVERY 6 HOURS SCHEDULED
Status: DISCONTINUED | OUTPATIENT
Start: 2025-01-10 | End: 2025-01-11 | Stop reason: HOSPADM

## 2025-01-10 RX ORDER — INSULIN GLARGINE 100 [IU]/ML
28 INJECTION, SOLUTION SUBCUTANEOUS DAILY
Status: DISCONTINUED | OUTPATIENT
Start: 2025-01-10 | End: 2025-01-11 | Stop reason: HOSPADM

## 2025-01-10 RX ORDER — SODIUM CHLORIDE 9 MG/ML
INJECTION, SOLUTION INTRAVENOUS PRN
Status: DISCONTINUED | OUTPATIENT
Start: 2025-01-10 | End: 2025-01-11 | Stop reason: HOSPADM

## 2025-01-10 RX ORDER — LEVOFLOXACIN 250 MG/1
250 TABLET, FILM COATED ORAL DAILY
Status: DISCONTINUED | OUTPATIENT
Start: 2025-01-11 | End: 2025-01-11 | Stop reason: HOSPADM

## 2025-01-10 RX ORDER — SODIUM BICARBONATE 650 MG/1
650 TABLET ORAL 3 TIMES DAILY
Status: DISCONTINUED | OUTPATIENT
Start: 2025-01-10 | End: 2025-01-11 | Stop reason: HOSPADM

## 2025-01-10 RX ORDER — LEVOFLOXACIN 500 MG/1
500 TABLET, FILM COATED ORAL ONCE
Status: COMPLETED | OUTPATIENT
Start: 2025-01-10 | End: 2025-01-10

## 2025-01-10 RX ORDER — SODIUM BICARBONATE 650 MG/1
650 TABLET ORAL 3 TIMES DAILY
Status: DISCONTINUED | OUTPATIENT
Start: 2025-01-10 | End: 2025-01-10

## 2025-01-10 RX ADMIN — INSULIN LISPRO 10 UNITS: 100 INJECTION, SOLUTION INTRAVENOUS; SUBCUTANEOUS at 17:20

## 2025-01-10 RX ADMIN — RIFAXIMIN 400 MG: 200 TABLET ORAL at 15:26

## 2025-01-10 RX ADMIN — PANTOPRAZOLE SODIUM 40 MG: 40 TABLET, DELAYED RELEASE ORAL at 15:26

## 2025-01-10 RX ADMIN — SODIUM BICARBONATE 650 MG: 650 TABLET ORAL at 15:26

## 2025-01-10 RX ADMIN — SODIUM BICARBONATE 650 MG: 650 TABLET ORAL at 20:51

## 2025-01-10 RX ADMIN — LACTULOSE 20 G: 10 SOLUTION ORAL at 06:02

## 2025-01-10 RX ADMIN — INSULIN GLARGINE 28 UNITS: 100 INJECTION, SOLUTION SUBCUTANEOUS at 09:47

## 2025-01-10 RX ADMIN — INSULIN LISPRO 8 UNITS: 100 INJECTION, SOLUTION INTRAVENOUS; SUBCUTANEOUS at 06:50

## 2025-01-10 RX ADMIN — RIFAXIMIN 400 MG: 200 TABLET ORAL at 09:39

## 2025-01-10 RX ADMIN — PANTOPRAZOLE SODIUM 40 MG: 40 TABLET, DELAYED RELEASE ORAL at 06:02

## 2025-01-10 RX ADMIN — SODIUM CHLORIDE, PRESERVATIVE FREE 10 ML: 5 INJECTION INTRAVENOUS at 20:52

## 2025-01-10 RX ADMIN — LACTULOSE 20 G: 10 SOLUTION ORAL at 01:07

## 2025-01-10 RX ADMIN — INSULIN LISPRO 4 UNITS: 100 INJECTION, SOLUTION INTRAVENOUS; SUBCUTANEOUS at 20:59

## 2025-01-10 RX ADMIN — INSULIN LISPRO 4 UNITS: 100 INJECTION, SOLUTION INTRAVENOUS; SUBCUTANEOUS at 17:20

## 2025-01-10 RX ADMIN — SODIUM CHLORIDE, PRESERVATIVE FREE 10 ML: 5 INJECTION INTRAVENOUS at 09:38

## 2025-01-10 RX ADMIN — INSULIN LISPRO 4 UNITS: 100 INJECTION, SOLUTION INTRAVENOUS; SUBCUTANEOUS at 11:57

## 2025-01-10 RX ADMIN — LEVOFLOXACIN 500 MG: 500 TABLET, FILM COATED ORAL at 15:25

## 2025-01-10 RX ADMIN — LACTULOSE 20 G: 10 SOLUTION ORAL at 04:05

## 2025-01-10 RX ADMIN — LACTULOSE 20 G: 10 SOLUTION ORAL at 09:38

## 2025-01-10 RX ADMIN — LACTULOSE 20 G: 10 SOLUTION ORAL at 02:18

## 2025-01-10 RX ADMIN — SODIUM BICARBONATE 650 MG: 650 TABLET ORAL at 11:57

## 2025-01-10 RX ADMIN — RIFAXIMIN 400 MG: 200 TABLET ORAL at 20:51

## 2025-01-10 RX ADMIN — PIPERACILLIN AND TAZOBACTAM 4500 MG: 4; .5 INJECTION, POWDER, FOR SOLUTION INTRAVENOUS at 02:29

## 2025-01-10 RX ADMIN — INSULIN LISPRO 10 UNITS: 100 INJECTION, SOLUTION INTRAVENOUS; SUBCUTANEOUS at 11:57

## 2025-01-10 NOTE — PLAN OF CARE
Problem: Chronic Conditions and Co-morbidities  Goal: Patient's chronic conditions and co-morbidity symptoms are monitored and maintained or improved  Outcome: Progressing  Flowsheets (Taken 1/9/2025 1650 by Samantha Faith RN)  Care Plan - Patient's Chronic Conditions and Co-Morbidity Symptoms are Monitored and Maintained or Improved: Monitor and assess patient's chronic conditions and comorbid symptoms for stability, deterioration, or improvement     Problem: Discharge Planning  Goal: Discharge to home or other facility with appropriate resources  Outcome: Progressing     Problem: ABCDS Injury Assessment  Goal: Absence of physical injury  Outcome: Progressing     Problem: Safety - Adult  Goal: Free from fall injury  Outcome: Progressing     Problem: Pain  Goal: Verbalizes/displays adequate comfort level or baseline comfort level  Outcome: Progressing     Problem: Nutrition Deficit:  Goal: Optimize nutritional status  Outcome: Progressing

## 2025-01-10 NOTE — CARE COORDINATION
Transition of care update. Per rounds with physician, patient to receive blood today for Hb . Platelets remain low at 24 after 2 bags of platelets. Consult for hemonc to evaluate her  pancytopenia. Discharge plan at this time is home with daughter when medically stable. CM will follow.  Electronically signed by Rony Gamble RN on 1/10/2025 at 4:26 PM

## 2025-01-10 NOTE — PROGRESS NOTES
Hospitalist Progress Note      SYNOPSIS: Patient admitted on 2025 for Acute metabolic encephalopathy  Patient with a history of hepatic cirrhosis secondary to liver cancer, ascites, hepatic encephalopathy, GERD, hypertension, type 2 diabetes, admitted on 2025 after presenting to the ED with worsening weakness and abdominal pain.  Reports taking her lactulose as scheduled, and has 3 bowel movements daily.  Recently admitted for decompensated liver cirrhosis and discharged on .  Had a paracentesis on  and almost 4 L of fluid were removed.   Patient was transfused 2 bags of platelet for paracentesis  Persistent thrombocytopenia, USG did not show enough fluid for paracentesis.  Ammonia was trending up lactulose increased to q2h  New bmp showed acidosis with anion gap of 22, had to be transferred to intensive care unit, repeat BMP showed improvement in anion gap.  Transferred out of the intensive care unit    SUBJECTIVE:  Stable overnight. No other overnight issues reported.   Patient seen and examined at bedside today a.m.  Session code 40744,  used  Patient states she feels fine, answering to my questions appropriately, patient does have multiple episodes of bowel movement definitely more than 3 as per the patient.  Denies any fever  Records reviewed.           Temp (24hrs), Av.5 °F (36.4 °C), Min:96.9 °F (36.1 °C), Max:98.4 °F (36.9 °C)    DIET: ADULT DIET; Regular; 4 carb choices (60 gm/meal)  ADULT ORAL NUTRITION SUPPLEMENT; Breakfast, Dinner; Diabetic Oral Supplement  CODE: Full Code    Intake/Output Summary (Last 24 hours) at 1/10/2025 1114  Last data filed at 1/10/2025 0632  Gross per 24 hour   Intake 690 ml   Output --   Net 690 ml       Review of Systems  All bolded are positive; please see HPI  General:  Fever, chills, diaphoresis, fatigue, malaise, night sweats, weight loss  Psychological:  Anxiety, disorientation, hallucinations.  ENT:  Epistaxis, headaches, vertigo,  lactulose to every 6 hours with goal stool of 3 loose stool per day, on rifaximin 400 mg 3 times a day, GI on board  Not enough fluid for paracentesis  platelet count just 24, status post 2 bag of platelet   Will transfuse 1 unit of packed RBC  Hold lasix  Continue albumin 25 g every 8 hours 9 doses  Continue Protonix 40 mg daily  hemonc for her pancytopenia evaluation  Discussed in detail about advance directive, fullcode         DVT Prophylaxis [] Lovenox, []  Heparin, [] SCDs, [] Ambulation   GI Prophylaxis [] PPI,  [] H2 Blocker,  [] Carafate,  [] Diet/Tube Feeds   Disposition Patient requires continued admission due to hepatic encephalopathy treatment   MDM [] Low, [] Moderate,[]  High  Patient's risk as above due to        Medications:  REVIEWED DAILY    Infusion Medications    sodium chloride      dextrose      sodium chloride       Scheduled Medications    insulin glargine  28 Units SubCUTAneous Daily    lactulose  20 g Oral 4 times per day    sodium bicarbonate  650 mg Oral TID    piperacillin-tazobactam  4,500 mg IntraVENous Q8H    [Held by provider] furosemide  20 mg Oral Daily    sodium bicarbonate  50 mEq IntraVENous Once    pantoprazole  40 mg Oral BID AC    insulin lispro  10 Units SubCUTAneous TID WC    [Held by provider] bumetanide  1 mg Oral Daily    [Held by provider] carvedilol  3.125 mg Oral Daily    [Held by provider] gabapentin  600 mg Oral TID    rifAXIMin  400 mg Oral TID    insulin lispro  0-16 Units SubCUTAneous 4x Daily AC & HS    sodium chloride flush  5-40 mL IntraVENous 2 times per day     PRN Meds: sodium chloride, glucose, dextrose **OR** dextrose, glucagon (rDNA), dextrose, sodium chloride flush, sodium chloride, ondansetron **OR** ondansetron, polyethylene glycol    Labs:     Recent Labs     01/08/25  1514 01/09/25  0624 01/10/25  0419   WBC 2.1* 2.3* 1.8*   HGB 7.0* 7.0* 6.7*   HCT 21.2* 21.3* 20.7*   PLT 25* 24* 24*       Recent Labs     01/09/25  0624 01/09/25  1906

## 2025-01-11 VITALS
SYSTOLIC BLOOD PRESSURE: 144 MMHG | WEIGHT: 160.3 LBS | BODY MASS INDEX: 26.71 KG/M2 | HEART RATE: 90 BPM | RESPIRATION RATE: 19 BRPM | DIASTOLIC BLOOD PRESSURE: 127 MMHG | OXYGEN SATURATION: 99 % | TEMPERATURE: 98.3 F | HEIGHT: 65 IN

## 2025-01-11 LAB
ABO/RH: NORMAL
AMMONIA PLAS-SCNC: 32 UMOL/L (ref 11–51)
ANTIBODY SCREEN: NEGATIVE
ARM BAND NUMBER: NORMAL
BASOPHILS # BLD: 0.01 K/UL (ref 0–0.2)
BASOPHILS NFR BLD: 1 % (ref 0–2)
BLOOD BANK BLOOD PRODUCT EXPIRATION DATE: NORMAL
BLOOD BANK DISPENSE STATUS: NORMAL
BLOOD BANK ISBT PRODUCT BLOOD TYPE: 6200
BLOOD BANK PRODUCT CODE: NORMAL
BLOOD BANK SAMPLE EXPIRATION: NORMAL
BLOOD BANK UNIT TYPE AND RH: NORMAL
BPU ID: NORMAL
COMPONENT: NORMAL
CROSSMATCH RESULT: NORMAL
EOSINOPHIL # BLD: 0.1 K/UL (ref 0.05–0.5)
EOSINOPHILS RELATIVE PERCENT: 6 % (ref 0–6)
ERYTHROCYTE [DISTWIDTH] IN BLOOD BY AUTOMATED COUNT: 15.7 % (ref 11.5–15)
GLUCOSE BLD-MCNC: 218 MG/DL (ref 74–99)
GLUCOSE BLD-MCNC: 262 MG/DL (ref 74–99)
HCT VFR BLD AUTO: 22.8 % (ref 34–48)
HGB BLD-MCNC: 7.6 G/DL (ref 11.5–15.5)
IMM GRANULOCYTES # BLD AUTO: <0.03 K/UL (ref 0–0.58)
IMM GRANULOCYTES NFR BLD: 1 % (ref 0–5)
LYMPHOCYTES NFR BLD: 0.22 K/UL (ref 1.5–4)
LYMPHOCYTES RELATIVE PERCENT: 13 % (ref 20–42)
MAGNESIUM SERPL-MCNC: 1.8 MG/DL (ref 1.6–2.6)
MCH RBC QN AUTO: 32.2 PG (ref 26–35)
MCHC RBC AUTO-ENTMCNC: 33.3 G/DL (ref 32–34.5)
MCV RBC AUTO: 96.6 FL (ref 80–99.9)
MONOCYTES NFR BLD: 0.17 K/UL (ref 0.1–0.95)
MONOCYTES NFR BLD: 10 % (ref 2–12)
NEUTROPHILS NFR BLD: 69 % (ref 43–80)
NEUTS SEG NFR BLD: 1.16 K/UL (ref 1.8–7.3)
PHOSPHATE SERPL-MCNC: 2.4 MG/DL (ref 2.5–4.5)
PLATELET # BLD AUTO: 23 K/UL (ref 130–450)
PLATELET CONFIRMATION: NORMAL
PMV BLD AUTO: 12.2 FL (ref 7–12)
RBC # BLD AUTO: 2.36 M/UL (ref 3.5–5.5)
RBC # BLD: ABNORMAL 10*6/UL
TRANSFUSION STATUS: NORMAL
UNIT DIVISION: 0
UNIT ISSUE DATE/TIME: NORMAL
WBC OTHER # BLD: 1.7 K/UL (ref 4.5–11.5)

## 2025-01-11 PROCEDURE — 6370000000 HC RX 637 (ALT 250 FOR IP): Performed by: STUDENT IN AN ORGANIZED HEALTH CARE EDUCATION/TRAINING PROGRAM

## 2025-01-11 PROCEDURE — 6370000000 HC RX 637 (ALT 250 FOR IP)

## 2025-01-11 PROCEDURE — 2500000003 HC RX 250 WO HCPCS: Performed by: HOSPITALIST

## 2025-01-11 PROCEDURE — 36415 COLL VENOUS BLD VENIPUNCTURE: CPT

## 2025-01-11 PROCEDURE — 83735 ASSAY OF MAGNESIUM: CPT

## 2025-01-11 PROCEDURE — 99239 HOSP IP/OBS DSCHRG MGMT >30: CPT | Performed by: STUDENT IN AN ORGANIZED HEALTH CARE EDUCATION/TRAINING PROGRAM

## 2025-01-11 PROCEDURE — 6370000000 HC RX 637 (ALT 250 FOR IP): Performed by: INTERNAL MEDICINE

## 2025-01-11 PROCEDURE — 84100 ASSAY OF PHOSPHORUS: CPT

## 2025-01-11 PROCEDURE — 85025 COMPLETE CBC W/AUTO DIFF WBC: CPT

## 2025-01-11 PROCEDURE — 82140 ASSAY OF AMMONIA: CPT

## 2025-01-11 PROCEDURE — 6370000000 HC RX 637 (ALT 250 FOR IP): Performed by: HOSPITALIST

## 2025-01-11 PROCEDURE — 82962 GLUCOSE BLOOD TEST: CPT

## 2025-01-11 RX ORDER — SPIRONOLACTONE 100 MG/1
50 TABLET, FILM COATED ORAL DAILY
Qty: 30 TABLET | Refills: 3 | Status: SHIPPED | OUTPATIENT
Start: 2025-01-11 | End: 2025-01-15

## 2025-01-11 RX ORDER — PANTOPRAZOLE SODIUM 40 MG/1
40 TABLET, DELAYED RELEASE ORAL
Qty: 30 TABLET | Refills: 3 | Status: SHIPPED | OUTPATIENT
Start: 2025-01-11

## 2025-01-11 RX ORDER — LEVOFLOXACIN 250 MG/1
250 TABLET, FILM COATED ORAL DAILY
Qty: 5 TABLET | Refills: 0 | Status: SHIPPED | OUTPATIENT
Start: 2025-01-12 | End: 2025-01-17

## 2025-01-11 RX ADMIN — INSULIN LISPRO 8 UNITS: 100 INJECTION, SOLUTION INTRAVENOUS; SUBCUTANEOUS at 07:40

## 2025-01-11 RX ADMIN — INSULIN LISPRO 10 UNITS: 100 INJECTION, SOLUTION INTRAVENOUS; SUBCUTANEOUS at 11:18

## 2025-01-11 RX ADMIN — CARVEDILOL 3.12 MG: 6.25 TABLET, FILM COATED ORAL at 07:41

## 2025-01-11 RX ADMIN — INSULIN GLARGINE 28 UNITS: 100 INJECTION, SOLUTION SUBCUTANEOUS at 07:40

## 2025-01-11 RX ADMIN — RIFAXIMIN 400 MG: 200 TABLET ORAL at 07:40

## 2025-01-11 RX ADMIN — INSULIN LISPRO 4 UNITS: 100 INJECTION, SOLUTION INTRAVENOUS; SUBCUTANEOUS at 11:17

## 2025-01-11 RX ADMIN — SODIUM CHLORIDE, PRESERVATIVE FREE 10 ML: 5 INJECTION INTRAVENOUS at 07:43

## 2025-01-11 RX ADMIN — SODIUM BICARBONATE 650 MG: 650 TABLET ORAL at 07:41

## 2025-01-11 RX ADMIN — LACTULOSE 20 G: 20 SOLUTION ORAL at 05:33

## 2025-01-11 RX ADMIN — LEVOFLOXACIN 250 MG: 250 TABLET, FILM COATED ORAL at 07:40

## 2025-01-11 RX ADMIN — INSULIN LISPRO 10 UNITS: 100 INJECTION, SOLUTION INTRAVENOUS; SUBCUTANEOUS at 07:40

## 2025-01-11 RX ADMIN — PANTOPRAZOLE SODIUM 40 MG: 40 TABLET, DELAYED RELEASE ORAL at 05:33

## 2025-01-11 NOTE — PROGRESS NOTES
CLINICAL PHARMACY NOTE: MEDS TO BEDS    Total # of Prescriptions Filled: 3   The following medications were delivered to the patient:  Pantoprazole 40mg tabs  Spironolactone 50mg tabs  Levofloxacin 250mg tabs    Additional Documentation:  To patient's friend at the pharmacy

## 2025-01-11 NOTE — CONSENT
Informed Consent for Blood Component Transfusion Note    I have discussed with the patient the rationale for blood component transfusion; its benefits in treating or preventing fatigue, organ damage, or death; and its risk which includes mild transfusion reactions, rare risk of blood borne infection, or more serious but rare reactions. I have discussed the alternatives to transfusion, including the risk and consequences of not receiving transfusion. The patient had an opportunity to ask questions and had agreed to proceed with transfusion of blood components.    Electronically signed by Josue Alvse MD on 1/11/25 at 9:55 AM EST

## 2025-01-11 NOTE — CARE COORDINATION
1/11/2025dc order noted. Order rn,no needs.  Electronically signed by JOSY Mitchell on 1/11/2025 at 10:22 AM

## 2025-01-11 NOTE — PLAN OF CARE
Problem: Chronic Conditions and Co-morbidities  Goal: Patient's chronic conditions and co-morbidity symptoms are monitored and maintained or improved  1/10/2025 2247 by Natalya Olivas RN  Outcome: Progressing  1/10/2025 1627 by Samantha Faith RN  Outcome: Progressing     Problem: Discharge Planning  Goal: Discharge to home or other facility with appropriate resources  1/10/2025 2247 by Natalya Olivas RN  Outcome: Progressing  1/10/2025 1627 by Samantha Faith RN  Outcome: Progressing     Problem: ABCDS Injury Assessment  Goal: Absence of physical injury  1/10/2025 1627 by Samantha Faith RN  Outcome: Progressing     Problem: Safety - Adult  Goal: Free from fall injury  1/10/2025 2247 by Natalya Olivas RN  Outcome: Progressing  1/10/2025 1627 by Samantha Faith RN  Outcome: Progressing     Problem: Pain  Goal: Verbalizes/displays adequate comfort level or baseline comfort level  1/10/2025 2247 by Natalya Olivas RN  Outcome: Progressing  1/10/2025 1627 by Samantha Faith RN  Outcome: Progressing     Problem: Nutrition Deficit:  Goal: Optimize nutritional status  1/10/2025 1627 by Samantha Faith RN  Outcome: Progressing

## 2025-01-11 NOTE — DISCHARGE SUMMARY
provided:      CBC:    Lab Results   Component Value Date/Time    WBC 1.7 01/11/2025 04:34 AM    HGB 7.6 01/11/2025 04:34 AM    HCT 22.8 01/11/2025 04:34 AM    PLT 23 01/11/2025 04:34 AM       Renal:    Lab Results   Component Value Date/Time     01/10/2025 07:58 PM    K 4.1 01/10/2025 07:58 PM     01/10/2025 07:58 PM    CO2 20 01/10/2025 07:58 PM    BUN 28 01/10/2025 07:58 PM    CREATININE 1.1 01/10/2025 07:58 PM    CALCIUM 9.5 01/10/2025 07:58 PM    PHOS 2.4 01/11/2025 04:34 AM       Discharge Medications:     Current Discharge Medication List             Details   levoFLOXacin (LEVAQUIN) 250 MG tablet Take 1 tablet by mouth daily for 5 days  Qty: 5 tablet, Refills: 0      pantoprazole (PROTONIX) 40 MG tablet Take 1 tablet by mouth 2 times daily (before meals)  Qty: 30 tablet, Refills: 3                Details   spironolactone (ALDACTONE) 100 MG tablet Take 0.5 tablets by mouth daily  Qty: 30 tablet, Refills: 3                Details   bumetanide (BUMEX) 1 MG tablet Take 1 tablet by mouth daily  Qty: 30 tablet, Refills: 0      insulin glargine (LANTUS) 100 UNIT/ML injection vial Inject 38 Units into the skin nightly  Qty: 10 mL, Refills: 0      insulin lispro (HUMALOG,ADMELOG) 100 UNIT/ML SOLN injection vial Inject 12 Units into the skin 3 times daily (before meals)  Qty: 10 mL, Refills: 0      lactulose (CHRONULAC) 10 GM/15ML solution Take 30 mLs by mouth every 8 (eight) hours Titrate to goal of 2-3 bowel movement per day  Qty: 946 mL, Refills: 5    Associated Diagnoses: Hyperammonemia (HCC)      rifAXIMin (XIFAXAN) 550 MG tablet Take 1 tablet by mouth 2 times daily      carvedilol (COREG) 6.25 MG tablet Take 0.5 tablets by mouth daily  Qty: 90 tablet, Refills: 1    Associated Diagnoses: Essential hypertension             Time Spent on discharge is 32mins in the examination, evaluation, counseling and review of medications and discharge plan.      Signed:    Josue Alves MD   1/11/2025

## 2025-01-11 NOTE — PROGRESS NOTES
Pt family states that they will be here as soon as possible but drivining is a problem at this time. Family wanted ambulance but discussed that it maybe private pay but they didn't want to do that. States they will work it out and be here as soon as they can get a ride

## 2025-01-13 DIAGNOSIS — E72.20 HYPERAMMONEMIA (HCC): ICD-10-CM

## 2025-01-13 RX ORDER — LACTULOSE 10 G/15ML
20 SOLUTION ORAL EVERY 8 HOURS
Qty: 946 ML | Refills: 2 | Status: SHIPPED
Start: 2025-01-13 | End: 2025-01-15 | Stop reason: SDUPTHER

## 2025-01-13 RX ORDER — INSULIN GLARGINE 100 [IU]/ML
38 INJECTION, SOLUTION SUBCUTANEOUS NIGHTLY
Qty: 10 ML | Refills: 3 | Status: SHIPPED | OUTPATIENT
Start: 2025-01-13

## 2025-01-13 RX ORDER — INSULIN LISPRO 100 [IU]/ML
12 INJECTION, SOLUTION INTRAVENOUS; SUBCUTANEOUS
Qty: 30 ML | Refills: 1 | Status: SHIPPED | OUTPATIENT
Start: 2025-01-13

## 2025-01-13 NOTE — TELEPHONE ENCOUNTER
Name of Medication(s) Requested:  Requested Prescriptions     Pending Prescriptions Disp Refills    lactulose (CHRONULAC) 10 GM/15ML solution 946 mL 0     Sig: Take 30 mLs by mouth every 8 (eight) hours Titrate to goal of 2-3 bowel movement per day    insulin lispro (HUMALOG,ADMELOG) 100 UNIT/ML SOLN injection vial 10 mL 0     Sig: Inject 12 Units into the skin 3 times daily (before meals)    insulin glargine (LANTUS) 100 UNIT/ML injection vial 10 mL 0     Sig: Inject 38 Units into the skin nightly       Medication is on current medication list Yes    Dosage and directions were verified? Yes    Quantity verified: 30 day supply     Pharmacy Verified?  Yes    Last Appointment:  10/7/2024    Future appts:  Future Appointments   Date Time Provider Department Center   1/15/2025 10:30 AM Tonie Hernandez DO [facility] DEP   1/15/2025  3:15 PM Kymberly Sosa MD [facility] Surg [facility]   1/28/2025 11:30 AM Tae Gaines MD [facility] ENDO [facility]   1/29/2025 10:20 AM Carolyne Roman, APRN - CNP BEL GASTRO [facility]   2/12/2025  3:30 PM Anil Conrad MD [facility] GASTR [facility]   2/24/2025 11:00 AM Tonie Hernandez DO [facility] DEP   5/14/2025 11:00 AM Anil Conrad MD [facility] GASTR [facility]        (If no appt send self scheduling link. .REFILLAPPT)  Scheduling request sent?     [] Yes  [x] No    Does patient need updated?  [] Yes  [x] No

## 2025-01-14 ENCOUNTER — TELEPHONE (OUTPATIENT)
Dept: FAMILY MEDICINE CLINIC | Age: 65
End: 2025-01-14

## 2025-01-14 NOTE — TELEPHONE ENCOUNTER
Daughter called in stating that Pt does not have transportation 1/15/25 at 10:45am. Daughter requesting that the appointment is pushed back to 1 pm because Pt will have an appt then and needs to be seen and can't wait to a later date.

## 2025-01-15 ENCOUNTER — OFFICE VISIT (OUTPATIENT)
Dept: HEMATOLOGY | Age: 65
End: 2025-01-15
Payer: MEDICARE

## 2025-01-15 VITALS
DIASTOLIC BLOOD PRESSURE: 44 MMHG | TEMPERATURE: 99.9 F | SYSTOLIC BLOOD PRESSURE: 154 MMHG | HEART RATE: 91 BPM | HEIGHT: 64 IN | BODY MASS INDEX: 28.34 KG/M2 | OXYGEN SATURATION: 100 % | WEIGHT: 166 LBS

## 2025-01-15 DIAGNOSIS — R18.8 CIRRHOSIS OF LIVER WITH ASCITES, UNSPECIFIED HEPATIC CIRRHOSIS TYPE (HCC): Primary | ICD-10-CM

## 2025-01-15 DIAGNOSIS — K74.60 CIRRHOSIS OF LIVER WITH ASCITES, UNSPECIFIED HEPATIC CIRRHOSIS TYPE (HCC): Primary | ICD-10-CM

## 2025-01-15 DIAGNOSIS — K76.6 PORTAL HYPERTENSION (HCC): ICD-10-CM

## 2025-01-15 DIAGNOSIS — E72.20 HYPERAMMONEMIA (HCC): ICD-10-CM

## 2025-01-15 DIAGNOSIS — C22.0 HEPATOCELLULAR CARCINOMA (HCC): ICD-10-CM

## 2025-01-15 PROCEDURE — 3077F SYST BP >= 140 MM HG: CPT | Performed by: STUDENT IN AN ORGANIZED HEALTH CARE EDUCATION/TRAINING PROGRAM

## 2025-01-15 PROCEDURE — 99213 OFFICE O/P EST LOW 20 MIN: CPT | Performed by: STUDENT IN AN ORGANIZED HEALTH CARE EDUCATION/TRAINING PROGRAM

## 2025-01-15 PROCEDURE — 3078F DIAST BP <80 MM HG: CPT | Performed by: STUDENT IN AN ORGANIZED HEALTH CARE EDUCATION/TRAINING PROGRAM

## 2025-01-15 RX ORDER — SPIRONOLACTONE 50 MG/1
TABLET, FILM COATED ORAL
COMMUNITY
Start: 2025-01-11

## 2025-01-15 RX ORDER — LACTULOSE 10 G/15ML
20 SOLUTION ORAL EVERY 8 HOURS
Qty: 946 ML | Refills: 5 | Status: SHIPPED | OUTPATIENT
Start: 2025-01-15

## 2025-01-17 NOTE — PROGRESS NOTES
Physician Progress Note      PATIENT:               CLINTON ADAMS  [facility] #:                  [id number]  :                       1960  ADMIT DATE:       2025 3:44 PM  DISCH DATE:        2025 11:56 AM  RESPONDING  PROVIDER #:        Josue Alves MD          QUERY TEXT:    Patient admitted with altered mental status and hepatic encephalopathy.   Documentation reflects suspected bacterial peritonitis in note(s) dated    and 1/10.  If possible, please document in the progress notes and discharge   summary if spontaneous bacterial peritonitis was:    The medical record reflects the following:  Risk Factors: AMS, neutropenia  Clinical Indicators: per progress note  Patient started on Zosyn for   suspicion of spontaneous bacterial peritonitis with pancytopenia as per   critical care team; PN 1/10 1/10 ?Spontaneous bacterial peritonitis Switch   Zosyn to Levaquin  Treatment: IV Zosyn, Levaquin, PO Levaquin continued at discharge  Options provided:  -- spontaneous bacterial peritonitis confirmed after study  -- spontaneous bacterial peritonitis treated and resolved  -- spontaneous bacterial peritonitis ruled out after study  -- Other - I will add my own diagnosis  -- Disagree - Not applicable / Not valid  -- Disagree - Clinically unable to determine / Unknown  -- Refer to Clinical Documentation Reviewer    PROVIDER RESPONSE TEXT:    spontaneous bacterial peritonitis ruled out after study.    Query created by: Princess Monroy on 2025 7:40 AM      Electronically signed by:  Josue Alves MD 2025 7:53 AM

## 2025-01-17 NOTE — PROGRESS NOTES
Hepatobiliary and Pancreatic Surgery Progress Note    CC: Cirrhosis, HCC    Subjective:   1/24/24: Ms. 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 She has seen GI in the past and had prior endoscopies with Dr. Artaega. She recently was hospitalized early January due to decompensation requiring EGD wit Dr. Conrad and banding of esophageal varices and IR paracentesis. She has had a total of 4 paracenteses and three were within the last year. She denies hx of bloody stools or dark tarry stools. She does take rifaximin and lactulose and notices she gets confused when she does not have a bowel movement for a while. Her ammonia levels have also been persistently elevated. She has been told by someone in the past that she should be put on a transplant list. She denies any alcohol use or smoking. Her liver disease is from ACOSTA. She takes her spironolactone and lasix as prescribed. She has not had a heart cath. Last EKG showed RBB which is new.      6/12: Interpretor Vasquez [phone] via hospital interpretor program utilized for entire visit. Patient was recently hospitalized 5/26/2024 for hepatic encephalopathy, liver cirrhosis, altered mental status.  She was seen by Dr. Conrad and is currently taking lactulose 3 times daily.  She was discharged 5/31/2024.  Per patient and daughter patient has been sleeping all the time despite taking the lactulose and having 3 large bouts of watery diarrhea per day.  Last ammonia level on 5/30/2024 was 55.  Per pt and daughter, pt has some mild confusion off and on. Per patient's daughter she is following routinely with Dr. Conrad.  Patient reports frustration stating despite coming to her office visit she has been hospitalized several times, it was discussed with her that her cirrhosis has been decompensated which has led to her hospitalizations. It was explained last hospital stay due to

## 2025-01-31 ENCOUNTER — APPOINTMENT (OUTPATIENT)
Dept: CT IMAGING | Age: 65
DRG: 442 | End: 2025-01-31
Attending: STUDENT IN AN ORGANIZED HEALTH CARE EDUCATION/TRAINING PROGRAM
Payer: MEDICARE

## 2025-01-31 ENCOUNTER — APPOINTMENT (OUTPATIENT)
Dept: GENERAL RADIOLOGY | Age: 65
DRG: 442 | End: 2025-01-31
Payer: MEDICARE

## 2025-01-31 ENCOUNTER — HOSPITAL ENCOUNTER (INPATIENT)
Age: 65
LOS: 5 days | Discharge: HOME | DRG: 442 | End: 2025-02-05
Attending: STUDENT IN AN ORGANIZED HEALTH CARE EDUCATION/TRAINING PROGRAM | Admitting: FAMILY MEDICINE
Payer: MEDICARE

## 2025-01-31 DIAGNOSIS — D64.9 ANEMIA, UNSPECIFIED TYPE: ICD-10-CM

## 2025-01-31 DIAGNOSIS — K76.82 HEPATIC ENCEPHALOPATHY (HCC): ICD-10-CM

## 2025-01-31 DIAGNOSIS — R41.82 ALTERED MENTAL STATUS, UNSPECIFIED ALTERED MENTAL STATUS TYPE: Primary | ICD-10-CM

## 2025-01-31 DIAGNOSIS — R73.9 HYPERGLYCEMIA: ICD-10-CM

## 2025-01-31 LAB
ALBUMIN SERPL-MCNC: 3.5 G/DL (ref 3.5–5.2)
ALP SERPL-CCNC: 181 U/L (ref 35–104)
ALT SERPL-CCNC: 22 U/L (ref 0–32)
AMMONIA PLAS-SCNC: 85 UMOL/L (ref 11–51)
AMPHET UR QL SCN: NEGATIVE
ANION GAP SERPL CALCULATED.3IONS-SCNC: 11 MMOL/L (ref 7–16)
APAP SERPL-MCNC: <5 UG/ML (ref 10–30)
AST SERPL-CCNC: 41 U/L (ref 0–31)
BARBITURATES UR QL SCN: NEGATIVE
BASOPHILS # BLD: 0.01 K/UL (ref 0–0.2)
BASOPHILS NFR BLD: 1 % (ref 0–2)
BENZODIAZ UR QL: NEGATIVE
BILIRUB SERPL-MCNC: 2.1 MG/DL (ref 0–1.2)
BILIRUB UR QL STRIP: NEGATIVE
BUN SERPL-MCNC: 37 MG/DL (ref 6–23)
BUPRENORPHINE UR QL: NEGATIVE
CALCIUM SERPL-MCNC: 9.6 MG/DL (ref 8.6–10.2)
CANNABINOIDS UR QL SCN: NEGATIVE
CHLORIDE SERPL-SCNC: 98 MMOL/L (ref 98–107)
CHP ED QC CHECK: NORMAL
CLARITY UR: CLEAR
CO2 SERPL-SCNC: 22 MMOL/L (ref 22–29)
COCAINE UR QL SCN: NEGATIVE
COLOR UR: YELLOW
CREAT SERPL-MCNC: 1.5 MG/DL (ref 0.5–1)
EOSINOPHIL # BLD: 0.02 K/UL (ref 0.05–0.5)
EOSINOPHILS RELATIVE PERCENT: 2 % (ref 0–6)
ERYTHROCYTE [DISTWIDTH] IN BLOOD BY AUTOMATED COUNT: 15.5 % (ref 11.5–15)
ETHANOLAMINE SERPL-MCNC: <10 MG/DL (ref 0–0.08)
FENTANYL UR QL: NEGATIVE
GFR, ESTIMATED: 40 ML/MIN/1.73M2
GLUCOSE BLD-MCNC: 480 MG/DL (ref 74–99)
GLUCOSE BLD-MCNC: 494 MG/DL
GLUCOSE BLD-MCNC: 494 MG/DL (ref 74–99)
GLUCOSE SERPL-MCNC: 479 MG/DL (ref 74–99)
GLUCOSE UR STRIP-MCNC: >=1000 MG/DL
HCT VFR BLD AUTO: 20 % (ref 34–48)
HGB BLD-MCNC: 6.4 G/DL (ref 11.5–15.5)
HGB UR QL STRIP.AUTO: ABNORMAL
IMM GRANULOCYTES # BLD AUTO: <0.03 K/UL (ref 0–0.58)
IMM GRANULOCYTES NFR BLD: 0 % (ref 0–5)
INR PPP: 1.5
KETONES UR STRIP-MCNC: NEGATIVE MG/DL
LACTATE BLDV-SCNC: 2.4 MMOL/L (ref 0.5–1.9)
LACTATE BLDV-SCNC: 3 MMOL/L (ref 0.5–1.9)
LEUKOCYTE ESTERASE UR QL STRIP: NEGATIVE
LYMPHOCYTES NFR BLD: 0.19 K/UL (ref 1.5–4)
LYMPHOCYTES RELATIVE PERCENT: 14 % (ref 20–42)
MCH RBC QN AUTO: 32.8 PG (ref 26–35)
MCHC RBC AUTO-ENTMCNC: 32 G/DL (ref 32–34.5)
MCV RBC AUTO: 102.6 FL (ref 80–99.9)
METHADONE UR QL: NEGATIVE
MONOCYTES NFR BLD: 0.09 K/UL (ref 0.1–0.95)
MONOCYTES NFR BLD: 7 % (ref 2–12)
NEUTROPHILS NFR BLD: 77 % (ref 43–80)
NEUTS SEG NFR BLD: 1.05 K/UL (ref 1.8–7.3)
NITRITE UR QL STRIP: NEGATIVE
OPIATES UR QL SCN: NEGATIVE
OXYCODONE UR QL SCN: NEGATIVE
PCP UR QL SCN: NEGATIVE
PH UR STRIP: 6 [PH] (ref 5–8)
PLATELET # BLD AUTO: 23 K/UL (ref 130–450)
PLATELET CONFIRMATION: NORMAL
PMV BLD AUTO: 13 FL (ref 7–12)
POTASSIUM SERPL-SCNC: 5.2 MMOL/L (ref 3.5–5)
PROT SERPL-MCNC: 8.3 G/DL (ref 6.4–8.3)
PROT UR STRIP-MCNC: NEGATIVE MG/DL
PROTHROMBIN TIME: 16.7 SEC (ref 9.3–12.4)
RBC # BLD AUTO: 1.95 M/UL (ref 3.5–5.5)
RBC # BLD: ABNORMAL 10*6/UL
RBC # BLD: ABNORMAL 10*6/UL
RBC #/AREA URNS HPF: ABNORMAL /HPF
SALICYLATES SERPL-MCNC: <0.3 MG/DL (ref 0–30)
SODIUM SERPL-SCNC: 131 MMOL/L (ref 132–146)
SP GR UR STRIP: 1.01 (ref 1–1.03)
TEST INFORMATION: NORMAL
TOXIC TRICYCLIC SC,BLOOD: NEGATIVE
TROPONIN I SERPL HS-MCNC: 26 NG/L (ref 0–9)
UROBILINOGEN UR STRIP-ACNC: 0.2 EU/DL (ref 0–1)
WBC #/AREA URNS HPF: ABNORMAL /HPF
WBC OTHER # BLD: 1.4 K/UL (ref 4.5–11.5)

## 2025-01-31 PROCEDURE — 84484 ASSAY OF TROPONIN QUANT: CPT

## 2025-01-31 PROCEDURE — 87086 URINE CULTURE/COLONY COUNT: CPT

## 2025-01-31 PROCEDURE — 74018 RADEX ABDOMEN 1 VIEW: CPT

## 2025-01-31 PROCEDURE — 85610 PROTHROMBIN TIME: CPT

## 2025-01-31 PROCEDURE — 99222 1ST HOSP IP/OBS MODERATE 55: CPT | Performed by: FAMILY MEDICINE

## 2025-01-31 PROCEDURE — 82140 ASSAY OF AMMONIA: CPT

## 2025-01-31 PROCEDURE — 1200000000 HC SEMI PRIVATE

## 2025-01-31 PROCEDURE — 80179 DRUG ASSAY SALICYLATE: CPT

## 2025-01-31 PROCEDURE — 80307 DRUG TEST PRSMV CHEM ANLYZR: CPT

## 2025-01-31 PROCEDURE — 86850 RBC ANTIBODY SCREEN: CPT

## 2025-01-31 PROCEDURE — 6370000000 HC RX 637 (ALT 250 FOR IP): Performed by: FAMILY MEDICINE

## 2025-01-31 PROCEDURE — 2580000003 HC RX 258: Performed by: FAMILY MEDICINE

## 2025-01-31 PROCEDURE — 80143 DRUG ASSAY ACETAMINOPHEN: CPT

## 2025-01-31 PROCEDURE — 82962 GLUCOSE BLOOD TEST: CPT

## 2025-01-31 PROCEDURE — 6360000002 HC RX W HCPCS: Performed by: STUDENT IN AN ORGANIZED HEALTH CARE EDUCATION/TRAINING PROGRAM

## 2025-01-31 PROCEDURE — 86923 COMPATIBILITY TEST ELECTRIC: CPT

## 2025-01-31 PROCEDURE — 96374 THER/PROPH/DIAG INJ IV PUSH: CPT

## 2025-01-31 PROCEDURE — 86900 BLOOD TYPING SEROLOGIC ABO: CPT

## 2025-01-31 PROCEDURE — 99285 EMERGENCY DEPT VISIT HI MDM: CPT

## 2025-01-31 PROCEDURE — 70450 CT HEAD/BRAIN W/O DYE: CPT

## 2025-01-31 PROCEDURE — 30233N1 TRANSFUSION OF NONAUTOLOGOUS RED BLOOD CELLS INTO PERIPHERAL VEIN, PERCUTANEOUS APPROACH: ICD-10-PCS | Performed by: FAMILY MEDICINE

## 2025-01-31 PROCEDURE — 6360000002 HC RX W HCPCS: Performed by: FAMILY MEDICINE

## 2025-01-31 PROCEDURE — 81001 URINALYSIS AUTO W/SCOPE: CPT

## 2025-01-31 PROCEDURE — 74176 CT ABD & PELVIS W/O CONTRAST: CPT

## 2025-01-31 PROCEDURE — 80053 COMPREHEN METABOLIC PANEL: CPT

## 2025-01-31 PROCEDURE — P9016 RBC LEUKOCYTES REDUCED: HCPCS

## 2025-01-31 PROCEDURE — 83605 ASSAY OF LACTIC ACID: CPT

## 2025-01-31 PROCEDURE — 2580000003 HC RX 258: Performed by: STUDENT IN AN ORGANIZED HEALTH CARE EDUCATION/TRAINING PROGRAM

## 2025-01-31 PROCEDURE — 85025 COMPLETE CBC W/AUTO DIFF WBC: CPT

## 2025-01-31 PROCEDURE — 71045 X-RAY EXAM CHEST 1 VIEW: CPT

## 2025-01-31 PROCEDURE — 86901 BLOOD TYPING SEROLOGIC RH(D): CPT

## 2025-01-31 PROCEDURE — 6370000000 HC RX 637 (ALT 250 FOR IP): Performed by: STUDENT IN AN ORGANIZED HEALTH CARE EDUCATION/TRAINING PROGRAM

## 2025-01-31 PROCEDURE — G0480 DRUG TEST DEF 1-7 CLASSES: HCPCS

## 2025-01-31 PROCEDURE — 87040 BLOOD CULTURE FOR BACTERIA: CPT

## 2025-01-31 PROCEDURE — 2500000003 HC RX 250 WO HCPCS: Performed by: FAMILY MEDICINE

## 2025-01-31 RX ORDER — ONDANSETRON 2 MG/ML
4 INJECTION INTRAMUSCULAR; INTRAVENOUS EVERY 6 HOURS PRN
Status: DISCONTINUED | OUTPATIENT
Start: 2025-01-31 | End: 2025-02-05 | Stop reason: HOSPADM

## 2025-01-31 RX ORDER — LACTULOSE 10 G/15ML
20 SOLUTION ORAL; RECTAL 3 TIMES DAILY
Status: DISCONTINUED | OUTPATIENT
Start: 2025-01-31 | End: 2025-02-02

## 2025-01-31 RX ORDER — POTASSIUM CHLORIDE 1500 MG/1
40 TABLET, EXTENDED RELEASE ORAL PRN
Status: DISCONTINUED | OUTPATIENT
Start: 2025-01-31 | End: 2025-02-05 | Stop reason: HOSPADM

## 2025-01-31 RX ORDER — POTASSIUM CHLORIDE 7.45 MG/ML
10 INJECTION INTRAVENOUS PRN
Status: DISCONTINUED | OUTPATIENT
Start: 2025-01-31 | End: 2025-02-05 | Stop reason: HOSPADM

## 2025-01-31 RX ORDER — LACTULOSE 10 G/15ML
30 SOLUTION ORAL ONCE
Status: COMPLETED | OUTPATIENT
Start: 2025-01-31 | End: 2025-01-31

## 2025-01-31 RX ORDER — SODIUM CHLORIDE 0.9 % (FLUSH) 0.9 %
5-40 SYRINGE (ML) INJECTION PRN
Status: DISCONTINUED | OUTPATIENT
Start: 2025-01-31 | End: 2025-02-05 | Stop reason: HOSPADM

## 2025-01-31 RX ORDER — CARVEDILOL 3.12 MG/1
3.12 TABLET ORAL DAILY
Status: DISCONTINUED | OUTPATIENT
Start: 2025-02-01 | End: 2025-02-05 | Stop reason: HOSPADM

## 2025-01-31 RX ORDER — ACETAMINOPHEN 500 MG
500 TABLET ORAL EVERY 6 HOURS PRN
Status: DISCONTINUED | OUTPATIENT
Start: 2025-01-31 | End: 2025-02-05 | Stop reason: HOSPADM

## 2025-01-31 RX ORDER — ONDANSETRON 4 MG/1
4 TABLET, ORALLY DISINTEGRATING ORAL EVERY 8 HOURS PRN
Status: DISCONTINUED | OUTPATIENT
Start: 2025-01-31 | End: 2025-02-05 | Stop reason: HOSPADM

## 2025-01-31 RX ORDER — INSULIN LISPRO 100 [IU]/ML
0-8 INJECTION, SOLUTION INTRAVENOUS; SUBCUTANEOUS
Status: DISCONTINUED | OUTPATIENT
Start: 2025-01-31 | End: 2025-02-05 | Stop reason: HOSPADM

## 2025-01-31 RX ORDER — DEXTROSE MONOHYDRATE 100 MG/ML
INJECTION, SOLUTION INTRAVENOUS CONTINUOUS PRN
Status: DISCONTINUED | OUTPATIENT
Start: 2025-01-31 | End: 2025-02-05 | Stop reason: HOSPADM

## 2025-01-31 RX ORDER — SODIUM CHLORIDE 9 MG/ML
INJECTION, SOLUTION INTRAVENOUS PRN
Status: DISCONTINUED | OUTPATIENT
Start: 2025-01-31 | End: 2025-02-05 | Stop reason: HOSPADM

## 2025-01-31 RX ORDER — GLUCAGON 1 MG/ML
1 KIT INJECTION PRN
Status: DISCONTINUED | OUTPATIENT
Start: 2025-01-31 | End: 2025-02-05 | Stop reason: HOSPADM

## 2025-01-31 RX ORDER — 0.9 % SODIUM CHLORIDE 0.9 %
500 INTRAVENOUS SOLUTION INTRAVENOUS ONCE
Status: COMPLETED | OUTPATIENT
Start: 2025-01-31 | End: 2025-01-31

## 2025-01-31 RX ORDER — ACETAMINOPHEN 650 MG/1
325 SUPPOSITORY RECTAL EVERY 6 HOURS PRN
Status: DISCONTINUED | OUTPATIENT
Start: 2025-01-31 | End: 2025-02-05 | Stop reason: HOSPADM

## 2025-01-31 RX ORDER — SODIUM CHLORIDE 0.9 % (FLUSH) 0.9 %
5-40 SYRINGE (ML) INJECTION EVERY 12 HOURS SCHEDULED
Status: DISCONTINUED | OUTPATIENT
Start: 2025-01-31 | End: 2025-02-05 | Stop reason: HOSPADM

## 2025-01-31 RX ORDER — POLYETHYLENE GLYCOL 3350 17 G/17G
17 POWDER, FOR SOLUTION ORAL DAILY PRN
Status: DISCONTINUED | OUTPATIENT
Start: 2025-01-31 | End: 2025-02-05 | Stop reason: HOSPADM

## 2025-01-31 RX ORDER — MAGNESIUM HYDROXIDE/ALUMINUM HYDROXICE/SIMETHICONE 120; 1200; 1200 MG/30ML; MG/30ML; MG/30ML
30 SUSPENSION ORAL EVERY 6 HOURS PRN
Status: DISCONTINUED | OUTPATIENT
Start: 2025-01-31 | End: 2025-02-05 | Stop reason: HOSPADM

## 2025-01-31 RX ORDER — INSULIN GLARGINE 100 [IU]/ML
38 INJECTION, SOLUTION SUBCUTANEOUS NIGHTLY
Status: DISCONTINUED | OUTPATIENT
Start: 2025-01-31 | End: 2025-02-05 | Stop reason: HOSPADM

## 2025-01-31 RX ADMIN — SODIUM CHLORIDE 80 MG: 9 INJECTION, SOLUTION INTRAVENOUS at 21:25

## 2025-01-31 RX ADMIN — INSULIN HUMAN 3 UNITS: 100 INJECTION, SOLUTION PARENTERAL at 21:21

## 2025-01-31 RX ADMIN — LACTULOSE 30 G: 20 SOLUTION ORAL at 22:13

## 2025-01-31 RX ADMIN — SODIUM CHLORIDE 500 ML: 9 INJECTION, SOLUTION INTRAVENOUS at 18:54

## 2025-01-31 RX ADMIN — INSULIN LISPRO 8 UNITS: 100 INJECTION, SOLUTION INTRAVENOUS; SUBCUTANEOUS at 22:11

## 2025-01-31 RX ADMIN — CEFEPIME 2000 MG: 2 INJECTION, POWDER, FOR SOLUTION INTRAVENOUS at 22:11

## 2025-01-31 RX ADMIN — INSULIN GLARGINE 38 UNITS: 100 INJECTION, SOLUTION SUBCUTANEOUS at 22:11

## 2025-01-31 RX ADMIN — SODIUM CHLORIDE, PRESERVATIVE FREE 5 ML: 5 INJECTION INTRAVENOUS at 22:22

## 2025-01-31 NOTE — ED PROVIDER NOTES
Lisa Hunt is a 64-year-old female present emergency department with concern for altered mental status.  Patient has had 1 day of increased confusion brought in from home.  Patient does have a history of hyperlipidemia and does have a history of lung cancer and cirrhosis EMS also reported patient has questionable liver mets.  Patient cannot provide history.    The history is provided by the EMS personnel and medical records.        Review of Systems   Unable to perform ROS: Acuity of condition        Physical Exam  Vitals and nursing note reviewed.   Constitutional:       Appearance: Normal appearance. She is ill-appearing. She is not toxic-appearing.   HENT:      Head: Normocephalic and atraumatic.   Eyes:      General: Scleral icterus present.      Conjunctiva/sclera: Conjunctivae normal.      Pupils: Pupils are equal, round, and reactive to light.   Cardiovascular:      Rate and Rhythm: Normal rate and regular rhythm.   Pulmonary:      Effort: Pulmonary effort is normal.      Breath sounds: Normal breath sounds.   Abdominal:      General: Bowel sounds are normal. There is no distension.      Palpations: Abdomen is soft.      Tenderness: There is no abdominal tenderness. There is no guarding or rebound.   Musculoskeletal:      Cervical back: Normal range of motion and neck supple. No rigidity or tenderness. No muscular tenderness.      Right lower leg: No edema.      Left lower leg: No edema.   Skin:     General: Skin is warm and dry.      Capillary Refill: Capillary refill takes less than 2 seconds.      Coloration: Skin is jaundiced. Skin is not pale.      Findings: No erythema or rash.   Neurological:      Mental Status: She is alert and oriented to person, place, and time.   Psychiatric:         Mood and Affect: Mood normal.        Procedures     MDM  Number of Diagnoses or Management Options  Altered mental status, unspecified altered mental status type  Anemia, unspecified type  Hepatic

## 2025-02-01 LAB
ALBUMIN SERPL-MCNC: 3.2 G/DL (ref 3.5–5.2)
ALP SERPL-CCNC: 158 U/L (ref 35–104)
ALT SERPL-CCNC: 20 U/L (ref 0–32)
AMMONIA PLAS-SCNC: 96 UMOL/L (ref 11–51)
ANION GAP SERPL CALCULATED.3IONS-SCNC: 8 MMOL/L (ref 7–16)
AST SERPL-CCNC: 36 U/L (ref 0–31)
B-OH-BUTYR SERPL-MCNC: 0.11 MMOL/L (ref 0.02–0.27)
BASOPHILS # BLD: 0.01 K/UL (ref 0–0.2)
BASOPHILS NFR BLD: 1 % (ref 0–2)
BILIRUB SERPL-MCNC: 5.3 MG/DL (ref 0–1.2)
BUN SERPL-MCNC: 36 MG/DL (ref 6–23)
CALCIUM SERPL-MCNC: 9.4 MG/DL (ref 8.6–10.2)
CHLORIDE SERPL-SCNC: 100 MMOL/L (ref 98–107)
CO2 SERPL-SCNC: 25 MMOL/L (ref 22–29)
CREAT SERPL-MCNC: 1.3 MG/DL (ref 0.5–1)
EOSINOPHIL # BLD: 0.05 K/UL (ref 0.05–0.5)
EOSINOPHILS RELATIVE PERCENT: 3 % (ref 0–6)
ERYTHROCYTE [DISTWIDTH] IN BLOOD BY AUTOMATED COUNT: 19 % (ref 11.5–15)
FERRITIN SERPL-MCNC: 164 NG/ML
FOLATE SERPL-MCNC: 10.2 NG/ML (ref 4.8–24.2)
GFR, ESTIMATED: 46 ML/MIN/1.73M2
GLUCOSE BLD-MCNC: 292 MG/DL (ref 74–99)
GLUCOSE BLD-MCNC: 302 MG/DL (ref 74–99)
GLUCOSE BLD-MCNC: 341 MG/DL (ref 74–99)
GLUCOSE BLD-MCNC: 345 MG/DL (ref 74–99)
GLUCOSE BLD-MCNC: 362 MG/DL (ref 74–99)
GLUCOSE BLD-MCNC: 397 MG/DL (ref 74–99)
GLUCOSE SERPL-MCNC: 291 MG/DL (ref 74–99)
HBA1C MFR BLD: 6.7 % (ref 4–5.6)
HCT VFR BLD AUTO: 19.4 % (ref 34–48)
HCT VFR BLD AUTO: 24.4 % (ref 34–48)
HGB BLD-MCNC: 6.4 G/DL (ref 11.5–15.5)
HGB BLD-MCNC: 8 G/DL (ref 11.5–15.5)
IMM GRANULOCYTES # BLD AUTO: <0.03 K/UL (ref 0–0.58)
IMM GRANULOCYTES NFR BLD: 0 % (ref 0–5)
IRON SATN MFR SERPL: ABNORMAL % (ref 15–50)
IRON SERPL-MCNC: 197 UG/DL (ref 37–145)
LYMPHOCYTES NFR BLD: 0.27 K/UL (ref 1.5–4)
LYMPHOCYTES RELATIVE PERCENT: 16 % (ref 20–42)
MAGNESIUM SERPL-MCNC: 1.7 MG/DL (ref 1.6–2.6)
MCH RBC QN AUTO: 31.1 PG (ref 26–35)
MCHC RBC AUTO-ENTMCNC: 32.8 G/DL (ref 32–34.5)
MCV RBC AUTO: 94.9 FL (ref 80–99.9)
MONOCYTES NFR BLD: 0.14 K/UL (ref 0.1–0.95)
MONOCYTES NFR BLD: 8 % (ref 2–12)
NEUTROPHILS NFR BLD: 72 % (ref 43–80)
NEUTS SEG NFR BLD: 1.22 K/UL (ref 1.8–7.3)
PHOSPHATE SERPL-MCNC: 3 MG/DL (ref 2.5–4.5)
PLATELET CONFIRMATION: NORMAL
PLATELET, FLUORESCENCE: 19 K/UL (ref 130–450)
PMV BLD AUTO: 12.3 FL (ref 7–12)
POTASSIUM SERPL-SCNC: 4.4 MMOL/L (ref 3.5–5)
PROT SERPL-MCNC: 7.7 G/DL (ref 6.4–8.3)
RBC # BLD AUTO: 2.57 M/UL (ref 3.5–5.5)
RBC # BLD: ABNORMAL 10*6/UL
SODIUM SERPL-SCNC: 133 MMOL/L (ref 132–146)
TIBC SERPL-MCNC: ABNORMAL UG/DL (ref 250–450)
VIT B12 SERPL-MCNC: 806 PG/ML (ref 211–946)
WBC OTHER # BLD: 1.7 K/UL (ref 4.5–11.5)

## 2025-02-01 PROCEDURE — 6360000002 HC RX W HCPCS: Performed by: FAMILY MEDICINE

## 2025-02-01 PROCEDURE — 1200000000 HC SEMI PRIVATE

## 2025-02-01 PROCEDURE — 85018 HEMOGLOBIN: CPT

## 2025-02-01 PROCEDURE — 85025 COMPLETE CBC W/AUTO DIFF WBC: CPT

## 2025-02-01 PROCEDURE — 2580000003 HC RX 258: Performed by: FAMILY MEDICINE

## 2025-02-01 PROCEDURE — 6370000000 HC RX 637 (ALT 250 FOR IP): Performed by: FAMILY MEDICINE

## 2025-02-01 PROCEDURE — 82746 ASSAY OF FOLIC ACID SERUM: CPT

## 2025-02-01 PROCEDURE — P9016 RBC LEUKOCYTES REDUCED: HCPCS

## 2025-02-01 PROCEDURE — 82728 ASSAY OF FERRITIN: CPT

## 2025-02-01 PROCEDURE — 82140 ASSAY OF AMMONIA: CPT

## 2025-02-01 PROCEDURE — 83036 HEMOGLOBIN GLYCOSYLATED A1C: CPT

## 2025-02-01 PROCEDURE — 2500000003 HC RX 250 WO HCPCS: Performed by: FAMILY MEDICINE

## 2025-02-01 PROCEDURE — 83735 ASSAY OF MAGNESIUM: CPT

## 2025-02-01 PROCEDURE — 82607 VITAMIN B-12: CPT

## 2025-02-01 PROCEDURE — 80053 COMPREHEN METABOLIC PANEL: CPT

## 2025-02-01 PROCEDURE — 83540 ASSAY OF IRON: CPT

## 2025-02-01 PROCEDURE — 82010 KETONE BODYS QUAN: CPT

## 2025-02-01 PROCEDURE — 84100 ASSAY OF PHOSPHORUS: CPT

## 2025-02-01 PROCEDURE — 99223 1ST HOSP IP/OBS HIGH 75: CPT | Performed by: STUDENT IN AN ORGANIZED HEALTH CARE EDUCATION/TRAINING PROGRAM

## 2025-02-01 PROCEDURE — 85014 HEMATOCRIT: CPT

## 2025-02-01 PROCEDURE — 99232 SBSQ HOSP IP/OBS MODERATE 35: CPT

## 2025-02-01 PROCEDURE — 36415 COLL VENOUS BLD VENIPUNCTURE: CPT

## 2025-02-01 PROCEDURE — 82962 GLUCOSE BLOOD TEST: CPT

## 2025-02-01 PROCEDURE — 83550 IRON BINDING TEST: CPT

## 2025-02-01 RX ORDER — SODIUM CHLORIDE 9 MG/ML
INJECTION, SOLUTION INTRAVENOUS PRN
Status: DISCONTINUED | OUTPATIENT
Start: 2025-02-01 | End: 2025-02-05 | Stop reason: HOSPADM

## 2025-02-01 RX ADMIN — INSULIN LISPRO 8 UNITS: 100 INJECTION, SOLUTION INTRAVENOUS; SUBCUTANEOUS at 21:57

## 2025-02-01 RX ADMIN — PANTOPRAZOLE SODIUM 40 MG: 40 INJECTION, POWDER, FOR SOLUTION INTRAVENOUS at 21:57

## 2025-02-01 RX ADMIN — INSULIN LISPRO 6 UNITS: 100 INJECTION, SOLUTION INTRAVENOUS; SUBCUTANEOUS at 16:28

## 2025-02-01 RX ADMIN — LACTULOSE 20 G: 10 SOLUTION ORAL; RECTAL at 08:13

## 2025-02-01 RX ADMIN — INSULIN GLARGINE 38 UNITS: 100 INJECTION, SOLUTION SUBCUTANEOUS at 21:57

## 2025-02-01 RX ADMIN — PANTOPRAZOLE SODIUM 40 MG: 40 INJECTION, POWDER, FOR SOLUTION INTRAVENOUS at 08:13

## 2025-02-01 RX ADMIN — INSULIN LISPRO 6 UNITS: 100 INJECTION, SOLUTION INTRAVENOUS; SUBCUTANEOUS at 12:39

## 2025-02-01 RX ADMIN — CARVEDILOL 3.12 MG: 6.25 TABLET, FILM COATED ORAL at 08:13

## 2025-02-01 RX ADMIN — RIFAXIMIN 400 MG: 200 TABLET ORAL at 08:13

## 2025-02-01 RX ADMIN — SODIUM CHLORIDE, PRESERVATIVE FREE 10 ML: 5 INJECTION INTRAVENOUS at 21:58

## 2025-02-01 RX ADMIN — INSULIN LISPRO 6 UNITS: 100 INJECTION, SOLUTION INTRAVENOUS; SUBCUTANEOUS at 06:39

## 2025-02-01 RX ADMIN — LACTULOSE 20 G: 10 SOLUTION ORAL; RECTAL at 21:58

## 2025-02-01 RX ADMIN — SODIUM CHLORIDE, PRESERVATIVE FREE 10 ML: 5 INJECTION INTRAVENOUS at 09:54

## 2025-02-01 RX ADMIN — RIFAXIMIN 400 MG: 200 TABLET ORAL at 16:28

## 2025-02-01 RX ADMIN — RIFAXIMIN 400 MG: 200 TABLET ORAL at 22:27

## 2025-02-01 ASSESSMENT — PAIN SCALES - GENERAL: PAINLEVEL_OUTOF10: 0

## 2025-02-01 NOTE — H&P
85 CT  head revealed    no acute process CXR negative  ETOH negative tylenol and  salicylic acid negative . Patient has a history of hepatocellular carcinoma  Admit inpatient vitals telemetry fall precaution neuro checks  UDS UA Lactulose 20 g TID  for 3 BMs daily Rifaximin 400 mg TID   GI consult  Child Laird class C     Hepatocellular carcinoma : patient is s/p Y90 tx 11/14 . Patient has not received systemic therapy . Patient was lost to follow up  CT A/P reveals lesion right hepatic lobe disease progression not excluded  Oncology consulted     Acute on chronic anemia/GAVE  : HGB 6.4 , it was 7.6 on 1/11  Patient reports no rectal bleeding or hematemesis transfuse 1PRBCs GI consult C/w  PPI BID trend H/H        Pancytopenia :  sec to decompensated cirrhosis PLT 23 WBC 1.4 -ANC 1078  transfuse HGB < 7 and PLT < 34328    Ascites : 2/2 to cirrhosis  as seen on CT moderate volume Last paracentesis was 12/16 with removal of 4L . On last admission there was not enough fluid to be drained IR consult    History of esophageal varices  : EGD 8/6/2024 revealed grade II varices in lower 1/3 of esophagus s/p 1 band     History of portal vein thrombosis extending into maojr intrahepatic portal branches, splenic vein and superior portion of superior mesenteric vein     Lactic acidosis : sec to liver disease will trend     DONNA on CKD stage 3:  BUN 37 Scr 1.5  likely hepatorenal     IDDM with hyperglycemia :  anion gap 11 will check beat hydroxy no DKA C/we Lantus 38 units nightly poct sliding  scale     Hyponatremia : sec to hyperglycemia     Hyperkalemia : will repeat , 5.2     Elevated Troponin : chronic  reports no chest pain     Hypertension : Coreg 3.125 mg daily held diuretics due to DONNA                    Code Status: Full   DVT prophylaxis: SCds         NOTE: This report was transcribed using voice recognition software. Every effort was made to ensure accuracy; however, inadvertent computerized transcription errors

## 2025-02-01 NOTE — ED NOTES
Patient unable to consent for blood transfusion. Patients daughterLisa called. Two RNS confirmed consent over the phone.

## 2025-02-01 NOTE — CONSENT
Informed Consent for Blood Component Transfusion Note    I have discussed with the {Contact:[id number]} the rationale for blood component transfusion; its benefits in treating or preventing fatigue, organ damage, or death; and its risk which includes mild transfusion reactions, rare risk of blood borne infection, or more serious but rare reactions. I have discussed the alternatives to transfusion, including the risk and consequences of not receiving transfusion. The {Contact:[phone]} had an opportunity to ask questions and had agreed to proceed with transfusion of blood components.    Electronically signed by Guerline Carr MD on 2/1/25 at 2:21 AM EST

## 2025-02-02 LAB
ALBUMIN SERPL-MCNC: 2.9 G/DL (ref 3.5–5.2)
ALP SERPL-CCNC: 146 U/L (ref 35–104)
ALT SERPL-CCNC: 18 U/L (ref 0–32)
AMMONIA PLAS-SCNC: 140 UMOL/L (ref 11–51)
ANION GAP SERPL CALCULATED.3IONS-SCNC: 11 MMOL/L (ref 7–16)
AST SERPL-CCNC: 35 U/L (ref 0–31)
BASOPHILS # BLD: 0.01 K/UL (ref 0–0.2)
BASOPHILS NFR BLD: 1 % (ref 0–2)
BILIRUB SERPL-MCNC: 2.7 MG/DL (ref 0–1.2)
BUN SERPL-MCNC: 39 MG/DL (ref 6–23)
CALCIUM SERPL-MCNC: 8.7 MG/DL (ref 8.6–10.2)
CHLORIDE SERPL-SCNC: 101 MMOL/L (ref 98–107)
CO2 SERPL-SCNC: 23 MMOL/L (ref 22–29)
CREAT SERPL-MCNC: 1.5 MG/DL (ref 0.5–1)
EOSINOPHIL # BLD: 0.05 K/UL (ref 0.05–0.5)
EOSINOPHILS RELATIVE PERCENT: 4 % (ref 0–6)
ERYTHROCYTE [DISTWIDTH] IN BLOOD BY AUTOMATED COUNT: 19.8 % (ref 11.5–15)
GFR, ESTIMATED: 40 ML/MIN/1.73M2
GLUCOSE BLD-MCNC: 258 MG/DL (ref 74–99)
GLUCOSE BLD-MCNC: 267 MG/DL (ref 74–99)
GLUCOSE BLD-MCNC: 298 MG/DL (ref 74–99)
GLUCOSE BLD-MCNC: 303 MG/DL (ref 74–99)
GLUCOSE BLD-MCNC: 314 MG/DL (ref 74–99)
GLUCOSE BLD-MCNC: 318 MG/DL (ref 74–99)
GLUCOSE SERPL-MCNC: 255 MG/DL (ref 74–99)
HCT VFR BLD AUTO: 21.5 % (ref 34–48)
HCT VFR BLD AUTO: 23.1 % (ref 34–48)
HCT VFR BLD AUTO: 26 % (ref 34–48)
HEMOCCULT STL QL: ABNORMAL
HGB BLD-MCNC: 7.2 G/DL (ref 11.5–15.5)
HGB BLD-MCNC: 7.5 G/DL (ref 11.5–15.5)
HGB BLD-MCNC: 8.6 G/DL (ref 11.5–15.5)
IMM GRANULOCYTES # BLD AUTO: <0.03 K/UL (ref 0–0.58)
IMM GRANULOCYTES NFR BLD: 0 % (ref 0–5)
LYMPHOCYTES NFR BLD: 0.33 K/UL (ref 1.5–4)
LYMPHOCYTES RELATIVE PERCENT: 24 % (ref 20–42)
MAGNESIUM SERPL-MCNC: 1.6 MG/DL (ref 1.6–2.6)
MCH RBC QN AUTO: 32.3 PG (ref 26–35)
MCHC RBC AUTO-ENTMCNC: 33.5 G/DL (ref 32–34.5)
MCV RBC AUTO: 96.4 FL (ref 80–99.9)
MICROORGANISM SPEC CULT: NO GROWTH
MONOCYTES NFR BLD: 0.1 K/UL (ref 0.1–0.95)
MONOCYTES NFR BLD: 7 % (ref 2–12)
NEUTROPHILS NFR BLD: 65 % (ref 43–80)
NEUTS SEG NFR BLD: 0.9 K/UL (ref 1.8–7.3)
PHOSPHATE SERPL-MCNC: 2.7 MG/DL (ref 2.5–4.5)
PLATELET CONFIRMATION: NORMAL
PLATELET, FLUORESCENCE: 19 K/UL (ref 130–450)
PMV BLD AUTO: 12.9 FL (ref 7–12)
POTASSIUM SERPL-SCNC: 4.7 MMOL/L (ref 3.5–5)
PROT SERPL-MCNC: 6.9 G/DL (ref 6.4–8.3)
RBC # BLD AUTO: 2.23 M/UL (ref 3.5–5.5)
RBC # BLD: ABNORMAL 10*6/UL
SERVICE CMNT-IMP: NORMAL
SODIUM SERPL-SCNC: 135 MMOL/L (ref 132–146)
SPECIMEN DESCRIPTION: NORMAL
WBC OTHER # BLD: 1.4 K/UL (ref 4.5–11.5)

## 2025-02-02 PROCEDURE — 85014 HEMATOCRIT: CPT

## 2025-02-02 PROCEDURE — 6360000002 HC RX W HCPCS: Performed by: FAMILY MEDICINE

## 2025-02-02 PROCEDURE — 84100 ASSAY OF PHOSPHORUS: CPT

## 2025-02-02 PROCEDURE — 85025 COMPLETE CBC W/AUTO DIFF WBC: CPT

## 2025-02-02 PROCEDURE — P9016 RBC LEUKOCYTES REDUCED: HCPCS

## 2025-02-02 PROCEDURE — 6370000000 HC RX 637 (ALT 250 FOR IP)

## 2025-02-02 PROCEDURE — 82140 ASSAY OF AMMONIA: CPT

## 2025-02-02 PROCEDURE — 85018 HEMOGLOBIN: CPT

## 2025-02-02 PROCEDURE — 82962 GLUCOSE BLOOD TEST: CPT

## 2025-02-02 PROCEDURE — 1200000000 HC SEMI PRIVATE

## 2025-02-02 PROCEDURE — 80053 COMPREHEN METABOLIC PANEL: CPT

## 2025-02-02 PROCEDURE — 99232 SBSQ HOSP IP/OBS MODERATE 35: CPT

## 2025-02-02 PROCEDURE — 36430 TRANSFUSION BLD/BLD COMPNT: CPT

## 2025-02-02 PROCEDURE — 2580000003 HC RX 258: Performed by: FAMILY MEDICINE

## 2025-02-02 PROCEDURE — 2500000003 HC RX 250 WO HCPCS: Performed by: FAMILY MEDICINE

## 2025-02-02 PROCEDURE — 83735 ASSAY OF MAGNESIUM: CPT

## 2025-02-02 PROCEDURE — 36415 COLL VENOUS BLD VENIPUNCTURE: CPT

## 2025-02-02 PROCEDURE — 6370000000 HC RX 637 (ALT 250 FOR IP): Performed by: FAMILY MEDICINE

## 2025-02-02 RX ORDER — SODIUM CHLORIDE 9 MG/ML
INJECTION, SOLUTION INTRAVENOUS PRN
Status: DISCONTINUED | OUTPATIENT
Start: 2025-02-02 | End: 2025-02-05 | Stop reason: HOSPADM

## 2025-02-02 RX ORDER — LACTULOSE 10 G/15ML
30 SOLUTION ORAL 3 TIMES DAILY
Status: DISCONTINUED | OUTPATIENT
Start: 2025-02-02 | End: 2025-02-05 | Stop reason: HOSPADM

## 2025-02-02 RX ADMIN — LACTULOSE 30 G: 10 SOLUTION ORAL; RECTAL at 13:49

## 2025-02-02 RX ADMIN — RIFAXIMIN 400 MG: 200 TABLET ORAL at 13:49

## 2025-02-02 RX ADMIN — RIFAXIMIN 400 MG: 200 TABLET ORAL at 09:15

## 2025-02-02 RX ADMIN — INSULIN LISPRO 6 UNITS: 100 INJECTION, SOLUTION INTRAVENOUS; SUBCUTANEOUS at 20:05

## 2025-02-02 RX ADMIN — PANTOPRAZOLE SODIUM 40 MG: 40 INJECTION, POWDER, FOR SOLUTION INTRAVENOUS at 09:15

## 2025-02-02 RX ADMIN — LACTULOSE 20 G: 10 SOLUTION ORAL; RECTAL at 09:15

## 2025-02-02 RX ADMIN — POLYETHYLENE GLYCOL 3350 17 G: 17 POWDER, FOR SOLUTION ORAL at 12:14

## 2025-02-02 RX ADMIN — SODIUM CHLORIDE, PRESERVATIVE FREE 10 ML: 5 INJECTION INTRAVENOUS at 20:05

## 2025-02-02 RX ADMIN — INSULIN GLARGINE 38 UNITS: 100 INJECTION, SOLUTION SUBCUTANEOUS at 20:05

## 2025-02-02 RX ADMIN — PANTOPRAZOLE SODIUM 40 MG: 40 INJECTION, POWDER, FOR SOLUTION INTRAVENOUS at 20:04

## 2025-02-02 RX ADMIN — LACTULOSE 30 G: 10 SOLUTION ORAL; RECTAL at 20:04

## 2025-02-02 RX ADMIN — CARVEDILOL 3.12 MG: 6.25 TABLET, FILM COATED ORAL at 09:15

## 2025-02-02 RX ADMIN — INSULIN LISPRO 4 UNITS: 100 INJECTION, SOLUTION INTRAVENOUS; SUBCUTANEOUS at 12:14

## 2025-02-02 RX ADMIN — SODIUM CHLORIDE, PRESERVATIVE FREE 10 ML: 5 INJECTION INTRAVENOUS at 09:15

## 2025-02-02 RX ADMIN — INSULIN LISPRO 6 UNITS: 100 INJECTION, SOLUTION INTRAVENOUS; SUBCUTANEOUS at 09:15

## 2025-02-02 RX ADMIN — RIFAXIMIN 400 MG: 200 TABLET ORAL at 20:04

## 2025-02-02 ASSESSMENT — PAIN SCALES - GENERAL: PAINLEVEL_OUTOF10: 0

## 2025-02-03 ENCOUNTER — APPOINTMENT (OUTPATIENT)
Dept: INTERVENTIONAL RADIOLOGY/VASCULAR | Age: 65
DRG: 442 | End: 2025-02-03
Payer: MEDICARE

## 2025-02-03 PROBLEM — K74.60 ESOPHAGEAL VARICES IN CIRRHOSIS (HCC): Status: ACTIVE | Noted: 2025-02-03

## 2025-02-03 PROBLEM — I85.00 ESOPHAGEAL VARICES (HCC): Status: ACTIVE | Noted: 2025-01-31

## 2025-02-03 PROBLEM — I85.10 ESOPHAGEAL VARICES IN CIRRHOSIS (HCC): Status: ACTIVE | Noted: 2025-02-03

## 2025-02-03 LAB
ABO/RH: NORMAL
ALBUMIN SERPL-MCNC: 3.2 G/DL (ref 3.5–5.2)
ALP SERPL-CCNC: 166 U/L (ref 35–104)
ALT SERPL-CCNC: 22 U/L (ref 0–32)
AMMONIA PLAS-SCNC: 102 UMOL/L (ref 11–51)
AMMONIA PLAS-SCNC: 122 UMOL/L (ref 11–51)
ANION GAP SERPL CALCULATED.3IONS-SCNC: 16 MMOL/L (ref 7–16)
ANTIBODY SCREEN: NEGATIVE
APPEARANCE FLD: CLEAR
ARM BAND NUMBER: NORMAL
AST SERPL-CCNC: 48 U/L (ref 0–31)
BASOPHILS # BLD: 0.02 K/UL (ref 0–0.2)
BASOPHILS NFR BLD: 2 % (ref 0–2)
BILIRUB SERPL-MCNC: 3 MG/DL (ref 0–1.2)
BLOOD BANK BLOOD PRODUCT EXPIRATION DATE: NORMAL
BLOOD BANK DISPENSE STATUS: NORMAL
BLOOD BANK ISBT PRODUCT BLOOD TYPE: 6200
BLOOD BANK ISBT PRODUCT BLOOD TYPE: 6200
BLOOD BANK ISBT PRODUCT BLOOD TYPE: 9500
BLOOD BANK PRODUCT CODE: NORMAL
BLOOD BANK SAMPLE EXPIRATION: NORMAL
BLOOD BANK UNIT TYPE AND RH: NORMAL
BODY FLD TYPE: NORMAL
BPU ID: NORMAL
BUN SERPL-MCNC: 38 MG/DL (ref 6–23)
CALCIUM SERPL-MCNC: 9.3 MG/DL (ref 8.6–10.2)
CHLORIDE SERPL-SCNC: 105 MMOL/L (ref 98–107)
CLOT CHECK: NORMAL
CO2 SERPL-SCNC: 20 MMOL/L (ref 22–29)
COLOR FLD: YELLOW
COMPONENT: NORMAL
CREAT SERPL-MCNC: 1.3 MG/DL (ref 0.5–1)
CROSSMATCH RESULT: NORMAL
EOSINOPHIL # BLD: 0.05 K/UL (ref 0.05–0.5)
EOSINOPHILS RELATIVE PERCENT: 4 % (ref 0–6)
ERYTHROCYTE [DISTWIDTH] IN BLOOD BY AUTOMATED COUNT: 18.8 % (ref 11.5–15)
GFR, ESTIMATED: 45 ML/MIN/1.73M2
GLUCOSE BLD-MCNC: 178 MG/DL (ref 74–99)
GLUCOSE BLD-MCNC: 196 MG/DL (ref 74–99)
GLUCOSE BLD-MCNC: 211 MG/DL (ref 74–99)
GLUCOSE BLD-MCNC: 283 MG/DL (ref 74–99)
GLUCOSE FLD-MCNC: 195 MG/DL
GLUCOSE SERPL-MCNC: 215 MG/DL (ref 74–99)
HCT VFR BLD AUTO: 24.8 % (ref 34–48)
HGB BLD-MCNC: 8.2 G/DL (ref 11.5–15.5)
INR PPP: 1.6
LDH FLD L TO P-CCNC: 74 U/L
LYMPHOCYTES NFR BLD: 0.23 K/UL (ref 1.5–4)
LYMPHOCYTES RELATIVE PERCENT: 18 % (ref 20–42)
MAGNESIUM SERPL-MCNC: 1.8 MG/DL (ref 1.6–2.6)
MCH RBC QN AUTO: 31.8 PG (ref 26–35)
MCHC RBC AUTO-ENTMCNC: 33.1 G/DL (ref 32–34.5)
MCV RBC AUTO: 96.1 FL (ref 80–99.9)
MONOCYTES NFR BLD: 0.02 K/UL (ref 0.1–0.95)
MONOCYTES NFR BLD: 2 % (ref 2–12)
MONOCYTES NFR FLD: 98 %
MYELOCYTES ABSOLUTE COUNT: 0.01 K/UL
MYELOCYTES: 1 %
NEUTROPHILS NFR BLD: 74 % (ref 43–80)
NEUTROPHILS NFR FLD: 2 %
NEUTS SEG NFR BLD: 0.96 K/UL (ref 1.8–7.3)
NUCLEATED RED BLOOD CELLS: 1 PER 100 WBC
PHOSPHATE SERPL-MCNC: 2.9 MG/DL (ref 2.5–4.5)
PLATELET # BLD AUTO: 23 K/UL (ref 130–450)
PLATELET CONFIRMATION: NORMAL
PMV BLD AUTO: 12.8 FL (ref 7–12)
POTASSIUM SERPL-SCNC: 4.3 MMOL/L (ref 3.5–5)
PROT FLD-MCNC: 2.3 G/DL
PROT SERPL-MCNC: 7.7 G/DL (ref 6.4–8.3)
PROTHROMBIN TIME: 17.3 SEC (ref 9.3–12.4)
RBC # BLD AUTO: 2.58 M/UL (ref 3.5–5.5)
RBC # BLD: ABNORMAL 10*6/UL
RBC # FLD: <2000 CELLS/UL
SODIUM SERPL-SCNC: 141 MMOL/L (ref 132–146)
SPECIMEN TYPE: NORMAL
TRANSFUSION STATUS: NORMAL
UNIT DIVISION: 0
UNIT ISSUE DATE/TIME: NORMAL
WBC # FLD: 129 CELLS/UL
WBC OTHER # BLD: 1.3 K/UL (ref 4.5–11.5)

## 2025-02-03 PROCEDURE — 36415 COLL VENOUS BLD VENIPUNCTURE: CPT

## 2025-02-03 PROCEDURE — 84157 ASSAY OF PROTEIN OTHER: CPT

## 2025-02-03 PROCEDURE — 6370000000 HC RX 637 (ALT 250 FOR IP)

## 2025-02-03 PROCEDURE — 6370000000 HC RX 637 (ALT 250 FOR IP): Performed by: FAMILY MEDICINE

## 2025-02-03 PROCEDURE — 89051 BODY FLUID CELL COUNT: CPT

## 2025-02-03 PROCEDURE — C1729 CATH, DRAINAGE: HCPCS

## 2025-02-03 PROCEDURE — 2580000003 HC RX 258: Performed by: FAMILY MEDICINE

## 2025-02-03 PROCEDURE — 49083 ABD PARACENTESIS W/IMAGING: CPT

## 2025-02-03 PROCEDURE — 84100 ASSAY OF PHOSPHORUS: CPT

## 2025-02-03 PROCEDURE — 83986 ASSAY PH BODY FLUID NOS: CPT

## 2025-02-03 PROCEDURE — 6360000002 HC RX W HCPCS: Performed by: FAMILY MEDICINE

## 2025-02-03 PROCEDURE — 6360000002 HC RX W HCPCS: Performed by: STUDENT IN AN ORGANIZED HEALTH CARE EDUCATION/TRAINING PROGRAM

## 2025-02-03 PROCEDURE — 85025 COMPLETE CBC W/AUTO DIFF WBC: CPT

## 2025-02-03 PROCEDURE — 82140 ASSAY OF AMMONIA: CPT

## 2025-02-03 PROCEDURE — 82945 GLUCOSE OTHER FLUID: CPT

## 2025-02-03 PROCEDURE — 0W9G30Z DRAINAGE OF PERITONEAL CAVITY WITH DRAINAGE DEVICE, PERCUTANEOUS APPROACH: ICD-10-PCS | Performed by: FAMILY MEDICINE

## 2025-02-03 PROCEDURE — 1200000000 HC SEMI PRIVATE

## 2025-02-03 PROCEDURE — 85610 PROTHROMBIN TIME: CPT

## 2025-02-03 PROCEDURE — 99232 SBSQ HOSP IP/OBS MODERATE 35: CPT | Performed by: NURSE PRACTITIONER

## 2025-02-03 PROCEDURE — 80053 COMPREHEN METABOLIC PANEL: CPT

## 2025-02-03 PROCEDURE — 6370000000 HC RX 637 (ALT 250 FOR IP): Performed by: STUDENT IN AN ORGANIZED HEALTH CARE EDUCATION/TRAINING PROGRAM

## 2025-02-03 PROCEDURE — 82962 GLUCOSE BLOOD TEST: CPT

## 2025-02-03 PROCEDURE — 6360000002 HC RX W HCPCS: Performed by: PHYSICIAN ASSISTANT

## 2025-02-03 PROCEDURE — 83735 ASSAY OF MAGNESIUM: CPT

## 2025-02-03 PROCEDURE — 83615 LACTATE (LD) (LDH) ENZYME: CPT

## 2025-02-03 PROCEDURE — P9047 ALBUMIN (HUMAN), 25%, 50ML: HCPCS | Performed by: STUDENT IN AN ORGANIZED HEALTH CARE EDUCATION/TRAINING PROGRAM

## 2025-02-03 PROCEDURE — 2500000003 HC RX 250 WO HCPCS: Performed by: FAMILY MEDICINE

## 2025-02-03 RX ORDER — PANTOPRAZOLE SODIUM 40 MG/1
40 TABLET, DELAYED RELEASE ORAL
Status: DISCONTINUED | OUTPATIENT
Start: 2025-02-03 | End: 2025-02-05 | Stop reason: HOSPADM

## 2025-02-03 RX ORDER — ALBUMIN (HUMAN) 12.5 G/50ML
25 SOLUTION INTRAVENOUS EVERY 8 HOURS
Status: COMPLETED | OUTPATIENT
Start: 2025-02-03 | End: 2025-02-05

## 2025-02-03 RX ORDER — LIDOCAINE HYDROCHLORIDE 20 MG/ML
INJECTION, SOLUTION INFILTRATION; PERINEURAL PRN
Status: COMPLETED | OUTPATIENT
Start: 2025-02-03 | End: 2025-02-03

## 2025-02-03 RX ORDER — TRAMADOL HYDROCHLORIDE 50 MG/1
50 TABLET ORAL EVERY 6 HOURS PRN
Status: DISCONTINUED | OUTPATIENT
Start: 2025-02-03 | End: 2025-02-05 | Stop reason: HOSPADM

## 2025-02-03 RX ADMIN — ALBUMIN (HUMAN) 25 G: 0.25 INJECTION, SOLUTION INTRAVENOUS at 06:31

## 2025-02-03 RX ADMIN — LACTULOSE 30 G: 10 SOLUTION ORAL; RECTAL at 21:02

## 2025-02-03 RX ADMIN — SODIUM CHLORIDE, PRESERVATIVE FREE 10 ML: 5 INJECTION INTRAVENOUS at 09:02

## 2025-02-03 RX ADMIN — LACTULOSE 30 G: 10 SOLUTION ORAL; RECTAL at 14:17

## 2025-02-03 RX ADMIN — TRAMADOL HYDROCHLORIDE 50 MG: 50 TABLET, COATED ORAL at 04:08

## 2025-02-03 RX ADMIN — INSULIN LISPRO 2 UNITS: 100 INJECTION, SOLUTION INTRAVENOUS; SUBCUTANEOUS at 08:57

## 2025-02-03 RX ADMIN — RIFAXIMIN 400 MG: 200 TABLET ORAL at 08:57

## 2025-02-03 RX ADMIN — SODIUM CHLORIDE, PRESERVATIVE FREE 10 ML: 5 INJECTION INTRAVENOUS at 21:09

## 2025-02-03 RX ADMIN — RIFAXIMIN 400 MG: 200 TABLET ORAL at 21:02

## 2025-02-03 RX ADMIN — INSULIN LISPRO 4 UNITS: 100 INJECTION, SOLUTION INTRAVENOUS; SUBCUTANEOUS at 17:09

## 2025-02-03 RX ADMIN — PANTOPRAZOLE SODIUM 40 MG: 40 TABLET, DELAYED RELEASE ORAL at 14:17

## 2025-02-03 RX ADMIN — LACTULOSE 30 G: 10 SOLUTION ORAL; RECTAL at 08:58

## 2025-02-03 RX ADMIN — ALBUMIN (HUMAN) 25 G: 0.25 INJECTION, SOLUTION INTRAVENOUS at 14:19

## 2025-02-03 RX ADMIN — INSULIN GLARGINE 38 UNITS: 100 INJECTION, SOLUTION SUBCUTANEOUS at 21:02

## 2025-02-03 RX ADMIN — CARVEDILOL 3.12 MG: 6.25 TABLET, FILM COATED ORAL at 08:56

## 2025-02-03 RX ADMIN — LIDOCAINE HYDROCHLORIDE 10 ML: 20 INJECTION, SOLUTION INFILTRATION; PERINEURAL at 12:41

## 2025-02-03 RX ADMIN — ALBUMIN (HUMAN) 25 G: 0.25 INJECTION, SOLUTION INTRAVENOUS at 20:59

## 2025-02-03 RX ADMIN — RIFAXIMIN 400 MG: 200 TABLET ORAL at 14:17

## 2025-02-03 RX ADMIN — PANTOPRAZOLE SODIUM 40 MG: 40 INJECTION, POWDER, FOR SOLUTION INTRAVENOUS at 08:57

## 2025-02-03 NOTE — ACP (ADVANCE CARE PLANNING)
Advance Care Planning   The patient has the following advanced directives on file:  Advance Directives       Power of  Living Will ACP-Advance Directive ACP-Power of     Not on File Not on File Not on File Not on File            The patient has appointed the following active healthcare agents:    Primary Decision Maker: Lisa Fitch - [phone]    Secondary Decision Maker: yolette fitch - [place] - [phone]      JOSY Ochoa  2/3/2025

## 2025-02-03 NOTE — OP NOTE
Operative Note  ______________________________________________________________      IR U/S GUIDED PARACENTESIS  [facility] 8WE MED SURG ONC    Patient Name: Lisa Hunt   YOB: 1960  Medical Record Number: [id number]  Date of Procedure: 2/3/25  Room/Bed: 8417/8417-B    Pre-operative Diagnosis: Ascites    Post-operative Diagnosis: Ascites    Consent: Informed consent was obtained from the daughter prior to the procedure. The details of the procedure, as well is its risks, benefits, and alternatives, were explained.      Anesthesia: Local    Performed by: RANJITH Alvarez under on-site supervision by Madison Ndiaye MD.    Estimated blood loss: Minimal    Complications: None    Specimens Obtained: Ascites Fluid    Procedure: Routine scanning of all four abdominal quadrants was performed using real-time ultrasound and revealed sufficient amount of ascites fluid present.  Decision was made to proceed with procedure.  After obtaining consent, a \"Time-Out\" was called to verify the correct patient, procedure/location, allergies, relevant medications held for procedure and that all equipment is functioning and available. The patient was then placed in the supine position with the head of the bed slightly elevated and the appropriate landmarks were identified. The skin over the puncture site in the left upper quadrant region was prepped with betadine and draped in a sterile fashion. Local anesthesia was obtained by infiltration using 2% Lidocaine without epinephrine administered subcutaneously. A 5 K[place] needle sheath catheter was then advanced into the abdominal cavity. Fluid return was clear straw colored. The catheter was then withdrawn and a sterile dressing was placed over the site. The patient tolerated the procedure well.    A total volume of  2100mL was withdrawn.     Albumin: none.    Thank you for allowing Interventional Radiology to participate in the care of Lisa Hunt.     Electronically

## 2025-02-03 NOTE — PLAN OF CARE
Problem: Safety - Adult  Goal: Free from fall injury  Outcome: Progressing     Problem: Chronic Conditions and Co-morbidities  Goal: Patient's chronic conditions and co-morbidity symptoms are monitored and maintained or improved  Outcome: Progressing     Problem: Skin/Tissue Integrity  Goal: Skin integrity remains intact  Description: 1.  Monitor for areas of redness and/or skin breakdown  2.  Assess vascular access sites hourly  3.  Every 4-6 hours minimum:  Change oxygen saturation probe site  4.  Every 4-6 hours:  If on nasal continuous positive airway pressure, respiratory therapy assess nares and determine need for appliance change or resting period  Outcome: Progressing     Problem: Pain  Goal: Verbalizes/displays adequate comfort level or baseline comfort level  Outcome: Progressing

## 2025-02-03 NOTE — BRIEF OP NOTE
Brief-Op Note  ______________________________________________________________      IR U/S GUIDED PARACENTESIS  [facility] 8WE MED SURG ONC    Patient Name: Lisa Hunt   YOB: 1960  Medical Record Number: [id number]  Date of Procedure: 2/3/25  Room/Bed: 84/8417-      Pre-operative Diagnosis: Ascites    Post-operative Diagnosis: Ascites    Consent: Informed consent was obtained from the daughter prior to the procedure. The details of the procedure, as well is its risks, benefits, and alternatives, were explained.      Anesthesia: Local anesthesia.    Performed by: RANJITH Alvarez under on-site supervision by Madison Ndiaye MD.    Estimated blood loss: Minimal    Complications: None    Specimen Obtained: 2100mL of clear straw colored ascites fluid was withdrawn.    (See radiology dictation in PACs for image review and additional procedural information)    Electronically signed by RANJITH Alvarez   DD: 2/3/25  5:21 PM

## 2025-02-03 NOTE — PLAN OF CARE
Problem: Safety - Adult  Goal: Free from fall injury  Outcome: Progressing     Problem: Chronic Conditions and Co-morbidities  Goal: Patient's chronic conditions and co-morbidity symptoms are monitored and maintained or improved  Outcome: Progressing     Problem: Skin/Tissue Integrity  Goal: Skin integrity remains intact  Description: 1.  Monitor for areas of redness and/or skin breakdown  2.  Assess vascular access sites hourly  3.  Every 4-6 hours minimum:  Change oxygen saturation probe site  4.  Every 4-6 hours:  If on nasal continuous positive airway pressure, respiratory therapy assess nares and determine need for appliance change or resting period  Outcome: Progressing

## 2025-02-04 LAB
ALBUMIN SERPL-MCNC: 4.3 G/DL (ref 3.5–5.2)
ALP SERPL-CCNC: 127 U/L (ref 35–104)
ALT SERPL-CCNC: 18 U/L (ref 0–32)
AMMONIA PLAS-SCNC: 98 UMOL/L (ref 11–51)
ANION GAP SERPL CALCULATED.3IONS-SCNC: 11 MMOL/L (ref 7–16)
AST SERPL-CCNC: 42 U/L (ref 0–31)
BILIRUB SERPL-MCNC: 3.8 MG/DL (ref 0–1.2)
BUN SERPL-MCNC: 32 MG/DL (ref 6–23)
CALCIUM SERPL-MCNC: 9.3 MG/DL (ref 8.6–10.2)
CHLORIDE SERPL-SCNC: 104 MMOL/L (ref 98–107)
CO2 SERPL-SCNC: 24 MMOL/L (ref 22–29)
CREAT SERPL-MCNC: 1.1 MG/DL (ref 0.5–1)
ERYTHROCYTE [DISTWIDTH] IN BLOOD BY AUTOMATED COUNT: 17.8 % (ref 11.5–15)
GFR, ESTIMATED: 54 ML/MIN/1.73M2
GLUCOSE BLD-MCNC: 136 MG/DL (ref 74–99)
GLUCOSE BLD-MCNC: 177 MG/DL (ref 74–99)
GLUCOSE BLD-MCNC: 336 MG/DL (ref 74–99)
GLUCOSE BLD-MCNC: 373 MG/DL (ref 74–99)
GLUCOSE SERPL-MCNC: 132 MG/DL (ref 74–99)
HCT VFR BLD AUTO: 24 % (ref 34–48)
HGB BLD-MCNC: 8 G/DL (ref 11.5–15.5)
MCH RBC QN AUTO: 32.3 PG (ref 26–35)
MCHC RBC AUTO-ENTMCNC: 33.3 G/DL (ref 32–34.5)
MCV RBC AUTO: 96.8 FL (ref 80–99.9)
PLATELET CONFIRMATION: NORMAL
PLATELET, FLUORESCENCE: 18 K/UL (ref 130–450)
PMV BLD AUTO: 12.4 FL (ref 7–12)
POTASSIUM SERPL-SCNC: 4 MMOL/L (ref 3.5–5)
PROT SERPL-MCNC: 7.7 G/DL (ref 6.4–8.3)
RBC # BLD AUTO: 2.48 M/UL (ref 3.5–5.5)
SODIUM SERPL-SCNC: 139 MMOL/L (ref 132–146)
WBC OTHER # BLD: 1.2 K/UL (ref 4.5–11.5)

## 2025-02-04 PROCEDURE — P9047 ALBUMIN (HUMAN), 25%, 50ML: HCPCS | Performed by: STUDENT IN AN ORGANIZED HEALTH CARE EDUCATION/TRAINING PROGRAM

## 2025-02-04 PROCEDURE — 2500000003 HC RX 250 WO HCPCS: Performed by: FAMILY MEDICINE

## 2025-02-04 PROCEDURE — 97165 OT EVAL LOW COMPLEX 30 MIN: CPT

## 2025-02-04 PROCEDURE — 97161 PT EVAL LOW COMPLEX 20 MIN: CPT

## 2025-02-04 PROCEDURE — 97535 SELF CARE MNGMENT TRAINING: CPT

## 2025-02-04 PROCEDURE — 6370000000 HC RX 637 (ALT 250 FOR IP)

## 2025-02-04 PROCEDURE — 36415 COLL VENOUS BLD VENIPUNCTURE: CPT

## 2025-02-04 PROCEDURE — 6370000000 HC RX 637 (ALT 250 FOR IP): Performed by: STUDENT IN AN ORGANIZED HEALTH CARE EDUCATION/TRAINING PROGRAM

## 2025-02-04 PROCEDURE — 1200000000 HC SEMI PRIVATE

## 2025-02-04 PROCEDURE — 6370000000 HC RX 637 (ALT 250 FOR IP): Performed by: FAMILY MEDICINE

## 2025-02-04 PROCEDURE — 82140 ASSAY OF AMMONIA: CPT

## 2025-02-04 PROCEDURE — 80053 COMPREHEN METABOLIC PANEL: CPT

## 2025-02-04 PROCEDURE — 97530 THERAPEUTIC ACTIVITIES: CPT

## 2025-02-04 PROCEDURE — 82962 GLUCOSE BLOOD TEST: CPT

## 2025-02-04 PROCEDURE — 99232 SBSQ HOSP IP/OBS MODERATE 35: CPT | Performed by: STUDENT IN AN ORGANIZED HEALTH CARE EDUCATION/TRAINING PROGRAM

## 2025-02-04 PROCEDURE — 6360000002 HC RX W HCPCS: Performed by: STUDENT IN AN ORGANIZED HEALTH CARE EDUCATION/TRAINING PROGRAM

## 2025-02-04 PROCEDURE — 85027 COMPLETE CBC AUTOMATED: CPT

## 2025-02-04 RX ADMIN — PANTOPRAZOLE SODIUM 40 MG: 40 TABLET, DELAYED RELEASE ORAL at 14:55

## 2025-02-04 RX ADMIN — PANTOPRAZOLE SODIUM 40 MG: 40 TABLET, DELAYED RELEASE ORAL at 05:39

## 2025-02-04 RX ADMIN — LACTULOSE 30 G: 10 SOLUTION ORAL; RECTAL at 08:53

## 2025-02-04 RX ADMIN — RIFAXIMIN 400 MG: 200 TABLET ORAL at 14:55

## 2025-02-04 RX ADMIN — SODIUM CHLORIDE, PRESERVATIVE FREE 10 ML: 5 INJECTION INTRAVENOUS at 08:53

## 2025-02-04 RX ADMIN — CARVEDILOL 3.12 MG: 6.25 TABLET, FILM COATED ORAL at 08:52

## 2025-02-04 RX ADMIN — INSULIN LISPRO 8 UNITS: 100 INJECTION, SOLUTION INTRAVENOUS; SUBCUTANEOUS at 17:14

## 2025-02-04 RX ADMIN — ALBUMIN (HUMAN) 25 G: 0.25 INJECTION, SOLUTION INTRAVENOUS at 22:54

## 2025-02-04 RX ADMIN — INSULIN GLARGINE 38 UNITS: 100 INJECTION, SOLUTION SUBCUTANEOUS at 20:10

## 2025-02-04 RX ADMIN — ALBUMIN (HUMAN) 25 G: 0.25 INJECTION, SOLUTION INTRAVENOUS at 05:38

## 2025-02-04 RX ADMIN — SODIUM CHLORIDE, PRESERVATIVE FREE 10 ML: 5 INJECTION INTRAVENOUS at 20:11

## 2025-02-04 RX ADMIN — LACTULOSE 30 G: 10 SOLUTION ORAL; RECTAL at 20:11

## 2025-02-04 RX ADMIN — RIFAXIMIN 400 MG: 200 TABLET ORAL at 20:10

## 2025-02-04 RX ADMIN — RIFAXIMIN 400 MG: 200 TABLET ORAL at 08:52

## 2025-02-04 RX ADMIN — INSULIN LISPRO 6 UNITS: 100 INJECTION, SOLUTION INTRAVENOUS; SUBCUTANEOUS at 20:10

## 2025-02-04 RX ADMIN — LACTULOSE 30 G: 10 SOLUTION ORAL; RECTAL at 14:55

## 2025-02-04 RX ADMIN — ALBUMIN (HUMAN) 25 G: 0.25 INJECTION, SOLUTION INTRAVENOUS at 14:56

## 2025-02-05 VITALS
RESPIRATION RATE: 18 BRPM | HEIGHT: 64 IN | SYSTOLIC BLOOD PRESSURE: 126 MMHG | HEART RATE: 81 BPM | TEMPERATURE: 98.1 F | DIASTOLIC BLOOD PRESSURE: 60 MMHG | OXYGEN SATURATION: 100 % | WEIGHT: 159.39 LBS | BODY MASS INDEX: 27.21 KG/M2

## 2025-02-05 LAB
AMMONIA PLAS-SCNC: 75 UMOL/L (ref 11–51)
ERYTHROCYTE [DISTWIDTH] IN BLOOD BY AUTOMATED COUNT: 17.6 % (ref 11.5–15)
GLUCOSE BLD-MCNC: 320 MG/DL (ref 74–99)
GLUCOSE BLD-MCNC: 350 MG/DL (ref 74–99)
HCT VFR BLD AUTO: 23 % (ref 34–48)
HGB BLD-MCNC: 7.6 G/DL (ref 11.5–15.5)
MCH RBC QN AUTO: 32.5 PG (ref 26–35)
MCHC RBC AUTO-ENTMCNC: 33 G/DL (ref 32–34.5)
MCV RBC AUTO: 98.3 FL (ref 80–99.9)
MICROORGANISM SPEC CULT: NORMAL
MICROORGANISM SPEC CULT: NORMAL
PLATELET # BLD AUTO: 20 K/UL (ref 130–450)
PLATELET CONFIRMATION: NORMAL
PMV BLD AUTO: 12.9 FL (ref 7–12)
RBC # BLD AUTO: 2.34 M/UL (ref 3.5–5.5)
REPORT STATUS: NORMAL
SERVICE CMNT-IMP: NORMAL
SERVICE CMNT-IMP: NORMAL
SPECIMEN DESCRIPTION: NORMAL
SPECIMEN DESCRIPTION: NORMAL
WBC OTHER # BLD: 1.1 K/UL (ref 4.5–11.5)

## 2025-02-05 PROCEDURE — 6370000000 HC RX 637 (ALT 250 FOR IP)

## 2025-02-05 PROCEDURE — 36415 COLL VENOUS BLD VENIPUNCTURE: CPT

## 2025-02-05 PROCEDURE — 6370000000 HC RX 637 (ALT 250 FOR IP): Performed by: FAMILY MEDICINE

## 2025-02-05 PROCEDURE — 85027 COMPLETE CBC AUTOMATED: CPT

## 2025-02-05 PROCEDURE — 82962 GLUCOSE BLOOD TEST: CPT

## 2025-02-05 PROCEDURE — 6370000000 HC RX 637 (ALT 250 FOR IP): Performed by: STUDENT IN AN ORGANIZED HEALTH CARE EDUCATION/TRAINING PROGRAM

## 2025-02-05 PROCEDURE — 2500000003 HC RX 250 WO HCPCS: Performed by: FAMILY MEDICINE

## 2025-02-05 PROCEDURE — 99232 SBSQ HOSP IP/OBS MODERATE 35: CPT | Performed by: NURSE PRACTITIONER

## 2025-02-05 PROCEDURE — 82140 ASSAY OF AMMONIA: CPT

## 2025-02-05 RX ADMIN — RIFAXIMIN 400 MG: 200 TABLET ORAL at 13:35

## 2025-02-05 RX ADMIN — INSULIN LISPRO 6 UNITS: 100 INJECTION, SOLUTION INTRAVENOUS; SUBCUTANEOUS at 12:05

## 2025-02-05 RX ADMIN — RIFAXIMIN 400 MG: 200 TABLET ORAL at 08:23

## 2025-02-05 RX ADMIN — LACTULOSE 30 G: 10 SOLUTION ORAL; RECTAL at 13:35

## 2025-02-05 RX ADMIN — INSULIN LISPRO 8 UNITS: 100 INJECTION, SOLUTION INTRAVENOUS; SUBCUTANEOUS at 05:32

## 2025-02-05 RX ADMIN — SODIUM CHLORIDE, PRESERVATIVE FREE 10 ML: 5 INJECTION INTRAVENOUS at 08:23

## 2025-02-05 RX ADMIN — LACTULOSE 30 G: 10 SOLUTION ORAL; RECTAL at 08:23

## 2025-02-05 RX ADMIN — CARVEDILOL 3.12 MG: 6.25 TABLET, FILM COATED ORAL at 08:23

## 2025-02-05 RX ADMIN — PANTOPRAZOLE SODIUM 40 MG: 40 TABLET, DELAYED RELEASE ORAL at 05:25

## 2025-02-05 NOTE — CARE COORDINATION
Care Coordination 1330:  Met with patient and . Discussed discharge plan.  Patient refuses placement and insists on returning home. She states her daughter Lisa will be picking her up this afternoon.  Sw left voice mail messages for both daughters to confirm plan.  Perfect Serve to Attending who will place discharge orders.  Updated [facility] at [facility] to cancel referral.   1517  Discharge order confirmed.  Daughter Kelsey on way to hospital to  patient,  RN informed  
Care coordination :  Following for discharge planning.  Chart reviewed and discussed in IDR.. EGD cancelled yesterday due to platelets count.  Plan unclear at this time.   GI following. PT OT evaluations completed yesterday  [facility]   PT 16  OT 15.  Per previous notes family was providing assistence with all aspects of care.   Lives with daughter Kelsey who is working.  Sppoke to daughter Lisa on phone.  Family is requesting short term rehab placement.  Offered choice and left list in room.  .  Daughters unable to visit.  Eric Gonzalez stated they really have no choice facility.  She was in some facility in the city and they were not happy there.  She can not remember the name.  She agreed for  to make referrals and discuss when bed availability determined.    Referral sent to [facility] for review for one of their facilities. Referral also to  [facility] .  Will follow.   
Social Work/Case Management Transition of Care Planning (Tahmina Can -[phone]):  Per report and chart review, patient had a paracentesis on 2/3 with 2100 cc of fluid removed.  EGD is planned for today.  GI is following. Patient is on IV Albumin q8 x6 doses.  PT/OT evals are pending.  Unable to meet with patient as she was off unit for procedure.  Spoke with daughter, Lisa.  Discharge plan is for patient to return home with no needs.  Family will transport home.  CM/SW will follow.  Tahmina Can, JOSY  2/4/2025    
Social Work/Case Management Transition of Care Planning (Tahmina FERRIS [phone]): Patient presented to the hospital due to concerns of AMS.  Imaging was negative for any acute process.  She was diagnosed with hepatic encephalopathy.  Patient does have a history of liver cancer.  Oncology was consulted.  IR was consulted for paracentesis. GI was consulted for cirrhosis. EGD is planned for 2/4.  Patient is on IV Albumin q8.   PT/OT to eval.  Patient has received a total of 3 units PRBC during this admission.  Patient is confused and does not speak English.   was not used due to mentation.  Spoke with daughter, Lisa.  Patient resides with her daughter, Kelsey (she does not speak English) in a 2 story home with 2 [facility].  Daughter reports patient has become increasingly dependent on family for assistance with all aspects of care.  Daughter also reports patient is noncompliant with meds especially the lactulose.  She has a rollator.  No oxygen needs in the home.  PCP is Dr. Hernandez.  Pharmacy is [facility] in [place].  HHC history with [facility].  No [facility] history reported.  Discharge plan is undetermined at this time.  Daughter wants to talk to the patient and the daughter that the patient lives with regarding discharge plan of [facility]AT vs HHC.  CM/SW will follow.  JOSY Ochoa  2/3/2025    Case Management Assessment  Initial Evaluation    Date/Time of Evaluation: 2/3/2025 3:07 PM  Assessment Completed by: JOSY Ochoa    If patient is discharged prior to next notation, then this note serves as note for discharge by case management.    Patient Name: Lisa Hunt                   YOB: 1960  Diagnosis: Hepatic encephalopathy (HCC) [K76.82]  Hyperglycemia [R73.9]  Altered mental status, unspecified altered mental status type [R41.82]  Anemia, unspecified type [D64.9]                   Date / Time: 1/31/2025  3:39 PM    Patient Admission Status: Inpatient   Readmission Risk (Low < 19, Mod 
Nae Witt RDN, CDN, [place]      [phone]   [email or web address]

## 2025-02-05 NOTE — CONSULTS
Session ID: [id number]  Language: Upper sorbian   ID: #[id number]   Name: Yadira
Session ID: [id number]  Language: Serbian   ID: #[id number]   Name: Leighann
Session ID: [id number]  Language: Malay   ID: #[id number]   Name: Lenny
Session ID: [id number]  Language: Uzbek   ID: #[id number]   Name: Cinthia
Session ID: [id number]  Language: Khmer   ID: #[id number]   Name: Shahnaz
Session ID: [id number]  Language: Yi   ID: #[id number]   Name: Nile Porras
Session ID: [id number]  Language: Lithuanian   ID: #[id number]   Name: Olya
Session ID: [id number]  Language: Malay   ID: #[id number]   Name: Tito
Session ID: [id number]  Language: Luxembourgish   ID: #[id number]   Name: Power
Session ID: [id number]  Language: Mohawk   ID: #[id number]   Name: Mickey
suppository 325 mg  325 mg Rectal Q6H PRN Guerline Carr MD        melatonin tablet 3 mg  3 mg Oral Daily PRN Guerline Carr MD        aluminum & magnesium hydroxide-simethicone (MAALOX) 200-200-20 MG/5ML suspension 30 mL  30 mL Oral Q6H PRN Guerline Carr MD             REVIEW OF SYSTEMS:   General: Patient denies n/v/f/c or weight loss.  HEENT: Patient denies persistent postnasal drip, scleral icterus, drooling, persistent bleeding from nose/mouth.  Resp: Patient denies SOB, wheezing, productive cough.  Cardio: Patient denies CP, palpitations, significant edema  GI: As above.  Derm: Patient denies jaundice/rashes.   Misc: Patient denies diffuse/irregular joint swelling or myalgias.      PHYSICAL EXAMINATION:   Vitals:    02/01/25 1200 02/01/25 1330 02/01/25 1345 02/01/25 1445   BP:  (!) 117/51  (!) 117/43   Pulse:  79 80 82   Resp:  12 16 18   Temp:    97.4 °F (36.3 °C)   TempSrc:    Temporal   SpO2: 97%  99%    Weight:    72.6 kg (160 lb)   Height:    1.626 m (5' 4\")     General: Overall well-appearing, NAD  HEENT: PERRLA, EOMI, Anicteric sclera, MMM, no rhinorrhea  Cards: RRR, no LE edema  Resp: Breathing comfortably, good air movement, no use of accessory muscles, no audible wheezing  Abdomen: soft, non-tender. Mild distention with appreciable ascites.   Extremities: Moves all extremities, no effusions or bruising.  Skin: No rashes or jaundice  Neuro: A&O x 3, CN grossly intact, non-focal exam    ASSESSMENT:   64y/F w/ ACOSTA cirrhosis c/b HCC, EV, and GAVE who presents to the ED with discomfort.     PLAN:   Cirrhosis:  - Monitor labs daily.     2. Hepatic Encephalopathy:  - Lactulose titrated to 2-3 BM daily.  - Rifaximin.    3. Varices/GAVE:  - Hgb stable.  - Will consider EGD this admission for surveillance.    4. Ascites:  - Paracentesis with albumin replacement and fluid studies.    5. Thrombocytopenia:  - Secondary to portal hypertension and cirrhosis.  - Will need transfused prior to procedures. 
AMS   - CT head showed no acute intracranial abnormality noted   - CT A/P: no acute findings in the abdomen or pelvis, given limitations. Similar appearing irregular, hypodense lesion of the right hepatic lobe that is not well characterized in the absence of contrast (can not r/o local progression). Redemonstrated hepatic cirrhosis with splenomegaly and moderate volume  - IR has been consulted for paracentesis which is pending  - Cultures drawn, on cefepime  - CMP with BUN 36, Cr 46. LFT's with Alk Phos 158, Ammonia 96, AST 36, total bili 5.3  - NG tube was placed and lactulose was given.    - CBC with pancytopenia WBC 1.7, ANC 1.2, Hgb 8.0 s/p pRBC's for an Hgb 6.4, platelets 19  - Iron 197, ferritin 164, iron sat and TIBC can not be calculated. Consistent with AOCD  - Folate 10.2 and B12 806  - Last AFP in 11/24 was stable at <1.8  - Recheck AFP now. CT A/P as above, no definitive evidence of new lesions or progression. Done without contrast. However CT A/P w/ contrast from 1/4/25 did not show progression (mostly necrotic). Highly doubt progression in 4 weeks  - Pancytopenia related to her liver cirrhosis and splenomegaly with splenic sequestration . Thrombocytopenia is currently slightly worse than her baseline. Hgb responded well to pRBC.   - Will follow      [place] - CNP  Electronically signed 2/1/2025 at 12:18 PM  Pt seen and examined. Note updated  Jovan Molina MD

## 2025-02-06 ENCOUNTER — CARE COORDINATION (OUTPATIENT)
Dept: CARE COORDINATION | Age: 65
End: 2025-02-06

## 2025-02-06 LAB
EKG ATRIAL RATE: 82 BPM
EKG P AXIS: 48 DEGREES
EKG P-R INTERVAL: 174 MS
EKG Q-T INTERVAL: 440 MS
EKG QRS DURATION: 140 MS
EKG QTC CALCULATION (BAZETT): 514 MS
EKG R AXIS: -52 DEGREES
EKG T AXIS: 33 DEGREES
EKG VENTRICULAR RATE: 82 BPM

## 2025-02-06 NOTE — PROGRESS NOTES
HOSPITALIST PROGRESS NOTE    Patient's name: Lisa Hunt  : 1960  Admission date: 2025  Date of service: 2/3/2025   Primary care physician: Tonie Hernandez DO    Assessment   Patient admitted on 2025     Lisa Hunt is a 64 y.o. female patient of Tonie Hernandez DO with history of hepatic cirrhosis, ascites, hepatic encephalopathy, chronic anemia/GAVE, pancytopenia, history of esophageal varices, GERD, hypertension, type 2 diabetes mellitus presented to [facility] on 2025 with altered mental status, patient was recently in the hospital from  to  for hepatic encephalopathy and hyperammonemia.  Patient again found to be hyperammonemic while in the ER with CT abdomen showing moderate volume ascites and large stool burden in the colon.  Patient was admitted for further management.  While in the hospital patient has been evaluated by GI for hepatic encephalopathy.     Hepatic encephalopathy  Liver cirrhosis  History of varices/GAVE  Monitor H&H, transfuse for hemoglobin less than 7  GI is on board, considering EGD during this admission  Continue lactulose, titrate for 2-3 bowel movements per day  Continue rifaximin    Ascites  IR consult for paracentesis  May need platelet transfusion before paracentesis    Thrombocytopenia  Pancytopenia  Likely secondary to cirrhosis  Monitor platelet count, transfuse for platelet count less than 10,000 or for procedures.    Lactic acidosis 2/2 liver disease  DONNA on CKD-DONNA resolved    Insulin-dependent diabetes mellitus  Continue Lantus with sliding scale insulin        Follow labs   DVT prophylaxis SCD's or Sequential Compression Device  Please see orders for further management and care.  Discharge plan: Pending plans from GI and improvement in mental status.    Renetta Gonzalez was seen and examined at bedside.  Patient remains drowsy, is unable to hold a conversation.   was used but unsuccessful.  
      HOSPITALIST PROGRESS NOTE    Patient's name: Lisa Hunt  : 1960  Admission date: 2025  Date of service: 2025   Primary care physician: Tonie Hernandez DO    Assessment   Patient admitted on 2025     Lisa Hunt is a 64 y.o. female patient of Tonie Hernandez DO with history of hepatic cirrhosis, ascites, hepatic encephalopathy, chronic anemia/GAVE, pancytopenia, history of esophageal varices, GERD, hypertension, type 2 diabetes mellitus presented to [facility] on 2025 with altered mental status, patient was recently in the hospital from  to  for hepatic encephalopathy and hyperammonemia.  Patient again found to be hyperammonemic while in the ER with CT abdomen showing moderate volume ascites and large stool burden in the colon.  Patient was admitted for further management.  While in the hospital patient has been evaluated by GI for hepatic encephalopathy.     Hepatic encephalopathy-improving slowly  Liver cirrhosis  History of varices/GAVE  Monitor H&H, transfuse for hemoglobin less than 7  GI is on board, considering EGD during this admission on .  Patient platelet count was less than 20,000, EGD was canceled advised to follow-up with GI as outpatient.  Continue lactulose, titrate for 2-3 bowel movements per day  Continue rifaximin  Daily ammonia-improving    Ascites  IR consult for paracentesis, s/p paracentesis on 2/3.    Thrombocytopenia  Pancytopenia  Likely secondary to cirrhosis  Monitor platelet count, transfuse for platelet count less than 10,000 or for procedures.    Lactic acidosis 2/2 liver disease  DONNA on CKD-DONNA resolved    Insulin-dependent diabetes mellitus  Continue Lantus with sliding scale insulin        Follow labs   DVT prophylaxis SCD's or Sequential Compression Device  Please see orders for further management and care.  Discharge plan: Pending placement    Renetta Gonzalez was seen and examined at bedside.  
      HOSPITALIST PROGRESS NOTE    Patient's name: Lisa Hunt  : 1960  Admission date: 2025  Date of service: 2025   Primary care physician: Tonie Hernandez DO    Assessment   Patient admitted on 2025     Lisa Hunt is a 64 y.o. female patient of Tonie Hernandez DO with history of hepatic cirrhosis, ascites, hepatic encephalopathy, chronic anemia/GAVE, pancytopenia, history of esophageal varices, GERD, hypertension, type 2 diabetes mellitus presented to [facility] on 2025 with altered mental status, patient was recently in the hospital from  to  for hepatic encephalopathy and hyperammonemia.  Patient again found to be hyperammonemic while in the ER with CT abdomen showing moderate volume ascites and large stool burden in the colon.  Patient was admitted for further management.  While in the hospital patient has been evaluated by GI for hepatic encephalopathy.     Hepatic encephalopathy-improving slowly  Liver cirrhosis  History of varices/GAVE  Monitor H&H, transfuse for hemoglobin less than 7  GI is on board, considering EGD during this admission on   Continue lactulose, titrate for 2-3 bowel movements per day  Continue rifaximin  Daily ammonia    Ascites  IR consult for paracentesis, s/p paracentesis on 2/3.    Thrombocytopenia  Pancytopenia  Likely secondary to cirrhosis  Monitor platelet count, transfuse for platelet count less than 10,000 or for procedures.    Lactic acidosis 2/2 liver disease  DONNA on CKD-DONNA resolved    Insulin-dependent diabetes mellitus  Continue Lantus with sliding scale insulin        Follow labs   DVT prophylaxis SCD's or Sequential Compression Device  Please see orders for further management and care.  Discharge plan: Pending plans from GI and improvement in mental status.    Subjective  Lisa was seen and examined at bedside.  Patient much more alert and awake today, following commands.  Feeling hungry.  Patient 
  CHIEF COMPLAINT:  AMS     History of Present Illness:   64 year old female with  past medical history of hepatic cirrhosis secondary to liver cancer, ascites, hepatic encephalopathy, GERD, HTN, and type 2 diabetes . Patient is seen in ED due to altered mental status . Patient was admitted -  due to hepatic encephalopathy  and hyperammonemia.Family reports no rectal bleeding  and hematemesis .  Patient open eyes and grunt but is unable to answer questions Im the ED patient was afebrile  RR 18 HR 80 /84 100 % RA . Lab data revealed  sodium 131 potassium 5.2 BUN 37 Scr 1.5  lactic acid 3.0 glucose 479 calcium 9.6 ammonia 85  Trop HS 26 EKG no acute ischemia  ALKP 181 ALT 22 AST 41 Tbili 2.1  ETOH negative tylenol and salicylic negative  PT 16.7 INR 1.5  CT head no acute process  CT A/P reveals right hepatic lobe lesion hematic cirrhosis with splenomegaly  and moderate volume ascites  large stool burden in colon  CXR negative . Patient had NGT placed and received lactulose 30 g  . Patient received cefepime fluid bolus and insulin 3 units . Patient will be admitted fir further evaluation     Informant(s) for H&P:review of chart     REVIEW OF SYSTEMS:  Unable to obtain due to mental status changes       PMH:  Past Medical History:   Diagnosis Date    DONNA (acute kidney injury) (HCC) 2023    Cirrhosis (HCC)     Diabetes mellitus (HCC)     Diabetic neuropathy (HCC)     Esophageal varices without bleeding (HCC)     GERD (gastroesophageal reflux disease)     Hypertension     Intracranial bleed (HCC) 2023    Liver cirrhosis (HCC)     with secondary esophageal varices, no signs/sx's of hepatic encephalopathy    Low vitamin D level     SDH (subdural hematoma) 2023    Thrombocytopenia (HCC)     Type 2 diabetes mellitus without complication (HCC)        Surgical History:  Past Surgical History:   Procedure Laterality Date    APPENDECTOMY       SECTION      CHOLECYSTECTOMY      CT NEEDLE 
  CHIEF COMPLAINT:  AMS     History of Present Illness:   64 year old female with  past medical history of hepatic cirrhosis secondary to liver cancer, ascites, hepatic encephalopathy, GERD, HTN, and type 2 diabetes . Patient is seen in ED due to altered mental status . Patient was admitted -  due to hepatic encephalopathy  and hyperammonemia.Family reports no rectal bleeding  and hematemesis .  Patient open eyes and grunt but is unable to answer questions Im the ED patient was afebrile  RR 18 HR 80 /84 100 % RA . Lab data revealed  sodium 131 potassium 5.2 BUN 37 Scr 1.5  lactic acid 3.0 glucose 479 calcium 9.6 ammonia 85  Trop HS 26 EKG no acute ischemia  ALKP 181 ALT 22 AST 41 Tbili 2.1  ETOH negative tylenol and salicylic negative  PT 16.7 INR 1.5  CT head no acute process  CT A/P reveals right hepatic lobe lesion hematic cirrhosis with splenomegaly  and moderate volume ascites  large stool burden in colon  CXR negative . Patient had NGT placed and received lactulose 30 g  . Patient received cefepime fluid bolus and insulin 3 units . Patient will be admitted fir further evaluation     Informant(s) for H&P:review of chart     REVIEW OF SYSTEMS:  Unable to obtain due to mental status changes       PMH:  Past Medical History:   Diagnosis Date    DONNA (acute kidney injury) (HCC) 2023    Cirrhosis (HCC)     Diabetes mellitus (HCC)     Diabetic neuropathy (HCC)     Esophageal varices without bleeding (HCC)     GERD (gastroesophageal reflux disease)     Hypertension     Intracranial bleed (HCC) 2023    Liver cirrhosis (HCC)     with secondary esophageal varices, no signs/sx's of hepatic encephalopathy    Low vitamin D level     SDH (subdural hematoma) 2023    Thrombocytopenia (HCC)     Type 2 diabetes mellitus without complication (HCC)        Surgical History:  Past Surgical History:   Procedure Laterality Date    APPENDECTOMY       SECTION      CHOLECYSTECTOMY      CT NEEDLE 
  Gastroenterology, Hepatology, &  Advanced Endoscopy    Progress Note    Dr. Conrad will be OOT from 2/5 to 2/11       Subjective: Pt sleeping arousable. Post Paracentesis 2/3/25. A total volume of  2100mL was withdrawn. Pt feels better overall.    Reason for Consult: Cirrhosis     HPI:   Lisa Hunt is a 64 y.o. female w/ PMH of  has a past medical history of DONNA (acute kidney injury) (HCC), Cirrhosis (HCC), Diabetes mellitus (HCC), Diabetic neuropathy (HCC), Esophageal varices without bleeding (HCC), GERD (gastroesophageal reflux disease), Hypertension, Intracranial bleed (HCC), Liver cirrhosis (HCC), Low vitamin D level, SDH (subdural hematoma), Thrombocytopenia (HCC), and Type 2 diabetes mellitus without complication (HCC). who presents to the ED with discomfort. The patient denies encephalopathy or pain. She has a history of HCC for which she has had y90 therapy and is being monitored. She denies significant abdominal distention but does report mild abdominal discomfort from ascites. Her last EGD was in August and she had one band placed.         Recent Labs     02/03/25  0527 02/04/25  0748   INR 1.6  --    ALT 22 18   AST 48* 42*   ALKPHOS 166* 127*   BILITOT 3.0* 3.8*     Lab Results   Component Value Date    WBC 1.2 (L) 02/04/2025    HGB 8.0 (L) 02/04/2025    HCT 24.0 (L) 02/04/2025    PLT 23 (L) 02/03/2025     02/04/2025    K 4.0 02/04/2025     02/04/2025    CREATININE 1.1 (H) 02/04/2025    BUN 32 (H) 02/04/2025    CO2 24 02/04/2025    FOLATE 10.2 02/01/2025    [id number] 806 02/01/2025    AMMONIA 98 (H) 02/04/2025    GLUCOSE 132 (H) 02/04/2025    INR 1.6 02/03/2025    PROTIME 17.3 (H) 02/03/2025    TSH 7.58 (H) 12/05/2024    LABA1C 6.7 (H) 02/01/2025     Lab Results   Component Value Date    INR 1.6 02/03/2025    INR 1.5 01/31/2025    INR 1.5 01/04/2025    PROTIME 17.3 (H) 02/03/2025    PROTIME 16.7 (H) 01/31/2025    PROTIME 16.1 (H) 01/04/2025      MELD 3.0: 19 at 2/4/2025  7:48 
  Gastroenterology, Hepatology, &  Advanced Endoscopy    Progress Note    Subjective: Pt sleeping arousable. For Paracentesis today.    Reason for Consult: Cirrhosis     HPI:   Lisa Hunt is a 64 y.o. female w/ PMH of  has a past medical history of DONNA (acute kidney injury) (HCC), Cirrhosis (HCC), Diabetes mellitus (HCC), Diabetic neuropathy (HCC), Esophageal varices without bleeding (HCC), GERD (gastroesophageal reflux disease), Hypertension, Intracranial bleed (HCC), Liver cirrhosis (HCC), Low vitamin D level, SDH (subdural hematoma), Thrombocytopenia (HCC), and Type 2 diabetes mellitus without complication (HCC). who presents to the ED with discomfort. The patient denies encephalopathy or pain. She has a history of HCC for which she has had y90 therapy and is being monitored. She denies significant abdominal distention but does report mild abdominal discomfort from ascites. Her last EGD was in August and she had one band placed.         Recent Labs     01/31/25  1600 02/01/25  0854 02/02/25  0429 02/03/25  0527   INR 1.5  --   --  1.6   ALT 22 20 18 22   AST 41* 36* 35* 48*   ALKPHOS 181* 158* 146* 166*   BILITOT 2.1* 5.3* 2.7* 3.0*     Lab Results   Component Value Date    WBC 1.3 (L) 02/03/2025    HGB 8.2 (L) 02/03/2025    HCT 24.8 (L) 02/03/2025    PLT 23 (L) 02/03/2025     02/03/2025    K 4.3 02/03/2025     02/03/2025    CREATININE 1.3 (H) 02/03/2025    BUN 38 (H) 02/03/2025    CO2 20 (L) 02/03/2025    FOLATE 10.2 02/01/2025    [id number] 806 02/01/2025    AMMONIA 102 (H) 02/03/2025    GLUCOSE 215 (H) 02/03/2025    INR 1.6 02/03/2025    PROTIME 17.3 (H) 02/03/2025    TSH 7.58 (H) 12/05/2024    LABA1C 6.7 (H) 02/01/2025     Lab Results   Component Value Date    INR 1.6 02/03/2025    INR 1.5 01/31/2025    INR 1.5 01/04/2025    PROTIME 17.3 (H) 02/03/2025    PROTIME 16.7 (H) 01/31/2025    PROTIME 16.1 (H) 01/04/2025      MELD 3.0: 20 at 2/3/2025  5:27 AM  MELD-Na: 18 at 2/3/2025  5:27 
  Physician Progress Note      PATIENT:               CLINTON ADAMS  [facility] #:                  [id number]  :                       1960  ADMIT DATE:       2025 3:39 PM  DISCH DATE:        2025 4:10 PM  RESPONDING  PROVIDER #:        Vincent Haskins MD          QUERY TEXT:    Patient admitted with hepatic encephalopathy.  Noted documentation of Acute   Kidney Injury in beginning in H&P.  In order to support the diagnosis of DONNA,   please include additional clinical indicators in your documentation.? Or   please document if the diagnosis of DONNA has been ruled out after further   study.    The medical record reflects the following:  Risk Factors: CKD 3, HTN, DM  Clinical Indicators: Beginning @ admission, creatinine 1.5 1.3 1.5 1.3 1.1.  Treatment: Serial chemistry panels.  0.9%  ml IV bolus    Defined by Kidney Disease Improving Global Outcomes (KDIGO) clinical practice   guideline for acute kidney injury:  -Increase in SCr by greater than or equal to 0.3 mg/dl within 48 hours; or  -Increase or decrease in SCr to greater than or equal to 1.5 times baseline,   which is known or presumed to have occurred within the prior 7 days; or  -Urine volume < 0.5ml/kg/h for 6 hours.  Options provided:  -- Acute kidney injury evidenced by, Please document evidence as well as a   numerical baseline creatinine, if known.  -- Acute kidney injury ruled out after study  -- Other - I will add my own diagnosis  -- Disagree - Not applicable / Not valid  -- Disagree - Clinically unable to determine / Unknown  -- Refer to Clinical Documentation Reviewer    PROVIDER RESPONSE TEXT:    Acute kidney injury was ruled out after study.    Query created by: Genie Rojo on 2025 1:58 PM      Electronically signed by:  Vincent Haskins MD 2025 11:18 AM          
4 Eyes Skin Assessment     NAME:  Lisa Hunt  YOB: 1960  MEDICAL RECORD NUMBER:  [id number]    The patient is being assessed for  Admission    I agree that at least one RN has performed a thorough Head to Toe Skin Assessment on the patient. ALL assessment sites listed below have been assessed.      Areas assessed by both nurses:    Head, Face, Ears, Shoulders, Back, Chest, Arms, Elbows, Hands, Sacrum. Buttock, Coccyx, Ischium, Legs. Feet and Heels, and Under Medical Devices         Does the Patient have a Wound? No noted wound(s)       Kane Prevention initiated by RN: Yes  Wound Care Orders initiated by RN: No    Pressure Injury (Stage 3,4, Unstageable, DTI, NWPT, and Complex wounds) if present, place Wound referral order by RN under : No    New Ostomies, if present place, Ostomy referral order under : No     Nurse 1 eSignature: Electronically signed by Caryn Conti on 2/1/25 at 3:36 PM EST    **SHARE this note so that the co-signing nurse can place an eSignature**    Nurse 2 eSignature: Electronically signed by Lisa Higgins RN on 2/1/25 at 5:13 PM EST  
Attempted to call patients daughter Lisa to go over home medications. No answer.   
Attending notified of EGD cancellation due to platelet count.   
Attending notified of pt having black tarry stool   
Belarusian interpreted use #[id number]  
Blood consent signed & in soft chart from 1/31   
Dr. Conrad aware of GI consult   
Dr. Haskins notified of critical platelets   
Notified provider via PS of platelet count 19, pt is for IR para tomorrow  
Occupational Therapy    OCCUPATIONAL THERAPY INITIAL EVALUATION    [facility]  [address] [place], [place]        Date:2025                                                  Patient Name: Lisa Hunt    MRN: [id number]    : 1960    Room: 8417/8417-          Evaluating OT: Shivani Lynn, OTD, OTR/L; [id number]      Occupational therapy physician order:   Start   Ordering Provider    25  OT eval and treat  Start:  25,   End:  25,   ONE TIME,   Standing Count:  1 Occurrences,   R         Guerline Carr MD        Pt presents to ED with AMS      Diagnosis:    1. Altered mental status, unspecified altered mental status type    2. Hepatic encephalopathy (HCC)    3. Hyperglycemia    4. Anemia, unspecified type       Patient Active Problem List   Diagnosis    Type 2 diabetes mellitus without complication, with long-term current use of insulin (HCC)    Varices of esophagus determined by endoscopy (HCC)    GERD without esophagitis    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 (HCC)    Essential hypertension    Hyperkalemia    Hepatic encephalopathy (HCC)    Chronic renal disease, stage III (HCC) [424992]    Chronic anemia    Anemia requiring transfusions    Hyperammonemia (HCC)    Thrombocytopenia (HCC)    Type 2 diabetes mellitus with hyperglycemia    Poorly controlled type 2 diabetes mellitus (HCC)    GI bleed    Chronic liver failure (HCC)    Ascites of liver    Acute metabolic encephalopathy    Dietary noncompliance    Hyperammonemic encephalopathy    GAVE (gastric antral vascular ectasia)    Acute on chronic anemia    Palliative care by specialist    Hepatocellular carcinoma (HCC)    Gastric antral vascular ectasia    Carcinoma of liver (HCC)    Decompensated cirrhosis (HCC)    Abdominal pain    Esophageal varices in cirrhosis (HCC)    Esophageal varices (HCC)          Pertinent Medical History:   Past Medical 
Occupational Therapy  OT SESSION ATTEMPT     Date:2025  Patient Name: Lisa Hunt  MRN: [id number]  : 1960  Room: [address] [place], [place] [address]     Attempted OT session this date:    [] unavailable due to other medical staff currently with pt   [] on hold per nursing staff   [] on hold per nursing staff secondary to lab / radiology results    [x] declined treatment  this date due to pain  Benefits of participation in therapy reviewed with pt.    [] off unit   [] Other:     Will reattempt OT eval at a later time.    Thania Pugh, OTR/L #[id number]   
POCT occult stool positive, attending aware  
Patient arrived via bed with transport to Radiology department for paracentesis. Allergies, home medications, H&P and fasting instructions reviewed with patient. Vital signs taken. IV in place, IV flushed.  Procedural instructions given, questions answered, understanding expressed and consent obtained over the phone with patients daughter d/t patient mentation.  
Patient has order for paracentesis- platelets low, will need to review with IR MD prior to sending for patient. Patient does not need to be NPO for paracentesis.   
Physical Therapy  Physical Therapy Initial Assessment       Name: Lisa Hunt  : 1960  MRN: [id number]      Date of Service: 2025    Evaluating PT:  Claudia Del Valle PT, DPT [id number]    Room #:  8417/8417-B  Diagnosis:  Hepatic encephalopathy (HCC) [K76.82]  Hyperglycemia [R73.9]  Altered mental status, unspecified altered mental status type [R41.82]  Anemia, unspecified type [D64.9]  PMHx/PSHx:   has a past medical history of DONNA (acute kidney injury) (HCC), Cirrhosis (HCC), Diabetes mellitus (HCC), Diabetic neuropathy (HCC), Esophageal varices without bleeding (HCC), GERD (gastroesophageal reflux disease), Hypertension, Intracranial bleed (HCC), Liver cirrhosis (HCC), Low vitamin D level, SDH (subdural hematoma), Thrombocytopenia (HCC), and Type 2 diabetes mellitus without complication (HCC).    Procedure/Surgery:  paracentesis 2/3  Precautions: Falls,  Sri [place]ankan speaking, TSM, alarm     SUBJECTIVE:    Pt lives with her daughter in a 1 story home.  Bed is on 1st floor and bath is on 1st floor.  Pt ambulated with WW per chart PTA.    Equipment Owned: WW  Equipment Needs:  TBD    OBJECTIVE:   Initial Evaluation  Date: 25 Treatment  Date:  Short Term/ Long Term   Goals   AM-PAC 6 Clicks      Was pt agreeable to Eval/treatment? Yes      Does pt have pain? No complaints      Bed Mobility  Rolling: SBA  Supine to sit: SBA  Sit to supine: SBA  Scooting: SBA  Rolling: Independent  Supine to sit: Independent  Sit to supine: Independent  Scooting: Independent   Transfers Sit to stand: SBA  Stand to sit: SBA  Stand pivot: SBA WW  Sit to stand: Independent  Stand to sit: Independent  Stand pivot: Modified Independent  AAD   Ambulation    75 feet with WW [place]    Assistance with maneuvering WW / obstacle avoidance   >300 feet with AAD Modified Independent   Stair negotiation: ascended and descended  NT   4 steps with 1 rail(s) supervision    ROM BUE:  Defer to OT  BLE:  WNL     Strength BUE:  Defer to 
Physical Therapy  Physical Therapy Missed Visit    Name: Lisa Hunt  : 1960  MRN: [id number]  Room #: 8417/8417-B    Date of Service: 2025    Attempted PT evaluation. Spoke with pt, c/o abdominal pain, requested PT attempt again tomorrow. Will do so as schedule permits.    Jm Smith, PT, DPT  [id number]      
Spiritual Health History and Assessment/Progress Note  [facility] [place]    Spiritual/Emotional Needs,  ,  ,      Name: Lisa Hunt MRN: [id number]    Age: 64 y.o.     Sex: female   Language: Chinese   Sikh: Confucianist   Hepatic encephalopathy (HCC)     Date: 2/5/2025                           Spiritual Assessment began in [facility] SUR[facility] ON[facility]        Referral/Consult From: Rounding   Encounter Overview/Reason: Spiritual/Emotional Needs  Service Provided For: Patient    Maria C, Belief, Meaning:   Patient is connected with a maria c tradition or spiritual practice  Family/Friends are connected with a maria c tradition or spiritual practice      Importance and Influence:  Patient has no beliefs influential to healthcare decision-making identified during this visit  Family/Friends No family/friends present    Community:  Patient is connected with a spiritual community  Family/Friends No family/friends present    Assessment and Plan of Care:     Patient Interventions include: Facilitated expression of thoughts and feelings  Family/Friends Interventions include: No family/friends present    Patient Plan of Care: Spiritual Care available upon further referral  Family/Friends Plan of Care: No family/friends present    Electronically signed by Chaplain Leeann on 2/5/2025 at 2:11 PM   
Stat labs called for H&H  
Tele monitor removed, cleaned & placed in storage  
hydroxide-simethicone (MAALOX) 200-200-20 MG/5ML suspension 30 mL  30 mL Oral Q6H PRN Guerline Carr MD           OBJECTIVE      Physical    VITALS:  /60   Pulse 80   Temp 97.7 °F (36.5 °C) (Temporal)   Resp 18   Ht 1.626 m (5' 4\")   Wt 72 kg (158 lb 11.7 oz)   SpO2 97%   BMI 27.25 kg/m²   Physical Exam:  General: Overall well-appearing, NAD  HEENT: PERRLA, EOMI, Mild scleral icterus, MMM, no rhinorrhea  Cards: RRR, no LE edema  Resp: Breathing comfortably on room air, good air movement, no use of accessory muscles, no audible wheezing  Abdomen: soft, NT, ND.   Extremities: Moves all extremities, no effusions or bruising.  Skin: No rashes. Jaundice.   Neuro: A&O x 3, CN grossly intact, non-focal exam       ASSESSMENT:   64y/F w/ ACOSTA cirrhosis c/b HCC, EV, and GAVE who presents to the ED with discomfort.      PLAN:   Cirrhosis:  - Monitor labs daily.      2. Hepatic Encephalopathy:  - Lactulose titrated to 2-3 BM daily.  - Rifaximin.     3. Varices/GAVE:  - Hgb stable.  - Platelets remain low. With no suggestion of large volume bleeding and requirement for continued PRBC transfusion.      4. Ascites:  - Paracentesis with albumin replacement and fluid studies.     5. Thrombocytopenia:  - Secondary to portal hypertension and cirrhosis.  - Will need transfused prior to procedures.      We will follow closely.     Thank you for including us in the care of this patient. Please do not hesitate to contact us with any additional questions or concerns.     Anil Conrad MD  Gastroenterology/Hepatology  Advanced Endoscopy

## 2025-02-06 NOTE — CARE COORDINATION
Care Transitions Note    Initial Call - Call within 2 business days of discharge: Yes    Attempted to reach patient for transitions of care follow up. Unable to reach patient. Contacted daughter Lisa through  [phone] (Lisa). Offered writer's number to have patient or daughter Kelsey who lives with patient call writer back to follow up on discharge. Declined writer's number. Stated her sister works and patient will not answer phone. Lisa declined sooner PCP appt, needs asst with transportation. Will message PCP office and refer to  for transportation assistance    Outreach Attempts:   Unable to reach patient. Contacted daughter Lisa who [place] out of state    Patient: Lisa Hutn    Patient : 1960   MRN: [id number]    Reason for Admission: Hepatic encephalopathy  Discharge Date: 25  RURS: Readmission Risk Score: 26.7    Last Discharge Facility       Date Complaint Diagnosis Description Type Department Provider    25 Altered Mental Status Altered mental status, unspecified altered mental status type ... ED to Hosp-Admission (Discharged) (ADMITTED) Vincent Steele MD; Yi Maya..            Was this an external facility discharge? No    Follow Up Appointment:   Patient has hospital follow up appointment scheduled within 7 days of discharge.    Future Appointments         Provider Specialty Dept Phone    2025 3:30 PM Anil Conrad MD Gastroenterology [phone]    2025 11:00 AM Tonie Hernandez DO Family Medicine [phone]    2025 11:00 AM Anil Conrad MD Gastroenterology [phone]            Plan for follow-up on next business day.      Concepción Zhou RN

## 2025-02-06 NOTE — DISCHARGE SUMMARY
[facility] Hospitalist Discharge Summary         Lisa Hunt  MRN: [id number]  Account Number: [id number]  Admitted: 1/31/2025  Discharge Date: 02/06/25    PCP Handoff    Recommended Outpatient Testing  EGD    Results Pending At Discharge  None        Clinical Summary  Patient admitted on 1/31/2025      Lisa Hunt is a 64 y.o. female patient of [facility] with history of hepatic cirrhosis, ascites, hepatic encephalopathy, chronic anemia/GAVE, pancytopenia, history of esophageal varices, GERD, hypertension, type 2 diabetes mellitus presented to [facility] on 1/31/2025 with altered mental status, patient was recently in the hospital from 1/4 to 1/11 for hepatic encephalopathy and hyperammonemia.  Patient again found to be hyperammonemic while in the ER with CT abdomen showing moderate volume ascites and large stool burden in the colon.  Patient was admitted for further management.  While in the hospital patient has been evaluated by GI for hepatic encephalopathy.  Hepatic encephalopathy improved with lactulose and rifaximin.  Patient ammonia level was improved.  Patient was advised to go to nursing home but patient refused.  Patient was discharged home with daughter.     Hepatic encephalopathy-improving slowly  Liver cirrhosis  History of varices/GAVE  Monitor H&H, transfuse for hemoglobin less than 7  GI is on board, considering EGD during this admission on 2/4.  Patient platelet count was less than 20,000, EGD was canceled advised to follow-up with GI as outpatient.  Continue lactulose, titrate for 2-3 bowel movements per day  Continue rifaximin  Daily ammonia-improving     Ascites  IR consult for paracentesis, s/p paracentesis on 2/3.     Thrombocytopenia  Pancytopenia  Likely secondary to cirrhosis  Monitor platelet count, transfuse for platelet count less than 10,000 or for procedures.     Lactic acidosis 2/2 liver disease  DONNA on CKD-DONNA resolved

## 2025-02-07 ENCOUNTER — CARE COORDINATION (OUTPATIENT)
Dept: CARE COORDINATION | Age: 65
End: 2025-02-07

## 2025-02-07 NOTE — CARE COORDINATION
Care Transitions Note    Initial Call - Call within 2 business days of discharge: Yes    Attempted to reach patient for transitions of care follow up. Unable to reach patient. Eft message through  # [phone] requesting call back to provided number.    Outreach Attempts:   Multiple attempts to contact patient at phone numbers on file.     Patient: Lisa Hunt    Patient : 1960   MRN: [id number]    Reason for Admission: Hepatic encephalopathy  Discharge Date: 25  RURS: Readmission Risk Score: 26.7    Last Discharge Facility       Date Complaint Diagnosis Description Type Department Provider    25 Altered Mental Status Altered mental status, unspecified altered mental status type ... ED to Hosp-Admission (Discharged) (ADMITTED) [facility] 8WE Vincent Haskins MD; Alfredo Maya...            Was this an external facility discharge? No    Follow Up Appointment:   Patient has hospital follow up appointment scheduled within 7 days of discharge.    Future Appointments         Provider Specialty Dept Phone    2025 3:30 PM Anli Conrad MD Gastroenterology [phone]    2025 11:00 AM Tonie Hernandez DO Family Medicine [phone]    2025 11:00 AM Anil Conrda MD Gastroenterology [phone]            No further follow-up call indicated     Concepción Zhou RN

## 2025-02-11 ENCOUNTER — HOSPITAL ENCOUNTER (INPATIENT)
Age: 65
LOS: 3 days | Discharge: HOME | End: 2025-02-14
Attending: STUDENT IN AN ORGANIZED HEALTH CARE EDUCATION/TRAINING PROGRAM | Admitting: FAMILY MEDICINE
Payer: MEDICARE

## 2025-02-11 ENCOUNTER — APPOINTMENT (OUTPATIENT)
Dept: CT IMAGING | Age: 65
End: 2025-02-11
Payer: MEDICARE

## 2025-02-11 ENCOUNTER — APPOINTMENT (OUTPATIENT)
Dept: GENERAL RADIOLOGY | Age: 65
End: 2025-02-11
Payer: MEDICARE

## 2025-02-11 DIAGNOSIS — K74.60 DECOMPENSATED CIRRHOSIS (HCC): ICD-10-CM

## 2025-02-11 DIAGNOSIS — K72.90 DECOMPENSATED CIRRHOSIS (HCC): ICD-10-CM

## 2025-02-11 DIAGNOSIS — K76.82 HEPATIC ENCEPHALOPATHY (HCC): Primary | ICD-10-CM

## 2025-02-11 LAB
ALBUMIN SERPL-MCNC: 3.6 G/DL (ref 3.5–5.2)
ALP SERPL-CCNC: 132 U/L (ref 35–104)
ALT SERPL-CCNC: 22 U/L (ref 0–32)
AMMONIA PLAS-SCNC: 104 UMOL/L (ref 11–51)
ANION GAP SERPL CALCULATED.3IONS-SCNC: 12 MMOL/L (ref 7–16)
AST SERPL-CCNC: 47 U/L (ref 0–31)
B.E.: -2.8 MMOL/L (ref -3–3)
BACTERIA URNS QL MICRO: ABNORMAL
BASOPHILS # BLD: 0 K/UL (ref 0–0.2)
BASOPHILS NFR BLD: 0 % (ref 0–2)
BILIRUB DIRECT SERPL-MCNC: 0.6 MG/DL (ref 0–0.3)
BILIRUB INDIRECT SERPL-MCNC: 1.2 MG/DL (ref 0–1)
BILIRUB SERPL-MCNC: 1.8 MG/DL (ref 0–1.2)
BILIRUB UR QL STRIP: NEGATIVE
BUN SERPL-MCNC: 52 MG/DL (ref 6–23)
CALCIUM SERPL-MCNC: 9.1 MG/DL (ref 8.6–10.2)
CASTS #/AREA URNS LPF: ABNORMAL /LPF
CHLORIDE SERPL-SCNC: 102 MMOL/L (ref 98–107)
CLARITY UR: CLEAR
CO2 SERPL-SCNC: 21 MMOL/L (ref 22–29)
COHB: 0.3 % (ref 0–1.5)
COLOR UR: YELLOW
CREAT SERPL-MCNC: 1.6 MG/DL (ref 0.5–1)
CRITICAL: ABNORMAL
DATE ANALYZED: ABNORMAL
DATE OF COLLECTION: ABNORMAL
EOSINOPHIL # BLD: 0.01 K/UL (ref 0.05–0.5)
EOSINOPHILS RELATIVE PERCENT: 1 % (ref 0–6)
ERYTHROCYTE [DISTWIDTH] IN BLOOD BY AUTOMATED COUNT: 17.1 % (ref 11.5–15)
FLUAV RNA RESP QL NAA+PROBE: NOT DETECTED
FLUBV RNA RESP QL NAA+PROBE: NOT DETECTED
GFR, ESTIMATED: 37 ML/MIN/1.73M2
GLUCOSE BLD-MCNC: 226 MG/DL (ref 74–99)
GLUCOSE SERPL-MCNC: 311 MG/DL (ref 74–99)
GLUCOSE UR STRIP-MCNC: NEGATIVE MG/DL
HCO3: 20 MMOL/L (ref 22–26)
HCT VFR BLD AUTO: 27.7 % (ref 34–48)
HGB BLD-MCNC: 9.2 G/DL (ref 11.5–15.5)
HGB UR QL STRIP.AUTO: NEGATIVE
HHB: 2.3 % (ref 0–5)
KETONES UR STRIP-MCNC: NEGATIVE MG/DL
LAB: ABNORMAL
LEUKOCYTE ESTERASE UR QL STRIP: NEGATIVE
LYMPHOCYTES NFR BLD: 0.12 K/UL (ref 1.5–4)
LYMPHOCYTES RELATIVE PERCENT: 9 % (ref 20–42)
Lab: 1555
MAGNESIUM SERPL-MCNC: 1.8 MG/DL (ref 1.6–2.6)
MCH RBC QN AUTO: 32.3 PG (ref 26–35)
MCHC RBC AUTO-ENTMCNC: 33.2 G/DL (ref 32–34.5)
MCV RBC AUTO: 97.2 FL (ref 80–99.9)
METHB: 0.5 % (ref 0–1.5)
MODE: ABNORMAL
MONOCYTES NFR BLD: 0.09 K/UL (ref 0.1–0.95)
MONOCYTES NFR BLD: 6 % (ref 2–12)
MYELOCYTES ABSOLUTE COUNT: 0.05 K/UL
MYELOCYTES: 4 %
NEUTROPHILS NFR BLD: 81 % (ref 43–80)
NEUTS SEG NFR BLD: 1.13 K/UL (ref 1.8–7.3)
NITRITE UR QL STRIP: NEGATIVE
O2 SATURATION: 97.7 % (ref 92–98.5)
O2HB: 96.9 % (ref 94–97)
OPERATOR ID: 5100
PATIENT TEMP: 37 C
PCO2: 27.6 MMHG (ref 35–45)
PH BLOOD GAS: 7.48 (ref 7.35–7.45)
PH UR STRIP: 5.5 [PH] (ref 5–8)
PLATELET CONFIRMATION: NORMAL
PLATELET, FLUORESCENCE: 24 K/UL (ref 130–450)
PMV BLD AUTO: 14.1 FL (ref 7–12)
PO2: 105.3 MMHG (ref 75–100)
POTASSIUM SERPL-SCNC: 5 MMOL/L (ref 3.5–5)
PROT SERPL-MCNC: 7.4 G/DL (ref 6.4–8.3)
PROT UR STRIP-MCNC: NEGATIVE MG/DL
RBC # BLD AUTO: 2.85 M/UL (ref 3.5–5.5)
RBC # BLD: ABNORMAL 10*6/UL
RBC # BLD: ABNORMAL 10*6/UL
RBC #/AREA URNS HPF: ABNORMAL /HPF
SARS-COV-2 RNA RESP QL NAA+PROBE: NOT DETECTED
SODIUM SERPL-SCNC: 135 MMOL/L (ref 132–146)
SOURCE, BLOOD GAS: ABNORMAL
SOURCE: 0
SP GR UR STRIP: 1.01 (ref 1–1.03)
SPECIMEN DESCRIPTION: NORMAL
THB: 9 G/DL (ref 11.5–16.5)
TIME ANALYZED: 1557
TROPONIN I SERPL HS-MCNC: 24 NG/L (ref 0–9)
TROPONIN I SERPL HS-MCNC: 29 NG/L (ref 0–9)
UROBILINOGEN UR STRIP-ACNC: 0.2 EU/DL (ref 0–1)
WBC #/AREA URNS HPF: ABNORMAL /HPF
WBC OTHER # BLD: 1.4 K/UL (ref 4.5–11.5)

## 2025-02-11 PROCEDURE — 87636 SARSCOV2 & INF A&B AMP PRB: CPT

## 2025-02-11 PROCEDURE — 99285 EMERGENCY DEPT VISIT HI MDM: CPT

## 2025-02-11 PROCEDURE — 80053 COMPREHEN METABOLIC PANEL: CPT

## 2025-02-11 PROCEDURE — 83735 ASSAY OF MAGNESIUM: CPT

## 2025-02-11 PROCEDURE — 84484 ASSAY OF TROPONIN QUANT: CPT

## 2025-02-11 PROCEDURE — 87086 URINE CULTURE/COLONY COUNT: CPT

## 2025-02-11 PROCEDURE — 85025 COMPLETE CBC W/AUTO DIFF WBC: CPT

## 2025-02-11 PROCEDURE — 2580000003 HC RX 258: Performed by: STUDENT IN AN ORGANIZED HEALTH CARE EDUCATION/TRAINING PROGRAM

## 2025-02-11 PROCEDURE — 99222 1ST HOSP IP/OBS MODERATE 55: CPT | Performed by: FAMILY MEDICINE

## 2025-02-11 PROCEDURE — 1200000000 HC SEMI PRIVATE

## 2025-02-11 PROCEDURE — 82248 BILIRUBIN DIRECT: CPT

## 2025-02-11 PROCEDURE — 81001 URINALYSIS AUTO W/SCOPE: CPT

## 2025-02-11 PROCEDURE — 93005 ELECTROCARDIOGRAM TRACING: CPT

## 2025-02-11 PROCEDURE — 70450 CT HEAD/BRAIN W/O DYE: CPT

## 2025-02-11 PROCEDURE — 82805 BLOOD GASES W/O2 SATURATION: CPT

## 2025-02-11 PROCEDURE — 82140 ASSAY OF AMMONIA: CPT

## 2025-02-11 PROCEDURE — 71045 X-RAY EXAM CHEST 1 VIEW: CPT

## 2025-02-11 PROCEDURE — 82962 GLUCOSE BLOOD TEST: CPT

## 2025-02-11 RX ORDER — POTASSIUM CHLORIDE 1500 MG/1
40 TABLET, EXTENDED RELEASE ORAL PRN
Status: DISCONTINUED | OUTPATIENT
Start: 2025-02-11 | End: 2025-02-14 | Stop reason: HOSPADM

## 2025-02-11 RX ORDER — ENOXAPARIN SODIUM 100 MG/ML
40 INJECTION SUBCUTANEOUS DAILY
Status: DISCONTINUED | OUTPATIENT
Start: 2025-02-12 | End: 2025-02-12

## 2025-02-11 RX ORDER — ONDANSETRON 2 MG/ML
4 INJECTION INTRAMUSCULAR; INTRAVENOUS EVERY 6 HOURS PRN
Status: DISCONTINUED | OUTPATIENT
Start: 2025-02-11 | End: 2025-02-14 | Stop reason: HOSPADM

## 2025-02-11 RX ORDER — SODIUM CHLORIDE 0.9 % (FLUSH) 0.9 %
5-40 SYRINGE (ML) INJECTION PRN
Status: DISCONTINUED | OUTPATIENT
Start: 2025-02-11 | End: 2025-02-14 | Stop reason: HOSPADM

## 2025-02-11 RX ORDER — MAGNESIUM SULFATE IN WATER 40 MG/ML
2000 INJECTION, SOLUTION INTRAVENOUS PRN
Status: DISCONTINUED | OUTPATIENT
Start: 2025-02-11 | End: 2025-02-14 | Stop reason: HOSPADM

## 2025-02-11 RX ORDER — ONDANSETRON 4 MG/1
4 TABLET, ORALLY DISINTEGRATING ORAL EVERY 8 HOURS PRN
Status: DISCONTINUED | OUTPATIENT
Start: 2025-02-11 | End: 2025-02-14 | Stop reason: HOSPADM

## 2025-02-11 RX ORDER — POLYETHYLENE GLYCOL 3350 17 G/17G
17 POWDER, FOR SOLUTION ORAL DAILY PRN
Status: DISCONTINUED | OUTPATIENT
Start: 2025-02-11 | End: 2025-02-14 | Stop reason: HOSPADM

## 2025-02-11 RX ORDER — DEXTROSE MONOHYDRATE 100 MG/ML
INJECTION, SOLUTION INTRAVENOUS CONTINUOUS PRN
Status: DISCONTINUED | OUTPATIENT
Start: 2025-02-11 | End: 2025-02-14 | Stop reason: HOSPADM

## 2025-02-11 RX ORDER — GLUCAGON 1 MG/ML
1 KIT INJECTION PRN
Status: DISCONTINUED | OUTPATIENT
Start: 2025-02-11 | End: 2025-02-14 | Stop reason: HOSPADM

## 2025-02-11 RX ORDER — LACTULOSE 10 G/15ML
30 SOLUTION ORAL 4 TIMES DAILY
Status: DISCONTINUED | OUTPATIENT
Start: 2025-02-11 | End: 2025-02-12

## 2025-02-11 RX ORDER — SODIUM CHLORIDE 9 MG/ML
INJECTION, SOLUTION INTRAVENOUS PRN
Status: DISCONTINUED | OUTPATIENT
Start: 2025-02-11 | End: 2025-02-14 | Stop reason: HOSPADM

## 2025-02-11 RX ORDER — SODIUM CHLORIDE 0.9 % (FLUSH) 0.9 %
5-40 SYRINGE (ML) INJECTION EVERY 12 HOURS SCHEDULED
Status: DISCONTINUED | OUTPATIENT
Start: 2025-02-11 | End: 2025-02-14 | Stop reason: HOSPADM

## 2025-02-11 RX ORDER — INSULIN LISPRO 100 [IU]/ML
0-8 INJECTION, SOLUTION INTRAVENOUS; SUBCUTANEOUS
Status: DISCONTINUED | OUTPATIENT
Start: 2025-02-11 | End: 2025-02-14

## 2025-02-11 RX ORDER — POTASSIUM CHLORIDE 7.45 MG/ML
10 INJECTION INTRAVENOUS PRN
Status: DISCONTINUED | OUTPATIENT
Start: 2025-02-11 | End: 2025-02-14 | Stop reason: HOSPADM

## 2025-02-11 RX ORDER — 0.9 % SODIUM CHLORIDE 0.9 %
1000 INTRAVENOUS SOLUTION INTRAVENOUS ONCE
Status: COMPLETED | OUTPATIENT
Start: 2025-02-11 | End: 2025-02-11

## 2025-02-11 RX ORDER — LACTULOSE 10 G/15ML
20 SOLUTION ORAL ONCE
Status: DISCONTINUED | OUTPATIENT
Start: 2025-02-11 | End: 2025-02-14 | Stop reason: HOSPADM

## 2025-02-11 RX ADMIN — SODIUM CHLORIDE 1000 ML: 9 INJECTION, SOLUTION INTRAVENOUS at 18:02

## 2025-02-11 NOTE — ED PROVIDER NOTES
[facility] EMERGENCY DEPARTMENT  EMERGENCY DEPARTMENT ENCOUNTER        Pt Name: Lisa Hunt  MRN: [id number]  Birthdate 1960  Date of evaluation: 2/11/2025  Provider: Trupti Escamilla DO  PCP: Tonie Hernandez DO  Note Started: 4:43 PM EST 2/11/25    CHIEF COMPLAINT       Chief Complaint   Patient presents with    Altered Mental Status     AMS, Patient alert to painful stimuli, LKW \"last night\", increased lethargy today. ,, hx high ammonia, Khmer speaking    Fatigue       HISTORY OF PRESENT ILLNESS: 1 or more Elements   History From: Patient    Limitations to history : Altered Mental Status  Social Determinants : None    Lisa Hunt is a 64 y.o. female who presents for altered mental status.  Patient was sent from home for altered mental status.  She was last known well last night.  She has history of high ammonia.  She was just admitted to the hospital for this issue.  She has had increased lethargy today.  She denies any symptoms.  Denies any fever, chills, n/v, headache, dizziness, vision changes, neck tenderness or stiffness, weakness, cp, palpitations, leg swelling/tenderness, sob, cough, abd pain, dysuria, hematuria, diarrhea, constipation, bloody stools.    Nursing Notes were all reviewed and agreed with or any disagreements were addressed in the HPI.    ROS:   Pertinent positives and negatives are stated within HPI, all other systems reviewed and are negative.      --------------------------------------------- PAST HISTORY ---------------------------------------------  Past Medical History:  has a past medical history of DONNA (acute kidney injury) (HCC), Cirrhosis (HCC), Diabetes mellitus (HCC), Diabetic neuropathy (HCC), Esophageal varices without bleeding (HCC), GERD (gastroesophageal reflux disease), Hypertension, Intracranial bleed (HCC), Liver cirrhosis (HCC), Low vitamin D level, SDH (subdural hematoma), Thrombocytopenia (HCC), and Type 2

## 2025-02-12 ENCOUNTER — TELEPHONE (OUTPATIENT)
Dept: GASTROENTEROLOGY | Age: 65
End: 2025-02-12

## 2025-02-12 PROBLEM — N18.9 CHRONIC KIDNEY DISEASE: Status: ACTIVE | Noted: 2023-12-22

## 2025-02-12 LAB
25(OH)D3 SERPL-MCNC: 36.6 NG/ML (ref 30–100)
ABO + RH BLD: NORMAL
ALBUMIN SERPL-MCNC: 3.8 G/DL (ref 3.5–5.2)
ALBUMIN SERPL-MCNC: 4.1 G/DL (ref 3.5–5.2)
ALP SERPL-CCNC: 135 U/L (ref 35–104)
ALP SERPL-CCNC: 147 U/L (ref 35–104)
ALT SERPL-CCNC: 24 U/L (ref 0–32)
ALT SERPL-CCNC: 26 U/L (ref 0–32)
AMMONIA PLAS-SCNC: 86 UMOL/L (ref 11–51)
ANION GAP SERPL CALCULATED.3IONS-SCNC: 11 MMOL/L (ref 7–16)
ANION GAP SERPL CALCULATED.3IONS-SCNC: 13 MMOL/L (ref 7–16)
ARM BAND NUMBER: NORMAL
AST SERPL-CCNC: 41 U/L (ref 0–31)
AST SERPL-CCNC: 41 U/L (ref 0–31)
BASOPHILS # BLD: 0 K/UL (ref 0–0.2)
BASOPHILS NFR BLD: 0 % (ref 0–2)
BILIRUB SERPL-MCNC: 1.9 MG/DL (ref 0–1.2)
BILIRUB SERPL-MCNC: 2.3 MG/DL (ref 0–1.2)
BLOOD BANK SAMPLE EXPIRATION: NORMAL
BLOOD GROUP ANTIBODIES SERPL: NEGATIVE
BUN SERPL-MCNC: 51 MG/DL (ref 6–23)
BUN SERPL-MCNC: 53 MG/DL (ref 6–23)
CALCIUM SERPL-MCNC: 9.2 MG/DL (ref 8.6–10.2)
CALCIUM SERPL-MCNC: 9.5 MG/DL (ref 8.6–10.2)
CHLORIDE SERPL-SCNC: 101 MMOL/L (ref 98–107)
CHLORIDE SERPL-SCNC: 102 MMOL/L (ref 98–107)
CO2 SERPL-SCNC: 23 MMOL/L (ref 22–29)
CO2 SERPL-SCNC: 23 MMOL/L (ref 22–29)
CREAT SERPL-MCNC: 1.5 MG/DL (ref 0.5–1)
CREAT SERPL-MCNC: 1.5 MG/DL (ref 0.5–1)
EKG ATRIAL RATE: 76 BPM
EKG P AXIS: 18 DEGREES
EKG P-R INTERVAL: 162 MS
EKG Q-T INTERVAL: 442 MS
EKG QRS DURATION: 134 MS
EKG QTC CALCULATION (BAZETT): 497 MS
EKG R AXIS: -48 DEGREES
EKG T AXIS: 13 DEGREES
EKG VENTRICULAR RATE: 76 BPM
EOSINOPHIL # BLD: 0.07 K/UL (ref 0.05–0.5)
EOSINOPHILS RELATIVE PERCENT: 5 % (ref 0–6)
ERYTHROCYTE [DISTWIDTH] IN BLOOD BY AUTOMATED COUNT: 16.8 % (ref 11.5–15)
FOLATE SERPL-MCNC: 8.9 NG/ML (ref 4.8–24.2)
GFR, ESTIMATED: 40 ML/MIN/1.73M2
GFR, ESTIMATED: 40 ML/MIN/1.73M2
GLUCOSE BLD-MCNC: 287 MG/DL (ref 74–99)
GLUCOSE BLD-MCNC: 324 MG/DL (ref 74–99)
GLUCOSE BLD-MCNC: 334 MG/DL (ref 74–99)
GLUCOSE BLD-MCNC: 361 MG/DL (ref 74–99)
GLUCOSE BLD-MCNC: 364 MG/DL (ref 74–99)
GLUCOSE SERPL-MCNC: 331 MG/DL (ref 74–99)
GLUCOSE SERPL-MCNC: 345 MG/DL (ref 74–99)
HBA1C MFR BLD: 6.3 % (ref 4–5.6)
HCT VFR BLD AUTO: 26 % (ref 34–48)
HGB BLD-MCNC: 8.6 G/DL (ref 11.5–15.5)
INR PPP: 1.6
LYMPHOCYTES NFR BLD: 0.21 K/UL (ref 1.5–4)
LYMPHOCYTES RELATIVE PERCENT: 15 % (ref 20–42)
MCH RBC QN AUTO: 32.3 PG (ref 26–35)
MCHC RBC AUTO-ENTMCNC: 33.1 G/DL (ref 32–34.5)
MCV RBC AUTO: 97.7 FL (ref 80–99.9)
MONOCYTES NFR BLD: 0.09 K/UL (ref 0.1–0.95)
MONOCYTES NFR BLD: 6 % (ref 2–12)
NEUTROPHILS NFR BLD: 74 % (ref 43–80)
NEUTS SEG NFR BLD: 1.03 K/UL (ref 1.8–7.3)
PLATELET CONFIRMATION: NORMAL
PLATELET, FLUORESCENCE: 21 K/UL (ref 130–450)
PMV BLD AUTO: 13.6 FL (ref 7–12)
POTASSIUM SERPL-SCNC: 4.5 MMOL/L (ref 3.5–5)
POTASSIUM SERPL-SCNC: 4.9 MMOL/L (ref 3.5–5)
PROT SERPL-MCNC: 7.6 G/DL (ref 6.4–8.3)
PROT SERPL-MCNC: 8.2 G/DL (ref 6.4–8.3)
PROTHROMBIN TIME: 17.1 SEC (ref 9.3–12.4)
RBC # BLD AUTO: 2.66 M/UL (ref 3.5–5.5)
RBC # BLD: ABNORMAL 10*6/UL
SODIUM SERPL-SCNC: 135 MMOL/L (ref 132–146)
SODIUM SERPL-SCNC: 138 MMOL/L (ref 132–146)
TSH SERPL DL<=0.05 MIU/L-ACNC: 4.02 UIU/ML (ref 0.27–4.2)
VIT B12 SERPL-MCNC: 779 PG/ML (ref 211–946)
WBC OTHER # BLD: 1.4 K/UL (ref 4.5–11.5)

## 2025-02-12 PROCEDURE — 36415 COLL VENOUS BLD VENIPUNCTURE: CPT

## 2025-02-12 PROCEDURE — 2500000003 HC RX 250 WO HCPCS: Performed by: FAMILY MEDICINE

## 2025-02-12 PROCEDURE — 6370000000 HC RX 637 (ALT 250 FOR IP): Performed by: FAMILY MEDICINE

## 2025-02-12 PROCEDURE — 99222 1ST HOSP IP/OBS MODERATE 55: CPT | Performed by: NURSE PRACTITIONER

## 2025-02-12 PROCEDURE — 84443 ASSAY THYROID STIM HORMONE: CPT

## 2025-02-12 PROCEDURE — 86900 BLOOD TYPING SEROLOGIC ABO: CPT

## 2025-02-12 PROCEDURE — 82306 VITAMIN D 25 HYDROXY: CPT

## 2025-02-12 PROCEDURE — 82140 ASSAY OF AMMONIA: CPT

## 2025-02-12 PROCEDURE — P9073 PLATELETS PHERESIS PATH REDU: HCPCS

## 2025-02-12 PROCEDURE — 80053 COMPREHEN METABOLIC PANEL: CPT

## 2025-02-12 PROCEDURE — 86850 RBC ANTIBODY SCREEN: CPT

## 2025-02-12 PROCEDURE — 85610 PROTHROMBIN TIME: CPT

## 2025-02-12 PROCEDURE — 99232 SBSQ HOSP IP/OBS MODERATE 35: CPT | Performed by: STUDENT IN AN ORGANIZED HEALTH CARE EDUCATION/TRAINING PROGRAM

## 2025-02-12 PROCEDURE — 6360000002 HC RX W HCPCS: Performed by: FAMILY MEDICINE

## 2025-02-12 PROCEDURE — 82607 VITAMIN B-12: CPT

## 2025-02-12 PROCEDURE — 82962 GLUCOSE BLOOD TEST: CPT

## 2025-02-12 PROCEDURE — 85025 COMPLETE CBC W/AUTO DIFF WBC: CPT

## 2025-02-12 PROCEDURE — 6370000000 HC RX 637 (ALT 250 FOR IP): Performed by: INTERNAL MEDICINE

## 2025-02-12 PROCEDURE — 83036 HEMOGLOBIN GLYCOSYLATED A1C: CPT

## 2025-02-12 PROCEDURE — 93010 ELECTROCARDIOGRAM REPORT: CPT | Performed by: INTERNAL MEDICINE

## 2025-02-12 PROCEDURE — 86901 BLOOD TYPING SEROLOGIC RH(D): CPT

## 2025-02-12 PROCEDURE — 82746 ASSAY OF FOLIC ACID SERUM: CPT

## 2025-02-12 PROCEDURE — 1200000000 HC SEMI PRIVATE

## 2025-02-12 RX ORDER — INSULIN GLARGINE 100 [IU]/ML
19 INJECTION, SOLUTION SUBCUTANEOUS NIGHTLY
Status: DISCONTINUED | OUTPATIENT
Start: 2025-02-12 | End: 2025-02-12

## 2025-02-12 RX ORDER — PANTOPRAZOLE SODIUM 40 MG/1
40 TABLET, DELAYED RELEASE ORAL
Status: DISCONTINUED | OUTPATIENT
Start: 2025-02-12 | End: 2025-02-14 | Stop reason: HOSPADM

## 2025-02-12 RX ORDER — SPIRONOLACTONE 25 MG/1
50 TABLET ORAL DAILY
Status: DISCONTINUED | OUTPATIENT
Start: 2025-02-12 | End: 2025-02-14 | Stop reason: HOSPADM

## 2025-02-12 RX ORDER — CARVEDILOL 3.12 MG/1
3.12 TABLET ORAL DAILY
Status: DISCONTINUED | OUTPATIENT
Start: 2025-02-12 | End: 2025-02-14 | Stop reason: HOSPADM

## 2025-02-12 RX ORDER — LACTULOSE 10 G/15ML
30 SOLUTION ORAL 4 TIMES DAILY
Status: DISCONTINUED | OUTPATIENT
Start: 2025-02-12 | End: 2025-02-14 | Stop reason: HOSPADM

## 2025-02-12 RX ORDER — SODIUM CHLORIDE 9 MG/ML
INJECTION, SOLUTION INTRAVENOUS PRN
Status: DISCONTINUED | OUTPATIENT
Start: 2025-02-12 | End: 2025-02-14 | Stop reason: HOSPADM

## 2025-02-12 RX ORDER — BUMETANIDE 1 MG/1
1 TABLET ORAL DAILY
Status: DISCONTINUED | OUTPATIENT
Start: 2025-02-12 | End: 2025-02-14 | Stop reason: HOSPADM

## 2025-02-12 RX ORDER — INSULIN LISPRO 100 [IU]/ML
12 INJECTION, SOLUTION INTRAVENOUS; SUBCUTANEOUS
Status: DISCONTINUED | OUTPATIENT
Start: 2025-02-12 | End: 2025-02-14

## 2025-02-12 RX ORDER — INSULIN GLARGINE 100 [IU]/ML
18 INJECTION, SOLUTION SUBCUTANEOUS NIGHTLY
Status: DISCONTINUED | OUTPATIENT
Start: 2025-02-12 | End: 2025-02-13

## 2025-02-12 RX ADMIN — SODIUM CHLORIDE, PRESERVATIVE FREE 10 ML: 5 INJECTION INTRAVENOUS at 11:27

## 2025-02-12 RX ADMIN — SODIUM CHLORIDE, PRESERVATIVE FREE 10 ML: 5 INJECTION INTRAVENOUS at 21:44

## 2025-02-12 RX ADMIN — LACTULOSE 30 G: 20 SOLUTION ORAL at 21:44

## 2025-02-12 RX ADMIN — INSULIN LISPRO 8 UNITS: 100 INJECTION, SOLUTION INTRAVENOUS; SUBCUTANEOUS at 11:51

## 2025-02-12 RX ADMIN — LACTULOSE 30 G: 20 SOLUTION ORAL at 17:33

## 2025-02-12 RX ADMIN — LACTULOSE 30 G: 20 SOLUTION ORAL at 13:13

## 2025-02-12 RX ADMIN — INSULIN LISPRO 4 UNITS: 100 INJECTION, SOLUTION INTRAVENOUS; SUBCUTANEOUS at 21:44

## 2025-02-12 RX ADMIN — BUMETANIDE 1 MG: 1 TABLET ORAL at 08:35

## 2025-02-12 RX ADMIN — ENOXAPARIN SODIUM 40 MG: 100 INJECTION SUBCUTANEOUS at 08:35

## 2025-02-12 RX ADMIN — LACTULOSE 30 G: 20 SOLUTION ORAL at 08:34

## 2025-02-12 RX ADMIN — CARVEDILOL 3.12 MG: 3.12 TABLET, FILM COATED ORAL at 08:35

## 2025-02-12 RX ADMIN — SODIUM CHLORIDE, PRESERVATIVE FREE 10 ML: 5 INJECTION INTRAVENOUS at 01:04

## 2025-02-12 RX ADMIN — INSULIN GLARGINE 18 UNITS: 100 INJECTION, SOLUTION SUBCUTANEOUS at 21:44

## 2025-02-12 RX ADMIN — RIFAXIMIN 400 MG: 200 TABLET ORAL at 13:30

## 2025-02-12 RX ADMIN — INSULIN LISPRO 6 UNITS: 100 INJECTION, SOLUTION INTRAVENOUS; SUBCUTANEOUS at 01:02

## 2025-02-12 RX ADMIN — INSULIN LISPRO 8 UNITS: 100 INJECTION, SOLUTION INTRAVENOUS; SUBCUTANEOUS at 08:35

## 2025-02-12 RX ADMIN — PANTOPRAZOLE SODIUM 40 MG: 40 TABLET, DELAYED RELEASE ORAL at 08:35

## 2025-02-12 RX ADMIN — LACTULOSE 30 G: 10 SOLUTION ORAL at 01:00

## 2025-02-12 RX ADMIN — SPIRONOLACTONE 50 MG: 25 TABLET ORAL at 08:36

## 2025-02-12 RX ADMIN — PANTOPRAZOLE SODIUM 40 MG: 40 TABLET, DELAYED RELEASE ORAL at 17:33

## 2025-02-12 RX ADMIN — INSULIN LISPRO 6 UNITS: 100 INJECTION, SOLUTION INTRAVENOUS; SUBCUTANEOUS at 17:33

## 2025-02-12 NOTE — CONSULTS
Gastroenterology, Hepatology, &  Advanced Endoscopy    Consult Note      Reason for Consult: Hepatic Encephalopathy    HPI:   Lisa Hunt is a 64 y.o. female w/ PMH of  has a past medical history of DONNA (acute kidney injury) (HCC), Cirrhosis (HCC), Diabetes mellitus (HCC), Diabetic neuropathy (HCC), Esophageal varices without bleeding (HCC), GERD (gastroesophageal reflux disease), Hypertension, Intracranial bleed (HCC), Liver cirrhosis (HCC), Low vitamin D level, SDH (subdural hematoma), Thrombocytopenia (HCC), and Type 2 diabetes mellitus without complication (HCC). who presents to the ED She has a history of HCC for which she has had y90 therapy and is being monitored. She denies significant abdominal distention but does report mild abdominal discomfort from ascites.  for altered mental status.  Patient was sent from home for altered mental status.  She was last known well last night.  She has history of high ammonia.  She was just admitted to the hospital for this issue.  She has had increased lethargy today.       PMH:       Diagnosis Date    DONNA (acute kidney injury) (HCC) 2023    Cirrhosis (HCC)     Diabetes mellitus (HCC)     Diabetic neuropathy (HCC)     Esophageal varices without bleeding (HCC)     GERD (gastroesophageal reflux disease)     Hypertension     Intracranial bleed (HCC) 2023    Liver cirrhosis (HCC)     with secondary esophageal varices, no signs/sx's of hepatic encephalopathy    Low vitamin D level     SDH (subdural hematoma) 2023    Thrombocytopenia (HCC)     Type 2 diabetes mellitus without complication (HCC)         PSH:       Procedure Laterality Date    APPENDECTOMY       SECTION      CHOLECYSTECTOMY      CT NEEDLE BIOPSY LIVER PERCUTANEOUS  2024    CT NEEDLE BIOPSY LIVER PERCUTANEOUS 2024 IRLANDA Ndiaye MD [facility]    CT NEEDLE BIOPSY LIVER PERCUTANEOUS  2024    CT NEEDLE BIOPSY LIVER PERCUTANEOUS 2024 Madison Ndiaye MD [facility]

## 2025-02-12 NOTE — PROGRESS NOTES
[facility] Hospitalist Progress Note    SYNOPSIS: Patient admitted on 2025 for Hepatic encephalopathy (HCC)      64 y.o. female who presented to [facility] with altered mental status.  Found to have ammonia level of 104.  Patient recently discharged on hospital 2024 for hepatic encephalopathy seen by GI at that time started rifaximin mean Aldactone and lactulose.  She also had paracentesis for ascites at admission.  She was advised to go to rehab facility but refused to wait to go home with her daughter.  CT head today negative for any acute findings.  Chest x-ray negative for any acute findings.  Blood gases pH of 7.47 pCO2 of 27 pO2 of one 5.3.  She was started on lactulose.  Patient is currently admitted for hepatic encephalopathy.      patient appears lethargic, able to communicate and answer questions.    SUBJECTIVE:  Stable overnight. No other overnight issues reported.   Patient seen and examined  Records reviewed.         Temp (24hrs), Av.8 °F (36.6 °C), Min:96.4 °F (35.8 °C), Max:98.4 °F (36.9 °C)    DIET: Diet NPO Exceptions are: Sips of Water with Meds  CODE: Full Code  No intake or output data in the 24 hours ending 25 1318    Review of Systems  All bolded are positive; please see HPI  General:  Fever, chills, diaphoresis, fatigue, malaise, night sweats, weight loss  Psychological:  Anxiety, disorientation, hallucinations.  ENT:  Epistaxis, headaches, vertigo, visual changes.  Cardiovascular:  Chest pain, irregular heartbeats, palpitations, paroxysmal nocturnal dyspnea.  Respiratory:  Shortness of breath, coughing, sputum production, hemoptysis, wheezing, orthopnea.  Gastrointestinal:  Nausea, vomiting, diarrhea, heartburn, constipation, abdominal pain, hematemesis, hematochezia, melena, acholic stools  Genito-Urinary:  Dysuria, urgency, frequency, hematuria  Musculoskeletal:  Joint pain, joint stiffness, joint swelling, muscle pain  Neurology:  Headache,  focal neurological deficits, weakness, numbness, paresthesia  Derm:  Rashes, ulcers, excoriations, bruising, icteric  Extremities:  Decreased ROM, peripheral edema, mottling      OBJECTIVE:    BP (!) 140/89   Pulse 86   Temp (!) 96.4 °F (35.8 °C) (Temporal)   Resp 14   SpO2 98%     General appearance:  awake, alert, and oriented to person, place, time, and purpose; appears stated age and cooperative; no apparent distress no labored breathing, lethargic, chronically ill  HEENT:  Conjunctivae/corneas clear.  Icteric  Neck: Supple. No jugular venous distention.   Respiratory: symmetrical; clear to auscultation bilaterally; no wheezes; no rhonchi; no rales  Cardiovascular: rhythm regular; rate controlled; no murmurs  Abdomen: Soft, nontender, distended due to ascites extremities:  peripheral pulses present; no peripheral edema; no ulcers  Musculoskeletal: No clubbing, cyanosis, no bilateral lower extremity edema. Brisk capillary refill.   Skin: Icterus neurologic: awake, alert and following commands     ASSESSMENT and PLAN:    Assessment    Altered mental status  Hepatic encephalopathy  CAOSTA liver cirrhosis with recurrent ascites, varices/GAVE  Chronic thrombocytopenia  Pancytopenia  Insulin-dependent diabetes mellitus  CKD stage IIIb  Diabetic neuropathy  GERD  Hypertension  Subdural hematoma        Plan  -Continue with lactulose, rifaximin  -Lactulose dosing to be titrated to 2-3 BM daily. Once he has 2 BM and mental status is at baseline, can hold additional doses for the day and start again the following day.   -GI on board, follow recommendations  -1 unit platelet transfusion, for tentative IR guided paracentesis  -Neurochecks  -Insulin sliding scale, Accu-Cheks, hypoglycemia protocol, endocrine consulted for uncontrolled diabetes mellitus, check A1c  -Check TSH, lipid panel, folic acid, vitamin B12, vitamin D  - consideration for EGD this admission for surveillance of varices/GAVE  -Continue with Bumex,

## 2025-02-12 NOTE — CONSENT
Informed Consent for Blood Component Transfusion Note    I have discussed with the patient the rationale for blood component transfusion; its benefits in treating or preventing fatigue, organ damage, or death; and its risk which includes mild transfusion reactions, rare risk of blood borne infection, or more serious but rare reactions. I have discussed the alternatives to transfusion, including the risk and consequences of not receiving transfusion. The patient had an opportunity to ask questions and had agreed to proceed with transfusion of blood components.    Electronically signed by Shay Parks MD on 2/12/25 at 10:15 AM EST

## 2025-02-12 NOTE — H&P
to Admission medications    Medication Sig Start Date End Date Taking? Authorizing Provider   spironolactone (ALDACTONE) 50 MG tablet  1/11/25   Jamie Palencia MD   lactulose (CHRONULAC) 10 GM/15ML solution Take 30 mLs by mouth every 8 (eight) hours Titrate to goal of 2-3 bowel movement per day 1/15/25   Kymberly Sosa MD   insulin lispro (HUMALOG,ADMELOG) 100 UNIT/ML SOLN injection vial Inject 12 Units into the skin 3 times daily (before meals) 1/13/25   Tonie Hernandez DO   insulin glargine (LANTUS) 100 UNIT/ML injection vial Inject 38 Units into the skin nightly 1/13/25   Tonie Hernandez DO   pantoprazole (PROTONIX) 40 MG tablet Take 1 tablet by mouth 2 times daily (before meals) 1/11/25   Josue Alves MD   bumetanide (BUMEX) 1 MG tablet Take 1 tablet by mouth daily 12/13/24   Nilsa George MD   rifAXIMin (XIFAXAN) 550 MG tablet Take 1 tablet by mouth 2 times daily    ProviderJamie MD   carvedilol (COREG) 6.25 MG tablet Take 0.5 tablets by mouth daily 5/30/24   Josue Alves MD       Allergies:  Fish allergy    Social History:      The patient currently lives at home    TOBACCO:   reports that she has never smoked. She has been exposed to tobacco smoke. She has never used smokeless tobacco.  ETOH:   reports that she does not currently use alcohol.      Family History:       Reviewed in detail and negative for DM, CAD, Cancer, CVA. Positive as follows:        Problem Relation Age of Onset    Diabetes Mother        REVIEW OF SYSTEMS:   Pertinent positives as noted in the HPI. All other systems reviewed and negative.    PHYSICAL EXAM:    BP (!) 117/43   Pulse 77   Temp 98.4 °F (36.9 °C)   Resp 16   SpO2 99%     General appearance:  No apparent distress, appears stated age and confused.  HEENT:  Normal cephalic, atraumatic without obvious deformity. Pupils equal, round, and reactive to light.  Extra ocular muscles intact. Conjunctivae/corneas clear.  Neck: Supple, with full range of  Advance diet as tolerated monitor blood sugar and adjust insulin as needed.             Rober Barros MD    Thank you Tonie Hernandez DO for the opportunity to be involved in this patient's care. If you have any questions or concerns please feel free to contact me through [employer].

## 2025-02-12 NOTE — PROGRESS NOTES
Spoke with bedside nurse regarding paracentesis. Procedure will be completed on 2/13.  Lovenox to be held in AM for procedure.

## 2025-02-12 NOTE — CONSULTS
sodium chloride      dextrose         Review of Systems  All systems reviewed. All negative except for symptoms mentioned in HPI     OBJECTIVE    BP (!) 140/89   Pulse 86   Temp (!) 96.4 °F (35.8 °C) (Temporal)   Resp 14   SpO2 98%   No intake or output data in the 24 hours ending 02/12/25 1430      Physical examination:  General: awake alert, oriented x3  HEENT: normocephalic non traumatic, no exophthalmos   Neck: supple, No thyroid tenderness,  Pulm: good equal air entry no added sounds  CVS: S1 + S2  Abd: soft lax, no tenderness  Skin: warm, no lesions, no rash. No open wounds, no ulcers   Neuro: CN intact, sensation decreased bilateral , muscle power normal  Psych: normal mood, and affect    Review of Laboratory Data:  I personally reviewed the following labs:   Recent Labs     02/11/25  1550 02/12/25  0747   WBC 1.4* 1.4*   RBC 2.85* 2.66*   HGB 9.2* 8.6*   HCT 27.7* 26.0*   MCV 97.2 97.7   MCH 32.3 32.3   MCHC 33.2 33.1   RDW 17.1* 16.8*   MPV 14.1* 13.6*     Recent Labs     02/11/25  1811 02/12/25  0114 02/12/25  0747    135 138   K 5.0 4.9 4.5    101 102   CO2 21* 23 23   BUN 52* 51* 53*   CREATININE 1.6* 1.5* 1.5*   GLUCOSE 311* 331* 345*   CALCIUM 9.1 9.2 9.5   BILITOT 1.8* 1.9* 2.3*   ALKPHOS 132* 135* 147*   AST 47* 41* 41*   ALT 22 24 26     Beta-Hydroxybutyrate   Date Value Ref Range Status   02/01/2025 0.11 0.02 - 0.27 mmol/L Final   01/08/2025 0.06 0.02 - 0.27 mmol/L Final   12/05/2024 0.06 0.02 - 0.27 mmol/L Final     Lab Results   Component Value Date/Time    LABA1C 6.7 02/01/2025 08:54 AM    LABA1C 6.9 01/05/2025 08:05 AM    LABA1C 8.8 12/06/2024 07:04 AM     Lab Results   Component Value Date/Time    TSH 4.02 02/12/2025 07:47 AM    T4FREE 1.3 12/05/2024 10:49 AM     Lab Results   Component Value Date/Time    LABA1C 6.7 02/01/2025 08:54 AM    GLUCOSE 345 02/12/2025 07:47 AM     Lab Results   Component Value Date/Time    TRIG 59 12/06/2024 07:04 AM    HDL 29 12/06/2024 07:04 AM

## 2025-02-12 NOTE — PROGRESS NOTES
SPEECH LANGUAGE PATHOLOGY  DAILY PROGRESS NOTE        PATIENT NAME:  Lisa Hunt      ROOM:  5413/5413-A :  1960         TODAY'S DATE:  2025       Attempted to see patient x2 for evaluation.   Unable to fully awaken patient to participate for evaluation and swallowing evaluation.   Will attempt tomorrow.

## 2025-02-12 NOTE — PROGRESS NOTES
4 Eyes Skin Assessment     NAME:  Lisa Hunt  YOB: 1960  MEDICAL RECORD NUMBER:  [id number]    The patient is being assessed for  Admission    I agree that at least one RN has performed a thorough Head to Toe Skin Assessment on the patient. ALL assessment sites listed below have been assessed.      Areas assessed by both nurses:    Head, Face, Ears, Shoulders, Back, Chest, Arms, Elbows, Hands, Sacrum. Buttock, Coccyx, Ischium, and Legs. Feet and Heels  Groin moist         Does the Patient have a Wound? No noted wound(s)       Kane Prevention initiated by RN: Yes  Wound Care Orders initiated by RN: No    Pressure Injury (Stage 3,4, Unstageable, DTI, NWPT, and Complex wounds) if present, place Wound referral order by RN under : No    New Ostomies, if present place, Ostomy referral order under : No     Nurse 1 eSignature: Electronically signed by Samantha Faith RN on 2/12/25 at 12:37 PM EST    **SHARE this note so that the co-signing nurse can place an eSignature**    Nurse 2 eSignature: Electronically signed by Kelsey Cervantes RN on 2/12/25 at 7:45 PM EST

## 2025-02-12 NOTE — PROGRESS NOTES
Attempted to call jane Gonzalez and Kelsey Fitch to obtain consent for blood transfusion and obtain admission information. Left message for Lisa to return call.

## 2025-02-12 NOTE — TELEPHONE ENCOUNTER
Please call patient to let her that her appointment from 2/12/25 was rescheduled to 4/23/25 due to patient being admitted to the hospital. Faroese speaking       When message has been addressed, please route message to office pool not to original sender.

## 2025-02-13 ENCOUNTER — APPOINTMENT (OUTPATIENT)
Dept: INTERVENTIONAL RADIOLOGY/VASCULAR | Age: 65
End: 2025-02-13
Payer: MEDICARE

## 2025-02-13 LAB
AMMONIA PLAS-SCNC: 113 UMOL/L (ref 11–51)
ANION GAP SERPL CALCULATED.3IONS-SCNC: 15 MMOL/L (ref 7–16)
ARM BAND NUMBER: NORMAL
BASOPHILS # BLD: 0 K/UL (ref 0–0.2)
BASOPHILS NFR BLD: 0 % (ref 0–2)
BLOOD BANK BLOOD PRODUCT EXPIRATION DATE: NORMAL
BLOOD BANK DISPENSE STATUS: NORMAL
BLOOD BANK ISBT PRODUCT BLOOD TYPE: 6200
BLOOD BANK PRODUCT CODE: NORMAL
BLOOD BANK UNIT TYPE AND RH: NORMAL
BPU ID: NORMAL
BUN SERPL-MCNC: 55 MG/DL (ref 6–23)
CALCIUM SERPL-MCNC: 9.4 MG/DL (ref 8.6–10.2)
CHLORIDE SERPL-SCNC: 102 MMOL/L (ref 98–107)
CHLORIDE UR-SCNC: 50 MMOL/L
CO2 SERPL-SCNC: 23 MMOL/L (ref 22–29)
COMPONENT: NORMAL
CREAT SERPL-MCNC: 1.7 MG/DL (ref 0.5–1)
CREAT UR-MCNC: 83.7 MG/DL (ref 29–226)
CREAT UR-MCNC: 84 MG/DL (ref 29–226)
EOSINOPHIL # BLD: 0.01 K/UL (ref 0.05–0.5)
EOSINOPHILS RELATIVE PERCENT: 1 % (ref 0–6)
ERYTHROCYTE [DISTWIDTH] IN BLOOD BY AUTOMATED COUNT: 17 % (ref 11.5–15)
GFR, ESTIMATED: 33 ML/MIN/1.73M2
GLUCOSE BLD-MCNC: 128 MG/DL (ref 74–99)
GLUCOSE BLD-MCNC: 144 MG/DL (ref 74–99)
GLUCOSE BLD-MCNC: 259 MG/DL (ref 74–99)
GLUCOSE BLD-MCNC: 262 MG/DL (ref 74–99)
GLUCOSE BLD-MCNC: 301 MG/DL (ref 74–99)
GLUCOSE SERPL-MCNC: 293 MG/DL (ref 74–99)
HCT VFR BLD AUTO: 23.6 % (ref 34–48)
HGB BLD-MCNC: 7.7 G/DL (ref 11.5–15.5)
LYMPHOCYTES NFR BLD: 0.28 K/UL (ref 1.5–4)
LYMPHOCYTES RELATIVE PERCENT: 16 % (ref 20–42)
MCH RBC QN AUTO: 32.4 PG (ref 26–35)
MCHC RBC AUTO-ENTMCNC: 32.6 G/DL (ref 32–34.5)
MCV RBC AUTO: 99.2 FL (ref 80–99.9)
MICROORGANISM SPEC CULT: ABNORMAL
MONOCYTES NFR BLD: 0.1 K/UL (ref 0.1–0.95)
MONOCYTES NFR BLD: 6 % (ref 2–12)
NEUTROPHILS NFR BLD: 77 % (ref 43–80)
NEUTS SEG NFR BLD: 1.3 K/UL (ref 1.8–7.3)
PHOSPHATE SERPL-MCNC: 3.6 MG/DL (ref 2.5–4.5)
PLATELET, FLUORESCENCE: 21 K/UL (ref 130–450)
PMV BLD AUTO: 13.8 FL (ref 7–12)
POTASSIUM SERPL-SCNC: 4 MMOL/L (ref 3.5–5)
RBC # BLD AUTO: 2.38 M/UL (ref 3.5–5.5)
RBC # BLD: ABNORMAL 10*6/UL
SERVICE CMNT-IMP: ABNORMAL
SODIUM SERPL-SCNC: 140 MMOL/L (ref 132–146)
SODIUM UR-SCNC: 70 MMOL/L
SPECIMEN DESCRIPTION: ABNORMAL
TOTAL PROTEIN, URINE: 7 MG/DL (ref 0–12)
TOTAL PROTEIN, URINE: 7 MG/DL (ref 0–12)
TRANSFUSION STATUS: NORMAL
UNIT DIVISION: 0
UNIT ISSUE DATE/TIME: NORMAL
URINE TOTAL PROTEIN CREATININE RATIO: 0.09 (ref 0–0.2)
WBC OTHER # BLD: 1.7 K/UL (ref 4.5–11.5)

## 2025-02-13 PROCEDURE — 97535 SELF CARE MNGMENT TRAINING: CPT

## 2025-02-13 PROCEDURE — 0W9G3ZZ DRAINAGE OF PERITONEAL CAVITY, PERCUTANEOUS APPROACH: ICD-10-PCS | Performed by: FAMILY MEDICINE

## 2025-02-13 PROCEDURE — 84300 ASSAY OF URINE SODIUM: CPT

## 2025-02-13 PROCEDURE — 80048 BASIC METABOLIC PNL TOTAL CA: CPT

## 2025-02-13 PROCEDURE — 49083 ABD PARACENTESIS W/IMAGING: CPT

## 2025-02-13 PROCEDURE — 2500000003 HC RX 250 WO HCPCS: Performed by: FAMILY MEDICINE

## 2025-02-13 PROCEDURE — 82962 GLUCOSE BLOOD TEST: CPT

## 2025-02-13 PROCEDURE — 85025 COMPLETE CBC W/AUTO DIFF WBC: CPT

## 2025-02-13 PROCEDURE — 76705 ECHO EXAM OF ABDOMEN: CPT

## 2025-02-13 PROCEDURE — 6370000000 HC RX 637 (ALT 250 FOR IP): Performed by: INTERNAL MEDICINE

## 2025-02-13 PROCEDURE — 36415 COLL VENOUS BLD VENIPUNCTURE: CPT

## 2025-02-13 PROCEDURE — 6370000000 HC RX 637 (ALT 250 FOR IP): Performed by: FAMILY MEDICINE

## 2025-02-13 PROCEDURE — 97530 THERAPEUTIC ACTIVITIES: CPT

## 2025-02-13 PROCEDURE — 1200000000 HC SEMI PRIVATE

## 2025-02-13 PROCEDURE — 99232 SBSQ HOSP IP/OBS MODERATE 35: CPT | Performed by: STUDENT IN AN ORGANIZED HEALTH CARE EDUCATION/TRAINING PROGRAM

## 2025-02-13 PROCEDURE — 84100 ASSAY OF PHOSPHORUS: CPT

## 2025-02-13 PROCEDURE — 82436 ASSAY OF URINE CHLORIDE: CPT

## 2025-02-13 PROCEDURE — 99232 SBSQ HOSP IP/OBS MODERATE 35: CPT | Performed by: NURSE PRACTITIONER

## 2025-02-13 PROCEDURE — P9073 PLATELETS PHERESIS PATH REDU: HCPCS

## 2025-02-13 PROCEDURE — 97161 PT EVAL LOW COMPLEX 20 MIN: CPT

## 2025-02-13 PROCEDURE — 84156 ASSAY OF PROTEIN URINE: CPT

## 2025-02-13 PROCEDURE — 82140 ASSAY OF AMMONIA: CPT

## 2025-02-13 PROCEDURE — 97165 OT EVAL LOW COMPLEX 30 MIN: CPT

## 2025-02-13 PROCEDURE — 2580000003 HC RX 258: Performed by: STUDENT IN AN ORGANIZED HEALTH CARE EDUCATION/TRAINING PROGRAM

## 2025-02-13 PROCEDURE — 82570 ASSAY OF URINE CREATININE: CPT

## 2025-02-13 RX ORDER — SODIUM CHLORIDE, SODIUM LACTATE, POTASSIUM CHLORIDE, CALCIUM CHLORIDE 600; 310; 30; 20 MG/100ML; MG/100ML; MG/100ML; MG/100ML
INJECTION, SOLUTION INTRAVENOUS CONTINUOUS
Status: ACTIVE | OUTPATIENT
Start: 2025-02-13 | End: 2025-02-14

## 2025-02-13 RX ORDER — INSULIN GLARGINE 100 [IU]/ML
22 INJECTION, SOLUTION SUBCUTANEOUS NIGHTLY
Status: DISCONTINUED | OUTPATIENT
Start: 2025-02-13 | End: 2025-02-14

## 2025-02-13 RX ORDER — SODIUM CHLORIDE 9 MG/ML
INJECTION, SOLUTION INTRAVENOUS PRN
Status: DISCONTINUED | OUTPATIENT
Start: 2025-02-13 | End: 2025-02-14 | Stop reason: HOSPADM

## 2025-02-13 RX ADMIN — RIFAXIMIN 400 MG: 200 TABLET ORAL at 10:04

## 2025-02-13 RX ADMIN — INSULIN LISPRO 12 UNITS: 100 INJECTION, SOLUTION INTRAVENOUS; SUBCUTANEOUS at 06:46

## 2025-02-13 RX ADMIN — SODIUM CHLORIDE, PRESERVATIVE FREE 10 ML: 5 INJECTION INTRAVENOUS at 21:41

## 2025-02-13 RX ADMIN — SODIUM CHLORIDE, PRESERVATIVE FREE 10 ML: 5 INJECTION INTRAVENOUS at 10:00

## 2025-02-13 RX ADMIN — INSULIN LISPRO 6 UNITS: 100 INJECTION, SOLUTION INTRAVENOUS; SUBCUTANEOUS at 06:45

## 2025-02-13 RX ADMIN — LACTULOSE 30 G: 20 SOLUTION ORAL at 10:00

## 2025-02-13 RX ADMIN — RIFAXIMIN 400 MG: 200 TABLET ORAL at 21:41

## 2025-02-13 RX ADMIN — LACTULOSE 30 G: 20 SOLUTION ORAL at 21:41

## 2025-02-13 RX ADMIN — RIFAXIMIN 400 MG: 200 TABLET ORAL at 00:10

## 2025-02-13 RX ADMIN — LACTULOSE 30 G: 20 SOLUTION ORAL at 17:13

## 2025-02-13 RX ADMIN — SODIUM CHLORIDE, POTASSIUM CHLORIDE, SODIUM LACTATE AND CALCIUM CHLORIDE: 600; 310; 30; 20 INJECTION, SOLUTION INTRAVENOUS at 10:33

## 2025-02-13 RX ADMIN — INSULIN LISPRO 4 UNITS: 100 INJECTION, SOLUTION INTRAVENOUS; SUBCUTANEOUS at 21:41

## 2025-02-13 RX ADMIN — INSULIN LISPRO 4 UNITS: 100 INJECTION, SOLUTION INTRAVENOUS; SUBCUTANEOUS at 17:12

## 2025-02-13 RX ADMIN — LACTULOSE 30 G: 20 SOLUTION ORAL at 13:34

## 2025-02-13 RX ADMIN — INSULIN GLARGINE 22 UNITS: 100 INJECTION, SOLUTION SUBCUTANEOUS at 21:41

## 2025-02-13 RX ADMIN — RIFAXIMIN 400 MG: 200 TABLET ORAL at 13:34

## 2025-02-13 RX ADMIN — PANTOPRAZOLE SODIUM 40 MG: 40 TABLET, DELAYED RELEASE ORAL at 17:13

## 2025-02-13 ASSESSMENT — PAIN SCALES - GENERAL: PAINLEVEL_OUTOF10: 0

## 2025-02-13 NOTE — PROGRESS NOTES
Discussed patient and IR procedure with bedside RN, all questions answered. Needs repeat CBC. Will update RN when resulted.

## 2025-02-13 NOTE — OR NURSING
Limited US. No paracentesis performed.    Post-Procedure SBAR Report    Patient Name: Lisa Hunt  MRN: [id number]  : 1960  Date of Procedure: 25  Completed Procedure: limited US    Telephone SBAR report provided to primary RN.     Information from the following report(s) Nurse Handoff Report was reviewed.    Opportunity for questions and clarification was provided.

## 2025-02-13 NOTE — PROGRESS NOTES
[facility] Hospitalist Progress Note    SYNOPSIS: Patient admitted on 2025 for Hepatic encephalopathy (HCC)      64 y.o. female who presented to [facility] with altered mental status.  Found to have ammonia level of 104.  Patient recently discharged on hospital 2024 for hepatic encephalopathy seen by GI at that time started rifaximin mean Aldactone and lactulose.  She also had paracentesis for ascites at admission.  She was advised to go to rehab facility but refused to wait to go home with her daughter.  CT head today negative for any acute findings.  Chest x-ray negative for any acute findings.  Blood gases pH of 7.47 pCO2 of 27 pO2 of one 5.3.  She was started on lactulose.  Patient is currently admitted for hepatic encephalopathy.      patient appears lethargic, able to communicate and answer questions.   patient is more awake and alert, answering questions.  Platelet count still less than 50,000, transfusing 1 unit platelet today for tentative IR guided paracentesis.    SUBJECTIVE:  Stable overnight. No other overnight issues reported.   Patient seen and examined  Records reviewed.         Temp (24hrs), Av.2 °F (36.2 °C), Min:96.8 °F (36 °C), Max:98 °F (36.7 °C)    DIET: Diet NPO Exceptions are: Sips of Water with Meds  CODE: Full Code    Intake/Output Summary (Last 24 hours) at 2025 1337  Last data filed at 2025 1337  Gross per 24 hour   Intake 1278.33 ml   Output --   Net 1278.33 ml       Review of Systems  All bolded are positive; please see HPI  General:  Fever, chills, diaphoresis, fatigue, malaise, night sweats, weight loss  Psychological:  Anxiety, disorientation, hallucinations.  ENT:  Epistaxis, headaches, vertigo, visual changes.  Cardiovascular:  Chest pain, irregular heartbeats, palpitations, paroxysmal nocturnal dyspnea.  Respiratory:  Shortness of breath, coughing, sputum production, hemoptysis, wheezing, orthopnea.  Gastrointestinal:  Nausea,  02/12/25  0747 02/13/25  0624    138 140   K 4.9 4.5 4.0    102 102   CO2 23 23 23   BUN 51* 53* 55*   CREATININE 1.5* 1.5* 1.7*   CALCIUM 9.2 9.5 9.4   PHOS  --   --  3.6       Recent Labs     02/11/25  1811 02/12/25  0114 02/12/25  0747   ALKPHOS 132* 135* 147*   ALT 22 24 26   AST 47* 41* 41*   BILITOT 1.8* 1.9* 2.3*       Recent Labs     02/12/25  0747   INR 1.6       No results for input(s): \"CKTOTAL\", \"TROPONINI\" in the last 72 hours.    Chronic labs:    Lab Results   Component Value Date    CHOL 88 12/06/2024    TRIG 59 12/06/2024    HDL 29 (L) 12/06/2024    TSH 4.02 02/12/2025    INR 1.6 02/12/2025    LABA1C 6.3 (H) 02/12/2025       Radiology: REVIEWED DAILY    +++++++++++++++++++++++++++++++++++++++++++++++++  Shay Parks MD  [facility]- [place], [place]  +++++++++++++++++++++++++++++++++++++++++++++++++  NOTE: This report was transcribed using voice recognition software. Every effort was made to ensure accuracy; however, inadvertent computerized transcription errors may be present.

## 2025-02-13 NOTE — BRIEF OP NOTE
Brief-Op Note  ______________________________________________________________      LIMITED U/S ABDOMEN FOR POSS. PARACENTESIS  [facility] ORTHO-TRAUMA    Patient Name: Lisa Hunt   YOB: 1960  Medical Record Number: [id number]  Date of Procedure: 2/13/25  Room/Bed: [address] [place] [address]      Pre-operative Diagnosis: Ascites    Post-operative Diagnosis: minimal ascites insufficient for paracentesis.    Consent: Informed consent was obtained from the daughter prior to the procedure. The details of the procedure, as well is its risks, benefits, and alternatives, were explained.      Performed by: RANJITH Alvarez under on-site supervision by Obinna Trevino MD.    Procedure: Routine scanning of all four abdominal quadrants was performed using real-time ultrasound and revealed minimal amount of ascites fluid present.  Decision was made not to proceed with procedure due to insufficient fluid present.  Risks and benefits were discussed with patient/family and agree not to proceed at this time. (See radiology dictation in PACs for image review and additional procedural information)    Complications: None    Specimen Obtained: None    Thank you for allowing Interventional Radiology to participate in the care of Lisa Hunt.     Electronically signed by RANJITH Alvarez   DD: 2/13/25  2:47 PM

## 2025-02-13 NOTE — CONSULTS
clear yellow ascites fluid removed    PARACENTESIS Left 12/16/2024    3950cc bright cler yellow fluid.    PARACENTESIS Left 02/03/2025    2070 clear/yellow ascites fluid removed.    UPPER GASTROINTESTINAL ENDOSCOPY  02/07/2022    [facility]    UPPER GASTROINTESTINAL ENDOSCOPY  11/01/2021    [facility]    UPPER GASTROINTESTINAL ENDOSCOPY  12/16/2019    [facility]    UPPER GASTROINTESTINAL ENDOSCOPY  04/26/2021    [facility]    UPPER GASTROINTESTINAL ENDOSCOPY N/A 01/12/2024    EGD BIOPSY performed by Anil Conrad MD at [facility]    UPPER GASTROINTESTINAL ENDOSCOPY N/A 01/12/2024    EGD BAND LIGATION performed by Anil Conrad MD at [facility] END[facility]    UPPER GASTROINTESTINAL ENDOSCOPY N/A 03/15/2024    ESOPHAGOGASTRODUODENOSCOPY POSSIBLE BAND LIGATION performed by Anil Conrad MD at [facility] END[facility]COPY    UPPER GASTROINTESTINAL ENDOSCOPY N/A 08/06/2024    ESOPHAGOGASTRODUODENOSCOPY CONTROL HEMORRHAGE performed by Anil Conrad MD at [facility] END[facility]    UPPER GASTROINTESTINAL ENDOSCOPY N/A 08/06/2024    ESOPHAGOGASTRODUODENOSCOPY BAND LIGATION performed by Anil Conrad MD at [facility] END[facility]COPY       Allergies:  Fish allergy    Current Medications:    0.9 % sodium chloride infusion, PRN  lactated ringers infusion, Continuous  [Held by provider] bumetanide (BUMEX) tablet 1 mg, Daily  carvedilol (COREG) tablet 3.125 mg, Daily  insulin lispro (HUMALOG,ADMELOG) injection vial 12 Units, TID AC  pantoprazole (PROTONIX) tablet 40 mg, BID AC  rifAXIMin (XIFAXAN) tablet 400 mg, TID  spironolactone (ALDACTONE) tablet 50 mg, Daily  lactulose (CHRONULAC) 10 GM/15ML solution 30 g, 4x daily  0.9 % sodium chloride infusion, PRN  insulin glargine (LANTUS) injection vial 18 Units, Nightly  lactulose (CHRONULAC) 10 GM/15ML solution 20 g, Once  sodium chloride flush 0.9 % injection 5-40 mL, 2 times per day  sodium chloride flush 0.9 % injection 5-40 mL, PRN  0.9 % sodium chloride infusion,      Plan  Continue the LR at 75 cc/hr   Urine indices   Hold the Bumex , hold the spironolactone. Resume once renal function improves   Follow labs  Monitor intake and output  Encourage PO intake when able  Avoid nephrotoxins   Continue supportive renal care       Thank you for the opportunity to participate in the care of your pleasant patient.  We look forward to following along with you.        Electronically signed by OK Ashraf CNP on 2/13/2025 at 3:45 PM

## 2025-02-13 NOTE — PROGRESS NOTES
Speech Language Pathology  NAME:  Lisa Hunt  :  1960  DATE: 2025  ROOM:  [facility]    Pt unavailable x2 for Clinical Swallow Evaluation       REASON:  With other medical staff OT and Patient NPO for procedure not able to address dysphagia goals due to this.    Will re-attempt as appropriate.       Thank You

## 2025-02-13 NOTE — PROGRESS NOTES
OCCUPATIONAL THERAPY INITIAL EVALUATION    [facility] [address], [place]      Date:2025                                                Patient Name: Lisa Hunt  MRN: [id number]  : 1960  [facility]     Evaluating OT:Dianna Flores, OTR/L   License #  [id number]       Referring Provider: Shay Parks MD     Specific Provider Orders/Date: OT evaluation & treatment        Diagnosis: Hepatic encephalopathy (HCC) [K76.82]      Pertinent Medical History:  has a past medical history of DONNA (acute kidney injury) (HCC), Cirrhosis (HCC), Diabetes mellitus (HCC), Diabetic neuropathy (HCC), Esophageal varices without bleeding (HCC), GERD (gastroesophageal reflux disease), Hypertension, Intracranial bleed (HCC), Liver cirrhosis (HCC), Low vitamin D level, SDH (subdural hematoma), Thrombocytopenia (HCC), and Type 2 diabetes mellitus without complication (HCC).    Surgery: none this admit    Past Surgical History:  has a past surgical history that includes Upper gastrointestinal endoscopy (2022); Upper gastrointestinal endoscopy (2021); Upper gastrointestinal endoscopy (2019); Upper gastrointestinal endoscopy (2021);  section; Cholecystectomy; Appendectomy; Paracentesis (Left, 10/12/2023); Paracentesis (Left, 2024); Upper gastrointestinal endoscopy (N/A, 2024); Upper gastrointestinal endoscopy (N/A, 2024); Paracentesis (Left, 2024); Upper gastrointestinal endoscopy (N/A, 03/15/2024); CT NEEDLE BIOPSY LIVER PERCUTANEOUS (2024); Upper gastrointestinal endoscopy (N/A, 2024); Upper gastrointestinal endoscopy (N/A, 2024); CT NEEDLE BIOPSY LIVER PERCUTANEOUS (2024); Paracentesis (Left, 10/30/2024); Paracentesis (Left, 2024); IR EMBOLIZATION TUMOR/ORGAN ISCH/INFARC (2024); Paracentesis (Left, 12/10/2024); IR VASC EMBOLIZE OCCLUDE ARTERY

## 2025-02-13 NOTE — PROGRESS NOTES
Comprehensive Nutrition Assessment    Type and Reason for Visit:  Initial, Positive nutrition screen    Nutrition Recommendations/Plan:     Continue NPO     Advance nutrition when medicaly feasible - pending swallow eval & procedure    Monitor ammonia trends - 113 on today's draw       Malnutrition Assessment:  Malnutrition Status:  Insufficient data (02/13/25 1421)    Context:  Acute Illness     Findings of the 6 clinical characteristics of malnutrition:  Energy Intake:  Mild decrease in energy intake (UTO PTA intakes)  Weight Loss:  Unable to assess (wt fluctuations in setting of liver disease)     Body Fat Loss:  No body fat loss   Muscle Mass Loss:  Mild muscle mass loss Clavicles (pectoralis & deltoids)  Fluid Accumulation:  No fluid accumulation   Strength:  Not Performed     Nutrition Assessment:    Pt admit w/ Hepatic encephalopathy (Ammonia initially 113 currently). PMHx HCC, ACOSTA Liver Cirrhosis, Esophageal varices/ banding, recurrent ascites frequent paracentesis, DM, SDH, & CKD NPO status pending speech eval & IR procedure. Will monitor nutrition progression & provide recs as needed.    Nutrition Related Findings:    Pt disoriented/ poor attention, New Zealander speaking, +I/O's, trace edema, active BS, abd distention/ ascites, jaundice (ammonia 113, Bili 2.3), hyperglycemia     Wound Type: None       Current Nutrition Intake & Therapies:    Average Meal Intake: NPO    Diet NPO Exceptions are: Sips of Water with Meds    Anthropometric Measures:  Height: 162.6 cm (5' 4\")  Ideal Body Weight (IBW): 120 lbs (55 kg)    Admission Body Weight: 72.6 kg (160 lb) (2/13 first measured)  Current Body Weight: 72.6 kg (160 lb) (2/13 bedscale), 133.3 % IBW.    Current BMI (kg/m2): 27.5  Usual Body Weight: 79.3 kg (174 lb 13 oz) (2/21/24 EMR measured office visit wt x 1 year ago; noted fluctuations in setting of liver disease)  % Weight Change (Calculated): -8.5, possible wt loss however fluctuations over course of

## 2025-02-13 NOTE — CARE COORDINATION
Care Coordination  The patient was admitted from home with altered mental status. She has a history of hepatic encephalopathy due to liver cancer. She is on Lr at 75 cc hr.  She is for an ultrasound guided paracentesis in Ir. Pt Ot has been ordered. Wbc is 1.4, h/h 7.7/23.6 and b/c 55/1.7. She is on Chronulac qid. Pt Ot to see the patient. Called and left a detailed voice message for the patients daughter Lisa Fitch @ [phone]. The patient is from home and the daughter cares for the patient.             Case Management Assessment  Initial Evaluation    Date/Time of Evaluation: 2/13/2025 1:33 PM  Assessment Completed by: Luz Elena Dean RN    If patient is discharged prior to next notation, then this note serves as note for discharge by case management.    Patient Name: Lisa Hunt                   YOB: 1960  Diagnosis: Hepatic encephalopathy (HCC) [K76.82]                   Date / Time: 2/11/2025  3:12 PM    Patient Admission Status: Inpatient   Readmission Risk (Low < 19, Mod (19-27), High > 27): Readmission Risk Score: 30.2    Current PCP: Tonie Hernandez, DO  PCP verified by CM? Yes    Chart Reviewed: Yes      History Provided by: Child/Family  Patient Orientation: Alert and Oriented    Patient Cognition: Alert    Hospitalization in the last 30 days (Readmission):  Yes    If yes, Readmission Assessment in  Navigator will be completed.    Advance Directives:      Code Status: Full Code   Patient's Primary Decision Maker is: Legal Next of Kin    Primary Decision Maker: Lisa Fitch - Child - [phone]    Secondary Decision Maker: yolette fitch - Child - [phone]    Discharge Planning:    Patient lives with: Family Members Type of Home: House  Primary Care Giver: Family  Patient Support Systems include: Children   Current Financial resources:    Current community resources:    Current services prior to admission: None            Current DME:              Type of Home Care

## 2025-02-13 NOTE — PROGRESS NOTES
vision  [x]Decreased safety awareness   []Increased pain       Comments:    RN cleared patient for participation in therapy session. Patient was seen this date for PT evaluation.  Patient was agreeable to intervention.  Results of the functional assessment are noted above.  Upon entering the room patient was found supine in bed. With encouragement willing to participate. Sat EOB x 10 minutes to increase dynamic sitting balance and activity tolerance.  STS and gait completed to restroom.    At end of session, patient in bed with alarm activated call light and phone within reach,  all lines and tubes intact, nursing notified.   This patient can benefit from the continuation of skilled PT possibly in a rehab setting to maximize functional level and return to PLOF.     Treatment:  Patient completed and was instructed in the following treatment:      *STS and transfer training - educated on hand/foot placement, safety, and sequencing during STS and pivot transfers without AD  *Gait training - verbal cues for upright posture, and safety during 90 and 180 degree turns during gait without assistive device  *Skillful positioning in bed to protect skin/joint integrity.  *Vitals and symptoms were closely monitored throughout session.    Pt's/ family goals      Return Home    Prognosis is good  for reaching above PT goals.    Patient and or family understand(s) diagnosis, prognosis, and plan of care.  yes    PHYSICAL THERAPY PLAN OF CARE:    PT POC is established based on physician order and patient diagnosis     Diagnosis:  Hepatic encephalopathy (HCC) [K76.82]  Specific instructions for next treatment:  Increase ambulation distance  Current Treatment Recommendations:     [x] Strengthening to improve independence with functional mobility   [x] ROM to improve independence with functional mobility   [x] Balance Training to improve static/dynamic balance and to reduce fall risk  [x] Endurance Training to improve activity  tolerance during functional mobility   [x] Transfer Training to improve safety and independence with all functional transfers   [x] Gait Training to improve gait mechanics, endurance and asses need for appropriate assistive device when appropriate.   [x] Stair Training in preparation for safe discharge home and/or into the community when appropriate  [] Positioning to prevent skin breakdown and contractures  [x] Safety and Education Training   [] Patient/Caregiver Education   [] HEP  [] Gait Team to be added to POC  [] Other     PT long term treatment goals are located in above grid    Frequency of treatments: 2-5x/week x 1-2 weeks.    Time in  1305  Time out  1330    Total Treatment Time  10 minutes     Evaluation Time includes thorough review of current medical information, gathering information on past medical history/social history and prior level of function, completion of standardized testing/informal observation of tasks, assessment of data and education on plan of care and goals.    CPT codes:  [x] Low Complexity PT evaluation 04840  [] Moderate Complexity PT evaluation 87379  [] High Complexity PT evaluation 23503  [] PT Re-evaluation 62851  [] Gait training 98760 - minutes  [] Manual therapy 79798  minutes  [x] Therapeutic activities 58155 --10 minutes  [] Therapeutic exercises 29746 - minutes  [] Neuromuscular reeducation 45631 minutes     Brent Wang, PT [id number]

## 2025-02-14 VITALS
RESPIRATION RATE: 16 BRPM | HEIGHT: 64 IN | OXYGEN SATURATION: 100 % | BODY MASS INDEX: 27.31 KG/M2 | HEART RATE: 84 BPM | WEIGHT: 160 LBS | DIASTOLIC BLOOD PRESSURE: 56 MMHG | TEMPERATURE: 97.2 F | SYSTOLIC BLOOD PRESSURE: 122 MMHG

## 2025-02-14 LAB
ANION GAP SERPL CALCULATED.3IONS-SCNC: 13 MMOL/L (ref 7–16)
ARM BAND NUMBER: NORMAL
BLOOD BANK BLOOD PRODUCT EXPIRATION DATE: NORMAL
BLOOD BANK DISPENSE STATUS: NORMAL
BLOOD BANK ISBT PRODUCT BLOOD TYPE: 6200
BLOOD BANK PRODUCT CODE: NORMAL
BLOOD BANK UNIT TYPE AND RH: NORMAL
BPU ID: NORMAL
BUN SERPL-MCNC: 49 MG/DL (ref 6–23)
CALCIUM SERPL-MCNC: 8.9 MG/DL (ref 8.6–10.2)
CHLORIDE SERPL-SCNC: 100 MMOL/L (ref 98–107)
CO2 SERPL-SCNC: 23 MMOL/L (ref 22–29)
COMPONENT: NORMAL
CREAT SERPL-MCNC: 1.3 MG/DL (ref 0.5–1)
GFR, ESTIMATED: 44 ML/MIN/1.73M2
GLUCOSE BLD-MCNC: 147 MG/DL (ref 74–99)
GLUCOSE BLD-MCNC: 228 MG/DL (ref 74–99)
GLUCOSE SERPL-MCNC: 195 MG/DL (ref 74–99)
PLATELET CONFIRMATION: NORMAL
POTASSIUM SERPL-SCNC: 3.8 MMOL/L (ref 3.5–5)
SODIUM SERPL-SCNC: 136 MMOL/L (ref 132–146)
TRANSFUSION STATUS: NORMAL
UNIT DIVISION: 0
UNIT ISSUE DATE/TIME: NORMAL

## 2025-02-14 PROCEDURE — 6370000000 HC RX 637 (ALT 250 FOR IP): Performed by: FAMILY MEDICINE

## 2025-02-14 PROCEDURE — 82962 GLUCOSE BLOOD TEST: CPT

## 2025-02-14 PROCEDURE — 36415 COLL VENOUS BLD VENIPUNCTURE: CPT

## 2025-02-14 PROCEDURE — 6370000000 HC RX 637 (ALT 250 FOR IP): Performed by: INTERNAL MEDICINE

## 2025-02-14 PROCEDURE — 99232 SBSQ HOSP IP/OBS MODERATE 35: CPT | Performed by: NURSE PRACTITIONER

## 2025-02-14 PROCEDURE — 99239 HOSP IP/OBS DSCHRG MGMT >30: CPT | Performed by: STUDENT IN AN ORGANIZED HEALTH CARE EDUCATION/TRAINING PROGRAM

## 2025-02-14 PROCEDURE — 92610 EVALUATE SWALLOWING FUNCTION: CPT | Performed by: SPEECH-LANGUAGE PATHOLOGIST

## 2025-02-14 PROCEDURE — 80048 BASIC METABOLIC PNL TOTAL CA: CPT

## 2025-02-14 PROCEDURE — 6370000000 HC RX 637 (ALT 250 FOR IP): Performed by: STUDENT IN AN ORGANIZED HEALTH CARE EDUCATION/TRAINING PROGRAM

## 2025-02-14 RX ORDER — POTASSIUM CHLORIDE 1500 MG/1
20 TABLET, EXTENDED RELEASE ORAL ONCE
Status: COMPLETED | OUTPATIENT
Start: 2025-02-14 | End: 2025-02-14

## 2025-02-14 RX ORDER — PEN NEEDLE, DIABETIC 32 GX 1/4"
NEEDLE, DISPOSABLE MISCELLANEOUS
Qty: 250 EACH | Refills: 5 | Status: SHIPPED | OUTPATIENT
Start: 2025-02-14

## 2025-02-14 RX ORDER — POLYETHYLENE GLYCOL 3350 17 G/17G
17 POWDER, FOR SOLUTION ORAL DAILY PRN
Qty: 527 G | Refills: 1 | Status: SHIPPED | OUTPATIENT
Start: 2025-02-14 | End: 2025-04-17

## 2025-02-14 RX ORDER — INSULIN LISPRO 100 [IU]/ML
INJECTION, SOLUTION INTRAVENOUS; SUBCUTANEOUS
Qty: 10 ADJUSTABLE DOSE PRE-FILLED PEN SYRINGE | Refills: 3 | Status: SHIPPED | OUTPATIENT
Start: 2025-02-14

## 2025-02-14 RX ORDER — INSULIN LISPRO 100 [IU]/ML
8 INJECTION, SOLUTION INTRAVENOUS; SUBCUTANEOUS
Status: DISCONTINUED | OUTPATIENT
Start: 2025-02-14 | End: 2025-02-14 | Stop reason: HOSPADM

## 2025-02-14 RX ORDER — INSULIN LISPRO 100 [IU]/ML
0-12 INJECTION, SOLUTION INTRAVENOUS; SUBCUTANEOUS
Status: DISCONTINUED | OUTPATIENT
Start: 2025-02-14 | End: 2025-02-14 | Stop reason: HOSPADM

## 2025-02-14 RX ORDER — INSULIN GLARGINE 100 [IU]/ML
24 INJECTION, SOLUTION SUBCUTANEOUS NIGHTLY
Status: DISCONTINUED | OUTPATIENT
Start: 2025-02-14 | End: 2025-02-14 | Stop reason: HOSPADM

## 2025-02-14 RX ORDER — INSULIN GLARGINE 100 [IU]/ML
INJECTION, SOLUTION SUBCUTANEOUS
Qty: 5 ADJUSTABLE DOSE PRE-FILLED PEN SYRINGE | Refills: 3 | Status: SHIPPED | OUTPATIENT
Start: 2025-02-14

## 2025-02-14 RX ORDER — GLUCOSAMINE HCL/CHONDROITIN SU 500-400 MG
CAPSULE ORAL
Qty: 250 STRIP | Refills: 5 | Status: SHIPPED | OUTPATIENT
Start: 2025-02-14

## 2025-02-14 RX ORDER — LANCETS
EACH MISCELLANEOUS
Qty: 250 EACH | Refills: 5 | Status: SHIPPED | OUTPATIENT
Start: 2025-02-14

## 2025-02-14 RX ADMIN — INSULIN LISPRO 12 UNITS: 100 INJECTION, SOLUTION INTRAVENOUS; SUBCUTANEOUS at 06:16

## 2025-02-14 RX ADMIN — INSULIN LISPRO 8 UNITS: 100 INJECTION, SOLUTION INTRAVENOUS; SUBCUTANEOUS at 10:59

## 2025-02-14 RX ADMIN — LACTULOSE 30 G: 20 SOLUTION ORAL at 09:07

## 2025-02-14 RX ADMIN — POTASSIUM CHLORIDE 20 MEQ: 1500 TABLET, EXTENDED RELEASE ORAL at 10:17

## 2025-02-14 RX ADMIN — CARVEDILOL 3.12 MG: 3.12 TABLET, FILM COATED ORAL at 09:08

## 2025-02-14 RX ADMIN — INSULIN LISPRO 2 UNITS: 100 INJECTION, SOLUTION INTRAVENOUS; SUBCUTANEOUS at 06:16

## 2025-02-14 RX ADMIN — RIFAXIMIN 400 MG: 200 TABLET ORAL at 09:08

## 2025-02-14 ASSESSMENT — PAIN SCALES - WONG BAKER
WONGBAKER_NUMERICALRESPONSE: NO HURT
WONGBAKER_NUMERICALRESPONSE: NO HURT

## 2025-02-14 ASSESSMENT — PAIN SCALES - GENERAL
PAINLEVEL_OUTOF10: 0
PAINLEVEL_OUTOF10: 0

## 2025-02-14 NOTE — PLAN OF CARE
Problem: Chronic Conditions and Co-morbidities  Goal: Patient's chronic conditions and co-morbidity symptoms are monitored and maintained or improved  2/14/2025 1310 by Colleen Stephen RN  Outcome: Adequate for Discharge     Problem: Chronic Conditions and Co-morbidities  Goal: Patient's chronic conditions and co-morbidity symptoms are monitored and maintained or improved  2/14/2025 1310 by Colleen Stephen RN  Outcome: Adequate for Discharge     Problem: Discharge Planning  Goal: Discharge to home or other facility with appropriate resources  2/14/2025 1310 by Colleen Stephen RN  Outcome: Adequate for Discharge     Problem: Discharge Planning  Goal: Discharge to home or other facility with appropriate resources  2/14/2025 1310 by Colleen Stephen RN  Outcome: Adequate for Discharge     Problem: Skin/Tissue Integrity  Goal: Skin integrity remains intact  Description: 1.  Monitor for areas of redness and/or skin breakdown  2.  Assess vascular access sites hourly  3.  Every 4-6 hours minimum:  Change oxygen saturation probe site  4.  Every 4-6 hours:  If on nasal continuous positive airway pressure, respiratory therapy assess nares and determine need for appliance change or resting period  2/14/2025 1310 by Colleen Stephen RN  Outcome: Adequate for Discharge     Problem: Skin/Tissue Integrity  Goal: Skin integrity remains intact  Description: 1.  Monitor for areas of redness and/or skin breakdown  2.  Assess vascular access sites hourly  3.  Every 4-6 hours minimum:  Change oxygen saturation probe site  4.  Every 4-6 hours:  If on nasal continuous positive airway pressure, respiratory therapy assess nares and determine need for appliance change or resting period  2/14/2025 1310 by Colleen Stephen RN  Outcome: Adequate for Discharge     Problem: Safety - Adult  Goal: Free from fall injury  2/14/2025 1310 by Colleen Stephen RN  Outcome: Adequate for Discharge     Problem: Safety - Adult  Goal: Free from fall injury  2/14/2025 1310 by Hailee

## 2025-02-14 NOTE — PROGRESS NOTES
ENDOCRINOLOGY PROGRESS NOTE      Date of admission: 2/11/2025  Date of service: 2/14/2025  Admitting physician: Rober Barros MD   Primary Care Physician: Tonie Hernandez DO  Consultant physician: Tae Gaines MD     Reason for the consultation:  Uncontrolled DM    History of Present Illness:  The history is provided by the patient. Accuracy of the patient data is good. Patient speaks Gibraltarian.    Lisa Hunt is a very pleasant 64 y.o. old female with PMH of T2DM, HTN, liver cirrhosis, hepatocellular carcinoma s/p y90 treatment and other listed below admitted to [facility] on 2/11/2025 because of altered mental status, endocrine service was consulted for diabetes management.    Subjective  I saw and examined the patient at bedside this morning, no acute events overnight, likely for discharge today.      Inpatient diet:   Carb Restricted diet     Point of care glucose monitoring   (Independently reviewed)   Recent Labs     02/12/25  2059 02/13/25  0637 02/13/25  1049 02/13/25  1052 02/13/25  1604 02/13/25  1941 02/14/25  0612 02/14/25  1036   POCGLU 287* 301* 144* 128* 259* 262* 228* 147*   Allergy and drug reactions:   Allergies   Allergen Reactions    Fish Allergy      All seafood       Scheduled Meds:   insulin glargine  24 Units SubCUTAneous Nightly    insulin lispro  8 Units SubCUTAneous TID AC    insulin lispro  0-12 Units SubCUTAneous 4x Daily AC & HS    [Held by provider] bumetanide  1 mg Oral Daily    carvedilol  3.125 mg Oral Daily    pantoprazole  40 mg Oral BID AC    rifAXIMin  400 mg Oral TID    [Held by provider] spironolactone  50 mg Oral Daily    lactulose  30 g Oral 4x daily    lactulose  20 g Oral Once    sodium chloride flush  5-40 mL IntraVENous 2 times per day       PRN Meds:   sodium chloride, , PRN  sodium chloride, , PRN  sodium chloride flush, 5-40 mL, PRN  sodium chloride, , PRN  potassium chloride, 40 mEq, PRN   Or  potassium alternative oral replacement, 40 mEq, PRN   Or  potassium  has been above      Decompensated liver cirrhosis  Management per primary    Interdisciplinary plan for communication with healthcare providers:   Consult recommendations were discussed with the Primary Service/Nursing staff      The above issues were reviewed with the patient who understood and agreed with the plan.    Thank you for allowing us to participate in the care of this patient. Please do not hesitate to contact us with any additional questions.     Tae Gaines MD  Endocrinologist, [facility]   [facility]  [facility] ORTH[facility]TRAU[facility]  [address] [facility] [address]  Dept: [phone]  Loc: [phone]   Phone: [phone]  Fax: [phone]  --------------------------------------------  An electronic signature was used to authenticate this note. Tae Gaines MD on 2/14/2025 at 12:57 PM

## 2025-02-14 NOTE — PLAN OF CARE
Problem: Discharge Planning  Goal: Discharge to home or other facility with appropriate resources  2/14/2025 1131 by Colleen Stephen RN  Outcome: Progressing     Problem: Skin/Tissue Integrity  Goal: Skin integrity remains intact  Description: 1.  Monitor for areas of redness and/or skin breakdown  2.  Assess vascular access sites hourly  3.  Every 4-6 hours minimum:  Change oxygen saturation probe site  4.  Every 4-6 hours:  If on nasal continuous positive airway pressure, respiratory therapy assess nares and determine need for appliance change or resting period  2/14/2025 1131 by Colleen Stephen, RN  Outcome: Progressing     Problem: Safety - Adult  Goal: Free from fall injury  2/14/2025 1131 by Colleen Stephen, RN  Outcome: Progressing

## 2025-02-14 NOTE — PROGRESS NOTES
Messaged Dr Parks via PS regarding diet modif order per ST recs. Chanfged diet per telephone order by Dr Parks and informed CN for other consultants for clearance on possible dc

## 2025-02-14 NOTE — DISCHARGE INSTR - COC
Continuity of Care Form    Patient Name: Lisa Hunt   :  1960  MRN:  [id number]    Admit date:  2025  Discharge date:  2025    Code Status Order: Full Code   Advance Directives:   Advance Care Flowsheet Documentation             Admitting Physician:  Rober Barros MD  PCP: Tonie Hernandez DO    Discharging Nurse: Colleen ESPINOZA RN  Discharging Hospital Unit/Room#: 5422/5422-A  Discharging Unit Phone Number: [phone]    Emergency Contact:   Extended Emergency Contact Information  Primary Emergency Contact: Lisa Fitch  Home Phone: [phone]  Mobile Phone: [phone]  Relation: Child  Preferred language: English  Secondary Emergency Contact: yolette fitch  Home Phone: [phone]  Mobile Phone: [phone]  Relation: Child    Past Surgical History:  Past Surgical History:   Procedure Laterality Date    APPENDECTOMY       SECTION      CHOLECYSTECTOMY      CT NEEDLE BIOPSY LIVER PERCUTANEOUS  2024    CT NEEDLE BIOPSY LIVER PERCUTANEOUS 2024 IRLANDA Ndiaye MD [facility]    CT NEEDLE BIOPSY LIVER PERCUTANEOUS  2024    CT NEEDLE BIOPSY LIVER PERCUTANEOUS 2024 Madison Ndiaye MD [facility]    IR EMBOLIZATION TUMOR/ORGAN  2024    IR EMBOLIZATION TUMOR / ORGAN 2024 Madison Ndiaye MD [facility]    IR EMBOLIZATION TUMOR/ORGAN  12/10/2024    IR EMBOLIZATION TUMOR / ORGAN 12/10/2024 Madison Ndiaye MD [facility]    IR VASC EMBOLIZE OCCLUDE ARTERY  12/10/2024    IR VASC EMBOLIZE OCCLUDE ARTERY 12/10/2024 Madison Ndiaye MD [facility]    PARACENTESIS Left 10/12/2023    4400ml hazy yellow    PARACENTESIS Left 2024    2440cc yellow clear fluid removed.    PARACENTESIS Left 2024    4250cc clear yellow fluid removed.    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    Carcinoma of liver (HCC) C22.0    Decompensated cirrhosis (HCC) K72.90, K74.60    Abdominal pain R10.9    Esophageal varices in cirrhosis (HCC) K74.60, I85.10    Esophageal varices (HCC) I85.00       Isolation/Infection:   Isolation            No Isolation          Patient Infection Status       Infection Onset Added Last Indicated Last Indicated By Review Planned Expiration Resolved Resolved By    None active    Resolved    Influenza 02/15/20 02/15/20 02/15/20 Rapid influenza A/B antigens   20 Infection                        Nurse Assessment:  Last Vital Signs: BP (!) 122/56   Pulse 84   Temp 97.2 °F (36.2 °C) (Temporal)   Resp 16   Ht 1.626 m (5' 4\")   Wt 72.6 kg (160 lb)   SpO2 100%   BMI 27.46 kg/m²     Last documented pain score (0-10 scale): Pain Level: 0  Last Weight:   Wt Readings from Last 1 Encounters:   25 72.6 kg (160 lb)     Mental Status:  disoriented    IV Access:  - None    Nursing Mobility/ADLs:  Walking   Assisted  Transfer  Assisted  Bathing  Assisted  Dressing  Assisted  Toileting  Assisted  Feeding  Assisted  Med Admin  Assisted  Med Delivery   whole    Wound Care Documentation and Therapy:        Elimination:  Continence:   Bowel: Yes  Bladder: Yes  Urinary Catheter: None   Colostomy/Ileostomy/Ileal Conduit: No       Date of Last BM: 2025    Intake/Output Summary (Last 24 hours) at 2025 1313  Last data filed at 2025 1126  Gross per 24 hour   Intake 830 ml   Output 750 ml   Net 80 ml     I/O last 3 completed shifts:  In: 1023 [P.O.:770; Blood:253]  Out: 400 [Urine:400]    Safety

## 2025-02-14 NOTE — PROGRESS NOTES
Dc instructions given to daughter Mahsa with AVS papers. PIV removed by student under supervision by clinical instructor. Bed watch booked.

## 2025-02-14 NOTE — PROGRESS NOTES
ENDOCRINOLOGY PROGRESS NOTE      Date of admission: 2/11/2025  Date of service: 2/13/2025  Admitting physician: Rober Barros MD   Primary Care Physician: Tonie Hernandez DO  Consultant physician: Tae Gaines MD     Reason for the consultation:  Uncontrolled DM    History of Present Illness:  The history is provided by the patient. Accuracy of the patient data is good. Patient speaks Kuwaiti.    Lisa Hunt is a very pleasant 64 y.o. old female with PMH of T2DM, HTN, liver cirrhosis, hepatocellular carcinoma s/p y90 treatment and other listed below admitted to [facility] on 2/11/2025 because of altered mental status, endocrine service was consulted for diabetes management.    Subjective  The patient was seen today, no acute events overnight, glucose level improving but still above goal.  No hypoglycemia      Inpatient diet:   Carb Restricted diet     Point of care glucose monitoring   (Independently reviewed)   Recent Labs     02/12/25  1044 02/12/25  1607 02/12/25  2059 02/13/25  0637 02/13/25  1049 02/13/25  1052 02/13/25  1604 02/13/25  1941   POCGLU 361* 324* 287* 301* 144* 128* 259* 262*   Allergy and drug reactions:   Allergies   Allergen Reactions    Fish Allergy      All seafood       Scheduled Meds:   insulin glargine  22 Units SubCUTAneous Nightly    [Held by provider] bumetanide  1 mg Oral Daily    carvedilol  3.125 mg Oral Daily    insulin lispro  12 Units SubCUTAneous TID AC    pantoprazole  40 mg Oral BID AC    rifAXIMin  400 mg Oral TID    [Held by provider] spironolactone  50 mg Oral Daily    lactulose  30 g Oral 4x daily    lactulose  20 g Oral Once    sodium chloride flush  5-40 mL IntraVENous 2 times per day    insulin lispro  0-8 Units SubCUTAneous 4x Daily AC & HS       PRN Meds:   sodium chloride, , PRN  sodium chloride, , PRN  sodium chloride flush, 5-40 mL, PRN  sodium chloride, , PRN  potassium chloride, 40 mEq, PRN   Or  potassium alternative oral replacement, 40 mEq, PRN

## 2025-02-14 NOTE — PROGRESS NOTES
[facility]  MD Tico Kiran MD Ali Hassan, MD Lisa Kniska, CNP   Tammie Stoner, ALFREDO Cuenca, NOA  Progress Note    2/14/2025    SUBJECTIVE:   2/14: Laying in bed, in no acute distress. Still seems weak and deconditioned. She tells me she is eating well. Still having loose stools. No edema noted. She denies dyspnea, chest pain, or palpitations. She is being discharged today.     PROBLEM LIST:    Principal Problem:    Hepatic encephalopathy (HCC)  Active Problems:    Chronic kidney disease  Resolved Problems:    * No resolved hospital problems. *         DIET:    ADULT DIET; Dysphagia - Minced and Moist     MEDS (scheduled):    insulin glargine  24 Units SubCUTAneous Nightly    insulin lispro  8 Units SubCUTAneous TID AC    insulin lispro  0-12 Units SubCUTAneous 4x Daily AC & HS    [Held by provider] bumetanide  1 mg Oral Daily    carvedilol  3.125 mg Oral Daily    pantoprazole  40 mg Oral BID AC    rifAXIMin  400 mg Oral TID    [Held by provider] spironolactone  50 mg Oral Daily    lactulose  30 g Oral 4x daily    lactulose  20 g Oral Once    sodium chloride flush  5-40 mL IntraVENous 2 times per day       MEDS (infusions):   sodium chloride      sodium chloride      sodium chloride      dextrose         MEDS (prn):  sodium chloride, sodium chloride, sodium chloride flush, sodium chloride, potassium chloride **OR** potassium alternative oral replacement **OR** potassium chloride, magnesium sulfate, ondansetron **OR** ondansetron, polyethylene glycol, glucose, dextrose bolus **OR** dextrose bolus, glucagon (rDNA), dextrose    PHYSICAL EXAM:     Patient Vitals for the past 24 hrs:   BP Temp Temp src Pulse Resp SpO2   02/14/25 0945 (!) 122/56 97.2 °F (36.2 °C) Temporal 84 16 100 %   02/14/25 0900 (!) 123/51 96.8 °F (36 °C) Temporal 84 17 100 %   02/13/25 1938 (!) 120/52 97.4 °F (36.3 °C) Temporal 82 17 100 %   @      Intake/Output Summary (Last 24 hours) at  longstanding diabetes, history of hypertension, though hepatorenal syndrome type II is likely playing a role   Diabetes mellitus   Anemia of chronic disease. Recent iron panel normal   Hypertension  Cirrhosis     Cr 1.7-->1.3 mg/dL    Kidney function improved with fluids  Eating well     Plan  Ok to discharge from a renal standpoint  Diuretic therapy can be resumed at discharge  Follow up in the office in 2-3 weeks       Electronically signed by OK Ashraf CNP on 2/14/2025 at 2:25 PM    NOTE: This report was transcribed using voice recognition software. Every effort was made to ensure accuracy; however, inadvertent transcription errors may be present.

## 2025-02-14 NOTE — PROGRESS NOTES
Gastroenterology, Hepatology, &  Advanced Endoscopy    Consult Note      Reason for Consult: Hepatic Encephalopathy    HPI:   Lisa Hunt is a 64 y.o. female w/ PMH of  has a past medical history of DONNA (acute kidney injury) (HCC), Cirrhosis (HCC), Diabetes mellitus (HCC), Diabetic neuropathy (HCC), Esophageal varices without bleeding (HCC), GERD (gastroesophageal reflux disease), Hypertension, Intracranial bleed (HCC), Liver cirrhosis (HCC), Low vitamin D level, SDH (subdural hematoma), Thrombocytopenia (HCC), and Type 2 diabetes mellitus without complication (HCC). who presents to the ED She has a history of HCC for which she has had y90 therapy and is being monitored. She denies significant abdominal distention but does report mild abdominal discomfort from ascites.  for altered mental status.  Patient was sent from home for altered mental status.  She was last known well last night.  She has history of high ammonia.  She was just admitted to the hospital for this issue.  She has had increased lethargy today.       PMH:       Diagnosis Date    DONNA (acute kidney injury) (HCC) 2023    Cirrhosis (HCC)     Diabetes mellitus (HCC)     Diabetic neuropathy (HCC)     Esophageal varices without bleeding (HCC)     GERD (gastroesophageal reflux disease)     Hypertension     Intracranial bleed (HCC) 2023    Liver cirrhosis (HCC)     with secondary esophageal varices, no signs/sx's of hepatic encephalopathy    Low vitamin D level     SDH (subdural hematoma) 2023    Thrombocytopenia (HCC)     Type 2 diabetes mellitus without complication (HCC)         PSH:       Procedure Laterality Date    APPENDECTOMY       SECTION      CHOLECYSTECTOMY      CT NEEDLE BIOPSY LIVER PERCUTANEOUS  2024    CT NEEDLE BIOPSY LIVER PERCUTANEOUS 2024 IRLANDA Ndiaye MD [facility]    CT NEEDLE BIOPSY LIVER PERCUTANEOUS  2024    CT NEEDLE BIOPSY LIVER PERCUTANEOUS 2024 Madison Ndiaye MD [facility]     CREATININE 1.3 (H) 02/14/2025    BUN 49 (H) 02/14/2025    CO2 23 02/14/2025    FOLATE 8.9 02/12/2025    [id number] 779 02/12/2025    AMMONIA 113 (H) 02/13/2025    GLUCOSE 195 (H) 02/14/2025    INR 1.6 02/12/2025    PROTIME 17.1 (H) 02/12/2025    ALBUMIN 4.1 02/12/2025    TSH 4.02 02/12/2025    LABA1C 6.3 (H) 02/12/2025     Lab Results   Component Value Date    INR 1.6 02/12/2025    INR 1.6 02/03/2025    INR 1.5 01/31/2025    PROTIME 17.1 (H) 02/12/2025    PROTIME 17.3 (H) 02/03/2025    PROTIME 16.7 (H) 01/31/2025      MELD 3.0: 19 at 2/14/2025  4:29 AM  MELD-Na: 18 at 2/14/2025  4:29 AM  Calculated from:  Serum Creatinine: 1.3 mg/dL at 2/14/2025  4:29 AM  Serum Sodium: 136 mmol/L at 2/14/2025  4:29 AM  Total Bilirubin: 2.3 mg/dL at 2/12/2025  7:47 AM  Serum Albumin: 4.1 g/dL (Using max of 3.5 g/dL) at 2/12/2025  7:47 AM  INR(ratio): 1.6 at 2/12/2025  7:47 AM  Age at listing (hypothetical): 64 years  Sex: Female at 2/14/2025  4:29 AM     CEA   Date Value Ref Range Status   10/22/2024 3.4 0.0 - 5.2 ng/mL Final     Comment:     The Roche \"ECLIA\" assay is used.  Results obtained with different assay methods cannot be   used interchangeably.     07/17/2024 3.2 0.0 - 5.2 ng/mL Final     Comment:     The Roche \"ECLIA\" assay is used.  Results obtained with different assay methods cannot be   used interchangeably.       Lipase   Date Value Ref Range Status   01/04/2025 26 13 - 60 U/L Final   12/05/2024 23 13 - 60 U/L Final   08/02/2024 27 13 - 60 U/L Final        Previous Endoscopies: No colonoscopy on file  No flexible sigmoidoscopy on file      ASSESSMENT:   Recurrent Hepatic encephalopathy with increasing hospitalizations.    HCC for which she has had y90 therapy and is being monitored.    PLAN:  OK for discharge with continued lactulose regimen. Pt's admissions are becoming more frequent with hepatic encephalopathy. Adherence with medications of strong importance.   Cirrhosis:  - Monitor labs daily.      2. Hepatic

## 2025-02-14 NOTE — DISCHARGE SUMMARY
reading provider will be dictating this exam?->CRC TECHNIQUE The radiology physician assistant, Igor Su PA-C performed the examination under supervision of the co-signing radiologist. Number of Images: 6 The patient's daughter was counseled regarding the procedure, it's indications, risks, potential complications and alternatives and any questions were answered. Consent was obtained for image guided paracentesis for ascites Prior to start of procedure, routine scanning of 4-quadrants of abdomen was performed using real-time ultrasound and revealed insufficient amount of ascites present. Images were saved into PACs. Decision was made not to proceed with procedure due to insufficient fluid present.  Risks and benefits were discussed with patient's daughter prior to procedureand agree not to proceed with paracentesis at this time if insufficient fluid present. FINDINGS: Limited ultrasound of the abdomen demonstrated insufficient ascites. No paracentesis performed.     Insufficient ascites for paracentesis     CT Head W/O Contrast    Result Date: 2/11/2025  EXAMINATION: CT OF THE HEAD WITHOUT CONTRAST  2/11/2025 5:05 pm TECHNIQUE: CT of the head was performed without the administration of intravenous contrast. Automated exposure control, iterative reconstruction, and/or weight based adjustment of the mA/kV was utilized to reduce the radiation dose to as low as reasonably achievable. COMPARISON: None. HISTORY: ORDERING SYSTEM PROVIDED HISTORY: AMS TECHNOLOGIST PROVIDED HISTORY: Has a \"code stroke\" or \"stroke alert\" been called?->No Reason for exam:->[facility] Decision Support Exception - unselect if not a suspected or confirmed emergency medical condition->Emergency Medical Condition ([place]) What reading provider will be dictating this exam?->CRC FINDINGS: BRAIN/VENTRICLES: There is no acute intracranial hemorrhage, mass effect or midline shift.  No abnormal extra-axial fluid collection.  The gray-white differentiation is  aortic aneurysm.  Mildly enlarged portacaval lymph nodes measure up to 1.1 cm in short axis.  No new lymphadenopathy. Bones/Soft Tissues: No acute osseous abnormality.  Similar multilevel degenerative changes in the spine.     Evaluation is limited in the absence of contrast and presence of moderate volume ascites. 1.  No acute findings in the abdomen or pelvis, given limitations. 2.  Similar appearing irregular, hypodense lesion of the right hepatic lobe that is not well characterized in the absence of contrast.  Local disease progression is not excluded on this exam. 3.  Redemonstrated hepatic cirrhosis with splenomegaly and moderate volume ascites. 4.  Large stool burden in the colon.  Recommend correlation with findings of constipation.     CT HEAD WO CONTRAST    Result Date: 1/31/2025  EXAM: CT Head Without Intravenous Contrast EXAM DATE/TIME: 1/31/2025 6:37 pm CLINICAL HISTORY: ORDERING SYSTEM PROVIDED HISTORY: [facility]  TECHNOLOGIST PROVIDED HISTORY:  Has a \"code stroke\" or \"stroke alert\" been called?->No  Reason for exam:->[facility] Decision Support Exception - unselect if not a suspected or confirmed emergency medical condition->Emergency Medical Condition ([place])  What reading provider will be dictating this exam?->CRC TECHNIQUE: Axial computed tomography images of the head/brain without intravenous contrast.  This CT exam was performed using one or more of the following dose reduction techniques:  automated exposure control, adjustment of the mA and/or kV according to patient size, and/or use of iterative reconstruction technique. COMPARISON: 12/05/2024 FINDINGS: Brain:  No acute findings.  No hemorrhage.  No significant white matter disease.  No edema. Ventricles:  No acute findings.  No ventriculomegaly. Bones/joints:  No acute findings.  No acute fracture. Soft tissues:  No acute findings. Sinuses:  Unremarkable as visualized.  No acute sinusitis. Mastoid air cells:  Unremarkable as visualized.  No mastoid effusion.

## 2025-02-14 NOTE — PROGRESS NOTES
include:     ongoing mealtime assessment to provide diet modification and compensatory strategy implementation to minimize risk of aspiration associated with PO intake    Specific instructions for next treatment:  ongoing PO analysis to upgrade diet and evaluate tolerance of current PO recommendation  Patient Treatment Goals:    Short Term Goals:  Pt will participate in meal time assessment for 1-2 sessions to provide diet modification and compensatory strategy implementation to safely advance diet as functional ability improves    Long Term Goals:   Pt will improve oropharyngeal swallow function to ensure airway protection during PO intake to maintain adequate nutrition/hydration and decrease signs/symptoms of aspiration to less than 1 x/day.      Patient/family Goal:    Did not state.  Will further assess during treatment.    Plan of care discussed with Patient   The Patient did not demonstrate complete understanding of the diagnosis, prognosis and plan of care     Rehabilitation Potential/Prognosis: fair                    ADMITTING DIAGNOSIS: Hepatic encephalopathy (HCC) [K76.82]    VISIT DIAGNOSIS:      PATIENT REPORT/COMPLAINT: denies difficulty swallowing  RN cleared patient for participation in assessment     yes     PRIOR LEVEL OF SWALLOW FUNCTION:    PAST HISTORY OF OROPHARYNGEAL DYSPHAGIA?: none reported    Home diet: Regular consistency solids (IDDSI level 7) with  thin liquids (IDDSI level 0)  Current Diet Order:  Diet NPO Exceptions are: Sips of Water with Meds    PROCEDURE:  Consistencies Administered During the Evaluation   Liquids: thin liquid   Solids:  Pureed  and Soft and bite sized      Method of Intake:   cup, spoon (straw offered, not used by Pt)  Self fed      Position:   Sitting in bed with head elevated above 75 degrees    CLINICAL ASSESSMENT:  Oral Stage:       prolonged mastication resulting in poor oral manipulation, poor bolus formation and increased oral residue post swallow  Oral     Thrombocytopenia (HCC)    Type 2 diabetes mellitus with hyperglycemia    Poorly controlled type 2 diabetes mellitus (HCC)    GI bleed    Chronic liver failure (HCC)    Ascites of liver    Acute metabolic encephalopathy    Dietary noncompliance    Hyperammonemic encephalopathy    GAVE (gastric antral vascular ectasia)    Acute on chronic anemia    Palliative care by specialist    Hepatocellular carcinoma (HCC)    Gastric antral vascular ectasia    Carcinoma of liver (HCC)    Decompensated cirrhosis (HCC)    Abdominal pain    Esophageal varices in cirrhosis (HCC)    Esophageal varices (HCC)         CPT code:  77998  bedside swallow ebonyal    Brunilda Gilmore M.S., CCC-SLP  Speech-Language Pathologist  [id number]  2/14/2025

## 2025-02-14 NOTE — CARE COORDINATION
2/14/2025social work transition of care planning  Magaly spoke with pt's dtr,Lisa. Plan remains for home. Pt's dtr Lisa is on her way to the hospital,she is the pt's ride at [place]. Lisa can only stay for about an hour and lives in [place]. [place] will message attending.  Electronically signed by JOSY Mitchell on 2/14/2025 at 11:16 AM    Addendum: Attending awaiting clearance from consults.  Electronically signed by JOSY Mitchell on 2/14/2025 at 11:48 AM

## 2025-02-14 NOTE — PLAN OF CARE
Problem: Discharge Planning  Goal: Discharge to home or other facility with appropriate resources  2/14/2025 1202 by Colleen Stephen RN  Outcome: Progressing     Problem: Discharge Planning  Goal: Discharge to home or other facility with appropriate resources  2/14/2025 1131 by Colleen Stephen RN  Outcome: Progressing     Problem: Skin/Tissue Integrity  Goal: Skin integrity remains intact  Description: 1.  Monitor for areas of redness and/or skin breakdown  2.  Assess vascular access sites hourly  3.  Every 4-6 hours minimum:  Change oxygen saturation probe site  4.  Every 4-6 hours:  If on nasal continuous positive airway pressure, respiratory therapy assess nares and determine need for appliance change or resting period  2/14/2025 1131 by Colleen Stephen RN  Outcome: Progressing     Problem: Safety - Adult  Goal: Free from fall injury  2/14/2025 1202 by Colleen Stephen, RN  Outcome: Progressing

## 2025-02-17 ENCOUNTER — CARE COORDINATION (OUTPATIENT)
Dept: CARE COORDINATION | Age: 65
End: 2025-02-17

## 2025-02-17 ENCOUNTER — HOSPITAL ENCOUNTER (INPATIENT)
Age: 65
LOS: 3 days | Discharge: HOME | DRG: 871 | End: 2025-02-20
Attending: EMERGENCY MEDICINE | Admitting: INTERNAL MEDICINE
Payer: MEDICARE

## 2025-02-17 ENCOUNTER — APPOINTMENT (OUTPATIENT)
Dept: CT IMAGING | Age: 65
DRG: 871 | End: 2025-02-17
Payer: MEDICARE

## 2025-02-17 ENCOUNTER — APPOINTMENT (OUTPATIENT)
Dept: GENERAL RADIOLOGY | Age: 65
DRG: 871 | End: 2025-02-17
Payer: MEDICARE

## 2025-02-17 DIAGNOSIS — R41.82 ALTERED MENTAL STATUS, UNSPECIFIED ALTERED MENTAL STATUS TYPE: ICD-10-CM

## 2025-02-17 DIAGNOSIS — E87.20 LACTIC ACIDOSIS: ICD-10-CM

## 2025-02-17 DIAGNOSIS — K76.82 HEPATIC ENCEPHALOPATHY (HCC): Primary | ICD-10-CM

## 2025-02-17 LAB
ALBUMIN SERPL-MCNC: 3.5 G/DL (ref 3.5–5.2)
ALP SERPL-CCNC: 135 U/L (ref 35–104)
ALT SERPL-CCNC: 25 U/L (ref 0–32)
AMMONIA PLAS-SCNC: 167 UMOL/L (ref 11–51)
ANION GAP SERPL CALCULATED.3IONS-SCNC: 14 MMOL/L (ref 7–16)
ANION GAP SERPL CALCULATED.3IONS-SCNC: 9 MMOL/L (ref 7–16)
AST SERPL-CCNC: 51 U/L (ref 0–31)
B PARAP IS1001 DNA NPH QL NAA+NON-PROBE: NOT DETECTED
B PERT DNA SPEC QL NAA+PROBE: NOT DETECTED
B-OH-BUTYR SERPL-MCNC: 0.34 MMOL/L (ref 0.02–0.27)
B.E.: -2.3 MMOL/L (ref -3–3)
BACTERIA URNS QL MICRO: ABNORMAL
BASOPHILS # BLD: 0 K/UL (ref 0–0.2)
BASOPHILS NFR BLD: 0 % (ref 0–2)
BILIRUB DIRECT SERPL-MCNC: 0.6 MG/DL (ref 0–0.3)
BILIRUB INDIRECT SERPL-MCNC: 1.3 MG/DL (ref 0–1)
BILIRUB SERPL-MCNC: 1.9 MG/DL (ref 0–1.2)
BILIRUB UR QL STRIP: NEGATIVE
BNP SERPL-MCNC: 181 PG/ML (ref 0–125)
BUN SERPL-MCNC: 45 MG/DL (ref 6–23)
BUN SERPL-MCNC: 46 MG/DL (ref 6–23)
C PNEUM DNA NPH QL NAA+NON-PROBE: NOT DETECTED
CALCIUM SERPL-MCNC: 8.5 MG/DL (ref 8.6–10.2)
CALCIUM SERPL-MCNC: 9.4 MG/DL (ref 8.6–10.2)
CHLORIDE SERPL-SCNC: 100 MMOL/L (ref 98–107)
CHLORIDE SERPL-SCNC: 100 MMOL/L (ref 98–107)
CHP ED QC CHECK: NORMAL
CLARITY UR: ABNORMAL
CO2 SERPL-SCNC: 23 MMOL/L (ref 22–29)
CO2 SERPL-SCNC: 23 MMOL/L (ref 22–29)
COHB: 0.1 % (ref 0–1.5)
COLOR UR: YELLOW
CREAT SERPL-MCNC: 1.4 MG/DL (ref 0.5–1)
CREAT SERPL-MCNC: 1.6 MG/DL (ref 0.5–1)
CRITICAL: ABNORMAL
DATE ANALYZED: ABNORMAL
DATE OF COLLECTION: ABNORMAL
EOSINOPHIL # BLD: 0 K/UL (ref 0.05–0.5)
EOSINOPHILS RELATIVE PERCENT: 0 % (ref 0–6)
ERYTHROCYTE [DISTWIDTH] IN BLOOD BY AUTOMATED COUNT: 17.1 % (ref 11.5–15)
FLUAV RNA NPH QL NAA+NON-PROBE: NOT DETECTED
FLUBV RNA NPH QL NAA+NON-PROBE: NOT DETECTED
GFR, ESTIMATED: 37 ML/MIN/1.73M2
GFR, ESTIMATED: 41 ML/MIN/1.73M2
GLUCOSE BLD-MCNC: 314 MG/DL
GLUCOSE BLD-MCNC: 314 MG/DL (ref 74–99)
GLUCOSE BLD-MCNC: 384 MG/DL
GLUCOSE BLD-MCNC: 384 MG/DL
GLUCOSE BLD-MCNC: 384 MG/DL (ref 74–99)
GLUCOSE BLD-MCNC: 388 MG/DL (ref 74–99)
GLUCOSE BLD-MCNC: 450 MG/DL (ref 74–99)
GLUCOSE BLD-MCNC: 460 MG/DL (ref 74–99)
GLUCOSE BLD-MCNC: >500 MG/DL (ref 74–99)
GLUCOSE SERPL-MCNC: 316 MG/DL (ref 74–99)
GLUCOSE SERPL-MCNC: 449 MG/DL (ref 74–99)
GLUCOSE UR STRIP-MCNC: 250 MG/DL
HADV DNA NPH QL NAA+NON-PROBE: NOT DETECTED
HCO3: 20.9 MMOL/L (ref 22–26)
HCOV 229E RNA NPH QL NAA+NON-PROBE: NOT DETECTED
HCOV HKU1 RNA NPH QL NAA+NON-PROBE: NOT DETECTED
HCOV NL63 RNA NPH QL NAA+NON-PROBE: NOT DETECTED
HCOV OC43 RNA NPH QL NAA+NON-PROBE: NOT DETECTED
HCT VFR BLD AUTO: 23.2 % (ref 34–48)
HGB BLD-MCNC: 7.6 G/DL (ref 11.5–15.5)
HGB UR QL STRIP.AUTO: ABNORMAL
HHB: 3.5 % (ref 0–5)
HMPV RNA NPH QL NAA+NON-PROBE: NOT DETECTED
HPIV1 RNA NPH QL NAA+NON-PROBE: NOT DETECTED
HPIV2 RNA NPH QL NAA+NON-PROBE: NOT DETECTED
HPIV3 RNA NPH QL NAA+NON-PROBE: NOT DETECTED
HPIV4 RNA NPH QL NAA+NON-PROBE: NOT DETECTED
INR PPP: 1.8
KETONES UR STRIP-MCNC: NEGATIVE MG/DL
LAB: ABNORMAL
LACTATE BLDV-SCNC: 3.2 MMOL/L (ref 0.5–2.2)
LACTATE BLDV-SCNC: 3.9 MMOL/L (ref 0.5–2.2)
LEUKOCYTE ESTERASE UR QL STRIP: ABNORMAL
LIPASE SERPL-CCNC: 30 U/L (ref 13–60)
LYMPHOCYTES NFR BLD: 0.22 K/UL (ref 1.5–4)
LYMPHOCYTES RELATIVE PERCENT: 14 % (ref 20–42)
Lab: 1209
M PNEUMO DNA NPH QL NAA+NON-PROBE: NOT DETECTED
MAGNESIUM SERPL-MCNC: 1.7 MG/DL (ref 1.6–2.6)
MCH RBC QN AUTO: 32.8 PG (ref 26–35)
MCHC RBC AUTO-ENTMCNC: 32.8 G/DL (ref 32–34.5)
MCV RBC AUTO: 100 FL (ref 80–99.9)
METAMYELOCYTES ABSOLUTE COUNT: 0.01 K/UL (ref 0–0.12)
METAMYELOCYTES: 1 % (ref 0–1)
METHB: 1.6 % (ref 0–1.5)
MODE: ABNORMAL
MONOCYTES NFR BLD: 0.07 K/UL (ref 0.1–0.95)
MONOCYTES NFR BLD: 4 % (ref 2–12)
NEUTROPHILS NFR BLD: 81 % (ref 43–80)
NEUTS SEG NFR BLD: 1.29 K/UL (ref 1.8–7.3)
NITRITE UR QL STRIP: POSITIVE
O2 SATURATION: 96.4 % (ref 92–98.5)
O2HB: 94.8 % (ref 94–97)
OPERATOR ID: 5100
PATIENT TEMP: 37 C
PCO2: 29.5 MMHG (ref 35–45)
PH BLOOD GAS: 7.47 (ref 7.35–7.45)
PH UR STRIP: 5.5 [PH] (ref 5–8)
PLATELET CONFIRMATION: NORMAL
PLATELET, FLUORESCENCE: 20 K/UL (ref 130–450)
PMV BLD AUTO: ABNORMAL FL (ref 7–12)
PO2: 106.6 MMHG (ref 75–100)
POTASSIUM SERPL-SCNC: 4.4 MMOL/L (ref 3.5–5)
POTASSIUM SERPL-SCNC: 5.6 MMOL/L (ref 3.5–5)
PROT SERPL-MCNC: 7.8 G/DL (ref 6.4–8.3)
PROT UR STRIP-MCNC: NEGATIVE MG/DL
PROTHROMBIN TIME: 19.5 SEC (ref 9.3–12.4)
RBC # BLD AUTO: 2.32 M/UL (ref 3.5–5.5)
RBC # BLD: ABNORMAL 10*6/UL
RBC #/AREA URNS HPF: ABNORMAL /HPF
RSV RNA NPH QL NAA+NON-PROBE: NOT DETECTED
RV+EV RNA NPH QL NAA+NON-PROBE: NOT DETECTED
SARS-COV-2 RNA NPH QL NAA+NON-PROBE: NOT DETECTED
SODIUM SERPL-SCNC: 132 MMOL/L (ref 132–146)
SODIUM SERPL-SCNC: 137 MMOL/L (ref 132–146)
SOURCE, BLOOD GAS: ABNORMAL
SP GR UR STRIP: 1.01 (ref 1–1.03)
SPECIMEN DESCRIPTION: NORMAL
THB: 7.9 G/DL (ref 11.5–16.5)
TIME ANALYZED: 1215
TROPONIN I SERPL HS-MCNC: 30 NG/L (ref 0–9)
TROPONIN I SERPL HS-MCNC: 34 NG/L (ref 0–9)
UROBILINOGEN UR STRIP-ACNC: 0.2 EU/DL (ref 0–1)
WBC #/AREA URNS HPF: ABNORMAL /HPF
WBC OTHER # BLD: 1.6 K/UL (ref 4.5–11.5)

## 2025-02-17 PROCEDURE — 82010 KETONE BODYS QUAN: CPT

## 2025-02-17 PROCEDURE — 2060000000 HC ICU INTERMEDIATE R&B

## 2025-02-17 PROCEDURE — 85025 COMPLETE CBC W/AUTO DIFF WBC: CPT

## 2025-02-17 PROCEDURE — 99223 1ST HOSP IP/OBS HIGH 75: CPT | Performed by: INTERNAL MEDICINE

## 2025-02-17 PROCEDURE — 81001 URINALYSIS AUTO W/SCOPE: CPT

## 2025-02-17 PROCEDURE — 2500000003 HC RX 250 WO HCPCS: Performed by: INTERNAL MEDICINE

## 2025-02-17 PROCEDURE — 87086 URINE CULTURE/COLONY COUNT: CPT

## 2025-02-17 PROCEDURE — 82805 BLOOD GASES W/O2 SATURATION: CPT

## 2025-02-17 PROCEDURE — 84484 ASSAY OF TROPONIN QUANT: CPT

## 2025-02-17 PROCEDURE — 6370000000 HC RX 637 (ALT 250 FOR IP): Performed by: INTERNAL MEDICINE

## 2025-02-17 PROCEDURE — 83735 ASSAY OF MAGNESIUM: CPT

## 2025-02-17 PROCEDURE — 80048 BASIC METABOLIC PNL TOTAL CA: CPT

## 2025-02-17 PROCEDURE — 83605 ASSAY OF LACTIC ACID: CPT

## 2025-02-17 PROCEDURE — 6360000002 HC RX W HCPCS: Performed by: INTERNAL MEDICINE

## 2025-02-17 PROCEDURE — 0202U NFCT DS 22 TRGT SARS-COV-2: CPT

## 2025-02-17 PROCEDURE — 6370000000 HC RX 637 (ALT 250 FOR IP)

## 2025-02-17 PROCEDURE — 70450 CT HEAD/BRAIN W/O DYE: CPT

## 2025-02-17 PROCEDURE — 83880 ASSAY OF NATRIURETIC PEPTIDE: CPT

## 2025-02-17 PROCEDURE — 6360000002 HC RX W HCPCS

## 2025-02-17 PROCEDURE — 82248 BILIRUBIN DIRECT: CPT

## 2025-02-17 PROCEDURE — 82962 GLUCOSE BLOOD TEST: CPT

## 2025-02-17 PROCEDURE — 2580000003 HC RX 258

## 2025-02-17 PROCEDURE — 80053 COMPREHEN METABOLIC PANEL: CPT

## 2025-02-17 PROCEDURE — 85610 PROTHROMBIN TIME: CPT

## 2025-02-17 PROCEDURE — 83690 ASSAY OF LIPASE: CPT

## 2025-02-17 PROCEDURE — 82140 ASSAY OF AMMONIA: CPT

## 2025-02-17 PROCEDURE — 87077 CULTURE AEROBIC IDENTIFY: CPT

## 2025-02-17 PROCEDURE — 2500000003 HC RX 250 WO HCPCS

## 2025-02-17 PROCEDURE — 99285 EMERGENCY DEPT VISIT HI MDM: CPT

## 2025-02-17 PROCEDURE — 96365 THER/PROPH/DIAG IV INF INIT: CPT

## 2025-02-17 PROCEDURE — 71045 X-RAY EXAM CHEST 1 VIEW: CPT

## 2025-02-17 RX ORDER — MAGNESIUM SULFATE IN WATER 40 MG/ML
2000 INJECTION, SOLUTION INTRAVENOUS PRN
Status: DISCONTINUED | OUTPATIENT
Start: 2025-02-17 | End: 2025-02-20 | Stop reason: HOSPADM

## 2025-02-17 RX ORDER — ENOXAPARIN SODIUM 100 MG/ML
40 INJECTION SUBCUTANEOUS DAILY
Status: DISCONTINUED | OUTPATIENT
Start: 2025-02-17 | End: 2025-02-17

## 2025-02-17 RX ORDER — DEXTROSE MONOHYDRATE 100 MG/ML
INJECTION, SOLUTION INTRAVENOUS CONTINUOUS PRN
Status: DISCONTINUED | OUTPATIENT
Start: 2025-02-17 | End: 2025-02-20 | Stop reason: HOSPADM

## 2025-02-17 RX ORDER — LACTULOSE 10 G/15ML
30 SOLUTION ORAL ONCE
Status: DISCONTINUED | OUTPATIENT
Start: 2025-02-17 | End: 2025-02-17

## 2025-02-17 RX ORDER — 0.9 % SODIUM CHLORIDE 0.9 %
1000 INTRAVENOUS SOLUTION INTRAVENOUS ONCE
Status: COMPLETED | OUTPATIENT
Start: 2025-02-17 | End: 2025-02-17

## 2025-02-17 RX ORDER — POTASSIUM CHLORIDE 1500 MG/1
40 TABLET, EXTENDED RELEASE ORAL PRN
Status: DISCONTINUED | OUTPATIENT
Start: 2025-02-17 | End: 2025-02-20 | Stop reason: HOSPADM

## 2025-02-17 RX ORDER — PROCHLORPERAZINE MALEATE 10 MG
10 TABLET ORAL EVERY 8 HOURS PRN
Status: DISCONTINUED | OUTPATIENT
Start: 2025-02-17 | End: 2025-02-20 | Stop reason: HOSPADM

## 2025-02-17 RX ORDER — LACTULOSE 10 G/15ML
20 SOLUTION ORAL
Status: DISCONTINUED | OUTPATIENT
Start: 2025-02-17 | End: 2025-02-20 | Stop reason: HOSPADM

## 2025-02-17 RX ORDER — ACETAMINOPHEN 325 MG/1
650 TABLET ORAL EVERY 6 HOURS PRN
Status: CANCELLED | OUTPATIENT
Start: 2025-02-17

## 2025-02-17 RX ORDER — ONDANSETRON 2 MG/ML
4 INJECTION INTRAMUSCULAR; INTRAVENOUS EVERY 6 HOURS PRN
Status: DISCONTINUED | OUTPATIENT
Start: 2025-02-17 | End: 2025-02-17 | Stop reason: CLARIF

## 2025-02-17 RX ORDER — SODIUM CHLORIDE 0.9 % (FLUSH) 0.9 %
5-40 SYRINGE (ML) INJECTION EVERY 12 HOURS SCHEDULED
Status: DISCONTINUED | OUTPATIENT
Start: 2025-02-17 | End: 2025-02-20 | Stop reason: HOSPADM

## 2025-02-17 RX ORDER — PROCHLORPERAZINE EDISYLATE 5 MG/ML
10 INJECTION INTRAMUSCULAR; INTRAVENOUS EVERY 6 HOURS PRN
Status: DISCONTINUED | OUTPATIENT
Start: 2025-02-17 | End: 2025-02-20 | Stop reason: HOSPADM

## 2025-02-17 RX ORDER — ONDANSETRON 4 MG/1
4 TABLET, ORALLY DISINTEGRATING ORAL EVERY 8 HOURS PRN
Status: DISCONTINUED | OUTPATIENT
Start: 2025-02-17 | End: 2025-02-17 | Stop reason: CLARIF

## 2025-02-17 RX ORDER — LACTULOSE 10 G/15ML
30 SOLUTION ORAL 3 TIMES DAILY
Status: DISCONTINUED | OUTPATIENT
Start: 2025-02-17 | End: 2025-02-17

## 2025-02-17 RX ORDER — BUMETANIDE 1 MG/1
1 TABLET ORAL DAILY
Status: DISCONTINUED | OUTPATIENT
Start: 2025-02-17 | End: 2025-02-20 | Stop reason: HOSPADM

## 2025-02-17 RX ORDER — PANTOPRAZOLE SODIUM 40 MG/1
40 TABLET, DELAYED RELEASE ORAL
Status: DISCONTINUED | OUTPATIENT
Start: 2025-02-17 | End: 2025-02-20 | Stop reason: HOSPADM

## 2025-02-17 RX ORDER — POTASSIUM CHLORIDE 7.45 MG/ML
10 INJECTION INTRAVENOUS PRN
Status: DISCONTINUED | OUTPATIENT
Start: 2025-02-17 | End: 2025-02-20 | Stop reason: HOSPADM

## 2025-02-17 RX ORDER — GLUCAGON 1 MG/ML
1 KIT INJECTION PRN
Status: DISCONTINUED | OUTPATIENT
Start: 2025-02-17 | End: 2025-02-20 | Stop reason: HOSPADM

## 2025-02-17 RX ORDER — INSULIN GLARGINE 100 [IU]/ML
24 INJECTION, SOLUTION SUBCUTANEOUS NIGHTLY
Status: DISCONTINUED | OUTPATIENT
Start: 2025-02-17 | End: 2025-02-20 | Stop reason: HOSPADM

## 2025-02-17 RX ORDER — SODIUM CHLORIDE 9 MG/ML
INJECTION, SOLUTION INTRAVENOUS PRN
Status: DISCONTINUED | OUTPATIENT
Start: 2025-02-17 | End: 2025-02-20 | Stop reason: HOSPADM

## 2025-02-17 RX ORDER — CARVEDILOL 6.25 MG/1
3.12 TABLET ORAL DAILY
Status: DISCONTINUED | OUTPATIENT
Start: 2025-02-17 | End: 2025-02-20 | Stop reason: HOSPADM

## 2025-02-17 RX ORDER — POLYETHYLENE GLYCOL 3350 17 G/17G
17 POWDER, FOR SOLUTION ORAL DAILY PRN
Status: DISCONTINUED | OUTPATIENT
Start: 2025-02-17 | End: 2025-02-20 | Stop reason: HOSPADM

## 2025-02-17 RX ORDER — INSULIN LISPRO 100 [IU]/ML
0-8 INJECTION, SOLUTION INTRAVENOUS; SUBCUTANEOUS
Status: DISCONTINUED | OUTPATIENT
Start: 2025-02-17 | End: 2025-02-19

## 2025-02-17 RX ORDER — CALCIUM GLUCONATE 20 MG/ML
1000 INJECTION, SOLUTION INTRAVENOUS ONCE
Status: COMPLETED | OUTPATIENT
Start: 2025-02-17 | End: 2025-02-17

## 2025-02-17 RX ORDER — SODIUM CHLORIDE 0.9 % (FLUSH) 0.9 %
5-40 SYRINGE (ML) INJECTION PRN
Status: DISCONTINUED | OUTPATIENT
Start: 2025-02-17 | End: 2025-02-20 | Stop reason: HOSPADM

## 2025-02-17 RX ORDER — ACETAMINOPHEN 650 MG/1
650 SUPPOSITORY RECTAL EVERY 6 HOURS PRN
Status: CANCELLED | OUTPATIENT
Start: 2025-02-17

## 2025-02-17 RX ADMIN — CALCIUM GLUCONATE 1000 MG: 20 INJECTION, SOLUTION INTRAVENOUS at 12:16

## 2025-02-17 RX ADMIN — LACTULOSE 1000 ML: 10 SOLUTION ORAL; RECTAL at 19:45

## 2025-02-17 RX ADMIN — DEXTROSE 250 ML: 10 SOLUTION INTRAVENOUS at 15:58

## 2025-02-17 RX ADMIN — INSULIN LISPRO 8 UNITS: 100 INJECTION, SOLUTION INTRAVENOUS; SUBCUTANEOUS at 20:38

## 2025-02-17 RX ADMIN — INSULIN HUMAN 10 UNITS: 100 INJECTION, SOLUTION PARENTERAL at 15:58

## 2025-02-17 RX ADMIN — SODIUM CHLORIDE, PRESERVATIVE FREE 10 ML: 5 INJECTION INTRAVENOUS at 20:39

## 2025-02-17 RX ADMIN — WATER 1000 MG: 1 INJECTION INTRAMUSCULAR; INTRAVENOUS; SUBCUTANEOUS at 20:38

## 2025-02-17 RX ADMIN — SODIUM CHLORIDE 1000 ML: 9 INJECTION, SOLUTION INTRAVENOUS at 11:58

## 2025-02-17 RX ADMIN — INSULIN GLARGINE 24 UNITS: 100 INJECTION, SOLUTION SUBCUTANEOUS at 20:39

## 2025-02-17 NOTE — H&P
History & Physical      PCP: Tonie Hernandez DO    Date of Admission: 2/17/2025    Date of Service: Pt seen/examined on 2/17/2025 and is admitted to Inpatient with expected LOS greater than two midnights due to medical therapy.      Chief Complaint:  had concerns including Altered Mental Status (\"Just here with high ammonia level\" 10 push dose epi after 76/40, came up to 107/40, minimal responsive).    History Of Present Illness:    Ms. Lisa Hunt, a 64 y.o. year old female  who  has a past medical history of DONNA (acute kidney injury) (HCC), Cirrhosis (HCC), Diabetes mellitus (HCC), Diabetic neuropathy (HCC), Esophageal varices without bleeding (HCC), GERD (gastroesophageal reflux disease), Hypertension, Intracranial bleed (HCC), Liver cirrhosis (HCC), Low vitamin D level, SDH (subdural hematoma), Thrombocytopenia (HCC), and Type 2 diabetes mellitus without complication (HCC).     Patient presented to the ED with altered mental status.  Has a known history of Alejandro liver cirrhosis, hepatic encephalopathy, diabetes, hypertension, subdural hematoma.  Family went to check on patient, and states that she was altered and unresponsive.  Upon EMS arrival she was noted to be hypotensive, she was given IV fluids and a 10 mcg push of epinephrine.  Patient did not response to pain.  Was moaning and withdrawing from pain in the ED.    In the ED vitals were largely reassuring.  Labs were significant for creatinine of 1.4, which is near patient's baseline, mild hyperkalemia which was treated medically, hyperglycemia above 500, mild lactic acidosis, UA with positive nitrites, ammonia level of 167.  CT of the head was obtained and largely reassuring.  Patient was given calcium gluconate, insulin and dextrose, and lactulose enema.  Admitted for further evaluation and treatment.      At the time of my evaluation, patient was laying comfortably no acute distress.  She was awake, able to protect her airway, but

## 2025-02-17 NOTE — ED PROVIDER NOTES
Department of Emergency Medicine     Written by: Miky Taylor MD  Patient Name: Lisa Hunt  Admit Date: 2025 10:37 AM  MRN: [id number]                   : 1960    HPI     Chief Complaint   Patient presents with    Altered Mental Status     \"Just here with high ammonia level\" 10 push dose epi after 76/40, came up to 107/40, minimal responsive       Lisa Hunt is a 64 y.o. female that presents to the ED due to concern for altered mental status.  The patient was admitted a week and a half ago for hyperammonemia for which she presented similarly.  Family states that she was altered today and not responsive.  When EMS arrived, blood pressure 76/40.  They started liter fluid and gave her 10 mcg of push dose epinephrine.  The patient does not respond to pain.  She will respond to vigorous sternal rub by moaning and withdraws from pain.  Pupils are equal and reactive bilaterally.  She has scleral icterus, skin is diffusely jaundiced.    Review of systems   Pertinent positives and negatives mentioned in the HPI/MDM.      Physical   Physical Exam  Constitutional:       General: She is not in acute distress.     Appearance: Normal appearance. She is ill-appearing and toxic-appearing.   HENT:      Mouth/Throat:      Comments: Dry oral mucosa  Eyes:      General: Scleral icterus present.      Extraocular Movements: Extraocular movements intact.      Pupils: Pupils are equal, round, and reactive to light.   Cardiovascular:      Rate and Rhythm: Normal rate and regular rhythm.      Heart sounds: Normal heart sounds.   Pulmonary:      Effort: Pulmonary effort is normal. No respiratory distress.      Breath sounds: No stridor.   Abdominal:      General: Bowel sounds are normal.      Palpations: Abdomen is soft.   Musculoskeletal:         General: Normal range of motion.   Skin:     Comments: Jaundice   Neurological:      Mental Status: She is lethargic.      Comments: Nonresponsive          Chart

## 2025-02-17 NOTE — CARE COORDINATION
Care Transitions Note    Initial Call - Call within 2 business days of discharge: Yes    Patient discharged from [facility] on  for hepatic encephalopathy, is currently in ED. Initial transitions call held d/t presently in ED      Patient: Lisa Hunt    Patient : 1960   MRN: [id number]    Reason for Admission: hepatic encephalopathy  Discharge Date: 25  RURS: Readmission Risk Score: 32.4    Last Discharge Facility       Date Complaint Diagnosis Description Type Department Provider    25 Altered Mental Status  [facility] Los Quintanilla MD; David Sharma...            Was this an external facility discharge? No    Follow Up Appointment:   Patient has hospital follow up appointment scheduled within 7 days of discharge.    Future Appointments         Provider Specialty Dept Phone    2025 11:00 AM Tonie Hernandez DO Family Medicine [phone]    2025 12:30 PM Tae Gaines MD Endocrinology [phone]    2025 9:45 AM Anil Conrad MD Gastroenterology [phone]    2025 11:00 AM Anil Conrad MD Gastroenterology [phone]            Plan for follow-up on next business day.      Concepción Zhou RN

## 2025-02-17 NOTE — CARE COORDINATION
Care Coordination  64 yr old Upper sorbian speaking female presents to ER from home secondary to AMS. Dennis   BUN 46  Amonia 167  Chart reviewed as 30 day readmission.  Patient with hx of hepatic encephalopathy   Previous  discharges  on 2/14  and 2/6  returning each time with same complaint., each time returning home .     Patient lives with her daughter Kelsey who works full time.  On previous admission patient refused [facility] placement.

## 2025-02-18 ENCOUNTER — APPOINTMENT (OUTPATIENT)
Dept: MRI IMAGING | Age: 65
DRG: 871 | End: 2025-02-18
Payer: MEDICARE

## 2025-02-18 LAB
AMMONIA PLAS-SCNC: 93 UMOL/L (ref 11–51)
ANION GAP SERPL CALCULATED.3IONS-SCNC: 12 MMOL/L (ref 7–16)
BASOPHILS # BLD: 0.02 K/UL (ref 0–0.2)
BASOPHILS NFR BLD: 1 % (ref 0–2)
BUN SERPL-MCNC: 45 MG/DL (ref 6–23)
CALCIUM SERPL-MCNC: 9 MG/DL (ref 8.6–10.2)
CHLORIDE SERPL-SCNC: 104 MMOL/L (ref 98–107)
CO2 SERPL-SCNC: 25 MMOL/L (ref 22–29)
CREAT SERPL-MCNC: 1.4 MG/DL (ref 0.5–1)
EOSINOPHIL # BLD: 0.02 K/UL (ref 0.05–0.5)
EOSINOPHILS RELATIVE PERCENT: 1 % (ref 0–6)
ERYTHROCYTE [DISTWIDTH] IN BLOOD BY AUTOMATED COUNT: 16.9 % (ref 11.5–15)
GFR, ESTIMATED: 42 ML/MIN/1.73M2
GLUCOSE BLD-MCNC: 259 MG/DL (ref 74–99)
GLUCOSE BLD-MCNC: 267 MG/DL (ref 74–99)
GLUCOSE BLD-MCNC: 272 MG/DL (ref 74–99)
GLUCOSE BLD-MCNC: 274 MG/DL (ref 74–99)
GLUCOSE SERPL-MCNC: 266 MG/DL (ref 74–99)
HCT VFR BLD AUTO: 22 % (ref 34–48)
HGB BLD-MCNC: 7.3 G/DL (ref 11.5–15.5)
LYMPHOCYTES NFR BLD: 0.21 K/UL (ref 1.5–4)
LYMPHOCYTES RELATIVE PERCENT: 12 % (ref 20–42)
MCH RBC QN AUTO: 32.3 PG (ref 26–35)
MCHC RBC AUTO-ENTMCNC: 33.2 G/DL (ref 32–34.5)
MCV RBC AUTO: 97.3 FL (ref 80–99.9)
MONOCYTES NFR BLD: 0.1 K/UL (ref 0.1–0.95)
MONOCYTES NFR BLD: 5 % (ref 2–12)
NEUTROPHILS NFR BLD: 81 % (ref 43–80)
NEUTS SEG NFR BLD: 1.47 K/UL (ref 1.8–7.3)
PLATELET CONFIRMATION: NORMAL
PLATELET, FLUORESCENCE: 19 K/UL (ref 130–450)
PMV BLD AUTO: ABNORMAL FL (ref 7–12)
POTASSIUM SERPL-SCNC: 4.4 MMOL/L (ref 3.5–5)
RBC # BLD AUTO: 2.26 M/UL (ref 3.5–5.5)
RBC # BLD: ABNORMAL 10*6/UL
SODIUM SERPL-SCNC: 141 MMOL/L (ref 132–146)
WBC OTHER # BLD: 1.8 K/UL (ref 4.5–11.5)

## 2025-02-18 PROCEDURE — 70551 MRI BRAIN STEM W/O DYE: CPT

## 2025-02-18 PROCEDURE — 6370000000 HC RX 637 (ALT 250 FOR IP): Performed by: INTERNAL MEDICINE

## 2025-02-18 PROCEDURE — 36415 COLL VENOUS BLD VENIPUNCTURE: CPT

## 2025-02-18 PROCEDURE — 85025 COMPLETE CBC W/AUTO DIFF WBC: CPT

## 2025-02-18 PROCEDURE — 2580000003 HC RX 258: Performed by: INTERNAL MEDICINE

## 2025-02-18 PROCEDURE — 99232 SBSQ HOSP IP/OBS MODERATE 35: CPT | Performed by: INTERNAL MEDICINE

## 2025-02-18 PROCEDURE — 2060000000 HC ICU INTERMEDIATE R&B

## 2025-02-18 PROCEDURE — 2500000003 HC RX 250 WO HCPCS: Performed by: INTERNAL MEDICINE

## 2025-02-18 PROCEDURE — 6360000002 HC RX W HCPCS: Performed by: INTERNAL MEDICINE

## 2025-02-18 PROCEDURE — 82140 ASSAY OF AMMONIA: CPT

## 2025-02-18 PROCEDURE — 82962 GLUCOSE BLOOD TEST: CPT

## 2025-02-18 PROCEDURE — 80048 BASIC METABOLIC PNL TOTAL CA: CPT

## 2025-02-18 RX ORDER — SODIUM CHLORIDE, SODIUM LACTATE, POTASSIUM CHLORIDE, CALCIUM CHLORIDE 600; 310; 30; 20 MG/100ML; MG/100ML; MG/100ML; MG/100ML
INJECTION, SOLUTION INTRAVENOUS CONTINUOUS
Status: DISCONTINUED | OUTPATIENT
Start: 2025-02-18 | End: 2025-02-20

## 2025-02-18 RX ADMIN — INSULIN LISPRO 4 UNITS: 100 INJECTION, SOLUTION INTRAVENOUS; SUBCUTANEOUS at 12:01

## 2025-02-18 RX ADMIN — SODIUM CHLORIDE, PRESERVATIVE FREE 10 ML: 5 INJECTION INTRAVENOUS at 12:06

## 2025-02-18 RX ADMIN — LACTULOSE 20 G: 20 SOLUTION ORAL at 20:57

## 2025-02-18 RX ADMIN — INSULIN LISPRO 4 UNITS: 100 INJECTION, SOLUTION INTRAVENOUS; SUBCUTANEOUS at 17:19

## 2025-02-18 RX ADMIN — WATER 1000 MG: 1 INJECTION INTRAMUSCULAR; INTRAVENOUS; SUBCUTANEOUS at 18:49

## 2025-02-18 RX ADMIN — SODIUM CHLORIDE, POTASSIUM CHLORIDE, SODIUM LACTATE AND CALCIUM CHLORIDE: 600; 310; 30; 20 INJECTION, SOLUTION INTRAVENOUS at 12:08

## 2025-02-18 RX ADMIN — INSULIN GLARGINE 24 UNITS: 100 INJECTION, SOLUTION SUBCUTANEOUS at 20:57

## 2025-02-18 RX ADMIN — INSULIN LISPRO 4 UNITS: 100 INJECTION, SOLUTION INTRAVENOUS; SUBCUTANEOUS at 20:57

## 2025-02-18 RX ADMIN — RIFAXIMIN 400 MG: 200 TABLET ORAL at 20:57

## 2025-02-18 NOTE — ACP (ADVANCE CARE PLANNING)
Advance Care Planning   Healthcare Decision Maker:    Primary Decision Maker: Lisa Fitch - Child - [phone]    Secondary Decision Maker: yolette fitch - Child - [phone]    Click here to complete Healthcare Decision Makers including selection of the Healthcare Decision Maker Relationship (ie \"Primary\").          not able to complete documents due to mental status. JOSY Cameron  2/18/2025

## 2025-02-18 NOTE — CARE COORDINATION
SOCIAL WORK/CASEMANAGEMENT TRANSITION OF CARE PLANNING( PHANI BAILEE, JOSY [phone]): I called Lisa the daughter, and went over things with her. She doesn't want reanna. Pt lives with her sister, Kelsey, who is not working, she quit to help care for pt. Kelsey lives about 37 minutes away in [place]. She has a autistic child. Pt has a fww only at home. No hhc pta. They have a 2 story setting with 1st floor room with a bed and 1/2 bath and 3 front steps to enter. Pt requires assistance at times but most time the daughters do her bathing and dressing, meals and medications. Her pcp is Dr. Hernandez whom she saw in dec. Hhc will need set up for home and dme such as 3: 1 commode and hospital bed and w/c. Lisa has no preference for dme or hhc agency when choices were offered.  Pt is German speaking and not responsive today. MRI of the brain is pending. Pt is on iv rocephin for a UTI, urine cx is pending. Npo. CT of the head showed:  no acute intracranial hemorrhage or acute intracranial  abnormality. Ammonia today is 93 , hgb 7.3 and creatine 1.4.    JOSY Cameron  2/18/2025    Case Management Assessment  Initial Evaluation    Date/Time of Evaluation: 2/18/2025 1:04 PM  Assessment Completed by: JOSY Cameron    If patient is discharged prior to next notation, then this note serves as note for discharge by case management.    Patient Name: Lisa Hunt                   YOB: 1960  Diagnosis: Hepatic encephalopathy (HCC) [K76.82]  Lactic acidosis [E87.20]  Altered mental status, unspecified altered mental status type [R41.82]                   Date / Time: 2/17/2025 10:37 AM    Patient Admission Status: Inpatient   Readmission Risk (Low < 19, Mod (19-27), High > 27): Readmission Risk Score: 36.8    Current PCP: Tonie Hernandez, DO  PCP verified by CM? Yes    Chart Reviewed: Yes      History Provided by: Child/Family  Patient Orientation: Unresponsive    Patient Cognition: Other (see comment) (not

## 2025-02-19 ENCOUNTER — TELEPHONE (OUTPATIENT)
Dept: FAMILY MEDICINE CLINIC | Age: 65
End: 2025-02-19

## 2025-02-19 LAB
GLUCOSE BLD-MCNC: 264 MG/DL (ref 74–99)
GLUCOSE BLD-MCNC: 291 MG/DL (ref 74–99)
GLUCOSE BLD-MCNC: 334 MG/DL (ref 74–99)
GLUCOSE BLD-MCNC: 411 MG/DL (ref 74–99)

## 2025-02-19 PROCEDURE — 99232 SBSQ HOSP IP/OBS MODERATE 35: CPT | Performed by: INTERNAL MEDICINE

## 2025-02-19 PROCEDURE — 6360000002 HC RX W HCPCS: Performed by: INTERNAL MEDICINE

## 2025-02-19 PROCEDURE — 2060000000 HC ICU INTERMEDIATE R&B

## 2025-02-19 PROCEDURE — 6370000000 HC RX 637 (ALT 250 FOR IP): Performed by: INTERNAL MEDICINE

## 2025-02-19 PROCEDURE — 2500000003 HC RX 250 WO HCPCS: Performed by: INTERNAL MEDICINE

## 2025-02-19 PROCEDURE — 2580000003 HC RX 258: Performed by: INTERNAL MEDICINE

## 2025-02-19 PROCEDURE — 82962 GLUCOSE BLOOD TEST: CPT

## 2025-02-19 RX ORDER — INSULIN LISPRO 100 [IU]/ML
0-16 INJECTION, SOLUTION INTRAVENOUS; SUBCUTANEOUS
Status: DISCONTINUED | OUTPATIENT
Start: 2025-02-19 | End: 2025-02-20 | Stop reason: HOSPADM

## 2025-02-19 RX ADMIN — BUMETANIDE 1 MG: 1 TABLET ORAL at 08:41

## 2025-02-19 RX ADMIN — INSULIN LISPRO 4 UNITS: 100 INJECTION, SOLUTION INTRAVENOUS; SUBCUTANEOUS at 08:41

## 2025-02-19 RX ADMIN — LACTULOSE 20 G: 20 SOLUTION ORAL at 11:56

## 2025-02-19 RX ADMIN — RIFAXIMIN 400 MG: 200 TABLET ORAL at 08:41

## 2025-02-19 RX ADMIN — LACTULOSE 20 G: 20 SOLUTION ORAL at 00:02

## 2025-02-19 RX ADMIN — PANTOPRAZOLE SODIUM 40 MG: 40 TABLET, DELAYED RELEASE ORAL at 05:36

## 2025-02-19 RX ADMIN — INSULIN LISPRO 8 UNITS: 100 INJECTION, SOLUTION INTRAVENOUS; SUBCUTANEOUS at 18:04

## 2025-02-19 RX ADMIN — INSULIN GLARGINE 24 UNITS: 100 INJECTION, SOLUTION SUBCUTANEOUS at 22:45

## 2025-02-19 RX ADMIN — LACTULOSE 20 G: 20 SOLUTION ORAL at 23:52

## 2025-02-19 RX ADMIN — SODIUM CHLORIDE, POTASSIUM CHLORIDE, SODIUM LACTATE AND CALCIUM CHLORIDE: 600; 310; 30; 20 INJECTION, SOLUTION INTRAVENOUS at 08:48

## 2025-02-19 RX ADMIN — INSULIN LISPRO 12 UNITS: 100 INJECTION, SOLUTION INTRAVENOUS; SUBCUTANEOUS at 22:45

## 2025-02-19 RX ADMIN — LACTULOSE 20 G: 20 SOLUTION ORAL at 08:41

## 2025-02-19 RX ADMIN — CARVEDILOL 3.12 MG: 6.25 TABLET, FILM COATED ORAL at 08:41

## 2025-02-19 RX ADMIN — INSULIN LISPRO 16 UNITS: 100 INJECTION, SOLUTION INTRAVENOUS; SUBCUTANEOUS at 11:56

## 2025-02-19 RX ADMIN — PANTOPRAZOLE SODIUM 40 MG: 40 TABLET, DELAYED RELEASE ORAL at 18:04

## 2025-02-19 RX ADMIN — RIFAXIMIN 400 MG: 200 TABLET ORAL at 22:45

## 2025-02-19 RX ADMIN — RIFAXIMIN 400 MG: 200 TABLET ORAL at 14:06

## 2025-02-19 RX ADMIN — WATER 1000 MG: 1 INJECTION INTRAMUSCULAR; INTRAVENOUS; SUBCUTANEOUS at 18:04

## 2025-02-19 NOTE — CARE COORDINATION
SOCIAL WORK/CASEMANAGEMENT TRANSITION OF Care: met with pt this a.m. she is more alert and eating breakfast . Pt is Indonesian speaking only. PT and OT to [facility] then  I will call daughterFausto, on discharge needs and dme. Pt  is on iv lactated ringers and rocephin. Urine cx Is pending. MRI of the brain showed:  no acute intracranial abnormality.  JOSY Cameron  2/19/2025    Referral made to [facility], and pt accepted,  will request for home PT, OT , speech and nsg. JOSY Cameron  2/19/2025    Pcp feels pt will go home tomorrow. I have a call out to the daughter, fausto, tor return call for dme needs. Monica JOSY Elizondo  2/19/2025    Fausto called back and wants a 3:1 commode , w/c and hospital bed. She has no preference for dme and c. I made a referral to [facility] and they are going to run her insurance to see if it is a medicare/medicaid policy. Fausto said they done want anything that cost money. I called [facility] in [place]. And pt can get dme thru them if lives within a hour away from the store. They have the 3:1 commode and W/C but a order must be obtained for it. They have a couple of hospital beds thru private owner. Their hours are 10:30-4p.m. Monday thru fri but must be there by 3:30 p.m. and must register on line under [facility] and answer questions before and request dme before going there. .JOSY Cameron   .2/19/2025  Per [facility], pt will have a 20 % copay. I have forwarded the [facility] information to the daughter and will get a order for the w/c if they want it. JOSY Cameron  2/19/2025

## 2025-02-19 NOTE — PLAN OF CARE
Problem: Chronic Conditions and Co-morbidities  Goal: Patient's chronic conditions and co-morbidity symptoms are monitored and maintained or improved  2/19/2025 0908 by Wilver Reed RN  Outcome: Progressing  2/19/2025 0041 by Miyk Gordillo RN  Outcome: Progressing     Problem: Discharge Planning  Goal: Discharge to home or other facility with appropriate resources  2/19/2025 0908 by Wilver Reed RN  Outcome: Progressing  2/19/2025 0041 by Miky Gordillo RN  Outcome: Progressing     Problem: Skin/Tissue Integrity  Goal: Skin integrity remains intact  Description: 1.  Monitor for areas of redness and/or skin breakdown  2.  Assess vascular access sites hourly  3.  Every 4-6 hours minimum:  Change oxygen saturation probe site  4.  Every 4-6 hours:  If on nasal continuous positive airway pressure, respiratory therapy assess nares and determine need for appliance change or resting period  2/19/2025 0908 by Wilver Reed RN  Outcome: Progressing  2/19/2025 0041 by Miky Gordillo RN  Outcome: Progressing     Problem: Safety - Adult  Goal: Free from fall injury  2/19/2025 0908 by Wilver Reed RN  Outcome: Progressing  2/19/2025 0041 by Miky Gordillo RN  Outcome: Progressing

## 2025-02-20 VITALS
TEMPERATURE: 97.9 F | HEART RATE: 85 BPM | OXYGEN SATURATION: 100 % | RESPIRATION RATE: 15 BRPM | HEIGHT: 64 IN | SYSTOLIC BLOOD PRESSURE: 91 MMHG | DIASTOLIC BLOOD PRESSURE: 47 MMHG | BODY MASS INDEX: 27.31 KG/M2 | WEIGHT: 160 LBS

## 2025-02-20 LAB
ANION GAP SERPL CALCULATED.3IONS-SCNC: 13 MMOL/L (ref 7–16)
BASOPHILS # BLD: 0.01 K/UL (ref 0–0.2)
BASOPHILS NFR BLD: 1 % (ref 0–2)
BUN SERPL-MCNC: 34 MG/DL (ref 6–23)
CALCIUM SERPL-MCNC: 8.5 MG/DL (ref 8.6–10.2)
CHLORIDE SERPL-SCNC: 101 MMOL/L (ref 98–107)
CO2 SERPL-SCNC: 22 MMOL/L (ref 22–29)
CREAT SERPL-MCNC: 1.2 MG/DL (ref 0.5–1)
EOSINOPHIL # BLD: 0.06 K/UL (ref 0.05–0.5)
EOSINOPHILS RELATIVE PERCENT: 4 % (ref 0–6)
ERYTHROCYTE [DISTWIDTH] IN BLOOD BY AUTOMATED COUNT: 17.1 % (ref 11.5–15)
GFR, ESTIMATED: 49 ML/MIN/1.73M2
GLUCOSE BLD-MCNC: 153 MG/DL (ref 74–99)
GLUCOSE BLD-MCNC: 263 MG/DL (ref 74–99)
GLUCOSE BLD-MCNC: 362 MG/DL (ref 74–99)
GLUCOSE SERPL-MCNC: 140 MG/DL (ref 74–99)
HCT VFR BLD AUTO: 22.6 % (ref 34–48)
HGB BLD-MCNC: 7.1 G/DL (ref 11.5–15.5)
IMM GRANULOCYTES # BLD AUTO: <0.03 K/UL (ref 0–0.58)
IMM GRANULOCYTES NFR BLD: 0 % (ref 0–5)
LYMPHOCYTES NFR BLD: 0.44 K/UL (ref 1.5–4)
LYMPHOCYTES RELATIVE PERCENT: 26 % (ref 20–42)
MCH RBC QN AUTO: 32.6 PG (ref 26–35)
MCHC RBC AUTO-ENTMCNC: 31.4 G/DL (ref 32–34.5)
MCV RBC AUTO: 103.7 FL (ref 80–99.9)
MICROORGANISM SPEC CULT: ABNORMAL
MICROORGANISM SPEC CULT: ABNORMAL
MONOCYTES NFR BLD: 0.18 K/UL (ref 0.1–0.95)
MONOCYTES NFR BLD: 11 % (ref 2–12)
NEUTROPHILS NFR BLD: 60 % (ref 43–80)
NEUTS SEG NFR BLD: 1.03 K/UL (ref 1.8–7.3)
PLATELET CONFIRMATION: NORMAL
PLATELET, FLUORESCENCE: 24 K/UL (ref 130–450)
PMV BLD AUTO: 13.6 FL (ref 7–12)
POTASSIUM SERPL-SCNC: 3.9 MMOL/L (ref 3.5–5)
RBC # BLD AUTO: 2.18 M/UL (ref 3.5–5.5)
RBC # BLD: ABNORMAL 10*6/UL
SERVICE CMNT-IMP: ABNORMAL
SODIUM SERPL-SCNC: 136 MMOL/L (ref 132–146)
SPECIMEN DESCRIPTION: ABNORMAL
SPECIMEN DESCRIPTION: ABNORMAL
WBC OTHER # BLD: 1.7 K/UL (ref 4.5–11.5)

## 2025-02-20 PROCEDURE — 82962 GLUCOSE BLOOD TEST: CPT

## 2025-02-20 PROCEDURE — 97166 OT EVAL MOD COMPLEX 45 MIN: CPT

## 2025-02-20 PROCEDURE — 97535 SELF CARE MNGMENT TRAINING: CPT

## 2025-02-20 PROCEDURE — 97530 THERAPEUTIC ACTIVITIES: CPT

## 2025-02-20 PROCEDURE — 97161 PT EVAL LOW COMPLEX 20 MIN: CPT

## 2025-02-20 PROCEDURE — 36415 COLL VENOUS BLD VENIPUNCTURE: CPT

## 2025-02-20 PROCEDURE — 99239 HOSP IP/OBS DSCHRG MGMT >30: CPT | Performed by: INTERNAL MEDICINE

## 2025-02-20 PROCEDURE — 80048 BASIC METABOLIC PNL TOTAL CA: CPT

## 2025-02-20 PROCEDURE — 85025 COMPLETE CBC W/AUTO DIFF WBC: CPT

## 2025-02-20 PROCEDURE — 6370000000 HC RX 637 (ALT 250 FOR IP): Performed by: INTERNAL MEDICINE

## 2025-02-20 RX ORDER — CIPROFLOXACIN 500 MG/1
500 TABLET, FILM COATED ORAL 2 TIMES DAILY
Qty: 10 TABLET | Refills: 0 | Status: SHIPPED | OUTPATIENT
Start: 2025-02-20 | End: 2025-02-25

## 2025-02-20 RX ADMIN — RIFAXIMIN 400 MG: 200 TABLET ORAL at 14:09

## 2025-02-20 RX ADMIN — INSULIN LISPRO 8 UNITS: 100 INJECTION, SOLUTION INTRAVENOUS; SUBCUTANEOUS at 12:01

## 2025-02-20 RX ADMIN — BUMETANIDE 1 MG: 1 TABLET ORAL at 09:22

## 2025-02-20 RX ADMIN — RIFAXIMIN 400 MG: 200 TABLET ORAL at 09:23

## 2025-02-20 RX ADMIN — PANTOPRAZOLE SODIUM 40 MG: 40 TABLET, DELAYED RELEASE ORAL at 05:51

## 2025-02-20 RX ADMIN — CARVEDILOL 3.12 MG: 6.25 TABLET, FILM COATED ORAL at 09:22

## 2025-02-20 RX ADMIN — LACTULOSE 20 G: 20 SOLUTION ORAL at 09:22

## 2025-02-20 RX ADMIN — PANTOPRAZOLE SODIUM 40 MG: 40 TABLET, DELAYED RELEASE ORAL at 17:00

## 2025-02-20 RX ADMIN — LACTULOSE 20 G: 20 SOLUTION ORAL at 03:39

## 2025-02-20 RX ADMIN — INSULIN LISPRO 16 UNITS: 100 INJECTION, SOLUTION INTRAVENOUS; SUBCUTANEOUS at 17:02

## 2025-02-20 NOTE — PLAN OF CARE
Problem: Chronic Conditions and Co-morbidities  Goal: Patient's chronic conditions and co-morbidity symptoms are monitored and maintained or improved  Outcome: Progressing     Problem: Discharge Planning  Goal: Discharge to home or other facility with appropriate resources  Outcome: Progressing     Problem: Skin/Tissue Integrity  Goal: Skin integrity remains intact  Description: 1.  Monitor for areas of redness and/or skin breakdown  2.  Assess vascular access sites hourly  3.  Every 4-6 hours minimum:  Change oxygen saturation probe site  4.  Every 4-6 hours:  If on nasal continuous positive airway pressure, respiratory therapy assess nares and determine need for appliance change or resting period  Outcome: Progressing     Problem: Safety - Adult  Goal: Free from fall injury  Outcome: Progressing     Problem: Neurosensory - Adult  Goal: Achieves stable or improved neurological status  Outcome: Progressing  Goal: Achieves maximal functionality and self care  Outcome: Progressing     Problem: Cardiovascular - Adult  Goal: Maintains optimal cardiac output and hemodynamic stability  Outcome: Progressing     Problem: Skin/Tissue Integrity - Adult  Goal: Skin integrity remains intact  Description: 1.  Monitor for areas of redness and/or skin breakdown  2.  Assess vascular access sites hourly  3.  Every 4-6 hours minimum:  Change oxygen saturation probe site  4.  Every 4-6 hours:  If on nasal continuous positive airway pressure, respiratory therapy assess nares and determine need for appliance change or resting period  Outcome: Progressing     Problem: Musculoskeletal - Adult  Goal: Return ADL status to a safe level of function  Outcome: Progressing     Problem: Gastrointestinal - Adult  Goal: Maintains or returns to baseline bowel function  Outcome: Progressing     Problem: Genitourinary - Adult  Goal: Absence of urinary retention  Outcome: Progressing     Problem: Metabolic/Fluid and Electrolytes - Adult  Goal:

## 2025-02-20 NOTE — CARE COORDINATION
SOCIAL WORK/[facility] TRANSITION OF CARE PLANNING( MONICA BAILEE, -[phone]):  PT and OT saw pt this a.m. hgb is 7.1 down from 7.3 and creatine is down to 1.2 from 1.4. pt is on lactated ringers and rocephin for UTI.  [facility] orders are in for [facility]. A friend came in to visit pt and said pt has no help at home. I met with pt and the  and she said she would not go to [facility]. I went over her discharge plan of [facility] with her and she is in agreement to return home. I called daughter, Lisa, and went over my concerns of the friend and she said her sister, Kelsey, will be with pt 24/7. Tentative discharge home today.Monica Elizondo, JOSY  2/20/2025

## 2025-02-21 ENCOUNTER — CARE COORDINATION (OUTPATIENT)
Dept: CARE COORDINATION | Age: 65
End: 2025-02-21

## 2025-02-21 NOTE — PROGRESS NOTES
[facility] Hospitalist Progress Note      Synopsis: Patient with a history of diabetes, GERD, hypertension, liver cirrhosis, SDH admitted on 2/17/2025 for altered mental status secondary to hepatic encephalopathy.  Complicated by possible UTI.  Patient started on lactulose, with improvement in mental status and elevated ammonia.  Continues on Rocephin for possible UTI.    Subjective  Continues to be drowsy but improving  Per family pt had not had a BM for 3 days prior to admission     Exam:  BP (!) 135/51   Pulse 84   Temp 98 °F (36.7 °C) (Axillary)   Resp 16   Ht 1.626 m (5' 4.02\")   Wt 72.6 kg (160 lb)   SpO2 100%   BMI 27.45 kg/m²   Constitutional:       General: She is not in acute distress.     Appearance: Normal appearance. She is ill-appearing and toxic-appearing.   HENT:      Mouth/Throat:      Comments: Dry oral mucosa  Eyes:      General: Scleral icterus present.      Extraocular Movements: Extraocular movements intact.      Pupils: Pupils are equal, round, and reactive to light.   Cardiovascular:      Rate and Rhythm: Normal rate and regular rhythm.      Heart sounds: Normal heart sounds.   Pulmonary:      Effort: Pulmonary effort is normal. No respiratory distress.      Breath sounds: No stridor.   Abdominal:      General: Bowel sounds are normal.      Palpations: Abdomen is soft.   Musculoskeletal:         General: Normal range of motion.   Skin:     Comments: Jaundice   Neurological:      Mental Status: She is lethargic.      Comments: Nonresponsive     Medications:  Reviewed    Infusion Medications    dextrose      sodium chloride       Scheduled Medications    lactulose  20 g Oral 6 times per day    bumetanide  1 mg Oral Daily    carvedilol  3.125 mg Oral Daily    insulin glargine  24 Units SubCUTAneous Nightly    insulin lispro  0-8 Units SubCUTAneous 4x Daily AC & HS    pantoprazole  40 mg Oral BID AC    sodium chloride flush  5-40 mL IntraVENous 2 times per day    rifAXIMin  400 mg Oral TID 
  Physician Progress Note      PATIENT:               CLINTON ADAMS  [facility] #:                  [id number]  :                       1960  ADMIT DATE:       2025 10:37 AM  DISCH DATE:        2025 6:08 PM  RESPONDING  PROVIDER #:        Ora Velasquez MD          QUERY TEXT:    Pt admitted with hepatic encephalopathy. Pt noted to have WBC 1.6, lactic acid   3.9, temp-96.7, If possible, please document in the progress notes and   discharge summary if you are evaluating and /or treating any of the following:    The medical record reflects the following:    Risk Factors: UTI, HTN, DM, age 64,    Clinical Indicators: IM progress note 2025 admitted on 2025 for   altered mental status secondary to hepatic encephalopathy.  Complicated by   possible UTI    WBC 1.7 1.8 1.6    Lactic Acid - 3.2 3.9    temp - 96.7,    UR/ culture-KLEBSIELLA OXYTOCA Identification by MALDI-TOF >100,000 CFU/ML    Nitrite, Ur POSITIVE A    Treatment:  IV ceftriaxone , Tab- rifaximin , IVF , serial lab , , Imaging   studies    Thank you    Kathy Vera [facility]  ,  CDS  Options provided:  -- severe Sepsis due to UTI, present on admission  -- Sepsis due to UTI, present on admission,  -- UTI without Sepsis  -- Other - I will add my own diagnosis  -- Disagree - Not applicable / Not valid  -- Disagree - Clinically unable to determine / Unknown  -- Refer to Clinical Documentation Reviewer    PROVIDER RESPONSE TEXT:    This patient has severe Sepsis due to UTI, present on admission    Query created by: Kathy Vera on 2025 10:30 PM      QUERY TEXT:    Patient admitted with hepatic encephalopathy, noted to have platelet 19, RBC   2.18, WBC-1.6, Hb 7.1. If possible, please document in-progress notes and   discharge summary if you are evaluating and/or treating any of the following:    The medical record reflects the following:    Risk Factors: cirrhosis, DM , HTN , CKD , age 64    Clinical Indicators: IM 
 consulted to visit with Ms. Hunt.  Upon entering the room I found Lisa lying in bed and very passive yet she expressed awareness of my presence.  I provided support to her through words of encouragement, a ministry of presence and prayer.     If additional support is requested or needed please reach out to Spiritual Health ([phone]).    Chap. Aston Samuel MDIV, BCC    
4 Eyes Skin Assessment     NAME:  Lisa Hunt  YOB: 1960  MEDICAL RECORD NUMBER:  [id number]    The patient is being assessed for  Admission    I agree that at least one RN has performed a thorough Head to Toe Skin Assessment on the patient. ALL assessment sites listed below have been assessed.      Areas assessed by both nurses:    Head, Face, Ears, Shoulders, Back, Chest, Arms, Elbows, Hands, Sacrum. Buttock, Coccyx, Ischium, Legs. Feet and Heels, and Under Medical Devices         Does the Patient have a Wound? No noted wound(s)       Kane Prevention initiated by RN: Yes  Wound Care Orders initiated by RN: No    Pressure Injury (Stage 3,4, Unstageable, DTI, NWPT, and Complex wounds) if present, place Wound referral order by RN under : No    New Ostomies, if present place, Ostomy referral order under : No     Nurse 1 eSignature: Electronically signed by Miky Gordillo RN on 2/17/25 at 10:11 PM EST    **SHARE this note so that the co-signing nurse can place an eSignature**    Nurse 2 eSignature: Electronically signed by Tonya Gilmore RN on 2/18/25 at 6:10 AM EST    
CLINICAL PHARMACY NOTE: MEDS TO BEDS    Total # of Prescriptions Filled: 1   The following medications were delivered to the patient:  Ciprofloxacin 500mg tabs    Additional Documentation:  To patient in room  
Called and spoke with the patient daughter Lisa Fitch over the phone and went over the admission questions. Patient daughter Lisa Fitch said she is not sure what the patient medications are and someone will bring the home medications list as soon as they can.  
Contacted daughter Kelsey left a message to call back. Got ahold of other daughter Lisa, to inform pt is discharged and coordinate a  time for patient, she will call back with a  time.   
Lisa contacted Rn and stated that her sister Kelsey is now coming to pick mother up, should be up shortly.    
MRI screening form needs completed, thank you.  
Notified Dr. Ora Velasquez via [employer] about patient abnormal platelet levels.  
Notified Dr. Velasquez pt hemoglobin is 7.1 and if pt would be able to discharge today.  
Notified Monica with case management and Dr. Velasquez, pt family is refusing to pick pt up tonight.  
OCCUPATIONAL THERAPY INITIAL EVALUATION    [facility] [address] [place], [place]       Date:2025                                                  Patient Name: Lisa Hunt  MRN: [id number]  : 1960  Room: [facility]    Evaluating OT: Miguelangel Ohara OTR/L [id number]    Referring Provider:     Ora Velasquez MD        Specific Provider Orders/Date: OT evaluation and treatment 25 0830    Diagnosis:  Hepatic encephalopathy (HCC) [K76.82]  Lactic acidosis [E87.20]  Altered mental status, unspecified altered mental status type [R41.82]      Pertinent Medical History:  has a past medical history of DONNA (acute kidney injury), Cirrhosis (HCC), Diabetes mellitus (HCC), Diabetic neuropathy (HCC), Esophageal varices without bleeding (HCC), GERD (gastroesophageal reflux disease), Hypertension, Intracranial bleed (HCC), Liver cirrhosis (HCC), Low vitamin D level, SDH (subdural hematoma) (HCC), Thrombocytopenia, and Type 2 diabetes mellitus without complication (HCC).   Past Surgical History:   Procedure Laterality Date    APPENDECTOMY       SECTION      CHOLECYSTECTOMY      CT NEEDLE BIOPSY LIVER PERCUTANEOUS  2024    CT NEEDLE BIOPSY LIVER PERCUTANEOUS 2024 IRLANDA Ndiaye MD [facility]    CT NEEDLE BIOPSY LIVER PERCUTANEOUS  2024    CT NEEDLE BIOPSY LIVER PERCUTANEOUS 2024 Madison Ndiaye MD [facility] CT    IR EMBOLIZATION TUMOR/ORGAN  2024    IR EMBOLIZATION TUMOR / ORGAN 2024 Madison Ndiaye MD [facility]    IR EMBOLIZATION TUMOR/ORGAN  12/10/2024    IR EMBOLIZATION TUMOR / ORGAN 12/10/2024 Madison Ndiaye MD [facility]    IR VASC EMBOLIZE OCCLUDE ARTERY  12/10/2024    IR VASC EMBOLIZE OCCLUDE ARTERY 12/10/2024 Madison Ndiaye MD [facility]    PARACENTESIS Left 10/12/2023    4400ml hazy yellow    PARACENTESIS Left 2024    2440cc yellow clear 
Pt incontinent of urine.  Complete bed change performed, pt cleaned up, brief placed on pt , pure wick placed for incontinence, and warm blankets provided for comfort  
Reached out to daughter Lisa and informed that we would provide her mother a taxi ride home, daughter Lisa stated \"that is dangerous\" and \"don't do that\". Notified charge RN and Monica  that family does not want mother to use taxi services.   
Rn has not heard back from patient family members for ride home. RN contacted Lisa/daughter to inquire about  time for mother, stated that someone else called her at 2:45 pm and informed her that her mother is discharged and that she could be picked up tomorrow, I went on to clarify that her mother is discharged however we need to make arrangements to have someone pick her up this evening, daughter went on to say that her sister yolette's car is not working and that she is 30 minutes away from the hospital and is unable to come tonight. Informed charge RN, that patient does not have a ride home this evening.   
Meds: glucose, dextrose bolus **OR** dextrose bolus, glucagon (rDNA), dextrose, sodium chloride flush, sodium chloride, potassium chloride **OR** potassium alternative oral replacement **OR** potassium chloride, magnesium sulfate, polyethylene glycol, prochlorperazine **OR** prochlorperazine    I/O  No intake or output data in the 24 hours ending 02/19/25 1125      Labs:   Recent Labs     02/17/25  1054 02/18/25  0608   WBC 1.6* 1.8*   HGB 7.6* 7.3*   HCT 23.2* 22.0*       Recent Labs     02/17/25  1054 02/17/25  1735 02/18/25  0608    132 141   K 5.6* 4.4 4.4    100 104   CO2 23 23 25   BUN 46* 45* 45*   CREATININE 1.6* 1.4* 1.4*   CALCIUM 9.4 8.5* 9.0       Recent Labs     02/17/25  1054   ALKPHOS 135*   ALT 25   AST 51*   BILITOT 1.9*   LIPASE 30       Recent Labs     02/17/25  1054   INR 1.8       No results for input(s): \"CKTOTAL\", \"TROPONINI\" in the last 72 hours.    Chronic labs:  Lab Results   Component Value Date    CHOL 88 12/06/2024    TRIG 59 12/06/2024    HDL 29 (L) 12/06/2024    TSH 4.02 02/12/2025    INR 1.8 02/17/2025    LABA1C 6.3 (H) 02/12/2025       Radiology:  Imaging studies reviewed today.    ASSESSMENT:  Hepatic encephalopathy  ACOSTA liver cirrhosis with recurrent ascites, varices  Chronic thrombocytopenia  Type 2 diabetes  CKD  Hyperkalemia  Medical noncompliance  Klebsiella oxytoca UTI     PLAN:  Strict intake and output, monitor renal function  Continue rifaximin, lactulose; educated family on how to uptitrate lactulose   Hold Aldactone given mild hyperkalemia  Glycemic control  Keep patient n.p.o. until mental status improves, will start on maintenance IV fluid until mental status improves and patient able to hydrate  MRI of the brain without acute changes  PT and OT consulted  Continue Rocephin pending urinary culture  Continue home meds as ordered  Will likely need placement at discharge      Diet: ADULT DIET; Dysphagia - Minced and Moist  Code Status: Full Code  PT/OT Eval 
functional mobility independence.     Pt was left in supine with all needs met at conclusion of session and all line(s) managed.   Bed alarm activated.     Treatment:  Patient practiced and was instructed in the following treatment:    Therapeutic activities:   Bed Mobility: Verbal cues for proper positioning and sequencing to perform bed mobility to facilitate maximum independence.   Pt sat EOB 5 minutes to promote upright tolerance, B LE ROM, and postural awareness. VC for posture, skilled guarding/ assistance provided as needed.   Transfers: Verbal cues for proper positioning and sequencing to perform transfers safely with maximum independence.   Gait Training: Verbal cues for safety and pacing to maximize functional mobility independence.   Stair negotiation: VC for safety and sequencing   Skillful positioning in bed to protect skin and joint integrity.   Pt education for safety with all mobility, activity pacing, and WW navigation.   Vitals and symptoms monitored during session.     Pt's/ family goals   1. Get better, return to PLOF    Prognosis is good for reaching above PT goals.    Patient and or family understand(s) diagnosis, prognosis, and plan of care.  Yes     PHYSICAL THERAPY PLAN OF CARE:    PT POC is established based on physician order and patient diagnosis     Referring provider/PT Order:    02/19/25 0830  PT eval and treat  Start:  02/19/25 0830,   End:  02/19/25 0830,   ONE TIME,   Standing Count:  1 Occurrences,   R         Ora Velasquez MD     Diagnosis:  Hepatic encephalopathy (HCC) [K76.82]  Lactic acidosis [E87.20]  Altered mental status, unspecified altered mental status type [R41.82]  Specific instructions for next treatment:  progress functional mobility as tolerated.     Current Treatment Recommendations:     [x] Strengthening to improve independence with functional mobility   [] ROM to improve independence with functional mobility   [x] Balance Training to improve static/dynamic balance

## 2025-02-21 NOTE — CARE COORDINATION
Care Transitions Note    Initial Call - Call within 2 business days of discharge: Yes    Attempted to reach patient, family, daughter Kelsey  for transitions of care follow up. Unable to reach patient. Spoke to pt's daughter Lisa. Informed pt lost her cell phone. Stated pt is staying with daughter Kelsey, requested call to her instead. Left VM through  requesting call back. Verified [facility] will be following, no SOC date set up yet.     Outreach Attempts:   HIPAA compliant voicemail left for family, magdaleno Cole.     Patient: Lisa Hunt    Patient : 1960   MRN: [id number]    Reason for Admission: hepatic encephalopathy  Discharge Date: 25  RURS: Readmission Risk Score: 37.2    Last Discharge Facility       Date Complaint Diagnosis Description Type Department Provider    25 Altered Mental Status Hepatic encephalopathy (HCC) ... ED to Hosp-Admission (Discharged) (ADMITTED) [facility] Ora Velasquez MD; David Sharma,...            Was this an external facility discharge? No    Follow Up Appointment:   Patient has hospital follow up appointment scheduled within 7 days of discharge.  Will request to be changed to HFU appt with PCP  Future Appointments         Provider Specialty Dept Phone    2025 11:00 AM Tonie Hernandez DO Family Medicine [phone]    2025 12:30 PM Tae Gaines MD Endocrinology [phone]    2025 9:45 AM Anil Conrad MD Gastroenterology [phone]    2025 11:00 AM Anil Conrad MD Gastroenterology [phone]            Plan for follow-up on next business day.      Concepción Zhou, ANNETTA

## 2025-02-22 LAB
EKG ATRIAL RATE: 71 BPM
EKG P AXIS: 26 DEGREES
EKG P-R INTERVAL: 164 MS
EKG Q-T INTERVAL: 466 MS
EKG QRS DURATION: 130 MS
EKG QTC CALCULATION (BAZETT): 506 MS
EKG R AXIS: -58 DEGREES
EKG T AXIS: 17 DEGREES
EKG VENTRICULAR RATE: 71 BPM

## 2025-02-24 ENCOUNTER — CARE COORDINATION (OUTPATIENT)
Dept: CARE COORDINATION | Age: 65
End: 2025-02-24

## 2025-02-24 NOTE — CARE COORDINATION
Care Transitions Note    Initial Call - Call within 2 business days of discharge: Yes    Attempted to reach patient for transitions of care follow up. Unable to reach patient. Noted pt was a no show for HFU today.     Outreach Attempts:   Multiple attempts to contact patient at phone numbers on file.     Patient: Lisa Hunt    Patient : 1960   MRN: [id number]    Reason for Admission: hepatic encephalopathy  Discharge Date: 25  RURS: Readmission Risk Score: 37.2    Last Discharge Facility       Date Complaint Diagnosis Description Type Department Provider    25 Altered Mental Status Hepatic encephalopathy (HCC) ... ED to Hosp-Admission (Discharged) (ADMITTED) [facility] Ora Velasquez MD; David Sharma,...            Was this an external facility discharge? No    Follow Up Appointment:   Patient has hospital follow up appointment scheduled within 7 days of discharge.  No show today for PCP HFU  Future Appointments         Provider Specialty Dept Phone    2025 12:30 PM Tae Gaines MD Endocrinology [phone]    2025 9:45 AM Anil Conrad MD Gastroenterology [phone]    2025 11:00 AM Anil Conrad MD Gastroenterology [phone]            No further follow-up call indicated     Concepción Zhou RN

## 2025-06-16 NOTE — ED NOTES
Bladder scanned, 627 ml urine noted     Marie Marte RN  07/18/23 2104 Patient resting with eyes closed, responds to verbal stimulation, denies suicidal ideation at this time. Sitter on 1:1 at this time.

## (undated) PROCEDURE — 0W9G3ZZ DRAINAGE OF PERITONEAL CAVITY, PERCUTANEOUS APPROACH: ICD-10-PCS

## (undated) DEVICE — GAUZE,SPONGE,4"X4",8PLY,STRL,LF,10/TRAY: Brand: MEDLINE

## (undated) DEVICE — TEN SHOOTER SAEED MULTI-BAND LIGATOR: Brand: SAEED

## (undated) DEVICE — BITEBLOCK 54FR W/ DENT RIM BLOX

## (undated) DEVICE — CONNECTOR IRRIGATION AUXILIARY H2O JET W/ PRT MTL THRD HYDR

## (undated) DEVICE — DEFENDO AIR WATER SUCTION AND BIOPSY VALVE KIT FOR  OLYMPUS: Brand: DEFENDO AIR/WATER/SUCTION AND BIOPSY VALVE

## (undated) DEVICE — ENDOSCOPIC KIT 1.1+ OP4 NO CP DE

## (undated) DEVICE — FIAPC® PROBE W/ FILTER 2200 C OD 2.3MM/6.9FR; L 2.2M/7.2FT: Brand: ERBE

## (undated) DEVICE — ELECTRODE PT RET AD L9FT HI MOIST COND ADH HYDRGEL CORDED

## (undated) DEVICE — AIR/WATER CLEANING ADAPTER FOR OLYMPUS® GI ENDOSCOPE: Brand: BULLDOG®

## (undated) DEVICE — FORMALIN  10%NBF 60ML PREFLL CONT

## (undated) DEVICE — FORCEPS BX L160CM JAW DIA2.4MM YEL L CAP W/ NDL DISP RAD